# Patient Record
Sex: FEMALE | Race: WHITE | NOT HISPANIC OR LATINO | Employment: OTHER | URBAN - METROPOLITAN AREA
[De-identification: names, ages, dates, MRNs, and addresses within clinical notes are randomized per-mention and may not be internally consistent; named-entity substitution may affect disease eponyms.]

---

## 2017-01-11 ENCOUNTER — LAB (OUTPATIENT)
Dept: LAB | Facility: HOSPITAL | Age: 62
End: 2017-01-11

## 2017-01-11 DIAGNOSIS — N18.4 CHRONIC KIDNEY DISEASE, STAGE IV (SEVERE) (HCC): Primary | ICD-10-CM

## 2017-01-11 LAB
ALBUMIN SERPL-MCNC: 3.4 G/DL (ref 3.2–4.8)
ANION GAP SERPL CALCULATED.3IONS-SCNC: 13 MMOL/L (ref 3–11)
BACTERIA UR QL AUTO: ABNORMAL /HPF
BILIRUB UR QL STRIP: NEGATIVE
BUN BLD-MCNC: 53 MG/DL (ref 9–23)
BUN/CREAT SERPL: 21.2 (ref 7–25)
CALCIUM SPEC-SCNC: 9.7 MG/DL (ref 8.7–10.4)
CHLORIDE SERPL-SCNC: 104 MMOL/L (ref 99–109)
CLARITY UR: CLEAR
CO2 SERPL-SCNC: 25 MMOL/L (ref 20–31)
COLOR UR: YELLOW
CREAT BLD-MCNC: 2.5 MG/DL (ref 0.6–1.3)
CREAT UR-MCNC: 38.7 MG/DL
GFR SERPL CREATININE-BSD FRML MDRD: 20 ML/MIN/1.73
GLUCOSE BLD-MCNC: 350 MG/DL (ref 70–100)
GLUCOSE UR STRIP-MCNC: ABNORMAL MG/DL
HGB UR QL STRIP.AUTO: ABNORMAL
HYALINE CASTS UR QL AUTO: ABNORMAL /LPF
KETONES UR QL STRIP: NEGATIVE
LEUKOCYTE ESTERASE UR QL STRIP.AUTO: NEGATIVE
NITRITE UR QL STRIP: NEGATIVE
PH UR STRIP.AUTO: 6.5 [PH] (ref 5–8)
PHOSPHATE SERPL-MCNC: 4.3 MG/DL (ref 2.4–5.1)
POTASSIUM BLD-SCNC: 5 MMOL/L (ref 3.5–5.5)
PROT UR QL STRIP: ABNORMAL
PROT UR-MCNC: 649 MG/DL (ref 1–14)
RBC # UR: ABNORMAL /HPF
REF LAB TEST METHOD: ABNORMAL
SODIUM BLD-SCNC: 142 MMOL/L (ref 132–146)
SP GR UR STRIP: 1.02 (ref 1–1.03)
SQUAMOUS #/AREA URNS HPF: ABNORMAL /HPF
UROBILINOGEN UR QL STRIP: ABNORMAL
WBC UR QL AUTO: ABNORMAL /HPF

## 2017-01-11 PROCEDURE — 80069 RENAL FUNCTION PANEL: CPT | Performed by: INTERNAL MEDICINE

## 2017-01-11 PROCEDURE — 81001 URINALYSIS AUTO W/SCOPE: CPT | Performed by: INTERNAL MEDICINE

## 2017-01-11 PROCEDURE — 84156 ASSAY OF PROTEIN URINE: CPT | Performed by: INTERNAL MEDICINE

## 2017-01-11 PROCEDURE — 82570 ASSAY OF URINE CREATININE: CPT | Performed by: INTERNAL MEDICINE

## 2017-01-11 PROCEDURE — 36415 COLL VENOUS BLD VENIPUNCTURE: CPT

## 2017-01-16 RX ORDER — ERGOCALCIFEROL 1.25 MG/1
CAPSULE ORAL
Qty: 5 CAPSULE | Refills: 0 | Status: SHIPPED | OUTPATIENT
Start: 2017-01-16 | End: 2017-02-16 | Stop reason: SDUPTHER

## 2017-01-24 DIAGNOSIS — I10 ESSENTIAL HYPERTENSION: ICD-10-CM

## 2017-01-24 RX ORDER — DILTIAZEM HYDROCHLORIDE 240 MG/1
CAPSULE, EXTENDED RELEASE ORAL
Qty: 90 CAPSULE | Refills: 0 | Status: SHIPPED | OUTPATIENT
Start: 2017-01-24 | End: 2017-06-12 | Stop reason: SDUPTHER

## 2017-01-30 RX ORDER — INSULIN LISPRO 200 [IU]/ML
INJECTION, SOLUTION SUBCUTANEOUS
Qty: 60 ML | Refills: 0 | Status: SHIPPED | OUTPATIENT
Start: 2017-01-30 | End: 2017-03-11 | Stop reason: SDUPTHER

## 2017-02-16 ENCOUNTER — OFFICE VISIT (OUTPATIENT)
Dept: INTERNAL MEDICINE | Facility: CLINIC | Age: 62
End: 2017-02-16

## 2017-02-16 ENCOUNTER — OFFICE VISIT (OUTPATIENT)
Dept: ENDOCRINOLOGY | Facility: CLINIC | Age: 62
End: 2017-02-16

## 2017-02-16 VITALS
OXYGEN SATURATION: 99 % | SYSTOLIC BLOOD PRESSURE: 142 MMHG | DIASTOLIC BLOOD PRESSURE: 74 MMHG | HEIGHT: 67 IN | HEART RATE: 87 BPM | BODY MASS INDEX: 45.99 KG/M2 | WEIGHT: 293 LBS

## 2017-02-16 VITALS
WEIGHT: 293 LBS | HEIGHT: 67 IN | OXYGEN SATURATION: 99 % | HEART RATE: 86 BPM | BODY MASS INDEX: 45.99 KG/M2 | DIASTOLIC BLOOD PRESSURE: 74 MMHG | SYSTOLIC BLOOD PRESSURE: 142 MMHG

## 2017-02-16 DIAGNOSIS — I25.10 CORONARY ARTERY DISEASE INVOLVING NATIVE CORONARY ARTERY OF NATIVE HEART WITHOUT ANGINA PECTORIS: ICD-10-CM

## 2017-02-16 DIAGNOSIS — N25.81 SECONDARY HYPERPARATHYROIDISM (HCC): ICD-10-CM

## 2017-02-16 DIAGNOSIS — R60.0 BILATERAL EDEMA OF LOWER EXTREMITY: ICD-10-CM

## 2017-02-16 DIAGNOSIS — IMO0002 DIABETES MELLITUS WITH NEUROLOGICAL MANIFESTATIONS, UNCONTROLLED: ICD-10-CM

## 2017-02-16 DIAGNOSIS — IMO0002 UNCONTROLLED TYPE 2 DIABETES MELLITUS WITH COMPLICATION, WITH LONG-TERM CURRENT USE OF INSULIN: Primary | ICD-10-CM

## 2017-02-16 DIAGNOSIS — E08.319: ICD-10-CM

## 2017-02-16 DIAGNOSIS — E66.9 NON MORBID OBESITY, UNSPECIFIED OBESITY TYPE: ICD-10-CM

## 2017-02-16 DIAGNOSIS — E11.42 DIABETIC PERIPHERAL NEUROPATHY (HCC): ICD-10-CM

## 2017-02-16 DIAGNOSIS — E55.9 VITAMIN D DEFICIENCY: ICD-10-CM

## 2017-02-16 DIAGNOSIS — I10 ESSENTIAL HYPERTENSION: ICD-10-CM

## 2017-02-16 LAB
GLUCOSE BLDC GLUCOMTR-MCNC: 159 MG/DL (ref 70–130)
HBA1C MFR BLD: 7 %

## 2017-02-16 PROCEDURE — 82962 GLUCOSE BLOOD TEST: CPT | Performed by: INTERNAL MEDICINE

## 2017-02-16 PROCEDURE — 83036 HEMOGLOBIN GLYCOSYLATED A1C: CPT | Performed by: INTERNAL MEDICINE

## 2017-02-16 PROCEDURE — 99214 OFFICE O/P EST MOD 30 MIN: CPT | Performed by: INTERNAL MEDICINE

## 2017-02-16 PROCEDURE — 99213 OFFICE O/P EST LOW 20 MIN: CPT | Performed by: HOSPITALIST

## 2017-02-16 RX ORDER — INSULIN GLARGINE 300 U/ML
145 INJECTION, SOLUTION SUBCUTANEOUS DAILY
Qty: 15 PEN | Refills: 3 | Status: SHIPPED | OUTPATIENT
Start: 2017-02-16 | End: 2017-06-12 | Stop reason: SDUPTHER

## 2017-02-16 RX ORDER — ERGOCALCIFEROL 1.25 MG/1
CAPSULE ORAL
Qty: 5 CAPSULE | Refills: 0 | Status: SHIPPED | OUTPATIENT
Start: 2017-02-16 | End: 2017-03-23 | Stop reason: SDUPTHER

## 2017-02-16 NOTE — PROGRESS NOTES
Subjective   Tashia Leon is a 61 y.o. female. Essential hypertension; Gastroesophageal reflux disease without esophagitis; Non morbid obesity, unspecified obesity type; and Lumbar disc disease         HPI Comments: She is doing well. She has been swelling off and on. She has been trying to eliminate sugar and has been off for 1 month. She takes the diuretic daily. Her BP is up slight from her last visit. Her weight is stable. She is trying to lose weight. She has no new complaints. She sees Dr. Cowan today for follow up of her diabetes.      The following portions of the patient's history were reviewed and updated as appropriate: allergies, current medications, past family history, past medical history, past social history, past surgical history and problem list.    Review of Systems   Constitutional: Positive for fatigue. Negative for activity change, appetite change and unexpected weight change.   Respiratory: Negative for chest tightness, shortness of breath and wheezing.    Cardiovascular: Positive for leg swelling. Negative for chest pain and palpitations.   Gastrointestinal: Negative for abdominal distention and abdominal pain.   Endocrine: Negative for cold intolerance and heat intolerance.   Genitourinary: Negative for difficulty urinating and dysuria.   Neurological: Negative for light-headedness and headaches.       Objective   Vitals:    02/16/17 0833   BP: 142/74   Pulse: 87   SpO2: 99%       Physical Exam   Constitutional: She is oriented to person, place, and time. She appears well-developed and well-nourished.   HENT:   Head: Normocephalic and atraumatic.   Eyes: Conjunctivae and EOM are normal. Pupils are equal, round, and reactive to light.   Neck: Normal range of motion. Neck supple.   Cardiovascular: Normal rate, regular rhythm and normal heart sounds.    Pulmonary/Chest: Effort normal and breath sounds normal.   Abdominal: Soft. Bowel sounds are normal.   Musculoskeletal: Normal range of  motion.   Neurological: She is alert and oriented to person, place, and time. She has normal reflexes.       Assessment/Plan   Tashia was seen today for essential hypertension, gastroesophageal reflux disease without esophagitis, non morbid obesity, unspecified obesity type and lumbar disc disease.    Diagnoses and all orders for this visit:    Uncontrolled type 2 diabetes mellitus with complication, with long-term current use of insulin  -     Cancel: POC Glycosylated Hemoglobin (Hb A1C)  -     Cancel: POCT Glucose    Essential hypertension    Coronary artery disease involving native coronary artery of native heart without angina pectoris    Non morbid obesity, unspecified obesity type    Bilateral edema of lower extremity      She will montior her BP and let me know if it continue to run high.

## 2017-02-16 NOTE — PROGRESS NOTES
Chief complaint  Diabetes (F/u for type 2 diabetes, testing 5x qd ~ pt stated blood sugars were in the 500's in January (due to being on steroids) but now her readings have been lower. )    Subjective   Tashia Leon is a 61 y.o. female is here today for follow-up.  Diabetes mellitus type 2 diagnosed in mid 90s.   Medications: Humalog and Toujeo  Diabetic complications: Diabetic foot ulcer. Diab retinopathy. CKD   Eye care: diabetic retinopathy by last exam, seeing eye dr regularly. Podiatry visit - every 2 weeks currently  Hypoglycemia:none  Nutrition: Patient eats 3 meals a day with carbohydrates consistent amount per meal. She follows high protein diet for improved healing. Her mother is dietitian and helps her with diet plan.   Monitoring: she has      Hyerlipidemia with high LDL. Side effects on statins . She developed SOB on crestor and lipitor, simvastatin, Livalo.Doing well on Praluent, which was started Nov 2015, doing well since then.     Vit D deficiency 50 000 IU weekly Vit D started in Dec 2014      Patient was on steroids in January and the glucose was high in 400. She is following the strict diet since New Year.      Diabetes   Pertinent negatives for hypoglycemia include no dizziness, headaches or tremors. Associated symptoms include fatigue and weakness. Pertinent negatives for diabetes include no chest pain.       Medications    Current Outpatient Prescriptions:   •  Aflibercept (EYLEA IO), Inject  as directed every 30 (thirty) days., Disp: , Rfl:   •  Alirocumab (PRALUENT) 75 MG/ML solution pen-injector, Inject 75 mg under the skin Every 14 (Fourteen) Days., Disp: 6 pen, Rfl: 1  •  aspirin  MG tablet, Take 1 tablet by mouth Daily., Disp: 90 tablet, Rfl: 1  •  B-D UF III MINI PEN NEEDLES 31G X 5 MM misc, FOR USE WITH INSULIN PEN 4 TO 6 TIMES A DAY, Disp: 200 each, Rfl: 5  •  CARTIA  MG 24 hr capsule, TAKE 1 CAPSULE BY MOUTH ONCE DAILY, Disp: 90 capsule, Rfl: 0  •  furosemide  (LASIX) 40 MG tablet, TK 1 T PO BID, Disp: , Rfl: 3  •  HUMALOG KWIKPEN 200 UNIT/ML solution pen-injector, INJECT 60 UNITS WITH MEALS UP TO THREE TIMES DAILY ADD CORRECTION INSULIN AS DIRECTED. MAXIMUM DAILY DOSE 180 UNITS, Disp: 60 mL, Rfl: 0  •  lansoprazole (PREVACID) 30 MG capsule, Take 1 capsule by mouth Daily., Disp: 30 capsule, Rfl: 2  •  LORTAB 7.5-325 MG per tablet, TK 1 T PO  QID, Disp: , Rfl: 0  •  Multiple Vitamin (DAILY VITAMIN PO), Take  by mouth daily., Disp: , Rfl:   •  ONE TOUCH ULTRA TEST test strip, USE TO TEST BLOOD GLUCOSE THREE TIMES DAILY, Disp: 100 each, Rfl: 1  •  TOUJEO SOLOSTAR 300 UNIT/ML solution pen-injector, Inject 145 Units under the skin Daily. Inject 75 units in the morning and 70 units at bedtime., Disp: 15 pen, Rfl: 3  •  vitamin D (ERGOCALCIFEROL) 80626 UNITS capsule capsule, TAKE 1 CAPSULE BY MOUTH ONCE WEEKLY, Disp: 5 capsule, Rfl: 0    PMH  The following portions of the patient's history were reviewed and updated as appropriate: allergies, current medications, past family history, past medical history, past social history, past surgical history and problem list.    Review of systems  Review of Systems   Constitutional: Positive for fatigue and unexpected weight change (weight gain). Negative for activity change, appetite change, chills and diaphoresis.   HENT: Negative for congestion, ear pain, facial swelling, hearing loss, postnasal drip, sore throat, trouble swallowing and voice change.    Eyes: Negative.  Negative for redness, itching and visual disturbance.   Respiratory: Negative for cough and shortness of breath.    Cardiovascular: Positive for leg swelling. Negative for chest pain and palpitations.   Gastrointestinal: Negative for abdominal distention, abdominal pain, constipation, diarrhea, nausea and vomiting.   Endocrine:        As listed in HPI   Genitourinary: Negative.    Musculoskeletal: Negative for arthralgias, back pain, joint swelling, myalgias, neck pain  "and neck stiffness.   Skin: Negative.    Allergic/Immunologic: Negative.    Neurological: Positive for weakness and light-headedness. Negative for dizziness, tremors, syncope and headaches.   Hematological: Negative.    Psychiatric/Behavioral: Negative.    All other systems reviewed and are negative.      Physical exam  Objective   Blood pressure 142/74, pulse 86, height 67\" (170.2 cm), weight 298 lb (135 kg), SpO2 99 %.   Physical Exam   Constitutional: She is oriented to person, place, and time. She appears well-developed and well-nourished.   HENT:   Head: Normocephalic and atraumatic.   Eyes: Conjunctivae are normal.   Neck: No thyromegaly present.   The neck is supple and symmetric   Cardiovascular: Normal rate, regular rhythm and normal heart sounds.    Pulmonary/Chest: Effort normal and breath sounds normal.   Musculoskeletal:   Lower extremities - compression device applied bilaterally. Skin is intact   Lymphadenopathy:     She has no cervical adenopathy.   Neurological: She is alert and oriented to person, place, and time.   Skin: Skin is warm and dry. No rash noted.   Psychiatric: She has a normal mood and affect. Thought content normal.   Vitals reviewed.        LABS AND IMAGING  Office Visit on 02/16/2017   Component Date Value Ref Range Status   • Glucose 02/16/2017 159* 70 - 130 mg/dL Final   • Hemoglobin A1C 02/16/2017 7.0  % Final           Assessment  Assessment/Plan   Problem List Items Addressed This Visit        Digestive    Vitamin D deficiency       Endocrine    Diabetes mellitus with neurological manifestations, uncontrolled    Relevant Medications    TOUJEO SOLOSTAR 300 UNIT/ML solution pen-injector    Diabetic peripheral neuropathy    Relevant Medications    TOUJEO SOLOSTAR 300 UNIT/ML solution pen-injector    Diabetes mellitus type 2, uncontrolled, with complications - Primary    Relevant Medications    TOUJEO SOLOSTAR 300 UNIT/ML solution pen-injector    Other Relevant Orders    POC " Glucose Fingerstick (Completed)    POC Glycosylated Hemoglobin (Hb A1C) (Completed)    Secondary hyperparathyroidism       Other    Diabetic retinopathy          Plan  1. Diabetes mellitus type 2 with hx diabetic foot ulcer, retinopathy and CKD.   -New insulin instructions with concentrated insulins given: Toujeo 75 units in am and 70 units at bedtime. Humalog U-200 60 units with meals with correction 3 for 50 over 150.   patient was out of the insulin for a week and didn't noticed significant change. Her renal function declined  -restart Toujeo at 75 units daily and monitor glucose, may add 40-45 units second dose 3 days     2. Hyperlipidemia  Restart Praluent every 2 weeks. Samples provided. She had reaction to crestor, livalo and lipitor. No alternatives exist.   -repeat labs next visit     3. CKD / edema - stable, labs from nephrololgist reviewed, Cr is 3. She had discussion on dialysis.     4. Vitamin D deficiency - 50 000 IU weekly currently.       Lab results from nephrologist reviewed with the patient.     Follow-up in 3 months. Lab before the visit.

## 2017-03-13 RX ORDER — INSULIN LISPRO 200 [IU]/ML
INJECTION, SOLUTION SUBCUTANEOUS
Qty: 60 PEN | Refills: 0 | Status: SHIPPED | OUTPATIENT
Start: 2017-03-13 | End: 2017-05-16 | Stop reason: SDUPTHER

## 2017-03-20 ENCOUNTER — TRANSCRIBE ORDERS (OUTPATIENT)
Dept: LAB | Facility: HOSPITAL | Age: 62
End: 2017-03-20

## 2017-03-20 ENCOUNTER — LAB (OUTPATIENT)
Dept: LAB | Facility: HOSPITAL | Age: 62
End: 2017-03-20

## 2017-03-20 DIAGNOSIS — N18.4 CHRONIC KIDNEY DISEASE, STAGE IV (SEVERE) (HCC): ICD-10-CM

## 2017-03-20 DIAGNOSIS — E21.1 HYPERPARATHYROIDISM DUE TO 1,25(0H)2D3 (HCC): ICD-10-CM

## 2017-03-20 DIAGNOSIS — R80.9 PROTEINURIA: ICD-10-CM

## 2017-03-20 DIAGNOSIS — N18.4 CHRONIC KIDNEY DISEASE, STAGE IV (SEVERE) (HCC): Primary | ICD-10-CM

## 2017-03-20 LAB
25(OH)D3 SERPL-MCNC: 17.6 NG/ML
ALBUMIN SERPL-MCNC: 3.5 G/DL (ref 3.2–4.8)
ANION GAP SERPL CALCULATED.3IONS-SCNC: 10 MMOL/L (ref 3–11)
BACTERIA UR QL AUTO: ABNORMAL /HPF
BILIRUB UR QL STRIP: NEGATIVE
BUN BLD-MCNC: 47 MG/DL (ref 9–23)
BUN/CREAT SERPL: 16.8 (ref 7–25)
CALCIUM SPEC-SCNC: 9.6 MG/DL (ref 8.7–10.4)
CHLORIDE SERPL-SCNC: 108 MMOL/L (ref 99–109)
CLARITY UR: ABNORMAL
CO2 SERPL-SCNC: 25 MMOL/L (ref 20–31)
COLOR UR: YELLOW
CREAT BLD-MCNC: 2.8 MG/DL (ref 0.6–1.3)
CREAT UR-MCNC: 22.9 MG/DL
GFR SERPL CREATININE-BSD FRML MDRD: 17 ML/MIN/1.73
GLUCOSE BLD-MCNC: 95 MG/DL (ref 70–100)
GLUCOSE UR STRIP-MCNC: ABNORMAL MG/DL
HGB UR QL STRIP.AUTO: ABNORMAL
HYALINE CASTS UR QL AUTO: ABNORMAL /LPF
KETONES UR QL STRIP: NEGATIVE
LEUKOCYTE ESTERASE UR QL STRIP.AUTO: NEGATIVE
NITRITE UR QL STRIP: NEGATIVE
PH UR STRIP.AUTO: 6 [PH] (ref 5–8)
PHOSPHATE SERPL-MCNC: 3.4 MG/DL (ref 2.4–5.1)
POTASSIUM BLD-SCNC: 4.3 MMOL/L (ref 3.5–5.5)
PROT UR QL STRIP: ABNORMAL
PROT UR-MCNC: 220 MG/DL (ref 1–14)
RBC # UR: ABNORMAL /HPF
REF LAB TEST METHOD: ABNORMAL
SODIUM BLD-SCNC: 143 MMOL/L (ref 132–146)
SP GR UR STRIP: 1.01 (ref 1–1.03)
SQUAMOUS #/AREA URNS HPF: ABNORMAL /HPF
UROBILINOGEN UR QL STRIP: ABNORMAL
WBC UR QL AUTO: ABNORMAL /HPF

## 2017-03-20 PROCEDURE — 36415 COLL VENOUS BLD VENIPUNCTURE: CPT

## 2017-03-20 PROCEDURE — 83970 ASSAY OF PARATHORMONE: CPT | Performed by: INTERNAL MEDICINE

## 2017-03-20 PROCEDURE — 82306 VITAMIN D 25 HYDROXY: CPT | Performed by: INTERNAL MEDICINE

## 2017-03-20 PROCEDURE — 84156 ASSAY OF PROTEIN URINE: CPT | Performed by: INTERNAL MEDICINE

## 2017-03-20 PROCEDURE — 82570 ASSAY OF URINE CREATININE: CPT | Performed by: INTERNAL MEDICINE

## 2017-03-20 PROCEDURE — 81001 URINALYSIS AUTO W/SCOPE: CPT | Performed by: INTERNAL MEDICINE

## 2017-03-20 PROCEDURE — 80069 RENAL FUNCTION PANEL: CPT | Performed by: INTERNAL MEDICINE

## 2017-03-21 LAB — PTH-INTACT SERPL-MCNC: 175 PG/ML (ref 15–65)

## 2017-03-22 ENCOUNTER — TRANSCRIBE ORDERS (OUTPATIENT)
Dept: LAB | Facility: HOSPITAL | Age: 62
End: 2017-03-22

## 2017-03-22 ENCOUNTER — LAB (OUTPATIENT)
Dept: LAB | Facility: HOSPITAL | Age: 62
End: 2017-03-22

## 2017-03-22 DIAGNOSIS — E21.1 HYPERPARATHYROIDISM DUE TO 1,25(0H)2D3 (HCC): Primary | ICD-10-CM

## 2017-03-22 DIAGNOSIS — E21.1 HYPERPARATHYROIDISM DUE TO 1,25(0H)2D3 (HCC): ICD-10-CM

## 2017-03-22 PROCEDURE — 36415 COLL VENOUS BLD VENIPUNCTURE: CPT

## 2017-03-22 PROCEDURE — 83970 ASSAY OF PARATHORMONE: CPT | Performed by: INTERNAL MEDICINE

## 2017-03-23 LAB — PTH-INTACT SERPL-MCNC: 156 PG/ML (ref 15–65)

## 2017-03-23 RX ORDER — LANSOPRAZOLE 30 MG/1
CAPSULE, DELAYED RELEASE ORAL
Qty: 30 CAPSULE | Refills: 5 | Status: SHIPPED | OUTPATIENT
Start: 2017-03-23 | End: 2018-01-16

## 2017-03-23 RX ORDER — ERGOCALCIFEROL 1.25 MG/1
50000 CAPSULE ORAL
Qty: 4 CAPSULE | Refills: 0 | Status: SHIPPED | OUTPATIENT
Start: 2017-03-23 | End: 2017-04-18 | Stop reason: SDUPTHER

## 2017-03-30 ENCOUNTER — OFFICE VISIT (OUTPATIENT)
Dept: CARDIOLOGY | Facility: CLINIC | Age: 62
End: 2017-03-30

## 2017-03-30 VITALS
WEIGHT: 293 LBS | BODY MASS INDEX: 45.99 KG/M2 | DIASTOLIC BLOOD PRESSURE: 64 MMHG | HEIGHT: 67 IN | HEART RATE: 88 BPM | SYSTOLIC BLOOD PRESSURE: 104 MMHG

## 2017-03-30 DIAGNOSIS — E78.00 HYPERCHOLESTEROLEMIA: ICD-10-CM

## 2017-03-30 DIAGNOSIS — I10 ESSENTIAL HYPERTENSION: ICD-10-CM

## 2017-03-30 DIAGNOSIS — I25.10 CORONARY ARTERY DISEASE INVOLVING NATIVE CORONARY ARTERY OF NATIVE HEART WITHOUT ANGINA PECTORIS: Primary | ICD-10-CM

## 2017-03-30 PROCEDURE — 99213 OFFICE O/P EST LOW 20 MIN: CPT | Performed by: INTERNAL MEDICINE

## 2017-03-30 RX ORDER — HYDROCODONE BITARTRATE AND ACETAMINOPHEN 7.5; 325 MG/1; MG/1
1 TABLET ORAL EVERY 8 HOURS PRN
Status: ON HOLD | COMMUNITY
End: 2021-01-01 | Stop reason: SDUPTHER

## 2017-03-30 NOTE — PROGRESS NOTES
Tashia Leon  1955  [phone]  [phone]    2017    Jackie Davis MD    Chief Complaint   Patient presents with   • Coronary Artery Disease     IDENTIFICATION:  A 61-year-old white female,  and resident of [place], [place].    PROBLEM LIST:  1. Coronary artery disease:  a. Abnormal cardiac PET, 10/02/2013, Dr. Becker, revealing a posterolateral defect with a mild wall motion abnormality and a normal LVEF.  b. Cardiac catheterization,  10/21/2013, with placement of a 2.5 x 15 mm Xience Expedition drug-eluting stent to the proximal circumflex.  LVEF of 55% to 60%.  Noncritical disease in the LAD and right coronary.  c. Echocardiogram, 2013, shows an LVEF 50% to 55% with trace MR  and mild TR.   d. Echocardiogram, 7/15/2016: EF= 55%. Trace MR. Trace TR.   2. Hypertension.  3. Hyperlipidemia.  4. Type  2 diabetes mellitus:  a. Proteinuria noted, 2014.     5. Renal insufficiency   a. Renal duplex, 2014: No renal artery stenosis. Mild resistive parenchymal blood flow pattern.   6. Obesity.  7. Previous tobacco use with cessation in .  8.  Osteomyelitis/methicillin-resistant Staphylococcus aureus of the left foot, 2013.  9. Sepsis, 2013, hospitalized at [facility].  10. Gastroesophageal reflux disease/hiatal hernia.  11. History of peptic ulcer disease,  diagnosed 2013 by EGD.  Repeat EGD in 2013 was negative.  12. Nonobstructive carotid disease by duplex, 2013.  13. Lumbar disc disease.  14. Surgical history:  a.  section x2.  b. Tonsillectomy.  c. Incision and debridement of the left foot x2.      Allergies   Allergen Reactions   • Statins Myalgia   • Ancef [Cefazolin]    • Cephalosporins    • Cleocin [Clindamycin Hcl]    • Clindamycin/Lincomycin    • Keflex [Cephalexin]    • Levaquin [Levofloxacin]    • Lipitor [Atorvastatin]    • Lisinopril    • Losartan Potassium Other  (See Comments)     Unknown reaction   • Oxycodone    • Quinolones        Current Medications:      Current Outpatient Prescriptions:   •  Aflibercept (EYLEA IO), Inject  as directed. Every 6 weeks, Disp: , Rfl:   •  Alirocumab (PRALUENT) 75 MG/ML solution pen-injector, Inject 75 mg under the skin Every 14 (Fourteen) Days., Disp: 6 pen, Rfl: 1  •  aspirin  MG tablet, Take 1 tablet by mouth Daily., Disp: 90 tablet, Rfl: 1  •  B-D UF III MINI PEN NEEDLES 31G X 5 MM misc, FOR USE WITH INSULIN PEN 4 TO 6 TIMES A DAY, Disp: 200 each, Rfl: 5  •  CARTIA  MG 24 hr capsule, TAKE 1 CAPSULE BY MOUTH ONCE DAILY, Disp: 90 capsule, Rfl: 0  •  Cholecalciferol (VITAMIN D PO), Take 3,000 Units by mouth Daily., Disp: , Rfl:   •  furosemide (LASIX) 40 MG tablet, TK 1 T PO BID, Disp: , Rfl: 3  •  HUMALOG KWIKPEN 200 UNIT/ML solution pen-injector, INJECT 60 UNITS WITH MEALS UP TO THREE TIMES DAILY. ADD CORRECTION INSULIN AS DIRECTED. MAX DAILY DOSE  UNITS, Disp: 60 pen, Rfl: 0  •  HYDROcodone-acetaminophen (NORCO) 7.5-325 MG per tablet, Take 1 tablet by mouth 3 (Three) Times a Day., Disp: , Rfl:   •  lansoprazole (PREVACID) 30 MG capsule, TAKE 1 CAPSULE BY MOUTH DAILY, Disp: 30 capsule, Rfl: 5  •  Multiple Vitamin (DAILY VITAMIN PO), Take  by mouth daily., Disp: , Rfl:   •  ONE TOUCH ULTRA TEST test strip, USE TO TEST BLOOD GLUCOSE THREE TIMES DAILY, Disp: 100 each, Rfl: 1  •  TOUJEO SOLOSTAR 300 UNIT/ML solution pen-injector, Inject 145 Units under the skin Daily. Inject 75 units in the morning and 70 units at bedtime., Disp: 15 pen, Rfl: 3  •  vitamin D (ERGOCALCIFEROL) 69922 UNITS capsule capsule, Take 1 capsule by mouth Every 7 (Seven) Days., Disp: 4 capsule, Rfl: 0    HPI    Ms. Leon presents today for follow up of CAD, hypertension, and hyperlipidemia. Since last visit, she has had both legs casted for lower extremity edema, which has improved her symptoms and her mobility. She notes that her symptoms were  "found to be due to chronic renal insufficiency, for which she is seeing nephrology associates. She is scheduled to discuss a kidney transplant with them in the near future. She notes that she is chronically anemic secondary to proteinuria. Patient denies chest pain, palpitations, shortness of breath, PND, orthopnea, dizziness, and syncope. She keeps active doing housework and exercising with a stepper, and has lost some weight dieting with weight watchers.     The following portions of the patient's history were reviewed and updated as appropriate: allergies, current medications and problem list.    Pertinent positives as listed in the HPI.  All other systems reviewed are negative.    Vitals:    03/30/17 0909   BP: 104/64   BP Location: Left arm   Patient Position: Sitting   Pulse: 88   Weight: 295 lb 12.8 oz (134 kg)   Height: 67\" (170.2 cm)       General: Alert and oriented  Neck: Jugular venous pressure is within normal limits. Carotids have normal upstrokes without bruits.   Cardiovascular: Heart has a nondisplaced focal PMI. Regular rate and rhythm with 1/6 SE murmur at the RUSB with radiation to both carotids (right greater than left), gallop or rub.  Lungs: Clear without rales or wheezes. Equal expansion is noted.   Extremities: Show trace edema.  Skin: warm and dry.  Neurologic: nonfocal      Diagnostic Data:    Lab Results   Component Value Date    GLUCOSE 95 03/20/2017    CALCIUM 9.6 03/20/2017     03/20/2017    K 4.3 03/20/2017    CO2 25.0 03/20/2017     03/20/2017    BUN 47 (H) 03/20/2017    CREATININE 2.80 (H) 03/20/2017    EGFRIFAFRI 38 (L) 03/26/2015    EGFRIFNONA 17 (L) 03/20/2017    BCR 16.8 03/20/2017    ANIONGAP 10.0 03/20/2017     Lab Results   Component Value Date    CHOL 371 (H) 10/13/2016    TRIG 649 (H) 10/13/2016    HDL 45 10/13/2016    LDLDIRECT 142 (H) 10/13/2016    AST 74 (H) 10/13/2016    ALT 50 (H) 10/13/2016     Lab Results   Component Value Date    HGBA1C 7.0 02/16/2017 "     Lab Results   Component Value Date    TSH 2.493 10/13/2016     Procedures    Assessment:      ICD-10-CM ICD-9-CM   1. Coronary artery disease involving native coronary artery of native heart without angina pectoris I25.10 414.01   2. Essential hypertension I10 401.9   3. Hypercholesterolemia E78.00 272.0       Plan:    1. Encouraged diet and exercise efforts.   2. Continue current medications.  3. F/up in 12 months or sooner if needed.    Scribed for Gillian Becker MD by Petty Sutherland. 3/30/2017  9:16 AM    I Gillian Becker MD personally performed the services described in this documentation as scribed by the above individual in my presence, and it is both accurate and complete.    Gillian Becker MD, FACC

## 2017-04-18 RX ORDER — ERGOCALCIFEROL 1.25 MG/1
CAPSULE ORAL
Qty: 4 CAPSULE | Refills: 0 | Status: SHIPPED | OUTPATIENT
Start: 2017-04-18 | End: 2017-06-12 | Stop reason: SDUPTHER

## 2017-04-20 DIAGNOSIS — I10 ESSENTIAL HYPERTENSION: ICD-10-CM

## 2017-04-21 RX ORDER — DILTIAZEM HYDROCHLORIDE 240 MG/1
CAPSULE, EXTENDED RELEASE ORAL
Qty: 90 CAPSULE | Refills: 0 | Status: SHIPPED | OUTPATIENT
Start: 2017-04-21 | End: 2017-07-18 | Stop reason: SDUPTHER

## 2017-05-12 RX ORDER — ERGOCALCIFEROL 1.25 MG/1
CAPSULE ORAL
Qty: 4 CAPSULE | Refills: 0 | Status: SHIPPED | OUTPATIENT
Start: 2017-05-12 | End: 2017-06-12 | Stop reason: SDUPTHER

## 2017-05-16 RX ORDER — INSULIN LISPRO 200 [IU]/ML
INJECTION, SOLUTION SUBCUTANEOUS
Qty: 60 PEN | Refills: 0 | Status: SHIPPED | OUTPATIENT
Start: 2017-05-16 | End: 2017-06-12 | Stop reason: SDUPTHER

## 2017-05-17 ENCOUNTER — TELEPHONE (OUTPATIENT)
Dept: ENDOCRINOLOGY | Facility: CLINIC | Age: 62
End: 2017-05-17

## 2017-05-17 RX ORDER — FLURBIPROFEN SODIUM 0.3 MG/ML
SOLUTION/ DROPS OPHTHALMIC
Qty: 200 EACH | Refills: 3 | Status: SHIPPED | OUTPATIENT
Start: 2017-05-17 | End: 2018-01-16 | Stop reason: SDUPTHER

## 2017-06-08 RX ORDER — ERGOCALCIFEROL 1.25 MG/1
CAPSULE ORAL
Qty: 4 CAPSULE | Refills: 0 | Status: SHIPPED | OUTPATIENT
Start: 2017-06-08 | End: 2017-09-27 | Stop reason: SDUPTHER

## 2017-06-12 ENCOUNTER — OFFICE VISIT (OUTPATIENT)
Dept: ENDOCRINOLOGY | Facility: CLINIC | Age: 62
End: 2017-06-12

## 2017-06-12 ENCOUNTER — LAB (OUTPATIENT)
Dept: INTERNAL MEDICINE | Facility: CLINIC | Age: 62
End: 2017-06-12

## 2017-06-12 VITALS
BODY MASS INDEX: 45.28 KG/M2 | HEART RATE: 80 BPM | HEIGHT: 67 IN | OXYGEN SATURATION: 98 % | SYSTOLIC BLOOD PRESSURE: 138 MMHG | WEIGHT: 288.5 LBS | DIASTOLIC BLOOD PRESSURE: 74 MMHG

## 2017-06-12 DIAGNOSIS — I25.10 CORONARY ARTERY DISEASE INVOLVING NATIVE CORONARY ARTERY OF NATIVE HEART WITHOUT ANGINA PECTORIS: ICD-10-CM

## 2017-06-12 DIAGNOSIS — E55.9 VITAMIN D DEFICIENCY: ICD-10-CM

## 2017-06-12 DIAGNOSIS — IMO0002 DIABETES MELLITUS WITH NEUROLOGICAL MANIFESTATIONS, UNCONTROLLED: ICD-10-CM

## 2017-06-12 DIAGNOSIS — IMO0002 UNCONTROLLED TYPE 2 DIABETES MELLITUS WITH COMPLICATION, WITH LONG-TERM CURRENT USE OF INSULIN: ICD-10-CM

## 2017-06-12 DIAGNOSIS — E78.5 HYPERLIPIDEMIA, UNSPECIFIED HYPERLIPIDEMIA TYPE: Primary | ICD-10-CM

## 2017-06-12 DIAGNOSIS — E11.42 DIABETIC PERIPHERAL NEUROPATHY (HCC): ICD-10-CM

## 2017-06-12 LAB
25(OH)D3 SERPL-MCNC: 28.3 NG/ML
ALBUMIN SERPL-MCNC: 3.7 G/DL (ref 3.2–4.8)
ALBUMIN/GLOB SERPL: 1.3 G/DL (ref 1.5–2.5)
ALP SERPL-CCNC: 81 U/L (ref 25–100)
ALT SERPL W P-5'-P-CCNC: 28 U/L (ref 7–40)
ANION GAP SERPL CALCULATED.3IONS-SCNC: 8 MMOL/L (ref 3–11)
ARTICHOKE IGE QN: 73 MG/DL (ref 0–130)
AST SERPL-CCNC: 37 U/L (ref 0–33)
BILIRUB SERPL-MCNC: 0.1 MG/DL (ref 0.3–1.2)
BUN BLD-MCNC: 42 MG/DL (ref 9–23)
BUN/CREAT SERPL: 15.6 (ref 7–25)
CALCIUM SPEC-SCNC: 9.7 MG/DL (ref 8.7–10.4)
CHLORIDE SERPL-SCNC: 107 MMOL/L (ref 99–109)
CHOLEST SERPL-MCNC: 183 MG/DL (ref 0–200)
CO2 SERPL-SCNC: 24 MMOL/L (ref 20–31)
CREAT BLD-MCNC: 2.7 MG/DL (ref 0.6–1.3)
GFR SERPL CREATININE-BSD FRML MDRD: 18 ML/MIN/1.73
GLOBULIN UR ELPH-MCNC: 2.8 GM/DL
GLUCOSE BLD-MCNC: 181 MG/DL (ref 70–100)
HBA1C MFR BLD: 6.9 % (ref 4.8–5.6)
HDLC SERPL-MCNC: 35 MG/DL (ref 40–60)
POTASSIUM BLD-SCNC: 4.2 MMOL/L (ref 3.5–5.5)
PROT SERPL-MCNC: 6.5 G/DL (ref 5.7–8.2)
SODIUM BLD-SCNC: 139 MMOL/L (ref 132–146)
TRIGL SERPL-MCNC: 385 MG/DL (ref 0–150)
TSH SERPL DL<=0.05 MIU/L-ACNC: 2.48 MIU/ML (ref 0.35–5.35)

## 2017-06-12 PROCEDURE — 99214 OFFICE O/P EST MOD 30 MIN: CPT | Performed by: INTERNAL MEDICINE

## 2017-06-12 PROCEDURE — 84443 ASSAY THYROID STIM HORMONE: CPT | Performed by: INTERNAL MEDICINE

## 2017-06-12 PROCEDURE — 82306 VITAMIN D 25 HYDROXY: CPT | Performed by: INTERNAL MEDICINE

## 2017-06-12 PROCEDURE — 80061 LIPID PANEL: CPT | Performed by: INTERNAL MEDICINE

## 2017-06-12 PROCEDURE — 83036 HEMOGLOBIN GLYCOSYLATED A1C: CPT | Performed by: INTERNAL MEDICINE

## 2017-06-12 PROCEDURE — 80053 COMPREHEN METABOLIC PANEL: CPT | Performed by: INTERNAL MEDICINE

## 2017-06-12 RX ORDER — ASPIRIN 325 MG
TABLET, DELAYED RELEASE (ENTERIC COATED) ORAL
Qty: 90 TABLET | Refills: 0 | Status: SHIPPED | OUTPATIENT
Start: 2017-06-12 | End: 2017-09-14 | Stop reason: SDUPTHER

## 2017-06-12 RX ORDER — INSULIN GLARGINE 300 U/ML
120 INJECTION, SOLUTION SUBCUTANEOUS DAILY
Qty: 15 PEN | Refills: 6 | Status: SHIPPED | OUTPATIENT
Start: 2017-06-12 | End: 2017-08-18 | Stop reason: SDUPTHER

## 2017-06-12 NOTE — PATIENT INSTRUCTIONS
Toujeo 90 units at night, increase to 100 units in 4-5 days if morning glucose is > 180.   Continue to increase Toujeo by 10 units every week until morning glucose goal achieved.     Continue Humalog 60 units three times a day  3 units for every 50 over 150.

## 2017-06-12 NOTE — PROGRESS NOTES
Chief complaint  Diabetes (F/u for type 2 diabetes, pt stated that she has started weight watchers and is keeping a daily food log. Blood sugar numbers have been more elevated since starting on diet. )    Subjective   Tashia Leon is a 61 y.o. female is here today for follow-up.  Diabetes mellitus type 2 diagnosed in mid 90s.   Medications: Humalog and Toujeo  Diabetic complications: Diabetic foot ulcer. Diab retinopathy. CKD   Eye care: diabetic retinopathy by last exam, seeing eye dr regularly, had visist this morning and received injection. Podiatry visit - every 2 weeks currently  Patient was evaluated for the kidney transplant and went through testing.   Hypoglycemia:none  Nutrition: Patient eats 3 meals a day with carbohydrates consistent amount per meal. Follows weight watchers diet and lost weight, glucose is still high.       Hyerlipidemia with high LDL. Side effects on statins . She developed SOB on crestor and lipitor, simvastatin, Livalo.Doing well on Praluent, which was started Nov 2015, doing well since then.     Vit D deficiency 50 000 IU weekly Vit D started in Dec 2014    Started Weight Watchers  In March 2017, lost 10 lbs of weight but doesn't noticed glucose improvement. Meals are under 400 gui,      Diabetes   Pertinent negatives for hypoglycemia include no dizziness, headaches or tremors. Associated symptoms include fatigue and weakness. Pertinent negatives for diabetes include no chest pain.       Medications    Current Outpatient Prescriptions:   •  Aflibercept (EYLEA IO), Inject  as directed. Every 6 weeks, Disp: , Rfl:   •  Alirocumab (PRALUENT) 75 MG/ML solution pen-injector, Inject 75 mg under the skin Every 14 (Fourteen) Days., Disp: 6 pen, Rfl: 1  •  aspirin  MG tablet, TAKE 1 TABLET BY MOUTH EVERY DAY, Disp: 90 tablet, Rfl: 0  •  B-D UF III MINI PEN NEEDLES 31G X 5 MM misc, USE WITH INSULIN PEN 4-6 TIMES DAILY, Disp: 200 each, Rfl: 3  •  CARTIA  MG 24 hr capsule, TAKE  1 CAPSULE BY MOUTH ONCE DAILY, Disp: 90 capsule, Rfl: 0  •  Cholecalciferol (VITAMIN D PO), Take 3,000 Units by mouth Daily., Disp: , Rfl:   •  furosemide (LASIX) 40 MG tablet, TK 1 T PO BID, Disp: , Rfl: 3  •  HUMALOG KWIKPEN 200 UNIT/ML solution pen-injector, INJECT 60 UNITS UNDER THE SKIN THREE TIMES DAILY WITH MEALS AND CORRECTION INSULIN- MAX  UNITS/DAY, Disp: 60 pen, Rfl: 0  •  HYDROcodone-acetaminophen (NORCO) 7.5-325 MG per tablet, Take 1 tablet by mouth 3 (Three) Times a Day., Disp: , Rfl:   •  lansoprazole (PREVACID) 30 MG capsule, TAKE 1 CAPSULE BY MOUTH DAILY, Disp: 30 capsule, Rfl: 5  •  Multiple Vitamin (DAILY VITAMIN PO), Take  by mouth daily., Disp: , Rfl:   •  ONE TOUCH ULTRA TEST test strip, USE TO TEST BLOOD GLUCOSE THREE TIMES DAILY, Disp: 100 each, Rfl: 1  •  TOUJEO SOLOSTAR 300 UNIT/ML solution pen-injector, Inject 145 Units under the skin Daily. Inject 75 units in the morning and 70 units at bedtime., Disp: 15 pen, Rfl: 3  •  vitamin D (ERGOCALCIFEROL) 23321 UNITS capsule capsule, TAKE 1 CAPSULE BY MOUTH EVERY 7 DAYS, Disp: 4 capsule, Rfl: 0    PMH  The following portions of the patient's history were reviewed and updated as appropriate: allergies, current medications, past family history, past medical history, past social history, past surgical history and problem list.    Review of systems  Review of Systems   Constitutional: Positive for fatigue and unexpected weight change (weight gain). Negative for activity change, appetite change, chills and diaphoresis.   HENT: Negative for congestion, ear pain, facial swelling, hearing loss, postnasal drip, sore throat, trouble swallowing and voice change.    Eyes: Negative.  Negative for redness, itching and visual disturbance.   Respiratory: Negative for cough and shortness of breath.    Cardiovascular: Positive for leg swelling. Negative for chest pain and palpitations.   Gastrointestinal: Negative for abdominal distention, abdominal pain,  "constipation, diarrhea, nausea and vomiting.   Endocrine:        As listed in HPI   Genitourinary: Negative.    Musculoskeletal: Negative for arthralgias, back pain, joint swelling, myalgias, neck pain and neck stiffness.   Skin: Negative.    Allergic/Immunologic: Negative.    Neurological: Positive for weakness and light-headedness. Negative for dizziness, tremors, syncope and headaches.   Hematological: Negative.    Psychiatric/Behavioral: Negative.    All other systems reviewed and are negative.      Physical exam  Objective   Blood pressure 138/74, pulse 80, height 67\" (170.2 cm), weight 288 lb 8 oz (131 kg), SpO2 98 %.   Physical Exam   Constitutional: She is oriented to person, place, and time. She appears well-developed and well-nourished.   HENT:   Head: Normocephalic and atraumatic.   Eyes: Conjunctivae are normal.   Neck: No thyromegaly present.   The neck is supple and symmetric   Cardiovascular: Normal rate, regular rhythm and normal heart sounds.    Pulmonary/Chest: Effort normal and breath sounds normal.   Musculoskeletal:   Lower extremities - compression device applied bilaterally. Skin is intact   Lymphadenopathy:     She has no cervical adenopathy.   Neurological: She is alert and oriented to person, place, and time.   Skin: Skin is warm and dry. No rash noted.   Psychiatric: She has a normal mood and affect. Thought content normal.   Vitals reviewed.        LABS AND IMAGING  No visits with results within 1 Month(s) from this visit.  Latest known visit with results is:    Lab on 03/22/2017   Component Date Value Ref Range Status   • PTH, Intact 03/22/2017 156* 15 - 65 pg/mL Final           Assessment  Assessment/Plan   Problem List Items Addressed This Visit        Cardiovascular and Mediastinum    Hyperlipidemia - Primary       Digestive    Vitamin D deficiency       Endocrine    Diabetes mellitus with neurological manifestations, uncontrolled    Diabetic peripheral neuropathy    Diabetes mellitus " type 2, uncontrolled, with complications          Plan  1. Diabetes mellitus type 2 with hx diabetic foot ulcer, retinopathy and CKD.   -New insulin instructions with concentrated insulins given: Toujeo 90 units at bedtime.   Humalog U-200 60 units with meals 3 times  with correction 3 for 50 over 150.     Patient Instructions   Toujeo 90 units at night, increase to 100 units in 4-5 days if morning glucose is > 180.   Continue to increase Toujeo by 10 units every week until morning glucose goal achieved.     Continue Humalog 60 units three times a day  3 units for every 50 over 150.            2. Hyperlipidemia  Continue Praluent every 2 weeks. Samples provided. She had reaction to crestor, livalo and lipitor. No alternatives exist.      3. CKD / edema - stable, planned for kidney transplant.    4. Vitamin D deficiency - 50 000 IU weekly currently.       Lab results from nephrologist reviewed with the patient.     Follow-up in 3 months.

## 2017-07-10 RX ORDER — ERGOCALCIFEROL 1.25 MG/1
CAPSULE ORAL
Qty: 4 CAPSULE | Refills: 0 | Status: SHIPPED | OUTPATIENT
Start: 2017-07-10 | End: 2017-09-27 | Stop reason: SDUPTHER

## 2017-07-16 DIAGNOSIS — I10 ESSENTIAL HYPERTENSION: ICD-10-CM

## 2017-07-18 DIAGNOSIS — I10 ESSENTIAL HYPERTENSION: ICD-10-CM

## 2017-07-18 RX ORDER — DILTIAZEM HYDROCHLORIDE 240 MG/1
240 CAPSULE, COATED, EXTENDED RELEASE ORAL DAILY
Qty: 90 CAPSULE | Refills: 0 | Status: SHIPPED | OUTPATIENT
Start: 2017-07-18 | End: 2017-10-19 | Stop reason: SDUPTHER

## 2017-07-18 RX ORDER — DILTIAZEM HYDROCHLORIDE 240 MG/1
CAPSULE, EXTENDED RELEASE ORAL
Qty: 90 CAPSULE | Refills: 0 | OUTPATIENT
Start: 2017-07-18

## 2017-07-24 ENCOUNTER — OFFICE VISIT (OUTPATIENT)
Dept: INTERNAL MEDICINE | Facility: CLINIC | Age: 62
End: 2017-07-24

## 2017-07-24 ENCOUNTER — LAB (OUTPATIENT)
Dept: LAB | Facility: HOSPITAL | Age: 62
End: 2017-07-24

## 2017-07-24 ENCOUNTER — TRANSCRIBE ORDERS (OUTPATIENT)
Dept: LAB | Facility: HOSPITAL | Age: 62
End: 2017-07-24

## 2017-07-24 VITALS
WEIGHT: 279 LBS | DIASTOLIC BLOOD PRESSURE: 60 MMHG | HEART RATE: 94 BPM | SYSTOLIC BLOOD PRESSURE: 164 MMHG | OXYGEN SATURATION: 98 % | BODY MASS INDEX: 43.7 KG/M2

## 2017-07-24 DIAGNOSIS — N18.4 CHRONIC KIDNEY DISEASE, STAGE IV (SEVERE) (HCC): Primary | ICD-10-CM

## 2017-07-24 DIAGNOSIS — K21.9 GASTROESOPHAGEAL REFLUX DISEASE WITHOUT ESOPHAGITIS: Primary | ICD-10-CM

## 2017-07-24 DIAGNOSIS — N18.4 CHRONIC KIDNEY DISEASE, STAGE IV (SEVERE) (HCC): ICD-10-CM

## 2017-07-24 LAB
ALBUMIN SERPL-MCNC: 3.5 G/DL (ref 3.2–4.8)
ANION GAP SERPL CALCULATED.3IONS-SCNC: 11 MMOL/L (ref 3–11)
BACTERIA UR QL AUTO: ABNORMAL /HPF
BASOPHILS # BLD AUTO: 0.05 10*3/MM3 (ref 0–0.2)
BASOPHILS NFR BLD AUTO: 0.5 % (ref 0–1)
BILIRUB UR QL STRIP: NEGATIVE
BUN BLD-MCNC: 44 MG/DL (ref 9–23)
BUN/CREAT SERPL: 13.8 (ref 7–25)
CALCIUM SPEC-SCNC: 9.1 MG/DL (ref 8.7–10.4)
CHLORIDE SERPL-SCNC: 106 MMOL/L (ref 99–109)
CLARITY UR: ABNORMAL
CO2 SERPL-SCNC: 22 MMOL/L (ref 20–31)
COLOR UR: YELLOW
CREAT BLD-MCNC: 3.2 MG/DL (ref 0.6–1.3)
CREAT UR-MCNC: 79.9 MG/DL
DEPRECATED RDW RBC AUTO: 46.8 FL (ref 37–54)
EOSINOPHIL # BLD AUTO: 0.16 10*3/MM3 (ref 0–0.3)
EOSINOPHIL NFR BLD AUTO: 1.7 % (ref 0–3)
ERYTHROCYTE [DISTWIDTH] IN BLOOD BY AUTOMATED COUNT: 13.5 % (ref 11.3–14.5)
GFR SERPL CREATININE-BSD FRML MDRD: 15 ML/MIN/1.73
GLUCOSE BLD-MCNC: 265 MG/DL (ref 70–100)
GLUCOSE UR STRIP-MCNC: ABNORMAL MG/DL
HCT VFR BLD AUTO: 34.4 % (ref 34.5–44)
HGB BLD-MCNC: 10.7 G/DL (ref 11.5–15.5)
HGB UR QL STRIP.AUTO: ABNORMAL
HYALINE CASTS UR QL AUTO: ABNORMAL /LPF
IMM GRANULOCYTES # BLD: 0.05 10*3/MM3 (ref 0–0.03)
IMM GRANULOCYTES NFR BLD: 0.5 % (ref 0–0.6)
KETONES UR QL STRIP: NEGATIVE
LEUKOCYTE ESTERASE UR QL STRIP.AUTO: NEGATIVE
LYMPHOCYTES # BLD AUTO: 3.24 10*3/MM3 (ref 0.6–4.8)
LYMPHOCYTES NFR BLD AUTO: 34.4 % (ref 24–44)
MCH RBC QN AUTO: 29.3 PG (ref 27–31)
MCHC RBC AUTO-ENTMCNC: 31.1 G/DL (ref 32–36)
MCV RBC AUTO: 94.2 FL (ref 80–99)
MONOCYTES # BLD AUTO: 0.38 10*3/MM3 (ref 0–1)
MONOCYTES NFR BLD AUTO: 4 % (ref 0–12)
NEUTROPHILS # BLD AUTO: 5.54 10*3/MM3 (ref 1.5–8.3)
NEUTROPHILS NFR BLD AUTO: 58.9 % (ref 41–71)
NITRITE UR QL STRIP: NEGATIVE
PH UR STRIP.AUTO: 6 [PH] (ref 5–8)
PHOSPHATE SERPL-MCNC: 4.1 MG/DL (ref 2.4–5.1)
PLATELET # BLD AUTO: 344 10*3/MM3 (ref 150–450)
PMV BLD AUTO: 10.2 FL (ref 6–12)
POTASSIUM BLD-SCNC: 4.8 MMOL/L (ref 3.5–5.5)
PROT UR QL STRIP: ABNORMAL
PROT UR-MCNC: 679 MG/DL (ref 1–14)
RBC # BLD AUTO: 3.65 10*6/MM3 (ref 3.89–5.14)
RBC # UR: ABNORMAL /HPF
REF LAB TEST METHOD: ABNORMAL
SODIUM BLD-SCNC: 139 MMOL/L (ref 132–146)
SP GR UR STRIP: 1.02 (ref 1–1.03)
SQUAMOUS #/AREA URNS HPF: ABNORMAL /HPF
UROBILINOGEN UR QL STRIP: ABNORMAL
WBC NRBC COR # BLD: 9.42 10*3/MM3 (ref 3.5–10.8)
WBC UR QL AUTO: ABNORMAL /HPF

## 2017-07-24 PROCEDURE — 80069 RENAL FUNCTION PANEL: CPT | Performed by: INTERNAL MEDICINE

## 2017-07-24 PROCEDURE — 36415 COLL VENOUS BLD VENIPUNCTURE: CPT

## 2017-07-24 PROCEDURE — 81001 URINALYSIS AUTO W/SCOPE: CPT | Performed by: INTERNAL MEDICINE

## 2017-07-24 PROCEDURE — 85025 COMPLETE CBC W/AUTO DIFF WBC: CPT | Performed by: INTERNAL MEDICINE

## 2017-07-24 PROCEDURE — 99213 OFFICE O/P EST LOW 20 MIN: CPT | Performed by: PHYSICIAN ASSISTANT

## 2017-07-24 PROCEDURE — 82570 ASSAY OF URINE CREATININE: CPT | Performed by: INTERNAL MEDICINE

## 2017-07-24 PROCEDURE — 84156 ASSAY OF PROTEIN URINE: CPT | Performed by: INTERNAL MEDICINE

## 2017-07-24 PROCEDURE — 83970 ASSAY OF PARATHORMONE: CPT | Performed by: INTERNAL MEDICINE

## 2017-07-24 RX ORDER — COLLAGENASE SANTYL 250 [ARB'U]/G
1 OINTMENT TOPICAL AS NEEDED
COMMUNITY
Start: 2017-07-18 | End: 2017-09-27

## 2017-07-24 NOTE — PROGRESS NOTES
Chief Complaint   Patient presents with   • Follow-up     hypertension, hyperlipidemia, GERD       Subjective   Tashia Leon is a 61 y.o. female.       History of Present Illness     Pt is previous pt of Dr lAston. Has several specialist who manage her care so the only med prescribed by our office is reflux meds.    Pt is 5 lbs away from moving to step 2 of the kidney transplant process. She has some donors that are going to be tested. Followed by transplant team at . Kidneys are stable currently. Follow up with nephrology today.    Cardiologist is Dr Becker and Dr Cowan manages her diabetes.      Current Outpatient Prescriptions:   •  Aflibercept (EYLEA IO), Inject  as directed. Every 6 weeks, Disp: , Rfl:   •  Alirocumab (PRALUENT) 75 MG/ML solution pen-injector, Inject 75 mg under the skin Every 14 (Fourteen) Days., Disp: 7 pen, Rfl: 3  •  aspirin  MG tablet, TAKE 1 TABLET BY MOUTH EVERY DAY, Disp: 90 tablet, Rfl: 0  •  B-D UF III MINI PEN NEEDLES 31G X 5 MM misc, USE WITH INSULIN PEN 4-6 TIMES DAILY, Disp: 200 each, Rfl: 3  •  Cholecalciferol (VITAMIN D PO), Take 3,000 Units by mouth Daily., Disp: , Rfl:   •  diclofenac (VOLTAREN) 1 % gel gel, Apply 1 g topically As Needed., Disp: , Rfl: 3  •  diltiaZEM CD (CARTIA XT) 240 MG 24 hr capsule, Take 1 capsule by mouth Daily., Disp: 90 capsule, Rfl: 0  •  furosemide (LASIX) 40 MG tablet, TK 1 T PO BID, Disp: , Rfl: 3  •  HYDROcodone-acetaminophen (NORCO) 7.5-325 MG per tablet, Take 1 tablet by mouth 3 (Three) Times a Day., Disp: , Rfl:   •  Insulin Lispro (HUMALOG KWIKPEN) 200 UNIT/ML solution pen-injector, Inject 60 Units under the skin 3 (Three) Times a Day With Meals., Disp: 15 pen, Rfl: 6  •  Insulin Pen Needle (B-D ULTRAFINE III SHORT PEN) 31G X 8 MM misc, 1 each 4 (Four) Times a Day., Disp: 150 each, Rfl: 11  •  lansoprazole (PREVACID) 30 MG capsule, TAKE 1 CAPSULE BY MOUTH DAILY, Disp: 30 capsule, Rfl: 5  •  Multiple Vitamin (DAILY  VITAMIN PO), Take  by mouth daily., Disp: , Rfl:   •  ONE TOUCH ULTRA TEST test strip, TEST THREE TIMES DAILY, Disp: 100 each, Rfl: 11  •  SANTYL 250 UNIT/GM ointment, Apply 1 application topically As Needed., Disp: , Rfl:   •  TOUJEO SOLOSTAR 300 UNIT/ML solution pen-injector, Inject 120 Units under the skin Daily. Inject 75 units in the morning and 70 units at bedtime., Disp: 15 pen, Rfl: 6  •  vitamin D (ERGOCALCIFEROL) 99499 UNITS capsule capsule, TAKE 1 CAPSULE BY MOUTH EVERY 7 DAYS, Disp: 4 capsule, Rfl: 0  •  vitamin D (ERGOCALCIFEROL) 82164 UNITS capsule capsule, TAKE 1 CAPSULE BY MOUTH EVERY 7 DAYS, Disp: 4 capsule, Rfl: 0     PMFSH  The following portions of the patient's history were reviewed and updated as appropriate: allergies, current medications, past family history, past medical history, past social history, past surgical history and problem list.    Review of Systems   Constitutional: Positive for fatigue. Negative for activity change, appetite change and fever.   HENT: Negative.    Respiratory: Negative for chest tightness, shortness of breath and wheezing.    Cardiovascular: Negative for chest pain.   Gastrointestinal: Negative.    Genitourinary: Negative.    Musculoskeletal: Negative.    Neurological: Negative for dizziness, syncope, weakness and light-headedness.   Psychiatric/Behavioral: Negative for dysphoric mood and sleep disturbance. The patient is not nervous/anxious.        Objective   /60  Pulse 94  Wt 279 lb (127 kg)  SpO2 98%  BMI 43.7 kg/m2    Physical Exam   Constitutional: She appears well-developed and well-nourished.   HENT:   Head: Normocephalic.   Right Ear: Hearing, tympanic membrane, external ear and ear canal normal.   Left Ear: Hearing, tympanic membrane, external ear and ear canal normal.   Nose: Nose normal.   Mouth/Throat: Oropharynx is clear and moist.   Eyes: Conjunctivae are normal. Pupils are equal, round, and reactive to light.   Neck: Normal range of  motion.   Cardiovascular: Normal rate, regular rhythm and normal heart sounds.    Pulmonary/Chest: Effort normal and breath sounds normal. She has no decreased breath sounds. She has no wheezes. She has no rhonchi. She has no rales.   Musculoskeletal: Normal range of motion.   Neurological: She is alert.   Skin: Skin is warm and dry.   Psychiatric: She has a normal mood and affect. Her behavior is normal.   Nursing note and vitals reviewed.      Results for orders placed or performed in visit on 07/24/17   Renal Function Panel   Result Value Ref Range    Glucose 265 (H) 70 - 100 mg/dL    BUN 44 (H) 9 - 23 mg/dL    Creatinine 3.20 (H) 0.60 - 1.30 mg/dL    Sodium 139 132 - 146 mmol/L    Potassium 4.8 3.5 - 5.5 mmol/L    Chloride 106 99 - 109 mmol/L    CO2 22.0 20.0 - 31.0 mmol/L    Calcium 9.1 8.7 - 10.4 mg/dL    Albumin 3.50 3.20 - 4.80 g/dL    Phosphorus 4.1 2.4 - 5.1 mg/dL    Anion Gap 11.0 3.0 - 11.0 mmol/L    BUN/Creatinine Ratio 13.8 7.0 - 25.0    eGFR Non African Amer 15 (L) >60 mL/min/1.73   CBC Auto Differential   Result Value Ref Range    WBC 9.42 3.50 - 10.80 10*3/mm3    RBC 3.65 (L) 3.89 - 5.14 10*6/mm3    Hemoglobin 10.7 (L) 11.5 - 15.5 g/dL    Hematocrit 34.4 (L) 34.5 - 44.0 %    MCV 94.2 80.0 - 99.0 fL    MCH 29.3 27.0 - 31.0 pg    MCHC 31.1 (L) 32.0 - 36.0 g/dL    RDW 13.5 11.3 - 14.5 %    RDW-SD 46.8 37.0 - 54.0 fl    MPV 10.2 6.0 - 12.0 fL    Platelets 344 150 - 450 10*3/mm3    Neutrophil % 58.9 41.0 - 71.0 %    Lymphocyte % 34.4 24.0 - 44.0 %    Monocyte % 4.0 0.0 - 12.0 %    Eosinophil % 1.7 0.0 - 3.0 %    Basophil % 0.5 0.0 - 1.0 %    Immature Grans % 0.5 0.0 - 0.6 %    Neutrophils, Absolute 5.54 1.50 - 8.30 10*3/mm3    Lymphocytes, Absolute 3.24 0.60 - 4.80 10*3/mm3    Monocytes, Absolute 0.38 0.00 - 1.00 10*3/mm3    Eosinophils, Absolute 0.16 0.00 - 0.30 10*3/mm3    Basophils, Absolute 0.05 0.00 - 0.20 10*3/mm3    Immature Grans, Absolute 0.05 (H) 0.00 - 0.03 10*3/mm3         ASSESSMENT/PLAN    Problem List Items Addressed This Visit        Digestive    GERD (gastroesophageal reflux disease) - Primary     Stable with prevacid. Continue current meds.                    Return in about 5 months (around 12/24/2017).

## 2017-07-25 LAB — PTH-INTACT SERPL-MCNC: 172 PG/ML (ref 15–65)

## 2017-07-30 RX ORDER — ERGOCALCIFEROL 1.25 MG/1
CAPSULE ORAL
Qty: 4 CAPSULE | Refills: 0 | OUTPATIENT
Start: 2017-07-30

## 2017-08-07 RX ORDER — ERGOCALCIFEROL 1.25 MG/1
CAPSULE ORAL
Qty: 4 CAPSULE | Refills: 0 | Status: SHIPPED | OUTPATIENT
Start: 2017-08-07 | End: 2017-09-27 | Stop reason: SDUPTHER

## 2017-08-18 RX ORDER — INSULIN GLARGINE 300 U/ML
INJECTION, SOLUTION SUBCUTANEOUS
Qty: 1 PEN | Refills: 2 | Status: SHIPPED | OUTPATIENT
Start: 2017-08-18 | End: 2017-10-04 | Stop reason: SDUPTHER

## 2017-08-31 RX ORDER — ERGOCALCIFEROL 1.25 MG/1
CAPSULE ORAL
Qty: 12 CAPSULE | Refills: 0 | Status: SHIPPED | OUTPATIENT
Start: 2017-08-31 | End: 2017-09-27 | Stop reason: SDUPTHER

## 2017-09-05 ENCOUNTER — LAB (OUTPATIENT)
Dept: LAB | Facility: HOSPITAL | Age: 62
End: 2017-09-05

## 2017-09-05 ENCOUNTER — TRANSCRIBE ORDERS (OUTPATIENT)
Dept: LAB | Facility: HOSPITAL | Age: 62
End: 2017-09-05

## 2017-09-05 DIAGNOSIS — N18.4 CHRONIC KIDNEY DISEASE, STAGE IV (SEVERE) (HCC): ICD-10-CM

## 2017-09-05 DIAGNOSIS — N18.4 CHRONIC KIDNEY DISEASE, STAGE IV (SEVERE) (HCC): Primary | ICD-10-CM

## 2017-09-05 LAB
25(OH)D3 SERPL-MCNC: 33.7 NG/ML
ALBUMIN SERPL-MCNC: 4 G/DL (ref 3.2–4.8)
ANION GAP SERPL CALCULATED.3IONS-SCNC: 14 MMOL/L (ref 3–11)
BACTERIA UR QL AUTO: ABNORMAL /HPF
BASOPHILS # BLD AUTO: 0.07 10*3/MM3 (ref 0–0.2)
BASOPHILS NFR BLD AUTO: 0.6 % (ref 0–1)
BILIRUB UR QL STRIP: NEGATIVE
BUN BLD-MCNC: 65 MG/DL (ref 9–23)
BUN/CREAT SERPL: 18.6 (ref 7–25)
CALCIUM SPEC-SCNC: 9.4 MG/DL (ref 8.7–10.4)
CHLORIDE SERPL-SCNC: 98 MMOL/L (ref 99–109)
CLARITY UR: ABNORMAL
CO2 SERPL-SCNC: 28 MMOL/L (ref 20–31)
COLOR UR: YELLOW
CREAT BLD-MCNC: 3.5 MG/DL (ref 0.6–1.3)
CREAT UR-MCNC: 52.9 MG/DL
DEPRECATED RDW RBC AUTO: 46.5 FL (ref 37–54)
EOSINOPHIL # BLD AUTO: 0.14 10*3/MM3 (ref 0–0.3)
EOSINOPHIL NFR BLD AUTO: 1.3 % (ref 0–3)
ERYTHROCYTE [DISTWIDTH] IN BLOOD BY AUTOMATED COUNT: 13.6 % (ref 11.3–14.5)
GFR SERPL CREATININE-BSD FRML MDRD: 13 ML/MIN/1.73
GLUCOSE BLD-MCNC: 208 MG/DL (ref 70–100)
GLUCOSE UR STRIP-MCNC: ABNORMAL MG/DL
HCT VFR BLD AUTO: 34.7 % (ref 34.5–44)
HGB BLD-MCNC: 11.2 G/DL (ref 11.5–15.5)
HGB UR QL STRIP.AUTO: ABNORMAL
HYALINE CASTS UR QL AUTO: ABNORMAL /LPF
IMM GRANULOCYTES # BLD: 0.07 10*3/MM3 (ref 0–0.03)
IMM GRANULOCYTES NFR BLD: 0.6 % (ref 0–0.6)
KETONES UR QL STRIP: NEGATIVE
LEUKOCYTE ESTERASE UR QL STRIP.AUTO: ABNORMAL
LYMPHOCYTES # BLD AUTO: 3.9 10*3/MM3 (ref 0.6–4.8)
LYMPHOCYTES NFR BLD AUTO: 35.4 % (ref 24–44)
MCH RBC QN AUTO: 30 PG (ref 27–31)
MCHC RBC AUTO-ENTMCNC: 32.3 G/DL (ref 32–36)
MCV RBC AUTO: 93 FL (ref 80–99)
MONOCYTES # BLD AUTO: 0.72 10*3/MM3 (ref 0–1)
MONOCYTES NFR BLD AUTO: 6.5 % (ref 0–12)
NEUTROPHILS # BLD AUTO: 6.12 10*3/MM3 (ref 1.5–8.3)
NEUTROPHILS NFR BLD AUTO: 55.6 % (ref 41–71)
NITRITE UR QL STRIP: NEGATIVE
PH UR STRIP.AUTO: 5.5 [PH] (ref 5–8)
PHOSPHATE SERPL-MCNC: 4.8 MG/DL (ref 2.4–5.1)
PLATELET # BLD AUTO: 349 10*3/MM3 (ref 150–450)
PMV BLD AUTO: 10.4 FL (ref 6–12)
POTASSIUM BLD-SCNC: 3.2 MMOL/L (ref 3.5–5.5)
PROT UR QL STRIP: ABNORMAL
PROT UR-MCNC: 300 MG/DL (ref 1–14)
RBC # BLD AUTO: 3.73 10*6/MM3 (ref 3.89–5.14)
RBC # UR: ABNORMAL /HPF
REF LAB TEST METHOD: ABNORMAL
SODIUM BLD-SCNC: 140 MMOL/L (ref 132–146)
SP GR UR STRIP: 1.02 (ref 1–1.03)
SQUAMOUS #/AREA URNS HPF: ABNORMAL /HPF
UROBILINOGEN UR QL STRIP: ABNORMAL
WBC NRBC COR # BLD: 11.02 10*3/MM3 (ref 3.5–10.8)
WBC UR QL AUTO: ABNORMAL /HPF

## 2017-09-05 PROCEDURE — 82570 ASSAY OF URINE CREATININE: CPT | Performed by: INTERNAL MEDICINE

## 2017-09-05 PROCEDURE — 36415 COLL VENOUS BLD VENIPUNCTURE: CPT

## 2017-09-05 PROCEDURE — 83970 ASSAY OF PARATHORMONE: CPT | Performed by: INTERNAL MEDICINE

## 2017-09-05 PROCEDURE — 82306 VITAMIN D 25 HYDROXY: CPT | Performed by: INTERNAL MEDICINE

## 2017-09-05 PROCEDURE — 84156 ASSAY OF PROTEIN URINE: CPT | Performed by: INTERNAL MEDICINE

## 2017-09-05 PROCEDURE — 85025 COMPLETE CBC W/AUTO DIFF WBC: CPT | Performed by: INTERNAL MEDICINE

## 2017-09-05 PROCEDURE — 81001 URINALYSIS AUTO W/SCOPE: CPT | Performed by: INTERNAL MEDICINE

## 2017-09-05 PROCEDURE — 80069 RENAL FUNCTION PANEL: CPT | Performed by: INTERNAL MEDICINE

## 2017-09-06 LAB — PTH-INTACT SERPL-MCNC: 241 PG/ML (ref 15–65)

## 2017-09-14 RX ORDER — ASPIRIN 325 MG
TABLET, DELAYED RELEASE (ENTERIC COATED) ORAL
Qty: 90 TABLET | Refills: 0 | Status: SHIPPED | OUTPATIENT
Start: 2017-09-14 | End: 2017-12-13 | Stop reason: SDUPTHER

## 2017-09-27 ENCOUNTER — OFFICE VISIT (OUTPATIENT)
Dept: ENDOCRINOLOGY | Facility: CLINIC | Age: 62
End: 2017-09-27

## 2017-09-27 ENCOUNTER — TELEPHONE (OUTPATIENT)
Dept: INTERNAL MEDICINE | Facility: CLINIC | Age: 62
End: 2017-09-27

## 2017-09-27 VITALS
OXYGEN SATURATION: 98 % | DIASTOLIC BLOOD PRESSURE: 78 MMHG | SYSTOLIC BLOOD PRESSURE: 132 MMHG | HEART RATE: 82 BPM | BODY MASS INDEX: 42.72 KG/M2 | HEIGHT: 67 IN | WEIGHT: 272.2 LBS

## 2017-09-27 DIAGNOSIS — E11.3399 MODERATE NONPROLIFERATIVE DIABETIC RETINOPATHY WITHOUT MACULAR EDEMA ASSOCIATED WITH TYPE 2 DIABETES MELLITUS (HCC): ICD-10-CM

## 2017-09-27 DIAGNOSIS — N18.4 CKD (CHRONIC KIDNEY DISEASE), STAGE 4 (SEVERE): ICD-10-CM

## 2017-09-27 DIAGNOSIS — N25.81 SECONDARY HYPERPARATHYROIDISM (HCC): ICD-10-CM

## 2017-09-27 DIAGNOSIS — E78.2 MIXED HYPERLIPIDEMIA: ICD-10-CM

## 2017-09-27 DIAGNOSIS — IMO0002 DIABETES MELLITUS WITH NEUROLOGICAL MANIFESTATIONS, UNCONTROLLED: ICD-10-CM

## 2017-09-27 DIAGNOSIS — E11.42 DIABETIC PERIPHERAL NEUROPATHY (HCC): ICD-10-CM

## 2017-09-27 DIAGNOSIS — IMO0002 UNCONTROLLED TYPE 2 DIABETES MELLITUS WITH COMPLICATION, WITH LONG-TERM CURRENT USE OF INSULIN: Primary | ICD-10-CM

## 2017-09-27 LAB
GLUCOSE BLDC GLUCOMTR-MCNC: 173 MG/DL (ref 70–130)
HBA1C MFR BLD: 8 %

## 2017-09-27 PROCEDURE — 90471 IMMUNIZATION ADMIN: CPT | Performed by: INTERNAL MEDICINE

## 2017-09-27 PROCEDURE — 82947 ASSAY GLUCOSE BLOOD QUANT: CPT | Performed by: INTERNAL MEDICINE

## 2017-09-27 PROCEDURE — 90686 IIV4 VACC NO PRSV 0.5 ML IM: CPT | Performed by: INTERNAL MEDICINE

## 2017-09-27 PROCEDURE — 83036 HEMOGLOBIN GLYCOSYLATED A1C: CPT | Performed by: INTERNAL MEDICINE

## 2017-09-27 PROCEDURE — 99214 OFFICE O/P EST MOD 30 MIN: CPT | Performed by: INTERNAL MEDICINE

## 2017-09-27 RX ORDER — ERGOCALCIFEROL 1.25 MG/1
50000 CAPSULE ORAL WEEKLY
COMMUNITY
End: 2018-01-12 | Stop reason: SDUPTHER

## 2017-09-27 NOTE — PATIENT INSTRUCTIONS
Toujeo 60 units twice a day (or Tresiba 120 units nightly)  May increase by 5 units every week for morning glucose < 180 goal    Continue Humalog 60 units three times a day  Add 3 units for every 50 over 200

## 2017-09-27 NOTE — PROGRESS NOTES
Chief complaint  Diabetes (F/u for type 2 diabetes, testing 4-5x qd. C/o elevations in blood sugar readings this week. )    Subjective   Tashia Leon is a 61 y.o. female is here today for follow-up.  Diabetes mellitus type 2 diagnosed in mid 90s.   Medications: Humalog and Toujeo  Diabetic complications: Diabetic foot ulcer. Diab retinopathy. CKD   Eye care: diabetic retinopathy by last exam, seeing eye dr regularly.   Podiatry visit - every 2 weeks currently  Patient was evaluated for the kidney transplant and went through testing.   Hypoglycemia:none  Nutrition: Patient eats 3 meals a day with carbohydrates consistent amount per meal. Follows weight watchers diet and lost weight, glucose is still high.  She is taking insulin consistently.      Hyerlipidemia with high LDL. Side effects on statins . She developed SOB on crestor and lipitor, simvastatin, Livalo.Doing well on Praluent, which was started Nov 2015, doing well since then, LDL 73 in 6/2017.     Vit D deficiency 50 000 IU weekly Vit D started in Dec 2014    Started Weight Watchers  In March 2017, lost 20 lbs of weight but doesn't noticed glucose improvement. Meals are under 400 gui,      Diabetes   Pertinent negatives for hypoglycemia include no dizziness, headaches or tremors. Associated symptoms include fatigue and weakness. Pertinent negatives for diabetes include no chest pain.       Medications    Current Outpatient Prescriptions:   •  vitamin D (ERGOCALCIFEROL) 01013 units capsule capsule, Take 50,000 Units by mouth 1 (One) Time Per Week., Disp: , Rfl:   •  Aflibercept (EYLEA IO), Inject  as directed. Every 6 weeks, Disp: , Rfl:   •  Alirocumab (PRALUENT) 75 MG/ML solution pen-injector, Inject 75 mg under the skin Every 14 (Fourteen) Days., Disp: 7 pen, Rfl: 3  •  aspirin  MG tablet, TAKE 1 TABLET BY MOUTH EVERY DAY, Disp: 90 tablet, Rfl: 0  •  B-D UF III MINI PEN NEEDLES 31G X 5 MM misc, USE WITH INSULIN PEN 4-6 TIMES DAILY, Disp: 200  each, Rfl: 3  •  Cholecalciferol (VITAMIN D PO), Take 3,000 Units by mouth Daily., Disp: , Rfl:   •  diclofenac (VOLTAREN) 1 % gel gel, Apply 1 g topically As Needed., Disp: , Rfl: 3  •  diltiaZEM CD (CARTIA XT) 240 MG 24 hr capsule, Take 1 capsule by mouth Daily., Disp: 90 capsule, Rfl: 0  •  furosemide (LASIX) 40 MG tablet, TK 1 T PO BID, Disp: , Rfl: 3  •  HYDROcodone-acetaminophen (NORCO) 7.5-325 MG per tablet, Take 1 tablet by mouth 3 (Three) Times a Day., Disp: , Rfl:   •  Insulin Lispro (HUMALOG KWIKPEN) 200 UNIT/ML solution pen-injector, Inject 60 Units under the skin 3 (Three) Times a Day With Meals., Disp: 15 pen, Rfl: 6  •  Insulin Pen Needle (B-D ULTRAFINE III SHORT PEN) 31G X 8 MM misc, 1 each 4 (Four) Times a Day., Disp: 150 each, Rfl: 11  •  lansoprazole (PREVACID) 30 MG capsule, TAKE 1 CAPSULE BY MOUTH DAILY, Disp: 30 capsule, Rfl: 5  •  Multiple Vitamin (DAILY VITAMIN PO), Take  by mouth daily., Disp: , Rfl:   •  ONE TOUCH ULTRA TEST test strip, TEST THREE TIMES DAILY, Disp: 100 each, Rfl: 11  •  TOUJEO SOLOSTAR 300 UNIT/ML solution pen-injector, INJECT 145 UNITS UNDER THE SKIN DAILY. INJECT 75 UNITS IN THE MORNING AND 70 UNITS AT BEDTIME, Disp: 1 pen, Rfl: 2    PMH  The following portions of the patient's history were reviewed and updated as appropriate: allergies, current medications, past family history, past medical history, past social history, past surgical history and problem list.    Review of systems  Review of Systems   Constitutional: Positive for fatigue and unexpected weight change (weight gain). Negative for activity change, appetite change, chills and diaphoresis.   HENT: Negative for congestion, ear pain, facial swelling, hearing loss, postnasal drip, sore throat, trouble swallowing and voice change.    Eyes: Negative.  Negative for redness, itching and visual disturbance.   Respiratory: Negative for cough and shortness of breath.    Cardiovascular: Positive for leg swelling.  "Negative for chest pain and palpitations.   Gastrointestinal: Negative for abdominal distention, abdominal pain, constipation, diarrhea, nausea and vomiting.   Endocrine:        As listed in HPI   Genitourinary: Negative.    Musculoskeletal: Negative for arthralgias, back pain, joint swelling, myalgias, neck pain and neck stiffness.   Skin: Negative.    Allergic/Immunologic: Negative.    Neurological: Positive for weakness and light-headedness. Negative for dizziness, tremors, syncope and headaches.   Hematological: Negative.    Psychiatric/Behavioral: Negative.    All other systems reviewed and are negative.      Physical exam  Objective   Blood pressure 132/78, pulse 82, height 67\" (170.2 cm), weight 272 lb 3.2 oz (123 kg), SpO2 98 %.   Physical Exam   Constitutional: She is oriented to person, place, and time. She appears well-developed and well-nourished.   HENT:   Head: Normocephalic and atraumatic.   Eyes: Conjunctivae are normal.   Neck: No thyromegaly present.   The neck is supple and symmetric   Cardiovascular: Normal rate, regular rhythm and normal heart sounds.    Pulmonary/Chest: Effort normal and breath sounds normal.   Musculoskeletal: She exhibits edema (trace).   Lower extremities - compression device applied bilaterally. Skin is intact   Lymphadenopathy:     She has no cervical adenopathy.   Neurological: She is alert and oriented to person, place, and time.   Skin: Skin is warm and dry. No rash noted.   Psychiatric: She has a normal mood and affect. Thought content normal.   Vitals reviewed.        LABS AND IMAGING  Office Visit on 09/27/2017   Component Date Value Ref Range Status   • Glucose 09/27/2017 173* 70 - 130 mg/dL Final   • Hemoglobin A1C 09/27/2017 8.0  % Final   Lab on 09/05/2017   Component Date Value Ref Range Status   • Creatinine, Urine 09/05/2017 52.9  mg/dL Final   • PTH, Intact 09/05/2017 241* 15 - 65 pg/mL Final   • Total Protein, Urine 09/05/2017 300.0* 1.0 - 14.0 mg/dL Final "   • Glucose 09/05/2017 208* 70 - 100 mg/dL Final   • BUN 09/05/2017 65* 9 - 23 mg/dL Final   • Creatinine 09/05/2017 3.50* 0.60 - 1.30 mg/dL Final   • Sodium 09/05/2017 140  132 - 146 mmol/L Final   • Potassium 09/05/2017 3.2* 3.5 - 5.5 mmol/L Final   • Chloride 09/05/2017 98* 99 - 109 mmol/L Final   • CO2 09/05/2017 28.0  20.0 - 31.0 mmol/L Final   • Calcium 09/05/2017 9.4  8.7 - 10.4 mg/dL Final   • Albumin 09/05/2017 4.00  3.20 - 4.80 g/dL Final   • Phosphorus 09/05/2017 4.8  2.4 - 5.1 mg/dL Final   • Anion Gap 09/05/2017 14.0* 3.0 - 11.0 mmol/L Final   • BUN/Creatinine Ratio 09/05/2017 18.6  7.0 - 25.0 Final   • eGFR Non African Amer 09/05/2017 13* >60 mL/min/1.73 Final   • 25 Hydroxy, Vitamin D 09/05/2017 33.7  ng/ml Final   • Color, UA 09/05/2017 Yellow  Yellow, Straw Final   • Appearance, UA 09/05/2017 Cloudy* Clear Final   • pH, UA 09/05/2017 5.5  5.0 - 8.0 Final   • Specific Gravity, UA 09/05/2017 1.016  1.001 - 1.030 Final   • Glucose, UA 09/05/2017 250 mg/dL (1+)* Negative Final   • Ketones, UA 09/05/2017 Negative  Negative Final   • Bilirubin, UA 09/05/2017 Negative  Negative Final   • Blood, UA 09/05/2017 Small (1+)* Negative Final   • Protein, UA 09/05/2017 >=300 mg/dL (3+)* Negative Final   • Leuk Esterase, UA 09/05/2017 Small (1+)* Negative Final   • Nitrite, UA 09/05/2017 Negative  Negative Final   • Urobilinogen, UA 09/05/2017 0.2 E.U./dL  0.2 - 1.0 E.U./dL Final   • RBC, UA 09/05/2017 None Seen  None Seen, 0-2 /HPF Final   • WBC, UA 09/05/2017 Too Numerous to Count* None Seen /HPF Final   • Bacteria, UA 09/05/2017 4+* None Seen, Trace /HPF Final   • Squamous Epithelial Cells, UA 09/05/2017 None Seen  None Seen, 0-2 /HPF Final   • Hyaline Casts, UA 09/05/2017 0-6  0 - 6 /LPF Final   • Methodology 09/05/2017 Automated Microscopy   Final   • WBC 09/05/2017 11.02* 3.50 - 10.80 10*3/mm3 Final   • RBC 09/05/2017 3.73* 3.89 - 5.14 10*6/mm3 Final   • Hemoglobin 09/05/2017 11.2* 11.5 - 15.5 g/dL Final   •  Hematocrit 09/05/2017 34.7  34.5 - 44.0 % Final   • MCV 09/05/2017 93.0  80.0 - 99.0 fL Final   • MCH 09/05/2017 30.0  27.0 - 31.0 pg Final   • MCHC 09/05/2017 32.3  32.0 - 36.0 g/dL Final   • RDW 09/05/2017 13.6  11.3 - 14.5 % Final   • RDW-SD 09/05/2017 46.5  37.0 - 54.0 fl Final   • MPV 09/05/2017 10.4  6.0 - 12.0 fL Final   • Platelets 09/05/2017 349  150 - 450 10*3/mm3 Final   • Neutrophil % 09/05/2017 55.6  41.0 - 71.0 % Final   • Lymphocyte % 09/05/2017 35.4  24.0 - 44.0 % Final   • Monocyte % 09/05/2017 6.5  0.0 - 12.0 % Final   • Eosinophil % 09/05/2017 1.3  0.0 - 3.0 % Final   • Basophil % 09/05/2017 0.6  0.0 - 1.0 % Final   • Immature Grans % 09/05/2017 0.6  0.0 - 0.6 % Final   • Neutrophils, Absolute 09/05/2017 6.12  1.50 - 8.30 10*3/mm3 Final   • Lymphocytes, Absolute 09/05/2017 3.90  0.60 - 4.80 10*3/mm3 Final   • Monocytes, Absolute 09/05/2017 0.72  0.00 - 1.00 10*3/mm3 Final   • Eosinophils, Absolute 09/05/2017 0.14  0.00 - 0.30 10*3/mm3 Final   • Basophils, Absolute 09/05/2017 0.07  0.00 - 0.20 10*3/mm3 Final   • Immature Grans, Absolute 09/05/2017 0.07* 0.00 - 0.03 10*3/mm3 Final           Assessment  Assessment/Plan   Problem List Items Addressed This Visit        Cardiovascular and Mediastinum    Hyperlipemia       Endocrine    Diabetes mellitus with neurological manifestations, uncontrolled    Diabetic peripheral neuropathy    Moderate nonproliferative diabetic retinopathy without macular edema associated with type 2 diabetes mellitus    Diabetes mellitus type 2, uncontrolled, with complications - Primary    Relevant Orders    POC Glucose Fingerstick (Completed)    POC Glycosylated Hemoglobin (Hb A1C) (Completed)    Secondary hyperparathyroidism       Genitourinary    CKD (chronic kidney disease)          Plan  1. Diabetes mellitus type 2 with hx diabetic foot ulcer, retinopathy and CKD.   -New insulin instructions with concentrated insulins given: Toujeo 120 units at bedtime, she may have to  split the dose into 60 units BID.    Humalog U-200 60 units with meals 3 times  with correction 3 for 50 over 150.     Patient Instructions   Toujeo 60 units twice a day (or Tresiba 120 units nightly)  May increase by 5 units every week for morning glucose < 180 goal    Continue Humalog 60 units three times a day  Add 3 units for every 50 over 200         2. Hyperlipidemia  Continue Praluent every 2 weeks. Samples provided. She had reaction to crestor, livalo and lipitor. No alternatives exist.      3. CKD / edema - stable, planned for kidney transplant.       Lab results from nephrologist reviewed with the patient. GFR is 13.   Medications refilled. Hypoglycemia precautions and insulin instructions reviewed.     Follow-up in 3 months.

## 2017-10-04 RX ORDER — INSULIN GLARGINE 300 U/ML
INJECTION, SOLUTION SUBCUTANEOUS
Qty: 3 PEN | Refills: 3 | Status: SHIPPED | OUTPATIENT
Start: 2017-10-04 | End: 2017-10-23 | Stop reason: SDUPTHER

## 2017-10-19 DIAGNOSIS — I10 ESSENTIAL HYPERTENSION: ICD-10-CM

## 2017-10-19 RX ORDER — DILTIAZEM HYDROCHLORIDE 240 MG/1
CAPSULE, EXTENDED RELEASE ORAL
Qty: 90 CAPSULE | Refills: 0 | Status: SHIPPED | OUTPATIENT
Start: 2017-10-19 | End: 2018-01-17 | Stop reason: SDUPTHER

## 2017-10-23 ENCOUNTER — TELEPHONE (OUTPATIENT)
Dept: INTERNAL MEDICINE | Facility: CLINIC | Age: 62
End: 2017-10-23

## 2017-10-23 NOTE — TELEPHONE ENCOUNTER
INSULIN GIVEN TO USE IN PLACE OF TOUJEO IS NOT HELPFUL, STATES IT MADE IT WORSE. COULD YOU PLEASE CALL IN TOUJEO 80 UNITS MORNING AND 80 UNITS AT NIGHT FOR HER?

## 2017-10-25 RX ORDER — INSULIN GLARGINE 300 U/ML
INJECTION, SOLUTION SUBCUTANEOUS
Refills: 0 | OUTPATIENT
Start: 2017-10-25

## 2017-11-14 ENCOUNTER — TRANSCRIBE ORDERS (OUTPATIENT)
Dept: LAB | Facility: HOSPITAL | Age: 62
End: 2017-11-14

## 2017-11-14 ENCOUNTER — LAB (OUTPATIENT)
Dept: LAB | Facility: HOSPITAL | Age: 62
End: 2017-11-14
Attending: INTERNAL MEDICINE

## 2017-11-14 DIAGNOSIS — N18.5 CHRONIC KIDNEY DISEASE, STAGE V (HCC): Primary | ICD-10-CM

## 2017-11-14 DIAGNOSIS — N18.5 CHRONIC KIDNEY DISEASE, STAGE V (HCC): ICD-10-CM

## 2017-11-14 LAB
25(OH)D3 SERPL-MCNC: 44.5 NG/ML
ALBUMIN SERPL-MCNC: 3.8 G/DL (ref 3.2–4.8)
ANION GAP SERPL CALCULATED.3IONS-SCNC: 14 MMOL/L (ref 3–11)
BASOPHILS # BLD AUTO: 0.06 10*3/MM3 (ref 0–0.2)
BASOPHILS NFR BLD AUTO: 0.6 % (ref 0–1)
BUN BLD-MCNC: 78 MG/DL (ref 9–23)
BUN/CREAT SERPL: 20.5 (ref 7–25)
CALCIUM SPEC-SCNC: 9 MG/DL (ref 8.7–10.4)
CHLORIDE SERPL-SCNC: 97 MMOL/L (ref 99–109)
CO2 SERPL-SCNC: 27 MMOL/L (ref 20–31)
CREAT BLD-MCNC: 3.8 MG/DL (ref 0.6–1.3)
DEPRECATED RDW RBC AUTO: 47.9 FL (ref 37–54)
EOSINOPHIL # BLD AUTO: 0.12 10*3/MM3 (ref 0–0.3)
EOSINOPHIL NFR BLD AUTO: 1.2 % (ref 0–3)
ERYTHROCYTE [DISTWIDTH] IN BLOOD BY AUTOMATED COUNT: 13.6 % (ref 11.3–14.5)
GFR SERPL CREATININE-BSD FRML MDRD: 12 ML/MIN/1.73
GLUCOSE BLD-MCNC: 394 MG/DL (ref 70–100)
HCT VFR BLD AUTO: 31.9 % (ref 34.5–44)
HGB BLD-MCNC: 10.3 G/DL (ref 11.5–15.5)
IMM GRANULOCYTES # BLD: 0.03 10*3/MM3 (ref 0–0.03)
IMM GRANULOCYTES NFR BLD: 0.3 % (ref 0–0.6)
LYMPHOCYTES # BLD AUTO: 3.25 10*3/MM3 (ref 0.6–4.8)
LYMPHOCYTES NFR BLD AUTO: 33.3 % (ref 24–44)
MCH RBC QN AUTO: 31.1 PG (ref 27–31)
MCHC RBC AUTO-ENTMCNC: 32.3 G/DL (ref 32–36)
MCV RBC AUTO: 96.4 FL (ref 80–99)
MONOCYTES # BLD AUTO: 0.66 10*3/MM3 (ref 0–1)
MONOCYTES NFR BLD AUTO: 6.8 % (ref 0–12)
NEUTROPHILS # BLD AUTO: 5.63 10*3/MM3 (ref 1.5–8.3)
NEUTROPHILS NFR BLD AUTO: 57.8 % (ref 41–71)
PHOSPHATE SERPL-MCNC: 4.7 MG/DL (ref 2.4–5.1)
PLATELET # BLD AUTO: 308 10*3/MM3 (ref 150–450)
PMV BLD AUTO: 10.7 FL (ref 6–12)
POTASSIUM BLD-SCNC: 3.5 MMOL/L (ref 3.5–5.5)
RBC # BLD AUTO: 3.31 10*6/MM3 (ref 3.89–5.14)
SODIUM BLD-SCNC: 138 MMOL/L (ref 132–146)
WBC NRBC COR # BLD: 9.75 10*3/MM3 (ref 3.5–10.8)

## 2017-11-14 PROCEDURE — 36415 COLL VENOUS BLD VENIPUNCTURE: CPT

## 2017-11-14 PROCEDURE — 82306 VITAMIN D 25 HYDROXY: CPT | Performed by: INTERNAL MEDICINE

## 2017-11-14 PROCEDURE — 80069 RENAL FUNCTION PANEL: CPT | Performed by: INTERNAL MEDICINE

## 2017-11-14 PROCEDURE — 85025 COMPLETE CBC W/AUTO DIFF WBC: CPT | Performed by: INTERNAL MEDICINE

## 2017-11-14 PROCEDURE — 83970 ASSAY OF PARATHORMONE: CPT | Performed by: INTERNAL MEDICINE

## 2017-11-16 LAB — PTH-INTACT SERPL-MCNC: 181 PG/ML (ref 15–65)

## 2017-11-19 RX ORDER — ERGOCALCIFEROL 1.25 MG/1
CAPSULE ORAL
Qty: 12 CAPSULE | Refills: 0 | Status: SHIPPED | OUTPATIENT
Start: 2017-11-19 | End: 2018-01-12 | Stop reason: SDUPTHER

## 2017-12-13 RX ORDER — INSULIN GLARGINE 300 U/ML
INJECTION, SOLUTION SUBCUTANEOUS
Qty: 15 PEN | Refills: 0 | Status: SHIPPED | OUTPATIENT
Start: 2017-12-13 | End: 2018-01-12 | Stop reason: SDUPTHER

## 2017-12-13 RX ORDER — ASPIRIN 325 MG
TABLET, DELAYED RELEASE (ENTERIC COATED) ORAL
Qty: 90 TABLET | Refills: 0 | Status: SHIPPED | OUTPATIENT
Start: 2017-12-13 | End: 2018-03-16 | Stop reason: SDUPTHER

## 2018-01-12 RX ORDER — ERGOCALCIFEROL 1.25 MG/1
CAPSULE ORAL
Qty: 12 CAPSULE | Refills: 0 | Status: SHIPPED | OUTPATIENT
Start: 2018-01-12 | End: 2018-01-16 | Stop reason: SDUPTHER

## 2018-01-12 NOTE — TELEPHONE ENCOUNTER
SUZANNEI: PT CHANGED PHARM. PLEASE SEND TO [facility] IN [place]

## 2018-01-15 RX ORDER — ERGOCALCIFEROL 1.25 MG/1
50000 CAPSULE ORAL WEEKLY
Qty: 4 CAPSULE | Refills: 3 | Status: SHIPPED | OUTPATIENT
Start: 2018-01-15 | End: 2018-04-25

## 2018-01-16 ENCOUNTER — LAB (OUTPATIENT)
Dept: LAB | Facility: HOSPITAL | Age: 63
End: 2018-01-16

## 2018-01-16 ENCOUNTER — OFFICE VISIT (OUTPATIENT)
Dept: INTERNAL MEDICINE | Facility: CLINIC | Age: 63
End: 2018-01-16

## 2018-01-16 ENCOUNTER — OFFICE VISIT (OUTPATIENT)
Dept: ENDOCRINOLOGY | Facility: CLINIC | Age: 63
End: 2018-01-16

## 2018-01-16 ENCOUNTER — TRANSCRIBE ORDERS (OUTPATIENT)
Dept: LAB | Facility: HOSPITAL | Age: 63
End: 2018-01-16

## 2018-01-16 VITALS
HEART RATE: 78 BPM | DIASTOLIC BLOOD PRESSURE: 74 MMHG | HEIGHT: 67 IN | BODY MASS INDEX: 42.06 KG/M2 | OXYGEN SATURATION: 97 % | WEIGHT: 268 LBS | SYSTOLIC BLOOD PRESSURE: 130 MMHG

## 2018-01-16 VITALS
BODY MASS INDEX: 42.06 KG/M2 | SYSTOLIC BLOOD PRESSURE: 130 MMHG | OXYGEN SATURATION: 97 % | HEIGHT: 67 IN | DIASTOLIC BLOOD PRESSURE: 74 MMHG | HEART RATE: 78 BPM | WEIGHT: 268 LBS

## 2018-01-16 DIAGNOSIS — E21.1 HYPERPARATHYROIDISM DUE TO 1,25(0H)2D3 (HCC): ICD-10-CM

## 2018-01-16 DIAGNOSIS — E21.1 HYPERPARATHYROIDISM DUE TO 1,25(0H)2D3 (HCC): Primary | ICD-10-CM

## 2018-01-16 DIAGNOSIS — N18.5 CHRONIC KIDNEY DISEASE, STAGE V (HCC): ICD-10-CM

## 2018-01-16 DIAGNOSIS — J02.9 SORE THROAT: Primary | ICD-10-CM

## 2018-01-16 DIAGNOSIS — IMO0002 UNCONTROLLED TYPE 2 DIABETES MELLITUS WITH COMPLICATION, WITH LONG-TERM CURRENT USE OF INSULIN: Primary | ICD-10-CM

## 2018-01-16 LAB
25(OH)D3 SERPL-MCNC: 63.5 NG/ML
ALBUMIN SERPL-MCNC: 4 G/DL (ref 3.2–4.8)
ALBUMIN/GLOB SERPL: 1.3 G/DL (ref 1.5–2.5)
ALP SERPL-CCNC: 50 U/L (ref 25–100)
ALT SERPL W P-5'-P-CCNC: 34 U/L (ref 7–40)
ANION GAP SERPL CALCULATED.3IONS-SCNC: 13 MMOL/L (ref 3–11)
ARTICHOKE IGE QN: 93 MG/DL (ref 0–130)
AST SERPL-CCNC: 43 U/L (ref 0–33)
BACTERIA UR QL AUTO: ABNORMAL /HPF
BASOPHILS # BLD AUTO: 0.06 10*3/MM3 (ref 0–0.2)
BASOPHILS NFR BLD AUTO: 0.5 % (ref 0–1)
BILIRUB SERPL-MCNC: 0.2 MG/DL (ref 0.3–1.2)
BILIRUB UR QL STRIP: NEGATIVE
BUN BLD-MCNC: 86 MG/DL (ref 9–23)
BUN/CREAT SERPL: 21.5 (ref 7–25)
CALCIUM SPEC-SCNC: 11.1 MG/DL (ref 8.7–10.4)
CHLORIDE SERPL-SCNC: 97 MMOL/L (ref 99–109)
CHOLEST SERPL-MCNC: 177 MG/DL (ref 0–200)
CLARITY UR: CLEAR
CO2 SERPL-SCNC: 29 MMOL/L (ref 20–31)
COLOR UR: YELLOW
CREAT BLD-MCNC: 4 MG/DL (ref 0.6–1.3)
CREAT UR-MCNC: 39.7 MG/DL
DEPRECATED RDW RBC AUTO: 47.5 FL (ref 37–54)
EOSINOPHIL # BLD AUTO: 0.21 10*3/MM3 (ref 0–0.3)
EOSINOPHIL NFR BLD AUTO: 1.6 % (ref 0–3)
ERYTHROCYTE [DISTWIDTH] IN BLOOD BY AUTOMATED COUNT: 13.4 % (ref 11.3–14.5)
EXPIRATION DATE: NORMAL
FERRITIN SERPL-MCNC: 146 NG/ML (ref 10–291)
GFR SERPL CREATININE-BSD FRML MDRD: 11 ML/MIN/1.73
GLOBULIN UR ELPH-MCNC: 3 GM/DL
GLUCOSE BLD-MCNC: 83 MG/DL (ref 70–100)
GLUCOSE BLDC GLUCOMTR-MCNC: 118 MG/DL (ref 70–130)
GLUCOSE UR STRIP-MCNC: NEGATIVE MG/DL
HBA1C MFR BLD: 7.1 %
HCT VFR BLD AUTO: 31.2 % (ref 34.5–44)
HDLC SERPL-MCNC: 33 MG/DL (ref 40–60)
HGB BLD-MCNC: 9.8 G/DL (ref 11.5–15.5)
HGB UR QL STRIP.AUTO: ABNORMAL
HYALINE CASTS UR QL AUTO: ABNORMAL /LPF
IMM GRANULOCYTES # BLD: 0.06 10*3/MM3 (ref 0–0.03)
IMM GRANULOCYTES NFR BLD: 0.5 % (ref 0–0.6)
INTERNAL CONTROL: NORMAL
IRON 24H UR-MRATE: 51 MCG/DL (ref 50–175)
IRON SATN MFR SERPL: 15 % (ref 15–50)
KETONES UR QL STRIP: NEGATIVE
LEUKOCYTE ESTERASE UR QL STRIP.AUTO: NEGATIVE
LYMPHOCYTES # BLD AUTO: 4.56 10*3/MM3 (ref 0.6–4.8)
LYMPHOCYTES NFR BLD AUTO: 35.5 % (ref 24–44)
Lab: NORMAL
MCH RBC QN AUTO: 30.5 PG (ref 27–31)
MCHC RBC AUTO-ENTMCNC: 31.4 G/DL (ref 32–36)
MCV RBC AUTO: 97.2 FL (ref 80–99)
MONOCYTES # BLD AUTO: 0.75 10*3/MM3 (ref 0–1)
MONOCYTES NFR BLD AUTO: 5.8 % (ref 0–12)
NEUTROPHILS # BLD AUTO: 7.22 10*3/MM3 (ref 1.5–8.3)
NEUTROPHILS NFR BLD AUTO: 56.1 % (ref 41–71)
NITRITE UR QL STRIP: NEGATIVE
PH UR STRIP.AUTO: <=5 [PH] (ref 5–8)
PHOSPHATE SERPL-MCNC: 4.3 MG/DL (ref 2.4–5.1)
PLATELET # BLD AUTO: 357 10*3/MM3 (ref 150–450)
PMV BLD AUTO: 10.8 FL (ref 6–12)
POTASSIUM BLD-SCNC: 3.5 MMOL/L (ref 3.5–5.5)
PROT SERPL-MCNC: 7 G/DL (ref 5.7–8.2)
PROT UR QL STRIP: ABNORMAL
PROT UR-MCNC: 124 MG/DL (ref 1–14)
RBC # BLD AUTO: 3.21 10*6/MM3 (ref 3.89–5.14)
RBC # UR: ABNORMAL /HPF
REF LAB TEST METHOD: ABNORMAL
S PYO AG THROAT QL: NEGATIVE
SODIUM BLD-SCNC: 139 MMOL/L (ref 132–146)
SP GR UR STRIP: 1.01 (ref 1–1.03)
SQUAMOUS #/AREA URNS HPF: ABNORMAL /HPF
TIBC SERPL-MCNC: 351 MCG/DL (ref 250–450)
TRIGL SERPL-MCNC: 304 MG/DL (ref 0–150)
UROBILINOGEN UR QL STRIP: ABNORMAL
WBC NRBC COR # BLD: 12.86 10*3/MM3 (ref 3.5–10.8)
WBC UR QL AUTO: ABNORMAL /HPF

## 2018-01-16 PROCEDURE — 81001 URINALYSIS AUTO W/SCOPE: CPT

## 2018-01-16 PROCEDURE — 84100 ASSAY OF PHOSPHORUS: CPT

## 2018-01-16 PROCEDURE — 83970 ASSAY OF PARATHORMONE: CPT

## 2018-01-16 PROCEDURE — 36415 COLL VENOUS BLD VENIPUNCTURE: CPT | Performed by: INTERNAL MEDICINE

## 2018-01-16 PROCEDURE — 99214 OFFICE O/P EST MOD 30 MIN: CPT | Performed by: INTERNAL MEDICINE

## 2018-01-16 PROCEDURE — 80053 COMPREHEN METABOLIC PANEL: CPT | Performed by: INTERNAL MEDICINE

## 2018-01-16 PROCEDURE — 83540 ASSAY OF IRON: CPT

## 2018-01-16 PROCEDURE — 83036 HEMOGLOBIN GLYCOSYLATED A1C: CPT | Performed by: INTERNAL MEDICINE

## 2018-01-16 PROCEDURE — 82043 UR ALBUMIN QUANTITATIVE: CPT | Performed by: INTERNAL MEDICINE

## 2018-01-16 PROCEDURE — 82728 ASSAY OF FERRITIN: CPT

## 2018-01-16 PROCEDURE — 99213 OFFICE O/P EST LOW 20 MIN: CPT | Performed by: PHYSICIAN ASSISTANT

## 2018-01-16 PROCEDURE — 87880 STREP A ASSAY W/OPTIC: CPT | Performed by: PHYSICIAN ASSISTANT

## 2018-01-16 PROCEDURE — 83550 IRON BINDING TEST: CPT

## 2018-01-16 PROCEDURE — 82570 ASSAY OF URINE CREATININE: CPT

## 2018-01-16 PROCEDURE — 80061 LIPID PANEL: CPT | Performed by: INTERNAL MEDICINE

## 2018-01-16 PROCEDURE — 82306 VITAMIN D 25 HYDROXY: CPT | Performed by: INTERNAL MEDICINE

## 2018-01-16 PROCEDURE — 84156 ASSAY OF PROTEIN URINE: CPT

## 2018-01-16 PROCEDURE — 82947 ASSAY GLUCOSE BLOOD QUANT: CPT | Performed by: INTERNAL MEDICINE

## 2018-01-16 PROCEDURE — 82570 ASSAY OF URINE CREATININE: CPT | Performed by: INTERNAL MEDICINE

## 2018-01-16 PROCEDURE — 85025 COMPLETE CBC W/AUTO DIFF WBC: CPT

## 2018-01-16 RX ORDER — AMMONIUM LACTATE 12 G/100G
1 CREAM TOPICAL AS NEEDED
COMMUNITY
Start: 2018-01-10 | End: 2018-11-26

## 2018-01-16 RX ORDER — FLUTICASONE PROPIONATE 50 MCG
2 SPRAY, SUSPENSION (ML) NASAL DAILY
Qty: 1 BOTTLE | Refills: 2 | Status: SHIPPED | OUTPATIENT
Start: 2018-01-16 | End: 2018-03-16 | Stop reason: SDUPTHER

## 2018-01-16 RX ORDER — BACLOFEN 10 MG/1
5-10 TABLET ORAL AS NEEDED
COMMUNITY
Start: 2018-01-10 | End: 2019-09-03

## 2018-01-16 RX ORDER — FLUTICASONE PROPIONATE 50 MCG
2 SPRAY, SUSPENSION (ML) NASAL DAILY
Qty: 1 BOTTLE | Refills: 2 | Status: SHIPPED | OUTPATIENT
Start: 2018-01-16 | End: 2018-01-16 | Stop reason: SDUPTHER

## 2018-01-16 RX ORDER — RANITIDINE 150 MG/1
150 TABLET ORAL 2 TIMES DAILY
COMMUNITY
Start: 2018-01-10 | End: 2018-07-26

## 2018-01-16 RX ORDER — BUMETANIDE 1 MG/1
TABLET ORAL
COMMUNITY
Start: 2018-01-10 | End: 2018-04-25

## 2018-01-16 RX ORDER — METOLAZONE 2.5 MG/1
2.5 TABLET ORAL EVERY OTHER DAY
COMMUNITY
Start: 2018-01-15 | End: 2018-12-13 | Stop reason: HOSPADM

## 2018-01-16 NOTE — PROGRESS NOTES
Chief complaint  Diabetes (F/u for type 2 diabetes, testing 3-4x qd. Pt stated that bs numbers have improved since last ov)    Subjective   Tashia Leon is a 62 y.o. female is here today for follow-up.  Diabetes mellitus type 2 diagnosed in mid 90s.   Medications: Humalog and Toujeo  Diabetic complications: Diabetic foot ulcer. Diab retinopathy. CKD   Eye care: diabetic retinopathy by last exam, seeing eye dr regularly.   Podiatry visit - every 2 weeks currently  Patient was evaluated for the kidney transplant and went through testing.   Hypoglycemia:none  Nutrition: Patient eats 3 meals a day with carbohydrates consistent amount per meal. Follows weight watchers diet and lost weight, glucose is still high.  She is taking insulin consistently.      Hyerlipidemia with high LDL. Side effects on statins . She developed SOB on crestor and lipitor, simvastatin, Livalo.Doing well on Praluent, which was started Nov 2015, doing well since then, LDL 73 in 6/2017.     Vit D deficiency 50 000 IU weekly Vit D started in Dec 2014    Started Weight Watchers  In March 2017, lost 20 lbs of weight but doesn't noticed glucose improvement. Meals are under 400 gui, she lost 5 more lbs from last visit. Glucometer reviewed.      Diabetes   Pertinent negatives for hypoglycemia include no dizziness, headaches or tremors. Associated symptoms include fatigue and weakness. Pertinent negatives for diabetes include no chest pain.       Medications    Current Outpatient Prescriptions:   •  Aflibercept (EYLEA IO), Inject  as directed. Every 6 weeks, Disp: , Rfl:   •  Alirocumab (PRALUENT) 75 MG/ML solution pen-injector, Inject 75 mg under the skin Every 14 (Fourteen) Days., Disp: 7 pen, Rfl: 3  •  aspirin  MG tablet, TAKE 1 TABLET BY MOUTH EVERY DAY, Disp: 90 tablet, Rfl: 0  •  B-D UF III MINI PEN NEEDLES 31G X 5 MM misc, USE WITH INSULIN PEN 4-6 TIMES DAILY, Disp: 200 each, Rfl: 3  •  CARTIA  MG 24 hr capsule, TAKE ONE CAPSULE  BY MOUTH ONCE DAILY, Disp: 90 capsule, Rfl: 0  •  Cholecalciferol (VITAMIN D PO), Take 3,000 Units by mouth Daily., Disp: , Rfl:   •  diclofenac (VOLTAREN) 1 % gel gel, Apply 1 g topically As Needed., Disp: , Rfl: 3  •  furosemide (LASIX) 40 MG tablet, TK 1 T PO BID, Disp: , Rfl: 3  •  HYDROcodone-acetaminophen (NORCO) 7.5-325 MG per tablet, Take 1 tablet by mouth 3 (Three) Times a Day., Disp: , Rfl:   •  Insulin Glargine (TOUJEO SOLOSTAR) 300 UNIT/ML solution pen-injector, Inject 80 Units as directed Daily., Disp: 15 pen, Rfl: 0  •  Insulin Lispro (HUMALOG KWIKPEN) 200 UNIT/ML solution pen-injector, Inject 60 Units under the skin 3 (Three) Times a Day With Meals., Disp: 15 pen, Rfl: 6  •  Insulin Pen Needle (B-D ULTRAFINE III SHORT PEN) 31G X 8 MM misc, 1 each 4 (Four) Times a Day., Disp: 150 each, Rfl: 11  •  lansoprazole (PREVACID) 30 MG capsule, TAKE 1 CAPSULE BY MOUTH DAILY, Disp: 30 capsule, Rfl: 5  •  Multiple Vitamin (DAILY VITAMIN PO), Take  by mouth daily., Disp: , Rfl:   •  ONE TOUCH ULTRA TEST test strip, TEST THREE TIMES DAILY, Disp: 100 each, Rfl: 11  •  vitamin D (ERGOCALCIFEROL) 47695 units capsule capsule, Take 1 capsule by mouth 1 (One) Time Per Week., Disp: 4 capsule, Rfl: 3    PMH  The following portions of the patient's history were reviewed and updated as appropriate: allergies, current medications, past family history, past medical history, past social history, past surgical history and problem list.    Review of systems  Review of Systems   Constitutional: Positive for fatigue and unexpected weight change (weight gain). Negative for activity change, appetite change, chills and diaphoresis.   HENT: Negative for congestion, ear pain, facial swelling, hearing loss, postnasal drip, sore throat, trouble swallowing and voice change.    Eyes: Negative.  Negative for redness, itching and visual disturbance.   Respiratory: Negative for cough and shortness of breath.    Cardiovascular: Positive for leg  "swelling. Negative for chest pain and palpitations.   Gastrointestinal: Negative for abdominal distention, abdominal pain, constipation, diarrhea, nausea and vomiting.   Endocrine:        As listed in HPI   Genitourinary: Negative.    Musculoskeletal: Negative for arthralgias, back pain, joint swelling, myalgias, neck pain and neck stiffness.   Skin: Negative.    Allergic/Immunologic: Negative.    Neurological: Positive for weakness and light-headedness. Negative for dizziness, tremors, syncope and headaches.   Hematological: Negative.    Psychiatric/Behavioral: Negative.    All other systems reviewed and are negative.      Physical exam  Objective   Blood pressure 130/74, pulse 78, height 170.2 cm (67\"), weight 122 kg (268 lb), SpO2 97 %.   Physical Exam   Constitutional: She is oriented to person, place, and time. She appears well-developed and well-nourished.   HENT:   Head: Normocephalic and atraumatic.   Eyes: Conjunctivae are normal.   Neck: No thyromegaly present.   The neck is supple and symmetric   Cardiovascular: Normal rate, regular rhythm and normal heart sounds.    Pulmonary/Chest: Effort normal and breath sounds normal.   Musculoskeletal: She exhibits edema (trace).   Lower extremities - compression device applied bilaterally. Skin is intact   Lymphadenopathy:     She has no cervical adenopathy.   Neurological: She is alert and oriented to person, place, and time.   Skin: Skin is warm and dry. No rash noted.   Psychiatric: She has a normal mood and affect. Thought content normal.   Vitals reviewed.        LABS AND IMAGING  Office Visit on 01/16/2018   Component Date Value Ref Range Status   • Glucose 01/16/2018 118  70 - 130 mg/dL Final   • Hemoglobin A1C 01/16/2018 7.1  % Final           Assessment  Assessment/Plan   Problem List Items Addressed This Visit        Endocrine    Diabetes mellitus type 2, uncontrolled, with complications - Primary    Relevant Orders    POC Glucose Fingerstick (Completed) "    POC Glycosylated Hemoglobin (Hb A1C) (Completed)          Plan  1. Diabetes mellitus type 2 with hx diabetic foot ulcer, retinopathy and CKD.   -New insulin instructions with concentrated insulins given: Toujeo 160 units at bedtime, she may have to split the dose into 80 units BID.    Humalog U-200 80 units with meals 3 times  with correction 3 for 50 over 150.     Add Trulicity, it should be beneficial for weight loss.     There are no Patient Instructions on file for this visit.    2. Hyperlipidemia  Continue Praluent every 2 weeks. Samples provided. She had reaction to crestor, livalo and lipitor. No alternatives exist.      3. CKD / edema - stable, planned for kidney transplant.       Medications refilled. Hypoglycemia precautions and insulin instructions reviewed.   Fasting labs today.   Follow-up in 3 months.

## 2018-01-16 NOTE — PROGRESS NOTES
Chief Complaint   Patient presents with   • Follow-up     FOllow UP        Subjective   Tashia Leon is a 62 y.o. female.       History of Present Illness     Pt is still being followed by kidney transplant team- levels are stable. She has been told that she needs to lose weight down to 225 lbs before she will be eligible for transplant, she continues to work on weight loss.     She saw endocrinologist today and had good report, a1c has improved.    Pt started feeling bad with ST in the morning and and evenings about 2 weeks ago, hurts when she swallows. No congestion or nasal drainage. Has gas heat with humidifier. No history of allergic rhinitis. No heartburn or reflux. Was switched from prevacid to zantac about a year ago due to worsening kidney function. Since then levels have stabilized and she was instructed to remain off of it.        Current Outpatient Prescriptions:   •  Aflibercept (EYLEA IO), Inject  as directed. Every 6 weeks, Disp: , Rfl:   •  Alirocumab (PRALUENT) 75 MG/ML solution pen-injector, Inject 75 mg under the skin Every 14 (Fourteen) Days., Disp: 7 pen, Rfl: 3  •  ammonium lactate (AMLACTIN) 12 % cream, , Disp: , Rfl:   •  aspirin  MG tablet, TAKE 1 TABLET BY MOUTH EVERY DAY, Disp: 90 tablet, Rfl: 0  •  baclofen (LIORESAL) 10 MG tablet, , Disp: , Rfl:   •  bumetanide (BUMEX) 1 MG tablet, , Disp: , Rfl:   •  CARTIA  MG 24 hr capsule, TAKE ONE CAPSULE BY MOUTH ONCE DAILY, Disp: 90 capsule, Rfl: 0  •  Cholecalciferol (VITAMIN D PO), Take 3,000 Units by mouth Daily., Disp: , Rfl:   •  diclofenac (VOLTAREN) 1 % gel gel, Apply 1 g topically As Needed., Disp: , Rfl: 3  •  Dulaglutide (TRULICITY) 0.75 MG/0.5ML solution pen-injector, Inject 1 dose under the skin 1 (One) Time Per Week., Disp: 4 pen, Rfl: 6  •  furosemide (LASIX) 40 MG tablet, TK 1 T PO BID, Disp: , Rfl: 3  •  HYDROcodone-acetaminophen (NORCO) 7.5-325 MG per tablet, Take 1 tablet by mouth 3 (Three) Times a Day., Disp: ,  Rfl:   •  Insulin Glargine (TOUJEO SOLOSTAR) 300 UNIT/ML solution pen-injector, Inject 80 Units as directed 2 (Two) Times a Day., Disp: 20 pen, Rfl: 6  •  Insulin Lispro (HUMALOG KWIKPEN) 200 UNIT/ML solution pen-injector, Inject 80 Units under the skin 3 (Three) Times a Day With Meals., Disp: 25 pen, Rfl: 6  •  Insulin Pen Needle (B-D UF III MINI PEN NEEDLES) 31G X 5 MM misc, Inject 1 each under the skin 6 (Six) Times a Day., Disp: 200 each, Rfl: 6  •  metOLazone (ZAROXOLYN) 2.5 MG tablet, , Disp: , Rfl:   •  Multiple Vitamin (DAILY VITAMIN PO), Take  by mouth daily., Disp: , Rfl:   •  ONE TOUCH ULTRA TEST test strip, TEST THREE TIMES DAILY, Disp: 100 each, Rfl: 11  •  raNITIdine (ZANTAC) 150 MG tablet, , Disp: , Rfl:   •  vitamin D (ERGOCALCIFEROL) 92040 units capsule capsule, Take 1 capsule by mouth 1 (One) Time Per Week., Disp: 4 capsule, Rfl: 3  •  fluticasone (FLONASE) 50 MCG/ACT nasal spray, 2 sprays into each nostril Daily., Disp: 1 bottle, Rfl: 2     PMFSH  The following portions of the patient's history were reviewed and updated as appropriate: allergies, current medications, past family history, past medical history, past social history, past surgical history and problem list.    Review of Systems   Constitutional: Positive for fatigue. Negative for activity change, appetite change, diaphoresis and fever.   HENT: Positive for sore throat. Negative for congestion, ear pain, postnasal drip, rhinorrhea and sinus pain.    Respiratory: Negative for chest tightness, shortness of breath and wheezing.    Cardiovascular: Positive for leg swelling. Negative for chest pain and palpitations.   Gastrointestinal: Positive for vomiting. Negative for abdominal pain and diarrhea.   Genitourinary: Negative for dysuria.   Musculoskeletal: Positive for back pain.   Neurological: Negative for dizziness, light-headedness and headaches.   Psychiatric/Behavioral: Negative for dysphoric mood.       Objective   /74  Pulse  "78  Ht 170.2 cm (67\")  Wt 122 kg (268 lb)  SpO2 97%  BMI 41.97 kg/m2    Physical Exam   Constitutional: She appears well-developed and well-nourished.   HENT:   Head: Normocephalic.   Right Ear: Hearing, tympanic membrane, external ear and ear canal normal.   Left Ear: Hearing, tympanic membrane, external ear and ear canal normal.   Nose: Nose normal.   Mouth/Throat: Oropharynx is clear and moist and mucous membranes are normal. Tonsils are 0 on the right. Tonsils are 0 on the left.   Eyes: Conjunctivae are normal. Pupils are equal, round, and reactive to light.   Neck: Normal range of motion.   Cardiovascular: Normal rate, regular rhythm and normal heart sounds.    Pulmonary/Chest: Effort normal and breath sounds normal. She has no decreased breath sounds. She has no wheezes. She has no rhonchi. She has no rales.   Musculoskeletal: Normal range of motion.   Neurological: She is alert.   Skin: Skin is warm and dry.   Psychiatric: She has a normal mood and affect. Her behavior is normal.   Nursing note and vitals reviewed.      Results for orders placed or performed in visit on 01/16/18   POCT rapid strep A   Result Value Ref Range    Rapid Strep A Screen Negative Negative, VALID, INVALID, Not Performed    Internal Control Passed Passed    Lot Number [id number]     Expiration Date 2016-02-28         ASSESSMENT/PLAN    Problem List Items Addressed This Visit     None      Visit Diagnoses     Sore throat    -  Primary    Trial of flonase to help with post nasal drainage, also recommended using humidifier in room. Pt will run it by transplant MD before using.    Relevant Medications    fluticasone (FLONASE) 50 MCG/ACT nasal spray    Other Relevant Orders    POCT rapid strep A (Completed)               Return in about 6 months (around 7/16/2018) for Annual physical.  "

## 2018-01-17 DIAGNOSIS — I10 ESSENTIAL HYPERTENSION: ICD-10-CM

## 2018-01-17 LAB
CREAT 24H UR-MCNC: 36.1 MG/DL
MICROALBUMIN UR-MCNC: 813.1 UG/ML
MICROALBUMIN/CREAT UR: 2252.4 MG/G CREAT (ref 0–30)
PTH-INTACT SERPL-MCNC: 18 PG/ML (ref 15–65)

## 2018-01-17 RX ORDER — DILTIAZEM HYDROCHLORIDE 240 MG/1
CAPSULE, EXTENDED RELEASE ORAL
Qty: 90 CAPSULE | Refills: 0 | Status: SHIPPED | OUTPATIENT
Start: 2018-01-17 | End: 2018-04-17 | Stop reason: SDUPTHER

## 2018-01-19 ENCOUNTER — TELEPHONE (OUTPATIENT)
Dept: ENDOCRINOLOGY | Facility: CLINIC | Age: 63
End: 2018-01-19

## 2018-01-19 NOTE — TELEPHONE ENCOUNTER
Pt informed of results and verbalized understanding. Pt stated she was currently taking 6000 mg of vitamin d so Per , I advised her to cut back to 3000 daily

## 2018-01-19 NOTE — TELEPHONE ENCOUNTER
----- Message from Franchesca VALE MD sent at 1/17/2018  3:13 PM EST -----  Please call the patient regarding her lab results. Renal function is abnormal and slightly declined from previous labs. Calcium level is high and Vit D is on the high side. I would like you to reduce Vit D supplements. THe chart states that pt is taking 3000 units daily - reduce to 1000 UNits per day. If she is taking different dose, please let me know. Lipid panel is normal with the exception of the high triglycerides.

## 2018-02-05 DIAGNOSIS — I25.10 CORONARY ARTERY DISEASE INVOLVING NATIVE CORONARY ARTERY OF NATIVE HEART WITHOUT ANGINA PECTORIS: ICD-10-CM

## 2018-02-13 ENCOUNTER — TELEPHONE (OUTPATIENT)
Dept: INTERNAL MEDICINE | Facility: CLINIC | Age: 63
End: 2018-02-13

## 2018-02-13 NOTE — TELEPHONE ENCOUNTER
Per Ella pt called and requested samples of Praluent 75 mg/ML, prepared one sample pen for pt to .

## 2018-02-14 ENCOUNTER — TELEPHONE (OUTPATIENT)
Dept: INTERNAL MEDICINE | Facility: CLINIC | Age: 63
End: 2018-02-14

## 2018-02-14 DIAGNOSIS — I25.10 CORONARY ARTERY DISEASE INVOLVING NATIVE CORONARY ARTERY OF NATIVE HEART WITHOUT ANGINA PECTORIS: ICD-10-CM

## 2018-02-14 NOTE — TELEPHONE ENCOUNTER
PER [facility] THEY SENT OVER A RELEASE TO GET MEDICAL RECORDS BECAUSE THEY ARE DOING THE P.A. THEMSELVES

## 2018-02-19 ENCOUNTER — TELEPHONE (OUTPATIENT)
Dept: INTERNAL MEDICINE | Facility: CLINIC | Age: 63
End: 2018-02-19

## 2018-02-19 NOTE — TELEPHONE ENCOUNTER
Returned pt's call and informed her that PA for Toujeo as well as Praluent have been completed and faxed to [facility] for review.

## 2018-02-19 NOTE — TELEPHONE ENCOUNTER
FYI:  PT BELIEVES THAT HER INSURANCE WASN'T BILLED FOR HER LAST OV.   SHE HAS CALLED BILLING DEPARTMENT SINCE 01/24/2018 WITH OUT A RESPONSE.CAN YOU CHECK   THE ONLY THING SHE WAS BILLED FOR WAS A STREP TEST. SHE WAS GIVEN THE  MAIN OFFICE NUMBER. I GAVE HER THE DIRECT BILLING NUMBER AND INFORMED HER THAT WE DO NOT HANDLE BILLING.

## 2018-02-20 PROBLEM — N18.5 CHRONIC KIDNEY DISEASE, STAGE V (HCC): Status: ACTIVE | Noted: 2018-02-20

## 2018-02-20 PROBLEM — D50.9: Status: ACTIVE | Noted: 2018-02-20

## 2018-02-22 ENCOUNTER — TELEPHONE (OUTPATIENT)
Dept: INTERNAL MEDICINE | Facility: CLINIC | Age: 63
End: 2018-02-22

## 2018-02-22 NOTE — TELEPHONE ENCOUNTER
PATIENT STATES THAT THE INSURANCE COMPANY STILL HASN'T RECEIVED THE PER APPROVAL FOR HER TOUJEO MEDICATION. PATIENT WOULD LIKE TO GET A CALL BACK [phone]

## 2018-02-22 NOTE — TELEPHONE ENCOUNTER
Returned pt's call and informed her that her Toujeo was denied by her insurance and that the next step will be the appeal process which will take 3-5 days. Pt verbalized understanding

## 2018-02-27 ENCOUNTER — INFUSION (OUTPATIENT)
Dept: ONCOLOGY | Facility: HOSPITAL | Age: 63
End: 2018-02-27

## 2018-02-27 VITALS
HEART RATE: 75 BPM | RESPIRATION RATE: 16 BRPM | SYSTOLIC BLOOD PRESSURE: 144 MMHG | DIASTOLIC BLOOD PRESSURE: 64 MMHG | TEMPERATURE: 97.9 F

## 2018-02-27 DIAGNOSIS — D50.9 IDIOPATHIC HYPOCHROMIC ANEMIA: Primary | ICD-10-CM

## 2018-02-27 DIAGNOSIS — N18.5 CHRONIC KIDNEY DISEASE, STAGE V (HCC): ICD-10-CM

## 2018-02-27 PROCEDURE — 25010000002 FERUMOXYTOL 510 MG/17ML SOLUTION 510 MG VIAL: Performed by: INTERNAL MEDICINE

## 2018-02-27 PROCEDURE — 96374 THER/PROPH/DIAG INJ IV PUSH: CPT

## 2018-02-27 RX ADMIN — FERUMOXYTOL 510 MG: 510 INJECTION INTRAVENOUS at 13:06

## 2018-03-02 ENCOUNTER — INFUSION (OUTPATIENT)
Dept: ONCOLOGY | Facility: HOSPITAL | Age: 63
End: 2018-03-02

## 2018-03-02 VITALS
TEMPERATURE: 97.7 F | HEART RATE: 84 BPM | SYSTOLIC BLOOD PRESSURE: 122 MMHG | DIASTOLIC BLOOD PRESSURE: 46 MMHG | RESPIRATION RATE: 16 BRPM

## 2018-03-02 DIAGNOSIS — N18.5 CHRONIC KIDNEY DISEASE, STAGE V (HCC): ICD-10-CM

## 2018-03-02 DIAGNOSIS — D50.9 IDIOPATHIC HYPOCHROMIC ANEMIA: Primary | ICD-10-CM

## 2018-03-02 PROCEDURE — 96374 THER/PROPH/DIAG INJ IV PUSH: CPT

## 2018-03-02 PROCEDURE — 25010000002 FERUMOXYTOL 510 MG/17ML SOLUTION 510 MG VIAL: Performed by: INTERNAL MEDICINE

## 2018-03-02 RX ADMIN — FERUMOXYTOL 510 MG: 510 INJECTION INTRAVENOUS at 13:11

## 2018-03-05 ENCOUNTER — TELEPHONE (OUTPATIENT)
Dept: INTERNAL MEDICINE | Facility: CLINIC | Age: 63
End: 2018-03-05

## 2018-03-07 DIAGNOSIS — IMO0002 UNCONTROLLED TYPE 2 DIABETES MELLITUS WITH COMPLICATION, WITH LONG-TERM CURRENT USE OF INSULIN: ICD-10-CM

## 2018-03-16 DIAGNOSIS — J02.9 SORE THROAT: ICD-10-CM

## 2018-03-16 RX ORDER — FUROSEMIDE 40 MG/1
TABLET ORAL
Qty: 120 TABLET | Refills: 11 | Status: SHIPPED | OUTPATIENT
Start: 2018-03-16 | End: 2018-11-26

## 2018-03-16 RX ORDER — FLUTICASONE PROPIONATE 50 MCG
SPRAY, SUSPENSION (ML) NASAL
Qty: 16 G | Refills: 2 | Status: SHIPPED | OUTPATIENT
Start: 2018-03-16 | End: 2018-11-26

## 2018-03-16 RX ORDER — ASPIRIN 325 MG
TABLET, DELAYED RELEASE (ENTERIC COATED) ORAL
Qty: 60 TABLET | Refills: 11 | Status: SHIPPED | OUTPATIENT
Start: 2018-03-16 | End: 2018-11-26

## 2018-03-30 ENCOUNTER — TELEPHONE (OUTPATIENT)
Dept: INTERNAL MEDICINE | Facility: CLINIC | Age: 63
End: 2018-03-30

## 2018-04-02 NOTE — TELEPHONE ENCOUNTER
Documents were signed and faxed to Dr. Janet Jama on 03/22/18 but I went ahead and refaxed it again this morning to [phone]

## 2018-04-17 DIAGNOSIS — I10 ESSENTIAL HYPERTENSION: ICD-10-CM

## 2018-04-17 DIAGNOSIS — J02.9 SORE THROAT: ICD-10-CM

## 2018-04-17 RX ORDER — FLUTICASONE PROPIONATE 50 MCG
SPRAY, SUSPENSION (ML) NASAL
Qty: 16 G | Refills: 2 | OUTPATIENT
Start: 2018-04-17

## 2018-04-17 RX ORDER — DILTIAZEM HYDROCHLORIDE 240 MG/1
CAPSULE, COATED, EXTENDED RELEASE ORAL
Qty: 90 CAPSULE | Refills: 0 | Status: SHIPPED | OUTPATIENT
Start: 2018-04-17 | End: 2018-07-26

## 2018-04-19 ENCOUNTER — TELEPHONE (OUTPATIENT)
Dept: INTERNAL MEDICINE | Facility: CLINIC | Age: 63
End: 2018-04-19

## 2018-04-19 NOTE — TELEPHONE ENCOUNTER
PATIENT HAS BEEN TAKING TRULICITY 0.75 AND SHE WOULD LIKE FOR DR BURTON TO INCREASE HER DOSE. SHE STATES THAT THE DOSE SHE IS ON NOW ISN'T WORKING FOR HER. IF THERE IS ANY QUESTIONS OR CONCERNS THE PATIENT CAN BE REACHED [phone]

## 2018-04-25 ENCOUNTER — OFFICE VISIT (OUTPATIENT)
Dept: CARDIOLOGY | Facility: CLINIC | Age: 63
End: 2018-04-25

## 2018-04-25 VITALS
BODY MASS INDEX: 39.02 KG/M2 | SYSTOLIC BLOOD PRESSURE: 116 MMHG | WEIGHT: 248.6 LBS | HEIGHT: 67 IN | HEART RATE: 87 BPM | DIASTOLIC BLOOD PRESSURE: 58 MMHG

## 2018-04-25 DIAGNOSIS — I25.10 CORONARY ARTERY DISEASE INVOLVING NATIVE CORONARY ARTERY OF NATIVE HEART WITHOUT ANGINA PECTORIS: Primary | ICD-10-CM

## 2018-04-25 DIAGNOSIS — E78.2 MIXED HYPERLIPIDEMIA: ICD-10-CM

## 2018-04-25 DIAGNOSIS — I10 ESSENTIAL HYPERTENSION: ICD-10-CM

## 2018-04-25 DIAGNOSIS — I65.23 BILATERAL CAROTID ARTERY STENOSIS: Primary | ICD-10-CM

## 2018-04-25 DIAGNOSIS — I65.23 BILATERAL CAROTID ARTERY STENOSIS: ICD-10-CM

## 2018-04-25 PROCEDURE — 99213 OFFICE O/P EST LOW 20 MIN: CPT | Performed by: INTERNAL MEDICINE

## 2018-04-25 NOTE — PROGRESS NOTES
Tashiagolden Leon  1955  [phone]  [phone]    2018    ALYSSIA De Los Santos    Chief Complaint   Patient presents with   • Coronary Artery Disease   • Carotid artery stenosis   • Hypertension       Problem List:  1. Coronary artery disease:  a. Abnormal cardiac PET, 10/02/2013, Dr. Becker, revealing a posterolateral defect with a mild wall motion abnormality and a normal LVEF.  b. Cardiac catheterization,  10/21/2013, with placement of a 2.5 x 15 mm Xience Expedition drug-eluting stent to the proximal circumflex.  LVEF of 55% to 60%.  Noncritical disease in the LAD and right coronary.  c. Echocardiogram, 2013, shows an LVEF 50% to 55% with trace MR  and mild TR.   d. Echocardiogram, 7/15/2016: EF= 55%. Trace MR. Trace TR.   2. Hypertension.  3. Hyperlipidemia.  4. Type  2 diabetes mellitus:  a. Proteinuria noted, 2014.     5. Renal insufficiency   a. Renal duplex, 2014: No renal artery stenosis. Mild resistive parenchymal blood flow pattern.   6. Obesity.  7. Previous tobacco use with cessation in .  8.  Osteomyelitis/methicillin-resistant Staphylococcus aureus of the left foot, 2013.  9. Sepsis, 2013, hospitalized at [facility].  10. Gastroesophageal reflux disease/hiatal hernia.  11. History of peptic ulcer disease,  diagnosed 2013 by EGD.  Repeat EGD in 2013 was negative.  12. Carotid disease, nonobstructive by duplex, 2013.  13. Lumbar disc disease.  14. Surgical history:  a.  section x2.  b. Tonsillectomy.  c. Incision and debridement of the left foot x2.    Allergies   Allergen Reactions   • Statins Myalgia   • Ancef [Cefazolin]    • Cephalosporins    • Cleocin [Clindamycin Hcl]    • Clindamycin/Lincomycin    • Keflex [Cephalexin]    • Levaquin [Levofloxacin]    • Lipitor [Atorvastatin]    • Lisinopril    • Losartan Potassium Other (See Comments)     Unknown reaction   • Oxycodone    •  Quinolones        Current Medications:      Current Outpatient Prescriptions:   •  Aflibercept (EYLEA IO), Inject  as directed As Needed. Every 6 weeks, Disp: , Rfl:   •  Alirocumab (PRALUENT) 75 MG/ML solution pen-injector, Inject 75 mg under the skin Every 14 (Fourteen) Days., Disp: 7 pen, Rfl: 3  •  ammonium lactate (AMLACTIN) 12 % cream, Apply 1 application topically As Needed., Disp: , Rfl:   •  aspirin  MG tablet, TAKE 1 TABLET DAILY, Disp: 60 tablet, Rfl: 11  •  baclofen (LIORESAL) 10 MG tablet, Take 10 mg by mouth As Needed., Disp: , Rfl:   •  diclofenac (VOLTAREN) 1 % gel gel, Apply 1 g topically As Needed., Disp: , Rfl: 3  •  diltiaZEM CD (CARDIZEM CD) 240 MG 24 hr capsule, TAKE 1 CAPSULE DAILY, Disp: 90 capsule, Rfl: 0  •  Dulaglutide 1.5 MG/0.5ML solution pen-injector, Inject 1.5 mg under the skin 1 (One) Time Per Week., Disp: 4 pen, Rfl: 5  •  fluticasone (FLONASE) 50 MCG/ACT nasal spray, USE 2 SPRAYS INTO EACH NOSTRIL ONCE DAILY., Disp: 16 g, Rfl: 2  •  furosemide (LASIX) 40 MG tablet, TAKE 1 TABLET TWICE DAILY, Disp: 120 tablet, Rfl: 11  •  HYDROcodone-acetaminophen (NORCO) 7.5-325 MG per tablet, Take 1 tablet by mouth 3 (Three) Times a Day., Disp: , Rfl:   •  Insulin Glargine (TOUJEO SOLOSTAR) 300 UNIT/ML solution pen-injector, Inject 80 Units as directed 2 (Two) Times a Day., Disp: 20 pen, Rfl: 6  •  Insulin Lispro (HUMALOG KWIKPEN) 200 UNIT/ML solution pen-injector, Inject 80 Units under the skin 3 (Three) Times a Day With Meals. (Patient taking differently: Inject 80 Units under the skin As Needed.), Disp: 25 pen, Rfl: 6  •  Insulin Pen Needle (B-D UF III MINI PEN NEEDLES) 31G X 5 MM misc, Inject 1 each under the skin 6 (Six) Times a Day., Disp: 200 each, Rfl: 6  •  metOLazone (ZAROXOLYN) 2.5 MG tablet, Take 2.5 mg by mouth As Needed., Disp: , Rfl:   •  ONE TOUCH ULTRA TEST test strip, TEST THREE TIMES DAILY, Disp: 100 each, Rfl: 11  •  raNITIdine (ZANTAC) 150 MG tablet, Take 150 mg by mouth  "2 (Two) Times a Day., Disp: , Rfl:     HPI    Tashia Leon presents today for 12 month follow up of CAD, hypertension, and hyperlipidemia. Since last visit, patient has been has been doing well from a cardiac standpoint. According to most recent lipid panel, her HDL continues to be low. Since last visit, she has lost almost 100 pounds due to an improved diet. Patient denies chest pain, palpitations, shortness of breath, edema, PND, orthopnea, dizziness, and syncope.     The following portions of the patient's history were reviewed and updated as appropriate: allergies, current medications and problem list.    Pertinent positives as listed in the HPI.  All other systems reviewed are negative.    Vitals:    04/25/18 1018   BP: 116/58   BP Location: Right arm   Patient Position: Sitting   Pulse: 87   Weight: 113 kg (248 lb 9.6 oz)   Height: 170.2 cm (67\")       Physical Exam:  General: Alert and oriented to person, place, and time.  Neck: Jugular venous pressure is within normal limits. Carotids have normal upstrokes with bilateral bruits.   Cardiovascular: Regular rate and rhythm with 1/6 SE murmur gallop or rub.  Lungs: Clear without rales or wheezes. Equal expansion is noted.   Extremities: Show trace lower extremity edema.  Skin: warm and dry.  Neurologic: nonfocal    Diagnostic Data:    Lab Results   Component Value Date    CHOL 177 01/16/2018    CHLPL 198 03/18/2016    TRIG 304 (H) 01/16/2018    HDL 33 (L) 01/16/2018    LDL 93 01/16/2018     Lab Results   Component Value Date    GLUCOSE 83 01/16/2018    BUN 86 (H) 01/16/2018    CREATININE 4.00 (H) 01/16/2018    EGFRIFNONA 11 (L) 01/16/2018    EGFRIFAFRI 38 (L) 03/26/2015    BCR 21.5 01/16/2018    K 3.5 01/16/2018    CO2 29.0 01/16/2018    CALCIUM 11.1 (H) 01/16/2018    PROTENTOTREF 6.2 05/26/2015    ALBUMIN 4.00 01/16/2018    LABIL2 1.3 (L) 01/16/2018    AST 43 (H) 01/16/2018    ALT 34 01/16/2018     Lab Results   Component Value Date    WBC 12.86 (H) " 01/16/2018    HGB 9.8 (L) 01/16/2018    HCT 31.2 (L) 01/16/2018    MCV 97.2 01/16/2018     01/16/2018     Lab Results   Component Value Date    TSH 2.479 06/12/2017         Procedures    Assessment:      ICD-10-CM ICD-9-CM   1. Coronary artery disease involving native coronary artery of native heart without angina pectoris I25.10 414.01   2. Essential hypertension I10 401.9   3. Mixed hyperlipidemia E78.2 272.2       Plan:    1. Schedule a carotid ultrasound to assess for her carotid artery disease.  2. Continue to monitor diet.  3. Continue current medications.  4. F/up in 12 months or sooner if needed.    Scribed for Gillian Becker MD by Blake Trevizo. 4/25/2018  10:39 AM     I Gillian Becker MD personally performed the services described in this documentation as scribed by the above individual in my presence, and it is both accurate and complete.    Gillian Becker MD, FACC

## 2018-05-15 ENCOUNTER — HOSPITAL ENCOUNTER (OUTPATIENT)
Dept: CARDIOLOGY | Facility: HOSPITAL | Age: 63
Discharge: HOME | End: 2018-05-15
Attending: INTERNAL MEDICINE | Admitting: INTERNAL MEDICINE

## 2018-05-15 DIAGNOSIS — I65.23 BILATERAL CAROTID ARTERY STENOSIS: ICD-10-CM

## 2018-05-15 PROCEDURE — 93880 EXTRACRANIAL BILAT STUDY: CPT

## 2018-05-15 PROCEDURE — 93880 EXTRACRANIAL BILAT STUDY: CPT | Performed by: INTERNAL MEDICINE

## 2018-05-16 LAB
BH CV XLRA MEAS LEFT DIST CCA EDV: 55 CM/SEC
BH CV XLRA MEAS LEFT DIST CCA PSV: 281 CM/SEC
BH CV XLRA MEAS LEFT DIST ICA EDV: 33 CM/SEC
BH CV XLRA MEAS LEFT DIST ICA PSV: 120 CM/SEC
BH CV XLRA MEAS LEFT ICA/CCA RATIO: 1
BH CV XLRA MEAS LEFT MID CCA EDV: 37.3 CM/SEC
BH CV XLRA MEAS LEFT MID CCA PSV: 136 CM/SEC
BH CV XLRA MEAS LEFT MID ICA EDV: 39 CM/SEC
BH CV XLRA MEAS LEFT MID ICA PSV: 183 CM/SEC
BH CV XLRA MEAS LEFT PROX CCA EDV: 27 CM/SEC
BH CV XLRA MEAS LEFT PROX CCA PSV: 116 CM/SEC
BH CV XLRA MEAS LEFT PROX ECA EDV: 12 CM/SEC
BH CV XLRA MEAS LEFT PROX ECA PSV: 175 CM/SEC
BH CV XLRA MEAS LEFT PROX ICA EDV: 53 CM/SEC
BH CV XLRA MEAS LEFT PROX ICA PSV: 275 CM/SEC
BH CV XLRA MEAS LEFT PROX SCLA EDV: 3 CM/SEC
BH CV XLRA MEAS LEFT PROX SCLA PSV: 414 CM/SEC
BH CV XLRA MEAS LEFT VERTEBRAL A EDV: 3 CM/SEC
BH CV XLRA MEAS LEFT VERTEBRAL A PSV: 30 CM/SEC
BH CV XLRA MEAS RIGHT DIST CCA EDV: 22 CM/SEC
BH CV XLRA MEAS RIGHT DIST CCA PSV: 108 CM/SEC
BH CV XLRA MEAS RIGHT DIST ICA EDV: 23 CM/SEC
BH CV XLRA MEAS RIGHT DIST ICA PSV: 132 CM/SEC
BH CV XLRA MEAS RIGHT ICA/CCA RATIO: 2
BH CV XLRA MEAS RIGHT MID CCA EDV: 17.6 CM/SEC
BH CV XLRA MEAS RIGHT MID CCA PSV: 81.7 CM/SEC
BH CV XLRA MEAS RIGHT MID ICA EDV: 61 CM/SEC
BH CV XLRA MEAS RIGHT MID ICA PSV: 220 CM/SEC
BH CV XLRA MEAS RIGHT PROX CCA EDV: 20 CM/SEC
BH CV XLRA MEAS RIGHT PROX CCA PSV: 145 CM/SEC
BH CV XLRA MEAS RIGHT PROX ECA EDV: 18 CM/SEC
BH CV XLRA MEAS RIGHT PROX ECA PSV: 162 CM/SEC
BH CV XLRA MEAS RIGHT PROX ICA EDV: 52 CM/SEC
BH CV XLRA MEAS RIGHT PROX ICA PSV: 219 CM/SEC
BH CV XLRA MEAS RIGHT PROX SCLA EDV: 21 CM/SEC
BH CV XLRA MEAS RIGHT PROX SCLA PSV: 434 CM/SEC
BH CV XLRA MEAS RIGHT VERTEBRAL A EDV: 21 CM/SEC
BH CV XLRA MEAS RIGHT VERTEBRAL A PSV: 55 CM/SEC
LEFT ARM BP: NORMAL MMHG
RIGHT ARM BP: NORMAL MMHG

## 2018-05-23 ENCOUNTER — TELEPHONE (OUTPATIENT)
Dept: CARDIOLOGY | Facility: CLINIC | Age: 63
End: 2018-05-23

## 2018-05-23 DIAGNOSIS — I65.23 BILATERAL CAROTID ARTERY STENOSIS: Primary | ICD-10-CM

## 2018-06-05 ENCOUNTER — OFFICE VISIT (OUTPATIENT)
Dept: ENDOCRINOLOGY | Facility: CLINIC | Age: 63
End: 2018-06-05

## 2018-06-05 VITALS
WEIGHT: 255 LBS | SYSTOLIC BLOOD PRESSURE: 124 MMHG | BODY MASS INDEX: 40.02 KG/M2 | HEART RATE: 72 BPM | DIASTOLIC BLOOD PRESSURE: 74 MMHG | HEIGHT: 67 IN

## 2018-06-05 DIAGNOSIS — IMO0002 UNCONTROLLED TYPE 2 DIABETES MELLITUS WITH COMPLICATION, WITH LONG-TERM CURRENT USE OF INSULIN: ICD-10-CM

## 2018-06-05 DIAGNOSIS — N25.81 SECONDARY HYPERPARATHYROIDISM (HCC): ICD-10-CM

## 2018-06-05 DIAGNOSIS — N18.4 STAGE 4 CHRONIC KIDNEY DISEASE (HCC): ICD-10-CM

## 2018-06-05 DIAGNOSIS — E11.3399 MODERATE NONPROLIFERATIVE DIABETIC RETINOPATHY WITHOUT MACULAR EDEMA ASSOCIATED WITH TYPE 2 DIABETES MELLITUS, UNSPECIFIED LATERALITY (HCC): ICD-10-CM

## 2018-06-05 DIAGNOSIS — E78.2 MIXED HYPERLIPIDEMIA: ICD-10-CM

## 2018-06-05 DIAGNOSIS — E11.42 DIABETIC PERIPHERAL NEUROPATHY (HCC): Primary | ICD-10-CM

## 2018-06-05 LAB
GLUCOSE BLDC GLUCOMTR-MCNC: 57 MG/DL (ref 70–130)
HBA1C MFR BLD: 7.5 %

## 2018-06-05 PROCEDURE — 83036 HEMOGLOBIN GLYCOSYLATED A1C: CPT | Performed by: INTERNAL MEDICINE

## 2018-06-05 PROCEDURE — 82947 ASSAY GLUCOSE BLOOD QUANT: CPT | Performed by: INTERNAL MEDICINE

## 2018-06-05 PROCEDURE — 99214 OFFICE O/P EST MOD 30 MIN: CPT | Performed by: INTERNAL MEDICINE

## 2018-06-05 NOTE — PATIENT INSTRUCTIONS
-Toujeo 80 Units BID.   -Continue Humalog 30-40  units three times a day  -Trulicity 1.5 mg     Results for orders placed or performed in visit on 06/05/18   POC Glucose Fingerstick   Result Value Ref Range    Glucose 57 (A) 70 - 130 mg/dL   POC Glycosylated Hemoglobin (Hb A1C)   Result Value Ref Range    Hemoglobin A1C 7.5 %

## 2018-06-05 NOTE — PROGRESS NOTES
Chief complaint  Hyperlipidemia (F/u for type 2 diabetes and hyperlipidemia. Fasting for labs today) and Diabetes    Subjective   Tashia Leon is a 62 y.o. female is here today for follow-up.  Diabetes mellitus type 2 diagnosed in mid 90s.   Medications: Humalog and Toujeo  Diabetic complications: Diabetic foot ulcer. Diab retinopathy. CKD - stable function.   Eye care: diabetic retinopathy by last exam, seeing eye dr regularly.   Podiatry visit - every 2 weeks currently, diabetic shoes rx given.   Patient was evaluated for the kidney transplant and went through testing.   Hypoglycemia:none  Nutrition: Patient eats 3 meals a day with carbohydrates consistent amount per meal. Follows weight [employer] diet and lost weight, glucose is still high.  She is taking insulin consistently.      Hyerlipidemia with high LDL. Side effects on statins . She developed SOB on crestor and lipitor, simvastatin, Livalo.Doing well on Praluent, which was started Nov 2015, doing well since then, LDL 73 in 6/2017.   Patient has vascular surgery consult pending, 70%carotud stenosis,            Diabetes   Pertinent negatives for hypoglycemia include no dizziness, headaches or tremors. Associated symptoms include fatigue and weakness. Pertinent negatives for diabetes include no chest pain.   Hyperlipidemia   Pertinent negatives include no chest pain, myalgias or shortness of breath.       Medications    Current Outpatient Prescriptions:   •  Aflibercept (EYLEA IO), Inject  as directed As Needed. Every 6 weeks, Disp: , Rfl:   •  Alirocumab (PRALUENT) 75 MG/ML solution pen-injector, Inject 75 mg under the skin Every 14 (Fourteen) Days., Disp: 7 pen, Rfl: 3  •  ammonium lactate (AMLACTIN) 12 % cream, Apply 1 application topically As Needed., Disp: , Rfl:   •  aspirin  MG tablet, TAKE 1 TABLET DAILY, Disp: 60 tablet, Rfl: 11  •  baclofen (LIORESAL) 10 MG tablet, Take 10 mg by mouth As Needed., Disp: , Rfl:   •  diclofenac (VOLTAREN) 1  % gel gel, Apply 1 g topically As Needed., Disp: , Rfl: 3  •  diltiaZEM CD (CARDIZEM CD) 240 MG 24 hr capsule, TAKE 1 CAPSULE DAILY, Disp: 90 capsule, Rfl: 0  •  Dulaglutide 1.5 MG/0.5ML solution pen-injector, Inject 1.5 mg under the skin 1 (One) Time Per Week., Disp: 4 pen, Rfl: 5  •  fluticasone (FLONASE) 50 MCG/ACT nasal spray, USE 2 SPRAYS INTO EACH NOSTRIL ONCE DAILY., Disp: 16 g, Rfl: 2  •  furosemide (LASIX) 40 MG tablet, TAKE 1 TABLET TWICE DAILY, Disp: 120 tablet, Rfl: 11  •  HYDROcodone-acetaminophen (NORCO) 7.5-325 MG per tablet, Take 1 tablet by mouth 3 (Three) Times a Day., Disp: , Rfl:   •  Insulin Glargine (TOUJEO SOLOSTAR) 300 UNIT/ML solution pen-injector, Inject 80 Units as directed 2 (Two) Times a Day., Disp: 20 pen, Rfl: 6  •  Insulin Lispro (HUMALOG KWIKPEN) 200 UNIT/ML solution pen-injector, Inject 80 Units under the skin 3 (Three) Times a Day With Meals. (Patient taking differently: Inject 80 Units under the skin As Needed.), Disp: 25 pen, Rfl: 6  •  Insulin Pen Needle (B-D UF III MINI PEN NEEDLES) 31G X 5 MM misc, Inject 1 each under the skin 6 (Six) Times a Day., Disp: 200 each, Rfl: 6  •  metOLazone (ZAROXOLYN) 2.5 MG tablet, Take 2.5 mg by mouth As Needed., Disp: , Rfl:   •  ONE TOUCH ULTRA TEST test strip, TEST THREE TIMES DAILY, Disp: 100 each, Rfl: 11  •  raNITIdine (ZANTAC) 150 MG tablet, Take 150 mg by mouth 2 (Two) Times a Day., Disp: , Rfl:     PMH  The following portions of the patient's history were reviewed and updated as appropriate: allergies, current medications, past family history, past medical history, past social history, past surgical history and problem list.    Review of systems  Review of Systems   Constitutional: Positive for fatigue and unexpected weight change (weight gain). Negative for activity change, appetite change, chills and diaphoresis.   HENT: Negative for congestion, ear pain, facial swelling, hearing loss, postnasal drip, sore throat, trouble swallowing  "and voice change.    Eyes: Negative.  Negative for redness, itching and visual disturbance.   Respiratory: Negative for cough and shortness of breath.    Cardiovascular: Positive for leg swelling. Negative for chest pain and palpitations.   Gastrointestinal: Negative for abdominal distention, abdominal pain, constipation, diarrhea, nausea and vomiting.   Endocrine:        As listed in HPI   Genitourinary: Negative.    Musculoskeletal: Negative for arthralgias, back pain, joint swelling, myalgias, neck pain and neck stiffness.   Skin: Negative.    Allergic/Immunologic: Negative.    Neurological: Positive for weakness and light-headedness. Negative for dizziness, tremors, syncope and headaches.   Hematological: Negative.    Psychiatric/Behavioral: Negative.    All other systems reviewed and are negative.      Physical exam  Objective   Blood pressure 124/74, pulse 72, height 170.2 cm (67\"), weight 116 kg (255 lb).   Physical Exam   Constitutional: She is oriented to person, place, and time. She appears well-developed and well-nourished.   HENT:   Head: Normocephalic and atraumatic.   Eyes: Conjunctivae are normal.   Neck: No thyromegaly present.   The neck is supple and symmetric   Cardiovascular: Normal rate, regular rhythm and normal heart sounds.    Pulmonary/Chest: Effort normal and breath sounds normal.   Musculoskeletal: She exhibits edema (trace).   Lower extremities - compression device applied bilaterally. Skin is intact   Lymphadenopathy:     She has no cervical adenopathy.   Neurological: She is alert and oriented to person, place, and time.   Skin: Skin is warm and dry. No rash noted.   Psychiatric: She has a normal mood and affect. Thought content normal.   Vitals reviewed.        LABS AND IMAGING  Results for orders placed or performed in visit on 06/05/18   POC Glucose Fingerstick   Result Value Ref Range    Glucose 57 (A) 70 - 130 mg/dL   POC Glycosylated Hemoglobin (Hb A1C)   Result Value Ref Range    " Hemoglobin A1C 7.5 %           Assessment  Assessment/Plan   Problem List Items Addressed This Visit        Cardiovascular and Mediastinum    Hyperlipemia       Endocrine    Diabetic peripheral neuropathy - Primary    Relevant Orders    POC Glucose Fingerstick (Completed)    POC Glycosylated Hemoglobin (Hb A1C) (Completed)    Moderate nonproliferative diabetic retinopathy without macular edema associated with type 2 diabetes mellitus    Diabetes mellitus type 2, uncontrolled, with complications    Secondary hyperparathyroidism       Other    CKD (chronic kidney disease)          Plan  1. Diabetes mellitus type 2 with hx diabetic foot ulcer, retinopathy and CKD.     Patient Instructions   -Toujeo 80 Units BID.   -Continue Humalog 30-40  units three times a day  -Trulicity 1.5 mg     I have advised to reduce nighttimne dose of Toujeo to 75 UNits if morning glucose is low often     2. Hyperlipidemia  Continue Praluent every 2 weeks.  She had reaction to crestor, livalo and lipitor and severe peripheral vascular disease. No alternatives exist.      3. CKD / edema - stable, planned for kidney transplant.     4. Hypercalcemia/secondary hyperparathyroidism  -resolved after stopping Vit D.      Medications refilled. Hypoglycemia precautions and insulin instructions reviewed.   Fasting labs with Angelica Tran next month.     Follow-up in 3-4 months.

## 2018-06-22 ENCOUNTER — OFFICE VISIT (OUTPATIENT)
Dept: INTERNAL MEDICINE | Facility: CLINIC | Age: 63
End: 2018-06-22

## 2018-06-22 VITALS
SYSTOLIC BLOOD PRESSURE: 120 MMHG | BODY MASS INDEX: 39.08 KG/M2 | HEART RATE: 92 BPM | WEIGHT: 249 LBS | OXYGEN SATURATION: 98 % | HEIGHT: 67 IN | DIASTOLIC BLOOD PRESSURE: 74 MMHG

## 2018-06-22 DIAGNOSIS — IMO0002 ABDOMINAL ABSCESS: Primary | ICD-10-CM

## 2018-06-22 PROCEDURE — 87070 CULTURE OTHR SPECIMN AEROBIC: CPT | Performed by: PHYSICIAN ASSISTANT

## 2018-06-22 PROCEDURE — 99213 OFFICE O/P EST LOW 20 MIN: CPT | Performed by: PHYSICIAN ASSISTANT

## 2018-06-22 PROCEDURE — 87205 SMEAR GRAM STAIN: CPT | Performed by: PHYSICIAN ASSISTANT

## 2018-06-22 RX ORDER — DOXYCYCLINE 100 MG/1
100 CAPSULE ORAL 2 TIMES DAILY
Qty: 20 CAPSULE | Refills: 0 | Status: SHIPPED | OUTPATIENT
Start: 2018-06-22 | End: 2018-07-26

## 2018-06-22 NOTE — PROGRESS NOTES
Chief Complaint   Patient presents with   • Abscess     Stomach, leaking, started 11 days ago. Has been to [facility], given Doxycycline went away after few days then has returned       Subjective   Tashia Leon is a 62 y.o. female.       History of Present Illness     Pt developed an abdominal abscess over 10 days ago. Went to urgent care and started on doxycycline. The abscess did open up and drain. Thought it has resolved and dried up but opened up yesterday and is draining purulent fluid.      Current Outpatient Prescriptions:   •  Aflibercept (EYLEA IO), Inject  as directed As Needed. Every 6 weeks, Disp: , Rfl:   •  Alirocumab (PRALUENT) 75 MG/ML solution pen-injector, Inject 75 mg under the skin Every 14 (Fourteen) Days., Disp: 7 pen, Rfl: 3  •  ammonium lactate (AMLACTIN) 12 % cream, Apply 1 application topically As Needed., Disp: , Rfl:   •  aspirin  MG tablet, TAKE 1 TABLET DAILY, Disp: 60 tablet, Rfl: 11  •  baclofen (LIORESAL) 10 MG tablet, Take 10 mg by mouth As Needed., Disp: , Rfl:   •  diclofenac (VOLTAREN) 1 % gel gel, Apply 1 g topically As Needed., Disp: , Rfl: 3  •  diltiaZEM CD (CARDIZEM CD) 240 MG 24 hr capsule, TAKE 1 CAPSULE DAILY, Disp: 90 capsule, Rfl: 0  •  Dulaglutide 1.5 MG/0.5ML solution pen-injector, Inject 1.5 mg under the skin 1 (One) Time Per Week., Disp: 4 pen, Rfl: 5  •  fluticasone (FLONASE) 50 MCG/ACT nasal spray, USE 2 SPRAYS INTO EACH NOSTRIL ONCE DAILY., Disp: 16 g, Rfl: 2  •  furosemide (LASIX) 40 MG tablet, TAKE 1 TABLET TWICE DAILY, Disp: 120 tablet, Rfl: 11  •  HYDROcodone-acetaminophen (NORCO) 7.5-325 MG per tablet, Take 1 tablet by mouth 3 (Three) Times a Day., Disp: , Rfl:   •  Insulin Glargine (TOUJEO SOLOSTAR) 300 UNIT/ML solution pen-injector, Inject 80 Units as directed 2 (Two) Times a Day., Disp: 20 pen, Rfl: 6  •  Insulin Lispro (HUMALOG KWIKPEN) 200 UNIT/ML solution pen-injector, Inject 80 Units under the skin 3 (Three) Times a Day With Meals. (Patient  "taking differently: Inject 80 Units under the skin As Needed.), Disp: 25 pen, Rfl: 6  •  Insulin Pen Needle (B-D UF III MINI PEN NEEDLES) 31G X 5 MM misc, Inject 1 each under the skin 6 (Six) Times a Day., Disp: 200 each, Rfl: 6  •  metOLazone (ZAROXOLYN) 2.5 MG tablet, Take 2.5 mg by mouth As Needed., Disp: , Rfl:   •  ONE TOUCH ULTRA TEST test strip, TEST BLOOD SUGAR 3 TIMES DAILY, Disp: 100 each, Rfl: 4  •  raNITIdine (ZANTAC) 150 MG tablet, Take 150 mg by mouth 2 (Two) Times a Day., Disp: , Rfl:   •  doxycycline (MONODOX) 100 MG capsule, Take 1 capsule by mouth 2 (Two) Times a Day., Disp: 20 capsule, Rfl: 0  •  mupirocin (BACTROBAN) 2 % ointment, Apply  topically 3 (Three) Times a Day., Disp: 30 g, Rfl: 0     PMFSH  The following portions of the patient's history were reviewed and updated as appropriate: allergies, current medications, past family history, past medical history, past social history, past surgical history and problem list.    Review of Systems   Constitutional: Negative for activity change, appetite change, fatigue, fever and unexpected weight change.   HENT: Negative for facial swelling, mouth sores and trouble swallowing.    Eyes: Negative for pain, discharge, itching and visual disturbance.   Respiratory: Negative for chest tightness, shortness of breath and wheezing.    Cardiovascular: Negative for chest pain and palpitations.   Gastrointestinal: Negative for abdominal pain, diarrhea, nausea and vomiting.   Endocrine: Negative.    Genitourinary: Negative.    Musculoskeletal: Negative for joint swelling.   Skin: Positive for wound.   Allergic/Immunologic: Negative.    Neurological: Negative for seizures and syncope.   Hematological: Does not bruise/bleed easily.   Psychiatric/Behavioral: Negative.        Objective   /74   Pulse 92   Ht 170.2 cm (67\")   Wt 113 kg (249 lb)   SpO2 98%   Breastfeeding? No   BMI 39.00 kg/m²     Physical Exam   Constitutional: She appears well-developed " and well-nourished.   HENT:   Head: Normocephalic.   Right Ear: Hearing, tympanic membrane, external ear and ear canal normal.   Left Ear: Hearing, tympanic membrane, external ear and ear canal normal.   Nose: Nose normal.   Mouth/Throat: Oropharynx is clear and moist.   Eyes: Conjunctivae are normal. Pupils are equal, round, and reactive to light.   Neck: Normal range of motion.   Cardiovascular: Normal rate, regular rhythm and normal heart sounds.    Pulmonary/Chest: Effort normal and breath sounds normal. She has no decreased breath sounds. She has no wheezes. She has no rhonchi. She has no rales.   Musculoskeletal: Normal range of motion.   Neurological: She is alert.   Skin: Skin is warm and dry.   Mid abdomen abscess with three 2-3 mm openings with purulent drainage; slight surrounding erythema and some firm tissue   Psychiatric: She has a normal mood and affect. Her behavior is normal.   Nursing note and vitals reviewed.      Results for orders placed or performed in visit on 06/22/18   Wound Culture - Wound, Abdominal Wall   Result Value Ref Range    Wound Culture No growth at 2 days     Gram Stain Result No WBCs or organisms seen         ASSESSMENT/PLAN    Problem List Items Addressed This Visit     None      Visit Diagnoses     Abdominal abscess    -  Primary    Culture of drainage sent. Refilled doxycycline and use bactroban. Keep clean, dry and covered.    Relevant Medications    doxycycline (MONODOX) 100 MG capsule    mupirocin (BACTROBAN) 2 % ointment    Other Relevant Orders    Wound Culture - Wound, Abdominal Wall (Completed)               Return in about 2 weeks (around 7/6/2018).

## 2018-06-24 LAB
BACTERIA SPEC AEROBE CULT: NORMAL
GRAM STN SPEC: NORMAL

## 2018-06-24 NOTE — PROGRESS NOTES
Please let her know that the wound culture did not grow any bacteria- probably because she was already on antibiotics. She should complete the doxycycline as directe.

## 2018-06-25 ENCOUNTER — OFFICE VISIT (OUTPATIENT)
Dept: NEUROSURGERY | Facility: CLINIC | Age: 63
End: 2018-06-25

## 2018-06-25 VITALS
SYSTOLIC BLOOD PRESSURE: 124 MMHG | WEIGHT: 250.2 LBS | TEMPERATURE: 97.9 F | HEIGHT: 67 IN | BODY MASS INDEX: 39.27 KG/M2 | DIASTOLIC BLOOD PRESSURE: 60 MMHG

## 2018-06-25 DIAGNOSIS — I65.23 CAROTID STENOSIS, ASYMPTOMATIC, BILATERAL: Primary | ICD-10-CM

## 2018-06-25 PROCEDURE — 99244 OFF/OP CNSLTJ NEW/EST MOD 40: CPT | Performed by: RADIOLOGY

## 2018-06-25 NOTE — PROGRESS NOTES
NAME: KADEN CERVANTES   DOS: 2018  : 1955  PCP: ALYSSIA De Los Santos    Chief Complaint:    Chief Complaint   Patient presents with   • Carotid Artery Disease       History of Present Illness:  62 y.o. female being followed by Dr. Becker for asymptomatic carotid disease.  She denies any symptoms of unilateral weakness/numbness, no speech or new vision changes.  Her most recent carotid duplex suggests a high-grade, left carotid stenosis.  She presents for consultation regarding her carotid occlusive disease.    Past Medical History:  Past Medical History:   Diagnosis Date   • Acute bronchitis    • Acute pharyngitis    • Acute sinusitis    • Benign colonic polyp    • Edema    • GERD (gastroesophageal reflux disease)    • Hiatal hernia    • Hyperkalemia    • Hyperlipidemia    • Hypertension    • Low back pain    • Lumbar disc disease    • MRSA (methicillin resistant Staphylococcus aureus)     Osteomyelitis/methicillin-resistant Staphylococcus aureus of the left foot, 2013.   • Obesity    • Osteomyelitis     Osteomyelitis/methicillin-resistant Staphylococcus aureus of the left foot, 2013.   • Peptic ulceration 2013    History of peptic ulcer disease, diagnosed 2013 by EGD.  Repeat EGD in 2013 was negative.   • Sepsis     Sepsis, 2013, hospitalized at [facility].   • Tobacco use     Previous tobacco use with cessation in .   • Type 2 diabetes mellitus     Proteinuria noted, 2014.     • Vitamin D deficiency        Past Surgical History:  Past Surgical History:   Procedure Laterality Date   •  SECTION      x2   • FOOT SURGERY Left     Incision and debridement of the left foot x2.   • LASIK     • TONSILLECTOMY         Review of Systems:        Review of Systems   Constitutional: Negative for activity change, appetite change, chills, diaphoresis, fatigue, fever and unexpected weight change.   HENT: Negative for  congestion, dental problem, drooling, ear discharge, ear pain, facial swelling, hearing loss, mouth sores, nosebleeds, postnasal drip, rhinorrhea, sinus pressure, sneezing, sore throat, tinnitus, trouble swallowing and voice change.    Eyes: Negative for photophobia, pain, discharge, redness, itching and visual disturbance.   Respiratory: Negative for apnea, cough, choking, chest tightness, shortness of breath, wheezing and stridor.    Cardiovascular: Negative for chest pain, palpitations and leg swelling.   Gastrointestinal: Negative for abdominal distention, abdominal pain, anal bleeding, blood in stool, constipation, diarrhea, nausea, rectal pain and vomiting.   Endocrine: Negative for cold intolerance, heat intolerance, polydipsia, polyphagia and polyuria.   Genitourinary: Negative for decreased urine volume, difficulty urinating, dysuria, enuresis, flank pain, frequency, genital sores, hematuria and urgency.   Musculoskeletal: Negative for arthralgias, back pain, gait problem, joint swelling, myalgias, neck pain and neck stiffness.   Skin: Negative for color change, pallor, rash and wound.   Allergic/Immunologic: Negative for environmental allergies, food allergies and immunocompromised state.   Neurological: Negative for dizziness, tremors, seizures, syncope, facial asymmetry, speech difficulty, weakness, light-headedness, numbness and headaches.   Hematological: Negative for adenopathy. Does not bruise/bleed easily.   Psychiatric/Behavioral: Negative for agitation, behavioral problems, confusion, decreased concentration, dysphoric mood, hallucinations, self-injury, sleep disturbance and suicidal ideas. The patient is not nervous/anxious and is not hyperactive.         Medications    Current Outpatient Prescriptions:   •  Aflibercept (EYLEA IO), Inject  as directed As Needed. Every 6 weeks, Disp: , Rfl:   •  Alirocumab (PRALUENT) 75 MG/ML solution pen-injector, Inject 75 mg under the skin Every 14 (Fourteen)  Days., Disp: 7 pen, Rfl: 3  •  ammonium lactate (AMLACTIN) 12 % cream, Apply 1 application topically As Needed., Disp: , Rfl:   •  aspirin  MG tablet, TAKE 1 TABLET DAILY, Disp: 60 tablet, Rfl: 11  •  baclofen (LIORESAL) 10 MG tablet, Take 10 mg by mouth As Needed., Disp: , Rfl:   •  diclofenac (VOLTAREN) 1 % gel gel, Apply 1 g topically As Needed., Disp: , Rfl: 3  •  diltiaZEM CD (CARDIZEM CD) 240 MG 24 hr capsule, TAKE 1 CAPSULE DAILY, Disp: 90 capsule, Rfl: 0  •  doxycycline (MONODOX) 100 MG capsule, Take 1 capsule by mouth 2 (Two) Times a Day., Disp: 20 capsule, Rfl: 0  •  Dulaglutide 1.5 MG/0.5ML solution pen-injector, Inject 1.5 mg under the skin 1 (One) Time Per Week., Disp: 4 pen, Rfl: 5  •  fluticasone (FLONASE) 50 MCG/ACT nasal spray, USE 2 SPRAYS INTO EACH NOSTRIL ONCE DAILY., Disp: 16 g, Rfl: 2  •  furosemide (LASIX) 40 MG tablet, TAKE 1 TABLET TWICE DAILY, Disp: 120 tablet, Rfl: 11  •  HYDROcodone-acetaminophen (NORCO) 7.5-325 MG per tablet, Take 1 tablet by mouth 3 (Three) Times a Day., Disp: , Rfl:   •  Insulin Glargine (TOUJEO SOLOSTAR) 300 UNIT/ML solution pen-injector, Inject 80 Units as directed 2 (Two) Times a Day., Disp: 20 pen, Rfl: 6  •  Insulin Lispro (HUMALOG KWIKPEN) 200 UNIT/ML solution pen-injector, Inject 80 Units under the skin 3 (Three) Times a Day With Meals. (Patient taking differently: Inject 80 Units under the skin As Needed.), Disp: 25 pen, Rfl: 6  •  Insulin Pen Needle (B-D UF III MINI PEN NEEDLES) 31G X 5 MM misc, Inject 1 each under the skin 6 (Six) Times a Day., Disp: 200 each, Rfl: 6  •  metOLazone (ZAROXOLYN) 2.5 MG tablet, Take 2.5 mg by mouth As Needed., Disp: , Rfl:   •  mupirocin (BACTROBAN) 2 % ointment, Apply  topically 3 (Three) Times a Day., Disp: 30 g, Rfl: 0  •  ONE TOUCH ULTRA TEST test strip, TEST BLOOD SUGAR 3 TIMES DAILY, Disp: 100 each, Rfl: 4  •  raNITIdine (ZANTAC) 150 MG tablet, Take 150 mg by mouth 2 (Two) Times a Day., Disp: , Rfl:      Allergies:  Allergies   Allergen Reactions   • Statins Myalgia   • Ancef [Cefazolin]    • Cephalosporins    • Cleocin [Clindamycin Hcl]    • Clindamycin/Lincomycin    • Keflex [Cephalexin]    • Levaquin [Levofloxacin]    • Lipitor [Atorvastatin]    • Lisinopril    • Losartan Potassium Other (See Comments)     Unknown reaction   • Oxycodone    • Quinolones        Social Hx:  Social History   Substance Use Topics   • Smoking status: Former Smoker     Packs/day: 1.00     Years: 30.00     Types: Cigarettes   • Smokeless tobacco: Never Used      Comment: quit 7 years ago   • Alcohol use 0.6 oz/week     1 Glasses of wine per week      Comment: holidays       Family Hx:  Family History   Problem Relation Age of Onset   • No Known Problems Mother    • No Known Problems Father        Review of Imaging:  Carotid duplex dated 5/18/2018 from [facility] was reviewed along with its corresponding radiologic report.  Comparison is made to prior carotid duplex dated 10/21/2014.  Given differences in technique, there has been improvement severity of stenosis involving the bilateral carotid bifurcations.  Peak velocities within the right carotid are 220/62 cm/s, with a ratio of 2.0 (was 304/80 cm/s).  Peak velocities within the left carotid vasculature are 275/53 cm/s, ratio 1.0 (was 432/85 cm/s).  Again, given differences in technique this represents an improvement, and I would estimate the severity of blockage within her left carotid to be on the order of 50% or so, with even less severe stenosis of the right carotid.    Physical Examination:  Vitals:    06/25/18 1413   BP: 124/60   Temp: 97.9 °F (36.6 °C)        General Appearance:   Well developed, well nourished, well groomed, alert, and cooperative.  Cardiovascular: Regular rate and rhythm. Bilateral carotid bruits      Neurological examination:  Neurologic Exam     Mental Status   Oriented to person, place, and time.   Speech: speech is normal   Level of  "consciousness: alert    Cranial Nerves     CN II   Right visual field deficit: upper temporal (Peripheral field deficit involving her right eye, which she attributes to \"retinopathy\".) quadrant(s)    CN III, IV, VI   Pupils are equal, round, and reactive to light.  Extraocular motions are normal.     CN V   Facial sensation intact.     CN VII   Facial expression full, symmetric.     CN VIII   CN VIII normal.     CN IX, X   CN IX normal.   CN X normal.     CN XI   CN XI normal.     Motor Exam     Strength   Strength 5/5 throughout.     Sensory Exam   Light touch normal.     Gait, Coordination, and Reflexes     Gait  Gait: normal      Diagnoses/Plan:    Ms. Leon is a 62 y.o. female asymptomatic, bilateral carotid stenosis (left greater than right).  I have reviewed her most recent carotid duplex, and in comparison to prior study from 2014, this represents an interval improvement.  She has started on Praluent, and lost nearly 70 pounds, and this certainly could account for an apparent regression and carotid plaque burden/stenosis.  I would estimate the severity of her stenosis within her left carotid vasculature to be approximate 50% or so, and as such we will continue aggressive medical therapy of her cardiovascular risk factors.  She does have chronic renal insufficiency (last creatinine 4.0), and again given the apparent improvement in her carotid occlusive disease, we will defer catheter angiography.  I did review his stroke symptoms with her, she will contact our office if she has any stroke symptoms during the interval, but I plan on following up with her in 12 months time with carotid duplex to ensure stability and exclude any progression of disease that might necessitate further treatment/intervention.                "

## 2018-06-29 ENCOUNTER — OFFICE VISIT (OUTPATIENT)
Dept: INTERNAL MEDICINE | Facility: CLINIC | Age: 63
End: 2018-06-29

## 2018-06-29 VITALS
SYSTOLIC BLOOD PRESSURE: 104 MMHG | HEART RATE: 90 BPM | WEIGHT: 245 LBS | BODY MASS INDEX: 38.36 KG/M2 | DIASTOLIC BLOOD PRESSURE: 58 MMHG | OXYGEN SATURATION: 99 %

## 2018-06-29 DIAGNOSIS — IMO0002 ABDOMINAL ABSCESS: Primary | ICD-10-CM

## 2018-06-29 PROCEDURE — 99213 OFFICE O/P EST LOW 20 MIN: CPT | Performed by: PHYSICIAN ASSISTANT

## 2018-07-26 ENCOUNTER — OFFICE VISIT (OUTPATIENT)
Dept: INTERNAL MEDICINE | Facility: CLINIC | Age: 63
End: 2018-07-26

## 2018-07-26 VITALS
BODY MASS INDEX: 39.87 KG/M2 | HEIGHT: 67 IN | WEIGHT: 254 LBS | SYSTOLIC BLOOD PRESSURE: 136 MMHG | DIASTOLIC BLOOD PRESSURE: 60 MMHG | OXYGEN SATURATION: 99 % | HEART RATE: 82 BPM

## 2018-07-26 DIAGNOSIS — Z12.39 BREAST CANCER SCREENING: Primary | ICD-10-CM

## 2018-07-26 DIAGNOSIS — I10 ESSENTIAL HYPERTENSION: ICD-10-CM

## 2018-07-26 DIAGNOSIS — N18.4 STAGE 4 CHRONIC KIDNEY DISEASE (HCC): ICD-10-CM

## 2018-07-26 DIAGNOSIS — Z11.59 NEED FOR HEPATITIS C SCREENING TEST: ICD-10-CM

## 2018-07-26 DIAGNOSIS — Z78.0 POST-MENOPAUSAL: ICD-10-CM

## 2018-07-26 LAB — HCV AB SER DONR QL: NORMAL

## 2018-07-26 PROCEDURE — 99214 OFFICE O/P EST MOD 30 MIN: CPT | Performed by: PHYSICIAN ASSISTANT

## 2018-07-26 PROCEDURE — 86803 HEPATITIS C AB TEST: CPT | Performed by: PHYSICIAN ASSISTANT

## 2018-07-26 RX ORDER — DILTIAZEM HYDROCHLORIDE 120 MG/1
120 CAPSULE, EXTENDED RELEASE ORAL DAILY
Refills: 11 | COMMUNITY
Start: 2018-07-06 | End: 2019-06-10

## 2018-07-26 RX ORDER — LANSOPRAZOLE 30 MG/1
30 CAPSULE, DELAYED RELEASE ORAL EVERY MORNING
Refills: 11 | COMMUNITY
Start: 2018-07-06

## 2018-07-26 RX ORDER — NALOXEGOL OXALATE 12.5 MG/1
1 TABLET, FILM COATED ORAL 2 TIMES WEEKLY
Refills: 1 | COMMUNITY
Start: 2018-07-07 | End: 2019-06-10

## 2018-07-26 NOTE — PROGRESS NOTES
Chief Complaint   Patient presents with   • Abdominal abscess     follow up       Subjective   Tashia Leon is a 62 y.o. female.       History of Present Illness     Pt's mid abdominal abscess has completely healed. Has some remaining scarring but firmness, redness, tenderness have resolved.    She saw her nephrologist 6 weeks ago, levels are stable. She is currently retaining about 14 lbs of fluid, she will take her metalozone today and tomorrow to get the fluid off. She still needs to lose 15 more lbs to be eligible for kidney transplant.     Saw Dr Becker in June for check up. Saw Dr Walsh, neurosurgery, but was told she does not need any carotid intervention because disease has regressed since last check.    Saw her opthalmologist and had bilateral injections for her retinopathy.    Taking meds as directed.Overdue for mammogram, bone density and physical.    Notes that she is retiring in the next couple months. Hopefully can take better care of herself.      Current Outpatient Prescriptions:   •  Aflibercept (EYLEA IO), Inject  as directed As Needed. Every 6 weeks, Disp: , Rfl:   •  Alirocumab (PRALUENT) 75 MG/ML solution pen-injector, Inject 75 mg under the skin Every 14 (Fourteen) Days., Disp: 7 pen, Rfl: 3  •  ammonium lactate (AMLACTIN) 12 % cream, Apply 1 application topically As Needed., Disp: , Rfl:   •  aspirin  MG tablet, TAKE 1 TABLET DAILY, Disp: 60 tablet, Rfl: 11  •  baclofen (LIORESAL) 10 MG tablet, Take 10 mg by mouth As Needed., Disp: , Rfl:   •  diclofenac (VOLTAREN) 1 % gel gel, Apply 1 g topically As Needed., Disp: , Rfl: 3  •  Dulaglutide 1.5 MG/0.5ML solution pen-injector, Inject 1.5 mg under the skin 1 (One) Time Per Week., Disp: 4 pen, Rfl: 5  •  fluticasone (FLONASE) 50 MCG/ACT nasal spray, USE 2 SPRAYS INTO EACH NOSTRIL ONCE DAILY., Disp: 16 g, Rfl: 2  •  furosemide (LASIX) 40 MG tablet, TAKE 1 TABLET TWICE DAILY, Disp: 120 tablet, Rfl: 11  •   HYDROcodone-acetaminophen (NORCO) 7.5-325 MG per tablet, Take 1 tablet by mouth 3 (Three) Times a Day., Disp: , Rfl:   •  Insulin Glargine (TOUJEO SOLOSTAR) 300 UNIT/ML solution pen-injector, Inject 80 Units as directed 2 (Two) Times a Day., Disp: 20 pen, Rfl: 6  •  Insulin Lispro (HUMALOG KWIKPEN) 200 UNIT/ML solution pen-injector, Inject 80 Units under the skin 3 (Three) Times a Day With Meals. (Patient taking differently: Inject 80 Units under the skin As Needed.), Disp: 25 pen, Rfl: 6  •  Insulin Pen Needle (B-D UF III MINI PEN NEEDLES) 31G X 5 MM misc, Inject 1 each under the skin 6 (Six) Times a Day., Disp: 200 each, Rfl: 6  •  lansoprazole (PREVACID) 30 MG capsule, Take 30 mg by mouth Daily., Disp: , Rfl: 11  •  metOLazone (ZAROXOLYN) 2.5 MG tablet, Take 2.5 mg by mouth As Needed., Disp: , Rfl:   •  MOVANTIK 12.5 MG tablet, Take 1 tablet by mouth Daily., Disp: , Rfl: 1  •  mupirocin (BACTROBAN) 2 % ointment, Apply  topically 3 (Three) Times a Day., Disp: 30 g, Rfl: 0  •  ONE TOUCH ULTRA TEST test strip, TEST BLOOD SUGAR 3 TIMES DAILY, Disp: 100 each, Rfl: 4  •  DILT- MG 24 hr capsule, Take 120 mg by mouth Daily., Disp: , Rfl: 11     PMF  The following portions of the patient's history were reviewed and updated as appropriate: allergies, current medications, past family history, past medical history, past social history, past surgical history and problem list.    Review of Systems   Constitutional: Positive for fatigue. Negative for activity change and appetite change.   HENT: Negative for congestion and rhinorrhea.    Respiratory: Negative for chest tightness and shortness of breath.    Cardiovascular: Negative for chest pain and palpitations.   Gastrointestinal: Negative for abdominal pain.   Genitourinary: Negative for dysuria.   Musculoskeletal: Negative for arthralgias and myalgias.   Neurological: Negative for dizziness, weakness, light-headedness and headaches.   Psychiatric/Behavioral: Negative  "for dysphoric mood. The patient is not nervous/anxious.        Objective   /60   Pulse 82   Ht 170.2 cm (67.01\")   Wt 115 kg (254 lb)   SpO2 99%   BMI 39.77 kg/m²     Physical Exam   Constitutional: She appears well-developed and well-nourished.   HENT:   Head: Normocephalic.   Right Ear: Hearing, tympanic membrane, external ear and ear canal normal.   Left Ear: Hearing, tympanic membrane, external ear and ear canal normal.   Nose: Nose normal.   Mouth/Throat: Oropharynx is clear and moist.   Eyes: Pupils are equal, round, and reactive to light. Conjunctivae are normal.   Neck: Normal range of motion.   Cardiovascular: Normal rate, regular rhythm and normal heart sounds.    Pulmonary/Chest: Effort normal and breath sounds normal. She has no decreased breath sounds. She has no wheezes. She has no rhonchi. She has no rales.   Musculoskeletal: Normal range of motion.   Neurological: She is alert.   Skin: Skin is warm and dry.   Psychiatric: She has a normal mood and affect. Her behavior is normal.   Nursing note and vitals reviewed.      Results for orders placed or performed in visit on 07/26/18   Hepatitis C Antibody   Result Value Ref Range    Hepatitis C Ab Non-Reactive Non-Reactive        ASSESSMENT/PLAN    Problem List Items Addressed This Visit        Cardiovascular and Mediastinum    Essential hypertension     Hypertension is unchanged.  Continue current treatment regimen.  Blood pressure will be reassessed at the next regular appointment.         Relevant Medications    DILT- MG 24 hr capsule       Other    CKD (chronic kidney disease)     Followed by  nephrology- currently stable.           Other Visit Diagnoses     Breast cancer screening    -  Primary    Relevant Orders    Mammo Screening Digital Tomosynthesis Bilateral With CAD    Post-menopausal        Relevant Orders    DEXA Bone Density Axial    Need for hepatitis C screening test        Relevant Orders    Hepatitis C Antibody " (Completed)               Return in about 4 months (around 11/26/2018) for Annual.

## 2018-08-06 DIAGNOSIS — IMO0002 UNCONTROLLED TYPE 2 DIABETES MELLITUS WITH COMPLICATION, WITH LONG-TERM CURRENT USE OF INSULIN: ICD-10-CM

## 2018-08-07 DIAGNOSIS — IMO0002 UNCONTROLLED TYPE 2 DIABETES MELLITUS WITH COMPLICATION, WITH LONG-TERM CURRENT USE OF INSULIN: ICD-10-CM

## 2018-08-07 RX ORDER — INSULIN LISPRO 200 [IU]/ML
80 INJECTION, SOLUTION SUBCUTANEOUS
Qty: 36 ML | Refills: 6 | Status: SHIPPED | OUTPATIENT
Start: 2018-08-07 | End: 2018-08-07 | Stop reason: SDUPTHER

## 2018-08-07 RX ORDER — PEN NEEDLE, DIABETIC 31 GX3/16"
1 NEEDLE, DISPOSABLE MISCELLANEOUS
Qty: 200 EACH | Refills: 6 | Status: SHIPPED | OUTPATIENT
Start: 2018-08-07 | End: 2018-11-26

## 2018-08-29 ENCOUNTER — HOSPITAL ENCOUNTER (OUTPATIENT)
Dept: BONE DENSITY | Facility: HOSPITAL | Age: 63
Discharge: HOME | End: 2018-08-29

## 2018-08-29 ENCOUNTER — HOSPITAL ENCOUNTER (OUTPATIENT)
Dept: MAMMOGRAPHY | Facility: HOSPITAL | Age: 63
Discharge: HOME | End: 2018-08-29
Admitting: PHYSICIAN ASSISTANT

## 2018-08-29 DIAGNOSIS — Z78.0 POST-MENOPAUSAL: ICD-10-CM

## 2018-08-29 DIAGNOSIS — Z12.39 BREAST CANCER SCREENING: ICD-10-CM

## 2018-08-29 PROCEDURE — 77063 BREAST TOMOSYNTHESIS BI: CPT

## 2018-08-29 PROCEDURE — 77067 SCR MAMMO BI INCL CAD: CPT | Performed by: RADIOLOGY

## 2018-08-29 PROCEDURE — 77067 SCR MAMMO BI INCL CAD: CPT

## 2018-08-29 PROCEDURE — 77080 DXA BONE DENSITY AXIAL: CPT

## 2018-08-29 PROCEDURE — 77063 BREAST TOMOSYNTHESIS BI: CPT | Performed by: RADIOLOGY

## 2018-10-09 RX ORDER — DULAGLUTIDE 1.5 MG/.5ML
INJECTION, SOLUTION SUBCUTANEOUS
Qty: 2 ML | Refills: 5 | Status: SHIPPED | OUTPATIENT
Start: 2018-10-09 | End: 2018-11-26

## 2018-10-19 ENCOUNTER — OFFICE VISIT (OUTPATIENT)
Dept: ENDOCRINOLOGY | Facility: CLINIC | Age: 63
End: 2018-10-19

## 2018-10-19 VITALS
DIASTOLIC BLOOD PRESSURE: 68 MMHG | HEIGHT: 67 IN | WEIGHT: 246.2 LBS | BODY MASS INDEX: 38.64 KG/M2 | HEART RATE: 84 BPM | SYSTOLIC BLOOD PRESSURE: 130 MMHG | OXYGEN SATURATION: 99 %

## 2018-10-19 DIAGNOSIS — I25.10 CORONARY ARTERY DISEASE INVOLVING NATIVE CORONARY ARTERY OF NATIVE HEART WITHOUT ANGINA PECTORIS: ICD-10-CM

## 2018-10-19 DIAGNOSIS — IMO0002 UNCONTROLLED TYPE 2 DIABETES MELLITUS WITH COMPLICATION, WITH LONG-TERM CURRENT USE OF INSULIN: ICD-10-CM

## 2018-10-19 DIAGNOSIS — E78.2 MIXED HYPERLIPIDEMIA: ICD-10-CM

## 2018-10-19 DIAGNOSIS — IMO0002 DIABETES MELLITUS TYPE 2, UNCONTROLLED, WITH COMPLICATIONS: Primary | ICD-10-CM

## 2018-10-19 LAB
GLUCOSE BLDC GLUCOMTR-MCNC: 115 MG/DL (ref 70–130)
HBA1C MFR BLD: 7.6 %

## 2018-10-19 PROCEDURE — 99214 OFFICE O/P EST MOD 30 MIN: CPT | Performed by: INTERNAL MEDICINE

## 2018-10-19 PROCEDURE — 83036 HEMOGLOBIN GLYCOSYLATED A1C: CPT | Performed by: INTERNAL MEDICINE

## 2018-10-19 PROCEDURE — 90674 CCIIV4 VAC NO PRSV 0.5 ML IM: CPT | Performed by: INTERNAL MEDICINE

## 2018-10-19 PROCEDURE — 82947 ASSAY GLUCOSE BLOOD QUANT: CPT | Performed by: INTERNAL MEDICINE

## 2018-10-19 PROCEDURE — 90471 IMMUNIZATION ADMIN: CPT | Performed by: INTERNAL MEDICINE

## 2018-10-19 NOTE — PROGRESS NOTES
Chief complaint  Diabetes (Follow UP Feels like the Trulicity is not working)    Subjective   Tashia Leon is a 63 y.o. female is here today for follow-up.  Diabetes mellitus type 2 diagnosed in mid 90s.   Medications: Humalog and Toujeo  Diabetic complications: Diabetic foot ulcer. Diab retinopathy. CKD - stable function.   Eye care: diabetic retinopathy by last exam, seeing eye dr regularly.   Podiatry visit - every 2 weeks currently, diabetic shoes rx given.   Patient was evaluated for the kidney transplant and went through testing.   Hypoglycemia:none  Nutrition: Patient eats 3 meals a day with carbohydrates consistent amount per meal. Follows weight watchers diet and lost weight, glucose is still high.  She is taking insulin consistently.      Hyerlipidemia with high LDL. Side effects on statins . She developed SOB on crestor and lipitor, simvastatin, Livalo.Doing well on Praluent, which was started Nov 2015, doing well since then, LDL 92 1/2018         Hyperlipidemia   Pertinent negatives include no chest pain, myalgias or shortness of breath.   Diabetes   Pertinent negatives for hypoglycemia include no dizziness, headaches or tremors. Associated symptoms include fatigue and weakness. Pertinent negatives for diabetes include no chest pain.       Medications    Current Outpatient Prescriptions:   •  Aflibercept (EYLEA IO), Inject  as directed As Needed. Every 6 weeks, Disp: , Rfl:   •  Alirocumab (PRALUENT) 75 MG/ML solution pen-injector, Inject 75 mg under the skin into the appropriate area as directed Every 14 (Fourteen) Days., Disp: 7 pen, Rfl: 3  •  ammonium lactate (AMLACTIN) 12 % cream, Apply 1 application topically As Needed., Disp: , Rfl:   •  aspirin  MG tablet, TAKE 1 TABLET DAILY, Disp: 60 tablet, Rfl: 11  •  baclofen (LIORESAL) 10 MG tablet, Take 10 mg by mouth As Needed., Disp: , Rfl:   •  diclofenac (VOLTAREN) 1 % gel gel, Apply 1 g topically As Needed., Disp: , Rfl: 3  •  DILT-  MG 24 hr capsule, Take 120 mg by mouth Daily., Disp: , Rfl: 11  •  fluticasone (FLONASE) 50 MCG/ACT nasal spray, USE 2 SPRAYS INTO EACH NOSTRIL ONCE DAILY., Disp: 16 g, Rfl: 2  •  furosemide (LASIX) 40 MG tablet, TAKE 1 TABLET TWICE DAILY, Disp: 120 tablet, Rfl: 11  •  GLOBAL EASE INJECT PEN NEEDLES 31G X 5 MM misc, INJECT 1 EACH UNDER THE SKIN 6 (SIX) TIMES A DAY., Disp: 200 each, Rfl: 6  •  HYDROcodone-acetaminophen (NORCO) 7.5-325 MG per tablet, Take 1 tablet by mouth 3 (Three) Times a Day., Disp: , Rfl:   •  Insulin Glargine (TOUJEO SOLOSTAR) 300 UNIT/ML solution pen-injector, Inject 80 Units as directed 2 (Two) Times a Day., Disp: 20 pen, Rfl: 11  •  Insulin Lispro (HUMALOG KWIKPEN) 200 UNIT/ML solution pen-injector, Inject 50 Units under the skin into the appropriate area as directed 3 (Three) Times a Day With Meals., Disp: 12 pen, Rfl: 11  •  lansoprazole (PREVACID) 30 MG capsule, Take 30 mg by mouth Daily., Disp: , Rfl: 11  •  metOLazone (ZAROXOLYN) 2.5 MG tablet, Take 2.5 mg by mouth As Needed., Disp: , Rfl:   •  MOVANTIK 12.5 MG tablet, Take 1 tablet by mouth Daily., Disp: , Rfl: 1  •  mupirocin (BACTROBAN) 2 % ointment, Apply  topically 3 (Three) Times a Day., Disp: 30 g, Rfl: 0  •  ONE TOUCH ULTRA TEST test strip, TEST BLOOD SUGAR 3 TIMES DAILY, Disp: 100 each, Rfl: 4  •  TRULICITY 1.5 MG/0.5ML solution pen-injector, INJECT 1.5 MG UNDER THE SKIN 1 ONE TIME PER WEEK., Disp: 2 mL, Rfl: 5    PMH  The following portions of the patient's history were reviewed and updated as appropriate: allergies, current medications, past family history, past medical history, past social history, past surgical history and problem list.    Review of systems  Review of Systems   Constitutional: Positive for fatigue and unexpected weight change (weight gain). Negative for activity change, appetite change, chills and diaphoresis.   HENT: Negative for congestion, ear pain, facial swelling, hearing loss, postnasal drip, sore  "throat, trouble swallowing and voice change.    Eyes: Negative.  Negative for redness, itching and visual disturbance.   Respiratory: Negative for cough and shortness of breath.    Cardiovascular: Positive for leg swelling. Negative for chest pain and palpitations.   Gastrointestinal: Negative for abdominal distention, abdominal pain, constipation, diarrhea, nausea and vomiting.   Endocrine:        As listed in HPI   Genitourinary: Negative.    Musculoskeletal: Negative for arthralgias, back pain, joint swelling, myalgias, neck pain and neck stiffness.   Skin: Negative.    Allergic/Immunologic: Negative.    Neurological: Positive for weakness and light-headedness. Negative for dizziness, tremors, syncope and headaches.   Hematological: Negative.    Psychiatric/Behavioral: Negative.    All other systems reviewed and are negative.      Physical exam  Objective   Blood pressure 130/68, pulse 84, height 170.2 cm (67.01\"), weight 112 kg (246 lb 3.2 oz), SpO2 99 %, not currently breastfeeding.   Physical Exam   Constitutional: She is oriented to person, place, and time. She appears well-developed and well-nourished.   HENT:   Head: Normocephalic and atraumatic.   Eyes: Conjunctivae are normal.   Neck: No thyromegaly present.   The neck is supple and symmetric   Cardiovascular: Normal rate, regular rhythm and normal heart sounds.    Pulmonary/Chest: Effort normal and breath sounds normal.   Musculoskeletal: She exhibits edema (trace).   Lower extremities - compression device applied bilaterally. Skin is intact   Lymphadenopathy:     She has no cervical adenopathy.   Neurological: She is alert and oriented to person, place, and time.   Skin: Skin is warm and dry. No rash noted.   Psychiatric: She has a normal mood and affect. Thought content normal.   Vitals reviewed.        LABS AND IMAGING  Results for orders placed or performed in visit on 10/19/18   POC Glucose Fingerstick   Result Value Ref Range    Glucose 115 70 - " 130 mg/dL   POC Glycosylated Hemoglobin (Hb A1C)   Result Value Ref Range    Hemoglobin A1C 7.6 %           Assessment  Assessment/Plan   Problem List Items Addressed This Visit        Cardiovascular and Mediastinum    Coronary artery disease involving native coronary artery of native heart without angina pectoris    Relevant Medications    Alirocumab (PRALUENT) 75 MG/ML solution pen-injector    Hyperlipemia     Praluent every 2 weeks continued.   LDL was 92 in 1/2018             Relevant Medications    Alirocumab (PRALUENT) 75 MG/ML solution pen-injector    Other Relevant Orders    Lipid Panel       Endocrine    Diabetes mellitus type 2, uncontrolled, with complications (CMS/[facility]) - Primary    Relevant Medications    Insulin Lispro (HUMALOG KWIKPEN) 200 UNIT/ML solution pen-injector    Insulin Glargine (TOUJEO SOLOSTAR) 300 UNIT/ML solution pen-injector    Other Relevant Orders    POC Glucose Fingerstick (Completed)    POC Glycosylated Hemoglobin (Hb A1C) (Completed)      Other Visit Diagnoses     Uncontrolled type 2 diabetes mellitus with complication, with long-term current use of insulin (CMS/[facility])        Relevant Medications    Insulin Lispro (HUMALOG KWIKPEN) 200 UNIT/ML solution pen-injector    Insulin Glargine (TOUJEO SOLOSTAR) 300 UNIT/ML solution pen-injector          Plan  Diabetes mellitus type 2 with hx diabetic foot ulcer, retinopathy and CKD.     Patient Instructions     -Toujeo 80 Units BID.   -Continue Humalog 30-50  units three times a day  -Trulicity 1.5 mg     Results for orders placed or performed in visit on 10/19/18   POC Glucose Fingerstick   Result Value Ref Range    Glucose 115 70 - 130 mg/dL   POC Glycosylated Hemoglobin (Hb A1C)   Result Value Ref Range    Hemoglobin A1C 7.6 %     2. Hyperlipidemia  Continue Praluent every 2 weeks.  She had reaction to crestor, livalo and lipitor and severe peripheral vascular disease. No alternatives exist.      3. CKD / edema - stable, planned for kidney  transplant. Last CR was 4.1 in 10/17/2018    Recent labs reviewed.    Medications refilled. Hypoglycemia precautions and insulin instructions reviewed.   Flu vaccine administered today.     Follow-up in 3-4 months.

## 2018-10-19 NOTE — PATIENT INSTRUCTIONS
-Toujeo 80 Units BID.   -Continue Humalog 30-50  units three times a day  -Trulicity 1.5 mg     Results for orders placed or performed in visit on 10/19/18   POC Glucose Fingerstick   Result Value Ref Range    Glucose 115 70 - 130 mg/dL   POC Glycosylated Hemoglobin (Hb A1C)   Result Value Ref Range    Hemoglobin A1C 7.6 %

## 2018-10-23 DIAGNOSIS — IMO0002 UNCONTROLLED TYPE 2 DIABETES MELLITUS WITH COMPLICATION, WITH LONG-TERM CURRENT USE OF INSULIN: ICD-10-CM

## 2018-11-26 ENCOUNTER — APPOINTMENT (OUTPATIENT)
Dept: MRI IMAGING | Facility: HOSPITAL | Age: 63
End: 2018-11-26

## 2018-11-26 ENCOUNTER — HOSPITAL ENCOUNTER (INPATIENT)
Facility: HOSPITAL | Age: 63
LOS: 17 days | Discharge: HOME | End: 2018-12-13
Attending: EMERGENCY MEDICINE | Admitting: INTERNAL MEDICINE

## 2018-11-26 DIAGNOSIS — E66.9 OBESITY (BMI 30-39.9): ICD-10-CM

## 2018-11-26 DIAGNOSIS — L97.414 DIABETIC ULCER OF RIGHT MIDFOOT ASSOCIATED WITH TYPE 2 DIABETES MELLITUS, WITH NECROSIS OF BONE (HCC): ICD-10-CM

## 2018-11-26 DIAGNOSIS — D72.829 LEUKOCYTOSIS, UNSPECIFIED TYPE: ICD-10-CM

## 2018-11-26 DIAGNOSIS — L03.031 CELLULITIS OF TOE OF RIGHT FOOT: ICD-10-CM

## 2018-11-26 DIAGNOSIS — N18.9 CHRONIC KIDNEY DISEASE, UNSPECIFIED CKD STAGE: ICD-10-CM

## 2018-11-26 DIAGNOSIS — E11.621 DIABETIC ULCER OF RIGHT MIDFOOT ASSOCIATED WITH TYPE 2 DIABETES MELLITUS, WITH NECROSIS OF BONE (HCC): ICD-10-CM

## 2018-11-26 DIAGNOSIS — Z74.09 IMPAIRED FUNCTIONAL MOBILITY, BALANCE, GAIT, AND ENDURANCE: ICD-10-CM

## 2018-11-26 DIAGNOSIS — N18.5 CHRONIC KIDNEY DISEASE, STAGE V (HCC): ICD-10-CM

## 2018-11-26 DIAGNOSIS — IMO0002 DIABETES MELLITUS TYPE 2, UNCONTROLLED, WITH COMPLICATIONS: ICD-10-CM

## 2018-11-26 DIAGNOSIS — L97.519 DIABETIC ULCER OF OTHER PART OF RIGHT FOOT ASSOCIATED WITH TYPE 2 DIABETES MELLITUS, UNSPECIFIED ULCER STAGE (HCC): Primary | ICD-10-CM

## 2018-11-26 DIAGNOSIS — L03.115 CELLULITIS OF RIGHT FOOT: ICD-10-CM

## 2018-11-26 DIAGNOSIS — E11.621 DIABETIC ULCER OF OTHER PART OF RIGHT FOOT ASSOCIATED WITH TYPE 2 DIABETES MELLITUS, UNSPECIFIED ULCER STAGE (HCC): Primary | ICD-10-CM

## 2018-11-26 DIAGNOSIS — M86.471 CHRONIC OSTEOMYELITIS OF RIGHT FOOT WITH DRAINING SINUS (HCC): ICD-10-CM

## 2018-11-26 DIAGNOSIS — E11.42 DIABETIC PERIPHERAL NEUROPATHY (HCC): ICD-10-CM

## 2018-11-26 DIAGNOSIS — Z74.09 IMPAIRED MOBILITY AND ADLS: ICD-10-CM

## 2018-11-26 DIAGNOSIS — Z78.9 IMPAIRED MOBILITY AND ADLS: ICD-10-CM

## 2018-11-26 PROBLEM — L03.90 CELLULITIS: Status: ACTIVE | Noted: 2018-11-26

## 2018-11-26 PROBLEM — A41.9 SEPSIS (HCC): Status: ACTIVE | Noted: 2018-11-26

## 2018-11-26 PROBLEM — E87.6 HYPOKALEMIA: Status: ACTIVE | Noted: 2018-11-26

## 2018-11-26 PROBLEM — D63.8 ANEMIA, CHRONIC DISEASE: Status: ACTIVE | Noted: 2018-11-26

## 2018-11-26 LAB
ALBUMIN SERPL-MCNC: 4.41 G/DL (ref 3.2–4.8)
ALBUMIN/GLOB SERPL: 1.3 G/DL (ref 1.5–2.5)
ALP SERPL-CCNC: 92 U/L (ref 25–100)
ALT SERPL W P-5'-P-CCNC: 19 U/L (ref 7–40)
ANION GAP SERPL CALCULATED.3IONS-SCNC: 12 MMOL/L (ref 3–11)
AST SERPL-CCNC: 24 U/L (ref 0–33)
BASOPHILS # BLD AUTO: 0.05 10*3/MM3 (ref 0–0.2)
BASOPHILS NFR BLD AUTO: 0.3 % (ref 0–1)
BILIRUB SERPL-MCNC: 0.2 MG/DL (ref 0.3–1.2)
BUN BLD-MCNC: 74 MG/DL (ref 9–23)
BUN/CREAT SERPL: 19.5 (ref 7–25)
CALCIUM SPEC-SCNC: 9.8 MG/DL (ref 8.7–10.4)
CHLORIDE SERPL-SCNC: 99 MMOL/L (ref 99–109)
CO2 SERPL-SCNC: 26 MMOL/L (ref 20–31)
CREAT BLD-MCNC: 3.79 MG/DL (ref 0.6–1.3)
CRP SERPL-MCNC: 17.25 MG/DL (ref 0–1)
D-LACTATE SERPL-SCNC: 2 MMOL/L (ref 0.5–2)
DEPRECATED RDW RBC AUTO: 44.6 FL (ref 37–54)
EOSINOPHIL # BLD AUTO: 0.07 10*3/MM3 (ref 0–0.3)
EOSINOPHIL NFR BLD AUTO: 0.4 % (ref 0–3)
ERYTHROCYTE [DISTWIDTH] IN BLOOD BY AUTOMATED COUNT: 12.7 % (ref 11.3–14.5)
ERYTHROCYTE [SEDIMENTATION RATE] IN BLOOD: 103 MM/HR (ref 0–30)
GFR SERPL CREATININE-BSD FRML MDRD: 12 ML/MIN/1.73
GLOBULIN UR ELPH-MCNC: 3.4 GM/DL
GLUCOSE BLD-MCNC: 137 MG/DL (ref 70–100)
GLUCOSE BLDC GLUCOMTR-MCNC: 208 MG/DL (ref 70–130)
HCT VFR BLD AUTO: 34.3 % (ref 34.5–44)
HGB BLD-MCNC: 11 G/DL (ref 11.5–15.5)
IMM GRANULOCYTES # BLD: 0.1 10*3/MM3 (ref 0–0.03)
IMM GRANULOCYTES NFR BLD: 0.5 % (ref 0–0.6)
INR PPP: 0.98 (ref 0.91–1.09)
LYMPHOCYTES # BLD AUTO: 4.13 10*3/MM3 (ref 0.6–4.8)
LYMPHOCYTES NFR BLD AUTO: 21.2 % (ref 24–44)
MCH RBC QN AUTO: 30.6 PG (ref 27–31)
MCHC RBC AUTO-ENTMCNC: 32.1 G/DL (ref 32–36)
MCV RBC AUTO: 95.3 FL (ref 80–99)
MONOCYTES # BLD AUTO: 1.3 10*3/MM3 (ref 0–1)
MONOCYTES NFR BLD AUTO: 6.7 % (ref 0–12)
NEUTROPHILS # BLD AUTO: 13.82 10*3/MM3 (ref 1.5–8.3)
NEUTROPHILS NFR BLD AUTO: 70.9 % (ref 41–71)
PLAT MORPH BLD: NORMAL
PLATELET # BLD AUTO: 417 10*3/MM3 (ref 150–450)
PMV BLD AUTO: 10.5 FL (ref 6–12)
POTASSIUM BLD-SCNC: 3.2 MMOL/L (ref 3.5–5.5)
PROCALCITONIN SERPL-MCNC: 0.1 NG/ML
PROT SERPL-MCNC: 7.8 G/DL (ref 5.7–8.2)
PROTHROMBIN TIME: 10.3 SECONDS (ref 9.6–11.5)
RBC # BLD AUTO: 3.6 10*6/MM3 (ref 3.89–5.14)
RBC MORPH BLD: NORMAL
SODIUM BLD-SCNC: 137 MMOL/L (ref 132–146)
WBC MORPH BLD: NORMAL
WBC NRBC COR # BLD: 19.47 10*3/MM3 (ref 3.5–10.8)

## 2018-11-26 PROCEDURE — 99284 EMERGENCY DEPT VISIT MOD MDM: CPT

## 2018-11-26 PROCEDURE — 85610 PROTHROMBIN TIME: CPT | Performed by: EMERGENCY MEDICINE

## 2018-11-26 PROCEDURE — 25010000002 HEPARIN (PORCINE) PER 1000 UNITS: Performed by: NURSE PRACTITIONER

## 2018-11-26 PROCEDURE — 86140 C-REACTIVE PROTEIN: CPT | Performed by: EMERGENCY MEDICINE

## 2018-11-26 PROCEDURE — 63710000001 INSULIN LISPRO (HUMAN) PER 5 UNITS: Performed by: NURSE PRACTITIONER

## 2018-11-26 PROCEDURE — 63710000001 INSULIN DETEMIR PER 5 UNITS: Performed by: NURSE PRACTITIONER

## 2018-11-26 PROCEDURE — 80053 COMPREHEN METABOLIC PANEL: CPT | Performed by: EMERGENCY MEDICINE

## 2018-11-26 PROCEDURE — 25010000002 DAPTOMYCIN PER 1 MG: Performed by: EMERGENCY MEDICINE

## 2018-11-26 PROCEDURE — 84145 PROCALCITONIN (PCT): CPT | Performed by: EMERGENCY MEDICINE

## 2018-11-26 PROCEDURE — 25010000002 HYDROMORPHONE 1 MG/ML SOLUTION: Performed by: EMERGENCY MEDICINE

## 2018-11-26 PROCEDURE — 25010000002 HYDROMORPHONE PER 4 MG: Performed by: NURSE PRACTITIONER

## 2018-11-26 PROCEDURE — 73718 MRI LOWER EXTREMITY W/O DYE: CPT

## 2018-11-26 PROCEDURE — 83605 ASSAY OF LACTIC ACID: CPT | Performed by: EMERGENCY MEDICINE

## 2018-11-26 PROCEDURE — 99223 1ST HOSP IP/OBS HIGH 75: CPT | Performed by: FAMILY MEDICINE

## 2018-11-26 PROCEDURE — 82962 GLUCOSE BLOOD TEST: CPT

## 2018-11-26 PROCEDURE — 85652 RBC SED RATE AUTOMATED: CPT | Performed by: EMERGENCY MEDICINE

## 2018-11-26 PROCEDURE — 85007 BL SMEAR W/DIFF WBC COUNT: CPT | Performed by: EMERGENCY MEDICINE

## 2018-11-26 PROCEDURE — 85025 COMPLETE CBC W/AUTO DIFF WBC: CPT | Performed by: EMERGENCY MEDICINE

## 2018-11-26 PROCEDURE — 87040 BLOOD CULTURE FOR BACTERIA: CPT | Performed by: EMERGENCY MEDICINE

## 2018-11-26 PROCEDURE — 25010000002 ERTAPENEM PER 500 MG: Performed by: EMERGENCY MEDICINE

## 2018-11-26 RX ORDER — DEXTROSE MONOHYDRATE 25 G/50ML
25 INJECTION, SOLUTION INTRAVENOUS
Status: DISCONTINUED | OUTPATIENT
Start: 2018-11-26 | End: 2018-12-13 | Stop reason: HOSPADM

## 2018-11-26 RX ORDER — HYDROMORPHONE HYDROCHLORIDE 1 MG/ML
0.5 INJECTION, SOLUTION INTRAMUSCULAR; INTRAVENOUS; SUBCUTANEOUS
Status: DISCONTINUED | OUTPATIENT
Start: 2018-11-26 | End: 2018-12-05

## 2018-11-26 RX ORDER — ONDANSETRON 2 MG/ML
4 INJECTION INTRAMUSCULAR; INTRAVENOUS EVERY 6 HOURS PRN
Status: DISCONTINUED | OUTPATIENT
Start: 2018-11-26 | End: 2018-12-13 | Stop reason: HOSPADM

## 2018-11-26 RX ORDER — DILTIAZEM HYDROCHLORIDE 120 MG/1
120 CAPSULE, COATED, EXTENDED RELEASE ORAL NIGHTLY
Status: DISCONTINUED | OUTPATIENT
Start: 2018-11-27 | End: 2018-12-13 | Stop reason: HOSPADM

## 2018-11-26 RX ORDER — HEPARIN SODIUM 5000 [USP'U]/ML
5000 INJECTION, SOLUTION INTRAVENOUS; SUBCUTANEOUS EVERY 12 HOURS SCHEDULED
Status: DISCONTINUED | OUTPATIENT
Start: 2018-11-26 | End: 2018-12-10

## 2018-11-26 RX ORDER — DULAGLUTIDE 1.5 MG/.5ML
1.5 INJECTION, SOLUTION SUBCUTANEOUS WEEKLY
COMMUNITY
End: 2019-02-13 | Stop reason: SDUPTHER

## 2018-11-26 RX ORDER — PANTOPRAZOLE SODIUM 40 MG/1
40 TABLET, DELAYED RELEASE ORAL EVERY MORNING
Status: DISCONTINUED | OUTPATIENT
Start: 2018-11-27 | End: 2018-12-13 | Stop reason: HOSPADM

## 2018-11-26 RX ORDER — HYDROCODONE BITARTRATE AND ACETAMINOPHEN 7.5; 325 MG/1; MG/1
1 TABLET ORAL EVERY 8 HOURS PRN
Status: DISCONTINUED | OUTPATIENT
Start: 2018-11-26 | End: 2018-11-30

## 2018-11-26 RX ORDER — SODIUM CHLORIDE 0.9 % (FLUSH) 0.9 %
3 SYRINGE (ML) INJECTION EVERY 12 HOURS SCHEDULED
Status: DISCONTINUED | OUTPATIENT
Start: 2018-11-26 | End: 2018-12-13 | Stop reason: HOSPADM

## 2018-11-26 RX ORDER — METOLAZONE 2.5 MG/1
2.5 TABLET ORAL EVERY OTHER DAY
Status: DISCONTINUED | OUTPATIENT
Start: 2018-11-26 | End: 2018-11-26

## 2018-11-26 RX ORDER — METOLAZONE 2.5 MG/1
2.5 TABLET ORAL EVERY OTHER DAY
Status: DISCONTINUED | OUTPATIENT
Start: 2018-11-28 | End: 2018-11-28

## 2018-11-26 RX ORDER — ASPIRIN 325 MG
325 TABLET, DELAYED RELEASE (ENTERIC COATED) ORAL DAILY
COMMUNITY
End: 2019-04-12 | Stop reason: SDUPTHER

## 2018-11-26 RX ORDER — FUROSEMIDE 40 MG/1
40 TABLET ORAL 2 TIMES DAILY
COMMUNITY
End: 2018-12-13 | Stop reason: HOSPADM

## 2018-11-26 RX ORDER — DILTIAZEM HYDROCHLORIDE 120 MG/1
120 CAPSULE, COATED, EXTENDED RELEASE ORAL
Status: DISCONTINUED | OUTPATIENT
Start: 2018-11-27 | End: 2018-11-26

## 2018-11-26 RX ORDER — SODIUM CHLORIDE 0.9 % (FLUSH) 0.9 %
3-10 SYRINGE (ML) INJECTION AS NEEDED
Status: DISCONTINUED | OUTPATIENT
Start: 2018-11-26 | End: 2018-12-13 | Stop reason: HOSPADM

## 2018-11-26 RX ORDER — FUROSEMIDE 40 MG/1
40 TABLET ORAL 2 TIMES DAILY
Status: DISCONTINUED | OUTPATIENT
Start: 2018-11-26 | End: 2018-11-28

## 2018-11-26 RX ORDER — ONDANSETRON 4 MG/1
4 TABLET, FILM COATED ORAL EVERY 6 HOURS PRN
Status: DISCONTINUED | OUTPATIENT
Start: 2018-11-26 | End: 2018-12-13 | Stop reason: HOSPADM

## 2018-11-26 RX ORDER — ASPIRIN 325 MG
325 TABLET, DELAYED RELEASE (ENTERIC COATED) ORAL DAILY
Status: DISCONTINUED | OUTPATIENT
Start: 2018-11-27 | End: 2018-12-13 | Stop reason: HOSPADM

## 2018-11-26 RX ORDER — SODIUM CHLORIDE 0.9 % (FLUSH) 0.9 %
10 SYRINGE (ML) INJECTION AS NEEDED
Status: DISCONTINUED | OUTPATIENT
Start: 2018-11-26 | End: 2018-12-13 | Stop reason: HOSPADM

## 2018-11-26 RX ORDER — NALOXONE HCL 0.4 MG/ML
0.4 VIAL (ML) INJECTION
Status: DISCONTINUED | OUTPATIENT
Start: 2018-11-26 | End: 2018-12-05

## 2018-11-26 RX ORDER — NICOTINE POLACRILEX 4 MG
15 LOZENGE BUCCAL
Status: DISCONTINUED | OUTPATIENT
Start: 2018-11-26 | End: 2018-12-13 | Stop reason: HOSPADM

## 2018-11-26 RX ADMIN — INSULIN LISPRO 5 UNITS: 100 INJECTION, SOLUTION INTRAVENOUS; SUBCUTANEOUS at 23:47

## 2018-11-26 RX ADMIN — INSULIN DETEMIR 40 UNITS: 100 INJECTION, SOLUTION SUBCUTANEOUS at 23:47

## 2018-11-26 RX ADMIN — ERTAPENEM SODIUM 500 MG: 1 INJECTION, POWDER, LYOPHILIZED, FOR SOLUTION INTRAMUSCULAR; INTRAVENOUS at 23:45

## 2018-11-26 RX ADMIN — DILTIAZEM HYDROCHLORIDE 120 MG: 120 CAPSULE, EXTENDED RELEASE ORAL at 23:44

## 2018-11-26 RX ADMIN — SODIUM CHLORIDE 500 ML: 9 INJECTION, SOLUTION INTRAVENOUS at 18:11

## 2018-11-26 RX ADMIN — HYDROMORPHONE HYDROCHLORIDE 1 MG: 1 INJECTION, SOLUTION INTRAMUSCULAR; INTRAVENOUS; SUBCUTANEOUS at 18:11

## 2018-11-26 RX ADMIN — HYDROMORPHONE HYDROCHLORIDE 0.5 MG: 1 INJECTION, SOLUTION INTRAMUSCULAR; INTRAVENOUS; SUBCUTANEOUS at 22:35

## 2018-11-26 RX ADMIN — FUROSEMIDE 40 MG: 40 TABLET ORAL at 23:45

## 2018-11-26 RX ADMIN — DAPTOMYCIN 500 MG: 500 INJECTION, POWDER, LYOPHILIZED, FOR SOLUTION INTRAVENOUS at 18:55

## 2018-11-26 RX ADMIN — HEPARIN SODIUM 5000 UNITS: 5000 INJECTION, SOLUTION INTRAVENOUS; SUBCUTANEOUS at 23:46

## 2018-11-26 RX ADMIN — SODIUM CHLORIDE, PRESERVATIVE FREE 3 ML: 5 INJECTION INTRAVENOUS at 22:36

## 2018-11-27 LAB
ANION GAP SERPL CALCULATED.3IONS-SCNC: 9 MMOL/L (ref 3–11)
BASOPHILS # BLD AUTO: 0.04 10*3/MM3 (ref 0–0.2)
BASOPHILS NFR BLD AUTO: 0.2 % (ref 0–1)
BUN BLD-MCNC: 73 MG/DL (ref 9–23)
BUN/CREAT SERPL: 19.4 (ref 7–25)
CALCIUM SPEC-SCNC: 8.4 MG/DL (ref 8.7–10.4)
CHLORIDE SERPL-SCNC: 101 MMOL/L (ref 99–109)
CK SERPL-CCNC: 146 U/L (ref 26–174)
CO2 SERPL-SCNC: 25 MMOL/L (ref 20–31)
CREAT BLD-MCNC: 3.76 MG/DL (ref 0.6–1.3)
DEPRECATED RDW RBC AUTO: 45.9 FL (ref 37–54)
EOSINOPHIL # BLD AUTO: 0.05 10*3/MM3 (ref 0–0.3)
EOSINOPHIL NFR BLD AUTO: 0.3 % (ref 0–3)
ERYTHROCYTE [DISTWIDTH] IN BLOOD BY AUTOMATED COUNT: 13.1 % (ref 11.3–14.5)
GFR SERPL CREATININE-BSD FRML MDRD: 12 ML/MIN/1.73
GLUCOSE BLD-MCNC: 106 MG/DL (ref 70–100)
GLUCOSE BLDC GLUCOMTR-MCNC: 120 MG/DL (ref 70–130)
GLUCOSE BLDC GLUCOMTR-MCNC: 133 MG/DL (ref 70–130)
GLUCOSE BLDC GLUCOMTR-MCNC: 137 MG/DL (ref 70–130)
GLUCOSE BLDC GLUCOMTR-MCNC: 140 MG/DL (ref 70–130)
HBA1C MFR BLD: 7.6 % (ref 4.8–5.6)
HCT VFR BLD AUTO: 28.9 % (ref 34.5–44)
HGB BLD-MCNC: 9.2 G/DL (ref 11.5–15.5)
IMM GRANULOCYTES # BLD: 0.07 10*3/MM3 (ref 0–0.03)
IMM GRANULOCYTES NFR BLD: 0.4 % (ref 0–0.6)
LYMPHOCYTES # BLD AUTO: 2.96 10*3/MM3 (ref 0.6–4.8)
LYMPHOCYTES NFR BLD AUTO: 17.6 % (ref 24–44)
MCH RBC QN AUTO: 30.9 PG (ref 27–31)
MCHC RBC AUTO-ENTMCNC: 31.8 G/DL (ref 32–36)
MCV RBC AUTO: 97 FL (ref 80–99)
MONOCYTES # BLD AUTO: 1.4 10*3/MM3 (ref 0–1)
MONOCYTES NFR BLD AUTO: 8.3 % (ref 0–12)
NEUTROPHILS # BLD AUTO: 12.35 10*3/MM3 (ref 1.5–8.3)
NEUTROPHILS NFR BLD AUTO: 73.6 % (ref 41–71)
PLATELET # BLD AUTO: 352 10*3/MM3 (ref 150–450)
PMV BLD AUTO: 10.8 FL (ref 6–12)
POTASSIUM BLD-SCNC: 3 MMOL/L (ref 3.5–5.5)
RBC # BLD AUTO: 2.98 10*6/MM3 (ref 3.89–5.14)
SODIUM BLD-SCNC: 135 MMOL/L (ref 132–146)
URATE SERPL-MCNC: 9.9 MG/DL (ref 3.1–7.8)
WBC NRBC COR # BLD: 16.8 10*3/MM3 (ref 3.5–10.8)

## 2018-11-27 PROCEDURE — 82962 GLUCOSE BLOOD TEST: CPT

## 2018-11-27 PROCEDURE — 85025 COMPLETE CBC W/AUTO DIFF WBC: CPT | Performed by: NURSE PRACTITIONER

## 2018-11-27 PROCEDURE — 25010000002 ONDANSETRON PER 1 MG: Performed by: NURSE PRACTITIONER

## 2018-11-27 PROCEDURE — 25010000002 ERTAPENEM PER 500 MG: Performed by: NURSE PRACTITIONER

## 2018-11-27 PROCEDURE — 63710000001 INSULIN DETEMIR PER 5 UNITS: Performed by: NURSE PRACTITIONER

## 2018-11-27 PROCEDURE — 25010000002 HEPARIN (PORCINE) PER 1000 UNITS: Performed by: NURSE PRACTITIONER

## 2018-11-27 PROCEDURE — 99233 SBSQ HOSP IP/OBS HIGH 50: CPT | Performed by: INTERNAL MEDICINE

## 2018-11-27 PROCEDURE — 83036 HEMOGLOBIN GLYCOSYLATED A1C: CPT | Performed by: NURSE PRACTITIONER

## 2018-11-27 PROCEDURE — 84550 ASSAY OF BLOOD/URIC ACID: CPT | Performed by: INTERNAL MEDICINE

## 2018-11-27 PROCEDURE — 80048 BASIC METABOLIC PNL TOTAL CA: CPT | Performed by: NURSE PRACTITIONER

## 2018-11-27 PROCEDURE — 82550 ASSAY OF CK (CPK): CPT | Performed by: INTERNAL MEDICINE

## 2018-11-27 PROCEDURE — 25010000002 HYDROMORPHONE PER 4 MG: Performed by: NURSE PRACTITIONER

## 2018-11-27 PROCEDURE — 63710000001 INSULIN DETEMIR PER 5 UNITS: Performed by: INTERNAL MEDICINE

## 2018-11-27 RX ORDER — POTASSIUM CHLORIDE 750 MG/1
20 CAPSULE, EXTENDED RELEASE ORAL ONCE
Status: COMPLETED | OUTPATIENT
Start: 2018-11-27 | End: 2018-11-27

## 2018-11-27 RX ADMIN — HYDROMORPHONE HYDROCHLORIDE 0.5 MG: 1 INJECTION, SOLUTION INTRAMUSCULAR; INTRAVENOUS; SUBCUTANEOUS at 15:13

## 2018-11-27 RX ADMIN — ERTAPENEM SODIUM 500 MG: 1 INJECTION, POWDER, LYOPHILIZED, FOR SOLUTION INTRAMUSCULAR; INTRAVENOUS at 17:41

## 2018-11-27 RX ADMIN — HYDROMORPHONE HYDROCHLORIDE 0.5 MG: 1 INJECTION, SOLUTION INTRAMUSCULAR; INTRAVENOUS; SUBCUTANEOUS at 18:29

## 2018-11-27 RX ADMIN — ASPIRIN 325 MG: 325 TABLET, DELAYED RELEASE ORAL at 09:00

## 2018-11-27 RX ADMIN — HYDROMORPHONE HYDROCHLORIDE 0.5 MG: 1 INJECTION, SOLUTION INTRAMUSCULAR; INTRAVENOUS; SUBCUTANEOUS at 07:44

## 2018-11-27 RX ADMIN — INSULIN DETEMIR 40 UNITS: 100 INJECTION, SOLUTION SUBCUTANEOUS at 21:55

## 2018-11-27 RX ADMIN — DILTIAZEM HYDROCHLORIDE 120 MG: 120 CAPSULE, EXTENDED RELEASE ORAL at 21:46

## 2018-11-27 RX ADMIN — HYDROMORPHONE HYDROCHLORIDE 0.5 MG: 1 INJECTION, SOLUTION INTRAMUSCULAR; INTRAVENOUS; SUBCUTANEOUS at 03:03

## 2018-11-27 RX ADMIN — HYDROMORPHONE HYDROCHLORIDE 0.5 MG: 1 INJECTION, SOLUTION INTRAMUSCULAR; INTRAVENOUS; SUBCUTANEOUS at 21:46

## 2018-11-27 RX ADMIN — HYDROMORPHONE HYDROCHLORIDE 0.5 MG: 1 INJECTION, SOLUTION INTRAMUSCULAR; INTRAVENOUS; SUBCUTANEOUS at 10:27

## 2018-11-27 RX ADMIN — HEPARIN SODIUM 5000 UNITS: 5000 INJECTION, SOLUTION INTRAVENOUS; SUBCUTANEOUS at 21:52

## 2018-11-27 RX ADMIN — ONDANSETRON HYDROCHLORIDE 4 MG: 2 INJECTION, SOLUTION INTRAMUSCULAR; INTRAVENOUS at 15:13

## 2018-11-27 RX ADMIN — FUROSEMIDE 40 MG: 40 TABLET ORAL at 21:46

## 2018-11-27 RX ADMIN — PANTOPRAZOLE SODIUM 40 MG: 40 TABLET, DELAYED RELEASE ORAL at 07:44

## 2018-11-27 RX ADMIN — POTASSIUM CHLORIDE 20 MEQ: 750 CAPSULE, EXTENDED RELEASE ORAL at 17:40

## 2018-11-27 RX ADMIN — FUROSEMIDE 40 MG: 40 TABLET ORAL at 09:17

## 2018-11-27 RX ADMIN — INSULIN DETEMIR 40 UNITS: 100 INJECTION, SOLUTION SUBCUTANEOUS at 10:20

## 2018-11-27 RX ADMIN — ONDANSETRON HYDROCHLORIDE 4 MG: 2 INJECTION, SOLUTION INTRAMUSCULAR; INTRAVENOUS at 03:03

## 2018-11-27 RX ADMIN — POTASSIUM CHLORIDE 20 MEQ: 750 CAPSULE, EXTENDED RELEASE ORAL at 12:10

## 2018-11-27 RX ADMIN — SODIUM CHLORIDE, PRESERVATIVE FREE 3 ML: 5 INJECTION INTRAVENOUS at 09:17

## 2018-11-27 RX ADMIN — HEPARIN SODIUM 5000 UNITS: 5000 INJECTION, SOLUTION INTRAVENOUS; SUBCUTANEOUS at 09:16

## 2018-11-28 ENCOUNTER — APPOINTMENT (OUTPATIENT)
Dept: CARDIOLOGY | Facility: HOSPITAL | Age: 63
End: 2018-11-28
Attending: INTERNAL MEDICINE

## 2018-11-28 ENCOUNTER — APPOINTMENT (OUTPATIENT)
Dept: GENERAL RADIOLOGY | Facility: HOSPITAL | Age: 63
End: 2018-11-28

## 2018-11-28 LAB
ANION GAP SERPL CALCULATED.3IONS-SCNC: 8 MMOL/L (ref 3–11)
BUN BLD-MCNC: 80 MG/DL (ref 9–23)
BUN/CREAT SERPL: 19.3 (ref 7–25)
CALCIUM SPEC-SCNC: 8.2 MG/DL (ref 8.7–10.4)
CHLORIDE SERPL-SCNC: 100 MMOL/L (ref 99–109)
CO2 SERPL-SCNC: 28 MMOL/L (ref 20–31)
CREAT BLD-MCNC: 4.14 MG/DL (ref 0.6–1.3)
DEPRECATED RDW RBC AUTO: 45.2 FL (ref 37–54)
ERYTHROCYTE [DISTWIDTH] IN BLOOD BY AUTOMATED COUNT: 12.8 % (ref 11.3–14.5)
GFR SERPL CREATININE-BSD FRML MDRD: 11 ML/MIN/1.73
GLUCOSE BLD-MCNC: 74 MG/DL (ref 70–100)
GLUCOSE BLDC GLUCOMTR-MCNC: 134 MG/DL (ref 70–130)
GLUCOSE BLDC GLUCOMTR-MCNC: 162 MG/DL (ref 70–130)
GLUCOSE BLDC GLUCOMTR-MCNC: 212 MG/DL (ref 70–130)
GLUCOSE BLDC GLUCOMTR-MCNC: 86 MG/DL (ref 70–130)
HCT VFR BLD AUTO: 29.8 % (ref 34.5–44)
HGB BLD-MCNC: 9.4 G/DL (ref 11.5–15.5)
IRON 24H UR-MRATE: 25 MCG/DL (ref 50–175)
IRON SATN MFR SERPL: 11 % (ref 15–50)
MCH RBC QN AUTO: 30.6 PG (ref 27–31)
MCHC RBC AUTO-ENTMCNC: 31.5 G/DL (ref 32–36)
MCV RBC AUTO: 97.1 FL (ref 80–99)
PLATELET # BLD AUTO: 380 10*3/MM3 (ref 150–450)
PMV BLD AUTO: 10.6 FL (ref 6–12)
POTASSIUM BLD-SCNC: 3.5 MMOL/L (ref 3.5–5.5)
RBC # BLD AUTO: 3.07 10*6/MM3 (ref 3.89–5.14)
SODIUM BLD-SCNC: 136 MMOL/L (ref 132–146)
TIBC SERPL-MCNC: 218 MCG/DL (ref 250–450)
WBC NRBC COR # BLD: 18.5 10*3/MM3 (ref 3.5–10.8)

## 2018-11-28 PROCEDURE — 25010000002 DAPTOMYCIN PER 1 MG: Performed by: FAMILY MEDICINE

## 2018-11-28 PROCEDURE — 83550 IRON BINDING TEST: CPT | Performed by: INTERNAL MEDICINE

## 2018-11-28 PROCEDURE — 85027 COMPLETE CBC AUTOMATED: CPT | Performed by: INTERNAL MEDICINE

## 2018-11-28 PROCEDURE — 25010000002 HYDROMORPHONE PER 4 MG: Performed by: NURSE PRACTITIONER

## 2018-11-28 PROCEDURE — 82962 GLUCOSE BLOOD TEST: CPT

## 2018-11-28 PROCEDURE — 25010000002 ERTAPENEM PER 500 MG: Performed by: NURSE PRACTITIONER

## 2018-11-28 PROCEDURE — 99233 SBSQ HOSP IP/OBS HIGH 50: CPT | Performed by: INTERNAL MEDICINE

## 2018-11-28 PROCEDURE — 63710000001 INSULIN DETEMIR PER 5 UNITS: Performed by: INTERNAL MEDICINE

## 2018-11-28 PROCEDURE — 93922 UPR/L XTREMITY ART 2 LEVELS: CPT

## 2018-11-28 PROCEDURE — 25010000002 HEPARIN (PORCINE) PER 1000 UNITS: Performed by: NURSE PRACTITIONER

## 2018-11-28 PROCEDURE — 63710000001 INSULIN LISPRO (HUMAN) PER 5 UNITS: Performed by: NURSE PRACTITIONER

## 2018-11-28 PROCEDURE — 80048 BASIC METABOLIC PNL TOTAL CA: CPT | Performed by: INTERNAL MEDICINE

## 2018-11-28 PROCEDURE — 93922 UPR/L XTREMITY ART 2 LEVELS: CPT | Performed by: INTERNAL MEDICINE

## 2018-11-28 PROCEDURE — 83540 ASSAY OF IRON: CPT | Performed by: INTERNAL MEDICINE

## 2018-11-28 PROCEDURE — 25010000002 ONDANSETRON PER 1 MG: Performed by: NURSE PRACTITIONER

## 2018-11-28 PROCEDURE — 73630 X-RAY EXAM OF FOOT: CPT

## 2018-11-28 RX ORDER — FERROUS SULFATE 325(65) MG
325 TABLET ORAL 2 TIMES DAILY WITH MEALS
Status: DISCONTINUED | OUTPATIENT
Start: 2018-11-28 | End: 2018-12-03

## 2018-11-28 RX ADMIN — HYDROMORPHONE HYDROCHLORIDE 0.5 MG: 1 INJECTION, SOLUTION INTRAMUSCULAR; INTRAVENOUS; SUBCUTANEOUS at 00:33

## 2018-11-28 RX ADMIN — INSULIN LISPRO 5 UNITS: 100 INJECTION, SOLUTION INTRAVENOUS; SUBCUTANEOUS at 12:34

## 2018-11-28 RX ADMIN — DAPTOMYCIN 500 MG: 500 INJECTION, POWDER, LYOPHILIZED, FOR SOLUTION INTRAVENOUS at 18:50

## 2018-11-28 RX ADMIN — SODIUM CHLORIDE, PRESERVATIVE FREE 3 ML: 5 INJECTION INTRAVENOUS at 21:42

## 2018-11-28 RX ADMIN — METOLAZONE 2.5 MG: 2.5 TABLET ORAL at 08:27

## 2018-11-28 RX ADMIN — INSULIN LISPRO 3 UNITS: 100 INJECTION, SOLUTION INTRAVENOUS; SUBCUTANEOUS at 21:14

## 2018-11-28 RX ADMIN — HYDROMORPHONE HYDROCHLORIDE 0.5 MG: 1 INJECTION, SOLUTION INTRAMUSCULAR; INTRAVENOUS; SUBCUTANEOUS at 19:57

## 2018-11-28 RX ADMIN — HEPARIN SODIUM 5000 UNITS: 5000 INJECTION, SOLUTION INTRAVENOUS; SUBCUTANEOUS at 08:27

## 2018-11-28 RX ADMIN — HYDROMORPHONE HYDROCHLORIDE 0.5 MG: 1 INJECTION, SOLUTION INTRAMUSCULAR; INTRAVENOUS; SUBCUTANEOUS at 13:09

## 2018-11-28 RX ADMIN — HYDROMORPHONE HYDROCHLORIDE 0.5 MG: 1 INJECTION, SOLUTION INTRAMUSCULAR; INTRAVENOUS; SUBCUTANEOUS at 06:50

## 2018-11-28 RX ADMIN — PANTOPRAZOLE SODIUM 40 MG: 40 TABLET, DELAYED RELEASE ORAL at 06:50

## 2018-11-28 RX ADMIN — HYDROMORPHONE HYDROCHLORIDE 0.5 MG: 1 INJECTION, SOLUTION INTRAMUSCULAR; INTRAVENOUS; SUBCUTANEOUS at 16:17

## 2018-11-28 RX ADMIN — HYDROMORPHONE HYDROCHLORIDE 0.5 MG: 1 INJECTION, SOLUTION INTRAMUSCULAR; INTRAVENOUS; SUBCUTANEOUS at 10:09

## 2018-11-28 RX ADMIN — HYDROMORPHONE HYDROCHLORIDE 0.5 MG: 1 INJECTION, SOLUTION INTRAMUSCULAR; INTRAVENOUS; SUBCUTANEOUS at 03:54

## 2018-11-28 RX ADMIN — ERTAPENEM SODIUM 500 MG: 1 INJECTION, POWDER, LYOPHILIZED, FOR SOLUTION INTRAMUSCULAR; INTRAVENOUS at 18:50

## 2018-11-28 RX ADMIN — ASPIRIN 325 MG: 325 TABLET, DELAYED RELEASE ORAL at 08:27

## 2018-11-28 RX ADMIN — HEPARIN SODIUM 5000 UNITS: 5000 INJECTION, SOLUTION INTRAVENOUS; SUBCUTANEOUS at 21:14

## 2018-11-28 RX ADMIN — SODIUM CHLORIDE, PRESERVATIVE FREE 3 ML: 5 INJECTION INTRAVENOUS at 08:29

## 2018-11-28 RX ADMIN — DILTIAZEM HYDROCHLORIDE 120 MG: 120 CAPSULE, EXTENDED RELEASE ORAL at 21:14

## 2018-11-28 RX ADMIN — INSULIN DETEMIR 40 UNITS: 100 INJECTION, SOLUTION SUBCUTANEOUS at 08:29

## 2018-11-28 RX ADMIN — Medication 325 MG: at 19:57

## 2018-11-28 RX ADMIN — HYDROMORPHONE HYDROCHLORIDE 0.5 MG: 1 INJECTION, SOLUTION INTRAMUSCULAR; INTRAVENOUS; SUBCUTANEOUS at 23:00

## 2018-11-28 RX ADMIN — ONDANSETRON HYDROCHLORIDE 4 MG: 2 INJECTION, SOLUTION INTRAMUSCULAR; INTRAVENOUS at 04:38

## 2018-11-28 RX ADMIN — INSULIN DETEMIR 40 UNITS: 100 INJECTION, SOLUTION SUBCUTANEOUS at 21:14

## 2018-11-29 LAB
ANION GAP SERPL CALCULATED.3IONS-SCNC: 14 MMOL/L (ref 3–11)
BH CV LOWER ARTERIAL RIGHT ABI RATIO: 0.77
BH CV LOWER ARTERIAL RIGHT CALF RATIO: 0.73
BH CV LOWER ARTERIAL RIGHT DORSALIS PEDIS SYS MAX: 104 MMHG
BH CV LOWER ARTERIAL RIGHT POPLITEAL SYS MAX: 108 MMHG
BH CV LOWER ARTERIAL RIGHT POST TIBIAL SYS MAX: 113 MMHG
BUN BLD-MCNC: 82 MG/DL (ref 9–23)
BUN/CREAT SERPL: 19.1 (ref 7–25)
CALCIUM SPEC-SCNC: 8.7 MG/DL (ref 8.7–10.4)
CHLORIDE SERPL-SCNC: 96 MMOL/L (ref 99–109)
CO2 SERPL-SCNC: 24 MMOL/L (ref 20–31)
CREAT BLD-MCNC: 4.29 MG/DL (ref 0.6–1.3)
DEPRECATED RDW RBC AUTO: 43.4 FL (ref 37–54)
ERYTHROCYTE [DISTWIDTH] IN BLOOD BY AUTOMATED COUNT: 12.5 % (ref 11.3–14.5)
GFR SERPL CREATININE-BSD FRML MDRD: 10 ML/MIN/1.73
GLUCOSE BLD-MCNC: 120 MG/DL (ref 70–100)
GLUCOSE BLDC GLUCOMTR-MCNC: 115 MG/DL (ref 70–130)
GLUCOSE BLDC GLUCOMTR-MCNC: 162 MG/DL (ref 70–130)
GLUCOSE BLDC GLUCOMTR-MCNC: 184 MG/DL (ref 70–130)
GLUCOSE BLDC GLUCOMTR-MCNC: 191 MG/DL (ref 70–130)
HCT VFR BLD AUTO: 31.2 % (ref 34.5–44)
HGB BLD-MCNC: 10.3 G/DL (ref 11.5–15.5)
MCH RBC QN AUTO: 30.9 PG (ref 27–31)
MCHC RBC AUTO-ENTMCNC: 33 G/DL (ref 32–36)
MCV RBC AUTO: 93.7 FL (ref 80–99)
PLATELET # BLD AUTO: 415 10*3/MM3 (ref 150–450)
PMV BLD AUTO: 9.6 FL (ref 6–12)
POTASSIUM BLD-SCNC: 3.3 MMOL/L (ref 3.5–5.5)
RBC # BLD AUTO: 3.33 10*6/MM3 (ref 3.89–5.14)
SODIUM BLD-SCNC: 134 MMOL/L (ref 132–146)
UPPER ARTERIAL LEFT ARM BRACHIAL SYS MAX: 147 MMHG
WBC NRBC COR # BLD: 18.34 10*3/MM3 (ref 3.5–10.8)

## 2018-11-29 PROCEDURE — 80048 BASIC METABOLIC PNL TOTAL CA: CPT | Performed by: INTERNAL MEDICINE

## 2018-11-29 PROCEDURE — 25010000002 HEPARIN (PORCINE) PER 1000 UNITS: Performed by: NURSE PRACTITIONER

## 2018-11-29 PROCEDURE — 25010000002 ONDANSETRON PER 1 MG: Performed by: NURSE PRACTITIONER

## 2018-11-29 PROCEDURE — 99254 IP/OBS CNSLTJ NEW/EST MOD 60: CPT | Performed by: ORTHOPAEDIC SURGERY

## 2018-11-29 PROCEDURE — 97163 PT EVAL HIGH COMPLEX 45 MIN: CPT

## 2018-11-29 PROCEDURE — 25010000002 HYDROMORPHONE PER 4 MG: Performed by: NURSE PRACTITIONER

## 2018-11-29 PROCEDURE — 85027 COMPLETE CBC AUTOMATED: CPT | Performed by: INTERNAL MEDICINE

## 2018-11-29 PROCEDURE — 82962 GLUCOSE BLOOD TEST: CPT

## 2018-11-29 PROCEDURE — 99233 SBSQ HOSP IP/OBS HIGH 50: CPT | Performed by: INTERNAL MEDICINE

## 2018-11-29 PROCEDURE — 25010000002 ERTAPENEM PER 500 MG: Performed by: NURSE PRACTITIONER

## 2018-11-29 PROCEDURE — 63710000001 INSULIN DETEMIR PER 5 UNITS: Performed by: INTERNAL MEDICINE

## 2018-11-29 RX ORDER — HYDROMORPHONE HCL 110MG/55ML
2 PATIENT CONTROLLED ANALGESIA SYRINGE INTRAVENOUS
Status: DISCONTINUED | OUTPATIENT
Start: 2018-11-30 | End: 2018-12-05

## 2018-11-29 RX ORDER — MUPIROCIN CALCIUM 20 MG/G
CREAM TOPICAL
Status: DISCONTINUED | OUTPATIENT
Start: 2018-11-29 | End: 2018-12-13 | Stop reason: HOSPADM

## 2018-11-29 RX ORDER — POTASSIUM CHLORIDE 750 MG/1
40 CAPSULE, EXTENDED RELEASE ORAL ONCE
Status: COMPLETED | OUTPATIENT
Start: 2018-11-29 | End: 2018-11-29

## 2018-11-29 RX ORDER — LORAZEPAM 2 MG/ML
1 INJECTION INTRAMUSCULAR ONCE
Status: COMPLETED | OUTPATIENT
Start: 2018-11-30 | End: 2018-11-30

## 2018-11-29 RX ADMIN — ONDANSETRON HYDROCHLORIDE 4 MG: 2 INJECTION, SOLUTION INTRAMUSCULAR; INTRAVENOUS at 20:42

## 2018-11-29 RX ADMIN — SODIUM CHLORIDE, PRESERVATIVE FREE 3 ML: 5 INJECTION INTRAVENOUS at 10:39

## 2018-11-29 RX ADMIN — INSULIN DETEMIR 40 UNITS: 100 INJECTION, SOLUTION SUBCUTANEOUS at 08:22

## 2018-11-29 RX ADMIN — HYDROMORPHONE HYDROCHLORIDE 0.5 MG: 1 INJECTION, SOLUTION INTRAMUSCULAR; INTRAVENOUS; SUBCUTANEOUS at 10:37

## 2018-11-29 RX ADMIN — ASPIRIN 325 MG: 325 TABLET, DELAYED RELEASE ORAL at 08:20

## 2018-11-29 RX ADMIN — HYDROMORPHONE HYDROCHLORIDE 0.5 MG: 1 INJECTION, SOLUTION INTRAMUSCULAR; INTRAVENOUS; SUBCUTANEOUS at 03:58

## 2018-11-29 RX ADMIN — SODIUM CHLORIDE, PRESERVATIVE FREE 3 ML: 5 INJECTION INTRAVENOUS at 20:24

## 2018-11-29 RX ADMIN — HEPARIN SODIUM 5000 UNITS: 5000 INJECTION, SOLUTION INTRAVENOUS; SUBCUTANEOUS at 08:20

## 2018-11-29 RX ADMIN — PANTOPRAZOLE SODIUM 40 MG: 40 TABLET, DELAYED RELEASE ORAL at 06:28

## 2018-11-29 RX ADMIN — HYDROMORPHONE HYDROCHLORIDE 0.5 MG: 1 INJECTION, SOLUTION INTRAMUSCULAR; INTRAVENOUS; SUBCUTANEOUS at 07:09

## 2018-11-29 RX ADMIN — POTASSIUM CHLORIDE 40 MEQ: 750 CAPSULE, EXTENDED RELEASE ORAL at 13:29

## 2018-11-29 RX ADMIN — HYDROMORPHONE HYDROCHLORIDE 0.5 MG: 1 INJECTION, SOLUTION INTRAMUSCULAR; INTRAVENOUS; SUBCUTANEOUS at 20:42

## 2018-11-29 RX ADMIN — INSULIN DETEMIR 40 UNITS: 100 INJECTION, SOLUTION SUBCUTANEOUS at 21:00

## 2018-11-29 RX ADMIN — INSULIN LISPRO 3 UNITS: 100 INJECTION, SOLUTION INTRAVENOUS; SUBCUTANEOUS at 20:42

## 2018-11-29 RX ADMIN — ERTAPENEM SODIUM 500 MG: 1 INJECTION, POWDER, LYOPHILIZED, FOR SOLUTION INTRAMUSCULAR; INTRAVENOUS at 20:18

## 2018-11-29 RX ADMIN — HEPARIN SODIUM 5000 UNITS: 5000 INJECTION, SOLUTION INTRAVENOUS; SUBCUTANEOUS at 20:19

## 2018-11-29 RX ADMIN — INSULIN LISPRO 2 UNITS: 100 INJECTION, SOLUTION INTRAVENOUS; SUBCUTANEOUS at 13:30

## 2018-11-29 RX ADMIN — HYDROMORPHONE HYDROCHLORIDE 0.5 MG: 1 INJECTION, SOLUTION INTRAMUSCULAR; INTRAVENOUS; SUBCUTANEOUS at 23:44

## 2018-11-29 RX ADMIN — HYDROMORPHONE HYDROCHLORIDE 0.5 MG: 1 INJECTION, SOLUTION INTRAMUSCULAR; INTRAVENOUS; SUBCUTANEOUS at 17:23

## 2018-11-29 RX ADMIN — DILTIAZEM HYDROCHLORIDE 120 MG: 120 CAPSULE, EXTENDED RELEASE ORAL at 20:19

## 2018-11-29 RX ADMIN — INSULIN LISPRO 3 UNITS: 100 INJECTION, SOLUTION INTRAVENOUS; SUBCUTANEOUS at 18:56

## 2018-11-30 ENCOUNTER — APPOINTMENT (OUTPATIENT)
Dept: CARDIOLOGY | Facility: HOSPITAL | Age: 63
End: 2018-11-30
Attending: ORTHOPAEDIC SURGERY

## 2018-11-30 LAB
ANION GAP SERPL CALCULATED.3IONS-SCNC: 14 MMOL/L (ref 3–11)
BH CV LOWER ARTERIAL LEFT ABI RATIO: 1.32
BH CV LOWER ARTERIAL LEFT DORSALIS PEDIS SYS MAX: 137 MMHG
BH CV LOWER ARTERIAL LEFT HIGH THIGH SYS MAX: 254 MMHG
BH CV LOWER ARTERIAL LEFT LOW THIGH SYS MAX: 236 MMHG
BH CV LOWER ARTERIAL LEFT POPLITEAL SYS MAX: 192 MMHG
BH CV LOWER ARTERIAL LEFT POST TIBIAL SYS MAX: 140 MMHG
BH CV LOWER ARTERIAL RIGHT ABI RATIO: 0.95
BH CV LOWER ARTERIAL RIGHT DORSALIS PEDIS SYS MAX: 83 MMHG
BH CV LOWER ARTERIAL RIGHT HIGH THIGH SYS MAX: 151 MMHG
BH CV LOWER ARTERIAL RIGHT LOW THIGH SYS MAX: 124 MMHG
BH CV LOWER ARTERIAL RIGHT POPLITEAL SYS MAX: 98 MMHG
BH CV LOWER ARTERIAL RIGHT POST TIBIAL SYS MAX: 101 MMHG
BUN BLD-MCNC: 79 MG/DL (ref 9–23)
BUN/CREAT SERPL: 18.2 (ref 7–25)
CALCIUM SPEC-SCNC: 8.2 MG/DL (ref 8.7–10.4)
CHLORIDE SERPL-SCNC: 94 MMOL/L (ref 99–109)
CO2 SERPL-SCNC: 24 MMOL/L (ref 20–31)
CREAT BLD-MCNC: 4.33 MG/DL (ref 0.6–1.3)
DEPRECATED RDW RBC AUTO: 44.1 FL (ref 37–54)
ERYTHROCYTE [DISTWIDTH] IN BLOOD BY AUTOMATED COUNT: 12.8 % (ref 11.3–14.5)
GFR SERPL CREATININE-BSD FRML MDRD: 10 ML/MIN/1.73
GLUCOSE BLD-MCNC: 185 MG/DL (ref 70–100)
GLUCOSE BLDC GLUCOMTR-MCNC: 142 MG/DL (ref 70–130)
GLUCOSE BLDC GLUCOMTR-MCNC: 156 MG/DL (ref 70–130)
GLUCOSE BLDC GLUCOMTR-MCNC: 226 MG/DL (ref 70–130)
GLUCOSE BLDC GLUCOMTR-MCNC: 244 MG/DL (ref 70–130)
HCT VFR BLD AUTO: 29.4 % (ref 34.5–44)
HGB BLD-MCNC: 9.3 G/DL (ref 11.5–15.5)
MCH RBC QN AUTO: 30.3 PG (ref 27–31)
MCHC RBC AUTO-ENTMCNC: 31.6 G/DL (ref 32–36)
MCV RBC AUTO: 95.8 FL (ref 80–99)
PLATELET # BLD AUTO: 406 10*3/MM3 (ref 150–450)
PMV BLD AUTO: 10.4 FL (ref 6–12)
POTASSIUM BLD-SCNC: 3.4 MMOL/L (ref 3.5–5.5)
RBC # BLD AUTO: 3.07 10*6/MM3 (ref 3.89–5.14)
SODIUM BLD-SCNC: 132 MMOL/L (ref 132–146)
WBC NRBC COR # BLD: 15.18 10*3/MM3 (ref 3.5–10.8)

## 2018-11-30 PROCEDURE — 99232 SBSQ HOSP IP/OBS MODERATE 35: CPT | Performed by: ORTHOPAEDIC SURGERY

## 2018-11-30 PROCEDURE — 25010000002 HEPARIN (PORCINE) PER 1000 UNITS: Performed by: NURSE PRACTITIONER

## 2018-11-30 PROCEDURE — 99253 IP/OBS CNSLTJ NEW/EST LOW 45: CPT | Performed by: THORACIC SURGERY (CARDIOTHORACIC VASCULAR SURGERY)

## 2018-11-30 PROCEDURE — 25010000002 DAPTOMYCIN PER 1 MG: Performed by: FAMILY MEDICINE

## 2018-11-30 PROCEDURE — 25010000002 ERTAPENEM PER 500 MG: Performed by: NURSE PRACTITIONER

## 2018-11-30 PROCEDURE — 25010000002 HYDROMORPHONE PER 4 MG: Performed by: ORTHOPAEDIC SURGERY

## 2018-11-30 PROCEDURE — 93923 UPR/LXTR ART STDY 3+ LVLS: CPT

## 2018-11-30 PROCEDURE — 93923 UPR/LXTR ART STDY 3+ LVLS: CPT | Performed by: INTERNAL MEDICINE

## 2018-11-30 PROCEDURE — 82962 GLUCOSE BLOOD TEST: CPT

## 2018-11-30 PROCEDURE — 80048 BASIC METABOLIC PNL TOTAL CA: CPT | Performed by: INTERNAL MEDICINE

## 2018-11-30 PROCEDURE — 25010000002 LORAZEPAM PER 2 MG: Performed by: ORTHOPAEDIC SURGERY

## 2018-11-30 PROCEDURE — 85027 COMPLETE CBC AUTOMATED: CPT | Performed by: INTERNAL MEDICINE

## 2018-11-30 PROCEDURE — 25010000002 HYDROMORPHONE PER 4 MG: Performed by: NURSE PRACTITIONER

## 2018-11-30 PROCEDURE — 99233 SBSQ HOSP IP/OBS HIGH 50: CPT | Performed by: INTERNAL MEDICINE

## 2018-11-30 PROCEDURE — 63710000001 INSULIN DETEMIR PER 5 UNITS: Performed by: INTERNAL MEDICINE

## 2018-11-30 RX ORDER — HYDROCODONE BITARTRATE AND ACETAMINOPHEN 7.5; 325 MG/1; MG/1
1 TABLET ORAL 3 TIMES DAILY
Status: DISCONTINUED | OUTPATIENT
Start: 2018-11-30 | End: 2018-11-30

## 2018-11-30 RX ORDER — HYDROCODONE BITARTRATE AND ACETAMINOPHEN 7.5; 325 MG/1; MG/1
1 TABLET ORAL 3 TIMES DAILY
Status: DISCONTINUED | OUTPATIENT
Start: 2018-11-30 | End: 2018-12-06

## 2018-11-30 RX ORDER — SODIUM CHLORIDE 9 MG/ML
75 INJECTION, SOLUTION INTRAVENOUS CONTINUOUS
Status: ACTIVE | OUTPATIENT
Start: 2018-11-30 | End: 2018-12-01

## 2018-11-30 RX ADMIN — DILTIAZEM HYDROCHLORIDE 120 MG: 120 CAPSULE, EXTENDED RELEASE ORAL at 20:39

## 2018-11-30 RX ADMIN — HYDROMORPHONE HYDROCHLORIDE 0.5 MG: 1 INJECTION, SOLUTION INTRAMUSCULAR; INTRAVENOUS; SUBCUTANEOUS at 05:57

## 2018-11-30 RX ADMIN — HYDROMORPHONE HYDROCHLORIDE 0.5 MG: 1 INJECTION, SOLUTION INTRAMUSCULAR; INTRAVENOUS; SUBCUTANEOUS at 11:39

## 2018-11-30 RX ADMIN — PANTOPRAZOLE SODIUM 40 MG: 40 TABLET, DELAYED RELEASE ORAL at 06:03

## 2018-11-30 RX ADMIN — HYDROMORPHONE HYDROCHLORIDE 0.5 MG: 1 INJECTION, SOLUTION INTRAMUSCULAR; INTRAVENOUS; SUBCUTANEOUS at 21:30

## 2018-11-30 RX ADMIN — HEPARIN SODIUM 5000 UNITS: 5000 INJECTION, SOLUTION INTRAVENOUS; SUBCUTANEOUS at 08:39

## 2018-11-30 RX ADMIN — HYDROMORPHONE HYDROCHLORIDE 0.5 MG: 1 INJECTION, SOLUTION INTRAMUSCULAR; INTRAVENOUS; SUBCUTANEOUS at 14:47

## 2018-11-30 RX ADMIN — INSULIN DETEMIR 40 UNITS: 100 INJECTION, SOLUTION SUBCUTANEOUS at 20:59

## 2018-11-30 RX ADMIN — HYDROMORPHONE HYDROCHLORIDE 0.5 MG: 1 INJECTION, SOLUTION INTRAMUSCULAR; INTRAVENOUS; SUBCUTANEOUS at 08:38

## 2018-11-30 RX ADMIN — HEPARIN SODIUM 5000 UNITS: 5000 INJECTION, SOLUTION INTRAVENOUS; SUBCUTANEOUS at 20:39

## 2018-11-30 RX ADMIN — HYDROCODONE BITARTRATE AND ACETAMINOPHEN 1 TABLET: 7.5; 325 TABLET ORAL at 20:40

## 2018-11-30 RX ADMIN — HYDROMORPHONE HYDROCHLORIDE 0.5 MG: 1 INJECTION, SOLUTION INTRAMUSCULAR; INTRAVENOUS; SUBCUTANEOUS at 02:51

## 2018-11-30 RX ADMIN — INSULIN LISPRO 5 UNITS: 100 INJECTION, SOLUTION INTRAVENOUS; SUBCUTANEOUS at 12:58

## 2018-11-30 RX ADMIN — INSULIN LISPRO 3 UNITS: 100 INJECTION, SOLUTION INTRAVENOUS; SUBCUTANEOUS at 08:38

## 2018-11-30 RX ADMIN — ONDANSETRON 4 MG: 4 TABLET, FILM COATED ORAL at 20:46

## 2018-11-30 RX ADMIN — DAPTOMYCIN 500 MG: 500 INJECTION, POWDER, LYOPHILIZED, FOR SOLUTION INTRAVENOUS at 17:31

## 2018-11-30 RX ADMIN — HYDROCODONE BITARTRATE AND ACETAMINOPHEN 1 TABLET: 7.5; 325 TABLET ORAL at 13:00

## 2018-11-30 RX ADMIN — ERTAPENEM SODIUM 500 MG: 1 INJECTION, POWDER, LYOPHILIZED, FOR SOLUTION INTRAMUSCULAR; INTRAVENOUS at 17:31

## 2018-11-30 RX ADMIN — HYDROMORPHONE HYDROCHLORIDE 2 MG: 2 INJECTION INTRAMUSCULAR; INTRAVENOUS; SUBCUTANEOUS at 11:05

## 2018-11-30 RX ADMIN — MUPIROCIN: 2 CREAM TOPICAL at 08:38

## 2018-11-30 RX ADMIN — SODIUM CHLORIDE, PRESERVATIVE FREE 3 ML: 5 INJECTION INTRAVENOUS at 08:42

## 2018-11-30 RX ADMIN — ASPIRIN 325 MG: 325 TABLET, DELAYED RELEASE ORAL at 08:39

## 2018-11-30 RX ADMIN — HYDROCODONE BITARTRATE AND ACETAMINOPHEN 1 TABLET: 7.5; 325 TABLET ORAL at 17:30

## 2018-11-30 RX ADMIN — LORAZEPAM 1 MG: 2 INJECTION, SOLUTION INTRAMUSCULAR; INTRAVENOUS at 11:04

## 2018-11-30 RX ADMIN — INSULIN DETEMIR 40 UNITS: 100 INJECTION, SOLUTION SUBCUTANEOUS at 08:47

## 2018-11-30 RX ADMIN — SODIUM CHLORIDE 75 ML/HR: 9 INJECTION, SOLUTION INTRAVENOUS at 17:32

## 2018-11-30 RX ADMIN — INSULIN LISPRO 5 UNITS: 100 INJECTION, SOLUTION INTRAVENOUS; SUBCUTANEOUS at 20:58

## 2018-12-01 LAB
ANION GAP SERPL CALCULATED.3IONS-SCNC: 13 MMOL/L (ref 3–11)
BACTERIA SPEC AEROBE CULT: NORMAL
BACTERIA SPEC AEROBE CULT: NORMAL
BACTERIA UR QL AUTO: ABNORMAL /HPF
BILIRUB UR QL STRIP: NEGATIVE
BUN BLD-MCNC: 87 MG/DL (ref 9–23)
BUN/CREAT SERPL: 19.9 (ref 7–25)
CALCIUM SPEC-SCNC: 8.2 MG/DL (ref 8.7–10.4)
CHLORIDE SERPL-SCNC: 95 MMOL/L (ref 99–109)
CK SERPL-CCNC: 121 U/L (ref 26–174)
CLARITY UR: ABNORMAL
CO2 SERPL-SCNC: 25 MMOL/L (ref 20–31)
COLOR UR: YELLOW
CREAT BLD-MCNC: 4.38 MG/DL (ref 0.6–1.3)
CREAT UR-MCNC: 61.1 MG/DL
EOSINOPHIL SPEC QL MICRO: 0 % EOS/100 CELLS (ref 0–0)
GFR SERPL CREATININE-BSD FRML MDRD: 10 ML/MIN/1.73
GLUCOSE BLD-MCNC: 150 MG/DL (ref 70–100)
GLUCOSE BLDC GLUCOMTR-MCNC: 147 MG/DL (ref 70–130)
GLUCOSE BLDC GLUCOMTR-MCNC: 153 MG/DL (ref 70–130)
GLUCOSE BLDC GLUCOMTR-MCNC: 197 MG/DL (ref 70–130)
GLUCOSE BLDC GLUCOMTR-MCNC: 203 MG/DL (ref 70–130)
GLUCOSE UR STRIP-MCNC: ABNORMAL MG/DL
HGB UR QL STRIP.AUTO: ABNORMAL
HYALINE CASTS UR QL AUTO: ABNORMAL /LPF
KETONES UR QL STRIP: NEGATIVE
LEUKOCYTE ESTERASE UR QL STRIP.AUTO: NEGATIVE
NITRITE UR QL STRIP: NEGATIVE
PH UR STRIP.AUTO: <=5 [PH] (ref 5–8)
POTASSIUM BLD-SCNC: 3.8 MMOL/L (ref 3.5–5.5)
PROT UR QL STRIP: ABNORMAL
PROT UR-MCNC: 203 MG/DL (ref 1–14)
RBC # UR: ABNORMAL /HPF
REF LAB TEST METHOD: ABNORMAL
SODIUM BLD-SCNC: 133 MMOL/L (ref 132–146)
SODIUM UR-SCNC: 43 MMOL/L (ref 30–90)
SP GR UR STRIP: 1.02 (ref 1–1.03)
SQUAMOUS #/AREA URNS HPF: ABNORMAL /HPF
URATE SERPL-MCNC: 9.9 MG/DL (ref 3.1–7.8)
UROBILINOGEN UR QL STRIP: ABNORMAL
UUN 24H UR-MCNC: 612 MG/DL
WBC UR QL AUTO: ABNORMAL /HPF

## 2018-12-01 PROCEDURE — 82550 ASSAY OF CK (CPK): CPT | Performed by: INTERNAL MEDICINE

## 2018-12-01 PROCEDURE — 87205 SMEAR GRAM STAIN: CPT | Performed by: INTERNAL MEDICINE

## 2018-12-01 PROCEDURE — 25010000002 ONDANSETRON PER 1 MG: Performed by: NURSE PRACTITIONER

## 2018-12-01 PROCEDURE — 81001 URINALYSIS AUTO W/SCOPE: CPT | Performed by: INTERNAL MEDICINE

## 2018-12-01 PROCEDURE — 25010000002 ERTAPENEM PER 500 MG: Performed by: NURSE PRACTITIONER

## 2018-12-01 PROCEDURE — 82570 ASSAY OF URINE CREATININE: CPT | Performed by: INTERNAL MEDICINE

## 2018-12-01 PROCEDURE — 25010000002 HYDROMORPHONE PER 4 MG: Performed by: NURSE PRACTITIONER

## 2018-12-01 PROCEDURE — 80048 BASIC METABOLIC PNL TOTAL CA: CPT | Performed by: INTERNAL MEDICINE

## 2018-12-01 PROCEDURE — 25010000002 HYDROMORPHONE PER 4 MG: Performed by: ORTHOPAEDIC SURGERY

## 2018-12-01 PROCEDURE — 97116 GAIT TRAINING THERAPY: CPT

## 2018-12-01 PROCEDURE — 84540 ASSAY OF URINE/UREA-N: CPT | Performed by: INTERNAL MEDICINE

## 2018-12-01 PROCEDURE — 84550 ASSAY OF BLOOD/URIC ACID: CPT | Performed by: INTERNAL MEDICINE

## 2018-12-01 PROCEDURE — 99232 SBSQ HOSP IP/OBS MODERATE 35: CPT | Performed by: INTERNAL MEDICINE

## 2018-12-01 PROCEDURE — 25010000002 HEPARIN (PORCINE) PER 1000 UNITS: Performed by: NURSE PRACTITIONER

## 2018-12-01 PROCEDURE — 84300 ASSAY OF URINE SODIUM: CPT | Performed by: INTERNAL MEDICINE

## 2018-12-01 PROCEDURE — 84156 ASSAY OF PROTEIN URINE: CPT | Performed by: INTERNAL MEDICINE

## 2018-12-01 PROCEDURE — 63710000001 INSULIN DETEMIR PER 5 UNITS: Performed by: INTERNAL MEDICINE

## 2018-12-01 PROCEDURE — 82962 GLUCOSE BLOOD TEST: CPT

## 2018-12-01 PROCEDURE — 99231 SBSQ HOSP IP/OBS SF/LOW 25: CPT | Performed by: ORTHOPAEDIC SURGERY

## 2018-12-01 RX ORDER — LACTULOSE 10 G/15ML
20 SOLUTION ORAL ONCE
Status: COMPLETED | OUTPATIENT
Start: 2018-12-01 | End: 2018-12-01

## 2018-12-01 RX ORDER — DOCUSATE SODIUM 100 MG/1
100 CAPSULE, LIQUID FILLED ORAL DAILY
Status: DISCONTINUED | OUTPATIENT
Start: 2018-12-01 | End: 2018-12-13 | Stop reason: HOSPADM

## 2018-12-01 RX ORDER — SODIUM CHLORIDE 9 MG/ML
75 INJECTION, SOLUTION INTRAVENOUS CONTINUOUS
Status: ACTIVE | OUTPATIENT
Start: 2018-12-01 | End: 2018-12-01

## 2018-12-01 RX ORDER — CLONAZEPAM 0.5 MG/1
0.5 TABLET ORAL 2 TIMES DAILY PRN
Status: DISCONTINUED | OUTPATIENT
Start: 2018-12-01 | End: 2018-12-07

## 2018-12-01 RX ORDER — BISACODYL 5 MG/1
10 TABLET, DELAYED RELEASE ORAL DAILY PRN
Status: DISCONTINUED | OUTPATIENT
Start: 2018-12-01 | End: 2018-12-13 | Stop reason: HOSPADM

## 2018-12-01 RX ADMIN — HYDROCODONE BITARTRATE AND ACETAMINOPHEN 1 TABLET: 7.5; 325 TABLET ORAL at 09:50

## 2018-12-01 RX ADMIN — HYDROMORPHONE HYDROCHLORIDE 2 MG: 2 INJECTION INTRAMUSCULAR; INTRAVENOUS; SUBCUTANEOUS at 23:30

## 2018-12-01 RX ADMIN — PANTOPRAZOLE SODIUM 40 MG: 40 TABLET, DELAYED RELEASE ORAL at 05:07

## 2018-12-01 RX ADMIN — HYDROMORPHONE HYDROCHLORIDE 2 MG: 2 INJECTION INTRAMUSCULAR; INTRAVENOUS; SUBCUTANEOUS at 07:43

## 2018-12-01 RX ADMIN — MUPIROCIN: 2 CREAM TOPICAL at 09:46

## 2018-12-01 RX ADMIN — HYDROCODONE BITARTRATE AND ACETAMINOPHEN 1 TABLET: 7.5; 325 TABLET ORAL at 16:19

## 2018-12-01 RX ADMIN — SODIUM CHLORIDE 75 ML/HR: 9 INJECTION, SOLUTION INTRAVENOUS at 14:56

## 2018-12-01 RX ADMIN — HYDROCODONE BITARTRATE AND ACETAMINOPHEN 1 TABLET: 7.5; 325 TABLET ORAL at 20:02

## 2018-12-01 RX ADMIN — INSULIN DETEMIR 40 UNITS: 100 INJECTION, SOLUTION SUBCUTANEOUS at 21:17

## 2018-12-01 RX ADMIN — CLONAZEPAM 0.5 MG: 0.5 TABLET ORAL at 16:19

## 2018-12-01 RX ADMIN — LACTULOSE 20 G: 20 SOLUTION ORAL at 05:06

## 2018-12-01 RX ADMIN — HYDROMORPHONE HYDROCHLORIDE 2 MG: 2 INJECTION INTRAMUSCULAR; INTRAVENOUS; SUBCUTANEOUS at 18:33

## 2018-12-01 RX ADMIN — DILTIAZEM HYDROCHLORIDE 120 MG: 120 CAPSULE, EXTENDED RELEASE ORAL at 20:01

## 2018-12-01 RX ADMIN — HYDROMORPHONE HYDROCHLORIDE 0.5 MG: 1 INJECTION, SOLUTION INTRAMUSCULAR; INTRAVENOUS; SUBCUTANEOUS at 05:07

## 2018-12-01 RX ADMIN — INSULIN LISPRO 5 UNITS: 100 INJECTION, SOLUTION INTRAVENOUS; SUBCUTANEOUS at 18:33

## 2018-12-01 RX ADMIN — INSULIN LISPRO 3 UNITS: 100 INJECTION, SOLUTION INTRAVENOUS; SUBCUTANEOUS at 21:17

## 2018-12-01 RX ADMIN — DOCUSATE SODIUM 100 MG: 100 CAPSULE, LIQUID FILLED ORAL at 09:46

## 2018-12-01 RX ADMIN — SODIUM CHLORIDE, PRESERVATIVE FREE 3 ML: 5 INJECTION INTRAVENOUS at 20:02

## 2018-12-01 RX ADMIN — HYDROMORPHONE HYDROCHLORIDE 2 MG: 2 INJECTION INTRAMUSCULAR; INTRAVENOUS; SUBCUTANEOUS at 21:24

## 2018-12-01 RX ADMIN — BISACODYL 10 MG: 5 TABLET, COATED ORAL at 09:49

## 2018-12-01 RX ADMIN — HEPARIN SODIUM 5000 UNITS: 5000 INJECTION, SOLUTION INTRAVENOUS; SUBCUTANEOUS at 20:02

## 2018-12-01 RX ADMIN — ERTAPENEM SODIUM 500 MG: 1 INJECTION, POWDER, LYOPHILIZED, FOR SOLUTION INTRAMUSCULAR; INTRAVENOUS at 18:32

## 2018-12-01 RX ADMIN — INSULIN DETEMIR 40 UNITS: 100 INJECTION, SOLUTION SUBCUTANEOUS at 09:47

## 2018-12-01 RX ADMIN — HYDROMORPHONE HYDROCHLORIDE 2 MG: 2 INJECTION INTRAMUSCULAR; INTRAVENOUS; SUBCUTANEOUS at 14:56

## 2018-12-01 RX ADMIN — INSULIN LISPRO 3 UNITS: 100 INJECTION, SOLUTION INTRAVENOUS; SUBCUTANEOUS at 07:42

## 2018-12-01 RX ADMIN — ONDANSETRON HYDROCHLORIDE 4 MG: 2 INJECTION, SOLUTION INTRAMUSCULAR; INTRAVENOUS at 21:25

## 2018-12-01 RX ADMIN — HYDROMORPHONE HYDROCHLORIDE 0.5 MG: 1 INJECTION, SOLUTION INTRAMUSCULAR; INTRAVENOUS; SUBCUTANEOUS at 01:34

## 2018-12-01 RX ADMIN — ASPIRIN 325 MG: 325 TABLET, DELAYED RELEASE ORAL at 09:47

## 2018-12-01 RX ADMIN — HEPARIN SODIUM 5000 UNITS: 5000 INJECTION, SOLUTION INTRAVENOUS; SUBCUTANEOUS at 09:46

## 2018-12-01 RX ADMIN — SODIUM CHLORIDE, PRESERVATIVE FREE 8 ML: 5 INJECTION INTRAVENOUS at 07:44

## 2018-12-01 RX ADMIN — SODIUM CHLORIDE 75 ML/HR: 9 INJECTION, SOLUTION INTRAVENOUS at 14:20

## 2018-12-01 NOTE — PROGRESS NOTES
Orthopedic Foot/Ankle Progress Note    Subjective   CC- infected right foot    Systemic or Specific Complaints: less pain  Objective     Vital signs in last 24 hours:  Temp:  [97.9 °F (36.6 °C)-98.2 °F (36.8 °C)] 98.2 °F (36.8 °C)  Heart Rate:  [81-90] 90  Resp:  [16-18] 16  BP: ()/(42-75) 137/64  GEN- trunkal obesity  PPULM- breathing unlabored  Neurovascular: No change in dense neuropathy    Wound:  right foot dressing intact         Data Review  Lab Results (last 24 hours)     Procedure Component Value Units Date/Time    Basic Metabolic Panel [[id number]] Collected:  12/01/18 0949    Specimen:  Blood Updated:  12/01/18 1023    POC Glucose Once [[id number]]  (Abnormal) Collected:  12/01/18 0722    Specimen:  Blood Updated:  12/01/18 0724     Glucose 197 mg/dL     POC Glucose Once [[id number]]  (Abnormal) Collected:  11/30/18 2047    Specimen:  Blood Updated:  11/30/18 2048     Glucose 244 mg/dL     Blood Culture - Blood, Arm, Left [[id number]] Collected:  11/26/18 1625    Specimen:  Blood from Arm, Left Updated:  11/30/18 1645     Blood Culture No growth at 4 days    Blood Culture - Blood, Arm, Right [[id number]] Collected:  11/26/18 1620    Specimen:  Blood from Arm, Right Updated:  11/30/18 1645     Blood Culture No growth at 4 days    POC Glucose Once [[id number]]  (Abnormal) Collected:  11/30/18 1629    Specimen:  Blood Updated:  11/30/18 1632     Glucose 142 mg/dL     POC Glucose Once [[id number]]  (Abnormal) Collected:  11/30/18 1157    Specimen:  Blood Updated:  11/30/18 1200     Glucose 226 mg/dL             Assessment/Plan      I will assess foot in am, and make a plan . As discussed previously, I would hope to remove toe only, for better foot function.  High risk for failure given all medical problems and difficulty with compliance.      Dense neuropathy- high risk for ulcers and amputation both feet    Hyperglycemia- high risk for poor outcome    Renal failure- stage IV< increased risk for  healing      Chio Sterling MD    Date: 12/1/2018  Time: 10:49 AM

## 2018-12-01 NOTE — PLAN OF CARE
Problem: Patient Care Overview  Goal: Plan of Care Review  Outcome: Ongoing (interventions implemented as appropriate)   12/01/18 7581   Coping/Psychosocial   Plan of Care Reviewed With patient;family   OTHER   Outcome Summary Pt used knee scooter to ambulate 40' with min A. Pt continues to have difficulty with turns and going backwards. Difficulty transferring on/off knee scooter while maintaining NWB precautions. Pt requires constant cues to maintain NWB status.

## 2018-12-01 NOTE — THERAPY TREATMENT NOTE
Acute Care - Physical Therapy Treatment Note  [facility]     Patient Name: Tashia Leon  : 1955  MRN: [id number]  Today's Date: 2018  Onset of Illness/Injury or Date of Surgery: 18  Date of Referral to PT: 18  Referring Physician: MD Schmidt    Admit Date: 2018    Visit Dx:    ICD-10-CM ICD-9-CM   1. Diabetic ulcer of other part of right foot associated with type 2 diabetes mellitus, unspecified ulcer stage (CMS/HCC) E11.621 250.80    L97.519 707.15   2. Leukocytosis, unspecified type D72.829 288.60   3. Cellulitis of right foot L03.115 682.7   4. Chronic kidney disease, unspecified CKD stage N18.9 585.9   5. Impaired functional mobility, balance, gait, and endurance Z74.09 V49.89     Patient Active Problem List   Diagnosis   • Coronary artery disease involving native coronary artery of native heart without angina pectoris   • Dyspnea   • Essential hypertension   • Hyperlipemia   • Lower extremity edema   • Carotid artery stenosis   • Carotid bruit   • Diabetes mellitus with neurological manifestations, uncontrolled (CMS/HCC)   • Diabetic peripheral neuropathy (CMS/HCC)   • Moderate nonproliferative diabetic retinopathy without macular edema associated with type 2 diabetes mellitus (CMS/HCC)   • Functional murmur   • GERD (gastroesophageal reflux disease)   • Peptic ulcer   • Vitamin D deficiency   • Health care maintenance   • Diabetes mellitus type 2, uncontrolled, with complications (CMS/HCC)   • Hyperlipidemia   • Obesity   • Osteomyelitis (CMS/HCC)   • MRSA (methicillin resistant Staphylococcus aureus)   • Lumbar disc disease   • Hearing loss   • Hyperkalemia   • Secondary hyperparathyroidism (CMS/HCC)   • Idiopathic hypochromic anemia   • Chronic kidney disease, stage V (CMS/HCC)   • Diabetic ulcer of right foot associated with type 2 diabetes mellitus (CMS/HCC)   • Sepsis (CMS/HCC)   • Cellulitis   • Hypokalemia   • Anemia, chronic disease   • Obesity (BMI 30-39.9)        Therapy Treatment    Rehabilitation Treatment Summary     Row Name 12/01/18 1217             Treatment Time/Intention    Discipline  physical therapist  -ES      Document Type  therapy note (daily note)  -ES      Subjective Information  complains of;weakness  -ES      Mode of Treatment  physical therapy  -ES      Patient/Family Observations  family present  -ES      Therapy Frequency (PT Clinical Impression)  daily  -ES      Patient Effort  fair  -ES      Existing Precautions/Restrictions  other (see comments) off loading shoe for RLE  -ES      Recorded by [ES] Galina Wei, PT 12/01/18 1515      Row Name 12/01/18 1217             Vital Signs    Pre Systolic BP Rehab  -- VSS, RN consent to tx  -ES      Recorded by [ES] Galina Wei, PT 12/01/18 1515      Row Name 12/01/18 1217             Cognitive Assessment/Intervention- PT/OT    Orientation Status (Cognition)  oriented x 4  -ES      Follows Commands (Cognition)  WFL  -ES      Safety Deficit (Cognitive)  safety precautions awareness;safety precautions follow-through/compliance  -ES      Recorded by [ES] Galina Wei, PT 12/01/18 1515      Row Name 12/01/18 1217             Safety Issues, Functional Mobility    Safety Issues Affecting Function (Mobility)  awareness of need for assistance;insight into deficits/self awareness;safety precaution awareness;safety precautions follow-through/compliance  -ES      Recorded by [ES] Galina Wei, PT 12/01/18 1515      Row Name 12/01/18 1217             Bed Mobility Assessment/Treatment    Supine-Sit [place] (Bed Mobility)  supervision  -ES      Sit-Supine [place] (Bed Mobility)  supervision  -ES      Recorded by [ES] Galina Wei, PT 12/01/18 1515      Row Name 12/01/18 1217             Transfer Assessment/Treatment    Transfer Assessment/Treatment  sit-stand transfer;stand-sit transfer  -ES      Comment (Transfers)  Constant cues to maintain NWB status on RLE, difficulty maintaining during STS  transfer  -ES      Recorded by [ES] Galina Wei, PT 12/01/18 1515      Row Name 12/01/18 1217             Sit-Stand Transfer    Sit-Stand [place] (Transfers)  contact guard  -ES      Assistive Device (Sit-Stand Transfers)  walker, knee scooter  -ES      Recorded by [ES] Galina Wei, PT 12/01/18 1515      Row Name 12/01/18 1217             Stand-Sit Transfer    Stand-Sit [place] (Transfers)  contact guard  -ES      Assistive Device (Stand-Sit Transfers)  walker, knee scooter  -ES      Recorded by [ES] Galina Wei, PT 12/01/18 1515      Row Name 12/01/18 1217             Gait/Stairs Assessment/Training    37514 - Gait Training Minutes   10  -ES      Gait/Stairs Assessment/Training  gait/ambulation assistive device  -ES      [place] Level (Gait)  minimum assist (75% patient effort)  -ES      Assistive Device (Gait)  walker, knee scooter  -ES      Distance in Feet (Gait)  40  -ES      Pattern (Gait)  step-to  -ES      Comment (Gait/Stairs)  Difficulty with turning and going backwards.  Decreased balance and difficulty transferring on/off knee scooter while maintaining NWB status.  Constant cues during transfers to maintain NWB status.  -ES      Recorded by [ES] Galina Wei, PT 12/01/18 1515      Row Name 12/01/18 1217             Motor Skills Assessment/Interventions    Additional Documentation  Therapeutic Exercise (Group)  -ES      Recorded by [ES] Galina Wei, PT 12/01/18 1515      Row Name 12/01/18 1217             Therapeutic Exercise    Therapeutic Exercise  seated, lower extremities  -ES      Additional Documentation  Therapeutic Exercise (Row)  -ES      85723 - PT Therapeutic Exercise Minutes  3  -ES      09905 - PT Therapeutic Activity Minutes  5  -ES      Recorded by [ES] Galina Wei, PT 12/01/18 1515      Row Name 12/01/18 1217             Lower Extremity Seated Therapeutic Exercise    Performed, Seated Lower Extremity (Therapeutic Exercise)  hip flexion/extension;knee  flexion/extension;ankle dorsiflexion/plantarflexion  -ES      Exercise Type, Seated Lower Extremity (Therapeutic Exercise)  AROM (active range of motion)  -ES      Sets/Reps Detail, Seated Lower Extremity (Therapeutic Exercise)  10  -ES      Recorded by [ES] Galina Wei, PT 12/01/18 1515      Row Name 12/01/18 1217             Static Sitting Balance    Level of [place] (Unsupported Sitting, Static Balance)  independent  -ES      Sitting Position (Unsupported Sitting, Static Balance)  sitting on edge of bed  -ES      Recorded by [ES] Galina Wei, PT 12/01/18 1515      Row Name 12/01/18 1217             Static Standing Balance    Level of [place] (Supported Standing, Static Balance)  minimal assist, 75% patient effort  -ES      Time Able to Maintain Position (Supported Standing, Static Balance)  1 to 2 minutes  -ES      Recorded by [ES] Galina Wei, PT 12/01/18 1515      Row Name 12/01/18 1217             Positioning and Restraints    Pre-Treatment Position  in bed  -ES      Post Treatment Position  bed  -ES      In Bed  supine;call light within reach;encouraged to call for assist;with family/caregiver  -ES      Recorded by [ES] Galina Wei, PT 12/01/18 1515      Row Name 12/01/18 1217             Pain Scale: Numbers Pre/Post-Treatment    Pain Scale: Numbers, Pretreatment  0/10 - no pain  -ES      Pain Scale: Numbers, Post-Treatment  0/10 - no pain  -ES      Recorded by [ES] Galina Wei, PT 12/01/18 1515      Row Name                Wound 11/26/18 2130 Right lateral plantar diabetic/neuropathic ulceration    Wound - Properties Group Date first assessed: 11/26/18 [KF] Time first assessed: 2130 [KF] Side: Right [KF] Orientation: lateral [KF2] Location: plantar [KF] Type: diabetic/neuropathic ulceration [KF] Recorded by:  [KF] Courtney Buenrostro RN 11/27/18 0323 [KF2] Courtney Buenrostro RN 11/27/18 0325    Row Name                Wound 11/26/18 2130 Right medial plantar diabetic/neuropathic  ulceration    Wound - Properties Group Date first assessed: 11/26/18 [KF] Time first assessed: 2130 [KF] Side: Right [KF] Orientation: medial [KF] Location: plantar [KF] Type: diabetic/neuropathic ulceration [KF] Recorded by:  [] Courtney Buenrostro RN 11/27/18 0325      User Key  (r) = Recorded By, (t) = Taken By, (c) = Cosigned By    Initials Name Effective Dates Discipline     Courtney Buenrostro RN 06/30/16 -  Nurse    Galina Pro, PT 11/13/17 -  PT          Wound 11/26/18 2130 Right lateral plantar diabetic/neuropathic ulceration (Active)   Dressing Appearance dry;intact 12/1/2018 10:00 AM   Closure VIOLA 12/1/2018 10:00 AM   Base dressing in place, unable to visualize 12/1/2018 10:00 AM   Dressing Care, Wound gauze 12/1/2018  7:43 AM       Wound 11/26/18 2130 Right medial plantar diabetic/neuropathic ulceration (Active)   Dressing Appearance dry;intact 12/1/2018 10:00 AM   Closure VIOLA 12/1/2018 10:00 AM   Base dressing in place, unable to visualize 12/1/2018 10:00 AM   Drainage Amount none 12/1/2018 10:00 AM   Dressing Care, Wound gauze, dry 12/1/2018  7:43 AM           Physical Therapy Education     Title: PT OT SLP Therapies (In Progress)     Topic: Physical Therapy (In Progress)     Point: Mobility training (In Progress)     Learning Progress Summary           Patient Acceptance, E,D, NR by ES at 12/1/2018  3:15 PM    Comment:  Educated pt and family on NWB status and importance of maintaining NWB status.  Reviewed sequencing with knee scooter and correct sequencing for transfers    Acceptance, E, NR by JACYE at 11/29/2018 10:15 AM   Family Acceptance, E,D, NR by ES at 12/1/2018  3:15 PM    Comment:  Educated pt and family on NWB status and importance of maintaining NWB status.  Reviewed sequencing with knee scooter and correct sequencing for transfers                   Point: Home exercise program (In Progress)     Learning Progress Summary           Patient Acceptance, E,D, NR by ES at 12/1/2018  3:15 PM     Comment:  Educated pt and family on NWB status and importance of maintaining NWB status.  Reviewed sequencing with knee scooter and correct sequencing for transfers    Acceptance, E, NR by JACEY at 11/29/2018 10:15 AM   Family Acceptance, E,D, NR by ES at 12/1/2018  3:15 PM    Comment:  Educated pt and family on NWB status and importance of maintaining NWB status.  Reviewed sequencing with knee scooter and correct sequencing for transfers                   Point: Body mechanics (In Progress)     Learning Progress Summary           Patient Acceptance, E,D, NR by ES at 12/1/2018  3:15 PM    Comment:  Educated pt and family on NWB status and importance of maintaining NWB status.  Reviewed sequencing with knee scooter and correct sequencing for transfers    Acceptance, E, VU by KG at 11/30/2018 11:46 PM    Acceptance, E, NR by JACEY at 11/29/2018 10:15 AM   Family Acceptance, E,D, NR by ES at 12/1/2018  3:15 PM    Comment:  Educated pt and family on NWB status and importance of maintaining NWB status.  Reviewed sequencing with knee scooter and correct sequencing for transfers                   Point: Precautions (In Progress)     Learning Progress Summary           Patient Acceptance, E,D, NR by ES at 12/1/2018  3:15 PM    Comment:  Educated pt and family on NWB status and importance of maintaining NWB status.  Reviewed sequencing with knee scooter and correct sequencing for transfers    Acceptance, E, NR by JACEY at 11/29/2018 10:15 AM   Family Acceptance, E,D, NR by ES at 12/1/2018  3:15 PM    Comment:  Educated pt and family on NWB status and importance of maintaining NWB status.  Reviewed sequencing with knee scooter and correct sequencing for transfers                               User Key     Initials Effective Dates Name Provider Type Discipline    JACEY 06/19/15 -  Yany Cheek, PT Physical Therapist PT    KG 06/16/16 -  Roseann Lazo, RN Registered Nurse Nurse    ES 11/13/17 -  Galina Wei, PT Physical  Therapist PT                PT Recommendation and Plan  Therapy Frequency (PT Clinical Impression): daily  Plan of Care Reviewed With: patient, family  Outcome Summary: Pt used knee scooter to ambulate 40' with min A.  Pt continues to have difficulty with turns and going backwards.  Difficulty transferring on/off knee scooter while maintaining NWB precautions.  Pt requires constant cues to maintain NWB status.  Outcome Measures     Row Name 12/01/18 1217 11/29/18 1015          How much help from another person do you currently need...    Turning from your back to your side while in flat bed without using bedrails?  4  -ES  4  -JACEY     Moving from lying on back to sitting on the side of a flat bed without bedrails?  4  -ES  4  -JACEY     Moving to and from a bed to a chair (including a wheelchair)?  3  -ES  3  -JACEY     Standing up from a chair using your arms (e.g., wheelchair, bedside chair)?  3  -ES  3  -JACEY     Climbing 3-5 steps with a railing?  2  -ES  2  -JACEY     To walk in hospital room?  3  -ES  3  -JACEY     AM-PAC 6 Clicks Score  19  -ES  19  -JACEY        Functional Assessment    Outcome Measure Options  AM-PAC 6 Clicks Basic Mobility (PT)  -ES  AM-PAC 6 Clicks Basic Mobility (PT)  -JACEY       User Key  (r) = Recorded By, (t) = Taken By, (c) = Cosigned By    Initials Name Provider Type    Yany Gonzalez, PT Physical Therapist    Galina Pro, PT Physical Therapist         Time Calculation:   PT Charges     Row Name 12/01/18 1217             Time Calculation    Start Time  1217  -ES      PT Received On  12/01/18  -ES      PT Goal Re-Cert Due Date  12/09/18  -ES         Timed Charges    80489 - PT Therapeutic Exercise Minutes  3  -ES      07913 - Gait Training Minutes   10  -ES      40461 - PT Therapeutic Activity Minutes  5  -ES        User Key  (r) = Recorded By, (t) = Taken By, (c) = Cosigned By    Initials Name Provider Type    Galina Pro, PT Physical Therapist        Therapy Suggested Charges      Code   Minutes Charges    75231 (CPT®) Hc Pt Neuromusc Re Education Ea 15 Min      94888 (CPT®) Hc Pt Ther Proc Ea 15 Min 3     69495 (CPT®) Hc Gait Training Ea 15 Min 10 1    19832 (CPT®) Hc Pt Therapeutic Act Ea 15 Min 5     51341 (CPT®) Hc Pt Manual Therapy Ea 15 Min      25120 (CPT®) Hc Pt Iontophoresis Ea 15 Min      87209 (CPT®) Hc Pt Elec Stim Ea-Per 15 Min      42316 (CPT®) Hc Pt Ultrasound Ea 15 Min      88803 (CPT®) Hc Pt Self Care/Mgmt/Train Ea 15 Min      76265 (CPT®) Hc Pt Prosthetic (S) Train Initial Encounter, Each 15 Min      83917 (CPT®) Hc Pt Orthotic(S)/Prosthetic(S) Encounter, Each 15 Min      36494 (CPT®) Hc Orthotic(S) Mgmt/Train Initial Encounter, Each 15min      Total  18 1        Therapy Charges for Today     Code Description Service Date Service Provider Modifiers Qty    [id number] HC GAIT TRAINING EA 15 MIN 12/1/2018 Galina Wei, PT GP 1          PT G-Codes  Outcome Measure Options: AM-PAC 6 Clicks Basic Mobility (PT)  AM-PAC 6 Clicks Score: 19    Galina Wei PT  12/1/2018

## 2018-12-01 NOTE — SIGNIFICANT NOTE
RN states pt has demonstrated numerous noncompliance since here. Up walking and wt bearing, getting up without help. Frequently to the bathroom trying to have bowel movement and constipated and only a small pebble out.    Added regiment.    Please f/u PRN on her compliance and constipation.  
<<-----Click on this checkbox to enter Pre-Op Dx

## 2018-12-01 NOTE — PROGRESS NOTES
[facility] Medicine Services  PROGRESS NOTE    Patient Name: Tashia Leon  : 1955  MRN: [id number]    Date of Admission: 2018  Length of Stay: 5  Primary Care Physician: Angelica Tran PA    Subjective   Subjective     CC:  Follow-up diabetic foot ulcer    HPI:  Ms. Leon was seen at 3PM.  She was very tearful and anxious as she was worried about surgery.  She said she was having severe issues with anxiety and was requesting a medication to help with this issue.  She noted that her home pain medication has helped her pain issues currently.  She denies any new fevers or chills or other new complaints.  She did have issues with constipation overnight.  She says she took Movantik at home and refuses Relastor which is our hospital formulary.  She also stated she did not want to bring in her home medication at this time.      Review of Systems  No current fevers or chills  No current shortness of breath or cough  No current nausea, vomiting, or diarrhea  No current chest pain or palpitations    Objective   Objective     Vital Signs:   Temp:  [97.1 °F (36.2 °C)-98.2 °F (36.8 °C)] 97.1 °F (36.2 °C)  Heart Rate:  [76-90] 76  Resp:  [16-18] 16  BP: (106-148)/(42-75) 110/55        Physical Exam:  Constitutional:Awake, alert  HENT: NCAT, mucous membranes moist, neck supple  Respiratory: Clear to auscultation bilaterally, respiratory effort normal, nonlabored breathing   Cardiovascular: RRR, no murmurs, rubs, or gallops,  trace bilateral lower extremity edema  Gastrointestinal: Positive bowel sounds, soft, nontender,  obese  Musculoskeletal: Normal musculature for age, BMI 39, near morbidly obese  Psychiatric: Appropriate affect, cooperative, conversational  Neurologic: No slurred speech or facial droop, follows commands, not confused   Skin: No rashes or jaundice, warm, right foot is currently covered with dressing CDI, wound pictures reviewed from wound care  nursing.        Results Reviewed:  I have personally reviewed current lab, radiology, and data and agree.    Results from last 7 days   Lab Units  11/30/18   0527  11/29/18   1503  11/28/18   0359   11/26/18   1628   WBC 10*3/mm3  15.18*  18.34*  18.50*   < >  19.47*   HEMOGLOBIN g/dL  9.3*  10.3*  9.4*   < >  11.0*   HEMATOCRIT %  29.4*  31.2*  29.8*   < >  34.3*   PLATELETS 10*3/mm3  406  415  380   < >  417   INR    --    --    --    --   0.98    < > = values in this interval not displayed.     Results from last 7 days   Lab Units  12/01/18   0949  11/30/18   0527  11/29/18   0635   11/26/18   1759   SODIUM mmol/L  133  132  134   < >  137   POTASSIUM mmol/L  3.8  3.4*  3.3*   < >  3.2*   CHLORIDE mmol/L  95*  94*  96*   < >  99   CO2 mmol/L  25.0  24.0  24.0   < >  26.0   BUN mg/dL  87*  79*  82*   < >  74*   CREATININE mg/dL  4.38*  4.33*  4.29*   < >  3.79*   GLUCOSE mg/dL  150*  185*  120*   < >  137*   CALCIUM mg/dL  8.2*  8.2*  8.7   < >  9.8   ALT (SGPT) U/L   --    --    --    --   19   AST (SGOT) U/L   --    --    --    --   24    < > = values in this interval not displayed.     Estimated Creatinine Clearance: 16.9 mL/min (A) (by C-G formula based on SCr of 4.38 mg/dL (H)).  No results found for: BNP    Microbiology Results Abnormal     Procedure Component Value - Date/Time    Blood Culture - Blood, Arm, Left [[id number]] Collected:  11/26/18 1625    Lab Status:  Preliminary result Specimen:  Blood from Arm, Left Updated:  11/30/18 1645     Blood Culture No growth at 4 days    Blood Culture - Blood, Arm, Right [[id number]] Collected:  11/26/18 1620    Lab Status:  Preliminary result Specimen:  Blood from Arm, Right Updated:  11/30/18 8383     Blood Culture No growth at 4 days          Imaging Results (last 24 hours)     ** No results found for the last 24 hours. **          I have reviewed the medications.      aspirin  mg Oral Daily   DAPTOmycin 6 mg/kg (Adjusted) Intravenous Q48H   diltiaZEM   mg Oral Nightly   docusate sodium 100 mg Oral Daily   ertapenem 500 mg Intravenous Q24H   ferrous sulfate 325 mg Oral BID With Meals   heparin (porcine) 5,000 Units Subcutaneous Q12H   HYDROcodone-acetaminophen 1 tablet Oral TID   insulin detemir 40 Units Subcutaneous Q12H   insulin lispro 0-14 Units Subcutaneous 4x Daily With Meals & Nightly   mupirocin  Topical Q24H   Naloxegol Oxalate 12.5 mg Oral Once per day on Sun Wed   pantoprazole 40 mg Oral QAM   sodium chloride 3 mL Intravenous Q12H         Assessment/Plan   Assessment / Plan     Active Hospital Problems    Diagnosis   • **Diabetic ulcer of right foot associated with type 2 diabetes mellitus (CMS/HCC)   • Sepsis (CMS/[facility])   • Cellulitis   • Hypokalemia   • Anemia, chronic disease   • Obesity (BMI 30-39.9)   • Chronic kidney disease, stage V (CMS/HCC)   • Hyperlipidemia   • Diabetes mellitus type 2, uncontrolled, with complications (CMS/HCC)     Proteinuria noted, January 2014.       • Diabetic peripheral neuropathy (CMS/[facility])   • Essential hypertension   • Coronary artery disease involving native coronary artery of native heart without angina pectoris     1. Coronary artery disease:  a. Abnormal cardiac PET, 10/02/2013, Dr. Becker, revealing a posterolateral defect with a mild wall motion abnormality and a normal LVEF.  b. Cardiac catheterization, 10/21/2013, with placement of a 2.5 x 15 mm Xience Expedition drug-eluting stent to the proximal circumflex.  LVEF of 55% to 60%.  Noncritical disease in the LAD and right coronary.  c. Echocardiogram, 12/18/2013, shows an LVEF 50% to 55% with trace MR and mild TR.               Brief Hospital Course to date:  Tashia Leon is a 63 y.o. female presents with infected diabetic foot ulceration and sepsis      Assessment & Plan:      1.  Sepsis, now resolved     2.  Diabetic foot ulcer with cellulitis right foot              -wound care consult currently with silver wound gel topical treatment               -Invanz and Dapto, ID recs              -BC x 2, currently negative              -pain control, monitor while on IV pain medicines              -cbc, bmp in the am            - Scooter to offload foot has been ordered and is at the bedside.  Patient has seen PT who taught her appropriate use but          are concerned about her weakness and stability.     3.   Type 2 diabetes mellitus with diabetic peripheral neuropathy              -a1c 7.6              -levemir 40 units bid, continue               -continue correction insulin, eating less while admitted     4.  CKDV              -consult nephrology,  renal function limits abx choices               -renal dysfunction is very advanced,               -May need nephrology clearance for PICC line if needed              - Monitor                 5.  Hypokalemia              -replaced by nephrology as needed                6. Obesity BMI 38: Weight loss recommended     7.  HTN: Currently normotensive     8.  CAD              -aspirin     9.  Diabetic peripheral neuropathy: Symptomatic treatment and supportive care     10.  Anemia of chronic disease              -cbc in the am and monitor    Patient receiving IV fluid from surgery.  Plan to recheck laboratory studies tomorrow.  In the setting of severe anxiety while awaiting surgery plan to start low-dose clonazepam and adjust as needed.  Preferably could use BuSpar but advanced kidney disease prevent the use of this medication.  Pain improved today with patient's home medicine she will try to back off on IV pain medicine as much as possible.    Also of note patient has not been completely compliant regarding nonweightbearing status of the foot.  I explained to her at great length that not adhering to recommended treatment plan can lead to worsening morbidity or mortality including leg amputation if her infection were to worsen.  She is now agreeable to stay nonweightbearing on her foot as recommended.    DVT Prophylaxis:   Heparin    CODE STATUS:   Code Status and Medical Interventions:   Ordered at: 11/26/18 2126     Level Of Support Discussed With:    Patient     Code Status:    CPR     Medical Interventions (Level of Support Prior to Arrest):    Full       Disposition: I expect the patient to be discharged possibly home as refusing skilled facility.      Electronically signed by Gonzalo Schmidt MD, 12/01/18, 3:11 PM.

## 2018-12-01 NOTE — PLAN OF CARE
"Problem: Patient Care Overview  Goal: Plan of Care Review  Transfer from , PT arrived at the floor at 1855. A&Ox4, very anxious. PT states\" I am extremely anxious because I don't want to be here.\" Resp even/unlabored. Skin w/d to touch. Dressings to the right planter CDI.   12/01/18 0139   Coping/Psychosocial   Plan of Care Reviewed With patient   OTHER   Outcome Summary new admit    PT is noncompliant with NWB status, states \"I can take care of myself\"  Proceeds to the bathroom with a walker by herself. Bed alarm/pad alarm in place. PT requires PRN narcotics every 3 to 4 hours for the RLE pain which she describes \"sharp\" and \"constant\". VS WNL. Will cont to mx. Call light in reach.      "

## 2018-12-01 NOTE — PROGRESS NOTES
INFECTIOUS DISEASE PROGRESS NOTE    Tashia Leon  1955  [id number]    Date: 12/1/2018    Admission Date: 11/26/2018      CC: diabetic foot ulcer    History of present illness:    Patient is a 63 y.o. female with chronic kidney disease stage IV, diabetes type 2 since 1990s has had history of left foot MRSA osteomyelitis treated at  several years ago.  Patient has developed calluses on her right foot and saw [facility] who did some bedside debridement in the clinic.  Patient said increasing pain and some weeping from the wounds because of low-grade temperatures patient's returns to see her podiatrist recommended admission to hospital.  Patient was seen in the emergency room started on IV antibiotics including daptomycin and ertapenem.  She had MRI of the foot which was stopped prematurely by the patient's and was not a complete study.    Asked to address long-term treatment and duration of antibiotics.    Patient lives in [place].    Patient denies C. difficile colitis history of nausea vomiting or diarrhea difficulty breathing patient denies rash.    Patient says she is a PICC line before    11/28/18; no events overnight; no fever, rash, sore throat    11/29/18; no events overnight feels well; no complaints; no diarrhea, rash, sore throat    11/30/18: No new events. Denies f/c, sob, n/v/d, rashes or sore throat.     12/1/18; no events overnight; no fever, rash, sore throat    Past Medical History:   Diagnosis Date   • Acute bronchitis    • Acute pharyngitis    • Acute sinusitis    • Benign colonic polyp    • Edema    • GERD (gastroesophageal reflux disease)    • Hiatal hernia    • Hyperkalemia    • Hyperlipidemia    • Hypertension    • Low back pain    • Lumbar disc disease    • MRSA (methicillin resistant Staphylococcus aureus)     Osteomyelitis/methicillin-resistant Staphylococcus aureus of the left foot, April 2013.   • Obesity    • Osteomyelitis (CMS/HCC)      Osteomyelitis/methicillin-resistant Staphylococcus aureus of the left foot, 2013.   • Peptic ulceration 2013    History of peptic ulcer disease, diagnosed 2013 by EGD.  Repeat EGD in 2013 was negative.   • Sepsis (CMS/formerly [facility])     Sepsis, 2013, hospitalized at [facility].   • Tobacco use     Previous tobacco use with cessation in .   • Type 2 diabetes mellitus (CMS/formerly [facility])     Proteinuria noted, 2014.     • Vitamin D deficiency        Past Surgical History:   Procedure Laterality Date   •  SECTION      x2   • FOOT SURGERY Left     Incision and debridement of the left foot x2.   • LASIK     • TONSILLECTOMY         Family History   Problem Relation Age of Onset   • No Known Problems Mother    • No Known Problems Father    • Breast cancer Neg Hx    • Ovarian cancer Neg Hx        Social History     Socioeconomic History   • Marital status:      Spouse name: Not on file   • Number of children: Not on file   • Years of education: Not on file   • Highest education level: Not on file   Social Needs   • Financial resource strain: Not on file   • Food insecurity - worry: Not on file   • Food insecurity - inability: Not on file   • Transportation needs - medical: Not on file   • Transportation needs - non-medical: Not on file   Occupational History   • Not on file   Tobacco Use   • Smoking status: Former Smoker     Packs/day: 1.00     Years: 30.00     Pack years: 30.00     Types: Cigarettes   • Smokeless tobacco: Never Used   • Tobacco comment: quit 7 years ago   Substance and Sexual Activity   • Alcohol use: Yes     Alcohol/week: 0.6 oz     Types: 1 Glasses of wine per week     Comment: holidays   • Drug use: No   • Sexual activity: Defer   Other Topics Concern   • Not on file   Social History Narrative   • Not on file       Allergies   Allergen Reactions   • Statins Myalgia   • Ancef [Cefazolin]    • Bactrim [Sulfamethoxazole-Trimethoprim] Nausea  And Vomiting   • Cephalosporins    • Cleocin [Clindamycin Hcl]    • Clindamycin/Lincomycin    • Keflex [Cephalexin]    • Levaquin [Levofloxacin]    • Lipitor [Atorvastatin]    • Lisinopril    • Losartan Potassium Other (See Comments)     Unknown reaction   • Oxycodone    • Quinolones          Medication:    Current Facility-Administered Medications:   •  aspirin EC tablet 325 mg, 325 mg, Oral, Daily, Rosario Walter APRN, 325 mg at 12/01/18 0947  •  bisacodyl (DULCOLAX) EC tablet 10 mg, 10 mg, Oral, Daily PRN, Heavenly Medina APRN, 10 mg at 12/01/18 0949  •  DAPTOmycin (CUBICIN) 500 mg in sodium chloride 0.9 % 50 mL IVPB, 6 mg/kg (Adjusted), Intravenous, Q48H, Breana Chance MD, Last Rate: 100 mL/hr at 11/30/18 1731, 500 mg at 11/30/18 1731  •  dextrose (D50W) 25 g/ 50mL Intravenous Solution 25 g, 25 g, Intravenous, Q15 Min PRN, Rosario Walter, APRN  •  dextrose (GLUTOSE) oral gel 15 g, 15 g, Oral, Q15 Min PRN, Rosario Walter APRN  •  diltiaZEM CD (CARDIZEM CD) 24 hr capsule 120 mg, 120 mg, Oral, Nightly, Rosario Walter APRN, 120 mg at 11/30/18 2039  •  docusate sodium (COLACE) capsule 100 mg, 100 mg, Oral, Daily, Heavenly Medina APRN, 100 mg at 12/01/18 0946  •  ertapenem (INVanz) 500 mg in sodium chloride 0.9 % 50 mL IVPB, 500 mg, Intravenous, Q24H, Rosario Walter APRN, Last Rate: 100 mL/hr at 11/30/18 1731, 500 mg at 11/30/18 1731  •  ferrous sulfate tablet 325 mg, 325 mg, Oral, BID With Meals, Jeremy, Dannie Armstrong MD, 325 mg at 11/1955  •  glucagon (human recombinant) (GLUCAGEN DIAGNOSTIC) injection 1 mg, 1 mg, Subcutaneous, PRN, Rosario Walter APRN  •  heparin (porcine) 5000 UNIT/ML injection 5,000 Units, 5,000 Units, Subcutaneous, Q12H, Rosario Walter APRN, 5,000 Units at 12/01/18 0946  •  HYDROcodone-acetaminophen (NORCO) 7.5-325 MG per tablet 1 tablet * Brand Name Norco--Patient Supplied Medication *, 1 tablet, Oral, TID, Gonzalo Schmidt  MD Yash, 1 tablet at 12/01/18 0950  •  HYDROmorphone (DILAUDID) injection 0.5 mg, 0.5 mg, Intravenous, Q3H PRN, 0.5 mg at 12/01/18 0507 **AND** naloxone (NARCAN) injection 0.4 mg, 0.4 mg, Intravenous, Q5 Min PRN, Rosario Walter APRN  •  HYDROmorphone (DILAUDID) injection 2 mg, 2 mg, Intravenous, Q2H PRN, Chio Sterling MD, 2 mg at 12/01/18 0743  •  insulin detemir (LEVEMIR) injection 40 Units, 40 Units, Subcutaneous, Q12H, Gonzalo Schmidt MD, 40 Units at 12/01/18 0947  •  insulin lispro (humaLOG) injection 0-14 Units, 0-14 Units, Subcutaneous, 4x Daily With Meals & Nightly, Rosario Walter APRN, 3 Units at 12/01/18 0742  •  mupirocin (BACTROBAN) 2 % cream, , Topical, Q24H, Chio Sterling MD  •  Naloxegol Oxalate (MOVANTIK) tablet 12.5 mg, 12.5 mg, Oral, Once per day on Sun Wed, Rosario Walter APRN  •  ondansetron (ZOFRAN) tablet 4 mg, 4 mg, Oral, Q6H PRN, 4 mg at 11/30/18 2046 **OR** ondansetron (ZOFRAN) injection 4 mg, 4 mg, Intravenous, Q6H PRN, Rosario Walter APRN, 4 mg at 11/29/18 2042  •  pantoprazole (PROTONIX) EC tablet 40 mg, 40 mg, Oral, QAM, Rosario Walter APRN, 40 mg at 12/01/18 0507  •  [COMPLETED] Insert peripheral IV, , , Once **AND** sodium chloride 0.9 % flush 10 mL, 10 mL, Intravenous, PRN, Hiram Osborn MD  •  sodium chloride 0.9 % flush 3 mL, 3 mL, Intravenous, Q12H, Rosario Walter APRN, 8 mL at 12/01/18 0744  •  sodium chloride 0.9 % flush 3-10 mL, 3-10 mL, Intravenous, PRN, Rosario Walter APRN  •  sodium chloride 0.9 % infusion, 75 mL/hr, Intravenous, Continuous, Dannie Luna MD    Antibiotics:  Anti-Infectives (From admission, onward)    Ordered     Dose/Rate Route Frequency Start Stop    11/26/18 2136  DAPTOmycin (CUBICIN) 500 mg in sodium chloride 0.9 % 50 mL IVPB     Ordering Provider:  Breana Chance MD    6 mg/kg × 79.2 kg (Adjusted)  100 mL/hr over 30 Minutes Intravenous Every 48 Hours 11/28/18 1800 12/04/18  1759    18  ertapenem (INVanz) 500 mg in sodium chloride 0.9 % 50 mL IVPB     Ordering Provider:  Rosario Walter APRN    500 mg  100 mL/hr over 30 Minutes Intravenous Every 24 Hours 18 1800 18 1759    18 1616  ertapenem (INVanz) 500 mg in sodium chloride 0.9 % 50 mL IVPB     Ordering Provider:  Hiram Osborn MD    500 mg  100 mL/hr over 30 Minutes Intravenous Every 24 Hours 18 1618 18 0015    18 1616  DAPTOmycin (CUBICIN) 500 mg in sodium chloride 0.9 % 50 mL IVPB     Ordering Provider:  Hiram Osborn MD    500 mg  100 mL/hr over 30 Minutes Intravenous Every 24 Hours 18 1617 18 1935            Review of Systems:       Review of systems were reviewed and were unremarkable.  See hpi      Physical Exam:   Vital Signs  Temp (24hrs), Av.9 °F (36.6 °C), Min:97.1 °F (36.2 °C), Max:98.2 °F (36.8 °C)    Temp  Min: 97.1 °F (36.2 °C)  Max: 98.2 °F (36.8 °C)  BP  Min: 106/42  Max: 148/61  Pulse  Min: 76  Max: 90  Resp  Min: 16  Max: 18  SpO2  Min: 95 %  Max: 98 %    GENERAL: Awake and alert, in no acute distress.   HEENT: Normocephalic, atraumatic.  PERRL. EOMI. No conjunctival injection. No icterus. Oropharynx clear without evidence of thrush or exudate.   HEART: RRR; No murmur, rubs, gallops.   LUNGS: Clear to auscultation bilaterally without wheezing, rales, rhonchi.   ABDOMEN: Soft, nontender, nondistended. Positive bowel sounds.    EXT:  No cyanosis, clubbing or edema. No cord.  MSK: FROM without joint effusions noted arms/legs.  Right foot wrapped  SKIN: Warm and dry without cutaneous eruptions on Inspection/palpation.    NEURO: Oriented to PPT. No focal deficits on motor/sensory exam at arms/legs.  PSYCHIATRIC: Normal insight and judgement. Cooperative with PE    Laboratory Data    Results from last 7 days   Lab Units  18   0527  18   1503  18   0359   WBC 10*3/mm3  15.18*  18.34*  18.50*   HEMOGLOBIN g/dL  9.3*  10.3*   9.4*   HEMATOCRIT %  29.4*  31.2*  29.8*   PLATELETS 10*3/mm3  406  415  380     Results from last 7 days   Lab Units  12/01/18   0949   SODIUM mmol/L  133   POTASSIUM mmol/L  3.8   CHLORIDE mmol/L  95*   CO2 mmol/L  25.0   BUN mg/dL  87*   CREATININE mg/dL  4.38*   GLUCOSE mg/dL  150*   CALCIUM mg/dL  8.2*     Results from last 7 days   Lab Units  11/26/18   1759   ALK PHOS U/L  92   BILIRUBIN mg/dL  0.2*   ALT (SGPT) U/L  19   AST (SGOT) U/L  24     Results from last 7 days   Lab Units  11/26/18   1628   SED RATE mm/hr  103*     Results from last 7 days   Lab Units  11/26/18   1759   CRP mg/dL  17.25*     Results from last 7 days   Lab Units  11/26/18   1628   LACTATE mmol/L  2.0     Results from last 7 days   Lab Units  12/01/18   0949  11/27/18   1515   CK TOTAL U/L  121  146         Estimated Creatinine Clearance: 16.9 mL/min (A) (by C-G formula based on SCr of 4.38 mg/dL (H)).      Microbiology:  Blood Culture   Date Value Ref Range Status   11/26/2018 No growth at 3 days  Preliminary   11/26/2018 No growth at 3 days  Preliminary                                Radiology:  Xr Foot 3+ View Right    Result Date: 11/28/2018  Possible plantar ulceration of the forefoot. No acute osseous abnormality is identified.  D:  11/28/2018 E:  11/28/2018  This report was finalized on 11/28/2018 12:28 PM by Aniceto Deleon.      Mri Foot Right Without Contrast    Result Date: 11/29/2018  Exam terminated early by the patient. Limited scan. No evidence of osteomyelitis, or discrete abscess.  D:  11/26/2018 E:  11/27/2018    This report was finalized on 11/29/2018 12:04 PM by DR. Schuyler Fernando MD.      Doppler arterial:   Addenda:       · Moderate reduction in arterial flow in the right lower extremity. · Monophasic waveforms are present in the right lower extremity with   biphasic waveforms in the left lower extremity · Due to the patients inability to tolerate the study only RLE pressures   were obtained    Signed: 11/29/18 2249 by  Gillian Becker MD   Narrative:     · Moderate reduction in arterial flow in the right lower extremity.   Normal-appearing waveforms.         Impression:   Diabetic foot ulcer on right foot  Leukocytosis with neutrophilia  chronic kidney disease stage IV  Antibiotic intolerances to cefazolin, clindamycin, Levaquin  DM II  Esr > 100  PVD    PLAN/RECOMMENDATIONS:   Thank you for asking us to see Tashia Leon, I recommend the following:    I find it very difficult to interpret an MRI was not completed an MRI that was not given with gadolinium because of her chronic kidney disease.    Physical exam of foot not remarkable for  Osteomyelitis.  I still have some concern with ESr > 100.      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   If patient continues the ulcerations can lead to secondary infection and contiguous spread of infection to the bone;  Unclear is patient already has osteo; bone biopsy may be gold standard to determine    contniue offloading     Offloading and using a knee scooter is probably the most important intervention currently     Cont dapto  Cont invanz  Avoid picc line for now  F/u NELLI's    D/w Dr. Dupont  Patient is at increased risk for limb loss  D/w family, Dr. Schmitt,   Jeremy  Nephrology is opposed to picc line  Hung Haynes MD  12/1/2018  12:58 PM

## 2018-12-02 LAB
DEPRECATED RDW RBC AUTO: 46 FL (ref 37–54)
ERYTHROCYTE [DISTWIDTH] IN BLOOD BY AUTOMATED COUNT: 13.1 % (ref 11.3–14.5)
GLUCOSE BLDC GLUCOMTR-MCNC: 126 MG/DL (ref 70–130)
GLUCOSE BLDC GLUCOMTR-MCNC: 165 MG/DL (ref 70–130)
GLUCOSE BLDC GLUCOMTR-MCNC: 191 MG/DL (ref 70–130)
GLUCOSE BLDC GLUCOMTR-MCNC: 97 MG/DL (ref 70–130)
HCT VFR BLD AUTO: 27.9 % (ref 34.5–44)
HGB BLD-MCNC: 8.7 G/DL (ref 11.5–15.5)
MCH RBC QN AUTO: 30.3 PG (ref 27–31)
MCHC RBC AUTO-ENTMCNC: 31.2 G/DL (ref 32–36)
MCV RBC AUTO: 97.2 FL (ref 80–99)
PLATELET # BLD AUTO: 438 10*3/MM3 (ref 150–450)
PMV BLD AUTO: 10 FL (ref 6–12)
RBC # BLD AUTO: 2.87 10*6/MM3 (ref 3.89–5.14)
WBC NRBC COR # BLD: 13.86 10*3/MM3 (ref 3.5–10.8)

## 2018-12-02 PROCEDURE — 25010000002 HEPARIN (PORCINE) PER 1000 UNITS: Performed by: NURSE PRACTITIONER

## 2018-12-02 PROCEDURE — 99233 SBSQ HOSP IP/OBS HIGH 50: CPT | Performed by: ORTHOPAEDIC SURGERY

## 2018-12-02 PROCEDURE — 82962 GLUCOSE BLOOD TEST: CPT

## 2018-12-02 PROCEDURE — 97116 GAIT TRAINING THERAPY: CPT

## 2018-12-02 PROCEDURE — 25010000002 ONDANSETRON PER 1 MG: Performed by: NURSE PRACTITIONER

## 2018-12-02 PROCEDURE — 63710000001 INSULIN DETEMIR PER 5 UNITS: Performed by: INTERNAL MEDICINE

## 2018-12-02 PROCEDURE — 85027 COMPLETE CBC AUTOMATED: CPT | Performed by: INTERNAL MEDICINE

## 2018-12-02 PROCEDURE — 99232 SBSQ HOSP IP/OBS MODERATE 35: CPT | Performed by: INTERNAL MEDICINE

## 2018-12-02 PROCEDURE — 25010000002 HYDROMORPHONE PER 4 MG: Performed by: ORTHOPAEDIC SURGERY

## 2018-12-02 PROCEDURE — 25010000002 ERTAPENEM PER 500 MG: Performed by: NURSE PRACTITIONER

## 2018-12-02 PROCEDURE — 25010000002 DAPTOMYCIN PER 1 MG: Performed by: FAMILY MEDICINE

## 2018-12-02 PROCEDURE — 25010000002 HYDROMORPHONE PER 4 MG: Performed by: NURSE PRACTITIONER

## 2018-12-02 RX ADMIN — SODIUM CHLORIDE, PRESERVATIVE FREE 3 ML: 5 INJECTION INTRAVENOUS at 19:28

## 2018-12-02 RX ADMIN — HYDROMORPHONE HYDROCHLORIDE 2 MG: 2 INJECTION INTRAMUSCULAR; INTRAVENOUS; SUBCUTANEOUS at 10:59

## 2018-12-02 RX ADMIN — ASPIRIN 325 MG: 325 TABLET, DELAYED RELEASE ORAL at 09:14

## 2018-12-02 RX ADMIN — HYDROCODONE BITARTRATE AND ACETAMINOPHEN 1 TABLET: 7.5; 325 TABLET ORAL at 16:00

## 2018-12-02 RX ADMIN — CLONAZEPAM 0.5 MG: 0.5 TABLET ORAL at 23:06

## 2018-12-02 RX ADMIN — INSULIN DETEMIR 40 UNITS: 100 INJECTION, SOLUTION SUBCUTANEOUS at 20:51

## 2018-12-02 RX ADMIN — HYDROMORPHONE HYDROCHLORIDE 0.5 MG: 1 INJECTION, SOLUTION INTRAMUSCULAR; INTRAVENOUS; SUBCUTANEOUS at 19:28

## 2018-12-02 RX ADMIN — MUPIROCIN 1 APPLICATION: 2 CREAM TOPICAL at 11:09

## 2018-12-02 RX ADMIN — HYDROMORPHONE HYDROCHLORIDE 0.5 MG: 1 INJECTION, SOLUTION INTRAMUSCULAR; INTRAVENOUS; SUBCUTANEOUS at 14:47

## 2018-12-02 RX ADMIN — HEPARIN SODIUM 5000 UNITS: 5000 INJECTION, SOLUTION INTRAVENOUS; SUBCUTANEOUS at 09:13

## 2018-12-02 RX ADMIN — HEPARIN SODIUM 5000 UNITS: 5000 INJECTION, SOLUTION INTRAVENOUS; SUBCUTANEOUS at 19:29

## 2018-12-02 RX ADMIN — HYDROCODONE BITARTRATE AND ACETAMINOPHEN 1 TABLET: 7.5; 325 TABLET ORAL at 09:15

## 2018-12-02 RX ADMIN — ONDANSETRON HYDROCHLORIDE 4 MG: 2 INJECTION, SOLUTION INTRAMUSCULAR; INTRAVENOUS at 04:38

## 2018-12-02 RX ADMIN — DAPTOMYCIN 500 MG: 500 INJECTION, POWDER, LYOPHILIZED, FOR SOLUTION INTRAVENOUS at 19:29

## 2018-12-02 RX ADMIN — INSULIN LISPRO 3 UNITS: 100 INJECTION, SOLUTION INTRAVENOUS; SUBCUTANEOUS at 17:46

## 2018-12-02 RX ADMIN — PANTOPRAZOLE SODIUM 40 MG: 40 TABLET, DELAYED RELEASE ORAL at 09:14

## 2018-12-02 RX ADMIN — INSULIN LISPRO 3 UNITS: 100 INJECTION, SOLUTION INTRAVENOUS; SUBCUTANEOUS at 20:52

## 2018-12-02 RX ADMIN — HYDROMORPHONE HYDROCHLORIDE 0.5 MG: 1 INJECTION, SOLUTION INTRAMUSCULAR; INTRAVENOUS; SUBCUTANEOUS at 04:33

## 2018-12-02 RX ADMIN — HYDROMORPHONE HYDROCHLORIDE 0.5 MG: 1 INJECTION, SOLUTION INTRAMUSCULAR; INTRAVENOUS; SUBCUTANEOUS at 23:06

## 2018-12-02 RX ADMIN — DILTIAZEM HYDROCHLORIDE 120 MG: 120 CAPSULE, EXTENDED RELEASE ORAL at 19:30

## 2018-12-02 RX ADMIN — INSULIN DETEMIR 40 UNITS: 100 INJECTION, SOLUTION SUBCUTANEOUS at 09:15

## 2018-12-02 RX ADMIN — ERTAPENEM SODIUM 500 MG: 1 INJECTION, POWDER, LYOPHILIZED, FOR SOLUTION INTRAMUSCULAR; INTRAVENOUS at 19:29

## 2018-12-02 RX ADMIN — DOCUSATE SODIUM 100 MG: 100 CAPSULE, LIQUID FILLED ORAL at 09:14

## 2018-12-02 RX ADMIN — ONDANSETRON HYDROCHLORIDE 4 MG: 2 INJECTION, SOLUTION INTRAMUSCULAR; INTRAVENOUS at 19:32

## 2018-12-02 NOTE — PROGRESS NOTES
INFECTIOUS DISEASE PROGRESS NOTE    Tashia Leon  1955  [id number]    Date: 12/2/2018    Admission Date: 11/26/2018      CC: diabetic foot ulcer    History of present illness:    Patient is a 63 y.o. female with chronic kidney disease stage IV, diabetes type 2 since 1990s has had history of left foot MRSA osteomyelitis treated at  several years ago.  Patient has developed calluses on her right foot and saw [facility] who did some bedside debridement in the clinic.  Patient said increasing pain and some weeping from the wounds because of low-grade temperatures patient's returns to see her podiatrist recommended admission to hospital.  Patient was seen in the emergency room started on IV antibiotics including daptomycin and ertapenem.  She had MRI of the foot which was stopped prematurely by the patient's and was not a complete study.    Asked to address long-term treatment and duration of antibiotics.    Patient lives in [place].    Patient denies C. difficile colitis history of nausea vomiting or diarrhea difficulty breathing patient denies rash.    Patient says she is a PICC line before    11/28/18; no events overnight; no fever, rash, sore throat    11/29/18; no events overnight feels well; no complaints; no diarrhea, rash, sore throat    11/30/18: No new events. Denies f/c, sob, n/v/d, rashes or sore throat.     12/1/18; no events overnight; no fever, rash, sore throat    12/2/18: no new events or complaints.  Denies f/c, sob, n/v/d, rashes.     Past Medical History:   Diagnosis Date   • Acute bronchitis    • Acute pharyngitis    • Acute sinusitis    • Benign colonic polyp    • Edema    • GERD (gastroesophageal reflux disease)    • Hiatal hernia    • Hyperkalemia    • Hyperlipidemia    • Hypertension    • Low back pain    • Lumbar disc disease    • MRSA (methicillin resistant Staphylococcus aureus)     Osteomyelitis/methicillin-resistant Staphylococcus aureus of the left foot, April 2013.    • Obesity    • Osteomyelitis (CMS/[facility])     Osteomyelitis/methicillin-resistant Staphylococcus aureus of the left foot, 2013.   • Peptic ulceration 2013    History of peptic ulcer disease, diagnosed 2013 by EGD.  Repeat EGD in 2013 was negative.   • Sepsis (CMS/HCC)     Sepsis, 2013, hospitalized at [facility].   • Tobacco use     Previous tobacco use with cessation in .   • Type 2 diabetes mellitus (CMS/[facility])     Proteinuria noted, 2014.     • Vitamin D deficiency        Past Surgical History:   Procedure Laterality Date   •  SECTION      x2   • FOOT SURGERY Left     Incision and debridement of the left foot x2.   • LASIK     • TONSILLECTOMY         Family History   Problem Relation Age of Onset   • No Known Problems Mother    • No Known Problems Father    • Breast cancer Neg Hx    • Ovarian cancer Neg Hx        Social History     Socioeconomic History   • Marital status:      Spouse name: Not on file   • Number of children: Not on file   • Years of education: Not on file   • Highest education level: Not on file   Social Needs   • Financial resource strain: Not on file   • Food insecurity - worry: Not on file   • Food insecurity - inability: Not on file   • Transportation needs - medical: Not on file   • Transportation needs - non-medical: Not on file   Occupational History   • Not on file   Tobacco Use   • Smoking status: Former Smoker     Packs/day: 1.00     Years: 30.00     Pack years: 30.00     Types: Cigarettes   • Smokeless tobacco: Never Used   • Tobacco comment: quit 7 years ago   Substance and Sexual Activity   • Alcohol use: Yes     Alcohol/week: 0.6 oz     Types: 1 Glasses of wine per week     Comment: holidays   • Drug use: No   • Sexual activity: Defer   Other Topics Concern   • Not on file   Social History Narrative   • Not on file       Allergies   Allergen Reactions   • Statins Myalgia   • Ancef [Cefazolin]    •  Bactrim [Sulfamethoxazole-Trimethoprim] Nausea And Vomiting   • Cephalosporins    • Cleocin [Clindamycin Hcl]    • Clindamycin/Lincomycin    • Keflex [Cephalexin]    • Levaquin [Levofloxacin]    • Lipitor [Atorvastatin]    • Lisinopril    • Losartan Potassium Other (See Comments)     Unknown reaction   • Oxycodone    • Quinolones          Medication:    Current Facility-Administered Medications:   •  aspirin EC tablet 325 mg, 325 mg, Oral, Daily, Rosario Walter APRN, 325 mg at 12/02/18 0914  •  bisacodyl (DULCOLAX) EC tablet 10 mg, 10 mg, Oral, Daily PRN, Heavenly Medina APRN, 10 mg at 12/01/18 0949  •  clonazePAM (KlonoPIN) tablet 0.5 mg, 0.5 mg, Oral, BID PRN, Gonzalo Schmidt MD, 0.5 mg at 12/01/18 1619  •  DAPTOmycin (CUBICIN) 500 mg in sodium chloride 0.9 % 50 mL IVPB, 6 mg/kg (Adjusted), Intravenous, Q48H, Breana Chance MD, Last Rate: 100 mL/hr at 11/30/18 1731, 500 mg at 11/30/18 1731  •  dextrose (D50W) 25 g/ 50mL Intravenous Solution 25 g, 25 g, Intravenous, Q15 Min PRN, Rosario Walter, APRN  •  dextrose (GLUTOSE) oral gel 15 g, 15 g, Oral, Q15 Min PRN, Rosario Walter, APRN  •  diltiaZEM CD (CARDIZEM CD) 24 hr capsule 120 mg, 120 mg, Oral, Nightly, Rosario Walter APRN, 120 mg at 12/01/18 2001  •  docusate sodium (COLACE) capsule 100 mg, 100 mg, Oral, Daily, Heavenly Medina APRN, 100 mg at 12/02/18 0914  •  ertapenem (INVanz) 500 mg in sodium chloride 0.9 % 50 mL IVPB, 500 mg, Intravenous, Q24H, Rosario Walter APRN, Last Rate: 100 mL/hr at 12/01/18 1832, 500 mg at 12/01/18 1832  •  ferrous sulfate tablet 325 mg, 325 mg, Oral, BID With Meals, JeremyDannie MD, 325 mg at 11/1955  •  glucagon (human recombinant) (GLUCAGEN DIAGNOSTIC) injection 1 mg, 1 mg, Subcutaneous, PRN, Rosario Walter, APRN  •  heparin (porcine) 5000 UNIT/ML injection 5,000 Units, 5,000 Units, Subcutaneous, Q12H, Rosario Walter, KAREEM, 5,000 Units at 12/02/18  0913  •  HYDROcodone-acetaminophen (NORCO) 7.5-325 MG per tablet 1 tablet * Brand Name Norco--Patient Supplied Medication *, 1 tablet, Oral, TID, Gonzalo Schmidt MD, 1 tablet at 12/02/18 0915  •  HYDROmorphone (DILAUDID) injection 0.5 mg, 0.5 mg, Intravenous, Q3H PRN, 0.5 mg at 12/02/18 1447 **AND** naloxone (NARCAN) injection 0.4 mg, 0.4 mg, Intravenous, Q5 Min PRN, Rosario Walter APRN  •  HYDROmorphone (DILAUDID) injection 2 mg, 2 mg, Intravenous, Q2H PRN, Chio Sterling MD, 2 mg at 12/02/18 1059  •  insulin detemir (LEVEMIR) injection 40 Units, 40 Units, Subcutaneous, Q12H, Gonzalo Schmidt MD, 40 Units at 12/02/18 0915  •  insulin lispro (humaLOG) injection 0-14 Units, 0-14 Units, Subcutaneous, 4x Daily With Meals & Nightly, Rosario Walter APRN, 3 Units at 12/01/18 2117  •  mupirocin (BACTROBAN) 2 % cream, , Topical, Q24H, Chio Sterling MD, 1 application at 12/02/18 1109  •  Naloxegol Oxalate (MOVANTIK) tablet 12.5 mg, 12.5 mg, Oral, Once per day on Sun Wed, Rosario Walter APRN  •  ondansetron (ZOFRAN) tablet 4 mg, 4 mg, Oral, Q6H PRN, 4 mg at 11/30/18 2046 **OR** ondansetron (ZOFRAN) injection 4 mg, 4 mg, Intravenous, Q6H PRN, Rosario Walter APRN, 4 mg at 12/02/18 0438  •  pantoprazole (PROTONIX) EC tablet 40 mg, 40 mg, Oral, QAM, Rosario Walter APRN, 40 mg at 12/02/18 0914  •  [COMPLETED] Insert peripheral IV, , , Once **AND** sodium chloride 0.9 % flush 10 mL, 10 mL, Intravenous, PRN, Hiram Osborn MD  •  sodium chloride 0.9 % flush 3 mL, 3 mL, Intravenous, Q12H, Rosario Walter APRN, 3 mL at 12/01/18 2002  •  sodium chloride 0.9 % flush 3-10 mL, 3-10 mL, Intravenous, PRN, Rosario Walter APRN    Antibiotics:  Anti-Infectives (From admission, onward)    Ordered     Dose/Rate Route Frequency Start Stop    11/26/18 2136  DAPTOmycin (CUBICIN) 500 mg in sodium chloride 0.9 % 50 mL IVPB     Ordering Provider:  Breana Chance MD    6 mg/kg ×  79.2 kg (Adjusted)  100 mL/hr over 30 Minutes Intravenous Every 48 Hours 18 1800 18 1759    18 2132  ertapenem (INVanz) 500 mg in sodium chloride 0.9 % 50 mL IVPB     Ordering Provider:  Rosario Walter APRN    500 mg  100 mL/hr over 30 Minutes Intravenous Every 24 Hours 18 1800 18 1759    18 1616  ertapenem (INVanz) 500 mg in sodium chloride 0.9 % 50 mL IVPB     Ordering Provider:  Hiram Osborn MD    500 mg  100 mL/hr over 30 Minutes Intravenous Every 24 Hours 18 1618 18 0015    18 1616  DAPTOmycin (CUBICIN) 500 mg in sodium chloride 0.9 % 50 mL IVPB     Ordering Provider:  Hiram Osborn MD    500 mg  100 mL/hr over 30 Minutes Intravenous Every 24 Hours 18 1617 18 1935            Review of Systems:       Review of systems were reviewed and were unremarkable.  See hpi      Physical Exam:   Vital Signs  Temp (24hrs), Av.2 °F (36.8 °C), Min:98 °F (36.7 °C), Max:98.6 °F (37 °C)    Temp  Min: 98 °F (36.7 °C)  Max: 98.6 °F (37 °C)  BP  Min: 112/54  Max: 122/60  Pulse  Min: 74  Max: 78  Resp  Min: 14  Max: 16  SpO2  Min: 92 %  Max: 94 %    GENERAL: Awake and alert, in no acute distress.   HEENT: Normocephalic, atraumatic.  PERRL. EOMI. No conjunctival injection. No icterus. Oropharynx clear without evidence of thrush or exudate.   HEART: RRR; No murmur, rubs, gallops.   LUNGS: Clear to auscultation bilaterally without wheezing, rales, rhonchi.   ABDOMEN: Soft, nontender, nondistended. Positive bowel sounds.    EXT:  No cyanosis, clubbing or edema. No cord.  MSK: FROM without joint effusions noted arms/legs.  Right foot wrapped  SKIN: Warm and dry without cutaneous eruptions on Inspection/palpation.    NEURO: Oriented to PPT. No focal deficits on motor/sensory exam at arms/legs.  PSYCHIATRIC: Normal insight and judgement. Cooperative with PE    Laboratory Data    Results from last 7 days   Lab Units  18   0818   0509   11/29/18   1503   WBC 10*3/mm3  13.86*  15.18*  18.34*   HEMOGLOBIN g/dL  8.7*  9.3*  10.3*   HEMATOCRIT %  27.9*  29.4*  31.2*   PLATELETS 10*3/mm3  438  406  415     Results from last 7 days   Lab Units  12/01/18   0949   SODIUM mmol/L  133   POTASSIUM mmol/L  3.8   CHLORIDE mmol/L  95*   CO2 mmol/L  25.0   BUN mg/dL  87*   CREATININE mg/dL  4.38*   GLUCOSE mg/dL  150*   CALCIUM mg/dL  8.2*     Results from last 7 days   Lab Units  11/26/18   1759   ALK PHOS U/L  92   BILIRUBIN mg/dL  0.2*   ALT (SGPT) U/L  19   AST (SGOT) U/L  24     Results from last 7 days   Lab Units  11/26/18   1628   SED RATE mm/hr  103*     Results from last 7 days   Lab Units  11/26/18   1759   CRP mg/dL  17.25*     Results from last 7 days   Lab Units  11/26/18   1628   LACTATE mmol/L  2.0     Results from last 7 days   Lab Units  12/01/18   0949  11/27/18   1515   CK TOTAL U/L  121  146         Estimated Creatinine Clearance: 16.9 mL/min (A) (by C-G formula based on SCr of 4.38 mg/dL (H)).      Microbiology:  Microbiology Results Abnormal     Procedure Component Value - Date/Time    Eosinophil Smear - Urine, Urine, Clean Catch [[id number]]  (Normal) Collected:  12/01/18 1629    Lab Status:  Final result Specimen:  Urine, Clean Catch Updated:  12/01/18 1710     Eosinophil Smear 0 % EOS/100 Cells     Narrative:       No eosinophil seen    Blood Culture - Blood, Arm, Left [[id number]] Collected:  11/26/18 1625    Lab Status:  Final result Specimen:  Blood from Arm, Left Updated:  12/01/18 1645     Blood Culture No growth at 5 days    Blood Culture - Blood, Arm, Right [[id number]] Collected:  11/26/18 1620    Lab Status:  Final result Specimen:  Blood from Arm, Right Updated:  12/01/18 1645     Blood Culture No growth at 5 days                       Radiology:  Xr Foot 3+ View Right    Result Date: 11/28/2018  Possible plantar ulceration of the forefoot. No acute osseous abnormality is identified.  D:  11/28/2018 E:  11/28/2018  This report was  finalized on 11/28/2018 12:28 PM by Aniceto Deleon.      Mri Foot Right Without Contrast    Result Date: 11/29/2018  Exam terminated early by the patient. Limited scan. No evidence of osteomyelitis, or discrete abscess.  D:  11/26/2018 E:  11/27/2018    This report was finalized on 11/29/2018 12:04 PM by DR. Schuyler Fernando MD.      Doppler arterial:   Addenda:       · Moderate reduction in arterial flow in the right lower extremity. · Monophasic waveforms are present in the right lower extremity with   biphasic waveforms in the left lower extremity · Due to the patients inability to tolerate the study only RLE pressures   were obtained    Signed: 11/29/18 2249 by Gillian Becker MD   Narrative:     · Moderate reduction in arterial flow in the right lower extremity.   Normal-appearing waveforms.     Narrative:     · Left Conclusion: The left NELLI is normal.  · Right Conclusion: The right NELLI is normal.           Impression:   Diabetic foot ulcer on right foot  Leukocytosis with neutrophilia, improving  chronic kidney disease stage IV  Antibiotic intolerances to cefazolin, clindamycin, Levaquin  DM II  Esr > 100    PLAN/RECOMMENDATIONS:     I find it very difficult to interpret an MRI was not completed an MRI that was not given with gadolinium because of her chronic kidney disease.    Physical exam of foot not remarkable for  Osteomyelitis.  I still have some concern with ESr > 100.      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   If patient continues the ulcerations can lead to secondary infection and contiguous spread of infection to the bone;  Unclear is patient already has osteo; bone biopsy may be gold standard to determine    continue offloading     Offloading and using a knee scooter is probably the most important intervention currently     Cont dapto  Cont invanz    Patient is at increased risk for limb loss  Nephrology is opposed to picc  line    Hung Haynes MD saw and examined patient, verified hx and PE, read all radiographic studies, reviewed labs and micro data, and formulated dx, plan for treatment and all medical decision making.      ALYSSIA Bahena-C for Hung Haynes MD    I have seen and examined patient sand agree with above      ALYSSIA Bahena  12/2/2018  3:31 PM

## 2018-12-02 NOTE — PLAN OF CARE
"Problem: Pain, Acute (Adult)  Goal: Acceptable Pain Control/Comfort Level  PT rests in bed without sx/si of pain/acute distress at this time. Resp even/unlabored. Skin w/d to touch. PT requires IV pain medications q 3 to 4 hours to control the pain which PT describes \"sharp\" and \"constant\". More relaxed / cooperative with nursing care tonight. Cont to be non compliant with  NWB status. PT uses \"scooter\" to get around in  the room. Bed/pad alarms in place. VS WNL. Will cont to mx Ca   12/02/18 0338   Pain, Acute (Adult)   Acceptable Pain Control/Comfort Level making progress toward outcome   ll light in reach.      "

## 2018-12-02 NOTE — PLAN OF CARE
Problem: Patient Care Overview  Goal: Plan of Care Review  Outcome: Ongoing (interventions implemented as appropriate)   12/02/18 1611   Coping/Psychosocial   Plan of Care Reviewed With patient   OTHER   Outcome Summary Pt ambulated 20' x 2 using knee scooter with min A. Continues to have difficulty with turns. Unable to transfer on/off while maintaining NWB on RLE. Educated on sequencing of transfer to be able to transfer on/off scooter while maintaining precautions.

## 2018-12-02 NOTE — PROGRESS NOTES
"Orthopedic Foot/Ankle Progress Note    Subjective       Systemic or Specific Complaints: notes significant pain in right foot and leg, despite dense neuropathy, much less tender in calf and thigh today.  Reports she is \"doing the best that she can\" with non-wt bearing but the bottom of the dressing is dirty indicating weight bearing, and nurses report she is putting weight on the right foot.  I very clearly explained that she is at high risk for BKA or even AKa if the foot does not heal.    Objective     Vital signs in last 24 hours:  Temp:  [97.9 °F (36.6 °C)-98.6 °F (37 °C)] 98 °F (36.7 °C)  Heart Rate:  [73-78] 74  Resp:  [14-16] 16  BP: (104-122)/(53-60) 122/60  GEN- tearful, no change in trunkal obesity  PULM- unlabored breathing  CV- pulses not palpable in either foot  EXT- much less tender in right thigh and calf, not tender on left.  No edema in the legs    Neurovascular: No change in very dense neuropathy bilaterally   Wound: Dressing changed on right foot, the great toe is less dusky, some of it is blanchable, the erythema has retreated to primarily the great toe,  Much less on metatarsal, less swelling.  The plantar ulcer under the 1st metatarsal has necrotic tissue in the center, the ulcer under the 4th metatarsal does not.           Data Review  Lab Results (last 24 hours)     Procedure Component Value Units Date/Time    CBC (No Diff) [[id number]]  (Abnormal) Collected:  12/02/18 0802    Specimen:  Blood Updated:  12/02/18 0852     WBC 13.86 10*3/mm3      RBC 2.87 10*6/mm3      Hemoglobin 8.7 g/dL      Hematocrit 27.9 %      MCV 97.2 fL      MCH 30.3 pg      MCHC 31.2 g/dL      RDW 13.1 %      RDW-SD 46.0 fl      MPV 10.0 fL      Platelets 438 10*3/mm3     POC Glucose Once [[id number]]  (Normal) Collected:  12/02/18 0733    Specimen:  Blood Updated:  12/02/18 0735     Glucose 97 mg/dL     POC Glucose Once [[id number]]  (Abnormal) Collected:  12/01/18 2101    Specimen:  Blood Updated:  12/01/18 2112     " Glucose 153 mg/dL     Protein, Urine, Random - Urine, Clean Catch [[id number]]  (Abnormal) Collected:  12/01/18 1629    Specimen:  Urine, Clean Catch Updated:  12/01/18 2101     Total Protein, Urine 203.0 mg/dL     Sodium, Urine, Random - Urine, Clean Catch [[id number]]  (Normal) Collected:  12/01/18 1629    Specimen:  Urine, Clean Catch Updated:  12/01/18 2050     Sodium, Urine 43 mmol/L     Creatinine, Urine, Random - Urine, Clean Catch [[id number]] Collected:  12/01/18 1629    Specimen:  Urine, Clean Catch Updated:  12/01/18 2050     Creatinine, Urine 61.1 mg/dL     Urea Nitrogen, Urine - Urine, Clean Catch [[id number]] Collected:  12/01/18 1629    Specimen:  Urine, Clean Catch Updated:  12/01/18 2050     Urea Nitrogen, Urine 612 mg/dL     POC Glucose Once [[id number]]  (Abnormal) Collected:  12/01/18 1753    Specimen:  Blood Updated:  12/01/18 1758     Glucose 203 mg/dL     Eosinophil Smear - Urine, Urine, Clean Catch [[id number]]  (Normal) Collected:  12/01/18 1629    Specimen:  Urine, Clean Catch Updated:  12/01/18 1710     Eosinophil Smear 0 % EOS/100 Cells     Narrative:       No eosinophil seen    Blood Culture - Blood, Arm, Left [[id number]] Collected:  11/26/18 1625    Specimen:  Blood from Arm, Left Updated:  12/01/18 1645     Blood Culture No growth at 5 days    Blood Culture - Blood, Arm, Right [[id number]] Collected:  11/26/18 1620    Specimen:  Blood from Arm, Right Updated:  12/01/18 1645     Blood Culture No growth at 5 days    Urinalysis With Microscopic If Indicated (No Culture) - Urine, Clean Catch [[id number]]  (Abnormal) Collected:  12/01/18 1629    Specimen:  Urine, Clean Catch Updated:  12/01/18 1636     Color, UA Yellow     Appearance, UA Cloudy     pH, UA <=5.0     Specific Gravity, UA 1.018     Glucose,  mg/dL (1+)     Ketones, UA Negative     Bilirubin, UA Negative     Blood, UA Moderate (2+)     Protein, UA >=300 mg/dL (3+)     Leuk Esterase, UA Negative     Nitrite, UA Negative      "Urobilinogen, UA 0.2 E.U./dL    Urinalysis, Microscopic Only - Urine, Clean Catch [[id number]]  (Abnormal) Collected:  12/01/18 1629    Specimen:  Urine, Clean Catch Updated:  12/01/18 1636     RBC, UA 3-6 /HPF      WBC, UA 3-5 /HPF      Bacteria, UA None Seen /HPF      Squamous Epithelial Cells, UA 0-2 /HPF      Hyaline Casts, UA 7-12 /LPF      Methodology Automated Microscopy    CK [[id number]]  (Normal) Collected:  12/01/18 0949    Specimen:  Blood Updated:  12/01/18 1234     Creatine Kinase 121 U/L     Uric Acid [[id number]]  (Abnormal) Collected:  12/01/18 0949    Specimen:  Blood Updated:  12/01/18 1234     Uric Acid 9.9 mg/dL      Comment: Falsely depressed results may occur on samples drawn from patients receiving N-Acetylcysteine (NAC) or Metamizole.       POC Glucose Once [[id number]]  (Abnormal) Collected:  12/01/18 1202    Specimen:  Blood Updated:  12/01/18 1224     Glucose 147 mg/dL             Assessment/Plan -diabetic neuropathy, peripheral vascular disease, renal failure, cellulitis, necrosis, ulcers right foot, possible osteomyelitis,     Very difficult problem,  I do not think the bone is involved in the more lateral ulcer- under the 4th metatarsal.  I think the sesamoids, the great toe and possibly the 1st MTPjoint are involved with the infection and necrosis.  I cannot be certain until I can look at the tissue, the MRI was not completed.  Now that the erythema has improved, and the area involved is more demarcated, we can plan for surgical debridement.  My goal will be to remove all necrotic and infected tissue, but try to preserve the metatarsal if possible.  If she has removal of the metatarsal it will be a more dysfunctional foot and at higher risk for further amputation.  I explained this to her clearly.  She was tearful, reports that \"I do not want to have something removed every month\" I explained that I will try to do the most definitive procedure that I can.  However, much of the risk for " further surgery and amputation rests with her.  She must be compliant with complete non-wt bearing on the foot- or she can definitely expect further amputation.  I also discussed this with her son. Plan for surgery Tuesday, my next operating time. We discussed the risks including but not limited to: death, infection, neurovascular damage, strokes, heart attacks, blood clots, chronic pain, deformity, stiffness, need for  further surgery,  amputation, etc.  Questions asked and answered in detail.  We also discussed what would happen if we do nothing- I think the foot is unlikely to heal with the deep necrotic tissue present, it needs debridement.                 Chio Sterling MD    Date: 12/2/2018  Time: 11:21 AM

## 2018-12-02 NOTE — THERAPY TREATMENT NOTE
Acute Care - Physical Therapy Treatment Note  [facility]     Patient Name: Tashia Leon  : 1955  MRN: [id number]  Today's Date: 2018  Onset of Illness/Injury or Date of Surgery: 18  Date of Referral to PT: 18  Referring Physician: MD Schmidt    Admit Date: 2018    Visit Dx:    ICD-10-CM ICD-9-CM   1. Diabetic ulcer of other part of right foot associated with type 2 diabetes mellitus, unspecified ulcer stage (CMS/[facility]) E11.621 250.80    L97.519 707.15   2. Leukocytosis, unspecified type D72.829 288.60   3. Cellulitis of right foot L03.115 682.7   4. Chronic kidney disease, unspecified CKD stage N18.9 585.9   5. Impaired functional mobility, balance, gait, and endurance Z74.09 V49.89   6. Diabetic peripheral neuropathy (CMS/[facility]) E11.42 250.60     357.2   7. Diabetes mellitus type 2, uncontrolled, with complications (CMS/[facility]) E11.8 250.92    E11.65    8. Chronic kidney disease, stage V (CMS/[facility]) N18.5 585.5   9. Diabetic ulcer of right midfoot associated with type 2 diabetes mellitus, with necrosis of bone (CMS/[facility]) E11.621 250.80    L97.414 707.14   10. Cellulitis of toe of right foot L03.031 681.10   11. Obesity (BMI 30-39.9) E66.9 278.00   12. Chronic osteomyelitis of right foot with draining sinus (CMS/[facility]) M86.471 730.17     Patient Active Problem List   Diagnosis   • Coronary artery disease involving native coronary artery of native heart without angina pectoris   • Dyspnea   • Essential hypertension   • Hyperlipemia   • Lower extremity edema   • Carotid artery stenosis   • Carotid bruit   • Diabetes mellitus with neurological manifestations, uncontrolled (CMS/HCC)   • Diabetic peripheral neuropathy (CMS/HCC)   • Moderate nonproliferative diabetic retinopathy without macular edema associated with type 2 diabetes mellitus (CMS/HCC)   • Functional murmur   • GERD (gastroesophageal reflux disease)   • Peptic ulcer   • Vitamin D deficiency   • Health care maintenance   • Diabetes  mellitus type 2, uncontrolled, with complications (CMS/HCC)   • Hyperlipidemia   • Obesity   • Osteomyelitis (CMS/HCC)   • MRSA (methicillin resistant Staphylococcus aureus)   • Lumbar disc disease   • Hearing loss   • Hyperkalemia   • Secondary hyperparathyroidism (CMS/HCC)   • Idiopathic hypochromic anemia   • Chronic kidney disease, stage V (CMS/HCC)   • Diabetic ulcer of right foot associated with type 2 diabetes mellitus (CMS/HCC)   • Sepsis (CMS/HCC)   • Cellulitis   • Hypokalemia   • Anemia, chronic disease   • Obesity (BMI 30-39.9)       Therapy Treatment    Rehabilitation Treatment Summary     Row Name 12/02/18 1530             Treatment Time/Intention    Discipline  physical therapist  -ES      Document Type  therapy note (daily note)  -ES      Subjective Information  complains of;pain  -ES      Mode of Treatment  physical therapy  -ES      Patient/Family Observations  no family present  -ES      Therapy Frequency (PT Clinical Impression)  daily  -ES      Patient Effort  fair  -ES      Existing Precautions/Restrictions  non-weight bearing RLE  -ES      Treatment Considerations/Comments  non-compliant with NWB status  -ES      Recorded by [ES] Galina Wei, PT 12/02/18 1610      Row Name 12/02/18 1530             Vital Signs    Pre Systolic BP Rehab  -- VSS, RN consent to tx  -ES      Recorded by [ES] Galina Wei, PT 12/02/18 1610      Row Name 12/02/18 1530             Safety Issues, Functional Mobility    Safety Issues Affecting Function (Mobility)  awareness of need for assistance;insight into deficits/self awareness;safety precaution awareness;safety precautions follow-through/compliance  -ES      Recorded by [ES] Galina Wei, PT 12/02/18 1610      Row Name 12/02/18 1530             Bed Mobility Assessment/Treatment    Supine-Sit [place] (Bed Mobility)  supervision  -ES      Sit-Supine [place] (Bed Mobility)  supervision  -ES      Recorded by [ES] Galina Wei, PT 12/02/18 1610       Row Name 12/02/18 1530             Transfer Assessment/Treatment    Transfer Assessment/Treatment  sit-stand transfer;stand-sit transfer  -ES      Maintains Weight-bearing Status (Transfers)  verbal cues to maintain  -ES      Comment (Transfers)  constant cues to maintain NWB on R LE  -ES      Recorded by [ES] Galina Wei, PT 12/02/18 1610      Row Name 12/02/18 1530             Sit-Stand Transfer    Sit-Stand [place] (Transfers)  contact guard;verbal cues  -ES      Assistive Device (Sit-Stand Transfers)  walker, knee scooter  -ES      Recorded by [ES] Galina Wei, PT 12/02/18 1610      Row Name 12/02/18 1530             Stand-Sit Transfer    Stand-Sit [place] (Transfers)  contact guard pt unable to maintain NWB on R LE with transfers  -ES      Assistive Device (Stand-Sit Transfers)  walker, knee scooter  -ES      Recorded by [ES] Galina Wei, PT 12/02/18 1610      Row Name 12/02/18 1530             Gait/Stairs Assessment/Training    27561 - Gait Training Minutes   8  -ES      [place] Level (Gait)  minimum assist (75% patient effort)  -ES      Assistive Device (Gait)  walker, knee scooter  -ES      Distance in Feet (Gait)  20+20  -ES      Pattern (Gait)  step-to  -ES      Comment (Gait/Stairs)  pt has difficulty turning, educated pt on approaching surface so LLE is near surface to be able to maintain NWB status on RLE  -ES      Recorded by [ES] Galina Wei, PT 12/02/18 1610      Row Name 12/02/18 1530             Pain Scale: Numbers Pre/Post-Treatment    Pain Scale: Numbers, Pretreatment  6/10  -ES      Pain Scale: Numbers, Post-Treatment  6/10  -ES      Pain Location - Side  Right  -ES      Pain Location - Orientation  lower  -ES      Pain Location  extremity  -ES      Pain Intervention(s)  Repositioned  -ES      Recorded by [ES] Galina Wei, PT 12/02/18 1610      Row Name                Wound 11/26/18 2130 Right lateral plantar diabetic/neuropathic ulceration    Wound -  Properties Group Date first assessed: 11/26/18 [KF] Time first assessed: 2130 [KF] Side: Right [KF] Orientation: lateral [KF2] Location: plantar [KF] Type: diabetic/neuropathic ulceration [KF] Recorded by:  [KF] Courtney Buenrostro RN 11/27/18 0323 [KF2] Courtney Buenrostro RN 11/27/18 0325    Row Name                Wound 11/26/18 2130 Right medial plantar diabetic/neuropathic ulceration    Wound - Properties Group Date first assessed: 11/26/18 [KF] Time first assessed: 2130 [KF] Side: Right [KF] Orientation: medial [KF] Location: plantar [KF] Type: diabetic/neuropathic ulceration [KF] Recorded by:  [KF] Courtney Buenrostro RN 11/27/18 0325      User Key  (r) = Recorded By, (t) = Taken By, (c) = Cosigned By    Initials Name Effective Dates Discipline     Courtney Buenrostro RN 06/30/16 -  Nurse    Galina Pro, PT 11/13/17 -  PT          Wound 11/26/18 2130 Right lateral plantar diabetic/neuropathic ulceration (Active)   Dressing Appearance dry;intact 12/2/2018  2:47 PM   Closure VIOLA 12/2/2018 10:00 AM   Base dressing in place, unable to visualize 12/2/2018 10:00 AM   Drainage Amount none 12/2/2018  9:14 AM   Care, Wound other (see comments) 12/2/2018 10:59 AM   Dressing Care, Wound dressing changed;gauze;elastic bandage;other (see comments) 12/2/2018 10:59 AM       Wound 11/26/18 2130 Right medial plantar diabetic/neuropathic ulceration (Active)   Dressing Appearance dry;intact 12/2/2018  2:47 PM   Closure VIOLA 12/2/2018  2:47 PM   Base dressing in place, unable to visualize 12/2/2018  2:47 PM   Drainage Amount none 12/2/2018  2:47 PM   Dressing Care, Wound gauze 12/2/2018  5:59 AM           Physical Therapy Education     Title: PT OT SLP Therapies (In Progress)     Topic: Physical Therapy (In Progress)     Point: Mobility training (In Progress)     Learning Progress Summary           Patient Acceptance, E, NR by DANIELLE at 12/2/2018  4:11 PM    Comment:  Reviewed NWB status with pt and educated on importance of following  NWB on R LE.  Educated pt on safe transfers on/off knee scooter to be able to maintain NWB.    Acceptance, E,D, NR by ES at 12/1/2018  3:15 PM    Comment:  Educated pt and family on NWB status and importance of maintaining NWB status.  Reviewed sequencing with knee scooter and correct sequencing for transfers    Acceptance, E, NR by JACEY at 11/29/2018 10:15 AM   Family Acceptance, E,D, NR by ES at 12/1/2018  3:15 PM    Comment:  Educated pt and family on NWB status and importance of maintaining NWB status.  Reviewed sequencing with knee scooter and correct sequencing for transfers                   Point: Home exercise program (In Progress)     Learning Progress Summary           Patient Acceptance, E,D, NR by ES at 12/1/2018  3:15 PM    Comment:  Educated pt and family on NWB status and importance of maintaining NWB status.  Reviewed sequencing with knee scooter and correct sequencing for transfers    Acceptance, E, NR by JACEY at 11/29/2018 10:15 AM   Family Acceptance, E,D, NR by ES at 12/1/2018  3:15 PM    Comment:  Educated pt and family on NWB status and importance of maintaining NWB status.  Reviewed sequencing with knee scooter and correct sequencing for transfers                   Point: Body mechanics (In Progress)     Learning Progress Summary           Patient Acceptance, E, NR by ES at 12/2/2018  4:11 PM    Comment:  Reviewed NWB status with pt and educated on importance of following NWB on R LE.  Educated pt on safe transfers on/off knee scooter to be able to maintain NWB.    Acceptance, E,D, NR by ES at 12/1/2018  3:15 PM    Comment:  Educated pt and family on NWB status and importance of maintaining NWB status.  Reviewed sequencing with knee scooter and correct sequencing for transfers    Acceptance, E, VU by KG at 11/30/2018 11:46 PM    Acceptance, E, NR by JACEY at 11/29/2018 10:15 AM   Family Acceptance, E,D, NR by ES at 12/1/2018  3:15 PM    Comment:  Educated pt and family on NWB status and importance  of maintaining NWB status.  Reviewed sequencing with knee scooter and correct sequencing for transfers                   Point: Precautions (In Progress)     Learning Progress Summary           Patient Acceptance, E, NR by ES at 12/2/2018  4:11 PM    Comment:  Reviewed NWB status with pt and educated on importance of following NWB on R LE.  Educated pt on safe transfers on/off knee scooter to be able to maintain NWB.    Acceptance, E, VU by KG at 12/1/2018 11:14 PM    Acceptance, E,D, NR by ES at 12/1/2018  3:15 PM    Comment:  Educated pt and family on NWB status and importance of maintaining NWB status.  Reviewed sequencing with knee scooter and correct sequencing for transfers    Acceptance, E, NR by JACEY at 11/29/2018 10:15 AM   Family Acceptance, E,D, NR by ES at 12/1/2018  3:15 PM    Comment:  Educated pt and family on NWB status and importance of maintaining NWB status.  Reviewed sequencing with knee scooter and correct sequencing for transfers                               User Key     Initials Effective Dates Name Provider Type Discipline     06/19/15 -  Yany Cheek, PT Physical Therapist PT     06/16/16 -  Roseann Lazo, RN Registered Nurse Nurse     11/13/17 -  Galina Wei, PT Physical Therapist PT                PT Recommendation and Plan  Therapy Frequency (PT Clinical Impression): daily  Plan of Care Reviewed With: patient  Outcome Summary: Pt ambulated 20' x 2 using knee scooter with min A.  Continues to have difficulty with turns.  Unable to transfer on/off while maintaining NWB on RLE.  Educated on sequencing of transfer to be able to transfer on/off scooter while maintaining precautions.    Outcome Measures     Row Name 12/02/18 1530 12/01/18 1217          How much help from another person do you currently need...    Turning from your back to your side while in flat bed without using bedrails?  4  -ES  4  -ES     Moving from lying on back to sitting on the side of a flat bed  without bedrails?  4  -ES  4  -ES     Moving to and from a bed to a chair (including a wheelchair)?  3  -ES  3  -ES     Standing up from a chair using your arms (e.g., wheelchair, bedside chair)?  3  -ES  3  -ES     Climbing 3-5 steps with a railing?  2  -ES  2  -ES     To walk in hospital room?  3  -ES  3  -ES     AM-PAC 6 Clicks Score  19  -ES  19  -ES        Functional Assessment    Outcome Measure Options  AM-PAC 6 Clicks Basic Mobility (PT)  -ES  AM-PAC 6 Clicks Basic Mobility (PT)  -ES       User Key  (r) = Recorded By, (t) = Taken By, (c) = Cosigned By    Initials Name Provider Type    Galina Pro, PT Physical Therapist         Time Calculation:   PT Charges     Row Name 12/02/18 1530             Time Calculation    Start Time  1530  -ES      PT Received On  12/02/18  -ES      PT Goal Re-Cert Due Date  12/09/18  -ES         Timed Charges    74098 - Gait Training Minutes   8  -ES        User Key  (r) = Recorded By, (t) = Taken By, (c) = Cosigned By    Initials Name Provider Type    Galina Pro, PT Physical Therapist        Therapy Suggested Charges     Code   Minutes Charges    23777 (CPT®) Hc Pt Neuromusc Re Education Ea 15 Min      79433 (CPT®) Hc Pt Ther Proc Ea 15 Min      81837 (CPT®) Hc Gait Training Ea 15 Min 8 1    81323 (CPT®) Hc Pt Therapeutic Act Ea 15 Min      22504 (CPT®) Hc Pt Manual Therapy Ea 15 Min      57561 (CPT®) Hc Pt Iontophoresis Ea 15 Min      73781 (CPT®) Hc Pt Elec Stim Ea-Per 15 Min      47691 (CPT®) Hc Pt Ultrasound Ea 15 Min      10040 (CPT®) Hc Pt Self Care/Mgmt/Train Ea 15 Min      06012 (CPT®) Hc Pt Prosthetic (S) Train Initial Encounter, Each 15 Min      46217 (CPT®) Hc Pt Orthotic(S)/Prosthetic(S) Encounter, Each 15 Min      26338 (CPT®) Hc Orthotic(S) Mgmt/Train Initial Encounter, Each 15min      Total  8 1        Therapy Charges for Today     Code Description Service Date Service Provider Modifiers Qty    01982603015 HC GAIT TRAINING EA 15 MIN 12/1/2018  Galina Wei, PT GP 1    66929998813 HC GAIT TRAINING EA 15 MIN 12/2/2018 Galina Wei, PT GP 1          PT G-Codes  Outcome Measure Options: AM-PAC 6 Clicks Basic Mobility (PT)  AM-PAC 6 Clicks Score: 19    Galina Wei, KYM  12/2/2018

## 2018-12-02 NOTE — PROGRESS NOTES
[facility] Medicine Services  PROGRESS NOTE    Patient Name: Tashia Leon  : 1955  MRN: [id number]    Date of Admission: 2018  Length of Stay: 6  Primary Care Physician: Angelica Tran PA    Subjective   Subjective     CC:  Follow-up diabetic foot ulcer    HPI:  Ms. Leon was seen at 1:30 PM.  She was much less anxious today.  She felt the clonazepam had greatly helped with her anxiety crisis yesterday.  She says her pain is reasonably controlled.  She denies any oversedation with pain medications.  She says the redness in her toe has decreased.  She is agreeable to surgery on Tuesday and hoping that this will help her heal quickly.  No other new complaints.  Her son was present at the bedside.       Review of Systems  No current fevers or chills  No current shortness of breath or cough  No current nausea, vomiting, or diarrhea  No current chest pain or palpitations    Objective   Objective     Vital Signs:   Temp:  [97.9 °F (36.6 °C)-98.6 °F (37 °C)] 98 °F (36.7 °C)  Heart Rate:  [73-78] 74  Resp:  [14-16] 16  BP: (104-122)/(53-60) 122/60        Physical Exam:  Constitutional:Awake, alert  HENT: NCAT, mucous membranes moist, neck supple  Respiratory: Clear to auscultation bilaterally, respiratory effort normal, nonlabored breathing   Cardiovascular: RRR, S1 S2,  No bilateral lower extremity edema  Gastrointestinal: Positive bowel sounds, soft, nontender,  obese  Musculoskeletal: Normal musculature for age, BMI 39, near morbidly obese  Psychiatric: Appropriate affect, cooperative, conversational  Neurologic: No slurred speech or facial droop, follows commands, not confused   Skin: No rashes or jaundice, warm, right foot is currently covered with dressing CDI, great toe exposed with decreasing erythema, wound pictures reviewed from wound care nursing in the chart.        Results Reviewed:  I have personally reviewed current lab, radiology, and data and  agree.    Results from last 7 days   Lab Units  12/02/18   0802  11/30/18   0527  11/29/18   1503   11/26/18   1628   WBC 10*3/mm3  13.86*  15.18*  18.34*   < >  19.47*   HEMOGLOBIN g/dL  8.7*  9.3*  10.3*   < >  11.0*   HEMATOCRIT %  27.9*  29.4*  31.2*   < >  34.3*   PLATELETS 10*3/mm3  438  406  415   < >  417   INR    --    --    --    --   0.98    < > = values in this interval not displayed.     Results from last 7 days   Lab Units  12/01/18   0949  11/30/18   0527  11/29/18   0635   11/26/18   1759   SODIUM mmol/L  133  132  134   < >  137   POTASSIUM mmol/L  3.8  3.4*  3.3*   < >  3.2*   CHLORIDE mmol/L  95*  94*  96*   < >  99   CO2 mmol/L  25.0  24.0  24.0   < >  26.0   BUN mg/dL  87*  79*  82*   < >  74*   CREATININE mg/dL  4.38*  4.33*  4.29*   < >  3.79*   GLUCOSE mg/dL  150*  185*  120*   < >  137*   CALCIUM mg/dL  8.2*  8.2*  8.7   < >  9.8   ALT (SGPT) U/L   --    --    --    --   19   AST (SGOT) U/L   --    --    --    --   24    < > = values in this interval not displayed.     Estimated Creatinine Clearance: 16.9 mL/min (A) (by C-G formula based on SCr of 4.38 mg/dL (H)).  No results found for: BNP    Microbiology Results Abnormal     Procedure Component Value - Date/Time    Eosinophil Smear - Urine, Urine, Clean Catch [[id number]]  (Normal) Collected:  12/01/18 1629    Lab Status:  Final result Specimen:  Urine, Clean Catch Updated:  12/01/18 1710     Eosinophil Smear 0 % EOS/100 Cells     Narrative:       No eosinophil seen    Blood Culture - Blood, Arm, Left [[id number]] Collected:  11/26/18 1625    Lab Status:  Final result Specimen:  Blood from Arm, Left Updated:  12/01/18 1645     Blood Culture No growth at 5 days    Blood Culture - Blood, Arm, Right [[id number]] Collected:  11/26/18 1620    Lab Status:  Final result Specimen:  Blood from Arm, Right Updated:  12/01/18 1645     Blood Culture No growth at 5 days          Imaging Results (last 24 hours)     ** No results found for the last 24 hours.  **          I have reviewed the medications.      aspirin  mg Oral Daily   DAPTOmycin 6 mg/kg (Adjusted) Intravenous Q48H   diltiaZEM  mg Oral Nightly   docusate sodium 100 mg Oral Daily   ertapenem 500 mg Intravenous Q24H   ferrous sulfate 325 mg Oral BID With Meals   heparin (porcine) 5,000 Units Subcutaneous Q12H   HYDROcodone-acetaminophen 1 tablet Oral TID   insulin detemir 40 Units Subcutaneous Q12H   insulin lispro 0-14 Units Subcutaneous 4x Daily With Meals & Nightly   mupirocin  Topical Q24H   Naloxegol Oxalate 12.5 mg Oral Once per day on Sun Wed   pantoprazole 40 mg Oral QAM   sodium chloride 3 mL Intravenous Q12H         Assessment/Plan   Assessment / Plan     Active Hospital Problems    Diagnosis   • **Diabetic ulcer of right foot associated with type 2 diabetes mellitus (CMS/HCC)   • Sepsis (CMS/HCC)   • Cellulitis   • Hypokalemia   • Anemia, chronic disease   • Obesity (BMI 30-39.9)   • Chronic kidney disease, stage V (CMS/HCC)   • Hyperlipidemia   • Diabetes mellitus type 2, uncontrolled, with complications (CMS/HCC)     Proteinuria noted, January 2014.       • Diabetic peripheral neuropathy (CMS/HCC)   • Essential hypertension   • Coronary artery disease involving native coronary artery of native heart without angina pectoris     1. Coronary artery disease:  a. Abnormal cardiac PET, 10/02/2013, Dr. Becker, revealing a posterolateral defect with a mild wall motion abnormality and a normal LVEF.  b. Cardiac catheterization, 10/21/2013, with placement of a 2.5 x 15 mm Xience Expedition drug-eluting stent to the proximal circumflex.  LVEF of 55% to 60%.  Noncritical disease in the LAD and right coronary.  c. Echocardiogram, 12/18/2013, shows an LVEF 50% to 55% with trace MR and mild TR.               Brief Hospital Course to date:  Tashia Leno is a 63 y.o. female presents with infected diabetic foot ulceration and sepsis    Diagnosis list:  Sepsis, now resolved  Diabetic foot  ulcer, necrotic infection, cellulitis right foot  Type 2 diabetes mellitus with diabetic peripheral polyneuropathy  Stage V chronic kidney disease  Hypokalemia   near morbid obesity BMI 38  Essential hypertension  Coronary artery disease      Assessment & Plan:        1.  Sepsis, now resolved     2.  Diabetic foot ulcer with cellulitis right foot              -wound care received silver wound gel topical treatment, dressings              -Invanz and Dapto, ID recs              -BC x 2, currently negative              -pain control, monitor while on IV pain medicines              -cbc, bmp in the am            - Scooter to offload foot has been ordered and is at the bedside.  Patient has seen PT who taught her appropriate use but are concerned about her weakness and stability.     3.   Type 2 diabetes mellitus with diabetic peripheral neuropathy              -a1c 7.6              -levemir 40 units bid, continue               -continue correction insulin, eating less while admitted     4.  CKDV              -nephrology following,  renal function limits abx choices               -renal dysfunction is very advanced              - Creatinine trending up, diuretics have been held and patient has received some IV fluids                 5.  Hypokalemia              -replaced by nephrology as needed                6. Obesity BMI 38: Weight loss recommended     7.  HTN: Currently normotensive     8.  CAD: Asymptomatic, aspirin     9.  Diabetic peripheral neuropathy: Symptomatic treatment and supportive care     10.  Anemia of chronic disease              -cbc in the am and monitor    11.  Severe anxiety:  In the setting of severe anxiety while awaiting surgery plan to start low-dose clonazepam and adjust as needed.  Preferably could use BuSpar but advanced kidney disease prevent the use of this medication.  improved now.      12.  Debility: Difficulty walking with needed skater.  Therapy recommended rehabilitation facility  for appropriate care and treatment.  Patient so far has declined.    Also of note patient has not been completely compliant regarding nonweightbearing status of the foot.  I explained to her at great length that not adhering to recommended treatment plan can lead to worsening morbidity or mortality including leg amputation if her infection were to worsen.  She is now agreeable to stay nonweightbearing on her foot as recommended.    DVT Prophylaxis:  Heparin    CODE STATUS:   Code Status and Medical Interventions:   Ordered at: 11/26/18 2126     Level Of Support Discussed With:    Patient     Code Status:    CPR     Medical Interventions (Level of Support Prior to Arrest):    Full       Disposition: I expect the patient to be discharged possibly home as refusing recommended skilled facility after stable from surgery.        Electronically signed by Gonzalo Schmidt MD, 12/02/18, 1:45 PM.

## 2018-12-03 PROBLEM — L97.414 DIABETIC ULCER OF RIGHT MIDFOOT ASSOCIATED WITH TYPE 2 DIABETES MELLITUS, WITH NECROSIS OF BONE (HCC): Status: ACTIVE | Noted: 2018-11-26

## 2018-12-03 PROBLEM — Z74.09 IMPAIRED FUNCTIONAL MOBILITY, BALANCE, GAIT, AND ENDURANCE: Status: ACTIVE | Noted: 2018-11-26

## 2018-12-03 PROBLEM — M86.471 CHRONIC OSTEOMYELITIS OF RIGHT FOOT WITH DRAINING SINUS (HCC): Status: ACTIVE | Noted: 2018-11-26

## 2018-12-03 PROBLEM — L03.031 CELLULITIS OF TOE OF RIGHT FOOT: Status: ACTIVE | Noted: 2018-11-26

## 2018-12-03 PROBLEM — E11.621 DIABETIC ULCER OF RIGHT MIDFOOT ASSOCIATED WITH TYPE 2 DIABETES MELLITUS, WITH NECROSIS OF BONE (HCC): Status: ACTIVE | Noted: 2018-11-26

## 2018-12-03 LAB
ANION GAP SERPL CALCULATED.3IONS-SCNC: 12 MMOL/L (ref 3–11)
BUN BLD-MCNC: 66 MG/DL (ref 9–23)
BUN/CREAT SERPL: 16.9 (ref 7–25)
CALCIUM SPEC-SCNC: 8 MG/DL (ref 8.7–10.4)
CHLORIDE SERPL-SCNC: 102 MMOL/L (ref 99–109)
CO2 SERPL-SCNC: 24 MMOL/L (ref 20–31)
CREAT BLD-MCNC: 3.91 MG/DL (ref 0.6–1.3)
DEPRECATED RDW RBC AUTO: 46.6 FL (ref 37–54)
ERYTHROCYTE [DISTWIDTH] IN BLOOD BY AUTOMATED COUNT: 13.1 % (ref 11.3–14.5)
GFR SERPL CREATININE-BSD FRML MDRD: 12 ML/MIN/1.73
GLUCOSE BLD-MCNC: 173 MG/DL (ref 70–100)
GLUCOSE BLDC GLUCOMTR-MCNC: 116 MG/DL (ref 70–130)
GLUCOSE BLDC GLUCOMTR-MCNC: 198 MG/DL (ref 70–130)
GLUCOSE BLDC GLUCOMTR-MCNC: 231 MG/DL (ref 70–130)
GLUCOSE BLDC GLUCOMTR-MCNC: 308 MG/DL (ref 70–130)
HCT VFR BLD AUTO: 26.9 % (ref 34.5–44)
HGB BLD-MCNC: 8.3 G/DL (ref 11.5–15.5)
MCH RBC QN AUTO: 30.2 PG (ref 27–31)
MCHC RBC AUTO-ENTMCNC: 30.9 G/DL (ref 32–36)
MCV RBC AUTO: 97.8 FL (ref 80–99)
PLATELET # BLD AUTO: 435 10*3/MM3 (ref 150–450)
PMV BLD AUTO: 10 FL (ref 6–12)
POTASSIUM BLD-SCNC: 3.6 MMOL/L (ref 3.5–5.5)
RBC # BLD AUTO: 2.75 10*6/MM3 (ref 3.89–5.14)
SODIUM BLD-SCNC: 138 MMOL/L (ref 132–146)
WBC NRBC COR # BLD: 12.24 10*3/MM3 (ref 3.5–10.8)

## 2018-12-03 PROCEDURE — 25010000002 ONDANSETRON PER 1 MG: Performed by: NURSE PRACTITIONER

## 2018-12-03 PROCEDURE — 99232 SBSQ HOSP IP/OBS MODERATE 35: CPT | Performed by: INTERNAL MEDICINE

## 2018-12-03 PROCEDURE — 25010000002 HYDROMORPHONE PER 4 MG: Performed by: NURSE PRACTITIONER

## 2018-12-03 PROCEDURE — 85027 COMPLETE CBC AUTOMATED: CPT | Performed by: INTERNAL MEDICINE

## 2018-12-03 PROCEDURE — 25010000002 ERTAPENEM PER 500 MG: Performed by: PHYSICIAN ASSISTANT

## 2018-12-03 PROCEDURE — 25010000002 HEPARIN (PORCINE) PER 1000 UNITS: Performed by: NURSE PRACTITIONER

## 2018-12-03 PROCEDURE — 25010000002 NA FERRIC GLUC CPLX PER 12.5 MG: Performed by: INTERNAL MEDICINE

## 2018-12-03 PROCEDURE — 63710000001 INSULIN DETEMIR PER 5 UNITS: Performed by: INTERNAL MEDICINE

## 2018-12-03 PROCEDURE — 82962 GLUCOSE BLOOD TEST: CPT

## 2018-12-03 PROCEDURE — 80048 BASIC METABOLIC PNL TOTAL CA: CPT | Performed by: INTERNAL MEDICINE

## 2018-12-03 RX ORDER — HYDROCODONE BITARTRATE AND ACETAMINOPHEN 7.5; 325 MG/1; MG/1
1-2 TABLET ORAL EVERY 6 HOURS PRN
Qty: 60 TABLET | Refills: 0 | OUTPATIENT
Start: 2018-12-03 | End: 2018-12-13 | Stop reason: HOSPADM

## 2018-12-03 RX ORDER — OXYCODONE HYDROCHLORIDE AND ACETAMINOPHEN 5; 325 MG/1; MG/1
1-2 TABLET ORAL EVERY 6 HOURS PRN
Qty: 60 TABLET | Refills: 0 | OUTPATIENT
Start: 2018-12-03

## 2018-12-03 RX ORDER — CASTOR OIL AND BALSAM, PERU 788; 87 MG/G; MG/G
OINTMENT TOPICAL EVERY 12 HOURS SCHEDULED
Status: DISCONTINUED | OUTPATIENT
Start: 2018-12-03 | End: 2018-12-13 | Stop reason: HOSPADM

## 2018-12-03 RX ORDER — ONDANSETRON 4 MG/1
4 TABLET, FILM COATED ORAL EVERY 6 HOURS PRN
Qty: 30 TABLET | Refills: 0 | OUTPATIENT
Start: 2018-12-03 | End: 2018-12-13 | Stop reason: HOSPADM

## 2018-12-03 RX ADMIN — ERTAPENEM SODIUM 500 MG: 1 INJECTION, POWDER, LYOPHILIZED, FOR SOLUTION INTRAMUSCULAR; INTRAVENOUS at 18:33

## 2018-12-03 RX ADMIN — INSULIN DETEMIR 40 UNITS: 100 INJECTION, SOLUTION SUBCUTANEOUS at 20:25

## 2018-12-03 RX ADMIN — HYDROMORPHONE HYDROCHLORIDE 0.5 MG: 1 INJECTION, SOLUTION INTRAMUSCULAR; INTRAVENOUS; SUBCUTANEOUS at 17:24

## 2018-12-03 RX ADMIN — INSULIN LISPRO 5 UNITS: 100 INJECTION, SOLUTION INTRAVENOUS; SUBCUTANEOUS at 20:26

## 2018-12-03 RX ADMIN — HYDROCODONE BITARTRATE AND ACETAMINOPHEN 1 TABLET: 7.5; 325 TABLET ORAL at 21:40

## 2018-12-03 RX ADMIN — INSULIN LISPRO 10 UNITS: 100 INJECTION, SOLUTION INTRAVENOUS; SUBCUTANEOUS at 13:09

## 2018-12-03 RX ADMIN — CASTOR OIL AND BALSAM, PERU: 788; 87 OINTMENT TOPICAL at 21:32

## 2018-12-03 RX ADMIN — HEPARIN SODIUM 5000 UNITS: 5000 INJECTION, SOLUTION INTRAVENOUS; SUBCUTANEOUS at 20:25

## 2018-12-03 RX ADMIN — CLONAZEPAM 0.5 MG: 0.5 TABLET ORAL at 17:25

## 2018-12-03 RX ADMIN — INSULIN LISPRO 3 UNITS: 100 INJECTION, SOLUTION INTRAVENOUS; SUBCUTANEOUS at 10:41

## 2018-12-03 RX ADMIN — PANTOPRAZOLE SODIUM 40 MG: 40 TABLET, DELAYED RELEASE ORAL at 10:40

## 2018-12-03 RX ADMIN — HYDROMORPHONE HYDROCHLORIDE 0.5 MG: 1 INJECTION, SOLUTION INTRAMUSCULAR; INTRAVENOUS; SUBCUTANEOUS at 20:27

## 2018-12-03 RX ADMIN — DOCUSATE SODIUM 100 MG: 100 CAPSULE, LIQUID FILLED ORAL at 10:40

## 2018-12-03 RX ADMIN — CASTOR OIL AND BALSAM, PERU: 788; 87 OINTMENT TOPICAL at 10:41

## 2018-12-03 RX ADMIN — HYDROMORPHONE HYDROCHLORIDE 0.5 MG: 1 INJECTION, SOLUTION INTRAMUSCULAR; INTRAVENOUS; SUBCUTANEOUS at 23:58

## 2018-12-03 RX ADMIN — INSULIN DETEMIR 40 UNITS: 100 INJECTION, SOLUTION SUBCUTANEOUS at 10:41

## 2018-12-03 RX ADMIN — HYDROMORPHONE HYDROCHLORIDE 0.5 MG: 1 INJECTION, SOLUTION INTRAMUSCULAR; INTRAVENOUS; SUBCUTANEOUS at 13:57

## 2018-12-03 RX ADMIN — DILTIAZEM HYDROCHLORIDE 120 MG: 120 CAPSULE, EXTENDED RELEASE ORAL at 20:25

## 2018-12-03 RX ADMIN — SODIUM CHLORIDE 125 MG: 9 INJECTION, SOLUTION INTRAVENOUS at 16:34

## 2018-12-03 RX ADMIN — MUPIROCIN: 2 CREAM TOPICAL at 16:33

## 2018-12-03 RX ADMIN — HEPARIN SODIUM 5000 UNITS: 5000 INJECTION, SOLUTION INTRAVENOUS; SUBCUTANEOUS at 10:40

## 2018-12-03 RX ADMIN — HYDROMORPHONE HYDROCHLORIDE 0.5 MG: 1 INJECTION, SOLUTION INTRAMUSCULAR; INTRAVENOUS; SUBCUTANEOUS at 06:56

## 2018-12-03 RX ADMIN — SODIUM CHLORIDE, PRESERVATIVE FREE 3 ML: 5 INJECTION INTRAVENOUS at 10:54

## 2018-12-03 RX ADMIN — ONDANSETRON HYDROCHLORIDE 4 MG: 2 INJECTION, SOLUTION INTRAMUSCULAR; INTRAVENOUS at 18:33

## 2018-12-03 RX ADMIN — HYDROMORPHONE HYDROCHLORIDE 0.5 MG: 1 INJECTION, SOLUTION INTRAMUSCULAR; INTRAVENOUS; SUBCUTANEOUS at 10:53

## 2018-12-03 NOTE — PROGRESS NOTES
"   LOS: 7 days    Patient Care Team:  Angelica Tran PA as PCP - General (Internal Medicine)  Denver Baldwin MD as Consulting Physician (Nephrology)    Chief Complaint:  Diabetic foot ulcer    Subjective     Interval History:     No acute events overnight. No new complaints.     Review of Systems:   NO CP Or SOA. NO Abdominal discomfort.     Objective     Vital Sign Min/Max for last 24 hours  Temp  Min: 98.3 °F (36.8 °C)  Max: 99.1 °F (37.3 °C)   BP  Min: 95/46  Max: 133/55   Pulse  Min: 73  Max: 81   Resp  Min: 16  Max: 18   SpO2  Min: 93 %  Max: 98 %   No Data Recorded   No Data Recorded     Flowsheet Rows      First Filed Value   Admission Height  167.6 cm (66\") Documented at 11/26/2018 1315   Admission Weight  109 kg (240 lb) Documented at 11/26/2018 1315          No intake/output data recorded.  I/O last 3 completed shifts:  In: -   Out: 50 [Urine:50]    Physical Exam:     General Appearance:    Alert, cooperative, in no acute distress   Head:    Normocephalic, without obvious abnormality, atraumatic               Neck:   No adenopathy, supple, trachea midline, no thyromegaly, no     carotid bruit, no JVD       Lungs:     Clear to auscultation,respirations regular, even and                   unlabored    Heart:    Regular rhythm and normal rate, normal S1 and S2, no            murmur, no gallop, no rub, no click       Abdomen:     Normal bowel sounds, no masses, no organomegaly, soft        non-tender, non-distended, no guarding, no rebound                 tenderness       Extremities:  Ulcer of the right foot with erythema noted. Trace edema.                Neurologic:   No focal deficit noted.        WBC WBC   Date Value Ref Range Status   12/03/2018 12.24 (H) 3.50 - 10.80 10*3/mm3 Final   12/02/2018 13.86 (H) 3.50 - 10.80 10*3/mm3 Final      HGB Hemoglobin   Date Value Ref Range Status   12/03/2018 8.3 (L) 11.5 - 15.5 g/dL Final   12/02/2018 8.7 (L) 11.5 - 15.5 g/dL Final      HCT Hematocrit "   Date Value Ref Range Status   12/03/2018 26.9 (L) 34.5 - 44.0 % Final   12/02/2018 27.9 (L) 34.5 - 44.0 % Final      Platlets No results found for: LABPLAT   MCV MCV   Date Value Ref Range Status   12/03/2018 97.8 80.0 - 99.0 fL Final   12/02/2018 97.2 80.0 - 99.0 fL Final          Sodium Sodium   Date Value Ref Range Status   12/03/2018 138 132 - 146 mmol/L Final   12/01/2018 133 132 - 146 mmol/L Final      Potassium Potassium   Date Value Ref Range Status   12/03/2018 3.6 3.5 - 5.5 mmol/L Final   12/01/2018 3.8 3.5 - 5.5 mmol/L Final      Chloride Chloride   Date Value Ref Range Status   12/03/2018 102 99 - 109 mmol/L Final   12/01/2018 95 (L) 99 - 109 mmol/L Final      CO2 CO2   Date Value Ref Range Status   12/03/2018 24.0 20.0 - 31.0 mmol/L Final   12/01/2018 25.0 20.0 - 31.0 mmol/L Final      BUN BUN   Date Value Ref Range Status   12/03/2018 66 (H) 9 - 23 mg/dL Final   12/01/2018 87 (H) 9 - 23 mg/dL Final      Creatinine Creatinine   Date Value Ref Range Status   12/03/2018 3.91 (H) 0.60 - 1.30 mg/dL Final   12/01/2018 4.38 (H) 0.60 - 1.30 mg/dL Final      Calcium Calcium   Date Value Ref Range Status   12/03/2018 8.0 (L) 8.7 - 10.4 mg/dL Final   12/01/2018 8.2 (L) 8.7 - 10.4 mg/dL Final      PO4 No results found for: CAPO4   Albumin No results found for: ALBUMIN   Magnesium No results found for: MG   Uric Acid Uric Acid   Date Value Ref Range Status   12/01/2018 9.9 (H) 3.1 - 7.8 mg/dL Final     Comment:     Falsely depressed results may occur on samples drawn from patients receiving N-Acetylcysteine (NAC) or Metamizole.           Results Review:     I reviewed the patient's new clinical results.      aspirin  mg Oral Daily   castor oil-balsam peru  Topical Q12H   [START ON 12/4/2018] DAPTOmycin 6 mg/kg (Adjusted) Intravenous Q48H   diltiaZEM  mg Oral Nightly   docusate sodium 100 mg Oral Daily   ertapenem 500 mg Intravenous Q24H   ferrous sulfate 325 mg Oral BID With Meals   heparin (porcine)  5,000 Units Subcutaneous Q12H   HYDROcodone-acetaminophen 1 tablet Oral TID   insulin detemir 40 Units Subcutaneous Q12H   insulin lispro 0-14 Units Subcutaneous 4x Daily With Meals & Nightly   mupirocin  Topical Q24H   Naloxegol Oxalate 12.5 mg Oral Once per day on Sun Wed   pantoprazole 40 mg Oral QAM   sodium chloride 3 mL Intravenous Q12H          Medication Review:     Assessment/Plan       Diabetic ulcer of right foot associated with type 2 diabetes mellitus (CMS/HCC)    Coronary artery disease involving native coronary artery of native heart without angina pectoris    Essential hypertension    Diabetic peripheral neuropathy (CMS/HCC)    Diabetes mellitus type 2, uncontrolled, with complications (CMS/HCC)    Hyperlipidemia    Chronic kidney disease, stage V (CMS/HCC)    Sepsis (CMS/HCC)    Cellulitis    Hypokalemia    Anemia, chronic disease    Obesity (BMI 30-39.9)    Impaired functional mobility, balance, gait, and endurance    Diabetic ulcer of right midfoot associated with type 2 diabetes mellitus, with necrosis of bone (CMS/HCC)    Cellulitis of toe of right foot    Chronic osteomyelitis of right foot with draining sinus (CMS/HCC)      1- CKD stage IV - DN likely - Scr 3.5 to 4.0 range at baseline.UOP is adequate. Scr 3.9 - improved.   2- HTN - stable.   3- Proteinuria -DN -UPCR 3.1  4- Anemia of chronic disease - stable.   5- Diabetic foot ulcer.   6- hypokalemia   7- Iron def.      Plan:  - Continue with current regimen.   - Monitor I/o   - Avoid nephrotoxic agents.       I discussed the patients findings and my recommendations with patient          Dannie Luan MD  12/03/18  12:36 PM

## 2018-12-03 NOTE — PLAN OF CARE
"Problem: Patient Care Overview  Goal: Plan of Care Review  PT agrees to stand pivot to the BSC. A&Ox4. Mood/affect appropriate. Voids spontaneously without difficulty. Cont to c/o \"sharp\" \"constant\" pain to the right ft. Dressings reinforced. VS WNL. Will cont to mx. Call light in reach.   12/03/18 0219   Coping/Psychosocial   Plan of Care Reviewed With patient   Plan of Care Review   Progress improving         "

## 2018-12-03 NOTE — PROGRESS NOTES
INFECTIOUS DISEASE PROGRESS NOTE    Tashia Leon  1955  [id number]    Date: 12/3/2018    Admission Date: 11/26/2018      CC: diabetic foot ulcer    History of present illness:    Patient is a 63 y.o. female with chronic kidney disease stage IV, diabetes type 2 since 1990s has had history of left foot MRSA osteomyelitis treated at  several years ago.  Patient has developed calluses on her right foot and saw [facility] who did some bedside debridement in the clinic.  Patient said increasing pain and some weeping from the wounds because of low-grade temperatures patient's returns to see her podiatrist recommended admission to hospital.  Patient was seen in the emergency room started on IV antibiotics including daptomycin and ertapenem.  She had MRI of the foot which was stopped prematurely by the patient's and was not a complete study.    Asked to address long-term treatment and duration of antibiotics.    Patient lives in [place].    Patient denies C. difficile colitis history of nausea vomiting or diarrhea difficulty breathing patient denies rash.    Patient says she is a PICC line before    11/28/18; no events overnight; no fever, rash, sore throat    11/29/18; no events overnight feels well; no complaints; no diarrhea, rash, sore throat    11/30/18: No new events. Denies f/c, sob, n/v/d, rashes or sore throat.     12/1/18; no events overnight; no fever, rash, sore throat    12/2/18: no new events or complaints.  Denies f/c, sob, n/v/d, rashes.   12/3/18: No new events or complaints.  Foot without pain.  No schedule for dressing changes.  Denies f/c, sob, n/v/d, rashes.     Past Medical History:   Diagnosis Date   • Acute bronchitis    • Acute pharyngitis    • Acute sinusitis    • Benign colonic polyp    • Edema    • GERD (gastroesophageal reflux disease)    • Hiatal hernia    • Hyperkalemia    • Hyperlipidemia    • Hypertension    • Low back pain    • Lumbar disc disease    • MRSA  (methicillin resistant Staphylococcus aureus)     Osteomyelitis/methicillin-resistant Staphylococcus aureus of the left foot, 2013.   • Obesity    • Osteomyelitis (CMS/HCC)     Osteomyelitis/methicillin-resistant Staphylococcus aureus of the left foot, 2013.   • Peptic ulceration 2013    History of peptic ulcer disease, diagnosed 2013 by EGD.  Repeat EGD in 2013 was negative.   • Sepsis (CMS/[facility])     Sepsis, 2013, hospitalized at [facility].   • Tobacco use     Previous tobacco use with cessation in .   • Type 2 diabetes mellitus (CMS/[facility])     Proteinuria noted, 2014.     • Vitamin D deficiency        Past Surgical History:   Procedure Laterality Date   •  SECTION      x2   • FOOT SURGERY Left     Incision and debridement of the left foot x2.   • LASIK     • TONSILLECTOMY         Family History   Problem Relation Age of Onset   • No Known Problems Mother    • No Known Problems Father    • Breast cancer Neg Hx    • Ovarian cancer Neg Hx        Social History     Socioeconomic History   • Marital status:      Spouse name: Not on file   • Number of children: Not on file   • Years of education: Not on file   • Highest education level: Not on file   Social Needs   • Financial resource strain: Not on file   • Food insecurity - worry: Not on file   • Food insecurity - inability: Not on file   • Transportation needs - medical: Not on file   • Transportation needs - non-medical: Not on file   Occupational History   • Not on file   Tobacco Use   • Smoking status: Former Smoker     Packs/day: 1.00     Years: 30.00     Pack years: 30.00     Types: Cigarettes   • Smokeless tobacco: Never Used   • Tobacco comment: quit 7 years ago   Substance and Sexual Activity   • Alcohol use: Yes     Alcohol/week: 0.6 oz     Types: 1 Glasses of wine per week     Comment: holidays   • Drug use: No   • Sexual activity: Defer   Other Topics Concern   • Not  on file   Social History Narrative   • Not on file       Allergies   Allergen Reactions   • Statins Myalgia   • Ancef [Cefazolin]    • Bactrim [Sulfamethoxazole-Trimethoprim] Nausea And Vomiting   • Cephalosporins    • Cleocin [Clindamycin Hcl]    • Clindamycin/Lincomycin    • Keflex [Cephalexin]    • Levaquin [Levofloxacin]    • Lipitor [Atorvastatin]    • Lisinopril    • Losartan Potassium Other (See Comments)     Unknown reaction   • Oxycodone    • Quinolones          Medication:    Current Facility-Administered Medications:   •  aspirin EC tablet 325 mg, 325 mg, Oral, Daily, Rosario Walter APRN, 325 mg at 12/02/18 0914  •  bisacodyl (DULCOLAX) EC tablet 10 mg, 10 mg, Oral, Daily PRN, Heavenly Medina APRN, 10 mg at 12/01/18 0949  •  clonazePAM (KlonoPIN) tablet 0.5 mg, 0.5 mg, Oral, BID PRN, Gonzalo Schmidt MD, 0.5 mg at 12/02/18 2306  •  dextrose (D50W) 25 g/ 50mL Intravenous Solution 25 g, 25 g, Intravenous, Q15 Min PRN, Rosario Walter APRN  •  dextrose (GLUTOSE) oral gel 15 g, 15 g, Oral, Q15 Min PRN, Rosario Walter APRN  •  diltiaZEM CD (CARDIZEM CD) 24 hr capsule 120 mg, 120 mg, Oral, Nightly, Rosario Walter APRN, 120 mg at 12/02/18 1930  •  docusate sodium (COLACE) capsule 100 mg, 100 mg, Oral, Daily, Heavenly Medina APRN, 100 mg at 12/02/18 0914  •  ertapenem (INVanz) 500 mg in sodium chloride 0.9 % 50 mL IVPB, 500 mg, Intravenous, Q24H, Rosario Walter APRN, Last Rate: 100 mL/hr at 12/02/18 1929, 500 mg at 12/02/18 1929  •  ferrous sulfate tablet 325 mg, 325 mg, Oral, BID With Meals, Jeremy, Dannie Armstrong MD, 325 mg at 11/1955  •  glucagon (human recombinant) (GLUCAGEN DIAGNOSTIC) injection 1 mg, 1 mg, Subcutaneous, PRN, Rosario Walter, APRN  •  heparin (porcine) 5000 UNIT/ML injection 5,000 Units, 5,000 Units, Subcutaneous, Q12H, Rosario Walter, KAREEM, 5,000 Units at 12/02/18 1929  •  HYDROcodone-acetaminophen (NORCO) 7.5-325 MG per  tablet 1 tablet * Brand Name Norco--Patient Supplied Medication *, 1 tablet, Oral, TID, Gonzalo Schmidt MD, 1 tablet at 12/02/18 1600  •  HYDROmorphone (DILAUDID) injection 0.5 mg, 0.5 mg, Intravenous, Q3H PRN, 0.5 mg at 12/03/18 0656 **AND** naloxone (NARCAN) injection 0.4 mg, 0.4 mg, Intravenous, Q5 Min PRN, Rosario Walter APRN  •  HYDROmorphone (DILAUDID) injection 2 mg, 2 mg, Intravenous, Q2H PRN, Chio Sterling MD, 2 mg at 12/02/18 1059  •  insulin detemir (LEVEMIR) injection 40 Units, 40 Units, Subcutaneous, Q12H, Gonzalo Schmidt MD, 40 Units at 12/02/18 2051  •  insulin lispro (humaLOG) injection 0-14 Units, 0-14 Units, Subcutaneous, 4x Daily With Meals & Nightly, Rosario Walter APRN, 3 Units at 12/02/18 2052  •  mupirocin (BACTROBAN) 2 % cream, , Topical, Q24H, Chio Sterling MD, 1 application at 12/02/18 1109  •  Naloxegol Oxalate (MOVANTIK) tablet 12.5 mg, 12.5 mg, Oral, Once per day on Sun Wed, Rosario Walter APRN  •  ondansetron (ZOFRAN) tablet 4 mg, 4 mg, Oral, Q6H PRN, 4 mg at 11/30/18 2046 **OR** ondansetron (ZOFRAN) injection 4 mg, 4 mg, Intravenous, Q6H PRN, Rosario Walter APRN, 4 mg at 12/02/18 1932  •  pantoprazole (PROTONIX) EC tablet 40 mg, 40 mg, Oral, QAM, Rosario Walter APRN, 40 mg at 12/02/18 0914  •  [COMPLETED] Insert peripheral IV, , , Once **AND** sodium chloride 0.9 % flush 10 mL, 10 mL, Intravenous, PRN, Hiram Osborn MD  •  sodium chloride 0.9 % flush 3 mL, 3 mL, Intravenous, Q12H, Rosario Walter, APRN, 3 mL at 12/02/18 1928  •  sodium chloride 0.9 % flush 3-10 mL, 3-10 mL, Intravenous, PRN, Rosario Walter, APRN    Antibiotics:  Anti-Infectives (From admission, onward)    Ordered     Dose/Rate Route Frequency Start Stop    11/26/18 2136  DAPTOmycin (CUBICIN) 500 mg in sodium chloride 0.9 % 50 mL IVPB     Ordering Provider:  Breana Chance MD    6 mg/kg × 79.2 kg (Adjusted)  100 mL/hr over 30 Minutes Intravenous  Every 48 Hours 18 1800 18 19518 2132  ertapenem (INVanz) 500 mg in sodium chloride 0.9 % 50 mL IVPB     Ordering Provider:  Rosario Walter APRN    500 mg  100 mL/hr over 30 Minutes Intravenous Every 24 Hours 18 1800 18 1759    18 1616  ertapenem (INVanz) 500 mg in sodium chloride 0.9 % 50 mL IVPB     Ordering Provider:  Hiram Osborn MD    500 mg  100 mL/hr over 30 Minutes Intravenous Every 24 Hours 18 1618 18 0015    18 1616  DAPTOmycin (CUBICIN) 500 mg in sodium chloride 0.9 % 50 mL IVPB     Ordering Provider:  Hiram Osborn MD    500 mg  100 mL/hr over 30 Minutes Intravenous Every 24 Hours 18 1617 18 1935            Review of Systems:       Review of systems were reviewed and were unremarkable.  See hpi      Physical Exam:   Vital Signs  Temp (24hrs), Av.5 °F (36.9 °C), Min:98.3 °F (36.8 °C), Max:98.8 °F (37.1 °C)    Temp  Min: 98.3 °F (36.8 °C)  Max: 98.8 °F (37.1 °C)  BP  Min: 95/46  Max: 133/55  Pulse  Min: 73  Max: 81  Resp  Min: 16  Max: 18  SpO2  Min: 93 %  Max: 98 %    GENERAL: Awake and alert, in no acute distress.   HEENT: Normocephalic, atraumatic.  PERRL. EOMI. No conjunctival injection. No icterus. Oropharynx clear without evidence of thrush or exudate.   HEART: RRR; No murmur, rubs, gallops.   LUNGS: Clear to auscultation bilaterally without wheezing, rales, rhonchi.   ABDOMEN: Soft, nontender, nondistended. Positive bowel sounds.    EXT:  No cyanosis, clubbing or edema. No cord.  MSK: FROM without joint effusions noted arms/legs.  Right foot wrapped  SKIN: Warm and dry without cutaneous eruptions on Inspection/palpation.    NEURO: Oriented to PPT. No focal deficits on motor/sensory exam at arms/legs.  PSYCHIATRIC: Normal insight and judgement. Cooperative with PE    Laboratory Data    Results from last 7 days   Lab Units  18   0557  18   0802  18   0527   WBC 10*3/mm3  12.24*  13.86*   15.18*   HEMOGLOBIN g/dL  8.3*  8.7*  9.3*   HEMATOCRIT %  26.9*  27.9*  29.4*   PLATELETS 10*3/mm3  435  438  406     Results from last 7 days   Lab Units  12/03/18   0557   SODIUM mmol/L  138   POTASSIUM mmol/L  3.6   CHLORIDE mmol/L  102   CO2 mmol/L  24.0   BUN mg/dL  66*   CREATININE mg/dL  3.91*   GLUCOSE mg/dL  173*   CALCIUM mg/dL  8.0*     Results from last 7 days   Lab Units  11/26/18   1759   ALK PHOS U/L  92   BILIRUBIN mg/dL  0.2*   ALT (SGPT) U/L  19   AST (SGOT) U/L  24     Results from last 7 days   Lab Units  11/26/18   1628   SED RATE mm/hr  103*     Results from last 7 days   Lab Units  11/26/18   1759   CRP mg/dL  17.25*     Results from last 7 days   Lab Units  11/26/18   1628   LACTATE mmol/L  2.0     Results from last 7 days   Lab Units  12/01/18   0949  11/27/18   1515   CK TOTAL U/L  121  146         Estimated Creatinine Clearance: 18.9 mL/min (A) (by C-G formula based on SCr of 3.91 mg/dL (H)).      Microbiology:  Microbiology Results Abnormal     Procedure Component Value - Date/Time    Eosinophil Smear - Urine, Urine, Clean Catch [[id number]]  (Normal) Collected:  12/01/18 1629    Lab Status:  Final result Specimen:  Urine, Clean Catch Updated:  12/01/18 1710     Eosinophil Smear 0 % EOS/100 Cells     Narrative:       No eosinophil seen    Blood Culture - Blood, Arm, Left [[id number]] Collected:  11/26/18 1625    Lab Status:  Final result Specimen:  Blood from Arm, Left Updated:  12/01/18 1645     Blood Culture No growth at 5 days    Blood Culture - Blood, Arm, Right [[id number]] Collected:  11/26/18 1620    Lab Status:  Final result Specimen:  Blood from Arm, Right Updated:  12/01/18 1645     Blood Culture No growth at 5 days                       Radiology:  Xr Foot 3+ View Right    Result Date: 11/28/2018  Possible plantar ulceration of the forefoot. No acute osseous abnormality is identified.  D:  11/28/2018 E:  11/28/2018  This report was finalized on 11/28/2018 12:28 PM by Aniceto Deleon.       Mri Foot Right Without Contrast    Result Date: 11/29/2018  Exam terminated early by the patient. Limited scan. No evidence of osteomyelitis, or discrete abscess.  D:  11/26/2018 E:  11/27/2018    This report was finalized on 11/29/2018 12:04 PM by DR. Schuyler Fernando MD.      Doppler arterial:   Addenda:       · Moderate reduction in arterial flow in the right lower extremity. · Monophasic waveforms are present in the right lower extremity with   biphasic waveforms in the left lower extremity · Due to the patients inability to tolerate the study only RLE pressures   were obtained    Signed: 11/29/18 2249 by Gillian Becker MD   Narrative:     · Moderate reduction in arterial flow in the right lower extremity.   Normal-appearing waveforms.     Narrative:     · Left Conclusion: The left NELLI is normal.  · Right Conclusion: The right NELLI is normal.           Impression:   Diabetic foot ulcer on right foot  Leukocytosis with neutrophilia, improving  chronic kidney disease stage IV  Antibiotic intolerances to cefazolin, clindamycin, Levaquin  DM II  Esr > 100    PLAN/RECOMMENDATIONS:     I find it very difficult to interpret an MRI was not completed an MRI that was not given with gadolinium because of her chronic kidney disease.    Physical exam of foot not remarkable for  Osteomyelitis.  I still have some concern with ESr > 100.      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   If patient continues the ulcerations can lead to secondary infection and contiguous spread of infection to the bone;  Unclear is patient already has osteo; bone biopsy may be gold standard to determine    continue offloading     Offloading and using a knee scooter is probably the most important intervention currently     Cont dapto 6 mg/kg IV Q48H  Cont invanz 500 mg IV Q24H    Patient is at increased risk for limb loss  Nephrology is opposed to picc line    Hung Haynes MD  saw and examined patient, verified hx and PE, read all radiographic studies, reviewed labs and micro data, and formulated dx, plan for treatment and all medical decision making.      Niko Deleon PA-C for Hung Haynes MD    I have seen and examined patient and agree with above      ALYSSIA Bahena  12/3/2018  8:37 AM

## 2018-12-03 NOTE — PROGRESS NOTES
Continued Stay Note  [facility]     Patient Name: Tashia Leon  MRN: [id number]  Today's Date: 12/3/2018    Admit Date: 11/26/2018    Discharge Plan     Row Name 12/03/18 1632       Plan    Plan  Home    Patient/Family in Agreement with Plan  yes    Plan Comments  Case mgt con't to follow. D/C plan is still to return home.She lives with daughter and mother ,who she states can assist with care. Knee scooter at bedside. Case mgt will con't to follow and assist with any final d/c arrrangements        Discharge Codes    No documentation.             Sonja C Kellerman, RN

## 2018-12-03 NOTE — PROGRESS NOTES
[facility] Medicine Services  PROGRESS NOTE    Patient Name: Tashia Leon  : 1955  MRN: [id number]    Date of Admission: 2018  Length of Stay: 7  Primary Care Physician: Angelica Tran PA    Subjective   Subjective     CC:  Diabetic foot ulcer     HPI:  States she is doing ok today. Agreeable with plan for the OR tomorrow.     Review of Systems  Gen- No fevers, chills  CV- No chest pain, palpitations  Resp- No cough, dyspnea  GI- No N/V/D, abd pain    Otherwise ROS is negative except as mentioned in the HPI.    Objective   Objective     Vital Signs:   Temp:  [97.9 °F (36.6 °C)-99.1 °F (37.3 °C)] 97.9 °F (36.6 °C)  Heart Rate:  [72-81] 72  Resp:  [16-18] 16  BP: ()/(44-59) 110/44        Physical Exam:  Constitutional: No acute distress, awake, alert  HENT: NCAT, mucous membranes moist  Respiratory: Clear to auscultation bilaterally, respiratory effort normal   Cardiovascular: RRR, no murmurs, rubs, or gallops, palpable pedal pulses bilaterally  Gastrointestinal: Positive bowel sounds, soft, nontender, nondistended  Musculoskeletal: No bilateral ankle edema  Psychiatric: Appropriate affect, cooperative  Neurologic: Oriented x 3, strength symmetric in all extremities, Cranial Nerves grossly intact to confrontation, speech clear  Skin: No rashes    Results Reviewed:  I have personally reviewed current lab, radiology, and data and agree.    Results from last 7 days   Lab Units  18   0557  18   0802  18   0518   1628   WBC 10*3/mm3  12.24*  13.86*  15.18*   < >  19.47*   HEMOGLOBIN g/dL  8.3*  8.7*  9.3*   < >  11.0*   HEMATOCRIT %  26.9*  27.9*  29.4*   < >  34.3*   PLATELETS 10*3/mm3  435  438  406   < >  417   INR    --    --    --    --   0.98    < > = values in this interval not displayed.     Results from last 7 days   Lab Units  18   0557  18   0949  18   0527   18   1759   SODIUM mmol/L  138  133  132   < >   137   POTASSIUM mmol/L  3.6  3.8  3.4*   < >  3.2*   CHLORIDE mmol/L  102  95*  94*   < >  99   CO2 mmol/L  24.0  25.0  24.0   < >  26.0   BUN mg/dL  66*  87*  79*   < >  74*   CREATININE mg/dL  3.91*  4.38*  4.33*   < >  3.79*   GLUCOSE mg/dL  173*  150*  185*   < >  137*   CALCIUM mg/dL  8.0*  8.2*  8.2*   < >  9.8   ALT (SGPT) U/L   --    --    --    --   19   AST (SGOT) U/L   --    --    --    --   24    < > = values in this interval not displayed.     Estimated Creatinine Clearance: 18.9 mL/min (A) (by C-G formula based on SCr of 3.91 mg/dL (H)).  No results found for: BNP    Microbiology Results Abnormal     Procedure Component Value - Date/Time    Eosinophil Smear - Urine, Urine, Clean Catch [[id number]]  (Normal) Collected:  12/01/18 1629    Lab Status:  Final result Specimen:  Urine, Clean Catch Updated:  12/01/18 1710     Eosinophil Smear 0 % EOS/100 Cells     Narrative:       No eosinophil seen    Blood Culture - Blood, Arm, Left [[id number]] Collected:  11/26/18 1625    Lab Status:  Final result Specimen:  Blood from Arm, Left Updated:  12/01/18 1645     Blood Culture No growth at 5 days    Blood Culture - Blood, Arm, Right [[id number]] Collected:  11/26/18 1620    Lab Status:  Final result Specimen:  Blood from Arm, Right Updated:  12/01/18 1645     Blood Culture No growth at 5 days          Imaging Results (last 24 hours)     ** No results found for the last 24 hours. **        Results for orders placed during the hospital encounter of 07/15/16   Adult transthoracic echo complete    Narrative · Left ventricular function is normal. Estimated EF = 55%.  · Trace mitral valve regurgitation is present.  · Trace tricuspid valve regurgitation is present          I have reviewed the medications.      aspirin  mg Oral Daily   castor oil-balsam peru  Topical Q12H   [START ON 12/4/2018] DAPTOmycin 6 mg/kg (Adjusted) Intravenous Q48H   diltiaZEM  mg Oral Nightly   docusate sodium 100 mg Oral Daily    ertapenem 500 mg Intravenous Q24H   ferric gluconate (FERRLECIT) IVPB 125 mg Intravenous Daily   heparin (porcine) 5,000 Units Subcutaneous Q12H   HYDROcodone-acetaminophen 1 tablet Oral TID   insulin detemir 40 Units Subcutaneous Q12H   insulin lispro 0-14 Units Subcutaneous 4x Daily With Meals & Nightly   mupirocin  Topical Q24H   Naloxegol Oxalate 12.5 mg Oral Once per day on Sun Wed   pantoprazole 40 mg Oral QAM   sodium chloride 3 mL Intravenous Q12H         Assessment/Plan   Assessment / Plan     Active Hospital Problems    Diagnosis   • **Diabetic ulcer of right foot associated with type 2 diabetes mellitus (CMS/HCC)   • Sepsis (CMS/HCC)   • Cellulitis   • Hypokalemia   • Anemia, chronic disease   • Obesity (BMI 30-39.9)   • Impaired functional mobility, balance, gait, and endurance     Added automatically from request for surgery [id number]     • Diabetic ulcer of right midfoot associated with type 2 diabetes mellitus, with necrosis of bone (CMS/HCC)     Added automatically from request for surgery [id number]     • Cellulitis of toe of right foot     Added automatically from request for surgery [id number]     • Chronic osteomyelitis of right foot with draining sinus (CMS/HCC)     Added automatically from request for surgery [id number]     • Chronic kidney disease, stage V (CMS/HCC)   • Hyperlipidemia   • Diabetes mellitus type 2, uncontrolled, with complications (CMS/HCC)     Proteinuria noted, January 2014.       • Diabetic peripheral neuropathy (CMS/HCC)   • Essential hypertension   • Coronary artery disease involving native coronary artery of native heart without angina pectoris     1. Coronary artery disease:  a. Abnormal cardiac PET, 10/02/2013, Dr. Becker, revealing a posterolateral defect with a mild wall motion abnormality and a normal LVEF.  b. Cardiac catheterization, 10/21/2013, with placement of a 2.5 x 15 mm Xience Expedition drug-eluting stent to the proximal circumflex.  LVEF of 55% to 60%.   Noncritical disease in the LAD and right coronary.  c. Echocardiogram, 12/18/2013, shows an LVEF 50% to 55% with trace MR and mild TR.               Brief Hospital Course to date:  1.  Sepsis, now resolved     2.  Diabetic foot ulcer with cellulitis right foot              -wound care received silver wound gel topical treatment, dressings              -Invanz and Dapto, ID recs              -BC x 2, currently negative              -pain control, monitor while on IV pain medicines              -cbc, bmp in the am            - Scooter to offload foot has been ordered and is at the bedside.  Patient has seen PT who taught her appropriate use but are concerned about her weakness and stability.             - OR tomorrow with Ortho      3.   Type 2 diabetes mellitus with diabetic peripheral neuropathy              -a1c 7.6              -levemir 40 units bid, continue               -continue correction insulin, eating less while admitted     4.  CKDV              -nephrology following,  renal function limits abx choices               -renal dysfunction is very advanced              - Creatinine trending up, diuretics have been held and patient has received some IV fluids                 5.  Hypokalemia              -replaced by nephrology as needed        6. Obesity BMI 38: Weight loss recommended     7.  HTN: Currently normotensive     8.  CAD: Asymptomatic, aspirin     9.  Diabetic peripheral neuropathy: Symptomatic treatment and supportive care     10.  Anemia of chronic disease                   11.  Severe anxiety:  In the setting of severe anxiety while awaiting surgery plan to start low-dose clonazepam and adjust as needed.  Preferably could use BuSpar but advanced kidney disease prevent the use of this medication.  improved now.       12.  Debility: Difficulty walking with needed skater.  Therapy recommended rehabilitation facility for appropriate care and treatment.  Patient so far has declined.     Also of note  patient has not been completely compliant regarding nonweightbearing status of the foot.      DVT Prophylaxis:  Heparin      CODE STATUS:   Code Status and Medical Interventions:   Ordered at: 11/26/18 2126     Level Of Support Discussed With:    Patient     Code Status:    CPR     Medical Interventions (Level of Support Prior to Arrest):    Full       Disposition: I expect the patient to be discharged tbd       Electronically signed by Halina Walker DO, 12/03/18, 4:13 PM.
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
## 2018-12-04 ENCOUNTER — ANESTHESIA EVENT (OUTPATIENT)
Dept: PERIOP | Facility: HOSPITAL | Age: 63
End: 2018-12-04

## 2018-12-04 ENCOUNTER — ANESTHESIA (OUTPATIENT)
Dept: PERIOP | Facility: HOSPITAL | Age: 63
End: 2018-12-04

## 2018-12-04 LAB
ANION GAP SERPL CALCULATED.3IONS-SCNC: 13 MMOL/L (ref 3–11)
BUN BLD-MCNC: 58 MG/DL (ref 9–23)
BUN/CREAT SERPL: 16.3 (ref 7–25)
CALCIUM SPEC-SCNC: 8.1 MG/DL (ref 8.7–10.4)
CHLORIDE SERPL-SCNC: 107 MMOL/L (ref 99–109)
CO2 SERPL-SCNC: 20 MMOL/L (ref 20–31)
CREAT BLD-MCNC: 3.56 MG/DL (ref 0.6–1.3)
DEPRECATED RDW RBC AUTO: 47 FL (ref 37–54)
ERYTHROCYTE [DISTWIDTH] IN BLOOD BY AUTOMATED COUNT: 13.1 % (ref 11.3–14.5)
GFR SERPL CREATININE-BSD FRML MDRD: 13 ML/MIN/1.73
GLUCOSE BLD-MCNC: 202 MG/DL (ref 70–100)
GLUCOSE BLDC GLUCOMTR-MCNC: 116 MG/DL (ref 70–130)
GLUCOSE BLDC GLUCOMTR-MCNC: 170 MG/DL (ref 70–130)
GLUCOSE BLDC GLUCOMTR-MCNC: 178 MG/DL (ref 70–130)
GLUCOSE BLDC GLUCOMTR-MCNC: 98 MG/DL (ref 70–130)
HCT VFR BLD AUTO: 27.3 % (ref 34.5–44)
HGB BLD-MCNC: 8.5 G/DL (ref 11.5–15.5)
MCH RBC QN AUTO: 30.6 PG (ref 27–31)
MCHC RBC AUTO-ENTMCNC: 31.1 G/DL (ref 32–36)
MCV RBC AUTO: 98.2 FL (ref 80–99)
PLATELET # BLD AUTO: 415 10*3/MM3 (ref 150–450)
PMV BLD AUTO: 9.4 FL (ref 6–12)
POTASSIUM BLD-SCNC: 3.9 MMOL/L (ref 3.5–5.5)
RBC # BLD AUTO: 2.78 10*6/MM3 (ref 3.89–5.14)
SODIUM BLD-SCNC: 140 MMOL/L (ref 132–146)
WBC NRBC COR # BLD: 12.87 10*3/MM3 (ref 3.5–10.8)

## 2018-12-04 PROCEDURE — 93005 ELECTROCARDIOGRAM TRACING: CPT | Performed by: ORTHOPAEDIC SURGERY

## 2018-12-04 PROCEDURE — 87102 FUNGUS ISOLATION CULTURE: CPT | Performed by: ORTHOPAEDIC SURGERY

## 2018-12-04 PROCEDURE — 25010000002 FENTANYL CITRATE (PF) 100 MCG/2ML SOLUTION: Performed by: NURSE ANESTHETIST, CERTIFIED REGISTERED

## 2018-12-04 PROCEDURE — 25010000002 HEPARIN (PORCINE) PER 1000 UNITS: Performed by: NURSE PRACTITIONER

## 2018-12-04 PROCEDURE — 87116 MYCOBACTERIA CULTURE: CPT | Performed by: ORTHOPAEDIC SURGERY

## 2018-12-04 PROCEDURE — 63710000001 INSULIN DETEMIR PER 5 UNITS: Performed by: INTERNAL MEDICINE

## 2018-12-04 PROCEDURE — 85027 COMPLETE CBC AUTOMATED: CPT | Performed by: INTERNAL MEDICINE

## 2018-12-04 PROCEDURE — 25010000002 NA FERRIC GLUC CPLX PER 12.5 MG: Performed by: INTERNAL MEDICINE

## 2018-12-04 PROCEDURE — 25010000002 SUCCINYLCHOLINE PER 20 MG: Performed by: NURSE ANESTHETIST, CERTIFIED REGISTERED

## 2018-12-04 PROCEDURE — 87206 SMEAR FLUORESCENT/ACID STAI: CPT | Performed by: ORTHOPAEDIC SURGERY

## 2018-12-04 PROCEDURE — 25010000002 PROPOFOL 10 MG/ML EMULSION: Performed by: NURSE ANESTHETIST, CERTIFIED REGISTERED

## 2018-12-04 PROCEDURE — 25010000002 ONDANSETRON PER 1 MG: Performed by: NURSE ANESTHETIST, CERTIFIED REGISTERED

## 2018-12-04 PROCEDURE — 25010000002 MIDAZOLAM PER 1 MG: Performed by: NURSE ANESTHETIST, CERTIFIED REGISTERED

## 2018-12-04 PROCEDURE — 87205 SMEAR GRAM STAIN: CPT | Performed by: ORTHOPAEDIC SURGERY

## 2018-12-04 PROCEDURE — 99232 SBSQ HOSP IP/OBS MODERATE 35: CPT | Performed by: NURSE PRACTITIONER

## 2018-12-04 PROCEDURE — 87070 CULTURE OTHR SPECIMN AEROBIC: CPT | Performed by: ORTHOPAEDIC SURGERY

## 2018-12-04 PROCEDURE — 87176 TISSUE HOMOGENIZATION CULTR: CPT | Performed by: ORTHOPAEDIC SURGERY

## 2018-12-04 PROCEDURE — 93010 ELECTROCARDIOGRAM REPORT: CPT | Performed by: INTERNAL MEDICINE

## 2018-12-04 PROCEDURE — 25010000002 ERTAPENEM PER 500 MG: Performed by: PHYSICIAN ASSISTANT

## 2018-12-04 PROCEDURE — 88311 DECALCIFY TISSUE: CPT | Performed by: ORTHOPAEDIC SURGERY

## 2018-12-04 PROCEDURE — 97164 PT RE-EVAL EST PLAN CARE: CPT

## 2018-12-04 PROCEDURE — 80048 BASIC METABOLIC PNL TOTAL CA: CPT | Performed by: INTERNAL MEDICINE

## 2018-12-04 PROCEDURE — 0Y6P0Z0 DETACHMENT AT RIGHT 1ST TOE, COMPLETE, OPEN APPROACH: ICD-10-PCS | Performed by: ORTHOPAEDIC SURGERY

## 2018-12-04 PROCEDURE — 97530 THERAPEUTIC ACTIVITIES: CPT

## 2018-12-04 PROCEDURE — 25010000002 DAPTOMYCIN PER 1 MG: Performed by: PHYSICIAN ASSISTANT

## 2018-12-04 PROCEDURE — 88305 TISSUE EXAM BY PATHOLOGIST: CPT | Performed by: ORTHOPAEDIC SURGERY

## 2018-12-04 PROCEDURE — 82962 GLUCOSE BLOOD TEST: CPT

## 2018-12-04 PROCEDURE — 28810 AMPUTATION TOE & METATARSAL: CPT | Performed by: ORTHOPAEDIC SURGERY

## 2018-12-04 PROCEDURE — 25010000002 HYDROMORPHONE PER 4 MG: Performed by: NURSE PRACTITIONER

## 2018-12-04 PROCEDURE — 25010000002 PHENYLEPHRINE PER 1 ML: Performed by: NURSE ANESTHETIST, CERTIFIED REGISTERED

## 2018-12-04 PROCEDURE — 87075 CULTR BACTERIA EXCEPT BLOOD: CPT | Performed by: ORTHOPAEDIC SURGERY

## 2018-12-04 RX ORDER — SODIUM CHLORIDE 9 MG/ML
9 INJECTION, SOLUTION INTRAVENOUS CONTINUOUS
Status: DISCONTINUED | OUTPATIENT
Start: 2018-12-04 | End: 2018-12-13 | Stop reason: HOSPADM

## 2018-12-04 RX ORDER — HYDROMORPHONE HYDROCHLORIDE 1 MG/ML
0.5 INJECTION, SOLUTION INTRAMUSCULAR; INTRAVENOUS; SUBCUTANEOUS
Status: DISCONTINUED | OUTPATIENT
Start: 2018-12-04 | End: 2018-12-04 | Stop reason: HOSPADM

## 2018-12-04 RX ORDER — SUCCINYLCHOLINE CHLORIDE 20 MG/ML
INJECTION INTRAMUSCULAR; INTRAVENOUS AS NEEDED
Status: DISCONTINUED | OUTPATIENT
Start: 2018-12-04 | End: 2018-12-04 | Stop reason: SURG

## 2018-12-04 RX ORDER — LIDOCAINE HYDROCHLORIDE 10 MG/ML
INJECTION, SOLUTION EPIDURAL; INFILTRATION; INTRACAUDAL; PERINEURAL AS NEEDED
Status: DISCONTINUED | OUTPATIENT
Start: 2018-12-04 | End: 2018-12-04 | Stop reason: SURG

## 2018-12-04 RX ORDER — FENTANYL CITRATE 50 UG/ML
INJECTION, SOLUTION INTRAMUSCULAR; INTRAVENOUS AS NEEDED
Status: DISCONTINUED | OUTPATIENT
Start: 2018-12-04 | End: 2018-12-04 | Stop reason: SURG

## 2018-12-04 RX ORDER — MIDAZOLAM HYDROCHLORIDE 1 MG/ML
INJECTION INTRAMUSCULAR; INTRAVENOUS AS NEEDED
Status: DISCONTINUED | OUTPATIENT
Start: 2018-12-04 | End: 2018-12-04 | Stop reason: SURG

## 2018-12-04 RX ORDER — MUPIROCIN CALCIUM 20 MG/G
CREAM TOPICAL AS NEEDED
Status: DISCONTINUED | OUTPATIENT
Start: 2018-12-04 | End: 2018-12-04 | Stop reason: HOSPADM

## 2018-12-04 RX ORDER — ONDANSETRON 2 MG/ML
INJECTION INTRAMUSCULAR; INTRAVENOUS AS NEEDED
Status: DISCONTINUED | OUTPATIENT
Start: 2018-12-04 | End: 2018-12-04 | Stop reason: SURG

## 2018-12-04 RX ORDER — FENTANYL CITRATE 50 UG/ML
50 INJECTION, SOLUTION INTRAMUSCULAR; INTRAVENOUS
Status: DISCONTINUED | OUTPATIENT
Start: 2018-12-04 | End: 2018-12-04 | Stop reason: HOSPADM

## 2018-12-04 RX ORDER — ONDANSETRON 2 MG/ML
4 INJECTION INTRAMUSCULAR; INTRAVENOUS ONCE AS NEEDED
Status: COMPLETED | OUTPATIENT
Start: 2018-12-04 | End: 2018-12-04

## 2018-12-04 RX ORDER — PROPOFOL 10 MG/ML
VIAL (ML) INTRAVENOUS AS NEEDED
Status: DISCONTINUED | OUTPATIENT
Start: 2018-12-04 | End: 2018-12-04 | Stop reason: SURG

## 2018-12-04 RX ADMIN — SODIUM CHLORIDE 125 MG: 9 INJECTION, SOLUTION INTRAVENOUS at 08:50

## 2018-12-04 RX ADMIN — FENTANYL CITRATE 50 MCG: 50 INJECTION, SOLUTION INTRAMUSCULAR; INTRAVENOUS at 12:50

## 2018-12-04 RX ADMIN — MIDAZOLAM HYDROCHLORIDE 2 MG: 1 INJECTION, SOLUTION INTRAMUSCULAR; INTRAVENOUS at 11:14

## 2018-12-04 RX ADMIN — MUPIROCIN: 2 CREAM TOPICAL at 08:51

## 2018-12-04 RX ADMIN — LIDOCAINE HYDROCHLORIDE 50 MG: 10 INJECTION, SOLUTION EPIDURAL; INFILTRATION; INTRACAUDAL; PERINEURAL at 11:28

## 2018-12-04 RX ADMIN — DAPTOMYCIN 500 MG: 500 INJECTION, POWDER, LYOPHILIZED, FOR SOLUTION INTRAVENOUS at 11:57

## 2018-12-04 RX ADMIN — CASTOR OIL AND BALSAM, PERU: 788; 87 OINTMENT TOPICAL at 22:00

## 2018-12-04 RX ADMIN — PHENYLEPHRINE HYDROCHLORIDE 80 MCG: 10 INJECTION INTRAVENOUS at 11:58

## 2018-12-04 RX ADMIN — SODIUM CHLORIDE 9 ML/HR: 9 INJECTION, SOLUTION INTRAVENOUS at 12:57

## 2018-12-04 RX ADMIN — HYDROMORPHONE HYDROCHLORIDE 0.5 MG: 1 INJECTION, SOLUTION INTRAMUSCULAR; INTRAVENOUS; SUBCUTANEOUS at 19:19

## 2018-12-04 RX ADMIN — SODIUM CHLORIDE 9 ML/HR: 9 INJECTION, SOLUTION INTRAVENOUS at 10:33

## 2018-12-04 RX ADMIN — HYDROMORPHONE HYDROCHLORIDE 0.5 MG: 1 INJECTION, SOLUTION INTRAMUSCULAR; INTRAVENOUS; SUBCUTANEOUS at 03:22

## 2018-12-04 RX ADMIN — DILTIAZEM HYDROCHLORIDE 120 MG: 120 CAPSULE, EXTENDED RELEASE ORAL at 23:03

## 2018-12-04 RX ADMIN — ERTAPENEM SODIUM 500 MG: 1 INJECTION, POWDER, LYOPHILIZED, FOR SOLUTION INTRAMUSCULAR; INTRAVENOUS at 18:13

## 2018-12-04 RX ADMIN — INSULIN DETEMIR 40 UNITS: 100 INJECTION, SOLUTION SUBCUTANEOUS at 08:55

## 2018-12-04 RX ADMIN — HEPARIN SODIUM 5000 UNITS: 5000 INJECTION, SOLUTION INTRAVENOUS; SUBCUTANEOUS at 23:12

## 2018-12-04 RX ADMIN — HYDROCODONE BITARTRATE AND ACETAMINOPHEN 1 TABLET: 7.5; 325 TABLET ORAL at 16:11

## 2018-12-04 RX ADMIN — PHENYLEPHRINE HYDROCHLORIDE 80 MCG: 10 INJECTION INTRAVENOUS at 11:53

## 2018-12-04 RX ADMIN — SODIUM CHLORIDE, PRESERVATIVE FREE 10 ML: 5 INJECTION INTRAVENOUS at 10:32

## 2018-12-04 RX ADMIN — HYDROCODONE BITARTRATE AND ACETAMINOPHEN 1 TABLET: 7.5; 325 TABLET ORAL at 08:55

## 2018-12-04 RX ADMIN — CASTOR OIL AND BALSAM, PERU: 788; 87 OINTMENT TOPICAL at 08:51

## 2018-12-04 RX ADMIN — ONDANSETRON 4 MG: 2 INJECTION INTRAMUSCULAR; INTRAVENOUS at 12:00

## 2018-12-04 RX ADMIN — SUCCINYLCHOLINE CHLORIDE 160 MG: 20 INJECTION, SOLUTION INTRAMUSCULAR; INTRAVENOUS at 11:29

## 2018-12-04 RX ADMIN — INSULIN DETEMIR 40 UNITS: 100 INJECTION, SOLUTION SUBCUTANEOUS at 22:57

## 2018-12-04 RX ADMIN — HYDROMORPHONE HYDROCHLORIDE 0.5 MG: 1 INJECTION, SOLUTION INTRAMUSCULAR; INTRAVENOUS; SUBCUTANEOUS at 16:09

## 2018-12-04 RX ADMIN — HYDROCODONE BITARTRATE AND ACETAMINOPHEN 1 TABLET: 7.5; 325 TABLET ORAL at 21:00

## 2018-12-04 RX ADMIN — HYDROMORPHONE HYDROCHLORIDE 0.5 MG: 1 INJECTION, SOLUTION INTRAMUSCULAR; INTRAVENOUS; SUBCUTANEOUS at 06:33

## 2018-12-04 RX ADMIN — HYDROMORPHONE HYDROCHLORIDE 0.5 MG: 1 INJECTION, SOLUTION INTRAMUSCULAR; INTRAVENOUS; SUBCUTANEOUS at 09:56

## 2018-12-04 RX ADMIN — INSULIN LISPRO 3 UNITS: 100 INJECTION, SOLUTION INTRAVENOUS; SUBCUTANEOUS at 08:56

## 2018-12-04 RX ADMIN — HYDROMORPHONE HYDROCHLORIDE 0.5 MG: 1 INJECTION, SOLUTION INTRAMUSCULAR; INTRAVENOUS; SUBCUTANEOUS at 22:59

## 2018-12-04 RX ADMIN — SODIUM CHLORIDE, PRESERVATIVE FREE 3 ML: 5 INJECTION INTRAVENOUS at 21:00

## 2018-12-04 RX ADMIN — ONDANSETRON 4 MG: 2 INJECTION, SOLUTION INTRAMUSCULAR; INTRAVENOUS at 12:50

## 2018-12-04 RX ADMIN — PANTOPRAZOLE SODIUM 40 MG: 40 TABLET, DELAYED RELEASE ORAL at 06:33

## 2018-12-04 RX ADMIN — PROPOFOL 200 MG: 10 INJECTION, EMULSION INTRAVENOUS at 11:28

## 2018-12-04 RX ADMIN — FENTANYL CITRATE 100 MCG: 50 INJECTION, SOLUTION INTRAMUSCULAR; INTRAVENOUS at 11:14

## 2018-12-04 NOTE — ANESTHESIA PROCEDURE NOTES
ANESTHESIA INTUBATION  Urgency: elective    Airway not difficult    General Information and Staff    Patient location during procedure: OR  CRNA: Darryl Bowden CRNA    Indications and Patient Condition  Indications for airway management: airway protection    Preoxygenated: yes  MILS not maintained throughout  Mask difficulty assessment: 1 - vent by mask    Final Airway Details  Final airway type: endotracheal airway      Successful airway: ETT  Cuffed: yes   Successful intubation technique: direct laryngoscopy  Endotracheal tube insertion site: oral  Blade: Preston  Blade size: 2  ETT size (mm): 7.0  Cormack-Lehane Classification: grade I - full view of glottis  Placement verified by: chest auscultation and capnometry   Cuff volume (mL): 5  Measured from: lips  ETT to lips (cm): 21  Number of attempts at approach: 1    Additional Comments  Negative epigastric sounds, Breath sound equal bilaterally with symmetric chest rise and fall

## 2018-12-04 NOTE — BRIEF OP NOTE
Orthopedics I&D right foot, great toe amputation, possible 1st Metatarsal amputation  Brief Op Note    Tashia Leon  11/26/2018 - 12/4/2018    Pre-op Diagnosis:   Impaired functional mobility, balance, gait, and endurance [Z74.09]  Diabetic peripheral neuropathy (CMS/HCC) [E11.42]  Diabetes mellitus type 2, uncontrolled, with complications (CMS/HCC) [E11.8, E11.65]  Chronic kidney disease, stage V (CMS/HCC) [N18.5]  Diabetic ulcer of right midfoot associated with type 2 diabetes mellitus, with necrosis of bone (CMS/HCC) [E11.621, L97.414]  Cellulitis of toe of right foot [L03.031]  Obesity (BMI 30-39.9) [E66.9]  Chronic osteomyelitis of right foot with draining sinus (CMS/HCC) [M86.471] gangrenous necrosis right great toe and distal metatarsal tissue    Post-op Diagnosis:  same       Post-Op Diagnosis Codes:     * Impaired functional mobility, balance, gait, and endurance [Z74.09]     * Diabetic peripheral neuropathy (CMS/HCC) [E11.42]     * Diabetes mellitus type 2, uncontrolled, with complications (CMS/HCC) [E11.8, E11.65]     * Chronic kidney disease, stage V (CMS/HCC) [N18.5]     * Diabetic ulcer of right midfoot associated with type 2 diabetes mellitus, with necrosis of bone (CMS/HCC) [E11.621, L97.414]     * Cellulitis of toe of right foot [L03.031]     * Obesity (BMI 30-39.9) [E66.9]     * Chronic osteomyelitis of right foot with draining sinus (CMS/HCC) [M86.471]    Procedure(s):  I&D right foot, great toe amputation, possible 1st Metatarsal amputation    Surgeon(s):  Chio Sterling MD    Anesthesia:  General with Block    Staff:   Circulator: Anshul Ferreira RN; Prema Mike RN  Scrub Person: Cam Dangelo; Gillian Stauffer  Assistant: Shayna Hooks PA-C    Estimated Blood Loss:10cc      Specimens: culture and permanant      Drains:  penrose    Complications:  None    Tourniquet:: 4min    Dressing:soft     Disposition:rr stable    Chio Sterling MD     Date: 12/4/2018  Time: 12:00  PM

## 2018-12-04 NOTE — PLAN OF CARE
"Problem: Patient Care Overview  Goal: Plan of Care Review  Outcome: Ongoing (interventions implemented as appropriate)   12/03/18 1950   Coping/Psychosocial   Plan of Care Reviewed With patient   OTHER   Outcome Summary Dressing changed on right foot. Pt remains noncompliant with non-weightbearing status and replies rudely to staff when she is reminded that she should not be bearing weight on the right foot. Pt uses her scooter, but gets out of it prematurely and ambulates to the bed/bathroom the rest of the way. Pt has refused her Norco all day, stating that it makes her \"sick\". Pt asks for Dilaudid approx every 3 hours. Wound nurse saw pt about an area of tenderness on her right buttock and Venelex ointment was ordered for it.       Problem: Fall Risk (Adult)  Goal: Absence of Fall  Outcome: Ongoing (interventions implemented as appropriate)  Alarms x2 even though patient still gets up without staff   12/03/18 1950   Fall Risk (Adult)   Absence of Fall making progress toward outcome         "

## 2018-12-04 NOTE — PROGRESS NOTES
"   LOS: 8 days    Patient Care Team:  Angelica Tran PA as PCP - General (Internal Medicine)  Denver Baldwin MD as Consulting Physician (Nephrology)    Chief Complaint:  Diabetic foot ulcer    Subjective     Interval History:     No acute events overnight. No new complaints.     Review of Systems:   NO CP Or SOA. NO Abdominal discomfort.     Objective     Vital Sign Min/Max for last 24 hours  Temp  Min: 97.9 °F (36.6 °C)  Max: 99.1 °F (37.3 °C)   BP  Min: 102/52  Max: 123/59   Pulse  Min: 72  Max: 76   Resp  Min: 16  Max: 17   SpO2  Min: 94 %  Max: 96 %   No Data Recorded   No Data Recorded     Flowsheet Rows      First Filed Value   Admission Height  167.6 cm (66\") Documented at 11/26/2018 1315   Admission Weight  109 kg (240 lb) Documented at 11/26/2018 1315          I/O this shift:  In: 110 [IV Piggyback:110]  Out: -   I/O last 3 completed shifts:  In: 1200 [P.O.:1200]  Out: -     Physical Exam:     General Appearance:    Alert, cooperative, in no acute distress   Head:    Normocephalic, without obvious abnormality, atraumatic               Neck:   No adenopathy, supple, trachea midline, no thyromegaly, no     carotid bruit, no JVD       Lungs:     Clear to auscultation,respirations regular, even and                   unlabored    Heart:    Regular rhythm and normal rate, normal S1 and S2, no            murmur, no gallop, no rub, no click       Abdomen:     Normal bowel sounds, no masses, no organomegaly, soft        non-tender, non-distended, no guarding, no rebound                 tenderness       Extremities:  Ulcer of the right foot with erythema noted. Trace edema.                Neurologic:   No focal deficit noted.        WBC WBC   Date Value Ref Range Status   12/04/2018 12.87 (H) 3.50 - 10.80 10*3/mm3 Final   12/03/2018 12.24 (H) 3.50 - 10.80 10*3/mm3 Final   12/02/2018 13.86 (H) 3.50 - 10.80 10*3/mm3 Final      HGB Hemoglobin   Date Value Ref Range Status   12/04/2018 8.5 (L) 11.5 - 15.5 g/dL " Final   12/03/2018 8.3 (L) 11.5 - 15.5 g/dL Final   12/02/2018 8.7 (L) 11.5 - 15.5 g/dL Final      HCT Hematocrit   Date Value Ref Range Status   12/04/2018 27.3 (L) 34.5 - 44.0 % Final   12/03/2018 26.9 (L) 34.5 - 44.0 % Final   12/02/2018 27.9 (L) 34.5 - 44.0 % Final      Platlets No results found for: LABPLAT   MCV MCV   Date Value Ref Range Status   12/04/2018 98.2 80.0 - 99.0 fL Final   12/03/2018 97.8 80.0 - 99.0 fL Final   12/02/2018 97.2 80.0 - 99.0 fL Final          Sodium Sodium   Date Value Ref Range Status   12/04/2018 140 132 - 146 mmol/L Final   12/03/2018 138 132 - 146 mmol/L Final   12/01/2018 133 132 - 146 mmol/L Final      Potassium Potassium   Date Value Ref Range Status   12/04/2018 3.9 3.5 - 5.5 mmol/L Final   12/03/2018 3.6 3.5 - 5.5 mmol/L Final   12/01/2018 3.8 3.5 - 5.5 mmol/L Final      Chloride Chloride   Date Value Ref Range Status   12/04/2018 107 99 - 109 mmol/L Final   12/03/2018 102 99 - 109 mmol/L Final   12/01/2018 95 (L) 99 - 109 mmol/L Final      CO2 CO2   Date Value Ref Range Status   12/04/2018 20.0 20.0 - 31.0 mmol/L Final   12/03/2018 24.0 20.0 - 31.0 mmol/L Final   12/01/2018 25.0 20.0 - 31.0 mmol/L Final      BUN BUN   Date Value Ref Range Status   12/04/2018 58 (H) 9 - 23 mg/dL Final   12/03/2018 66 (H) 9 - 23 mg/dL Final   12/01/2018 87 (H) 9 - 23 mg/dL Final      Creatinine Creatinine   Date Value Ref Range Status   12/04/2018 3.56 (H) 0.60 - 1.30 mg/dL Final   12/03/2018 3.91 (H) 0.60 - 1.30 mg/dL Final   12/01/2018 4.38 (H) 0.60 - 1.30 mg/dL Final      Calcium Calcium   Date Value Ref Range Status   12/04/2018 8.1 (L) 8.7 - 10.4 mg/dL Final   12/03/2018 8.0 (L) 8.7 - 10.4 mg/dL Final   12/01/2018 8.2 (L) 8.7 - 10.4 mg/dL Final      PO4 No results found for: CAPO4   Albumin No results found for: ALBUMIN   Magnesium No results found for: MG   Uric Acid Uric Acid   Date Value Ref Range Status   12/01/2018 9.9 (H) 3.1 - 7.8 mg/dL Final     Comment:     Falsely depressed  results may occur on samples drawn from patients receiving N-Acetylcysteine (NAC) or Metamizole.           Results Review:     I reviewed the patient's new clinical results.      aspirin  mg Oral Daily   castor oil-balsam peru  Topical Q12H   DAPTOmycin 6 mg/kg (Adjusted) Intravenous Q48H   diltiaZEM  mg Oral Nightly   docusate sodium 100 mg Oral Daily   ertapenem 500 mg Intravenous Q24H   ferric gluconate (FERRLECIT) IVPB 125 mg Intravenous Daily   heparin (porcine) 5,000 Units Subcutaneous Q12H   HYDROcodone-acetaminophen 1 tablet Oral TID   insulin detemir 40 Units Subcutaneous Q12H   insulin lispro 0-14 Units Subcutaneous 4x Daily With Meals & Nightly   mupirocin  Topical Q24H   Naloxegol Oxalate 12.5 mg Oral Once per day on Sun Wed   pantoprazole 40 mg Oral QAM   sodium chloride 3 mL Intravenous Q12H          Medication Review:     Assessment/Plan       Diabetic ulcer of right foot associated with type 2 diabetes mellitus (CMS/HCC)    Coronary artery disease involving native coronary artery of native heart without angina pectoris    Essential hypertension    Diabetic peripheral neuropathy (CMS/HCC)    Diabetes mellitus type 2, uncontrolled, with complications (CMS/HCC)    Hyperlipidemia    Chronic kidney disease, stage V (CMS/HCC)    Sepsis (CMS/HCC)    Cellulitis    Hypokalemia    Anemia, chronic disease    Obesity (BMI 30-39.9)    Impaired functional mobility, balance, gait, and endurance    Diabetic ulcer of right midfoot associated with type 2 diabetes mellitus, with necrosis of bone (CMS/HCC)    Cellulitis of toe of right foot    Chronic osteomyelitis of right foot with draining sinus (CMS/HCC)      1- CKD stage IV - DN likely - Scr 3.5 to 4.0 range at baseline.UOP is adequate. Scr 3.5 - improved.   2- HTN - stable.   3- Proteinuria -DN -UPCR 3.1  4- Anemia of chronic disease - stable.   5- Diabetic foot ulcer.   6- hypokalemia   7- Iron def.      Plan:  - Continue with current regimen.   -  Monitor I/o   - Avoid nephrotoxic agents.       I discussed the patients findings and my recommendations with patient          Dannie Luna MD  12/04/18  9:19 AM

## 2018-12-04 NOTE — THERAPY RE-EVALUATION
Acute Care - Physical Therapy Re-Evaluation  [facility]     Patient Name: Tashia Leon  : 1955  MRN: [id number]  Today's Date: 2018   Onset of Illness/Injury or Date of Surgery: 18  Date of Referral to PT: 18  Referring Physician: Dr Patiño      Admit Date: 2018    Visit Dx:     ICD-10-CM ICD-9-CM   1. Diabetic ulcer of other part of right foot associated with type 2 diabetes mellitus, unspecified ulcer stage (CMS/HCC) E11.621 250.80    L97.519 707.15   2. Leukocytosis, unspecified type D72.829 288.60   3. Cellulitis of right foot L03.115 682.7   4. Chronic kidney disease, unspecified CKD stage N18.9 585.9   5. Impaired functional mobility, balance, gait, and endurance Z74.09 V49.89   6. Diabetic peripheral neuropathy (CMS/[facility]) E11.42 250.60     357.2   7. Diabetes mellitus type 2, uncontrolled, with complications (CMS/HCC) E11.8 250.92    E11.65    8. Chronic kidney disease, stage V (CMS/HCC) N18.5 585.5   9. Diabetic ulcer of right midfoot associated with type 2 diabetes mellitus, with necrosis of bone (CMS/HCC) E11.621 250.80    L97.414 707.14   10. Cellulitis of toe of right foot L03.031 681.10   11. Obesity (BMI 30-39.9) E66.9 278.00   12. Chronic osteomyelitis of right foot with draining sinus (CMS/[facility]) M86.471 730.17     Patient Active Problem List   Diagnosis   • Coronary artery disease involving native coronary artery of native heart without angina pectoris   • Dyspnea   • Essential hypertension   • Hyperlipemia   • Lower extremity edema   • Carotid artery stenosis   • Carotid bruit   • Diabetes mellitus with neurological manifestations, uncontrolled (CMS/HCC)   • Diabetic peripheral neuropathy (CMS/HCC)   • Moderate nonproliferative diabetic retinopathy without macular edema associated with type 2 diabetes mellitus (CMS/HCC)   • Functional murmur   • GERD (gastroesophageal reflux disease)   • Peptic ulcer   • Vitamin D deficiency   • Health care maintenance   •  Diabetes mellitus type 2, uncontrolled, with complications (CMS/HCC)   • Hyperlipidemia   • Obesity   • Osteomyelitis (CMS/HCC)   • MRSA (methicillin resistant Staphylococcus aureus)   • Lumbar disc disease   • Hearing loss   • Hyperkalemia   • Secondary hyperparathyroidism (CMS/HCC)   • Idiopathic hypochromic anemia   • Chronic kidney disease, stage V (CMS/HCC)   • Diabetic ulcer of right foot associated with type 2 diabetes mellitus (CMS/HCC)   • Sepsis (CMS/HCC)   • Cellulitis   • Hypokalemia   • Anemia, chronic disease   • Obesity (BMI 30-39.9)   • Impaired functional mobility, balance, gait, and endurance   • Diabetic ulcer of right midfoot associated with type 2 diabetes mellitus, with necrosis of bone (CMS/HCC)   • Cellulitis of toe of right foot   • Chronic osteomyelitis of right foot with draining sinus (CMS/HCC)     Past Medical History:   Diagnosis Date   • Acute bronchitis    • Acute pharyngitis    • Acute sinusitis    • Benign colonic polyp    • Edema    • GERD (gastroesophageal reflux disease)    • Hiatal hernia    • Hyperkalemia    • Hyperlipidemia    • Hypertension    • Low back pain    • Lumbar disc disease    • MRSA (methicillin resistant Staphylococcus aureus)     Osteomyelitis/methicillin-resistant Staphylococcus aureus of the left foot, 2013.   • Obesity    • Osteomyelitis (CMS/HCC)     Osteomyelitis/methicillin-resistant Staphylococcus aureus of the left foot, 2013.   • Peptic ulceration 2013    History of peptic ulcer disease, diagnosed 2013 by EGD.  Repeat EGD in 2013 was negative.   • Sepsis (CMS/HCC)     Sepsis, 2013, hospitalized at [facility].   • Tobacco use     Previous tobacco use with cessation in .   • Type 2 diabetes mellitus (CMS/HCC)     Proteinuria noted, 2014.     • Vitamin D deficiency      Past Surgical History:   Procedure Laterality Date   •  SECTION      x2   • FOOT SURGERY Left     Incision  and debridement of the left foot x2.   • LASIK     • TONSILLECTOMY          PT ASSESSMENT (last 12 hours)      Physical Therapy Evaluation     Row Name 12/04/18 1530          General Information    Referring Physician  Dr Patiño  -SC     Patient Observations  alert  -SC     General Observations of Patient  in bed r leg elevated  -SC     Prior Level of Function  -- trained with PT on scooter this week  -SC     Equipment Currently Used at Home  -- has scooter rental  -SC     Pertinent History of Current Functional Problem  s/p GT toe amputation with MT today  -SC     Risks Reviewed  patient:;increased discomfort;change in vital signs  -SC     Benefits Reviewed  patient:;improve function;increase independence;increase strength  -SC     Barriers to Rehab  medically complex  -SC     Row Name 12/04/18 1530          Relationship/Environment    Lives With  parent(s)  -SC     Family Caregiver if Needed  child(jeannie), adult  -SC     Row Name 12/04/18 1530          Resource/Environmental Concerns    Current Living Arrangements  home/apartment/condo  -SC     Row Name 12/04/18 1530          Home Main Entrance    Number of Stairs, Main Entrance  one  -SC     Row Name 12/04/18 1530          Safety Issues, Functional Mobility    Safety Issues Affecting Function (Mobility)  judgment;impulsivity  -SC     Row Name 12/04/18 1530          Mobility Assessment/Treatment    Extremity Weight-bearing Status  right lower extremity  -SC     Right Lower Extremity (Weight-bearing Status)  non weight-bearing (NWB)  (Significant)   -SC     Row Name 12/04/18 1530          Bed Mobility Assessment/Treatment    Scooting/Bridging [place] (Bed Mobility)  independent  -SC     Supine-Sit [place] (Bed Mobility)  conditional independence  -SC     Comment (Bed Mobility)  up to edge of bed. Very impulsive and attempting to stand up  -SC     Row Name 12/04/18 1530          Transfer Assessment/Treatment    Transfer Assessment/Treatment  stand  pivot/stand step transfer  -SC     Maintains Weight-bearing Status (Transfers)  able to maintain  (Significant)   -SC     Comment (Transfers)  worked on transfers to [facility] then back to chair. Some direction needed for sequencing and for safety  -SC     Row Name 12/04/18 1530          Stand Pivot/Stand Step Transfer    Stand Pivot/Stand Step [facility]  minimum assist (75% patient effort);verbal cues;2 person assist  -SC     Assistive Device (Stand Pivot Stand Step Transfer)  walker, front-wheeled  -SC     Row Name 12/04/18 1530          Gait/Stairs Assessment/Training    Comment (Gait/Stairs)  see transfers--DId not use her scooter, there is a loose NUT and the stearing is unstable  -SC     Row Name 12/04/18 1530          General ROM    RT Lower Ext  -- DF limited 50%  -SC     GENERAL ROM COMMENTS  other jts wfl  -SC     Row Name 12/04/18 1530          MMT (Manual Muscle Testing)    Rt Lower Ext  -- DF 1/5, quads 3+/5  -SC     Lt Lower Ext  -- grossly $=/5  -SC     Row Name 12/04/18 1530          Motor Assessment/Intervention    Additional Documentation  Balance (Group);Balance Interventions (Group);Therapeutic Exercise (Group);Therapeutic Exercise Interventions (Group)  -SC     Row Name 12/04/18 1530          Therapeutic Exercise    27173 - PT Therapeutic Activity Minutes  30  -SC     Row Name 12/04/18 1530          Balance    Balance  static sitting balance;static standing balance;dynamic sitting balance;dynamic standing balance  -SC     Row Name 12/04/18 1530          Dynamic Standing Balance    Level of [facility], Reaches Outside Midline (Standing, Dynamic Balance)  minimal assist, 75% patient effort  -SC     Time Able to Maintain Position, Reaches Outside Midline (Standing, Dynamic Balance)  less than 15 seconds walker  -SC     Row Name 12/04/18 1530          Sensory Assessment/Intervention    Sensory General Assessment  light touch sensation deficits identified  -SC     Row Name 12/04/18 1530          Pain  Assessment    Additional Documentation  Pain Scale: FACES Pre/Post-Treatment (Group)  -SC     Row Name 12/04/18 1530          Pain Scale: Numbers Pre/Post-Treatment    Pain Location - Side  Right  -SC     Pain Location - Orientation  lower  -SC     Pain Location  extremity  -SC     Row Name 12/04/18 1530          Pain Scale: FACES Pre/Post-Treatment    Pain: FACES Scale, Pretreatment  2-->hurts little bit  -SC     Pain: FACES Scale, Post-Treatment  2-->hurts little bit  -SC     Row Name             Wound 11/26/18 2130 Right lateral plantar diabetic/neuropathic ulceration    Wound - Properties Group Date first assessed: 11/26/18  -KF Time first assessed: 2130  -KF Side: Right  -KF Orientation: lateral  -KF Location: plantar  -KF Type: diabetic/neuropathic ulceration  -KF    Row Name             Wound 11/26/18 2130 Right medial plantar diabetic/neuropathic ulceration    Wound - Properties Group Date first assessed: 11/26/18  -KF Time first assessed: 2130  -KF Side: Right  -KF Orientation: medial  -KF Location: plantar  -KF Type: diabetic/neuropathic ulceration  -KF    Row Name             Wound 12/03/18 0940 Right medial gluteal    Wound - Properties Group Date first assessed: 12/03/18  -MR Time first assessed: 0940  -MR Side: Right  -MR Orientation: medial  -MR Location: gluteal  -MR Stage, Pressure Injury: Stage 1  -MR, healing/chronic area     Row Name             Wound 12/04/18 1202 Right foot incision    Wound - Properties Group Date first assessed: 12/04/18  -LD Time first assessed: 1202  -LD Side: Right  -LD Location: foot  -LD Type: incision  -LD    Row Name 12/04/18 1530          Coping    Observed Emotional State  calm  -SC     Verbalized Emotional State  acceptance  -SC     Row Name 12/04/18 1530          Plan of Care Review    Plan of Care Reviewed With  patient  -SC     Row Name 12/04/18 1530          Physical Therapy Clinical Impression    Date of Referral to PT  12/04/18  -SC     PT Diagnosis (PT  Clinical Impression)  impaired mobility  -SC     Patient/Family Goals Statement (PT Clinical Impression)  go home  -SC     Criteria for Skilled Interventions Met (PT Clinical Impression)  yes;treatment indicated  -SC     Pathology/Pathophysiology Noted (Describe Specifically for Each System)  musculoskeletal  -SC     Impairments Found (describe specific impairments)  motor function;muscle performance  -SC     Rehab Potential (PT Clinical Summary)  good, to achieve stated therapy goals  -SC     Care Plan Review (PT)  risks/benefits reviewed;care plan/treatment goals reviewed;evaluation/treatment results reviewed  -SC     Patient/Family Concerns, Anticipated Discharge Disposition (PT)  wants to go home  -SC     Row Name 12/04/18 1530          Physical Therapy Goals    Transfer Goal Selection (PT)  transfer, PT goal 1  -SC     Gait Training Goal Selection (PT)  gait training, PT goal 1  -SC     Row Name 12/04/18 1530          Transfer Goal 1 (PT)    Activity/Assistive Device (Transfer Goal 1, PT)  sit-to-stand/stand-to-sit;bed-to-chair/chair-to-bed;walker, rolling  -SC     [place] Level/Cues Needed (Transfer Goal 1, PT)  contact guard assist  -SC     Time Frame (Transfer Goal 1, PT)  long term goal (LTG);10 days  -SC     Row Name 12/04/18 1530          Gait Training Goal 1 (PT)    Activity/Assistive Device (Gait Training Goal 1, PT)  gait (walking locomotion)  -SC     [place] Level (Gait Training Goal 1, PT)  conditional independence knee walker  -SC     Distance (Gait Goal 1, PT)  150  -SC     Time Frame (Gait Training Goal 1, PT)  long term goal (LTG);10 days  -SC     Row Name 12/04/18 4780          Patient Education Goal (PT)    Activity (Patient Education Goal, PT)  nwb r leg  -SC     [place]/Cues/Accuracy (Memory Goal 2, PT)  demonstrates adequately;verbalizes understanding  -SC     Time Frame (Patient Education Goal, PT)  long term goal (LTG);10 days  -SC     Row Name 12/04/18 7000           Positioning and Restraints    Pre-Treatment Position  in bed  -SC     Post Treatment Position  chair  -SC     In Bed  sitting;call light within reach;encouraged to call for assist;exit alarm on  -SC     Row Name 12/04/18 1530          Living Environment    Home Accessibility  stairs to enter home  -SC       User Key  (r) = Recorded By, (t) = Taken By, (c) = Cosigned By    Initials Name Provider Type    SC Srinivasan Mcmahon, PT Physical Therapist    Prema Aguirre RN Registered Nurse    Michell Soto RN Registered Nurse    Courtney Dale RN Registered Nurse        Physical Therapy Education     Title: PT OT SLP Therapies (In Progress)     Topic: Physical Therapy (In Progress)     Point: Mobility training (In Progress)     Learning Progress Summary           Patient MANJIT Sanford VU, DU,NR by SC at 12/4/2018  3:30 PM    Comment:  reviewed nwb r leg    Acceptance, E, NR by ES at 12/2/2018  4:11 PM    Comment:  Reviewed NWB status with pt and educated on importance of following NWB on R LE.  Educated pt on safe transfers on/off knee scooter to be able to maintain NWB.    Acceptance, E,D, NR by ES at 12/1/2018  3:15 PM    Comment:  Educated pt and family on NWB status and importance of maintaining NWB status.  Reviewed sequencing with knee scooter and correct sequencing for transfers    Acceptance, E, NR by JACEY at 11/29/2018 10:15 AM   Family Acceptance, E,D, NR by ES at 12/1/2018  3:15 PM    Comment:  Educated pt and family on NWB status and importance of maintaining NWB status.  Reviewed sequencing with knee scooter and correct sequencing for transfers                   Point: Home exercise program (In Progress)     Learning Progress Summary           Patient MANJIT Sanford VU, DU,NR by SC at 12/4/2018  3:30 PM    Comment:  reviewed nwb r leg    Acceptance, E,D, NR by ES at 12/1/2018  3:15 PM    Comment:  Educated pt and family on NWB status and importance of maintaining NWB status.  Reviewed sequencing with knee  scooter and correct sequencing for transfers    Acceptance, E, NR by JACEY at 11/29/2018 10:15 AM   Family Acceptance, E,D, NR by ES at 12/1/2018  3:15 PM    Comment:  Educated pt and family on NWB status and importance of maintaining NWB status.  Reviewed sequencing with knee scooter and correct sequencing for transfers                   Point: Body mechanics (In Progress)     Learning Progress Summary           Patient Eager, E, VU,DU,NR by SC at 12/4/2018  3:30 PM    Comment:  reviewed nwb r leg    Acceptance, E, NR by ES at 12/2/2018  4:11 PM    Comment:  Reviewed NWB status with pt and educated on importance of following NWB on R LE.  Educated pt on safe transfers on/off knee scooter to be able to maintain NWB.    Acceptance, E,D, NR by ES at 12/1/2018  3:15 PM    Comment:  Educated pt and family on NWB status and importance of maintaining NWB status.  Reviewed sequencing with knee scooter and correct sequencing for transfers    Acceptance, E, VU by KG at 11/30/2018 11:46 PM    Acceptance, E, NR by JACEY at 11/29/2018 10:15 AM   Family Acceptance, E,D, NR by ES at 12/1/2018  3:15 PM    Comment:  Educated pt and family on NWB status and importance of maintaining NWB status.  Reviewed sequencing with knee scooter and correct sequencing for transfers                   Point: Precautions (In Progress)     Learning Progress Summary           Patient Eager, E, VU,DU,NR by SC at 12/4/2018  3:30 PM    Comment:  reviewed nwb r leg    Acceptance, E, NR by ES at 12/2/2018  4:11 PM    Comment:  Reviewed NWB status with pt and educated on importance of following NWB on R LE.  Educated pt on safe transfers on/off knee scooter to be able to maintain NWB.    Acceptance, E, VU by KG at 12/1/2018 11:14 PM    Acceptance, E,D, NR by ES at 12/1/2018  3:15 PM    Comment:  Educated pt and family on NWB status and importance of maintaining NWB status.  Reviewed sequencing with knee scooter and correct sequencing for transfers    Acceptance,  E, NR by JACEY at 11/29/2018 10:15 AM   Family Acceptance, E,D, NR by DANIELLE at 12/1/2018  3:15 PM    Comment:  Educated pt and family on NWB status and importance of maintaining NWB status.  Reviewed sequencing with knee scooter and correct sequencing for transfers                               User Key     Initials Effective Dates Name Provider Type Discipline    SC 06/19/15 -  Srinivasan Mcmahon, PT Physical Therapist PT    JACEY 06/19/15 -  Yany Cheek, PT Physical Therapist PT    KG 06/16/16 -  Roseann Lazo, RN Registered Nurse Nurse     11/13/17 -  Galina Wei, PT Physical Therapist PT              PT Recommendation and Plan  Anticipated Discharge Disposition (PT): skilled nursing facility  Planned Therapy Interventions (PT Eval): gait training, patient/family education, strengthening, transfer training  Therapy Frequency (PT Clinical Impression): daily  Outcome Summary/Treatment Plan (PT)  Anticipated Equipment Needs at Discharge (PT): rolling knee walker  Anticipated Discharge Disposition (PT): skilled nursing facility  Patient/Family Concerns, Anticipated Discharge Disposition (PT): wants to go home  Plan of Care Reviewed With: patient  Progress: improving  Outcome Summary: Patient demonstrated ability to maintain nwb while using walker. She is impulsive and unsafe at times. Recommend SNF at discharge.  Outcome Measures     Row Name 12/04/18 1530 12/02/18 1530          How much help from another person do you currently need...    Turning from your back to your side while in flat bed without using bedrails?  4  -SC  4  -ES     Moving from lying on back to sitting on the side of a flat bed without bedrails?  4  -SC  4  -ES     Moving to and from a bed to a chair (including a wheelchair)?  3  -SC  3  -ES     Standing up from a chair using your arms (e.g., wheelchair, bedside chair)?  3  -SC  3  -ES     Climbing 3-5 steps with a railing?  1  -SC  2  -ES     To walk in hospital room?  2  -SC  3  -ES      AM-PAC 6 Clicks Score  17  -SC  19  -ES        Functional Assessment    Outcome Measure Options  AM-PAC 6 Clicks Basic Mobility (PT)  -SC  AM-PAC 6 Clicks Basic Mobility (PT)  -ES       User Key  (r) = Recorded By, (t) = Taken By, (c) = Cosigned By    Initials Name Provider Type    SC Srinivasan Mcmahon A, PT Physical Therapist    ES Galina Wei, PT Physical Therapist         Time Calculation:   PT Charges     Row Name 12/04/18 1530             Time Calculation    Start Time  1530  -SC      PT Received On  12/04/18  -SC      PT Goal Re-Cert Due Date  12/09/18  -SC         Time Calculation- PT    Total Timed Code Minutes- PT  30 minute(s)  -SC         Timed Charges    39497 - PT Therapeutic Activity Minutes  30  -SC        User Key  (r) = Recorded By, (t) = Taken By, (c) = Cosigned By    Initials Name Provider Type    SC Lisandro Srinivasan A, PT Physical Therapist        Therapy Suggested Charges     Code   Minutes Charges    04137 (CPT®) Hc Pt Neuromusc Re Education Ea 15 Min      47578 (CPT®) Hc Pt Ther Proc Ea 15 Min      71847 (CPT®) Hc Gait Training Ea 15 Min      53493 (CPT®) Hc Pt Therapeutic Act Ea 15 Min 30 2    97909 (CPT®) Hc Pt Manual Therapy Ea 15 Min      87439 (CPT®) Hc Pt Iontophoresis Ea 15 Min      97014 (CPT®) Hc Pt Elec Stim Ea-Per 15 Min      95122 (CPT®) Hc Pt Ultrasound Ea 15 Min      07184 (CPT®) Hc Pt Self Care/Mgmt/Train Ea 15 Min      70463 (CPT®) Hc Pt Prosthetic (S) Train Initial Encounter, Each 15 Min      73314 (CPT®) Hc Pt Orthotic(S)/Prosthetic(S) Encounter, Each 15 Min      58690 (CPT®) Hc Orthotic(S) Mgmt/Train Initial Encounter, Each 15min      Total  30 2        Therapy Charges for Today     Code Description Service Date Service Provider Modifiers Qty    19309777272 HC PT THERAPEUTIC ACT EA 15 MIN 12/4/2018 LisandroJosé Luis barkeron A, PT GP 2    25412909781 HC PT THER SUPP EA 15 MIN 12/4/2018 LisandroJosé Luis barkeron A, PT GP 2    96749668549 HC PT RE-EVAL ESTABLISHED PLAN 2 12/4/2018 Srinivasan Mcmahon, PT GP  1          PT G-Codes  Outcome Measure Options: AM-PAC 6 Clicks Basic Mobility (PT)  AM-PAC 6 Clicks Score: 17      Srinivasan Mcmahon, PT  12/4/2018

## 2018-12-04 NOTE — PLAN OF CARE
"Problem: Patient Care Overview  Goal: Plan of Care Review   12/04/18 0504   Coping/Psychosocial   Plan of Care Reviewed With patient   Plan of Care Review   Progress improving   OTHER   Outcome Summary Pt is alert and oriented x4, however, at the start of the shift the patient was experiencing possible hallucinations and forgetfulness, pt was talking to her sister (who was not present), frequent medication request despite giving medication. Pt was aware of behavior, had a recent administration of klonopin, states \"klonopin makes me feel drunk.\" Pt reports right foot pain 7/10, request dilaudid q3-4hrs, drowsiness noted, remains noncompliaint with NWB RLE status. Has been NPO since 0000, CHG done, pre-op EKG NSR. Consent signed for right foot debridement 12-4-18 at 1300 with Dr. Patiño. Hospitalist, ID, and nephrology also following pt.          "

## 2018-12-04 NOTE — OP NOTE
Operative Report    12/04/18  12:01 PM    Preoperative diagnosis: right foot diabetic ulcer, necrosis, probable  ostetomyelitis great toe    Postoperative diagnosis: Same with gangrenous necrosis of tissue circumferential to right great toe and distal 1st metatarsal    Anesthesia: Gen.     Surgeon: Chio Sterling M.D.    Assistant: pamela CADE, she was present for the entire procedure including prepping, draping, retraction, closure, dressing.    Operative procedure: amputate right great toe and metatarsal    Operative indications: This is a 63-year-old woman with long-standing diabetes with severe peripheral neuropathy, decreased blood flow, stage IV renal failure.  She has had osteomyelitis previously in the left foot.  She has had chronic calluses and recurrent ulcerations on the feet.  She was admitted to the hospital with cellulitis and draining ulcers on the right foot under the first and third metatarsal heads.  She was found to have significant necrosis in the ulcer under the first metatarsal head, she was started on antibiotics.  Noninvasive vascular studies showed monophasic flow.  MRI was attempted but she was unable to complete it due to pain.  With antibiotics the cellulitis decreased and the necrotic areas demarcated.  She was instructed in strict nonweightbearing, but had difficulty complying with this.  Dressing changes were done with Bactroban.  The ulcer under the third metatarsal head improved.  The one under the first metatarsal head remains necrotic, there is dusky tissue around the great toe, at the time of surgery I found deep necrosis circumferentially around the great toe and metatarsal head.  Once the toe demarcated I discussed surgery with her.  I recommended I&D with probable amputation of the toe and possible amputation of the metatarsal.  She agreed.  I explained I don't think this will heal without surgical debridement.  She has a very high risk for a below-knee or above-knee  amputation given all of her medical problems, the ignificant necrosis, and difficulty with compliance.    Operative procedure: The patient was taken to the operating room where general anesthesia was induced without difficulty.  SHe was already on antibiotics.  The right leg was prepped and draped in the usual sterile fashion with a well-padded tourniquet on the thigh.  The appropriate timeout was called.  The leg was elevated, tourniquet inflated to 350 mmHg.  Tourniquet time was 4 minutes.  I made an elliptical incision over the plantar ulcer under the first metatarsal head and carried this proximally and distally in a curvilinear fashion.  Beneath the skin I found the full-thickness necrosis circumferentially around the great toe and metatarsal head.  Once I remove the most significant necrosis, and once deep cultures were obtained the tourniquet was released.  I continued to excise necrotic tissue until the was at least some sluggish bleeding at the transected edges of the tissue.  This required removal of the distal quarter of the first metatarsal.  Once this was done the remaining tissue had at least some sluggish bleeding with no micah necrosis.  All of the purulent material was removed.  The wound was then irrigated copiously with antibiotic solution using pulsatile lavage.  The incision was closed loosely over a Penrose drain with nylon.  The foot was dressed sterilely.  She was awakened, extubated and transferred to recovery in stable condition.  We will continue IV antibiotics.  She must be absolutely nonweightbearing on the extremity to have any chance of healing.  She is at very high risk for below-knee amputation.  She is also at risk for above-knee amputation.    Estimated blood loss: 10 cc    Specimens: Cultures and permanent    Drains: Penrose    Complications: None    Chio Sterling MD  12/04/18  12:01 PM

## 2018-12-04 NOTE — ANESTHESIA POSTPROCEDURE EVALUATION
Patient: Tashia Leon    Procedure Summary     Date:  12/04/18 Room / Location:   [facility]    Anesthesia Start:  1125 Anesthesia Stop:  1236    Procedure:  I&D right foot, great toe amputation and 1st Metatarsal amputation (Right Fingers) Diagnosis:       Impaired functional mobility, balance, gait, and endurance      Diabetic peripheral neuropathy (CMS/HCC)      Diabetes mellitus type 2, uncontrolled, with complications (CMS/HCC)      Chronic kidney disease, stage V (CMS/HCC)      Diabetic ulcer of right midfoot associated with type 2 diabetes mellitus, with necrosis of bone (CMS/HCC)      Cellulitis of toe of right foot      Obesity (BMI 30-39.9)      Chronic osteomyelitis of right foot with draining sinus (CMS/HCC)      (Impaired functional mobility, balance, gait, and endurance [Z74.09])      (Diabetic peripheral neuropathy (CMS/HCC) [E11.42])      (Diabetes mellitus type 2, uncontrolled, with complications (CMS/HCC) [E11.8, E11.65])      (Chronic kidney disease, stage V (CMS/HCC) [N18.5])      (Diabetic ulcer of right midfoot associated with type 2 diabetes mellitus, with necrosis of bone (CMS/HCC) [E11.621, L97.414])      (Cellulitis of toe of right foot [L03.031])      (Obesity (BMI 30-39.9) [E66.9])      (Chronic osteomyelitis of right foot with draining sinus (CMS/HCC) [M86.471])    Surgeon:  Chio Sterling MD Provider:  Binta Patricia MD    Anesthesia Type:  general ASA Status:  3          Anesthesia Type: general  Last vitals  BP   130/51 (12/04/18 1029)   Temp   98.1 °F (36.7 °C) (12/04/18 1029)   Pulse   75 (12/04/18 1029)   Resp   18 (12/04/18 1029)     SpO2   96 % (12/04/18 1029)     Post Anesthesia Care and Evaluation    Patient location during evaluation: PACU  Patient participation: complete - patient participated  Level of consciousness: awake and alert  Pain score: 0  Pain management: adequate  Airway patency: patent  Anesthetic complications: No anesthetic  complications  PONV Status: none  Cardiovascular status: hemodynamically stable and acceptable  Respiratory status: nonlabored ventilation, acceptable and nasal cannula  Hydration status: acceptable

## 2018-12-04 NOTE — ANESTHESIA PREPROCEDURE EVALUATION
Anesthesia Evaluation     Patient summary reviewed and Nursing notes reviewed   no history of anesthetic complications:  NPO Solid Status: > 8 hours  NPO Liquid Status: > 8 hours           Airway   Mallampati: III  TM distance: >3 FB  Neck ROM: full  Possible difficult intubation  Dental - normal exam   (+) implants    Pulmonary - negative pulmonary ROS and normal exam   Cardiovascular - normal exam    ECG reviewed    (+) hypertension, CAD, cardiac stents more than 12 months ago PVD, hyperlipidemia,  carotid artery disease  (-) dysrhythmias, angina      Neuro/Psych  (+) numbness (NEUROPATHY),     (-) seizures, CVA  GI/Hepatic/Renal/Endo    (+) obesity,  GERD well controlled,  renal disease (ckd STAGE 4), diabetes mellitus,     Musculoskeletal     Abdominal   (+) obese,    Substance History      OB/GYN          Other          Other Comment: ANEMIA                  Anesthesia Plan    ASA 3     general   (PT REFUSED NERVE BLOCK)  intravenous induction   Anesthetic plan, all risks, benefits, and alternatives have been provided, discussed and informed consent has been obtained with: patient.    Plan discussed with CRNA.

## 2018-12-04 NOTE — PROGRESS NOTES
[facility] Medicine Services  PROGRESS NOTE    Patient Name: Tashia Leon  : 1955  MRN: [id number]    Date of Admission: 2018  Length of Stay: 8  Primary Care Physician: Angelica Tran PA    Subjective   Subjective     CC:  Diabetic foot ulcer     HPI:  Seen in PACU. Just came from OR. Is tired, but denies pain, nausea/ vomiting.  Reports no overnight issues    Review of Systems  Gen- No fevers, chills  CV- No chest pain, palpitations  Resp- No cough, dyspnea  GI- No N/V/D, abd pain    Otherwise ROS is negative except as mentioned in the HPI.    Objective   Objective     Vital Signs:   Temp:  [97.6 °F (36.4 °C)-98.5 °F (36.9 °C)] 97.6 °F (36.4 °C)  Heart Rate:  [70-80] 70  Resp:  [16-20] 18  BP: (102-152)/(39-63) 148/60        Physical Exam:  Constitutional: No acute distress, sleeping and arouses easily to voice  HENT: NCAT, mucous membranes moist  Respiratory: Clear to auscultation bilaterally, respiratory effort normal   Cardiovascular: RRR, no murmurs, rubs, or gallops, palpable pedal pulses bilaterally  Gastrointestinal: Positive bowel sounds, soft, nontender, nondistended  Musculoskeletal: No bilateral ankle edema  Psychiatric: Appropriate affect, cooperative  Neurologic: Oriented x 3, strength symmetric in all extremities, Cranial Nerves grossly intact to confrontation, speech clear  Skin: No rashes- right foot wrapped in ACE- CDI    Results Reviewed:  I have personally reviewed current lab, radiology, and data and agree.    Results from last 7 days   Lab Units  18   0639  18   0557  18   0802   WBC 10*3/mm3  12.87*  12.24*  13.86*   HEMOGLOBIN g/dL  8.5*  8.3*  8.7*   HEMATOCRIT %  27.3*  26.9*  27.9*   PLATELETS 10*3/mm3  415  435  438     Results from last 7 days   Lab Units  18   0639  18   0557  18   0949   SODIUM mmol/L  140  138  133   POTASSIUM mmol/L  3.9  3.6  3.8   CHLORIDE mmol/L  107  102  95*   CO2 mmol/L  20.0   24.0  25.0   BUN mg/dL  58*  66*  87*   CREATININE mg/dL  3.56*  3.91*  4.38*   GLUCOSE mg/dL  202*  173*  150*   CALCIUM mg/dL  8.1*  8.0*  8.2*     Estimated Creatinine Clearance: 20.7 mL/min (A) (by C-G formula based on SCr of 3.56 mg/dL (H)).  No results found for: BNP    Microbiology Results Abnormal     Procedure Component Value - Date/Time    Eosinophil Smear - Urine, Urine, Clean Catch [[id number]]  (Normal) Collected:  12/01/18 1629    Lab Status:  Final result Specimen:  Urine, Clean Catch Updated:  12/01/18 1710     Eosinophil Smear 0 % EOS/100 Cells     Narrative:       No eosinophil seen    Blood Culture - Blood, Arm, Left [[id number]] Collected:  11/26/18 1625    Lab Status:  Final result Specimen:  Blood from Arm, Left Updated:  12/01/18 1645     Blood Culture No growth at 5 days    Blood Culture - Blood, Arm, Right [[id number]] Collected:  11/26/18 1620    Lab Status:  Final result Specimen:  Blood from Arm, Right Updated:  12/01/18 1645     Blood Culture No growth at 5 days          Imaging Results (last 24 hours)     ** No results found for the last 24 hours. **        Results for orders placed during the hospital encounter of 07/15/16   Adult transthoracic echo complete    Narrative · Left ventricular function is normal. Estimated EF = 55%.  · Trace mitral valve regurgitation is present.  · Trace tricuspid valve regurgitation is present          I have reviewed the medications.      aspirin  mg Oral Daily   castor oil-balsam peru  Topical Q12H   DAPTOmycin 6 mg/kg (Adjusted) Intravenous Q48H   diltiaZEM  mg Oral Nightly   docusate sodium 100 mg Oral Daily   ertapenem 500 mg Intravenous Q24H   ferric gluconate (FERRLECIT) IVPB 125 mg Intravenous Daily   heparin (porcine) 5,000 Units Subcutaneous Q12H   HYDROcodone-acetaminophen 1 tablet Oral TID   insulin detemir 40 Units Subcutaneous Q12H   insulin lispro 0-14 Units Subcutaneous 4x Daily With Meals & Nightly   insulin lispro 0-7 Units  Subcutaneous 4x Daily With Meals & Nightly   mupirocin  Topical Q24H   Naloxegol Oxalate 12.5 mg Oral Once per day on Sun Wed   pantoprazole 40 mg Oral QAM   sodium chloride 3 mL Intravenous Q12H         Assessment/Plan   Assessment / Plan     Active Hospital Problems    Diagnosis   • **Diabetic ulcer of right foot associated with type 2 diabetes mellitus (CMS/HCC)   • Sepsis (CMS/HCC)   • Cellulitis   • Hypokalemia   • Anemia, chronic disease   • Obesity (BMI 30-39.9)   • Impaired functional mobility, balance, gait, and endurance     Added automatically from request for surgery [id number]     • Diabetic ulcer of right midfoot associated with type 2 diabetes mellitus, with necrosis of bone (CMS/HCC)     Added automatically from request for surgery [id number]     • Cellulitis of toe of right foot     Added automatically from request for surgery [id number]     • Chronic osteomyelitis of right foot with draining sinus (CMS/HCC)     Added automatically from request for surgery [id number]     • Chronic kidney disease, stage V (CMS/HCC)   • Hyperlipidemia   • Diabetes mellitus type 2, uncontrolled, with complications (CMS/HCC)     Proteinuria noted, January 2014.       • Diabetic peripheral neuropathy (CMS/HCC)   • Essential hypertension   • Coronary artery disease involving native coronary artery of native heart without angina pectoris     1. Coronary artery disease:  a. Abnormal cardiac PET, 10/02/2013, Dr. Becker, revealing a posterolateral defect with a mild wall motion abnormality and a normal LVEF.  b. Cardiac catheterization, 10/21/2013, with placement of a 2.5 x 15 mm Xience Expedition drug-eluting stent to the proximal circumflex.  LVEF of 55% to 60%.  Noncritical disease in the LAD and right coronary.  c. Echocardiogram, 12/18/2013, shows an LVEF 50% to 55% with trace MR and mild TR.               Brief Hospital Course to date:  1.  Sepsis, now resolved     2.  Diabetic foot ulcer with cellulitis right foot        -s/p debridement and partial right great toe amputation with Dr. Whalen today              -wound care received silver wound gel topical treatment, dressings              -Invanz and Dapto, ID recs              -BC x 2, currently negative              -pain control, monitor while on IV pain medicines              -cbc, bmp in the am            - Scooter to offload foot has been ordered and is at the bedside.  Patient has seen PT who taught her appropriate use but are concerned about her weakness and stability.               3.   Type 2 diabetes mellitus with diabetic peripheral neuropathy              -a1c 7.6              -levemir 40 units bid, continue               -continue correction insulin, eating less while admitted     4.  CKDV              -nephrology following,  renal function limits abx choices               -renal dysfunction is very advanced              - Creatinine trending up, diuretics have been held and patient has received some IV fluids                 5.  Hypokalemia              -replaced by nephrology as needed        6. Obesity BMI 38: Weight loss recommended     7.  HTN: Currently normotensive     8.  CAD: Asymptomatic, aspirin     9.  Diabetic peripheral neuropathy: Symptomatic treatment and supportive care     10.  Anemia of chronic disease                 11.  Severe anxiety:  In the setting of severe anxiety while awaiting surgery plan to start low-dose clonazepam and adjust as needed.  Preferably could use BuSpar but advanced kidney disease prevent the use of this medication.  improved now.       12.  Debility: Difficulty walking with needed scooter.  Therapy recommended rehabilitation facility for appropriate care and treatment.  Patient so far has declined.     Also of note patient has not been completely compliant regarding nonweightbearing status of the foot. Continue to stress importance     DVT Prophylaxis:  Heparin      CODE STATUS:   Code Status and Medical Interventions:    Ordered at: 11/26/18 2126     Level Of Support Discussed With:    Patient     Code Status:    CPR     Medical Interventions (Level of Support Prior to Arrest):    Full       Disposition: I expect the patient to be discharged tbd       Electronically signed by KAREEM Mena, 12/04/18, 1:08 PM.

## 2018-12-04 NOTE — PROGRESS NOTES
Orthopedic Foot/Ankle Progress Note      Subjective     Post-Op: s/p amputation right 1st toe and metatarsal   Systemic or Specific Complaints: sleepy  Objective     Vital signs in last 24 hours:  Temp:  [97.6 °F (36.4 °C)-98.5 °F (36.9 °C)] 97.6 °F (36.4 °C)  Heart Rate:  [70-80] 70  Resp:  [16-20] 18  BP: (102-152)/(39-63) 148/60    Neurovascular: No change in dense neuropathy, other toes right foot are pink and warm   Wound: Right foot dressing dry         Data Review  Lab Results (last 24 hours)     Procedure Component Value Units Date/Time    Anaerobic Culture - Tissue, Toe, Right [[id number]] Collected:  12/04/18 1207    Specimen:  Tissue from Toe, Right Updated:  12/04/18 1302    Fungus Culture - Tissue, Toe, Right [[id number]] Collected:  12/04/18 1207    Specimen:  Tissue from Toe, Right Updated:  12/04/18 1302    Tissue / Bone Culture - Tissue, Toe, Right [[id number]] Collected:  12/04/18 1207    Specimen:  Tissue from Toe, Right Updated:  12/04/18 1302    AFB Culture - Tissue, Toe, Right [[id number]] Collected:  12/04/18 1207    Specimen:  Tissue from Toe, Right Updated:  12/04/18 1302    Tissue Pathology Exam [[id number]] Collected:  12/04/18 1209    Specimen:  Tissue from Toe, Right Updated:  12/04/18 1257    POC Glucose Once [[id number]]  (Abnormal) Collected:  12/04/18 0753    Specimen:  Blood Updated:  12/04/18 0821     Glucose 178 mg/dL     Basic Metabolic Panel [[id number]]  (Abnormal) Collected:  12/04/18 0639    Specimen:  Blood Updated:  12/04/18 0740     Glucose 202 mg/dL      BUN 58 mg/dL      Creatinine 3.56 mg/dL      Sodium 140 mmol/L      Potassium 3.9 mmol/L      Chloride 107 mmol/L      CO2 20.0 mmol/L      Calcium 8.1 mg/dL      eGFR Non African Amer 13 mL/min/1.73      BUN/Creatinine Ratio 16.3     Anion Gap 13.0 mmol/L     Narrative:       [employer] Guidelines    Stage     Description        GFR  1         Normal or High     90+  2         Mild decrease      60-89  3          Moderate decrease  30-59  4         Severe decrease    15-29  5         Kidney failure     <15    The MDRD GFR formula is only valid for adults with stable renal function between ages 18 and 70.    CBC (No Diff) [[id number]]  (Abnormal) Collected:  12/04/18 0639    Specimen:  Blood Updated:  12/04/18 0704     WBC 12.87 10*3/mm3      RBC 2.78 10*6/mm3      Hemoglobin 8.5 g/dL      Hematocrit 27.3 %      MCV 98.2 fL      MCH 30.6 pg      MCHC 31.1 g/dL      RDW 13.1 %      RDW-SD 47.0 fl      MPV 9.4 fL      Platelets 415 10*3/mm3     POC Glucose Once [[id number]]  (Abnormal) Collected:  12/03/18 2020    Specimen:  Blood Updated:  12/03/18 2027     Glucose 231 mg/dL     POC Glucose Once [[id number]]  (Normal) Collected:  12/03/18 1613    Specimen:  Blood Updated:  12/03/18 1618     Glucose 116 mg/dL             Assessment/Plan     Status post- stable s/p right 1st toe and metatarsal amputation  Will need 6 weeks IV antibiotics.  Must be ABSOLUTELY NON-WT BEARING, or she will go on to BKA/AKA.  I discussed this in detail with son, and again with patient (multiple prior discussions).  Many risk factors for poor healing; unfortunately.           Chio Sterling MD    Date: 12/4/2018  Time: 1:31 PM

## 2018-12-05 LAB
ANION GAP SERPL CALCULATED.3IONS-SCNC: 7 MMOL/L (ref 3–11)
BASOPHILS # BLD AUTO: 0.06 10*3/MM3 (ref 0–0.2)
BASOPHILS NFR BLD AUTO: 0.4 % (ref 0–1)
BUN BLD-MCNC: 51 MG/DL (ref 9–23)
BUN/CREAT SERPL: 15.1 (ref 7–25)
CALCIUM SPEC-SCNC: 7.9 MG/DL (ref 8.7–10.4)
CHLORIDE SERPL-SCNC: 105 MMOL/L (ref 99–109)
CO2 SERPL-SCNC: 25 MMOL/L (ref 20–31)
CREAT BLD-MCNC: 3.38 MG/DL (ref 0.6–1.3)
CYTO UR: NORMAL
DEPRECATED RDW RBC AUTO: 47 FL (ref 37–54)
EOSINOPHIL # BLD AUTO: 0.44 10*3/MM3 (ref 0–0.3)
EOSINOPHIL NFR BLD AUTO: 2.9 % (ref 0–3)
ERYTHROCYTE [DISTWIDTH] IN BLOOD BY AUTOMATED COUNT: 13.2 % (ref 11.3–14.5)
GFR SERPL CREATININE-BSD FRML MDRD: 14 ML/MIN/1.73
GLUCOSE BLD-MCNC: 135 MG/DL (ref 70–100)
GLUCOSE BLDC GLUCOMTR-MCNC: 140 MG/DL (ref 70–130)
GLUCOSE BLDC GLUCOMTR-MCNC: 143 MG/DL (ref 70–130)
GLUCOSE BLDC GLUCOMTR-MCNC: 212 MG/DL (ref 70–130)
GLUCOSE BLDC GLUCOMTR-MCNC: 216 MG/DL (ref 70–130)
HCT VFR BLD AUTO: 27.1 % (ref 34.5–44)
HGB BLD-MCNC: 8.4 G/DL (ref 11.5–15.5)
IMM GRANULOCYTES # BLD: 0.19 10*3/MM3 (ref 0–0.03)
IMM GRANULOCYTES NFR BLD: 1.3 % (ref 0–0.6)
LAB AP CASE REPORT: NORMAL
LAB AP CLINICAL INFORMATION: NORMAL
LYMPHOCYTES # BLD AUTO: 3.88 10*3/MM3 (ref 0.6–4.8)
LYMPHOCYTES NFR BLD AUTO: 25.6 % (ref 24–44)
MCH RBC QN AUTO: 30.5 PG (ref 27–31)
MCHC RBC AUTO-ENTMCNC: 31 G/DL (ref 32–36)
MCV RBC AUTO: 98.5 FL (ref 80–99)
MONOCYTES # BLD AUTO: 0.87 10*3/MM3 (ref 0–1)
MONOCYTES NFR BLD AUTO: 5.7 % (ref 0–12)
NEUTROPHILS # BLD AUTO: 9.9 10*3/MM3 (ref 1.5–8.3)
NEUTROPHILS NFR BLD AUTO: 65.4 % (ref 41–71)
PATH REPORT.FINAL DX SPEC: NORMAL
PATH REPORT.GROSS SPEC: NORMAL
PLATELET # BLD AUTO: 444 10*3/MM3 (ref 150–450)
PMV BLD AUTO: 9.3 FL (ref 6–12)
POTASSIUM BLD-SCNC: 3.9 MMOL/L (ref 3.5–5.5)
RBC # BLD AUTO: 2.75 10*6/MM3 (ref 3.89–5.14)
SODIUM BLD-SCNC: 137 MMOL/L (ref 132–146)
WBC NRBC COR # BLD: 15.15 10*3/MM3 (ref 3.5–10.8)

## 2018-12-05 PROCEDURE — 99024 POSTOP FOLLOW-UP VISIT: CPT | Performed by: ORTHOPAEDIC SURGERY

## 2018-12-05 PROCEDURE — 97166 OT EVAL MOD COMPLEX 45 MIN: CPT

## 2018-12-05 PROCEDURE — 25010000002 HEPARIN (PORCINE) PER 1000 UNITS: Performed by: NURSE PRACTITIONER

## 2018-12-05 PROCEDURE — 80048 BASIC METABOLIC PNL TOTAL CA: CPT | Performed by: NURSE PRACTITIONER

## 2018-12-05 PROCEDURE — 25010000002 HYDROMORPHONE PER 4 MG: Performed by: NURSE PRACTITIONER

## 2018-12-05 PROCEDURE — 25010000002 NA FERRIC GLUC CPLX PER 12.5 MG: Performed by: INTERNAL MEDICINE

## 2018-12-05 PROCEDURE — 99232 SBSQ HOSP IP/OBS MODERATE 35: CPT | Performed by: NURSE PRACTITIONER

## 2018-12-05 PROCEDURE — 63710000001 INSULIN DETEMIR PER 5 UNITS: Performed by: INTERNAL MEDICINE

## 2018-12-05 PROCEDURE — 97530 THERAPEUTIC ACTIVITIES: CPT

## 2018-12-05 PROCEDURE — 82962 GLUCOSE BLOOD TEST: CPT

## 2018-12-05 PROCEDURE — 25010000002 ERTAPENEM PER 500 MG: Performed by: PHYSICIAN ASSISTANT

## 2018-12-05 PROCEDURE — 85025 COMPLETE CBC W/AUTO DIFF WBC: CPT | Performed by: NURSE PRACTITIONER

## 2018-12-05 RX ORDER — HYDROMORPHONE HYDROCHLORIDE 1 MG/ML
0.5 INJECTION, SOLUTION INTRAMUSCULAR; INTRAVENOUS; SUBCUTANEOUS EVERY 4 HOURS PRN
Status: DISCONTINUED | OUTPATIENT
Start: 2018-12-05 | End: 2018-12-06

## 2018-12-05 RX ORDER — MUPIROCIN CALCIUM 20 MG/G
CREAM TOPICAL
Status: DISCONTINUED | OUTPATIENT
Start: 2018-12-05 | End: 2018-12-13 | Stop reason: HOSPADM

## 2018-12-05 RX ORDER — NALOXONE HCL 0.4 MG/ML
0.4 VIAL (ML) INJECTION
Status: DISCONTINUED | OUTPATIENT
Start: 2018-12-05 | End: 2018-12-06

## 2018-12-05 RX ADMIN — PANTOPRAZOLE SODIUM 40 MG: 40 TABLET, DELAYED RELEASE ORAL at 09:36

## 2018-12-05 RX ADMIN — SODIUM CHLORIDE, PRESERVATIVE FREE 3 ML: 5 INJECTION INTRAVENOUS at 09:38

## 2018-12-05 RX ADMIN — HEPARIN SODIUM 5000 UNITS: 5000 INJECTION, SOLUTION INTRAVENOUS; SUBCUTANEOUS at 09:36

## 2018-12-05 RX ADMIN — INSULIN LISPRO 5 UNITS: 100 INJECTION, SOLUTION INTRAVENOUS; SUBCUTANEOUS at 17:53

## 2018-12-05 RX ADMIN — SODIUM CHLORIDE 125 MG: 9 INJECTION, SOLUTION INTRAVENOUS at 10:20

## 2018-12-05 RX ADMIN — CASTOR OIL AND BALSAM, PERU: 788; 87 OINTMENT TOPICAL at 09:37

## 2018-12-05 RX ADMIN — HYDROMORPHONE HYDROCHLORIDE 0.5 MG: 1 INJECTION, SOLUTION INTRAMUSCULAR; INTRAVENOUS; SUBCUTANEOUS at 22:51

## 2018-12-05 RX ADMIN — INSULIN DETEMIR 40 UNITS: 100 INJECTION, SOLUTION SUBCUTANEOUS at 09:43

## 2018-12-05 RX ADMIN — HYDROMORPHONE HYDROCHLORIDE 0.5 MG: 1 INJECTION, SOLUTION INTRAMUSCULAR; INTRAVENOUS; SUBCUTANEOUS at 08:12

## 2018-12-05 RX ADMIN — MUPIROCIN: 2 CREAM TOPICAL at 09:37

## 2018-12-05 RX ADMIN — SODIUM CHLORIDE, PRESERVATIVE FREE 3 ML: 5 INJECTION INTRAVENOUS at 21:44

## 2018-12-05 RX ADMIN — HEPARIN SODIUM 5000 UNITS: 5000 INJECTION, SOLUTION INTRAVENOUS; SUBCUTANEOUS at 21:43

## 2018-12-05 RX ADMIN — ASPIRIN 325 MG: 325 TABLET, DELAYED RELEASE ORAL at 09:36

## 2018-12-05 RX ADMIN — HYDROMORPHONE HYDROCHLORIDE 0.5 MG: 1 INJECTION, SOLUTION INTRAMUSCULAR; INTRAVENOUS; SUBCUTANEOUS at 12:36

## 2018-12-05 RX ADMIN — HYDROCODONE BITARTRATE AND ACETAMINOPHEN 1 TABLET: 7.5; 325 TABLET ORAL at 21:44

## 2018-12-05 RX ADMIN — DOCUSATE SODIUM 100 MG: 100 CAPSULE, LIQUID FILLED ORAL at 09:36

## 2018-12-05 RX ADMIN — ERTAPENEM SODIUM 500 MG: 1 INJECTION, POWDER, LYOPHILIZED, FOR SOLUTION INTRAMUSCULAR; INTRAVENOUS at 17:51

## 2018-12-05 RX ADMIN — INSULIN LISPRO 5 UNITS: 100 INJECTION, SOLUTION INTRAVENOUS; SUBCUTANEOUS at 12:11

## 2018-12-05 RX ADMIN — DILTIAZEM HYDROCHLORIDE 120 MG: 120 CAPSULE, EXTENDED RELEASE ORAL at 21:43

## 2018-12-05 RX ADMIN — HYDROCODONE BITARTRATE AND ACETAMINOPHEN 1 TABLET: 7.5; 325 TABLET ORAL at 17:51

## 2018-12-05 RX ADMIN — CASTOR OIL AND BALSAM, PERU: 788; 87 OINTMENT TOPICAL at 21:44

## 2018-12-05 RX ADMIN — HYDROMORPHONE HYDROCHLORIDE 0.5 MG: 1 INJECTION, SOLUTION INTRAMUSCULAR; INTRAVENOUS; SUBCUTANEOUS at 02:20

## 2018-12-05 RX ADMIN — MUPIROCIN: 2 CREAM TOPICAL at 09:42

## 2018-12-05 RX ADMIN — INSULIN DETEMIR 40 UNITS: 100 INJECTION, SOLUTION SUBCUTANEOUS at 21:43

## 2018-12-05 NOTE — PROGRESS NOTES
[facility] Medicine Services  PROGRESS NOTE    Patient Name: Tashia Leon  : 1955  MRN: [id number]    Date of Admission: 2018  Length of Stay: 9  Primary Care Physician: Angelica Tran PA    Subjective   Subjective     CC:  Diabetic foot ulcer     HPI:  Patient comfortable by report. States no overnight issues. Was able to get out of bed yesterday with assist, but has some trouble with balance per PT notes. Patient states she would be agreeable to some rehab at discharge.   Asking if she can switch to oral antibiotics    Review of Systems  Gen- No fevers, chills  CV- No chest pain, palpitations  Resp- No cough, dyspnea  GI- No N/V/D, abd pain    Otherwise ROS is negative except as mentioned in the HPI.    Objective   Objective     Vital Signs:   Temp:  [97.5 °F (36.4 °C)-99.6 °F (37.6 °C)] 99.6 °F (37.6 °C)  Heart Rate:  [65-85] 85  Resp:  [16-20] 16  BP: (116-155)/(39-94) 118/50        Physical Exam:  Constitutional: No acute distress, sitting up in bed with IPad  HENT: NCAT, mucous membranes moist  Respiratory: Clear to auscultation bilaterally, respiratory effort normal   Cardiovascular: RRR, no murmurs, rubs, or gallops, palpable pedal pulses bilaterally  Gastrointestinal: Positive bowel sounds, soft, nontender, nondistended  Musculoskeletal: No bilateral ankle edema  Psychiatric: Appropriate affect, cooperative  Neurologic: Oriented x 3, strength symmetric in all extremities, Cranial Nerves grossly intact to confrontation, speech clear  Skin: No rashes- right foot wrapped in ACE- CDI- elevated on pillow    Results Reviewed:  I have personally reviewed current lab, radiology, and data and agree.    Results from last 7 days   Lab Units  18   0637  18   0639  18   0557   WBC 10*3/mm3  15.15*  12.87*  12.24*   HEMOGLOBIN g/dL  8.4*  8.5*  8.3*   HEMATOCRIT %  27.1*  27.3*  26.9*   PLATELETS 10*3/mm3  444  415  435     Results from last 7 days   Lab  Units  12/05/18   0637  12/04/18   0639  12/03/18   0557   SODIUM mmol/L  137  140  138   POTASSIUM mmol/L  3.9  3.9  3.6   CHLORIDE mmol/L  105  107  102   CO2 mmol/L  25.0  20.0  24.0   BUN mg/dL  51*  58*  66*   CREATININE mg/dL  3.38*  3.56*  3.91*   GLUCOSE mg/dL  135*  202*  173*   CALCIUM mg/dL  7.9*  8.1*  8.0*     Estimated Creatinine Clearance: 21.9 mL/min (A) (by C-G formula based on SCr of 3.38 mg/dL (H)).  No results found for: BNP    Microbiology Results Abnormal     Procedure Component Value - Date/Time    Tissue / Bone Culture - Tissue, Toe, Right [[id number]] Collected:  12/04/18 1207    Lab Status:  Preliminary result Specimen:  Tissue from Toe, Right Updated:  12/05/18 0654     Tissue Culture No growth     Gram Stain No WBCs or organisms seen    Eosinophil Smear - Urine, Urine, Clean Catch [[id number]]  (Normal) Collected:  12/01/18 1629    Lab Status:  Final result Specimen:  Urine, Clean Catch Updated:  12/01/18 1710     Eosinophil Smear 0 % EOS/100 Cells     Narrative:       No eosinophil seen    Blood Culture - Blood, Arm, Left [[id number]] Collected:  11/26/18 1625    Lab Status:  Final result Specimen:  Blood from Arm, Left Updated:  12/01/18 1645     Blood Culture No growth at 5 days    Blood Culture - Blood, Arm, Right [[id number]] Collected:  11/26/18 1620    Lab Status:  Final result Specimen:  Blood from Arm, Right Updated:  12/01/18 1645     Blood Culture No growth at 5 days          Imaging Results (last 24 hours)     ** No results found for the last 24 hours. **        Results for orders placed during the hospital encounter of 07/15/16   Adult transthoracic echo complete    Narrative · Left ventricular function is normal. Estimated EF = 55%.  · Trace mitral valve regurgitation is present.  · Trace tricuspid valve regurgitation is present          I have reviewed the medications.      aspirin  mg Oral Daily   castor oil-balsam peru  Topical Q12H   DAPTOmycin 6 mg/kg (Adjusted)  Intravenous Q48H   diltiaZEM  mg Oral Nightly   docusate sodium 100 mg Oral Daily   ertapenem 500 mg Intravenous Q24H   ferric gluconate (FERRLECIT) IVPB 125 mg Intravenous Daily   heparin (porcine) 5,000 Units Subcutaneous Q12H   HYDROcodone-acetaminophen 1 tablet Oral TID   insulin detemir 40 Units Subcutaneous Q12H   insulin lispro 0-14 Units Subcutaneous 4x Daily With Meals & Nightly   mupirocin  Topical Q24H   mupirocin  Topical Q24H   Naloxegol Oxalate 12.5 mg Oral Once per day on Sun Wed   pantoprazole 40 mg Oral QAM   sodium chloride 3 mL Intravenous Q12H         Assessment/Plan   Assessment / Plan     Active Hospital Problems    Diagnosis   • **Diabetic ulcer of right foot associated with type 2 diabetes mellitus (CMS/HCC)   • Sepsis (CMS/HCC)   • Cellulitis   • Hypokalemia   • Anemia, chronic disease   • Obesity (BMI 30-39.9)   • Impaired functional mobility, balance, gait, and endurance     Added automatically from request for surgery [id number]     • Diabetic ulcer of right midfoot associated with type 2 diabetes mellitus, with necrosis of bone (CMS/HCC)     Added automatically from request for surgery [id number]     • Cellulitis of toe of right foot     Added automatically from request for surgery [id number]     • Chronic osteomyelitis of right foot with draining sinus (CMS/HCC)     Added automatically from request for surgery [id number]     • Chronic kidney disease, stage V (CMS/HCC)   • Hyperlipidemia   • Diabetes mellitus type 2, uncontrolled, with complications (CMS/HCC)     Proteinuria noted, January 2014.       • Diabetic peripheral neuropathy (CMS/HCC)   • Essential hypertension   • Coronary artery disease involving native coronary artery of native heart without angina pectoris     1. Coronary artery disease:  a. Abnormal cardiac PET, 10/02/2013, Dr. Becker, revealing a posterolateral defect with a mild wall motion abnormality and a normal LVEF.  b. Cardiac catheterization, 10/21/2013, with  placement of a 2.5 x 15 mm Xience Expedition drug-eluting stent to the proximal circumflex.  LVEF of 55% to 60%.  Noncritical disease in the LAD and right coronary.  c. Echocardiogram, 12/18/2013, shows an LVEF 50% to 55% with trace MR and mild TR.               Brief Hospital Course to date:  1.  Sepsis, now resolved     2.  Diabetic foot ulcer with cellulitis right foot       -s/p debridement and right 1st toe/ metarsal amputation with Dr. Whalen 12/4              -wound care received silver wound gel topical treatment, dressings              -Invanz and Dapto, ID recs              -BC x 2, currently negative              -pain control- scheduled norco; decrease frequency of dilaudid IV              -cbc, bmp in the am            - Scooter to offload foot has been ordered and is at the bedside.  Patient has seen PT who taught her appropriate use but are concerned about her weakness and stability.               3.   Type 2 diabetes mellitus with diabetic peripheral neuropathy              -a1c 7.6              -levemir 40 units bid, continue               -continue correction insulin, eating less while admitted     4.  CKDV              -nephrology following,  renal function limits abx choices               -renal dysfunction is very advanced              - Creatinine stable, continues off home rx lasix; euvolemic                 5.  Hypokalemia              -replaced by nephrology as needed        6. Obesity BMI 38: Weight loss recommended     7.  HTN: Currently normotensive     8.  CAD: Asymptomatic, aspirin     9.  Diabetic peripheral neuropathy: Symptomatic treatment and supportive care     10.  Anemia of chronic disease                 11.  Severe anxiety:improved with clonazepam. Not needed past couple of days      12.  Debility: Difficulty walking with needed skater.  Therapy recommended rehabilitation facility for appropriate care and treatment.  Patient agreeable today- will ask CM to make arrangements          DVT Prophylaxis:  Heparin      CODE STATUS:   Code Status and Medical Interventions:   Ordered at: 11/26/18 2126     Level Of Support Discussed With:    Patient     Code Status:    CPR     Medical Interventions (Level of Support Prior to Arrest):    Full       Disposition: I expect the patient to be discharged to rehab TBD      Electronically signed by KAREEM Mena, 12/05/18, 9:38 AM.

## 2018-12-05 NOTE — PROGRESS NOTES
Continued Stay Note  [facility]     Patient Name: Tashia Leon  MRN: [id number]  Today's Date: 12/5/2018    Admit Date: 11/26/2018    Discharge Plan     Row Name 12/05/18 1558       Plan    Plan  Home with home infusion    Patient/Family in Agreement with Plan  yes    Plan Comments  Case mgt con't to follow. Discussed d/c options with Ms Leon. Her plan remains to go home, she has a mother and daughter to assist with daily care. She states she has a RN friend who can administer IV antibiotics. She states she has done home IV's,wound vac , etc at home in 2013,she was also non weight bearing on left foot at that time. She is aware she is to be non weight bearing. She has a knee scooter,wheelchair, BSC, and electric scooter at home. She does not want to consider transfer to another facility(SNF or LTAC). She also does not want Home Health services. She is willing to make weekly trips to [facility] office.She states she would like MDs to  explain exact plan of treatment for home. We discussed IV access issues, since PICC is not an option. (She states her RN friend changed peripheral IV as needed when she did this in 2013)        Discharge Codes    No documentation.       Expected Discharge Date and Time     Expected Discharge Date Expected Discharge Time    Dec 6, 2018             Sonja C Kellerman, RN

## 2018-12-05 NOTE — PLAN OF CARE
"Problem: Patient Care Overview  Goal: Plan of Care Review   12/05/18 0125   Coping/Psychosocial   Plan of Care Reviewed With patient   Plan of Care Review   Progress improving   OTHER   Outcome Summary VSS, intermittent episodes of confusion and increased forgetfulness noted. Pain stays consistently at a 7/10, but pt reports being comfortable. RLE kept elevated post right 1st toe amputation, pt denies numbness/tingling, able to wiggle toes. Pt compliant with NWB status this evening, up with 2 to BSC due to her impulsiveness. Patient voiced interest in a SNF at discharge stating \"I'll consider it.\" Labs in AM. Hospitalist, ID, Nephrology, and Dr. Patiño following patient. Jayesh CADE contacted to clarify cardizem administration, pharmacy warning alerted staff to increased effectiveness of fentanyl when in combination of cardizem, Ambrose CADE instructed staff to give the Cardizem, this was at approx 2300 and patient had surgery at approx 1300 where fentanyl was used. PRN dilaudid 0.5mg IV used for pain management as well as scheduled norco.          "

## 2018-12-05 NOTE — PLAN OF CARE
Problem: Patient Care Overview  Goal: Plan of Care Review  Outcome: Ongoing (interventions implemented as appropriate)   12/04/18 2017   Coping/Psychosocial   Plan of Care Reviewed With patient   Plan of Care Review   Progress improving   OTHER   Outcome Summary patient taken down for scheduled surgery at aprox 1002 Great toe apmputation completed. Patient arrived back to the unit at aprox 1430. Continued to have complaint of right foot incisional pain Dilaudid and scheduled norco given throughout shift. Patient has been able to void after surgery. Up to bedside however pt was impulsive at times even after reeducation completed on nonweightbearing baring status. Spoke to pts about risk of putting weight on right foot. Dressing clean dry and intact. numbness has slowly resolved good cap refill on RLE Anticipates to be inpatient for next couple of days will continue to monitor         Problem: Infection, Risk/Actual (Adult)  Goal: Identify Related Risk Factors and Signs and Symptoms  Outcome: Ongoing (interventions implemented as appropriate)   12/04/18 2017   Infection, Risk/Actual (Adult)   Related Risk Factors (Infection, Risk/Actual) surgery/procedure   Signs and Symptoms (Infection, Risk/Actual) pain;weakness     Goal: Infection Prevention/Resolution  Outcome: Ongoing (interventions implemented as appropriate)   12/04/18 2017   Infection, Risk/Actual (Adult)   Infection Prevention/Resolution making progress toward outcome       Problem: Fall Risk (Adult)  Goal: Identify Related Risk Factors and Signs and Symptoms  Outcome: Ongoing (interventions implemented as appropriate)   12/04/18 2017   Fall Risk (Adult)   Related Risk Factors (Fall Risk) gait/mobility problems;fatigue/slow reaction;environment unfamiliar   Signs and Symptoms (Fall Risk) presence of risk factors     Goal: Absence of Fall  Outcome: Ongoing (interventions implemented as appropriate)   12/04/18 2017   Fall Risk (Adult)   Absence of Fall making  progress toward outcome       Problem: Pain, Acute (Adult)  Goal: Identify Related Risk Factors and Signs and Symptoms  Outcome: Ongoing (interventions implemented as appropriate)   12/04/18 2017   Pain, Acute (Adult)   Related Risk Factors (Acute Pain) surgery;persistent pain;infection;knowledge deficit;patient perception   Signs and Symptoms (Acute Pain) BADLs/IADLs reluctance/inability to perform;fatigue/weakness;facial mask of pain/grimace     Goal: Acceptable Pain Control/Comfort Level  Outcome: Ongoing (interventions implemented as appropriate)   12/04/18 2017   Pain, Acute (Adult)   Acceptable Pain Control/Comfort Level making progress toward outcome       Problem: Diabetes, Type 2 (Adult)  Goal: Signs and Symptoms of Listed Potential Problems Will be Absent, Minimized or Managed (Diabetes, Type 2)  Outcome: Ongoing (interventions implemented as appropriate)   12/04/18 2017   Goal/Outcome Evaluation   Problems Assessed (Type 2 Diabetes) all   Problems Present (Type 2 Diabetes) hyperglycemia       Problem: Skin and Soft Tissue Infection (Adult)  Goal: Signs and Symptoms of Listed Potential Problems Will be Absent, Minimized or Managed (Skin and Soft Tissue Infection)  Outcome: Ongoing (interventions implemented as appropriate)   12/04/18 2017   Goal/Outcome Evaluation   Problems Assessed (Skin and Soft Tissue Infection) all   Problems Present (Skin/Soft Tissue Inf) pain       Problem: Skin Injury Risk (Adult)  Goal: Identify Related Risk Factors and Signs and Symptoms  Outcome: Ongoing (interventions implemented as appropriate)   12/04/18 2017   Skin Injury Risk (Adult)   Related Risk Factors (Skin Injury Risk) body weight extremes;edema;mobility impaired     Goal: Skin Health and Integrity  Outcome: Ongoing (interventions implemented as appropriate)   12/04/18 2017   Skin Injury Risk (Adult)   Skin Health and Integrity making progress toward outcome

## 2018-12-05 NOTE — PROGRESS NOTES
Orthopedic  Progress Note    Subjective     Post-Operative Day: 1 Day Post-Op    Systemic or Specific Complaints: c/o pain, eating breakfast  Objective     Vital signs in last 24 hours:  Temp:  [97.5 °F (36.4 °C)-99.6 °F (37.6 °C)] 99.6 °F (37.6 °C)  Heart Rate:  [65-85] 85  Resp:  [16-20] 16  BP: (116-155)/(39-94) 118/50    Neurovascular: Remaining toes right foot pink, wiggle   Wound: dressingdry right foot         Data Review  Lab Results (last 24 hours)     Procedure Component Value Units Date/Time    Basic Metabolic Panel [[id number]]  (Abnormal) Collected:  12/05/18 0637    Specimen:  Blood Updated:  12/05/18 0709     Glucose 135 mg/dL      BUN 51 mg/dL      Creatinine 3.38 mg/dL      Sodium 137 mmol/L      Potassium 3.9 mmol/L      Chloride 105 mmol/L      CO2 25.0 mmol/L      Calcium 7.9 mg/dL      eGFR Non African Amer 14 mL/min/1.73      BUN/Creatinine Ratio 15.1     Anion Gap 7.0 mmol/L     Narrative:       [employer] Guidelines    Stage     Description        GFR  1         Normal or High     90+  2         Mild decrease      60-89  3         Moderate decrease  30-59  4         Severe decrease    15-29  5         Kidney failure     <15    The MDRD GFR formula is only valid for adults with stable renal function between ages 18 and 70.    Tissue / Bone Culture - Tissue, Toe, Right [[id number]] Collected:  12/04/18 1207    Specimen:  Tissue from Toe, Right Updated:  12/05/18 0654     Tissue Culture No growth     Gram Stain No WBCs or organisms seen    POC Glucose Once [[id number]]  (Abnormal) Collected:  12/05/18 0648    Specimen:  Blood Updated:  12/05/18 0649     Glucose 143 mg/dL     CBC & Differential [[id number]] Collected:  12/05/18 0637    Specimen:  Blood Updated:  12/05/18 0643    Narrative:       The following orders were created for panel order CBC & Differential.  Procedure                               Abnormality         Status                     ---------                                -----------         ------                     CBC Auto Differential[[id number]]        Abnormal            Final result                 Please view results for these tests on the individual orders.    CBC Auto Differential [[id number]]  (Abnormal) Collected:  12/05/18 0637    Specimen:  Blood Updated:  12/05/18 0643     WBC 15.15 10*3/mm3      RBC 2.75 10*6/mm3      Hemoglobin 8.4 g/dL      Hematocrit 27.1 %      MCV 98.5 fL      MCH 30.5 pg      MCHC 31.0 g/dL      RDW 13.2 %      RDW-SD 47.0 fl      MPV 9.3 fL      Platelets 444 10*3/mm3      Neutrophil % 65.4 %      Lymphocyte % 25.6 %      Monocyte % 5.7 %      Eosinophil % 2.9 %      Basophil % 0.4 %      Immature Grans % 1.3 %      Neutrophils, Absolute 9.90 10*3/mm3      Lymphocytes, Absolute 3.88 10*3/mm3      Monocytes, Absolute 0.87 10*3/mm3      Eosinophils, Absolute 0.44 10*3/mm3      Basophils, Absolute 0.06 10*3/mm3      Immature Grans, Absolute 0.19 10*3/mm3     POC Glucose Once [[id number]]  (Abnormal) Collected:  12/04/18 2006    Specimen:  Blood Updated:  12/04/18 2008     Glucose 170 mg/dL     POC Glucose Once [[id number]]  (Normal) Collected:  12/04/18 1637    Specimen:  Blood Updated:  12/04/18 1657     Glucose 98 mg/dL     POC Glucose Once [[id number]]  (Normal) Collected:  12/04/18 1336    Specimen:  Blood Updated:  12/04/18 1339     Glucose 116 mg/dL     Anaerobic Culture - Tissue, Toe, Right [[id number]] Collected:  12/04/18 1207    Specimen:  Tissue from Toe, Right Updated:  12/04/18 1302    Fungus Culture - Tissue, Toe, Right [[id number]] Collected:  12/04/18 1207    Specimen:  Tissue from Toe, Right Updated:  12/04/18 1302    AFB Culture - Tissue, Toe, Right [[id number]] Collected:  12/04/18 1207    Specimen:  Tissue from Toe, Right Updated:  12/04/18 1302    Tissue Pathology Exam [[id number]] Collected:  12/04/18 1209    Specimen:  Tissue from Toe, Right Updated:  12/04/18 1257            Assessment/Plan     Status post- stable, cultures  pending.  Must be absolutely non-wt bearing on the foot,  I will change the dressing tomorrow.             Chio Sterling MD    Date: 12/5/2018  Time: 8:31 AM

## 2018-12-05 NOTE — THERAPY EVALUATION
Acute Care - Occupational Therapy Initial Evaluation  [facility]     Patient Name: Tashia Leon  : 1955  MRN: [id number]  Today's Date: 2018  Onset of Illness/Injury or Date of Surgery: 18  Date of Referral to OT: 18  Referring Physician: Asher    Admit Date: 2018       ICD-10-CM ICD-9-CM   1. Diabetic ulcer of other part of right foot associated with type 2 diabetes mellitus, unspecified ulcer stage (CMS/[facility]) E11.621 250.80    L97.519 707.15   2. Leukocytosis, unspecified type D72.829 288.60   3. Cellulitis of right foot L03.115 682.7   4. Chronic kidney disease, unspecified CKD stage N18.9 585.9   5. Impaired functional mobility, balance, gait, and endurance Z74.09 V49.89   6. Diabetic peripheral neuropathy (CMS/[facility]) E11.42 250.60     357.2   7. Diabetes mellitus type 2, uncontrolled, with complications (CMS/[facility]) E11.8 250.92    E11.65    8. Chronic kidney disease, stage V (CMS/[facility]) N18.5 585.5   9. Diabetic ulcer of right midfoot associated with type 2 diabetes mellitus, with necrosis of bone (CMS/[facility]) E11.621 250.80    L97.414 707.14   10. Cellulitis of toe of right foot L03.031 681.10   11. Obesity (BMI 30-39.9) E66.9 278.00   12. Chronic osteomyelitis of right foot with draining sinus (CMS/[facility]) M86.471 730.17   13. Impaired mobility and ADLs Z74.09 799.89     Patient Active Problem List   Diagnosis   • Coronary artery disease involving native coronary artery of native heart without angina pectoris   • Dyspnea   • Essential hypertension   • Hyperlipemia   • Lower extremity edema   • Carotid artery stenosis   • Carotid bruit   • Diabetes mellitus with neurological manifestations, uncontrolled (CMS/[facility])   • Diabetic peripheral neuropathy (CMS/[facility])   • Moderate nonproliferative diabetic retinopathy without macular edema associated with type 2 diabetes mellitus (CMS/[facility])   • Functional murmur   • GERD (gastroesophageal reflux disease)   • Peptic ulcer   • Vitamin D  deficiency   • Health care maintenance   • Diabetes mellitus type 2, uncontrolled, with complications (CMS/HCC)   • Hyperlipidemia   • Obesity   • Osteomyelitis (CMS/HCC)   • MRSA (methicillin resistant Staphylococcus aureus)   • Lumbar disc disease   • Hearing loss   • Hyperkalemia   • Secondary hyperparathyroidism (CMS/HCC)   • Idiopathic hypochromic anemia   • Chronic kidney disease, stage V (CMS/HCC)   • Diabetic ulcer of right foot associated with type 2 diabetes mellitus (CMS/HCC)   • Sepsis (CMS/HCC)   • Cellulitis   • Hypokalemia   • Anemia, chronic disease   • Obesity (BMI 30-39.9)   • Impaired functional mobility, balance, gait, and endurance   • Diabetic ulcer of right midfoot associated with type 2 diabetes mellitus, with necrosis of bone (CMS/HCC)   • Cellulitis of toe of right foot   • Chronic osteomyelitis of right foot with draining sinus (CMS/HCC)     Past Medical History:   Diagnosis Date   • Acute bronchitis    • Acute pharyngitis    • Acute sinusitis    • Benign colonic polyp    • Edema    • GERD (gastroesophageal reflux disease)    • Hiatal hernia    • Hyperkalemia    • Hyperlipidemia    • Hypertension    • Low back pain    • Lumbar disc disease    • MRSA (methicillin resistant Staphylococcus aureus)     Osteomyelitis/methicillin-resistant Staphylococcus aureus of the left foot, 2013.   • Obesity    • Osteomyelitis (CMS/HCC)     Osteomyelitis/methicillin-resistant Staphylococcus aureus of the left foot, 2013.   • Peptic ulceration 2013    History of peptic ulcer disease, diagnosed 2013 by EGD.  Repeat EGD in 2013 was negative.   • Sepsis (CMS/HCC)     Sepsis, 2013, hospitalized at [facility].   • Tobacco use     Previous tobacco use with cessation in .   • Type 2 diabetes mellitus (CMS/HCC)     Proteinuria noted, 2014.     • Vitamin D deficiency      Past Surgical History:   Procedure Laterality Date   •  SECTION       x2   • FOOT SURGERY Left     Incision and debridement of the left foot x2.   • LASIK     • TONSILLECTOMY            OT ASSESSMENT FLOWSHEET (last 72 hours)      Occupational Therapy Evaluation     Row Name 12/05/18 0918                   OT Evaluation Time/Intention    Subjective Information  no complaints  -TB        Document Type  evaluation  -TB        Mode of Treatment  occupational therapy;individual therapy  -TB        Patient Effort  adequate  -TB        Symptoms Noted During/After Treatment  none  -TB        Comment  Reluctant to participate; irritable with staff - RN present  -TB           General Information    Patient Profile Reviewed?  yes  -TB        Onset of Illness/Injury or Date of Surgery  11/26/18  -TB        Referring Physician  DeGnore  -TB        Patient Observations  alert  -TB        Patient/Family Observations  No family present  -TB        General Observations of Patient  Pt in bed, room air, IV heplocked L forearm, compression ace wrap to R forefoot/elevated on pillows; exit alarm  -TB        Prior Level of Function  independent:;all household mobility;community mobility;ADL's;home management;driving;shopping;using stairs  -TB        Equipment Currently Used at Home  grab bar;bath bench  -TB        Pertinent History of Current Functional Problem  Pt to ED at urging of Nephrologist and admitted with R foot ulceration in arena of ESRD and uncontrolled DM. S/P R great toe and metatarsal amputation 11/4/2018  -TB        Existing Precautions/Restrictions  non-weight bearing;right;fall  (Significant)  Strict NWB R foot  -TB        Limitations/Impairments  safety/cognitive  -TB        Equipment Ordered for Patient  -- Pt rented knee scooter - delivered to room for training  -TB        Risks Reviewed  patient:;LOB;increased discomfort;increased drainage;lines disloged  -TB        Benefits Reviewed  patient:;improve function;decrease pain;decrease risk of DVT;increase knowledge  -TB         Barriers to Rehab  medically complex  -TB           Relationship/Environment    Lives With  parent(s);child(jeannie), adult  -TB        Family Caregiver if Needed  child(jeannie), adult  -TB           Resource/Environmental Concerns    Current Living Arrangements  home/apartment/condo  -TB           Cognitive Assessment/Intervention- PT/OT    Orientation Status (Cognition)  oriented x 4  -TB        Follows Commands (Cognition)  WFL;other (see comments) reluctant to take direction  -TB        Safety Deficit (Cognitive)  impulsivity;judgment;safety precautions awareness;safety precautions follow-through/compliance  -TB           Safety Issues, Functional Mobility    Safety Issues Affecting Function (Mobility)  impulsivity;judgment;safety precaution awareness;safety precautions follow-through/compliance  -TB        Impairments Affecting Function (Mobility)  balance;endurance/activity tolerance;pain;strength  -TB           Mobility Assessment/Treatment    Extremity Weight-bearing Status  right lower extremity  (Significant)   -TB        Right Lower Extremity (Weight-bearing Status)  non weight-bearing (NWB)  (Significant)   -TB           Bed Mobility Assessment/Treatment    Rolling Left [place] (Bed Mobility)  independent  -TB        Rolling Right [place] (Bed Mobility)  independent  -TB        Scooting/Bridging [place] (Bed Mobility)  independent  -TB        Supine-Sit [place] (Bed Mobility)  conditional independence  -TB        Assistive Device (Bed Mobility)  head of bed elevated;bed rails  -TB        Comment (Bed Mobility)  moves quickly; impulsive/irritable  -TB           Functional Mobility    Functional Mobility- Comment  Defer to PT for scooter training  -TB           Transfer Assessment/Treatment    Transfer Assessment/Treatment  sit-stand transfer;stand-sit transfer  -TB        Maintains Weight-bearing Status (Transfers)  able to maintain  (Significant)   -TB        Comment (Transfers)  Pt allowed  "OT to place gait belt, refused to allow OT to touch her during STS transfer - additional transfers deferred d/t safety concerns  -TB           Sit-Stand Transfer    Sit-Stand [place] (Transfers)  stand by assist  -TB        Assistive Device (Sit-Stand Transfers)  walker, front-wheeled  -TB           Stand-Sit Transfer    Stand-Sit [place] (Transfers)  stand by assist  -TB        Assistive Device (Stand-Sit Transfers)  walker, front-wheeled  -TB           ADL Assessment/Intervention    BADL Assessment/Intervention  bathing;lower body dressing  -TB           Bathing Assessment/Intervention    Comment (Bathing)  Education initiated for use of AE to maximize ADL independence - pt reports having all necessary AE at home & \"my daughter and mother are there\"  -TB           Lower Body Dressing Assessment/Training    Comment (Lower Body Dressing)  Education initiated for use of AE to maximize ADL independence - pt reports having all necessary AE at home & \"my daughter and mother are there\"  -TB           BADL Safety/Performance    Impairments, BADL Safety/Performance  balance;endurance/activity tolerance;pain;strength  -TB           General ROM    GENERAL ROM COMMENTS  AROM B UE WFL for age and ADL  -TB           MMT (Manual Muscle Testing)    General MMT Comments  B UE generalized weakness; functionally 4-/5  -TB           Motor Assessment/Interventions    Additional Documentation  Balance (Group);Therapeutic Exercise (Group)  -TB           Therapeutic Exercise    Therapeutic Exercise  seated, upper extremities  -TB        28853 - OT Therapeutic Activity Minutes  15  -TB           Upper Extremity Seated Therapeutic Exercise    Performed, Seated Upper Extremity (Therapeutic Exercise)  shoulder flexion/extension;shoulder abduction/adduction;shoulder external/internal rotation;shoulder horizontal abduction/adduction;elbow flexion/extension;forearm supination/pronation;wrist flexion/extension;digit flexion/extension  " -TB        Exercise Type, Seated Upper Extremity (Therapeutic Exercise)  AROM (active range of motion)  -TB        Restrictions, Seated Upper Extremity (Therapeutic Exercise)  generalized weakness  -TB        Comment, Seated Upper Extremity (Therapeutic Exercise)  Education initiated for benefits of activity/therapy and role of OT, transfer training and use of AE to maximize ADL independence  -TB           Balance    Balance  dynamic sitting balance;static standing balance  -TB           Static Sitting Balance    Level of [place] (Unsupported Sitting, Static Balance)  supervision  -TB        Sitting Position (Unsupported Sitting, Static Balance)  sitting on edge of bed  -TB        Time Able to Maintain Position (Unsupported Sitting, Static Balance)  more than 5 minutes  -TB           Dynamic Sitting Balance    Level of [place], Reaches Outside Midline (Sitting, Dynamic Balance)  standby assist  -TB           Static Standing Balance    Level of [place] (Supported Standing, Static Balance)  standby assist  -TB        Comment (Supported Standing, Static Balance)  Pt refused OT assist, completed STS with close SBA - EOB standing up within RW - OT holding wx steady  -TB           Sensory Assessment/Intervention    Sensory General Assessment  no sensation deficits identified B UE intact  -TB           Positioning and Restraints    Pre-Treatment Position  in bed  -TB        Post Treatment Position  bed  -TB        In Bed  sitting EOB;with nsg RN passing meds  -TB           Pain Assessment    Additional Documentation  Pain Scale: FACES Pre/Post-Treatment (Group)  -TB           Pain Scale: Numbers Pre/Post-Treatment    Pain Location - Side  Right  -TB        Pain Location - Orientation  lower  -TB        Pain Location  extremity  -TB        Pre/Post Treatment Pain Comment  tolerates activity  -TB        Pain Intervention(s)  Ambulation/increased activity;Repositioned  -TB           Pain Scale: FACES  Pre/Post-Treatment    Pain: FACES Scale, Pretreatment  2-->hurts little bit  -TB        Pain: FACES Scale, Post-Treatment  4-->hurts little more  -TB        Pre/Post Treatment Pain Comment  increased pain with R foot over EOB  -TB           Wound 11/26/18 2130 Right lateral plantar diabetic/neuropathic ulceration    Wound - Properties Group Date first assessed: 11/26/18  -KF Time first assessed: 2130  -KF Side: Right  -KF Orientation: lateral  -KF Location: plantar  -KF Type: diabetic/neuropathic ulceration  -KF       Wound 11/26/18 2130 Right medial plantar diabetic/neuropathic ulceration    Wound - Properties Group Date first assessed: 11/26/18  -KF Time first assessed: 2130  -KF Side: Right  -KF Orientation: medial  -KF Location: plantar  -KF Type: diabetic/neuropathic ulceration  -KF       Wound 12/03/18 0940 Right medial gluteal    Wound - Properties Group Date first assessed: 12/03/18  -MR Time first assessed: 0940  -MR Side: Right  -MR Orientation: medial  -MR Location: gluteal  -MR Stage, Pressure Injury: Stage 1  -MR, healing/chronic area        Wound 12/04/18 1202 Right foot incision    Wound - Properties Group Date first assessed: 12/04/18  -LD Time first assessed: 1202  -LD Side: Right  -LD Location: foot  -LD Type: incision  -LD       Coping    Observed Emotional State  irritable  -TB        Verbalized Emotional State  frustration  -TB           Plan of Care Review    Plan of Care Reviewed With  patient  -TB           Clinical Impression (OT)    Date of Referral to OT  12/04/18  -TB        OT Diagnosis  Impaired mobility and ADL  -TB        Patient/Family Goals Statement (OT Eval)  Pt asking to return home at d/c & wanting to make arrangements to that end  -TB        Criteria for Skilled Therapeutic Interventions Met (OT Eval)  yes;treatment indicated  -TB        Rehab Potential (OT Eval)  fair, will monitor progress closely  -TB        Therapy Frequency (OT Eval)  daily  -TB        Care Plan Review  (OT)  evaluation/treatment results reviewed;care plan/treatment goals reviewed;risks/benefits reviewed;patient/other agree to care plan  -TB        Anticipated Equipment Needs at Discharge (OT)  bedside commode;walker  -TB        Patient/Family Concerns, Equipment Needs at Discharge (OT)  Pt requesting wx for home use  -TB        Anticipated Discharge Disposition (OT)  skilled nursing facility  -TB        Patient/Family Concerns, Anticipated Discharge Disposition (OT)  Pt requesting home with family  -TB           Vital Signs    Pre Systolic BP Rehab  -- RN cleared OT  -TB        O2 Delivery Post Treatment  room air  -TB        Pre Patient Position  Supine  -TB        Intra Patient Position  Standing  -TB        Post Patient Position  Sitting  -TB           Planned OT Interventions    Planned Therapy Interventions (OT Eval)  transfer/mobility retraining;strengthening exercise  -TB           OT Goals    Transfer Goal Selection (OT)  transfer, OT goal 1  -TB        Strength Goal Selection (OT)  strength, OT goal 1  -TB           Transfer Goal 1 (OT)    Activity/Assistive Device (Transfer Goal 1, OT)  toilet;commode, bedside without drop arms;walker, standard  -TB        [place] Level/Cues Needed (Transfer Goal 1, OT)  minimum assist (75% or more patient effort);verbal cues required  -TB        Time Frame (Transfer Goal 1, OT)  by discharge  -TB        Barriers (Transfers Goal 1, OT)  safety, balance, strength, pain  -TB        Progress/Outcome (Transfer Goal 1, OT)  goal ongoing  -TB           Strength Goal 1 (OT)    Strength Goal 1 (OT)  Pt participates in STS/chair push-up HEP to support transfer training and LB/toileting ADLs - 2 sets, 3x3 reps  -TB        Time Frame (Strength Goal 1, OT)  by discharge  -TB        Barriers (Strength Goal 1, OT)  safety, balance, strength, pain  -TB        Progress/Outcome (Strength Goal 1, OT)  goal ongoing  -TB           Living Environment    Home Accessibility  stairs to  enter home walk-in shower with grab bar and bench  -          User Key  (r) = Recorded By, (t) = Taken By, (c) = Cosigned By    Initials Name Effective Dates     Leti Shepherd OT 06/08/18 -     Prema gAuirre RN 06/16/16 -     MR Marion, Michell SARAVIA RN 06/16/16 -     Courtney Dale RN 06/30/16 -          Occupational Therapy Education     Title: PT OT SLP Therapies (In Progress)     Topic: Occupational Therapy (Done)     Point: ADL training (Done)     Description: Instruct learner(s) on proper safety adaptation and remediation techniques during self care or transfers.   Instruct in proper use of assistive devices.    Learning Progress Summary           Patient Acceptance, E,D, VU,NR by  at 12/5/2018 11:06 AM    Comment:  Education initiated for benefits of activity/therapy, surgical precautions,  role of OT, transfer training and use of AE to maximize ADL independence                   Point: Precautions (Done)     Description: Instruct learner(s) on prescribed precautions during self-care and functional transfers.    Learning Progress Summary           Patient Acceptance, E,D, VU,NR by  at 12/5/2018 11:06 AM    Comment:  Education initiated for benefits of activity/therapy, surgical precautions,  role of OT, transfer training and use of AE to maximize ADL independence                               User Key     Initials Effective Dates Name Provider Type Discipline     06/08/18 -  Leti Shepherd OT Occupational Therapist OT                  OT Recommendation and Plan  Outcome Summary/Treatment Plan (OT)  Anticipated Equipment Needs at Discharge (OT): bedside commode, walker  Patient/Family Concerns, Equipment Needs at Discharge (OT): Pt requesting wx for home use  Anticipated Discharge Disposition (OT): skilled nursing facility  Patient/Family Concerns, Anticipated Discharge Disposition (OT): Pt requesting home with family  Planned Therapy Interventions (OT Eval):  transfer/mobility retraining, strengthening exercise  Therapy Frequency (OT Eval): daily  Plan of Care Review  Plan of Care Reviewed With: patient  Plan of Care Reviewed With: patient  Outcome Summary: OT IE completed. Pt participates with encouragement. Presents with impusivity and impaired judgement/safety. Education completed for surgical precautions with transfer training. Pt declines need for AE - family to assist with ADLs. OT to follow for BR transfer training. Recommend SNF at d/c for best outcome. Pt reports that she is going home at d/c.  Recommend BSC and SW for home.     Outcome Measures     Row Name 12/05/18 0918 12/04/18 1530 12/02/18 1530       How much help from another person do you currently need...    Turning from your back to your side while in flat bed without using bedrails?  --  4  -SC  4  -ES    Moving from lying on back to sitting on the side of a flat bed without bedrails?  --  4  -SC  4  -ES    Moving to and from a bed to a chair (including a wheelchair)?  --  3  -SC  3  -ES    Standing up from a chair using your arms (e.g., wheelchair, bedside chair)?  --  3  -SC  3  -ES    Climbing 3-5 steps with a railing?  --  1  -SC  2  -ES    To walk in hospital room?  --  2  -SC  3  -ES    AM-PAC 6 Clicks Score  --  17  -SC  19  -ES       How much help from another is currently needed...    Putting on and taking off regular lower body clothing?  2  -TB  --  --    Bathing (including washing, rinsing, and drying)  2  -TB  --  --    Toileting (which includes using toilet bed pan or urinal)  2  -TB  --  --    Putting on and taking off regular upper body clothing  3  -TB  --  --    Taking care of personal grooming (such as brushing teeth)  3  -TB  --  --    Eating meals  4  -TB  --  --    Score  16  -TB  --  --       Functional Assessment    Outcome Measure Options  AM-PAC 6 Clicks Daily Activity (OT)  -TB  AM-PAC 6 Clicks Basic Mobility (PT)  -SC  AM-PAC 6 Clicks Basic Mobility (PT)  -ES      User Key   (r) = Recorded By, (t) = Taken By, (c) = Cosigned By    Initials Name Provider Type    Srinivasan Scott, PT Physical Therapist    TB Leti Shepherd, OT Occupational Therapist    Galina Pro, PT Physical Therapist          Time Calculation:   Time Calculation- OT     Row Name 12/05/18 1110 12/05/18 0918          Time Calculation- OT    OT Start Time  0918  -TB  --     OT Received On  12/05/18  -TB  --     OT Goal Re-Cert Due Date  12/15/18  -TB  --        Timed Charges    12939 - OT Therapeutic Activity Minutes  --  15  -TB       User Key  (r) = Recorded By, (t) = Taken By, (c) = Cosigned By    Initials Name Provider Type    TB Leti Shepherd, OT Occupational Therapist        Therapy Suggested Charges     Code   Minutes Charges    40811 (CPT®) Hc Ot Neuromusc Re Education Ea 15 Min      71044 (CPT®) Hc Ot Ther Proc Ea 15 Min      61891 (CPT®) Hc Ot Therapeutic Act Ea 15 Min 15 1    77902 (CPT®) Hc Ot Manual Therapy Ea 15 Min      68905 (CPT®) Hc Ot Iontophoresis Ea 15 Min      57818 (CPT®) Hc Ot Elec Stim Ea-Per 15 Min      41096 (CPT®) Hc Ot Ultrasound Ea 15 Min      81677 (CPT®) Hc Ot Self Care/Mgmt/Train Ea 15 Min      Total  15 1        Therapy Charges for Today     Code Description Service Date Service Provider Modifiers Qty    20973727463 HC OT THERAPEUTIC ACT EA 15 MIN 12/5/2018 Leti Shepherd OT GO 1    64144600959 HC OT EVAL MOD COMPLEXITY 3 12/5/2018 Leti Shepherd OT GO 1               Leti Shepherd OT  12/5/2018

## 2018-12-05 NOTE — PROGRESS NOTES
"   LOS: 9 days    Patient Care Team:  Angelica Tran PA as PCP - General (Internal Medicine)  Denver Baldwin MD as Consulting Physician (Nephrology)    Chief Complaint:  Diabetic foot ulcer    Subjective     Interval History:     No acute events overnight. No new complaints.     Review of Systems:   NO CP Or SOA. NO Abdominal discomfort.     Objective     Vital Sign Min/Max for last 24 hours  Temp  Min: 97.5 °F (36.4 °C)  Max: 99.6 °F (37.6 °C)   BP  Min: 116/39  Max: 155/49   Pulse  Min: 65  Max: 85   Resp  Min: 16  Max: 20   SpO2  Min: 94 %  Max: 98 %   No Data Recorded   No Data Recorded     Flowsheet Rows      First Filed Value   Admission Height  167.6 cm (66\") Documented at 11/26/2018 1315   Admission Weight  109 kg (240 lb) Documented at 11/26/2018 1315          I/O this shift:  In: 120 [P.O.:120]  Out: -   I/O last 3 completed shifts:  In: 1610 [P.O.:600; I.V.:900; IV Piggyback:110]  Out: 1610 [Urine:1600; Blood:10]    Physical Exam:     General Appearance:    Alert, cooperative, in no acute distress   Head:    Normocephalic, without obvious abnormality, atraumatic               Neck:   No adenopathy, supple, trachea midline, no thyromegaly, no     carotid bruit, no JVD       Lungs:     Clear to auscultation,respirations regular, even and                   unlabored    Heart:    Regular rhythm and normal rate, normal S1 and S2, no            murmur, no gallop, no rub, no click       Abdomen:     Normal bowel sounds, no masses, no organomegaly, soft        non-tender, non-distended, no guarding, no rebound                 tenderness       Extremities:  Ulcer of the right foot with erythema noted. no edema.                Neurologic:   No focal deficit noted.        WBC WBC   Date Value Ref Range Status   12/05/2018 15.15 (H) 3.50 - 10.80 10*3/mm3 Final   12/04/2018 12.87 (H) 3.50 - 10.80 10*3/mm3 Final   12/03/2018 12.24 (H) 3.50 - 10.80 10*3/mm3 Final      HGB Hemoglobin   Date Value Ref Range " Status   12/05/2018 8.4 (L) 11.5 - 15.5 g/dL Final   12/04/2018 8.5 (L) 11.5 - 15.5 g/dL Final   12/03/2018 8.3 (L) 11.5 - 15.5 g/dL Final      HCT Hematocrit   Date Value Ref Range Status   12/05/2018 27.1 (L) 34.5 - 44.0 % Final   12/04/2018 27.3 (L) 34.5 - 44.0 % Final   12/03/2018 26.9 (L) 34.5 - 44.0 % Final      Platlets No results found for: LABPLAT   MCV MCV   Date Value Ref Range Status   12/05/2018 98.5 80.0 - 99.0 fL Final   12/04/2018 98.2 80.0 - 99.0 fL Final   12/03/2018 97.8 80.0 - 99.0 fL Final          Sodium Sodium   Date Value Ref Range Status   12/05/2018 137 132 - 146 mmol/L Final   12/04/2018 140 132 - 146 mmol/L Final   12/03/2018 138 132 - 146 mmol/L Final      Potassium Potassium   Date Value Ref Range Status   12/05/2018 3.9 3.5 - 5.5 mmol/L Final   12/04/2018 3.9 3.5 - 5.5 mmol/L Final   12/03/2018 3.6 3.5 - 5.5 mmol/L Final      Chloride Chloride   Date Value Ref Range Status   12/05/2018 105 99 - 109 mmol/L Final   12/04/2018 107 99 - 109 mmol/L Final   12/03/2018 102 99 - 109 mmol/L Final      CO2 CO2   Date Value Ref Range Status   12/05/2018 25.0 20.0 - 31.0 mmol/L Final   12/04/2018 20.0 20.0 - 31.0 mmol/L Final   12/03/2018 24.0 20.0 - 31.0 mmol/L Final      BUN BUN   Date Value Ref Range Status   12/05/2018 51 (H) 9 - 23 mg/dL Final   12/04/2018 58 (H) 9 - 23 mg/dL Final   12/03/2018 66 (H) 9 - 23 mg/dL Final      Creatinine Creatinine   Date Value Ref Range Status   12/05/2018 3.38 (H) 0.60 - 1.30 mg/dL Final   12/04/2018 3.56 (H) 0.60 - 1.30 mg/dL Final   12/03/2018 3.91 (H) 0.60 - 1.30 mg/dL Final      Calcium Calcium   Date Value Ref Range Status   12/05/2018 7.9 (L) 8.7 - 10.4 mg/dL Final   12/04/2018 8.1 (L) 8.7 - 10.4 mg/dL Final   12/03/2018 8.0 (L) 8.7 - 10.4 mg/dL Final      PO4 No results found for: CAPO4   Albumin No results found for: ALBUMIN   Magnesium No results found for: MG   Uric Acid No results found for: URICACID        Results Review:     I reviewed the  patient's new clinical results.      aspirin  mg Oral Daily   castor oil-balsam peru  Topical Q12H   DAPTOmycin 6 mg/kg (Adjusted) Intravenous Q48H   diltiaZEM  mg Oral Nightly   docusate sodium 100 mg Oral Daily   ertapenem 500 mg Intravenous Q24H   ferric gluconate (FERRLECIT) IVPB 125 mg Intravenous Daily   heparin (porcine) 5,000 Units Subcutaneous Q12H   HYDROcodone-acetaminophen 1 tablet Oral TID   insulin detemir 40 Units Subcutaneous Q12H   insulin lispro 0-14 Units Subcutaneous 4x Daily With Meals & Nightly   mupirocin  Topical Q24H   mupirocin  Topical Q24H   Naloxegol Oxalate 12.5 mg Oral Once per day on Sun Wed   pantoprazole 40 mg Oral QAM   sodium chloride 3 mL Intravenous Q12H       sodium chloride 9 mL/hr Last Rate: 9 mL/hr (12/04/18 1257)       Medication Review:     Assessment/Plan       Diabetic ulcer of right foot associated with type 2 diabetes mellitus (CMS/HCC)    Coronary artery disease involving native coronary artery of native heart without angina pectoris    Essential hypertension    Diabetic peripheral neuropathy (CMS/HCC)    Diabetes mellitus type 2, uncontrolled, with complications (CMS/HCC)    Hyperlipidemia    Chronic kidney disease, stage V (CMS/HCC)    Sepsis (CMS/HCC)    Cellulitis    Hypokalemia    Anemia, chronic disease    Obesity (BMI 30-39.9)    Impaired functional mobility, balance, gait, and endurance    Diabetic ulcer of right midfoot associated with type 2 diabetes mellitus, with necrosis of bone (CMS/HCC)    Cellulitis of toe of right foot    Chronic osteomyelitis of right foot with draining sinus (CMS/HCC)      1- CKD stage IV - DN likely - Scr 3.5 to 4.0 range at baseline.UOP is adequate. Scr 3.38 - improved. UOp is good without diuretics.   2- HTN - stable.   3- Proteinuria -DN -UPCR 3.1  4- Anemia of chronic disease - stable.   5- Diabetic foot ulcer.   6- hypokalemia   7- Iron def.      Plan:  - Continue with current regimen.   - Monitor I/o   - Avoid  nephrotoxic agents.       I discussed the patients findings and my recommendations with patient          Dannie Luna MD  12/05/18  11:07 AM

## 2018-12-05 NOTE — PROGRESS NOTES
INFECTIOUS DISEASE PROGRESS NOTE    Tashia Leon  1955  [id number]    Date: 12/5/2018    Admission Date: 11/26/2018      CC: diabetic foot ulcer    History of present illness:    Patient is a 63 y.o. female with chronic kidney disease stage IV, diabetes type 2 since 1990s has had history of left foot MRSA osteomyelitis treated at  several years ago.  Patient has developed calluses on her right foot and saw [place] pod[facility]ry who did some bedside debridement in the clinic.  Patient said increasing pain and some weeping from the wounds because of low-grade temperatures patient's returns to see her podiatrist recommended admission to hospital.  Patient was seen in the emergency room started on IV antibiotics including daptomycin and ertapenem.  She had MRI of the foot which was stopped prematurely by the patient's and was not a complete study.    Asked to address long-term treatment and duration of antibiotics.    Patient lives in [place].    Patient denies C. difficile colitis history of nausea vomiting or diarrhea difficulty breathing patient denies rash.    Patient says she is a PICC line before    11/28/18; no events overnight; no fever, rash, sore throat    11/29/18; no events overnight feels well; no complaints; no diarrhea, rash, sore throat    11/30/18: No new events. Denies f/c, sob, n/v/d, rashes or sore throat.     12/1/18; no events overnight; no fever, rash, sore throat    12/2/18: no new events or complaints.  Denies f/c, sob, n/v/d, rashes.   12/3/18: No new events or complaints.  Foot without pain.  No schedule for dressing changes.  Denies f/c, sob, n/v/d, rashes.     Past Medical History:   Diagnosis Date   • Acute bronchitis    • Acute pharyngitis    • Acute sinusitis    • Benign colonic polyp    • Edema    • GERD (gastroesophageal reflux disease)    • Hiatal hernia    • Hyperkalemia    • Hyperlipidemia    • Hypertension    • Low back pain    • Lumbar disc disease    • MRSA  (methicillin resistant Staphylococcus aureus)     Osteomyelitis/methicillin-resistant Staphylococcus aureus of the left foot, 2013.   • Obesity    • Osteomyelitis (CMS/HCC)     Osteomyelitis/methicillin-resistant Staphylococcus aureus of the left foot, 2013.   • Peptic ulceration 2013    History of peptic ulcer disease, diagnosed 2013 by EGD.  Repeat EGD in 2013 was negative.   • Sepsis (CMS/[facility])     Sepsis, 2013, hospitalized at [facility].   • Tobacco use     Previous tobacco use with cessation in .   • Type 2 diabetes mellitus (CMS/[facility])     Proteinuria noted, 2014.     • Vitamin D deficiency        Past Surgical History:   Procedure Laterality Date   •  SECTION      x2   • FOOT SURGERY Left     Incision and debridement of the left foot x2.   • LASIK     • TONSILLECTOMY         Family History   Problem Relation Age of Onset   • No Known Problems Mother    • No Known Problems Father    • Breast cancer Neg Hx    • Ovarian cancer Neg Hx        Social History     Socioeconomic History   • Marital status:      Spouse name: Not on file   • Number of children: Not on file   • Years of education: Not on file   • Highest education level: Not on file   Social Needs   • Financial resource strain: Not on file   • Food insecurity - worry: Not on file   • Food insecurity - inability: Not on file   • Transportation needs - medical: Not on file   • Transportation needs - non-medical: Not on file   Occupational History   • Not on file   Tobacco Use   • Smoking status: Former Smoker     Packs/day: 1.00     Years: 30.00     Pack years: 30.00     Types: Cigarettes   • Smokeless tobacco: Never Used   • Tobacco comment: quit 7 years ago   Substance and Sexual Activity   • Alcohol use: Yes     Alcohol/week: 0.6 oz     Types: 1 Glasses of wine per week     Comment: holidays   • Drug use: No   • Sexual activity: Defer   Other Topics Concern   • Not  on file   Social History Narrative   • Not on file       Allergies   Allergen Reactions   • Statins Myalgia   • Ancef [Cefazolin]    • Bactrim [Sulfamethoxazole-Trimethoprim] Nausea And Vomiting   • Cephalosporins    • Cleocin [Clindamycin Hcl]    • Clindamycin/Lincomycin    • Keflex [Cephalexin]    • Levaquin [Levofloxacin]    • Lipitor [Atorvastatin]    • Lisinopril    • Losartan Potassium Other (See Comments)     Unknown reaction   • Oxycodone    • Quinolones          Medication:    Current Facility-Administered Medications:   •  aspirin EC tablet 325 mg, 325 mg, Oral, Daily, Rosario Walter APRN, 325 mg at 12/05/18 0936  •  bisacodyl (DULCOLAX) EC tablet 10 mg, 10 mg, Oral, Daily PRN, Heavenly Medina APRN, 10 mg at 12/01/18 0949  •  castor oil-balsam peru (VENELEX) ointment, , Topical, Q12H, Halina Walker DO  •  clonazePAM (KlonoPIN) tablet 0.5 mg, 0.5 mg, Oral, BID PRN, Gonzalo Schmidt MD, 0.5 mg at 12/03/18 1725  •  DAPTOmycin (CUBICIN) 500 mg in sodium chloride 0.9 % 50 mL IVPB, 6 mg/kg (Adjusted), Intravenous, Q48H, Niko Deleon PA, 500 mg at 12/04/18 1157  •  dextrose (D50W) 25 g/ 50mL Intravenous Solution 25 g, 25 g, Intravenous, Q15 Min PRN, Rosario Walter APRN  •  dextrose (GLUTOSE) oral gel 15 g, 15 g, Oral, Q15 Min PRN, Rosario Walter APRN  •  diltiaZEM CD (CARDIZEM CD) 24 hr capsule 120 mg, 120 mg, Oral, Nightly, Rosario Walter APRN, 120 mg at 12/04/18 2303  •  docusate sodium (COLACE) capsule 100 mg, 100 mg, Oral, Daily, Heavenly Medina APRN, 100 mg at 12/05/18 0936  •  ertapenem (INVanz) 500 mg in sodium chloride 0.9 % 50 mL IVPB, 500 mg, Intravenous, Q24H, Niko Deleon PA, Last Rate: 100 mL/hr at 12/04/18 1813, 500 mg at 12/04/18 1813  •  glucagon (human recombinant) (GLUCAGEN DIAGNOSTIC) injection 1 mg, 1 mg, Subcutaneous, PRN, Rosario Walter, APRN  •  heparin (porcine) 5000 UNIT/ML injection 5,000 Units, 5,000 Units, Subcutaneous, Q12H,  Rosario Walter APRN, 5,000 Units at 12/05/18 0936  •  HYDROcodone-acetaminophen (NORCO) 7.5-325 MG per tablet 1 tablet * Brand Name Norco--Patient Supplied Medication *, 1 tablet, Oral, TID, Gonzalo Schmidt MD, 1 tablet at 12/04/18 2100  •  HYDROmorphone (DILAUDID) injection 0.5 mg, 0.5 mg, Intravenous, Q4H PRN, 0.5 mg at 12/05/18 1236 **AND** naloxone (NARCAN) injection 0.4 mg, 0.4 mg, Intravenous, Q5 Min PRN, Lilly Garay APRN  •  insulin detemir (LEVEMIR) injection 40 Units, 40 Units, Subcutaneous, Q12H, Gonzalo Schmidt MD, 40 Units at 12/05/18 0943  •  insulin lispro (humaLOG) injection 0-14 Units, 0-14 Units, Subcutaneous, 4x Daily With Meals & Nightly, Rosario Walter APRN, 5 Units at 12/05/18 1211  •  mupirocin (BACTROBAN) 2 % cream, , Topical, Q24H, Chio Sterling MD  •  mupirocin (BACTROBAN) 2 % cream, , Topical, Q24H, Chio Sterling MD  •  Naloxegol Oxalate (MOVANTIK) tablet 12.5 mg, 12.5 mg, Oral, Once per day on Sun Wed, Rosario Walter APRN, 12.5 mg at 12/05/18 0942  •  ondansetron (ZOFRAN) tablet 4 mg, 4 mg, Oral, Q6H PRN, 4 mg at 11/30/18 2046 **OR** ondansetron (ZOFRAN) injection 4 mg, 4 mg, Intravenous, Q6H PRN, Rosario Walter APRN, 4 mg at 12/03/18 1833  •  pantoprazole (PROTONIX) EC tablet 40 mg, 40 mg, Oral, QAM, Rosario Walter APRN, 40 mg at 12/05/18 0936  •  [COMPLETED] Insert peripheral IV, , , Once **AND** sodium chloride 0.9 % flush 10 mL, 10 mL, Intravenous, PRN, Hiram Osborn MD, 10 mL at 12/04/18 1032  •  sodium chloride 0.9 % flush 3 mL, 3 mL, Intravenous, Q12H, Rosario Walter APRN, 3 mL at 12/05/18 0938  •  sodium chloride 0.9 % flush 3-10 mL, 3-10 mL, Intravenous, PRN, Rosario Walter APRN  •  sodium chloride 0.9 % infusion, 9 mL/hr, Intravenous, Continuous, Binta Patricia MD, Last Rate: 9 mL/hr at 12/04/18 1257, 9 mL/hr at 12/04/18 1257    Antibiotics:  Anti-Infectives (From admission, onward)     Ordered     Dose/Rate Route Frequency Start Stop    18 0848  DAPTOmycin (CUBICIN) 500 mg in sodium chloride 0.9 % 50 mL IVPB     Ordering Provider:  Niko Deleon PA    6 mg/kg × 79.2 kg (Adjusted)  100 mL/hr over 30 Minutes Intravenous Every 48 Hours 18 1000 18 0959    18 2136  DAPTOmycin (CUBICIN) 500 mg in sodium chloride 0.9 % 50 mL IVPB     Ordering Provider:  Breana Chance MD    6 mg/kg × 79.2 kg (Adjusted)  100 mL/hr over 30 Minutes Intravenous Every 48 Hours 18 1800 18 1959    18 2132  ertapenem (INVanz) 500 mg in sodium chloride 0.9 % 50 mL IVPB     Niko Deleon PA reviewed the order on 18 0848.   Ordering Provider:  Niko Deleon PA    500 mg  100 mL/hr over 30 Minutes Intravenous Every 24 Hours 18 1800 12/10/18 0844    18 1616  ertapenem (INVanz) 500 mg in sodium chloride 0.9 % 50 mL IVPB     Ordering Provider:  Hiram Osborn MD    500 mg  100 mL/hr over 30 Minutes Intravenous Every 24 Hours 18 1618 18 0015    18 1616  DAPTOmycin (CUBICIN) 500 mg in sodium chloride 0.9 % 50 mL IVPB     Ordering Provider:  Hiram Osborn MD    500 mg  100 mL/hr over 30 Minutes Intravenous Every 24 Hours 18 1617 18 1935            Review of Systems:       Review of systems were reviewed and were unremarkable.  See hpi      Physical Exam:   Vital Signs  Temp (24hrs), Av °F (37.2 °C), Min:97.9 °F (36.6 °C), Max:99.6 °F (37.6 °C)    Temp  Min: 97.9 °F (36.6 °C)  Max: 99.6 °F (37.6 °C)  BP  Min: 118/50  Max: 155/49  Pulse  Min: 72  Max: 85  Resp  Min: 16  Max: 17  SpO2  Min: 94 %  Max: 95 %    GENERAL: Awake and alert, in no acute distress.   HEENT: Normocephalic, atraumatic.  PERRL. EOMI. No conjunctival injection. No icterus. Oropharynx clear without evidence of thrush or exudate.   HEART: RRR; No murmur, rubs, gallops.   LUNGS: Clear to auscultation bilaterally without wheezing, rales, rhonchi.   ABDOMEN: Soft,  nontender, nondistended. Positive bowel sounds.    EXT:  No cyanosis, clubbing or edema. No cord.  MSK: FROM without joint effusions noted arms/legs.  Right foot wrapped  SKIN: Warm and dry without cutaneous eruptions on Inspection/palpation.    NEURO: Oriented to PPT. No focal deficits on motor/sensory exam at arms/legs.  PSYCHIATRIC: Normal insight and judgement. Cooperative with PE    Laboratory Data    Results from last 7 days   Lab Units  12/05/18   0637  12/04/18   0639  12/03/18   0557   WBC 10*3/mm3  15.15*  12.87*  12.24*   HEMOGLOBIN g/dL  8.4*  8.5*  8.3*   HEMATOCRIT %  27.1*  27.3*  26.9*   PLATELETS 10*3/mm3  444  415  435     Results from last 7 days   Lab Units  12/05/18   0637   SODIUM mmol/L  137   POTASSIUM mmol/L  3.9   CHLORIDE mmol/L  105   CO2 mmol/L  25.0   BUN mg/dL  51*   CREATININE mg/dL  3.38*   GLUCOSE mg/dL  135*   CALCIUM mg/dL  7.9*                     Results from last 7 days   Lab Units  12/01/18   0949   CK TOTAL U/L  121         Estimated Creatinine Clearance: 21.9 mL/min (A) (by C-G formula based on SCr of 3.38 mg/dL (H)).      Microbiology:  Microbiology Results Abnormal     Procedure Component Value - Date/Time    Anaerobic Culture - Tissue, Toe, Right [[id number]] Collected:  12/04/18 1207    Lab Status:  Preliminary result Specimen:  Tissue from Toe, Right Updated:  12/05/18 1417     Culture No anaerobes isolated    AFB Culture - Tissue, Toe, Right [[id number]] Collected:  12/04/18 1207    Lab Status:  Preliminary result Specimen:  Tissue from Toe, Right Updated:  12/05/18 1320     AFB Stain No acid fast bacilli seen on concentrated smear    Tissue / Bone Culture - Tissue, Toe, Right [[id number]] Collected:  12/04/18 1207    Lab Status:  Preliminary result Specimen:  Tissue from Toe, Right Updated:  12/05/18 0654     Tissue Culture No growth     Gram Stain No WBCs or organisms seen    Eosinophil Smear - Urine, Urine, Clean Catch [[id number]]  (Normal) Collected:  12/01/18 7999     Lab Status:  Final result Specimen:  Urine, Clean Catch Updated:  12/01/18 1710     Eosinophil Smear 0 % EOS/100 Cells     Narrative:       No eosinophil seen    Blood Culture - Blood, Arm, Left [[id number]] Collected:  11/26/18 1625    Lab Status:  Final result Specimen:  Blood from Arm, Left Updated:  12/01/18 1645     Blood Culture No growth at 5 days    Blood Culture - Blood, Arm, Right [[id number]] Collected:  11/26/18 1620    Lab Status:  Final result Specimen:  Blood from Arm, Right Updated:  12/01/18 1645     Blood Culture No growth at 5 days                       Radiology:  Xr Foot 3+ View Right    Result Date: 11/28/2018  Possible plantar ulceration of the forefoot. No acute osseous abnormality is identified.  D:  11/28/2018 E:  11/28/2018  This report was finalized on 11/28/2018 12:28 PM by Aniceto Deleon.      Doppler arterial:   Addenda:       · Moderate reduction in arterial flow in the right lower extremity. · Monophasic waveforms are present in the right lower extremity with   biphasic waveforms in the left lower extremity · Due to the patients inability to tolerate the study only RLE pressures   were obtained    Signed: 11/29/18 2249 by Gillian Becker MD   Narrative:     · Moderate reduction in arterial flow in the right lower extremity.   Normal-appearing waveforms.     Narrative:     · Left Conclusion: The left NELLI is normal.  · Right Conclusion: The right NELLI is normal.           Impression:   Diabetic foot ulcer on right foot  Leukocytosis with neutrophilia, improving  chronic kidney disease stage IV  Antibiotic intolerances to cefazolin, clindamycin, Levaquin  DM II  Esr > 100    PLAN/RECOMMENDATIONS:     I find it very difficult to interpret an MRI was not completed an MRI that was not given with gadolinium because of her chronic kidney disease.    Physical exam of foot not remarkable for  Osteomyelitis.  I still have some concern with ESr > 100.      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   If patient continues the ulcerations can lead to secondary infection and contiguous spread of infection to the bone;  Unclear is patient already has osteo; bone biopsy may be gold standard to determine    continue offloading     Offloading and using a knee scooter is probably the most important intervention currently     Cont dapto 6 mg/kg IV Q48H  Cont invanz 500 mg IV Q24H    D/w Dr. Patiño  F/u op cultures  Patient needs scrict offloading  F/u of path    Needs LTAC referral      Hung Haynes MD  12/5/2018  2:34 PM

## 2018-12-05 NOTE — PROGRESS NOTES
"Adult Nutrition  Assessment/PES    Patient Name:  Tashia Leon  YOB: 1955  MRN: [id number]  Admit Date:  11/26/2018    Assessment Date:  12/5/2018    Comments:      Reason for Assessment     Row Name 12/05/18 0741          Reason for Assessment    Reason For Assessment  follow-up protocol 5\"                   Nutrition Prescription Ordered     Row Name 12/05/18 0741          Nutrition Prescription PO    Current PO Diet  Regular     Supplement  Milk SKIM     Supplement Frequency  3 times a day     Common Modifiers  Cardiac;Consistent Carbohydrate                 Problem/Interventions:  Problem 1     Row Name 12/05/18 0741          Nutrition Diagnoses Problem 1    Problem 1  Increased Nutrient Needs     Macronutrient  Protein     Etiology (related to)  -- SKIN INTEGRITY     Signs/Symptoms (evidenced by)  Other (comment) DIABETIC FOOD ULCER DX         Problem 2     Row Name 12/05/18 0742          Nutrition Diagnoses Problem 2    Problem 2  Needs Alternate Composition     Micronutrient  Potassium     Etiology (related to)  MNT for Treatment/Condition     Signs/Symptoms (evidenced by)  Biochemical     Specific Labs Noted  Other (comment) HYPOKALEMIA         Problem 3     Row Name 12/05/18 0742          Nutrition Diagnoses Problem 3    Problem 3  Nutrition Appropriate for Condition at this Time     Signs/Symptoms (evidenced by)  PO Intake     Percent (%) intake recorded  75 %     Over number of meals  3                   Education/Evaluation     Row Name 12/05/18 0743          Monitor/Evaluation    Monitor  Per protocol           Electronically signed by:  Kim Walter RD  12/05/18 7:43 AM  "

## 2018-12-05 NOTE — PLAN OF CARE
Problem: Patient Care Overview  Goal: Plan of Care Review  Outcome: Ongoing (interventions implemented as appropriate)   12/05/18 0928   Coping/Psychosocial   Plan of Care Reviewed With patient   OTHER   Outcome Summary OT IE completed. Pt participates with encouragement. Presents with impusivity and impaired judgement/safety. Education completed for surgical precautions with transfer training. Pt declines need for AE - family to assist with ADLs. OT to follow for BR transfer training. Recommend SNF at d/c for best outcome. Pt reports that she is going home at d/c. Recommend BSC and SW for home.

## 2018-12-06 LAB
GLUCOSE BLDC GLUCOMTR-MCNC: 133 MG/DL (ref 70–130)
GLUCOSE BLDC GLUCOMTR-MCNC: 212 MG/DL (ref 70–130)
GLUCOSE BLDC GLUCOMTR-MCNC: 260 MG/DL (ref 70–130)
GLUCOSE BLDC GLUCOMTR-MCNC: 79 MG/DL (ref 70–130)

## 2018-12-06 PROCEDURE — 25010000002 DAPTOMYCIN PER 1 MG: Performed by: PHYSICIAN ASSISTANT

## 2018-12-06 PROCEDURE — 99233 SBSQ HOSP IP/OBS HIGH 50: CPT | Performed by: NURSE PRACTITIONER

## 2018-12-06 PROCEDURE — 25010000002 HYDROMORPHONE PER 4 MG: Performed by: NURSE PRACTITIONER

## 2018-12-06 PROCEDURE — 99024 POSTOP FOLLOW-UP VISIT: CPT | Performed by: ORTHOPAEDIC SURGERY

## 2018-12-06 PROCEDURE — 82962 GLUCOSE BLOOD TEST: CPT

## 2018-12-06 PROCEDURE — 25010000002 ERTAPENEM PER 500 MG: Performed by: PHYSICIAN ASSISTANT

## 2018-12-06 PROCEDURE — 63710000001 INSULIN DETEMIR PER 5 UNITS: Performed by: INTERNAL MEDICINE

## 2018-12-06 PROCEDURE — 25010000002 HEPARIN (PORCINE) PER 1000 UNITS: Performed by: NURSE PRACTITIONER

## 2018-12-06 PROCEDURE — 25010000002 ONDANSETRON PER 1 MG: Performed by: NURSE PRACTITIONER

## 2018-12-06 RX ORDER — HYDROCODONE BITARTRATE AND ACETAMINOPHEN 7.5; 325 MG/1; MG/1
1 TABLET ORAL EVERY 6 HOURS PRN
Status: DISCONTINUED | OUTPATIENT
Start: 2018-12-06 | End: 2018-12-06

## 2018-12-06 RX ORDER — HYDROCODONE BITARTRATE AND ACETAMINOPHEN 7.5; 325 MG/1; MG/1
1 TABLET ORAL EVERY 6 HOURS PRN
Status: DISCONTINUED | OUTPATIENT
Start: 2018-12-06 | End: 2018-12-13 | Stop reason: HOSPADM

## 2018-12-06 RX ADMIN — MUPIROCIN: 2 CREAM TOPICAL at 12:51

## 2018-12-06 RX ADMIN — ERTAPENEM SODIUM 500 MG: 1 INJECTION, POWDER, LYOPHILIZED, FOR SOLUTION INTRAMUSCULAR; INTRAVENOUS at 17:58

## 2018-12-06 RX ADMIN — BISACODYL 10 MG: 5 TABLET, COATED ORAL at 09:12

## 2018-12-06 RX ADMIN — INSULIN DETEMIR 40 UNITS: 100 INJECTION, SOLUTION SUBCUTANEOUS at 22:10

## 2018-12-06 RX ADMIN — HYDROMORPHONE HYDROCHLORIDE 0.5 MG: 1 INJECTION, SOLUTION INTRAMUSCULAR; INTRAVENOUS; SUBCUTANEOUS at 02:46

## 2018-12-06 RX ADMIN — DILTIAZEM HYDROCHLORIDE 120 MG: 120 CAPSULE, EXTENDED RELEASE ORAL at 22:11

## 2018-12-06 RX ADMIN — HYDROMORPHONE HYDROCHLORIDE 0.5 MG: 1 INJECTION, SOLUTION INTRAMUSCULAR; INTRAVENOUS; SUBCUTANEOUS at 12:16

## 2018-12-06 RX ADMIN — SODIUM CHLORIDE, PRESERVATIVE FREE 3 ML: 5 INJECTION INTRAVENOUS at 22:10

## 2018-12-06 RX ADMIN — PANTOPRAZOLE SODIUM 40 MG: 40 TABLET, DELAYED RELEASE ORAL at 06:10

## 2018-12-06 RX ADMIN — CASTOR OIL AND BALSAM, PERU: 788; 87 OINTMENT TOPICAL at 22:09

## 2018-12-06 RX ADMIN — DOCUSATE SODIUM 100 MG: 100 CAPSULE, LIQUID FILLED ORAL at 09:12

## 2018-12-06 RX ADMIN — ONDANSETRON HYDROCHLORIDE 4 MG: 2 INJECTION, SOLUTION INTRAMUSCULAR; INTRAVENOUS at 12:23

## 2018-12-06 RX ADMIN — INSULIN LISPRO 8 UNITS: 100 INJECTION, SOLUTION INTRAVENOUS; SUBCUTANEOUS at 17:57

## 2018-12-06 RX ADMIN — HEPARIN SODIUM 5000 UNITS: 5000 INJECTION, SOLUTION INTRAVENOUS; SUBCUTANEOUS at 22:09

## 2018-12-06 RX ADMIN — CASTOR OIL AND BALSAM, PERU: 788; 87 OINTMENT TOPICAL at 09:15

## 2018-12-06 RX ADMIN — SODIUM CHLORIDE, PRESERVATIVE FREE 3 ML: 5 INJECTION INTRAVENOUS at 09:15

## 2018-12-06 RX ADMIN — DAPTOMYCIN 500 MG: 500 INJECTION, POWDER, LYOPHILIZED, FOR SOLUTION INTRAVENOUS at 12:16

## 2018-12-06 RX ADMIN — INSULIN LISPRO 5 UNITS: 100 INJECTION, SOLUTION INTRAVENOUS; SUBCUTANEOUS at 22:10

## 2018-12-06 RX ADMIN — HYDROCODONE BITARTRATE AND ACETAMINOPHEN 1 TABLET: 7.5; 325 TABLET ORAL at 09:16

## 2018-12-06 RX ADMIN — ASPIRIN 325 MG: 325 TABLET, DELAYED RELEASE ORAL at 09:12

## 2018-12-06 RX ADMIN — HEPARIN SODIUM 5000 UNITS: 5000 INJECTION, SOLUTION INTRAVENOUS; SUBCUTANEOUS at 09:12

## 2018-12-06 RX ADMIN — HYDROCODONE BITARTRATE AND ACETAMINOPHEN 1 TABLET: 7.5; 325 TABLET ORAL at 17:57

## 2018-12-06 NOTE — PLAN OF CARE
Problem: Patient Care Overview  Goal: Plan of Care Review  Outcome: Ongoing (interventions implemented as appropriate)   12/06/18 8341   Coping/Psychosocial   Plan of Care Reviewed With patient   Plan of Care Review   Progress no change   OTHER   Outcome Summary Dr. Patiño changed dressing on foot today (right foot); elevated on pillow; can wiggle toes; dressing cdi; IV kvo and abx given; dilaudid was d/c'd today for prep for discharge-pt very unhappy with this; explained the need to prepare her for discharge with meds she can take at home. vss. not needed insulin coverage today.     Goal: Individualization and Mutuality  Outcome: Ongoing (interventions implemented as appropriate)    Goal: Discharge Needs Assessment  Outcome: Ongoing (interventions implemented as appropriate)    Goal: Interprofessional Rounds/Family Conf  Outcome: Ongoing (interventions implemented as appropriate)      Problem: Infection, Risk/Actual (Adult)  Goal: Identify Related Risk Factors and Signs and Symptoms  Outcome: Ongoing (interventions implemented as appropriate)    Goal: Infection Prevention/Resolution  Outcome: Ongoing (interventions implemented as appropriate)      Problem: Fall Risk (Adult)  Goal: Identify Related Risk Factors and Signs and Symptoms  Outcome: Ongoing (interventions implemented as appropriate)    Goal: Absence of Fall  Outcome: Ongoing (interventions implemented as appropriate)      Problem: Pain, Acute (Adult)  Goal: Identify Related Risk Factors and Signs and Symptoms  Outcome: Ongoing (interventions implemented as appropriate)    Goal: Acceptable Pain Control/Comfort Level  Outcome: Ongoing (interventions implemented as appropriate)      Problem: Diabetes, Type 2 (Adult)  Goal: Signs and Symptoms of Listed Potential Problems Will be Absent, Minimized or Managed (Diabetes, Type 2)  Outcome: Ongoing (interventions implemented as appropriate)      Problem: Skin Injury Risk (Adult)  Goal: Identify Related Risk  Factors and Signs and Symptoms  Outcome: Ongoing (interventions implemented as appropriate)    Goal: Skin Health and Integrity  Outcome: Ongoing (interventions implemented as appropriate)

## 2018-12-06 NOTE — PROGRESS NOTES
[facility] Medicine Services  PROGRESS NOTE    Patient Name: Tashia Leon  : 1955  MRN: [id number]    Date of Admission: 2018  Length of Stay: 10  Primary Care Physician: Angelica Tran PA    Subjective   Subjective     CC:  Diabetic foot ulcer     HPI:  No issues overnight  Appears comfortable    Review of Systems  Gen- No fevers, chills  CV- No chest pain, palpitations  Resp- No cough, dyspnea  GI- No N/V/D, abd pain    Otherwise ROS is negative except as mentioned in the HPI.    Objective   Objective     Vital Signs:   Temp:  [98.2 °F (36.8 °C)-99.2 °F (37.3 °C)] 98.6 °F (37 °C)  Heart Rate:  [70-80] 71  Resp:  [16-18] 16  BP: (111-148)/(58-68) 121/58        Physical Exam:  Constitutional: No acute distress, resting with eyes closed  HENT: NCAT, mucous membranes moist  Respiratory: Clear to auscultation bilaterally, respiratory effort normal   Cardiovascular: RRR, no murmurs, rubs, or gallops, palpable pedal pulses bilaterally  Gastrointestinal: Positive bowel sounds, soft, nontender, nondistended  Musculoskeletal: No bilateral ankle edema  Psychiatric: Appropriate affect, cooperative  Neurologic: Oriented x 3, STOUT, speech clear  Skin: No rashes- right foot wrapped in ACE- CDI- elevated on pillow    Results Reviewed:  I have personally reviewed current lab, radiology, and data and agree.    Results from last 7 days   Lab Units  18   0618   0557   WBC 10*3/mm3  15.15*  12.87*  12.24*   HEMOGLOBIN g/dL  8.4*  8.5*  8.3*   HEMATOCRIT %  27.1*  27.3*  26.9*   PLATELETS 10*3/mm3  444  415  435     Results from last 7 days   Lab Units  18   0637  18   0639  18   0557   SODIUM mmol/L  137  140  138   POTASSIUM mmol/L  3.9  3.9  3.6   CHLORIDE mmol/L  105  107  102   CO2 mmol/L  25.0  20.0  24.0   BUN mg/dL  51*  58*  66*   CREATININE mg/dL  3.38*  3.56*  3.91*   GLUCOSE mg/dL  135*  202*  173*   CALCIUM mg/dL  7.9*  8.1*   8.0*     Estimated Creatinine Clearance: 21.9 mL/min (A) (by C-G formula based on SCr of 3.38 mg/dL (H)).  No results found for: BNP    Microbiology Results Abnormal     Procedure Component Value - Date/Time    Tissue / Bone Culture - Tissue, Toe, Right [[id number]] Collected:  12/04/18 1207    Lab Status:  Preliminary result Specimen:  Tissue from Toe, Right Updated:  12/06/18 0721     Tissue Culture No growth at 2 days     Gram Stain No WBCs or organisms seen    Anaerobic Culture - Tissue, Toe, Right [[id number]] Collected:  12/04/18 1207    Lab Status:  Preliminary result Specimen:  Tissue from Toe, Right Updated:  12/05/18 1417     Culture No anaerobes isolated    AFB Culture - Tissue, Toe, Right [[id number]] Collected:  12/04/18 1207    Lab Status:  Preliminary result Specimen:  Tissue from Toe, Right Updated:  12/05/18 1320     AFB Stain No acid fast bacilli seen on concentrated smear    Eosinophil Smear - Urine, Urine, Clean Catch [[id number]]  (Normal) Collected:  12/01/18 1629    Lab Status:  Final result Specimen:  Urine, Clean Catch Updated:  12/01/18 1710     Eosinophil Smear 0 % EOS/100 Cells     Narrative:       No eosinophil seen    Blood Culture - Blood, Arm, Left [[id number]] Collected:  11/26/18 1625    Lab Status:  Final result Specimen:  Blood from Arm, Left Updated:  12/01/18 1645     Blood Culture No growth at 5 days    Blood Culture - Blood, Arm, Right [[id number]] Collected:  11/26/18 1620    Lab Status:  Final result Specimen:  Blood from Arm, Right Updated:  12/01/18 1645     Blood Culture No growth at 5 days          Imaging Results (last 24 hours)     ** No results found for the last 24 hours. **        Results for orders placed during the hospital encounter of 07/15/16   Adult transthoracic echo complete    Narrative · Left ventricular function is normal. Estimated EF = 55%.  · Trace mitral valve regurgitation is present.  · Trace tricuspid valve regurgitation is present          I have  reviewed the medications.      aspirin  mg Oral Daily   castor oil-balsam peru  Topical Q12H   DAPTOmycin 6 mg/kg (Adjusted) Intravenous Q48H   diltiaZEM  mg Oral Nightly   docusate sodium 100 mg Oral Daily   ertapenem 500 mg Intravenous Q24H   heparin (porcine) 5,000 Units Subcutaneous Q12H   HYDROcodone-acetaminophen 1 tablet Oral TID   insulin detemir 40 Units Subcutaneous Q12H   insulin lispro 0-14 Units Subcutaneous 4x Daily With Meals & Nightly   mupirocin  Topical Q24H   mupirocin  Topical Q24H   Naloxegol Oxalate 12.5 mg Oral Once per day on Sun Wed   pantoprazole 40 mg Oral QAM   sodium chloride 3 mL Intravenous Q12H         Assessment/Plan   Assessment / Plan     Active Hospital Problems    Diagnosis   • **Diabetic ulcer of right foot associated with type 2 diabetes mellitus (CMS/HCC)   • Sepsis (CMS/HCC)   • Cellulitis   • Hypokalemia   • Anemia, chronic disease   • Obesity (BMI 30-39.9)   • Impaired functional mobility, balance, gait, and endurance     Added automatically from request for surgery [id number]     • Diabetic ulcer of right midfoot associated with type 2 diabetes mellitus, with necrosis of bone (CMS/HCC)     Added automatically from request for surgery [id number]     • Cellulitis of toe of right foot     Added automatically from request for surgery [id number]     • Chronic osteomyelitis of right foot with draining sinus (CMS/HCC)     Added automatically from request for surgery [id number]     • Chronic kidney disease, stage V (CMS/HCC)   • Hyperlipidemia   • Diabetes mellitus type 2, uncontrolled, with complications (CMS/HCC)     Proteinuria noted, January 2014.       • Diabetic peripheral neuropathy (CMS/HCC)   • Essential hypertension   • Coronary artery disease involving native coronary artery of native heart without angina pectoris     1. Coronary artery disease:  a. Abnormal cardiac PET, 10/02/2013, Dr. Becker, revealing a posterolateral defect with a mild wall motion abnormality  and a normal LVEF.  b. Cardiac catheterization, 10/21/2013, with placement of a 2.5 x 15 mm Xience Expedition drug-eluting stent to the proximal circumflex.  LVEF of 55% to 60%.  Noncritical disease in the LAD and right coronary.  c. Echocardiogram, 12/18/2013, shows an LVEF 50% to 55% with trace MR and mild TR.               Brief Hospital Course to date:  1.  Sepsis, now resolved     2.  Diabetic foot ulcer with cellulitis right foot       -s/p debridement and right 1st toe/ metarsal amputation with Dr. Patiño 12/4              -wound care received silver wound gel topical treatment, dressings              -Invanz and Dapto, ID recs              -BC x 2, currently negative; surgical path negative for OM              -pain control- norco changed to prn q6h; dc'd dilaudid              -Scooter to offload foot has been ordered and is at the bedside.  Patient has seen PT who taught her appropriate use but are concerned about her weakness and stability.               3.   Type 2 diabetes mellitus with diabetic peripheral neuropathy              -a1c 7.6              -levemir 40 units bid, continue               -continue correction insulin, eating less while admitted     4.  CKDV              -nephrology following,  renal function limits abx choices               -renal dysfunction is very advanced              -Creatinine stable, continues off home rx lasix; euvolemic                 5.  Hypokalemia              -replaced by nephrology as needed        6. Obesity BMI 38: Weight loss recommended     7.  HTN: Currently normotensive     8.  CAD: Asymptomatic, aspirin     9.  Diabetic peripheral neuropathy: Symptomatic treatment and supportive care     10.  Anemia of chronic disease                 11.  Severe anxiety:improved with clonazepam. Not needed past couple of days      12.  Debility: Difficulty walking with needed scooter.  Therapy recommended rehabilitation facility for appropriate care and treatment.        DVT Prophylaxis:  Heparin      CODE STATUS:   Code Status and Medical Interventions:   Ordered at: 11/26/18 2126     Level Of Support Discussed With:    Patient     Code Status:    CPR     Medical Interventions (Level of Support Prior to Arrest):    Full       Disposition: I expect the patient to be discharged home with HH, adamantly refuses HH      Electronically signed by KAREEM Zendejas, 12/06/18, 2:45 PM.

## 2018-12-06 NOTE — PLAN OF CARE
Problem: Patient Care Overview  Goal: Plan of Care Review  Outcome: Ongoing (interventions implemented as appropriate)   12/06/18 0356   Coping/Psychosocial   Plan of Care Reviewed With patient   Plan of Care Review   Progress no change   OTHER   Outcome Summary Pt VSS. Pt c/o right foot pain with a rating of 7 or greater consistently. Pt required both her scheduled pain medication and prn IV pain medication. Encouraged pt to try to use PO pain medication r/t will not DC with IV pain medication. Pt can be demanding at times and disregard the NWB status. Pts right leg is elevated at all times with pillows and bottom of bed elevated. Pt lost IV access, IV was fine and then during the night woukld not flush. House placed IV via U/S. Dressing supplies at bedside per physician orders. Pt slept most of the shift.        Problem: Fall Risk (Adult)  Goal: Identify Related Risk Factors and Signs and Symptoms  Outcome: Ongoing (interventions implemented as appropriate)    Goal: Absence of Fall  Outcome: Ongoing (interventions implemented as appropriate)      Problem: Pain, Acute (Adult)  Goal: Identify Related Risk Factors and Signs and Symptoms  Outcome: Ongoing (interventions implemented as appropriate)    Goal: Acceptable Pain Control/Comfort Level  Outcome: Ongoing (interventions implemented as appropriate)      Problem: Skin and Soft Tissue Infection (Adult)  Goal: Signs and Symptoms of Listed Potential Problems Will be Absent, Minimized or Managed (Skin and Soft Tissue Infection)  Outcome: Ongoing (interventions implemented as appropriate)      Problem: Skin Injury Risk (Adult)  Goal: Identify Related Risk Factors and Signs and Symptoms  Outcome: Ongoing (interventions implemented as appropriate)    Goal: Skin Health and Integrity  Outcome: Ongoing (interventions implemented as appropriate)

## 2018-12-06 NOTE — PROGRESS NOTES
INFECTIOUS DISEASE PROGRESS NOTE    Tashia Leon  1955  [id number]    Date: 12/6/2018    Admission Date: 11/26/2018      CC: diabetic foot ulcer    History of present illness:    Patient is a 63 y.o. female with chronic kidney disease stage IV, diabetes type 2 since 1990s has had history of left foot MRSA osteomyelitis treated at  several years ago.  Patient has developed calluses on her right foot and saw [facility] who did some bedside debridement in the clinic.  Patient said increasing pain and some weeping from the wounds because of low-grade temperatures patient's returns to see her podiatrist recommended admission to hospital.  Patient was seen in the emergency room started on IV antibiotics including daptomycin and ertapenem.  She had MRI of the foot which was stopped prematurely by the patient's and was not a complete study.    Asked to address long-term treatment and duration of antibiotics.    Patient lives in [place].    Patient denies C. difficile colitis history of nausea vomiting or diarrhea difficulty breathing patient denies rash.    Patient says she is a PICC line before    11/28/18; no events overnight; no fever, rash, sore throat    11/29/18; no events overnight feels well; no complaints; no diarrhea, rash, sore throat    11/30/18: No new events. Denies f/c, sob, n/v/d, rashes or sore throat.     12/1/18; no events overnight; no fever, rash, sore throat    12/2/18: no new events or complaints.  Denies f/c, sob, n/v/d, rashes.   12/3/18: No new events or complaints.  Foot without pain.  No schedule for dressing changes.  Denies f/c, sob, n/v/d, rashes.     12/6/18; doing well; no events overnight; no fever, rash, sore throat    Past Medical History:   Diagnosis Date   • Acute bronchitis    • Acute pharyngitis    • Acute sinusitis    • Benign colonic polyp    • Edema    • GERD (gastroesophageal reflux disease)    • Hiatal hernia    • Hyperkalemia    • Hyperlipidemia    •  Hypertension    • Low back pain    • Lumbar disc disease    • MRSA (methicillin resistant Staphylococcus aureus)     Osteomyelitis/methicillin-resistant Staphylococcus aureus of the left foot, 2013.   • Obesity    • Osteomyelitis (CMS/HCC)     Osteomyelitis/methicillin-resistant Staphylococcus aureus of the left foot, 2013.   • Peptic ulceration 2013    History of peptic ulcer disease, diagnosed 2013 by EGD.  Repeat EGD in 2013 was negative.   • Sepsis (CMS/[facility])     Sepsis, 2013, hospitalized at [facility].   • Tobacco use     Previous tobacco use with cessation in .   • Type 2 diabetes mellitus (CMS/[facility])     Proteinuria noted, 2014.     • Vitamin D deficiency        Past Surgical History:   Procedure Laterality Date   •  SECTION      x2   • FOOT SURGERY Left     Incision and debridement of the left foot x2.   • LASIK     • TONSILLECTOMY         Family History   Problem Relation Age of Onset   • No Known Problems Mother    • No Known Problems Father    • Breast cancer Neg Hx    • Ovarian cancer Neg Hx        Social History     Socioeconomic History   • Marital status:      Spouse name: Not on file   • Number of children: Not on file   • Years of education: Not on file   • Highest education level: Not on file   Social Needs   • Financial resource strain: Not on file   • Food insecurity - worry: Not on file   • Food insecurity - inability: Not on file   • Transportation needs - medical: Not on file   • Transportation needs - non-medical: Not on file   Occupational History   • Not on file   Tobacco Use   • Smoking status: Former Smoker     Packs/day: 1.00     Years: 30.00     Pack years: 30.00     Types: Cigarettes   • Smokeless tobacco: Never Used   • Tobacco comment: quit 7 years ago   Substance and Sexual Activity   • Alcohol use: Yes     Alcohol/week: 0.6 oz     Types: 1 Glasses of wine per week     Comment: holidays   • Drug  use: No   • Sexual activity: Defer   Other Topics Concern   • Not on file   Social History Narrative   • Not on file       Allergies   Allergen Reactions   • Statins Myalgia   • Ancef [Cefazolin]    • Bactrim [Sulfamethoxazole-Trimethoprim] Nausea And Vomiting   • Cephalosporins    • Cleocin [Clindamycin Hcl]    • Clindamycin/Lincomycin    • Keflex [Cephalexin]    • Levaquin [Levofloxacin]    • Lipitor [Atorvastatin]    • Lisinopril    • Losartan Potassium Other (See Comments)     Unknown reaction   • Oxycodone    • Quinolones          Medication:    Current Facility-Administered Medications:   •  aspirin EC tablet 325 mg, 325 mg, Oral, Daily, Rosario Walter APRN, 325 mg at 12/06/18 0912  •  bisacodyl (DULCOLAX) EC tablet 10 mg, 10 mg, Oral, Daily PRN, Heavenly Medina APRN, 10 mg at 12/06/18 0912  •  castor oil-balsam peru (VENELEX) ointment, , Topical, Q12H, Halina Walker,   •  clonazePAM (KlonoPIN) tablet 0.5 mg, 0.5 mg, Oral, BID PRN, Gonzalo Schmidt MD, 0.5 mg at 12/03/18 1725  •  DAPTOmycin (CUBICIN) 500 mg in sodium chloride 0.9 % 50 mL IVPB, 6 mg/kg (Adjusted), Intravenous, Q48H, Niko Deleon PA, Last Rate: 100 mL/hr at 12/06/18 1216, 500 mg at 12/06/18 1216  •  dextrose (D50W) 25 g/ 50mL Intravenous Solution 25 g, 25 g, Intravenous, Q15 Min PRN, Rosario Walter, APRN  •  dextrose (GLUTOSE) oral gel 15 g, 15 g, Oral, Q15 Min PRN, Rosario Walter, APRN  •  diltiaZEM CD (CARDIZEM CD) 24 hr capsule 120 mg, 120 mg, Oral, Nightly, Rosario Walter, APRN, 120 mg at 12/05/18 2143  •  docusate sodium (COLACE) capsule 100 mg, 100 mg, Oral, Daily, Heavenly Medina APRN, 100 mg at 12/06/18 0912  •  ertapenem (INVanz) 500 mg in sodium chloride 0.9 % 50 mL IVPB, 500 mg, Intravenous, Q24H, Niko Deleon PA, Last Rate: 100 mL/hr at 12/05/18 1751, 500 mg at 12/05/18 1751  •  glucagon (human recombinant) (GLUCAGEN DIAGNOSTIC) injection 1 mg, 1 mg, Subcutaneous, PRN, Saba,  KAREEM Hewitt  •  heparin (porcine) 5000 UNIT/ML injection 5,000 Units, 5,000 Units, Subcutaneous, Q12H, Rosario Walter APRN, 5,000 Units at 12/06/18 0912  •  HYDROcodone-acetaminophen (NORCO) 7.5-325 MG per tablet 1 tablet, 1 tablet, Oral, Q6H PRN, Franchesca Finney APRN  •  insulin detemir (LEVEMIR) injection 40 Units, 40 Units, Subcutaneous, Q12H, Gonzalo Schmidt MD, 40 Units at 12/05/18 2143  •  insulin lispro (humaLOG) injection 0-14 Units, 0-14 Units, Subcutaneous, 4x Daily With Meals & Nightly, Rosario Walter APRN, 5 Units at 12/05/18 1753  •  mupirocin (BACTROBAN) 2 % cream, , Topical, Q24H, Chio Sterling MD  •  mupirocin (BACTROBAN) 2 % cream, , Topical, Q24H, Chio Sterling MD  •  Naloxegol Oxalate (MOVANTIK) tablet 12.5 mg, 12.5 mg, Oral, Once per day on Sun Wed, Rosario Walter APRN, 12.5 mg at 12/05/18 0942  •  ondansetron (ZOFRAN) tablet 4 mg, 4 mg, Oral, Q6H PRN, 4 mg at 11/30/18 2046 **OR** ondansetron (ZOFRAN) injection 4 mg, 4 mg, Intravenous, Q6H PRN, Rosario Walter APRN, 4 mg at 12/06/18 1223  •  pantoprazole (PROTONIX) EC tablet 40 mg, 40 mg, Oral, QAM, Rosario Walter APRN, 40 mg at 12/06/18 0610  •  [COMPLETED] Insert peripheral IV, , , Once **AND** sodium chloride 0.9 % flush 10 mL, 10 mL, Intravenous, PRN, Hiram Osborn MD, 10 mL at 12/04/18 1032  •  sodium chloride 0.9 % flush 3 mL, 3 mL, Intravenous, Q12H, Rosario Walter APRN, 3 mL at 12/06/18 0915  •  sodium chloride 0.9 % flush 3-10 mL, 3-10 mL, Intravenous, PRN, Rosario Walter APRN  •  sodium chloride 0.9 % infusion, 9 mL/hr, Intravenous, Continuous, Binta Patricia MD, Last Rate: 9 mL/hr at 12/04/18 1257, 9 mL/hr at 12/04/18 1257    Antibiotics:  Anti-Infectives (From admission, onward)    Ordered     Dose/Rate Route Frequency Start Stop    12/03/18 0848  DAPTOmycin (CUBICIN) 500 mg in sodium chloride 0.9 % 50 mL IVPB     Ordering Provider:  Niko Deleon PA    6  mg/kg × 79.2 kg (Adjusted)  100 mL/hr over 30 Minutes Intravenous Every 48 Hours 18 1000 18 0959    18 2136  DAPTOmycin (CUBICIN) 500 mg in sodium chloride 0.9 % 50 mL IVPB     Ordering Provider:  Breana Chance MD    6 mg/kg × 79.2 kg (Adjusted)  100 mL/hr over 30 Minutes Intravenous Every 48 Hours 18 1800 18 1959    18 2132  ertapenem (INVanz) 500 mg in sodium chloride 0.9 % 50 mL IVPB     Niko Deleon PA reviewed the order on 18 0848.   Ordering Provider:  Niko Deleon PA    500 mg  100 mL/hr over 30 Minutes Intravenous Every 24 Hours 18 1800 12/10/18 0844    18 1616  ertapenem (INVanz) 500 mg in sodium chloride 0.9 % 50 mL IVPB     Ordering Provider:  Hiram Osborn MD    500 mg  100 mL/hr over 30 Minutes Intravenous Every 24 Hours 18 1618 18 0015    18 1616  DAPTOmycin (CUBICIN) 500 mg in sodium chloride 0.9 % 50 mL IVPB     Ordering Provider:  Hiram Osborn MD    500 mg  100 mL/hr over 30 Minutes Intravenous Every 24 Hours 18 1617 18 1935            Review of Systems:       Review of systems were reviewed and were unremarkable.  See hpi      Physical Exam:   Vital Signs  Temp (24hrs), Av.6 °F (37 °C), Min:98.2 °F (36.8 °C), Max:99.2 °F (37.3 °C)    Temp  Min: 98.2 °F (36.8 °C)  Max: 99.2 °F (37.3 °C)  BP  Min: 111/59  Max: 148/68  Pulse  Min: 70  Max: 80  Resp  Min: 16  Max: 18  SpO2  Min: 95 %  Max: 97 %    GENERAL: Awake and alert, in no acute distress.   HEENT: Normocephalic, atraumatic.  PERRL. EOMI. No conjunctival injection. No icterus. Oropharynx clear without evidence of thrush or exudate.   HEART: RRR; No murmur, rubs, gallops.   LUNGS: Clear to auscultation bilaterally without wheezing, rales, rhonchi.   ABDOMEN: Soft, nontender, nondistended. Positive bowel sounds.    EXT:  No cyanosis, clubbing or edema. No cord.  MSK: FROM without joint effusions noted arms/legs.  Right foot wrapped  SKIN: Warm  and dry without cutaneous eruptions on Inspection/palpation.    NEURO: Oriented to PPT. No focal deficits on motor/sensory exam at arms/legs.  PSYCHIATRIC: Normal insight and judgement. Cooperative with PE    Laboratory Data    Results from last 7 days   Lab Units  12/05/18   0637  12/04/18   0639  12/03/18   0557   WBC 10*3/mm3  15.15*  12.87*  12.24*   HEMOGLOBIN g/dL  8.4*  8.5*  8.3*   HEMATOCRIT %  27.1*  27.3*  26.9*   PLATELETS 10*3/mm3  444  415  435     Results from last 7 days   Lab Units  12/05/18   0637   SODIUM mmol/L  137   POTASSIUM mmol/L  3.9   CHLORIDE mmol/L  105   CO2 mmol/L  25.0   BUN mg/dL  51*   CREATININE mg/dL  3.38*   GLUCOSE mg/dL  135*   CALCIUM mg/dL  7.9*                     Results from last 7 days   Lab Units  12/01/18   0949   CK TOTAL U/L  121         Estimated Creatinine Clearance: 21.9 mL/min (A) (by C-G formula based on SCr of 3.38 mg/dL (H)).      Microbiology:  Microbiology Results Abnormal     Procedure Component Value - Date/Time    Tissue / Bone Culture - Tissue, Toe, Right [[id number]] Collected:  12/04/18 1207    Lab Status:  Preliminary result Specimen:  Tissue from Toe, Right Updated:  12/06/18 0721     Tissue Culture No growth at 2 days     Gram Stain No WBCs or organisms seen    Anaerobic Culture - Tissue, Toe, Right [[id number]] Collected:  12/04/18 1207    Lab Status:  Preliminary result Specimen:  Tissue from Toe, Right Updated:  12/05/18 1417     Culture No anaerobes isolated    AFB Culture - Tissue, Toe, Right [[id number]] Collected:  12/04/18 1207    Lab Status:  Preliminary result Specimen:  Tissue from Toe, Right Updated:  12/05/18 1320     AFB Stain No acid fast bacilli seen on concentrated smear    Eosinophil Smear - Urine, Urine, Clean Catch [[id number]]  (Normal) Collected:  12/01/18 1629    Lab Status:  Final result Specimen:  Urine, Clean Catch Updated:  12/01/18 1710     Eosinophil Smear 0 % EOS/100 Cells     Narrative:       No eosinophil seen    Blood  Culture - Blood, Arm, Left [[id number]] Collected:  11/26/18 1625    Lab Status:  Final result Specimen:  Blood from Arm, Left Updated:  12/01/18 1645     Blood Culture No growth at 5 days    Blood Culture - Blood, Arm, Right [[id number]] Collected:  11/26/18 1620    Lab Status:  Final result Specimen:  Blood from Arm, Right Updated:  12/01/18 1645     Blood Culture No growth at 5 days                       Radiology:  No radiology results for the last 7 days  Doppler arterial:   Addenda:       · Moderate reduction in arterial flow in the right lower extremity. · Monophasic waveforms are present in the right lower extremity with   biphasic waveforms in the left lower extremity · Due to the patients inability to tolerate the study only RLE pressures   were obtained    Signed: 11/29/18 6009 by Gillian Becker MD   Narrative:     · Moderate reduction in arterial flow in the right lower extremity.   Normal-appearing waveforms.     Narrative:     · Left Conclusion: The left NELLI is normal.  · Right Conclusion: The right NELLI is normal.     Path from 12/4/18  RIGHT FIRST TOE AND METATARSAL, AMPUTATION:  Skin with ulceration, necrosis and focal abscess formation compatible with gangrene.  No histologic evidence of acute osteomyelitis on representative sections of bone.  Proximal soft tissue margins appear viable.      Impression:   Diabetic foot ulcer on right foot  Leukocytosis with neutrophilia, improving  chronic kidney disease stage IV  Focal abscess of right 1st toe  Antibiotic intolerances to cefazolin, clindamycin, Levaquin  DM II  Esr > 100    PLAN/RECOMMENDATIONS:     I find it very difficult to interpret an MRI was not completed an MRI that was not given with gadolinium because of her chronic kidney disease.    Physical exam of foot not remarkable for  Osteomyelitis.  I still have some concern with ESr > 100.           Cont dapto 6 mg/kg IV Q48H  Cont invanz 500 mg IV Q24H    If cultures negative may  consolidate abx to invanz, po doxycycline    F/u op cultures  Patient needs scrict offloading  F/u of path    Needs LTAC referral  D/w ; dp/cm time 30 minutes  Hung Haynes MD  12/6/2018  3:05 PM

## 2018-12-06 NOTE — PROGRESS NOTES
"   LOS: 10 days    Patient Care Team:  Angelica Tran PA as PCP - General (Internal Medicine)  Denver Baldwin MD as Consulting Physician (Nephrology)    Chief Complaint:  Diabetic foot ulcer    Subjective     Interval History:     No acute events overnight. No new complaints.     Review of Systems:   NO CP Or SOA.     Objective     Vital Sign Min/Max for last 24 hours  Temp  Min: 98.2 °F (36.8 °C)  Max: 99.2 °F (37.3 °C)   BP  Min: 111/59  Max: 148/68   Pulse  Min: 70  Max: 80   Resp  Min: 16  Max: 18   SpO2  Min: 95 %  Max: 97 %   No Data Recorded   No Data Recorded     Flowsheet Rows      First Filed Value   Admission Height  167.6 cm (66\") Documented at 11/26/2018 1315   Admission Weight  109 kg (240 lb) Documented at 11/26/2018 1315          I/O this shift:  In: 600 [P.O.:600]  Out: 900 [Urine:900]  I/O last 3 completed shifts:  In: 360 [P.O.:360]  Out: 2250 [Urine:2250]    Physical Exam:     General Appearance:    Alert, cooperative, in no acute distress   Head:    Normocephalic, without obvious abnormality, atraumatic               Neck:   No adenopathy, supple, trachea midline, no thyromegaly, no     carotid bruit, no JVD       Lungs:     Clear to auscultation,respirations regular, even and                   unlabored    Heart:    Regular rhythm and normal rate, normal S1 and S2, no            murmur, no gallop, no rub, no click       Abdomen:     Normal bowel sounds, no masses, no organomegaly, soft        non-tender, non-distended, no guarding, no rebound                 tenderness       Extremities:  Ulcer of the right foot with erythema noted. no edema.                Neurologic:   No focal deficit noted.        WBC WBC   Date Value Ref Range Status   12/05/2018 15.15 (H) 3.50 - 10.80 10*3/mm3 Final   12/04/2018 12.87 (H) 3.50 - 10.80 10*3/mm3 Final      HGB Hemoglobin   Date Value Ref Range Status   12/05/2018 8.4 (L) 11.5 - 15.5 g/dL Final   12/04/2018 8.5 (L) 11.5 - 15.5 g/dL Final      HCT " Hematocrit   Date Value Ref Range Status   12/05/2018 27.1 (L) 34.5 - 44.0 % Final   12/04/2018 27.3 (L) 34.5 - 44.0 % Final      Platlets No results found for: LABPLAT   MCV MCV   Date Value Ref Range Status   12/05/2018 98.5 80.0 - 99.0 fL Final   12/04/2018 98.2 80.0 - 99.0 fL Final          Sodium Sodium   Date Value Ref Range Status   12/05/2018 137 132 - 146 mmol/L Final   12/04/2018 140 132 - 146 mmol/L Final      Potassium Potassium   Date Value Ref Range Status   12/05/2018 3.9 3.5 - 5.5 mmol/L Final   12/04/2018 3.9 3.5 - 5.5 mmol/L Final      Chloride Chloride   Date Value Ref Range Status   12/05/2018 105 99 - 109 mmol/L Final   12/04/2018 107 99 - 109 mmol/L Final      CO2 CO2   Date Value Ref Range Status   12/05/2018 25.0 20.0 - 31.0 mmol/L Final   12/04/2018 20.0 20.0 - 31.0 mmol/L Final      BUN BUN   Date Value Ref Range Status   12/05/2018 51 (H) 9 - 23 mg/dL Final   12/04/2018 58 (H) 9 - 23 mg/dL Final      Creatinine Creatinine   Date Value Ref Range Status   12/05/2018 3.38 (H) 0.60 - 1.30 mg/dL Final   12/04/2018 3.56 (H) 0.60 - 1.30 mg/dL Final      Calcium Calcium   Date Value Ref Range Status   12/05/2018 7.9 (L) 8.7 - 10.4 mg/dL Final   12/04/2018 8.1 (L) 8.7 - 10.4 mg/dL Final      PO4 No results found for: CAPO4   Albumin No results found for: ALBUMIN   Magnesium No results found for: MG   Uric Acid No results found for: URICACID        Results Review:     I reviewed the patient's new clinical results.      aspirin  mg Oral Daily   castor oil-balsam peru  Topical Q12H   DAPTOmycin 6 mg/kg (Adjusted) Intravenous Q48H   diltiaZEM  mg Oral Nightly   docusate sodium 100 mg Oral Daily   ertapenem 500 mg Intravenous Q24H   heparin (porcine) 5,000 Units Subcutaneous Q12H   HYDROcodone-acetaminophen 1 tablet Oral TID   insulin detemir 40 Units Subcutaneous Q12H   insulin lispro 0-14 Units Subcutaneous 4x Daily With Meals & Nightly   mupirocin  Topical Q24H   mupirocin  Topical Q24H    Naloxegol Oxalate 12.5 mg Oral Once per day on Sun Wed   pantoprazole 40 mg Oral QAM   sodium chloride 3 mL Intravenous Q12H       sodium chloride 9 mL/hr Last Rate: 9 mL/hr (12/04/18 1257)       Medication Review:     Assessment/Plan       Diabetic ulcer of right foot associated with type 2 diabetes mellitus (CMS/HCC)    Coronary artery disease involving native coronary artery of native heart without angina pectoris    Essential hypertension    Diabetic peripheral neuropathy (CMS/HCC)    Diabetes mellitus type 2, uncontrolled, with complications (CMS/HCC)    Hyperlipidemia    Chronic kidney disease, stage V (CMS/HCC)    Sepsis (CMS/HCC)    Cellulitis    Hypokalemia    Anemia, chronic disease    Obesity (BMI 30-39.9)    Impaired functional mobility, balance, gait, and endurance    Diabetic ulcer of right midfoot associated with type 2 diabetes mellitus, with necrosis of bone (CMS/HCC)    Cellulitis of toe of right foot    Chronic osteomyelitis of right foot with draining sinus (CMS/HCC)      1- CKD stage IV - DN likely - Scr 3.5 to 4.0 range at baseline.UOP is adequate. Scr 3.38 - improved. UOp is good without diuretics.   2- HTN - stable.   3- Proteinuria -DN -UPCR 3.1  4- Anemia of chronic disease - stable.   5- Diabetic foot ulcer.   6- hypokalemia   7- Iron def. S/p IV iron     Plan:  - Continue with current regimen.   - Labs @ AM       I discussed the  findings and my recommendations with patient          Dannie Luna MD  12/06/18  12:50 PM

## 2018-12-06 NOTE — PAYOR COMM NOTE
"Abril Cast RN Utilization Review [phone]  Fax # [phone]  Ref # [id number]          Tashia Leon (63 y.o. Female)     Date of Birth Social Security Number Address Home Phone MRN    1955  [id number] [facility] [id number] [phone] [id number]    [employer] Marital Status          None        Admission Date Admission Type Admitting Provider Attending Provider Department, Room/Bed    11/26/18 Emergency Halina Walker DO Roesch, Lyndsey, DO [facility] 3H, S386/1    Discharge Date Discharge Disposition Discharge Destination                       Attending Provider:  Halina Walker DO    Allergies:  Statins, Ancef [Cefazolin], Bactrim [Sulfamethoxazole-trimethoprim], Cephalosporins, Cleocin [Clindamycin Hcl], Clindamycin/lincomycin, Keflex [Cephalexin], Levaquin [Levofloxacin], Lipitor [Atorvastatin], Lisinopril, Losartan Potassium, Oxycodone, Quinolones    Isolation:  None   Infection:  None   Code Status:  CPR    Ht:  167.6 cm (66\")   Wt:  109 kg (240 lb)    Admission Cmt:  None   Principal Problem:  Diabetic ulcer of right foot associated with type 2 diabetes mellitus (CMS/HCC) [E11.621,L97.519]                 Active Insurance as of 11/26/2018     Primary Coverage     Payor Plan Insurance Group Employer/Plan Group    [employer] [facility] [id number]     Payor Plan Address Payor Plan Phone Number Payor Plan Fax Number Effective Dates    PO    1/16/2018 - None Entered    [facility] [id number]       Subscriber Name Subscriber Birth Date Member ID       TASHIA LEON 1955 [id number]                 Emergency Contacts      (Rel.) Home Phone Work Phone Mobile Phone    Bisi Lucia (Mother) -- -- [phone]            ICU Vital Signs     Row Name 12/06/18 1203 12/06/18 1032 12/06/18 0915 12/06/18 0842 12/06/18 0803       Vitals    Temp  98.6 °F (37 °C)  --  --  --  99.2 °F (37.3 °C)    Temp src  Oral  --  --  --  Oral    " Pulse  71  --  --  --  75    Heart Rate Source  Monitor  --  --  --  Monitor    Resp  16  --  --  --  16    Resp Rate Source  Visual  --  --  --  Visual    BP  121/58  --  --  --  129/63    BP Location  Right arm  --  --  --  Right arm    BP Method  Automatic  --  --  --  Automatic    Patient Position  Lying  --  --  --  Lying       Oxygen Therapy    SpO2  96 %  --  --  --  97 %    Device (Oxygen Therapy)  --  room air  room air  room air  --    Row Name 12/06/18 0620 12/06/18 0420 12/06/18 0349 12/06/18 0345 12/06/18 0246       Vitals    Temp  --  --  98.4 °F (36.9 °C)  --  --    Temp src  --  --  Oral  --  --    Pulse  --  --  73  --  --    Heart Rate Source  --  --  Monitor  --  --    Resp  --  --  16  --  --    Resp Rate Source  --  --  Visual  --  --    BP  --  --  148/68  --  --    BP Location  --  --  Right arm  --  --    BP Method  --  --  Automatic  --  --    Patient Position  --  --  Lying  --  --       Oxygen Therapy    SpO2  --  --  95 %  --  --    Pulse Oximetry Type  --  --  Intermittent  --  --    Device (Oxygen Therapy)  room air  room air  --  room air  room air    Row Name 12/06/18 0215 12/06/18 0103 12/06/18 0030 12/05/18 2350 12/05/18 2251       Vitals    Temp  --  98.6 °F (37 °C)  --  --  --    Temp src  --  Oral  --  --  --    Pulse  --  70  --  --  --    Heart Rate Source  --  Monitor  --  --  --    Resp  --  16  --  --  --    Resp Rate Source  --  Visual  --  --  --    BP  --  111/59  --  --  --    BP Location  --  Left arm  --  --  --    BP Method  --  Automatic  --  --  --    Patient Position  --  Lying  --  --  --       Oxygen Therapy    SpO2  --  96 %  --  --  --    Pulse Oximetry Type  --  Intermittent  --  --  --    Device (Oxygen Therapy)  room air  --  room air  room air  room air    Row Name 12/05/18 2144 12/05/18 2012 12/05/18 2005 12/05/18 1751 12/05/18 1640       Vitals    Temp  --  98.2 °F (36.8 °C)  --  --  98.6 °F (37 °C)    Temp src  --  Oral  --  --  Oral    Pulse  --  75  --   --  80    Heart Rate Source  --  Monitor  --  --  Monitor    Resp  --  16  --  --  18    Resp Rate Source  --  Visual  --  --  Visual    BP  --  132/62  --  --  131/60    BP Location  --  Left arm  --  --  --    BP Method  --  Automatic  --  --  --    Patient Position  --  Lying  --  --  --       Oxygen Therapy    SpO2  --  95 %  --  --  --    Pulse Oximetry Type  --  Intermittent  --  --  --    Device (Oxygen Therapy)  room air  --  room air  room air  --    Row Name 12/05/18 1600                   Oxygen Therapy    Device (Oxygen Therapy)  room air            [facility] Medications (active)       Dose Frequency Start End    aspirin EC tablet 325 mg 325 mg Daily 11/27/2018     Sig - Route: Take 1 tablet by mouth Daily. - Oral    bisacodyl (DULCOLAX) EC tablet 10 mg 10 mg Daily PRN 12/1/2018     Sig - Route: Take 2 tablets by mouth Daily As Needed for Constipation. - Oral    castor oil-balsam peru (VENELEX) ointment  Every 12 Hours Scheduled 12/3/2018     Sig - Route: Apply  topically to the appropriate area as directed Every 12 (Twelve) Hours. - Topical    Cosign for Ordering: Accepted by Halina Walker DO on 12/3/2018 11:38 AM    clonazePAM (KlonoPIN) tablet 0.5 mg 0.5 mg 2 Times Daily PRN 12/1/2018 12/11/2018    Sig - Route: Take 1 tablet by mouth 2 (Two) Times a Day As Needed for Anxiety. - Oral    DAPTOmycin (CUBICIN) 500 mg in sodium chloride 0.9 % 50 mL IVPB 6 mg/kg × 79.2 kg (Adjusted) Every 48 Hours 12/4/2018 12/12/2018    Sig - Route: Infuse 500 mg into a venous catheter Every Other Day. - Intravenous    dextrose (D50W) 25 g/ 50mL Intravenous Solution 25 g 25 g Every 15 Minutes PRN 11/26/2018     Sig - Route: Infuse 50 mL into a venous catheter Every 15 (Fifteen) Minutes As Needed for Low Blood Sugar (Blood Sugar Less Than 70). - Intravenous    dextrose (GLUTOSE) oral gel 15 g 15 g Every 15 Minutes PRN 11/26/2018     Sig - Route: Take 15 g by mouth Every 15 (Fifteen) Minutes As Needed for Low Blood Sugar  "(Blood sugar less than 70). - Oral    diltiaZEM CD (CARDIZEM CD) 24 hr capsule 120 mg 120 mg Nightly 11/27/2018     Sig - Route: Take 1 capsule by mouth Every Night. - Oral    docusate sodium (COLACE) capsule 100 mg 100 mg Daily 12/1/2018     Sig - Route: Take 1 capsule by mouth Daily. - Oral    ertapenem (INVanz) 500 mg in sodium chloride 0.9 % 50 mL IVPB 500 mg Every 24 Hours 11/27/2018 12/10/2018    Sig - Route: Infuse 500 mg into a venous catheter Daily. - Intravenous    glucagon (human recombinant) (GLUCAGEN DIAGNOSTIC) injection 1 mg 1 mg As Needed 11/26/2018     Sig - Route: Inject 1 mg under the skin into the appropriate area as directed As Needed (Blood Glucose Less Than 70). - Subcutaneous    heparin (porcine) 5000 UNIT/ML injection 5,000 Units 5,000 Units Every 12 Hours Scheduled 11/26/2018     Sig - Route: Inject 1 mL under the skin into the appropriate area as directed Every 12 (Twelve) Hours. - Subcutaneous    HYDROcodone-acetaminophen (NORCO) 7.5-325 MG per tablet 1 tablet * Brand Name Norco--Patient Supplied Medication * 1 tablet 3 Times Daily 11/30/2018 12/10/2018    Sig - Route: Take 1 tablet by mouth 3 (Three) Times a Day. - Oral    Non-formulary Exception Code: Patient supplied medication    HYDROmorphone (DILAUDID) injection 0.5 mg 0.5 mg Every 4 Hours PRN 12/5/2018     Sig - Route: Infuse 0.5 mL into a venous catheter Every 4 (Four) Hours As Needed for Severe Pain . - Intravenous    Linked Group 1:  \"And\" Linked Group Details        insulin detemir (LEVEMIR) injection 40 Units 40 Units Every 12 Hours Scheduled 11/27/2018     Sig - Route: Inject 40 Units under the skin into the appropriate area as directed Every 12 (Twelve) Hours. - Subcutaneous    insulin lispro (humaLOG) injection 0-14 Units 0-14 Units 4 Times Daily With Meals & Nightly 11/26/2018     Sig - Route: Inject 0-14 Units under the skin into the appropriate area as directed 4 (Four) Times a Day With Meals & at Bedtime. - Subcutaneous " "   mupirocin (BACTROBAN) 2 % cream  Every 24 Hours Scheduled 11/29/2018     Sig - Route: Apply  topically to the appropriate area as directed Daily. - Topical    mupirocin (BACTROBAN) 2 % cream  Every 24 Hours Scheduled 12/5/2018     Sig - Route: Apply  topically to the appropriate area as directed Daily. - Topical    Naloxegol Oxalate (MOVANTIK) tablet 12.5 mg 12.5 mg 2 Times Weekly 11/28/2018     Sig - Route: Take 1 tablet by mouth Once per day on Sun Wed. - Oral    Notes to Pharmacy: Wednesday and Sunday    naloxone (NARCAN) injection 0.4 mg 0.4 mg Every 5 Minutes PRN 12/5/2018     Sig - Route: Infuse 1 mL into a venous catheter Every 5 (Five) Minutes As Needed for Respiratory Depression. - Intravenous    Linked Group 1:  \"And\" Linked Group Details        ondansetron (ZOFRAN) injection 4 mg 4 mg Every 6 Hours PRN 11/26/2018     Sig - Route: Infuse 2 mL into a venous catheter Every 6 (Six) Hours As Needed for Nausea or Vomiting. - Intravenous    Linked Group 2:  \"Or\" Linked Group Details        ondansetron (ZOFRAN) tablet 4 mg 4 mg Every 6 Hours PRN 11/26/2018     Sig - Route: Take 1 tablet by mouth Every 6 (Six) Hours As Needed for Nausea or Vomiting. - Oral    Linked Group 2:  \"Or\" Linked Group Details        pantoprazole (PROTONIX) EC tablet 40 mg 40 mg Every Morning 11/27/2018     Sig - Route: Take 1 tablet by mouth Every Morning. - Oral    sodium chloride 0.9 % flush 10 mL 10 mL As Needed 11/26/2018     Sig - Route: Infuse 10 mL into a venous catheter As Needed for Line Care. - Intravenous    Linked Group 3:  \"And\" Linked Group Details        sodium chloride 0.9 % flush 3 mL 3 mL Every 12 Hours Scheduled 11/26/2018     Sig - Route: Infuse 3 mL into a venous catheter Every 12 (Twelve) Hours. - Intravenous    sodium chloride 0.9 % flush 3-10 mL 3-10 mL As Needed 11/26/2018     Sig - Route: Infuse 3-10 mL into a venous catheter As Needed for Line Care. - Intravenous    sodium chloride 0.9 % infusion 9 mL/hr " Continuous 12/4/2018     Sig - Route: Infuse 9 mL/hr into a venous catheter Continuous. - Intravenous             Operative/Procedure Notes (last 72 hours) (Notes from 12/3/2018  2:44 PM through 12/6/2018  2:44 PM)      Chio Sterling MD at 12/4/2018 12:00 PM        Orthopedics I&D right foot, great toe amputation, possible 1st Metatarsal amputation  Brief Op Note    Tashia Leon  11/26/2018 - 12/4/2018    Pre-op Diagnosis:   Impaired functional mobility, balance, gait, and endurance [Z74.09]  Diabetic peripheral neuropathy (CMS/HCC) [E11.42]  Diabetes mellitus type 2, uncontrolled, with complications (CMS/HCC) [E11.8, E11.65]  Chronic kidney disease, stage V (CMS/HCC) [N18.5]  Diabetic ulcer of right midfoot associated with type 2 diabetes mellitus, with necrosis of bone (CMS/HCC) [E11.621, L97.414]  Cellulitis of toe of right foot [L03.031]  Obesity (BMI 30-39.9) [E66.9]  Chronic osteomyelitis of right foot with draining sinus (CMS/HCC) [M86.471] gangrenous necrosis right great toe and distal metatarsal tissue    Post-op Diagnosis:  same       Post-Op Diagnosis Codes:     * Impaired functional mobility, balance, gait, and endurance [Z74.09]     * Diabetic peripheral neuropathy (CMS/HCC) [E11.42]     * Diabetes mellitus type 2, uncontrolled, with complications (CMS/HCC) [E11.8, E11.65]     * Chronic kidney disease, stage V (CMS/HCC) [N18.5]     * Diabetic ulcer of right midfoot associated with type 2 diabetes mellitus, with necrosis of bone (CMS/HCC) [E11.621, L97.414]     * Cellulitis of toe of right foot [L03.031]     * Obesity (BMI 30-39.9) [E66.9]     * Chronic osteomyelitis of right foot with draining sinus (CMS/HCC) [M86.471]    Procedure(s):  I&D right foot, great toe amputation, possible 1st Metatarsal amputation    Surgeon(s):  Chio Sterling MD    Anesthesia:  General with Block    Staff:   Circulator: Anshul Ferreira RN; Prema Mike RN  Scrub Person: Cam Dangelo; Gillian Stauffer  JI  Assistant: Shayna Hooks PA-C    Estimated Blood Loss:10cc      Specimens: culture and permanant      Drains:  penrose    Complications:  None    Tourniquet:: 4min    Dressing:soft     Disposition:rr stable    Chio Sterling MD     Date: 12/4/2018  Time: 12:00 PM    Electronically signed by Chio Sterling MD at 12/4/2018 12:01 PM     Chio Sterling MD at 12/4/2018 12:00 PM        Operative Report    12/04/18  12:01 PM    Preoperative diagnosis: right foot diabetic ulcer, necrosis, probable  ostetomyelitis great toe    Postoperative diagnosis: Same with gangrenous necrosis of tissue circumferential to right great toe and distal 1st metatarsal    Anesthesia: Gen.     Surgeon: Chio Sterling M.D.    Assistant: shayna CADE, she was present for the entire procedure including prepping, draping, retraction, closure, dressing.    Operative procedure: amputate right great toe and metatarsal    Operative indications: This is a 63-year-old woman with long-standing diabetes with severe peripheral neuropathy, decreased blood flow, stage IV renal failure.  She has had osteomyelitis previously in the left foot.  She has had chronic calluses and recurrent ulcerations on the feet.  She was admitted to the hospital with cellulitis and draining ulcers on the right foot under the first and third metatarsal heads.  She was found to have significant necrosis in the ulcer under the first metatarsal head, she was started on antibiotics.  Noninvasive vascular studies showed monophasic flow.  MRI was attempted but she was unable to complete it due to pain.  With antibiotics the cellulitis decreased and the necrotic areas demarcated.  She was instructed in strict nonweightbearing, but had difficulty complying with this.  Dressing changes were done with Bactroban.  The ulcer under the third metatarsal head improved.  The one under the first metatarsal head remains necrotic, there is dusky tissue around the great toe,  at the time of surgery I found deep necrosis circumferentially around the great toe and metatarsal head.  Once the toe demarcated I discussed surgery with her.  I recommended I&D with probable amputation of the toe and possible amputation of the metatarsal.  She agreed.  I explained I don't think this will heal without surgical debridement.  She has a very high risk for a below-knee or above-knee amputation given all of her medical problems, the ignificant necrosis, and difficulty with compliance.    Operative procedure: The patient was taken to the operating room where general anesthesia was induced without difficulty.  SHe was already on antibiotics.  The right leg was prepped and draped in the usual sterile fashion with a well-padded tourniquet on the thigh.  The appropriate timeout was called.  The leg was elevated, tourniquet inflated to 350 mmHg.  Tourniquet time was 4 minutes.  I made an elliptical incision over the plantar ulcer under the first metatarsal head and carried this proximally and distally in a curvilinear fashion.  Beneath the skin I found the full-thickness necrosis circumferentially around the great toe and metatarsal head.  Once I remove the most significant necrosis, and once deep cultures were obtained the tourniquet was released.  I continued to excise necrotic tissue until the was at least some sluggish bleeding at the transected edges of the tissue.  This required removal of the distal quarter of the first metatarsal.  Once this was done the remaining tissue had at least some sluggish bleeding with no micah necrosis.  All of the purulent material was removed.  The wound was then irrigated copiously with antibiotic solution using pulsatile lavage.  The incision was closed loosely over a Penrose drain with nylon.  The foot was dressed sterilely.  She was awakened, extubated and transferred to recovery in stable condition.  We will continue IV antibiotics.  She must be absolutely  nonweightbearing on the extremity to have any chance of healing.  She is at very high risk for below-knee amputation.  She is also at risk for above-knee amputation.    Estimated blood loss: 10 cc    Specimens: Cultures and permanent    Drains: Penrose    Complications: None    Chio Sterling MD  12/04/18  12:01 PM        Electronically signed by Chio Sterling MD at 12/4/2018 12:08 PM          Physician Progress Notes (last 72 hours) (Notes from 12/3/2018  2:44 PM through 12/6/2018  2:44 PM)      Chio Sterling MD at 12/6/2018 12:56 PM          Orthopedic  Progress Note    Subjective     Post-Operative Day: 2 Days Post-Op    Systemic or Specific Complaints: c/o pain and worry about what foot looks like.  However, when I changed the dressing and removed the penrose (she was not watching) she did not flinch at all, foot is densely neuropathic  Objective     Vital signs in last 24 hours:  Temp:  [98.2 °F (36.8 °C)-99.2 °F (37.3 °C)] 98.6 °F (37 °C)  Heart Rate:  [70-80] 71  Resp:  [16-18] 16  BP: (111-148)/(58-68) 121/58    Neurovascular: Dense neuropathy bilaterally   Wound: Right foot dressing changed, incision clean, closed, no flucutance no necrosis, no purulence, only faint erythema, the ulcer under the 4th met is healing         Data Review  Lab Results (last 24 hours)     Procedure Component Value Units Date/Time    POC Glucose Once [[id number]]  (Abnormal) Collected:  12/06/18 1206    Specimen:  Blood Updated:  12/06/18 1226     Glucose 133 mg/dL     POC Glucose Once [[id number]]  (Normal) Collected:  12/06/18 0805    Specimen:  Blood Updated:  12/06/18 0825     Glucose 79 mg/dL     Tissue / Bone Culture - Tissue, Toe, Right [[id number]] Collected:  12/04/18 1207    Specimen:  Tissue from Toe, Right Updated:  12/06/18 0721     Tissue Culture No growth at 2 days     Gram Stain No WBCs or organisms seen    POC Glucose Once [[id number]]  (Abnormal) Collected:  12/05/18 2010    Specimen:  Blood Updated:  12/05/18  2011     Glucose 140 mg/dL     POC Glucose Once [[id number]]  (Abnormal) Collected:  12/05/18 1606    Specimen:  Blood Updated:  12/05/18 1608     Glucose 212 mg/dL     Tissue Pathology Exam [[id number]] Collected:  12/04/18 1209    Specimen:  Tissue from Toe, Right Updated:  12/05/18 1608     Case Report --     Surgical Pathology Report                         Case: [id number]                                  Authorizing Provider:  Chio Sterling MD        Collected:           12/04/2018 12:09 PM          Ordering Location:     [facility]   Received:            12/04/2018 12:57 PM                                 OR                                                                           Pathologist:           Marito Dolan MD                                                        Specimen:    Toe, Right, right 1st toe and metatarsal - gangrene                                         Clinical Information --     The working history is right foot diabetic ulcer, necrosis, probable osteomyelitis great toe and gangrenous necrosis of tissue circumferential to right great toe and distal first metatarsal.           Final Diagnosis --      RIGHT FIRST TOE AND METATARSAL, AMPUTATION:  Skin with ulceration, necrosis and focal abscess formation compatible with gangrene.  No histologic evidence of acute osteomyelitis on representative sections of bone.  Proximal soft tissue margins appear viable.    PCC/dlb         Gross Description --     Received in formalin labeled as right first toe and metatarsal-gangrene is a 6.5 x 3.2 x 2.7 cm toe partially covered in tan skin.  The bone extends 4.5 cm beyond the proximal skin margin, which appears grossly viable.  Also received freely floating in the container is a 6.5 x 5.5 x 1 cm aggregate of tan skin, soft tissue and bone fragments.  A partial toenail is present on the toe.  One of the skin fragments displays a 1 x 0.4 cm ulcerated area.  Sectioning through the  bone reveals a firm, tan, finely granular cut surface.  No other gross lesions are identified.  Representative sections are submitted as follows:  Block 1A proximal skin margin, en face, 1B skin including ulcer and 1C cross section of metatarsal, submitted following decalcification.  LED/mm         Microscopic Description --     The slides are reviewed and demonstrate histopathologic features supporting the above rendered diagnosis.          Anaerobic Culture - Tissue, Toe, Right [[id number]] Collected:  12/04/18 1207    Specimen:  Tissue from Toe, Right Updated:  12/05/18 1417     Culture No anaerobes isolated    AFB Culture - Tissue, Toe, Right [[id number]] Collected:  12/04/18 1207    Specimen:  Tissue from Toe, Right Updated:  12/05/18 1320     AFB Stain No acid fast bacilli seen on concentrated smear            Assessment/Plan     Status post- right 1st toe and metatarsal amputation for gangrene- path shows the gangrenous necrosis.  No definite osteo on path, but the necrosis went to the periosteum on toe and metatarsal, and the bone had to be removed in order to close the wound.  Leaving the metatarsal and phalanges skeletonized would not heal.  Cultures pending (antibiotic modified)  She is very high risk for amputation, due to medical problems as well as difficulty with compliance.  I again emphasized that she must NOT PUT any weight on foot.  She was sleeping when I went in the room, and is taking a lot of narcotics (insensate foot) and I am not sure she is retaining the discussions that she has with nurses, doctors.   She would be safest at rehab, but is adamant that she does not want to go there.            Chio Sterling MD    Date: 12/6/2018  Time: 12:56 PM    Electronically signed by Chio Sterling MD at 12/6/2018  1:01 PM     JeremyDannie MD at 12/6/2018 12:50 PM             LOS: 10 days    Patient Care Team:  Angelica Tran PA as PCP - General (Internal Medicine)  Denver Baldwin,  "MD as Consulting Physician (Nephrology)    Chief Complaint:  Diabetic foot ulcer    Subjective     Interval History:     No acute events overnight. No new complaints.     Review of Systems:   NO CP Or SOA.     Objective     Vital Sign Min/Max for last 24 hours  Temp  Min: 98.2 °F (36.8 °C)  Max: 99.2 °F (37.3 °C)   BP  Min: 111/59  Max: 148/68   Pulse  Min: 70  Max: 80   Resp  Min: 16  Max: 18   SpO2  Min: 95 %  Max: 97 %   No Data Recorded   No Data Recorded     Flowsheet Rows      First Filed Value   Admission Height  167.6 cm (66\") Documented at 11/26/2018 1315   Admission Weight  109 kg (240 lb) Documented at 11/26/2018 1315          I/O this shift:  In: 600 [P.O.:600]  Out: 900 [Urine:900]  I/O last 3 completed shifts:  In: 360 [P.O.:360]  Out: 2250 [Urine:2250]    Physical Exam:     General Appearance:    Alert, cooperative, in no acute distress   Head:    Normocephalic, without obvious abnormality, atraumatic               Neck:   No adenopathy, supple, trachea midline, no thyromegaly, no     carotid bruit, no JVD       Lungs:     Clear to auscultation,respirations regular, even and                   unlabored    Heart:    Regular rhythm and normal rate, normal S1 and S2, no            murmur, no gallop, no rub, no click       Abdomen:     Normal bowel sounds, no masses, no organomegaly, soft        non-tender, non-distended, no guarding, no rebound                 tenderness       Extremities:  Ulcer of the right foot with erythema noted. no edema.                Neurologic:   No focal deficit noted.        WBC WBC   Date Value Ref Range Status   12/05/2018 15.15 (H) 3.50 - 10.80 10*3/mm3 Final   12/04/2018 12.87 (H) 3.50 - 10.80 10*3/mm3 Final      HGB Hemoglobin   Date Value Ref Range Status   12/05/2018 8.4 (L) 11.5 - 15.5 g/dL Final   12/04/2018 8.5 (L) 11.5 - 15.5 g/dL Final      HCT Hematocrit   Date Value Ref Range Status   12/05/2018 27.1 (L) 34.5 - 44.0 % Final   12/04/2018 27.3 (L) 34.5 - 44.0 % " Final      Platlets No results found for: LABPLAT   MCV MCV   Date Value Ref Range Status   12/05/2018 98.5 80.0 - 99.0 fL Final   12/04/2018 98.2 80.0 - 99.0 fL Final          Sodium Sodium   Date Value Ref Range Status   12/05/2018 137 132 - 146 mmol/L Final   12/04/2018 140 132 - 146 mmol/L Final      Potassium Potassium   Date Value Ref Range Status   12/05/2018 3.9 3.5 - 5.5 mmol/L Final   12/04/2018 3.9 3.5 - 5.5 mmol/L Final      Chloride Chloride   Date Value Ref Range Status   12/05/2018 105 99 - 109 mmol/L Final   12/04/2018 107 99 - 109 mmol/L Final      CO2 CO2   Date Value Ref Range Status   12/05/2018 25.0 20.0 - 31.0 mmol/L Final   12/04/2018 20.0 20.0 - 31.0 mmol/L Final      BUN BUN   Date Value Ref Range Status   12/05/2018 51 (H) 9 - 23 mg/dL Final   12/04/2018 58 (H) 9 - 23 mg/dL Final      Creatinine Creatinine   Date Value Ref Range Status   12/05/2018 3.38 (H) 0.60 - 1.30 mg/dL Final   12/04/2018 3.56 (H) 0.60 - 1.30 mg/dL Final      Calcium Calcium   Date Value Ref Range Status   12/05/2018 7.9 (L) 8.7 - 10.4 mg/dL Final   12/04/2018 8.1 (L) 8.7 - 10.4 mg/dL Final      PO4 No results found for: CAPO4   Albumin No results found for: ALBUMIN   Magnesium No results found for: MG   Uric Acid No results found for: URICACID        Results Review:     I reviewed the patient's new clinical results.      aspirin  mg Oral Daily   castor oil-balsam peru  Topical Q12H   DAPTOmycin 6 mg/kg (Adjusted) Intravenous Q48H   diltiaZEM  mg Oral Nightly   docusate sodium 100 mg Oral Daily   ertapenem 500 mg Intravenous Q24H   heparin (porcine) 5,000 Units Subcutaneous Q12H   HYDROcodone-acetaminophen 1 tablet Oral TID   insulin detemir 40 Units Subcutaneous Q12H   insulin lispro 0-14 Units Subcutaneous 4x Daily With Meals & Nightly   mupirocin  Topical Q24H   mupirocin  Topical Q24H   Naloxegol Oxalate 12.5 mg Oral Once per day on Sun Wed   pantoprazole 40 mg Oral QAM   sodium chloride 3 mL  Intravenous Q12H       sodium chloride 9 mL/hr Last Rate: 9 mL/hr (12/04/18 1257)       Medication Review:     Assessment/Plan       Diabetic ulcer of right foot associated with type 2 diabetes mellitus (CMS/HCC)    Coronary artery disease involving native coronary artery of native heart without angina pectoris    Essential hypertension    Diabetic peripheral neuropathy (CMS/HCC)    Diabetes mellitus type 2, uncontrolled, with complications (CMS/HCC)    Hyperlipidemia    Chronic kidney disease, stage V (CMS/HCC)    Sepsis (CMS/HCC)    Cellulitis    Hypokalemia    Anemia, chronic disease    Obesity (BMI 30-39.9)    Impaired functional mobility, balance, gait, and endurance    Diabetic ulcer of right midfoot associated with type 2 diabetes mellitus, with necrosis of bone (CMS/HCC)    Cellulitis of toe of right foot    Chronic osteomyelitis of right foot with draining sinus (CMS/HCC)      1- CKD stage IV - DN likely - Scr 3.5 to 4.0 range at baseline.UOP is adequate. Scr 3.38 - improved. UOp is good without diuretics.   2- HTN - stable.   3- Proteinuria -DN -UPCR 3.1  4- Anemia of chronic disease - stable.   5- Diabetic foot ulcer.   6- hypokalemia   7- Iron def. S/p IV iron     Plan:  - Continue with current regimen.   - Labs @ AM       I discussed the  findings and my recommendations with patient          Dannie Luna MD  12/06/18  12:50 PM        Electronically signed by Dannie Luna MD at 12/6/2018  2:07 PM     Hung Haynes MD at 12/5/2018  2:34 PM          INFECTIOUS DISEASE PROGRESS NOTE    Tashia Leon  1955  [id number]    Date: 12/5/2018    Admission Date: 11/26/2018      CC: diabetic foot ulcer    History of present illness:    Patient is a 63 y.o. female with chronic kidney disease stage IV, diabetes type 2 since 1990s has had history of left foot MRSA osteomyelitis treated at  several years ago.  Patient has developed calluses on her right foot and saw [facility] who did some bedside debridement in the clinic.  Patient said increasing pain and some weeping from the wounds because of low-grade temperatures patient's returns to see her podiatrist recommended admission to hospital.  Patient was seen in the emergency room started on IV antibiotics including daptomycin and ertapenem.  She had MRI of the foot which was stopped prematurely by the patient's and was not a complete study.    Asked to address long-term treatment and duration of antibiotics.    Patient lives in [place].    Patient denies C. difficile colitis history of nausea vomiting or diarrhea difficulty breathing patient denies rash.    Patient says she is a PICC line before    11/28/18; no events overnight; no fever, rash, sore throat    11/29/18; no events overnight feels well; no complaints; no diarrhea, rash, sore throat    11/30/18: No new events. Denies f/c, sob, n/v/d, rashes or sore throat.     12/1/18; no events overnight; no fever, rash, sore throat    12/2/18: no new events or complaints.  Denies f/c, sob, n/v/d, rashes.   12/3/18: No new events or complaints.  Foot without pain.  No schedule for dressing changes.  Denies f/c, sob, n/v/d, rashes.     Past Medical History:   Diagnosis Date   • Acute bronchitis    • Acute pharyngitis    • Acute sinusitis    • Benign colonic polyp    • Edema    • GERD (gastroesophageal reflux disease)    • Hiatal hernia    • Hyperkalemia    • Hyperlipidemia    • Hypertension    • Low back pain    • Lumbar disc disease    • MRSA (methicillin resistant Staphylococcus aureus)     Osteomyelitis/methicillin-resistant Staphylococcus aureus of the left foot, April 2013.   • Obesity    • Osteomyelitis (CMS/HCC)     Osteomyelitis/methicillin-resistant Staphylococcus aureus of the left foot, April 2013.   • Peptic ulceration 01/2013    History of peptic ulcer disease, diagnosed January 2013 by EGD.  Repeat EGD in August 2013 was negative.   • Sepsis (CMS/HCC)     Sepsis,  2013, hospitalized at [facility].   • Tobacco use     Previous tobacco use with cessation in .   • Type 2 diabetes mellitus (CMS/HCC)     Proteinuria noted, 2014.     • Vitamin D deficiency        Past Surgical History:   Procedure Laterality Date   •  SECTION      x2   • FOOT SURGERY Left     Incision and debridement of the left foot x2.   • LASIK     • TONSILLECTOMY         Family History   Problem Relation Age of Onset   • No Known Problems Mother    • No Known Problems Father    • Breast cancer Neg Hx    • Ovarian cancer Neg Hx        Social History     Socioeconomic History   • Marital status:      Spouse name: Not on file   • Number of children: Not on file   • Years of education: Not on file   • Highest education level: Not on file   Social Needs   • Financial resource strain: Not on file   • Food insecurity - worry: Not on file   • Food insecurity - inability: Not on file   • Transportation needs - medical: Not on file   • Transportation needs - non-medical: Not on file   Occupational History   • Not on file   Tobacco Use   • Smoking status: Former Smoker     Packs/day: 1.00     Years: 30.00     Pack years: 30.00     Types: Cigarettes   • Smokeless tobacco: Never Used   • Tobacco comment: quit 7 years ago   Substance and Sexual Activity   • Alcohol use: Yes     Alcohol/week: 0.6 oz     Types: 1 Glasses of wine per week     Comment: holidays   • Drug use: No   • Sexual activity: Defer   Other Topics Concern   • Not on file   Social History Narrative   • Not on file       Allergies   Allergen Reactions   • Statins Myalgia   • Ancef [Cefazolin]    • Bactrim [Sulfamethoxazole-Trimethoprim] Nausea And Vomiting   • Cephalosporins    • Cleocin [Clindamycin Hcl]    • Clindamycin/Lincomycin    • Keflex [Cephalexin]    • Levaquin [Levofloxacin]    • Lipitor [Atorvastatin]    • Lisinopril    • Losartan Potassium Other (See Comments)     Unknown reaction   •  Oxycodone    • Quinolones          Medication:    Current Facility-Administered Medications:   •  aspirin EC tablet 325 mg, 325 mg, Oral, Daily, Rosario Walter APRN, 325 mg at 12/05/18 0936  •  bisacodyl (DULCOLAX) EC tablet 10 mg, 10 mg, Oral, Daily PRN, Heavenly Medina APRN, 10 mg at 12/01/18 0949  •  castor oil-balsam peru (VENELEX) ointment, , Topical, Q12H, Halina Walker,   •  clonazePAM (KlonoPIN) tablet 0.5 mg, 0.5 mg, Oral, BID PRN, Gonzalo Schmidt MD, 0.5 mg at 12/03/18 1725  •  DAPTOmycin (CUBICIN) 500 mg in sodium chloride 0.9 % 50 mL IVPB, 6 mg/kg (Adjusted), Intravenous, Q48H, Niko Deleon PA, 500 mg at 12/04/18 1157  •  dextrose (D50W) 25 g/ 50mL Intravenous Solution 25 g, 25 g, Intravenous, Q15 Min PRN, Rosario Walter APRN  •  dextrose (GLUTOSE) oral gel 15 g, 15 g, Oral, Q15 Min PRN, Rosario Walter APRN  •  diltiaZEM CD (CARDIZEM CD) 24 hr capsule 120 mg, 120 mg, Oral, Nightly, Rosario Walter APRN, 120 mg at 12/04/18 2303  •  docusate sodium (COLACE) capsule 100 mg, 100 mg, Oral, Daily, Heavenly Medina APRN, 100 mg at 12/05/18 0936  •  ertapenem (INVanz) 500 mg in sodium chloride 0.9 % 50 mL IVPB, 500 mg, Intravenous, Q24H, Niko Deleon PA, Last Rate: 100 mL/hr at 12/04/18 1813, 500 mg at 12/04/18 1813  •  glucagon (human recombinant) (GLUCAGEN DIAGNOSTIC) injection 1 mg, 1 mg, Subcutaneous, PRN, Rosario Walter, APRN  •  heparin (porcine) 5000 UNIT/ML injection 5,000 Units, 5,000 Units, Subcutaneous, Q12H, Rosario Walter, APRN, 5,000 Units at 12/05/18 0936  •  HYDROcodone-acetaminophen (NORCO) 7.5-325 MG per tablet 1 tablet * Brand Name Norco--Patient Supplied Medication *, 1 tablet, Oral, TID, Gonzalo Schmidt MD, 1 tablet at 12/04/18 2100  •  HYDROmorphone (DILAUDID) injection 0.5 mg, 0.5 mg, Intravenous, Q4H PRN, 0.5 mg at 12/05/18 1236 **AND** naloxone (NARCAN) injection 0.4 mg, 0.4 mg, Intravenous, Q5 Min PRN, Jarrod  KAREEM Price  •  insulin detemir (LEVEMIR) injection 40 Units, 40 Units, Subcutaneous, Q12H, Gonzalo Schmidt MD, 40 Units at 12/05/18 0943  •  insulin lispro (humaLOG) injection 0-14 Units, 0-14 Units, Subcutaneous, 4x Daily With Meals & Nightly, Rosario Walter APRN, 5 Units at 12/05/18 1211  •  mupirocin (BACTROBAN) 2 % cream, , Topical, Q24H, Chio Sterling MD  •  mupirocin (BACTROBAN) 2 % cream, , Topical, Q24H, Chio Sterling MD  •  Naloxegol Oxalate (MOVANTIK) tablet 12.5 mg, 12.5 mg, Oral, Once per day on Sun Wed, Rosario Walter APRN, 12.5 mg at 12/05/18 0942  •  ondansetron (ZOFRAN) tablet 4 mg, 4 mg, Oral, Q6H PRN, 4 mg at 11/30/18 2046 **OR** ondansetron (ZOFRAN) injection 4 mg, 4 mg, Intravenous, Q6H PRN, Rosario Walter APRN, 4 mg at 12/03/18 1833  •  pantoprazole (PROTONIX) EC tablet 40 mg, 40 mg, Oral, QAM, Rosario Walter APRN, 40 mg at 12/05/18 0936  •  [COMPLETED] Insert peripheral IV, , , Once **AND** sodium chloride 0.9 % flush 10 mL, 10 mL, Intravenous, PRN, Hiram Osborn MD, 10 mL at 12/04/18 1032  •  sodium chloride 0.9 % flush 3 mL, 3 mL, Intravenous, Q12H, Rosario Walter APRN, 3 mL at 12/05/18 0938  •  sodium chloride 0.9 % flush 3-10 mL, 3-10 mL, Intravenous, PRN, Rosario Walter APRN  •  sodium chloride 0.9 % infusion, 9 mL/hr, Intravenous, Continuous, Binta Patricia MD, Last Rate: 9 mL/hr at 12/04/18 1257, 9 mL/hr at 12/04/18 1257    Antibiotics:  Anti-Infectives (From admission, onward)    Ordered     Dose/Rate Route Frequency Start Stop    12/03/18 0848  DAPTOmycin (CUBICIN) 500 mg in sodium chloride 0.9 % 50 mL IVPB     Ordering Provider:  Niko Deleon PA    6 mg/kg × 79.2 kg (Adjusted)  100 mL/hr over 30 Minutes Intravenous Every 48 Hours 12/04/18 1000 12/12/18 0959    11/26/18 2136  DAPTOmycin (CUBICIN) 500 mg in sodium chloride 0.9 % 50 mL IVPB     Ordering Provider:  Breana Chance MD    6 mg/kg × 79.2 kg  (Adjusted)  100 mL/hr over 30 Minutes Intravenous Every 48 Hours 18 1800 18 19518 2132  ertapenem (INVanz) 500 mg in sodium chloride 0.9 % 50 mL IVPB     Niko Deleon PA reviewed the order on 18 0848.   Ordering Provider:  Niko Deleon PA    500 mg  100 mL/hr over 30 Minutes Intravenous Every 24 Hours 18 1800 12/10/18 0844    18 1616  ertapenem (INVanz) 500 mg in sodium chloride 0.9 % 50 mL IVPB     Ordering Provider:  Hiram Osborn MD    500 mg  100 mL/hr over 30 Minutes Intravenous Every 24 Hours 18 1618 18 0015    18 1616  DAPTOmycin (CUBICIN) 500 mg in sodium chloride 0.9 % 50 mL IVPB     Ordering Provider:  Hiram Osborn MD    500 mg  100 mL/hr over 30 Minutes Intravenous Every 24 Hours 18 1617 18 1935            Review of Systems:       Review of systems were reviewed and were unremarkable.  See hpi      Physical Exam:   Vital Signs  Temp (24hrs), Av °F (37.2 °C), Min:97.9 °F (36.6 °C), Max:99.6 °F (37.6 °C)    Temp  Min: 97.9 °F (36.6 °C)  Max: 99.6 °F (37.6 °C)  BP  Min: 118/50  Max: 155/49  Pulse  Min: 72  Max: 85  Resp  Min: 16  Max: 17  SpO2  Min: 94 %  Max: 95 %    GENERAL: Awake and alert, in no acute distress.   HEENT: Normocephalic, atraumatic.  PERRL. EOMI. No conjunctival injection. No icterus. Oropharynx clear without evidence of thrush or exudate.   HEART: RRR; No murmur, rubs, gallops.   LUNGS: Clear to auscultation bilaterally without wheezing, rales, rhonchi.   ABDOMEN: Soft, nontender, nondistended. Positive bowel sounds.    EXT:  No cyanosis, clubbing or edema. No cord.  MSK: FROM without joint effusions noted arms/legs.  Right foot wrapped  SKIN: Warm and dry without cutaneous eruptions on Inspection/palpation.    NEURO: Oriented to PPT. No focal deficits on motor/sensory exam at arms/legs.  PSYCHIATRIC: Normal insight and judgement. Cooperative with PE    Laboratory Data    Results from last 7 days    Lab Units  12/05/18   0637  12/04/18   0639  12/03/18   0557   WBC 10*3/mm3  15.15*  12.87*  12.24*   HEMOGLOBIN g/dL  8.4*  8.5*  8.3*   HEMATOCRIT %  27.1*  27.3*  26.9*   PLATELETS 10*3/mm3  444  415  435     Results from last 7 days   Lab Units  12/05/18   0637   SODIUM mmol/L  137   POTASSIUM mmol/L  3.9   CHLORIDE mmol/L  105   CO2 mmol/L  25.0   BUN mg/dL  51*   CREATININE mg/dL  3.38*   GLUCOSE mg/dL  135*   CALCIUM mg/dL  7.9*                     Results from last 7 days   Lab Units  12/01/18   0949   CK TOTAL U/L  121         Estimated Creatinine Clearance: 21.9 mL/min (A) (by C-G formula based on SCr of 3.38 mg/dL (H)).      Microbiology:  Microbiology Results Abnormal     Procedure Component Value - Date/Time    Anaerobic Culture - Tissue, Toe, Right [[id number]] Collected:  12/04/18 1207    Lab Status:  Preliminary result Specimen:  Tissue from Toe, Right Updated:  12/05/18 1417     Culture No anaerobes isolated    AFB Culture - Tissue, Toe, Right [[id number]] Collected:  12/04/18 1207    Lab Status:  Preliminary result Specimen:  Tissue from Toe, Right Updated:  12/05/18 1320     AFB Stain No acid fast bacilli seen on concentrated smear    Tissue / Bone Culture - Tissue, Toe, Right [[id number]] Collected:  12/04/18 1207    Lab Status:  Preliminary result Specimen:  Tissue from Toe, Right Updated:  12/05/18 0654     Tissue Culture No growth     Gram Stain No WBCs or organisms seen    Eosinophil Smear - Urine, Urine, Clean Catch [[id number]]  (Normal) Collected:  12/01/18 1629    Lab Status:  Final result Specimen:  Urine, Clean Catch Updated:  12/01/18 1710     Eosinophil Smear 0 % EOS/100 Cells     Narrative:       No eosinophil seen    Blood Culture - Blood, Arm, Left [[id number]] Collected:  11/26/18 1625    Lab Status:  Final result Specimen:  Blood from Arm, Left Updated:  12/01/18 1645     Blood Culture No growth at 5 days    Blood Culture - Blood, Arm, Right [[id number]] Collected:  11/26/18 1620     Lab Status:  Final result Specimen:  Blood from Arm, Right Updated:  12/01/18 1645     Blood Culture No growth at 5 days                       Radiology:  Xr Foot 3+ View Right    Result Date: 11/28/2018  Possible plantar ulceration of the forefoot. No acute osseous abnormality is identified.  D:  11/28/2018 E:  11/28/2018  This report was finalized on 11/28/2018 12:28 PM by Aniceto Deleon.      Doppler arterial:   Addenda:       · Moderate reduction in arterial flow in the right lower extremity. · Monophasic waveforms are present in the right lower extremity with   biphasic waveforms in the left lower extremity · Due to the patients inability to tolerate the study only RLE pressures   were obtained    Signed: 11/29/18 6704 by Gillian Becker MD   Narrative:     · Moderate reduction in arterial flow in the right lower extremity.   Normal-appearing waveforms.     Narrative:     · Left Conclusion: The left NELLI is normal.  · Right Conclusion: The right NELLI is normal.           Impression:   Diabetic foot ulcer on right foot  Leukocytosis with neutrophilia, improving  chronic kidney disease stage IV  Antibiotic intolerances to cefazolin, clindamycin, Levaquin  DM II  Esr > 100    PLAN/RECOMMENDATIONS:     I find it very difficult to interpret an MRI was not completed an MRI that was not given with gadolinium because of her chronic kidney disease.    Physical exam of foot not remarkable for  Osteomyelitis.  I still have some concern with ESr > 100.      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   If patient continues the ulcerations can lead to secondary infection and contiguous spread of infection to the bone;  Unclear is patient already has osteo; bone biopsy may be gold standard to determine    continue offloading     Offloading and using a knee scooter is probably the most important intervention currently     Cont dapto 6 mg/kg IV Q48H  Cont  "invanz 500 mg IV Q24H    D/w Dr. Patiño  F/u op cultures  Patient needs scrict offloading  F/u of path    Needs LTAC referral      Hung Haynes MD  12/5/2018  2:34 PM                    Electronically signed by Hung Haynes MD at 12/5/2018  2:37 PM     Dannie Luna MD at 12/5/2018 11:07 AM             LOS: 9 days    Patient Care Team:  Angelica Tran PA as PCP - General (Internal Medicine)  Denver Baldwin MD as Consulting Physician (Nephrology)    Chief Complaint:  Diabetic foot ulcer    Subjective     Interval History:     No acute events overnight. No new complaints.     Review of Systems:   NO CP Or SOA. NO Abdominal discomfort.     Objective     Vital Sign Min/Max for last 24 hours  Temp  Min: 97.5 °F (36.4 °C)  Max: 99.6 °F (37.6 °C)   BP  Min: 116/39  Max: 155/49   Pulse  Min: 65  Max: 85   Resp  Min: 16  Max: 20   SpO2  Min: 94 %  Max: 98 %   No Data Recorded   No Data Recorded     Flowsheet Rows      First Filed Value   Admission Height  167.6 cm (66\") Documented at 11/26/2018 1315   Admission Weight  109 kg (240 lb) Documented at 11/26/2018 1315          I/O this shift:  In: 120 [P.O.:120]  Out: -   I/O last 3 completed shifts:  In: 1610 [P.O.:600; I.V.:900; IV Piggyback:110]  Out: 1610 [Urine:1600; Blood:10]    Physical Exam:     General Appearance:    Alert, cooperative, in no acute distress   Head:    Normocephalic, without obvious abnormality, atraumatic               Neck:   No adenopathy, supple, trachea midline, no thyromegaly, no     carotid bruit, no JVD       Lungs:     Clear to auscultation,respirations regular, even and                   unlabored    Heart:    Regular rhythm and normal rate, normal S1 and S2, no            murmur, no gallop, no rub, no click       Abdomen:     Normal bowel sounds, no masses, no organomegaly, soft        non-tender, non-distended, no guarding, no rebound                 tenderness       Extremities:  Ulcer of the right foot " with erythema noted. no edema.                Neurologic:   No focal deficit noted.        WBC WBC   Date Value Ref Range Status   12/05/2018 15.15 (H) 3.50 - 10.80 10*3/mm3 Final   12/04/2018 12.87 (H) 3.50 - 10.80 10*3/mm3 Final   12/03/2018 12.24 (H) 3.50 - 10.80 10*3/mm3 Final      HGB Hemoglobin   Date Value Ref Range Status   12/05/2018 8.4 (L) 11.5 - 15.5 g/dL Final   12/04/2018 8.5 (L) 11.5 - 15.5 g/dL Final   12/03/2018 8.3 (L) 11.5 - 15.5 g/dL Final      HCT Hematocrit   Date Value Ref Range Status   12/05/2018 27.1 (L) 34.5 - 44.0 % Final   12/04/2018 27.3 (L) 34.5 - 44.0 % Final   12/03/2018 26.9 (L) 34.5 - 44.0 % Final      Platlets No results found for: LABPLAT   MCV MCV   Date Value Ref Range Status   12/05/2018 98.5 80.0 - 99.0 fL Final   12/04/2018 98.2 80.0 - 99.0 fL Final   12/03/2018 97.8 80.0 - 99.0 fL Final          Sodium Sodium   Date Value Ref Range Status   12/05/2018 137 132 - 146 mmol/L Final   12/04/2018 140 132 - 146 mmol/L Final   12/03/2018 138 132 - 146 mmol/L Final      Potassium Potassium   Date Value Ref Range Status   12/05/2018 3.9 3.5 - 5.5 mmol/L Final   12/04/2018 3.9 3.5 - 5.5 mmol/L Final   12/03/2018 3.6 3.5 - 5.5 mmol/L Final      Chloride Chloride   Date Value Ref Range Status   12/05/2018 105 99 - 109 mmol/L Final   12/04/2018 107 99 - 109 mmol/L Final   12/03/2018 102 99 - 109 mmol/L Final      CO2 CO2   Date Value Ref Range Status   12/05/2018 25.0 20.0 - 31.0 mmol/L Final   12/04/2018 20.0 20.0 - 31.0 mmol/L Final   12/03/2018 24.0 20.0 - 31.0 mmol/L Final      BUN BUN   Date Value Ref Range Status   12/05/2018 51 (H) 9 - 23 mg/dL Final   12/04/2018 58 (H) 9 - 23 mg/dL Final   12/03/2018 66 (H) 9 - 23 mg/dL Final      Creatinine Creatinine   Date Value Ref Range Status   12/05/2018 3.38 (H) 0.60 - 1.30 mg/dL Final   12/04/2018 3.56 (H) 0.60 - 1.30 mg/dL Final   12/03/2018 3.91 (H) 0.60 - 1.30 mg/dL Final      Calcium Calcium   Date Value Ref Range Status   12/05/2018  7.9 (L) 8.7 - 10.4 mg/dL Final   12/04/2018 8.1 (L) 8.7 - 10.4 mg/dL Final   12/03/2018 8.0 (L) 8.7 - 10.4 mg/dL Final      PO4 No results found for: CAPO4   Albumin No results found for: ALBUMIN   Magnesium No results found for: MG   Uric Acid No results found for: URICACID        Results Review:     I reviewed the patient's new clinical results.      aspirin  mg Oral Daily   castor oil-balsam peru  Topical Q12H   DAPTOmycin 6 mg/kg (Adjusted) Intravenous Q48H   diltiaZEM  mg Oral Nightly   docusate sodium 100 mg Oral Daily   ertapenem 500 mg Intravenous Q24H   ferric gluconate (FERRLECIT) IVPB 125 mg Intravenous Daily   heparin (porcine) 5,000 Units Subcutaneous Q12H   HYDROcodone-acetaminophen 1 tablet Oral TID   insulin detemir 40 Units Subcutaneous Q12H   insulin lispro 0-14 Units Subcutaneous 4x Daily With Meals & Nightly   mupirocin  Topical Q24H   mupirocin  Topical Q24H   Naloxegol Oxalate 12.5 mg Oral Once per day on Sun Wed   pantoprazole 40 mg Oral QAM   sodium chloride 3 mL Intravenous Q12H       sodium chloride 9 mL/hr Last Rate: 9 mL/hr (12/04/18 1257)       Medication Review:     Assessment/Plan       Diabetic ulcer of right foot associated with type 2 diabetes mellitus (CMS/HCC)    Coronary artery disease involving native coronary artery of native heart without angina pectoris    Essential hypertension    Diabetic peripheral neuropathy (CMS/HCC)    Diabetes mellitus type 2, uncontrolled, with complications (CMS/HCC)    Hyperlipidemia    Chronic kidney disease, stage V (CMS/HCC)    Sepsis (CMS/HCC)    Cellulitis    Hypokalemia    Anemia, chronic disease    Obesity (BMI 30-39.9)    Impaired functional mobility, balance, gait, and endurance    Diabetic ulcer of right midfoot associated with type 2 diabetes mellitus, with necrosis of bone (CMS/HCC)    Cellulitis of toe of right foot    Chronic osteomyelitis of right foot with draining sinus (CMS/HCC)      1- CKD stage IV - DN likely - Scr  3.5 to 4.0 range at baseline.UOP is adequate. Scr 3.38 - improved. UOp is good without diuretics.   2- HTN - stable.   3- Proteinuria -DN -UPCR 3.1  4- Anemia of chronic disease - stable.   5- Diabetic foot ulcer.   6- hypokalemia   7- Iron def.      Plan:  - Continue with current regimen.   - Monitor I/o   - Avoid nephrotoxic agents.       I discussed the patients findings and my recommendations with patient          Dannie Luna MD  18  11:07 AM        Electronically signed by Dannie Luna MD at 2018 11:07 AM     Lilly Garay APRN at 2018  9:38 AM              [facility] Medicine Services  PROGRESS NOTE    Patient Name: Tashia Leon  : 1955  MRN: [id number]    Date of Admission: 2018  Length of Stay: 9  Primary Care Physician: Angelica Tran PA    Subjective   Subjective     CC:  Diabetic foot ulcer     HPI:  Patient comfortable by report. States no overnight issues. Was able to get out of bed yesterday with assist, but has some trouble with balance per PT notes. Patient states she would be agreeable to some rehab at discharge.   Asking if she can switch to oral antibiotics    Review of Systems  Gen- No fevers, chills  CV- No chest pain, palpitations  Resp- No cough, dyspnea  GI- No N/V/D, abd pain    Otherwise ROS is negative except as mentioned in the HPI.    Objective   Objective     Vital Signs:   Temp:  [97.5 °F (36.4 °C)-99.6 °F (37.6 °C)] 99.6 °F (37.6 °C)  Heart Rate:  [65-85] 85  Resp:  [16-20] 16  BP: (116-155)/(39-94) 118/50        Physical Exam:  Constitutional: No acute distress, sitting up in bed with IPad  HENT: NCAT, mucous membranes moist  Respiratory: Clear to auscultation bilaterally, respiratory effort normal   Cardiovascular: RRR, no murmurs, rubs, or gallops, palpable pedal pulses bilaterally  Gastrointestinal: Positive bowel sounds, soft, nontender, nondistended  Musculoskeletal: No bilateral ankle  edema  Psychiatric: Appropriate affect, cooperative  Neurologic: Oriented x 3, strength symmetric in all extremities, Cranial Nerves grossly intact to confrontation, speech clear  Skin: No rashes- right foot wrapped in ACE- CDI- elevated on pillow    Results Reviewed:  I have personally reviewed current lab, radiology, and data and agree.    Results from last 7 days   Lab Units  12/05/18   0637  12/04/18   0639  12/03/18   0557   WBC 10*3/mm3  15.15*  12.87*  12.24*   HEMOGLOBIN g/dL  8.4*  8.5*  8.3*   HEMATOCRIT %  27.1*  27.3*  26.9*   PLATELETS 10*3/mm3  444  415  435     Results from last 7 days   Lab Units  12/05/18   0637  12/04/18   0639  12/03/18   0557   SODIUM mmol/L  137  140  138   POTASSIUM mmol/L  3.9  3.9  3.6   CHLORIDE mmol/L  105  107  102   CO2 mmol/L  25.0  20.0  24.0   BUN mg/dL  51*  58*  66*   CREATININE mg/dL  3.38*  3.56*  3.91*   GLUCOSE mg/dL  135*  202*  173*   CALCIUM mg/dL  7.9*  8.1*  8.0*     Estimated Creatinine Clearance: 21.9 mL/min (A) (by C-G formula based on SCr of 3.38 mg/dL (H)).  No results found for: BNP    Microbiology Results Abnormal     Procedure Component Value - Date/Time    Tissue / Bone Culture - Tissue, Toe, Right [[id number]] Collected:  12/04/18 1207    Lab Status:  Preliminary result Specimen:  Tissue from Toe, Right Updated:  12/05/18 0654     Tissue Culture No growth     Gram Stain No WBCs or organisms seen    Eosinophil Smear - Urine, Urine, Clean Catch [[id number]]  (Normal) Collected:  12/01/18 1629    Lab Status:  Final result Specimen:  Urine, Clean Catch Updated:  12/01/18 1710     Eosinophil Smear 0 % EOS/100 Cells     Narrative:       No eosinophil seen    Blood Culture - Blood, Arm, Left [[id number]] Collected:  11/26/18 1625    Lab Status:  Final result Specimen:  Blood from Arm, Left Updated:  12/01/18 1645     Blood Culture No growth at 5 days    Blood Culture - Blood, Arm, Right [[id number]] Collected:  11/26/18 6267    Lab Status:  Final result  Specimen:  Blood from Arm, Right Updated:  12/01/18 1640     Blood Culture No growth at 5 days          Imaging Results (last 24 hours)     ** No results found for the last 24 hours. **        Results for orders placed during the hospital encounter of 07/15/16   Adult transthoracic echo complete    Narrative · Left ventricular function is normal. Estimated EF = 55%.  · Trace mitral valve regurgitation is present.  · Trace tricuspid valve regurgitation is present          I have reviewed the medications.      aspirin  mg Oral Daily   castor oil-balsam peru  Topical Q12H   DAPTOmycin 6 mg/kg (Adjusted) Intravenous Q48H   diltiaZEM  mg Oral Nightly   docusate sodium 100 mg Oral Daily   ertapenem 500 mg Intravenous Q24H   ferric gluconate (FERRLECIT) IVPB 125 mg Intravenous Daily   heparin (porcine) 5,000 Units Subcutaneous Q12H   HYDROcodone-acetaminophen 1 tablet Oral TID   insulin detemir 40 Units Subcutaneous Q12H   insulin lispro 0-14 Units Subcutaneous 4x Daily With Meals & Nightly   mupirocin  Topical Q24H   mupirocin  Topical Q24H   Naloxegol Oxalate 12.5 mg Oral Once per day on Sun Wed   pantoprazole 40 mg Oral QAM   sodium chloride 3 mL Intravenous Q12H         Assessment/Plan   Assessment / Plan     Active Hospital Problems    Diagnosis   • **Diabetic ulcer of right foot associated with type 2 diabetes mellitus (CMS/HCC)   • Sepsis (CMS/HCC)   • Cellulitis   • Hypokalemia   • Anemia, chronic disease   • Obesity (BMI 30-39.9)   • Impaired functional mobility, balance, gait, and endurance     Added automatically from request for surgery [id number]     • Diabetic ulcer of right midfoot associated with type 2 diabetes mellitus, with necrosis of bone (CMS/HCC)     Added automatically from request for surgery [id number]     • Cellulitis of toe of right foot     Added automatically from request for surgery [id number]     • Chronic osteomyelitis of right foot with draining sinus (CMS/HCC)     Added automatically  from request for surgery [id number]     • Chronic kidney disease, stage V (CMS/[facility])   • Hyperlipidemia   • Diabetes mellitus type 2, uncontrolled, with complications (CMS/HCC)     Proteinuria noted, January 2014.       • Diabetic peripheral neuropathy (CMS/[facility])   • Essential hypertension   • Coronary artery disease involving native coronary artery of native heart without angina pectoris     1. Coronary artery disease:  a. Abnormal cardiac PET, 10/02/2013, Dr. Becker, revealing a posterolateral defect with a mild wall motion abnormality and a normal LVEF.  b. Cardiac catheterization, 10/21/2013, with placement of a 2.5 x 15 mm Xience Expedition drug-eluting stent to the proximal circumflex.  LVEF of 55% to 60%.  Noncritical disease in the LAD and right coronary.  c. Echocardiogram, 12/18/2013, shows an LVEF 50% to 55% with trace MR and mild TR.               Brief Hospital Course to date:  1.  Sepsis, now resolved     2.  Diabetic foot ulcer with cellulitis right foot       -s/p debridement and right 1st toe/ metarsal amputation with Dr. Whalen 12/4              -wound care received silver wound gel topical treatment, dressings              -Invanz and Dapto, ID recs              -BC x 2, currently negative              -pain control- scheduled norco; decrease frequency of dilaudid IV              -cbc, bmp in the am            - Scooter to offload foot has been ordered and is at the bedside.  Patient has seen PT who taught her appropriate use but are concerned about her weakness and stability.               3.   Type 2 diabetes mellitus with diabetic peripheral neuropathy              -a1c 7.6              -levemir 40 units bid, continue               -continue correction insulin, eating less while admitted     4.  CKDV              -nephrology following,  renal function limits abx choices               -renal dysfunction is very advanced              - Creatinine stable, continues off home rx lasix; euvolemic                  5.  Hypokalemia              -replaced by nephrology as needed        6. Obesity BMI 38: Weight loss recommended     7.  HTN: Currently normotensive     8.  CAD: Asymptomatic, aspirin     9.  Diabetic peripheral neuropathy: Symptomatic treatment and supportive care     10.  Anemia of chronic disease                 11.  Severe anxiety:improved with clonazepam. Not needed past couple of days      12.  Debility: Difficulty walking with needed skater.  Therapy recommended rehabilitation facility for appropriate care and treatment.  Patient  agreeable today- will ask CM to make arrangements         DVT Prophylaxis:  Heparin      CODE STATUS:   Code Status and Medical Interventions:   Ordered at: 11/26/18 2126     Level Of Support Discussed With:    Patient     Code Status:    CPR     Medical Interventions (Level of Support Prior to Arrest):    Full       Disposition: I expect the patient to be discharged to rehab TBD      Electronically signed by KAREEM Mena, 12/05/18, 9:38 AM.        Electronically signed by Lilly Garay APRN at 12/5/2018  9:47 AM     Chio Sterling MD at 12/5/2018  8:31 AM          Orthopedic  Progress Note    Subjective     Post-Operative Day: 1 Day Post-Op    Systemic or Specific Complaints: c/o pain, eating breakfast  Objective     Vital signs in last 24 hours:  Temp:  [97.5 °F (36.4 °C)-99.6 °F (37.6 °C)] 99.6 °F (37.6 °C)  Heart Rate:  [65-85] 85  Resp:  [16-20] 16  BP: (116-155)/(39-94) 118/50    Neurovascular: Remaining toes right foot pink, wiggle   Wound: dressingdry right foot         Data Review  Lab Results (last 24 hours)     Procedure Component Value Units Date/Time    Basic Metabolic Panel [[id number]]  (Abnormal) Collected:  12/05/18 0637    Specimen:  Blood Updated:  12/05/18 0709     Glucose 135 mg/dL      BUN 51 mg/dL      Creatinine 3.38 mg/dL      Sodium 137 mmol/L      Potassium 3.9 mmol/L      Chloride 105 mmol/L      CO2 25.0 mmol/L      Calcium  7.9 mg/dL      eGFR Non African Amer 14 mL/min/1.73      BUN/Creatinine Ratio 15.1     Anion Gap 7.0 mmol/L     Narrative:       National [employer] Guidelines    Stage     Description        GFR  1         Normal or High     90+  2         Mild decrease      60-89  3         Moderate decrease  30-59  4         Severe decrease    15-29  5         Kidney failure     <15    The MDRD GFR formula is only valid for adults with stable renal function between ages 18 and 70.    Tissue / Bone Culture - Tissue, Toe, Right [[id number]] Collected:  12/04/18 1207    Specimen:  Tissue from Toe, Right Updated:  12/05/18 0654     Tissue Culture No growth     Gram Stain No WBCs or organisms seen    POC Glucose Once [[id number]]  (Abnormal) Collected:  12/05/18 0648    Specimen:  Blood Updated:  12/05/18 0649     Glucose 143 mg/dL     CBC & Differential [[id number]] Collected:  12/05/18 0637    Specimen:  Blood Updated:  12/05/18 0643    Narrative:       The following orders were created for panel order CBC & Differential.  Procedure                               Abnormality         Status                     ---------                               -----------         ------                     CBC Auto Differential[[id number]]        Abnormal            Final result                 Please view results for these tests on the individual orders.    CBC Auto Differential [[id number]]  (Abnormal) Collected:  12/05/18 0637    Specimen:  Blood Updated:  12/05/18 0643     WBC 15.15 10*3/mm3      RBC 2.75 10*6/mm3      Hemoglobin 8.4 g/dL      Hematocrit 27.1 %      MCV 98.5 fL      MCH 30.5 pg      MCHC 31.0 g/dL      RDW 13.2 %      RDW-SD 47.0 fl      MPV 9.3 fL      Platelets 444 10*3/mm3      Neutrophil % 65.4 %      Lymphocyte % 25.6 %      Monocyte % 5.7 %      Eosinophil % 2.9 %      Basophil % 0.4 %      Immature Grans % 1.3 %      Neutrophils, Absolute 9.90 10*3/mm3      Lymphocytes, Absolute 3.88 10*3/mm3      Monocytes,  Absolute 0.87 10*3/mm3      Eosinophils, Absolute 0.44 10*3/mm3      Basophils, Absolute 0.06 10*3/mm3      Immature Grans, Absolute 0.19 10*3/mm3     POC Glucose Once [[id number]]  (Abnormal) Collected:  12/04/18 2006    Specimen:  Blood Updated:  12/04/18 2008     Glucose 170 mg/dL     POC Glucose Once [[id number]]  (Normal) Collected:  12/04/18 1637    Specimen:  Blood Updated:  12/04/18 1657     Glucose 98 mg/dL     POC Glucose Once [[id number]]  (Normal) Collected:  12/04/18 1336    Specimen:  Blood Updated:  12/04/18 1339     Glucose 116 mg/dL     Anaerobic Culture - Tissue, Toe, Right [[id number]] Collected:  12/04/18 1207    Specimen:  Tissue from Toe, Right Updated:  12/04/18 1302    Fungus Culture - Tissue, Toe, Right [[id number]] Collected:  12/04/18 1207    Specimen:  Tissue from Toe, Right Updated:  12/04/18 1302    AFB Culture - Tissue, Toe, Right [[id number]] Collected:  12/04/18 1207    Specimen:  Tissue from Toe, Right Updated:  12/04/18 1302    Tissue Pathology Exam [[id number]] Collected:  12/04/18 1209    Specimen:  Tissue from Toe, Right Updated:  12/04/18 1257            Assessment/Plan     Status post- stable, cultures pending.  Must be absolutely non-wt bearing on the foot,  I will change the dressing tomorrow.             Chio Sterling MD    Date: 12/5/2018  Time: 8:31 AM    Electronically signed by Chio Sterling MD at 12/5/2018  8:34 AM     Chio Sterling MD at 12/4/2018  1:31 PM          Orthopedic Foot/Ankle Progress Note      Subjective     Post-Op: s/p amputation right 1st toe and metatarsal   Systemic or Specific Complaints: sleepy  Objective     Vital signs in last 24 hours:  Temp:  [97.6 °F (36.4 °C)-98.5 °F (36.9 °C)] 97.6 °F (36.4 °C)  Heart Rate:  [70-80] 70  Resp:  [16-20] 18  BP: (102-152)/(39-63) 148/60    Neurovascular: No change in dense neuropathy, other toes right foot are pink and warm   Wound: Right foot dressing dry         Data Review  Lab Results (last 24 hours)      Procedure Component Value Units Date/Time    Anaerobic Culture - Tissue, Toe, Right [[id number]] Collected:  12/04/18 1207    Specimen:  Tissue from Toe, Right Updated:  12/04/18 1302    Fungus Culture - Tissue, Toe, Right [[id number]] Collected:  12/04/18 1207    Specimen:  Tissue from Toe, Right Updated:  12/04/18 1302    Tissue / Bone Culture - Tissue, Toe, Right [[id number]] Collected:  12/04/18 1207    Specimen:  Tissue from Toe, Right Updated:  12/04/18 1302    AFB Culture - Tissue, Toe, Right [[id number]] Collected:  12/04/18 1207    Specimen:  Tissue from Toe, Right Updated:  12/04/18 1302    Tissue Pathology Exam [[id number]] Collected:  12/04/18 1209    Specimen:  Tissue from Toe, Right Updated:  12/04/18 1257    POC Glucose Once [[id number]]  (Abnormal) Collected:  12/04/18 0753    Specimen:  Blood Updated:  12/04/18 0821     Glucose 178 mg/dL     Basic Metabolic Panel [[id number]]  (Abnormal) Collected:  12/04/18 0639    Specimen:  Blood Updated:  12/04/18 0740     Glucose 202 mg/dL      BUN 58 mg/dL      Creatinine 3.56 mg/dL      Sodium 140 mmol/L      Potassium 3.9 mmol/L      Chloride 107 mmol/L      CO2 20.0 mmol/L      Calcium 8.1 mg/dL      eGFR Non African Amer 13 mL/min/1.73      BUN/Creatinine Ratio 16.3     Anion Gap 13.0 mmol/L     Narrative:       National Kidney Foundation Guidelines    Stage     Description        GFR  1         Normal or High     90+  2         Mild decrease      60-89  3         Moderate decrease  30-59  4         Severe decrease    15-29  5         Kidney failure     <15    The MDRD GFR formula is only valid for adults with stable renal function between ages 18 and 70.    CBC (No Diff) [[id number]]  (Abnormal) Collected:  12/04/18 0639    Specimen:  Blood Updated:  12/04/18 0704     WBC 12.87 10*3/mm3      RBC 2.78 10*6/mm3      Hemoglobin 8.5 g/dL      Hematocrit 27.3 %      MCV 98.2 fL      MCH 30.6 pg      MCHC 31.1 g/dL      RDW 13.1 %      RDW-SD 47.0 fl      MPV 9.4 fL       Platelets 415 10*3/mm3     POC Glucose Once [[id number]]  (Abnormal) Collected:  18    Specimen:  Blood Updated:  18     Glucose 231 mg/dL     POC Glucose Once [[id number]]  (Normal) Collected:  18 1613    Specimen:  Blood Updated:  18 1618     Glucose 116 mg/dL             Assessment/Plan     Status post- stable s/p right 1st toe and metatarsal amputation  Will need 6 weeks IV antibiotics.  Must be ABSOLUTELY NON-WT BEARING, or she will go on to BKA/AKA.  I discussed this in detail with son, and again with patient (multiple prior discussions).  Many risk factors for poor healing; unfortunately.           Chio Sterling MD    Date: 2018  Time: 1:31 PM    Electronically signed by Chio Sterling MD at 2018  1:37 PM     Lilly Garay APRN at 2018  1:08 PM              [facility]  PROGRESS NOTE    Patient Name: Tashia Leon  : 1955  MRN: [id number]    Date of Admission: 2018  Length of Stay: 8  Primary Care Physician: Angelica Tran PA    Subjective   Subjective     CC:  Diabetic foot ulcer     HPI:  Seen in PACU. Just came from OR. Is tired, but denies pain, nausea/ vomiting.  Reports no overnight issues    Review of Systems  Gen- No fevers, chills  CV- No chest pain, palpitations  Resp- No cough, dyspnea  GI- No N/V/D, abd pain    Otherwise ROS is negative except as mentioned in the HPI.    Objective   Objective     Vital Signs:   Temp:  [97.6 °F (36.4 °C)-98.5 °F (36.9 °C)] 97.6 °F (36.4 °C)  Heart Rate:  [70-80] 70  Resp:  [16-20] 18  BP: (102-152)/(39-63) 148/60        Physical Exam:  Constitutional: No acute distress, sleeping and arouses easily to voice  HENT: NCAT, mucous membranes moist  Respiratory: Clear to auscultation bilaterally, respiratory effort normal   Cardiovascular: RRR, no murmurs, rubs, or gallops, palpable pedal pulses bilaterally  Gastrointestinal: Positive bowel sounds,  soft, nontender, nondistended  Musculoskeletal: No bilateral ankle edema  Psychiatric: Appropriate affect, cooperative  Neurologic: Oriented x 3, strength symmetric in all extremities, Cranial Nerves grossly intact to confrontation, speech clear  Skin: No rashes- right foot wrapped in ACE- CDI    Results Reviewed:  I have personally reviewed current lab, radiology, and data and agree.    Results from last 7 days   Lab Units  12/04/18   0639  12/03/18   0557  12/02/18   0802   WBC 10*3/mm3  12.87*  12.24*  13.86*   HEMOGLOBIN g/dL  8.5*  8.3*  8.7*   HEMATOCRIT %  27.3*  26.9*  27.9*   PLATELETS 10*3/mm3  415  435  438     Results from last 7 days   Lab Units  12/04/18   0639  12/03/18   0557  12/01/18   0949   SODIUM mmol/L  140  138  133   POTASSIUM mmol/L  3.9  3.6  3.8   CHLORIDE mmol/L  107  102  95*   CO2 mmol/L  20.0  24.0  25.0   BUN mg/dL  58*  66*  87*   CREATININE mg/dL  3.56*  3.91*  4.38*   GLUCOSE mg/dL  202*  173*  150*   CALCIUM mg/dL  8.1*  8.0*  8.2*     Estimated Creatinine Clearance: 20.7 mL/min (A) (by C-G formula based on SCr of 3.56 mg/dL (H)).  No results found for: BNP    Microbiology Results Abnormal     Procedure Component Value - Date/Time    Eosinophil Smear - Urine, Urine, Clean Catch [[id number]]  (Normal) Collected:  12/01/18 1629    Lab Status:  Final result Specimen:  Urine, Clean Catch Updated:  12/01/18 1710     Eosinophil Smear 0 % EOS/100 Cells     Narrative:       No eosinophil seen    Blood Culture - Blood, Arm, Left [[id number]] Collected:  11/26/18 1625    Lab Status:  Final result Specimen:  Blood from Arm, Left Updated:  12/01/18 1645     Blood Culture No growth at 5 days    Blood Culture - Blood, Arm, Right [[id number]] Collected:  11/26/18 1620    Lab Status:  Final result Specimen:  Blood from Arm, Right Updated:  12/01/18 1645     Blood Culture No growth at 5 days          Imaging Results (last 24 hours)     ** No results found for the last 24 hours. **        Results for  orders placed during the hospital encounter of 07/15/16   Adult transthoracic echo complete    Narrative · Left ventricular function is normal. Estimated EF = 55%.  · Trace mitral valve regurgitation is present.  · Trace tricuspid valve regurgitation is present          I have reviewed the medications.      aspirin  mg Oral Daily   castor oil-balsam peru  Topical Q12H   DAPTOmycin 6 mg/kg (Adjusted) Intravenous Q48H   diltiaZEM  mg Oral Nightly   docusate sodium 100 mg Oral Daily   ertapenem 500 mg Intravenous Q24H   ferric gluconate (FERRLECIT) IVPB 125 mg Intravenous Daily   heparin (porcine) 5,000 Units Subcutaneous Q12H   HYDROcodone-acetaminophen 1 tablet Oral TID   insulin detemir 40 Units Subcutaneous Q12H   insulin lispro 0-14 Units Subcutaneous 4x Daily With Meals & Nightly   insulin lispro 0-7 Units Subcutaneous 4x Daily With Meals & Nightly   mupirocin  Topical Q24H   Naloxegol Oxalate 12.5 mg Oral Once per day on Sun Wed   pantoprazole 40 mg Oral QAM   sodium chloride 3 mL Intravenous Q12H         Assessment/Plan   Assessment / Plan     Active Hospital Problems    Diagnosis   • **Diabetic ulcer of right foot associated with type 2 diabetes mellitus (CMS/HCC)   • Sepsis (CMS/HCC)   • Cellulitis   • Hypokalemia   • Anemia, chronic disease   • Obesity (BMI 30-39.9)   • Impaired functional mobility, balance, gait, and endurance     Added automatically from request for surgery [id number]     • Diabetic ulcer of right midfoot associated with type 2 diabetes mellitus, with necrosis of bone (CMS/HCC)     Added automatically from request for surgery [id number]     • Cellulitis of toe of right foot     Added automatically from request for surgery [id number]     • Chronic osteomyelitis of right foot with draining sinus (CMS/HCC)     Added automatically from request for surgery [id number]     • Chronic kidney disease, stage V (CMS/HCC)   • Hyperlipidemia   • Diabetes mellitus type 2, uncontrolled, with complications  (CMS/HCC)     Proteinuria noted, January 2014.       • Diabetic peripheral neuropathy (CMS/HCC)   • Essential hypertension   • Coronary artery disease involving native coronary artery of native heart without angina pectoris     2. Coronary artery disease:  a. Abnormal cardiac PET, 10/02/2013, Dr. Becker, revealing a posterolateral defect with a mild wall motion abnormality and a normal LVEF.  b. Cardiac catheterization, 10/21/2013, with placement of a 2.5 x 15 mm Xience Expedition drug-eluting stent to the proximal circumflex.  LVEF of 55% to 60%.  Noncritical disease in the LAD and right coronary.  c. Echocardiogram, 12/18/2013, shows an LVEF 50% to 55% with trace MR and mild TR.               Brief Hospital Course to date:  1.  Sepsis, now resolved     2.  Diabetic foot ulcer with cellulitis right foot       -s/p debridement and partial right great toe amputation with Dr. Whalen today              -wound care received silver wound gel topical treatment, dressings              -Invanz and Dapto, ID recs              -BC x 2, currently negative              -pain control, monitor while on IV pain medicines              -cbc, bmp in the am            - Scooter to offload foot has been ordered and is at the bedside.  Patient has seen PT who taught her appropriate use but are concerned about her weakness and stability.               3.   Type 2 diabetes mellitus with diabetic peripheral neuropathy              -a1c 7.6              -levemir 40 units bid, continue               -continue correction insulin, eating less while admitted     4.  CKDV              -nephrology following,  renal function limits abx choices               -renal dysfunction is very advanced              - Creatinine trending up, diuretics have been held and patient has received some IV fluids                 5.  Hypokalemia              -replaced by nephrology as needed        6. Obesity BMI 38: Weight loss recommended     7.  HTN:  Currently normotensive     8.  CAD: Asymptomatic, aspirin     9.  Diabetic peripheral neuropathy: Symptomatic treatment and supportive care     10.  Anemia of chronic disease                 11.  Severe anxiety:  In the setting of severe anxiety while awaiting surgery plan to start low-dose clonazepam and adjust as needed.  Preferably could use BuSpar but advanced kidney disease prevent the use of this medication.  improved now.       12.  Debility: Difficulty walking with needed scooter.  Therapy recommended rehabilitation facility for appropriate care and treatment.  Patient so far has declined.     Also of note patient has not been completely compliant regarding nonweightbearing status of the foot. Continue to stress importance     DVT Prophylaxis:  Heparin      CODE STATUS:   Code Status and Medical Interventions:   Ordered at: 11/26/18 2126     Level Of Support Discussed With:    Patient     Code Status:    CPR     Medical Interventions (Level of Support Prior to Arrest):    Full       Disposition: I expect the patient to be discharged tbd       Electronically signed by KAREEM Mena, 12/04/18, 1:08 PM.        Electronically signed by Lilly Garay APRN at 12/4/2018  1:15 PM     Dannie Luna MD at 12/4/2018  9:19 AM             LOS: 8 days    Patient Care Team:  Angelica Tran PA as PCP - General (Internal Medicine)  Denver Baldwin MD as Consulting Physician (Nephrology)    Chief Complaint:  Diabetic foot ulcer    Subjective     Interval History:     No acute events overnight. No new complaints.     Review of Systems:   NO CP Or SOA. NO Abdominal discomfort.     Objective     Vital Sign Min/Max for last 24 hours  Temp  Min: 97.9 °F (36.6 °C)  Max: 99.1 °F (37.3 °C)   BP  Min: 102/52  Max: 123/59   Pulse  Min: 72  Max: 76   Resp  Min: 16  Max: 17   SpO2  Min: 94 %  Max: 96 %   No Data Recorded   No Data Recorded     Flowsheet Rows      First Filed Value   Admission Height   "167.6 cm (66\") Documented at 11/26/2018 1315   Admission Weight  109 kg (240 lb) Documented at 11/26/2018 1315          I/O this shift:  In: 110 [IV Piggyback:110]  Out: -   I/O last 3 completed shifts:  In: 1200 [P.O.:1200]  Out: -     Physical Exam:     General Appearance:    Alert, cooperative, in no acute distress   Head:    Normocephalic, without obvious abnormality, atraumatic               Neck:   No adenopathy, supple, trachea midline, no thyromegaly, no     carotid bruit, no JVD       Lungs:     Clear to auscultation,respirations regular, even and                   unlabored    Heart:    Regular rhythm and normal rate, normal S1 and S2, no            murmur, no gallop, no rub, no click       Abdomen:     Normal bowel sounds, no masses, no organomegaly, soft        non-tender, non-distended, no guarding, no rebound                 tenderness       Extremities:  Ulcer of the right foot with erythema noted. Trace edema.                Neurologic:   No focal deficit noted.        WBC WBC   Date Value Ref Range Status   12/04/2018 12.87 (H) 3.50 - 10.80 10*3/mm3 Final   12/03/2018 12.24 (H) 3.50 - 10.80 10*3/mm3 Final   12/02/2018 13.86 (H) 3.50 - 10.80 10*3/mm3 Final      HGB Hemoglobin   Date Value Ref Range Status   12/04/2018 8.5 (L) 11.5 - 15.5 g/dL Final   12/03/2018 8.3 (L) 11.5 - 15.5 g/dL Final   12/02/2018 8.7 (L) 11.5 - 15.5 g/dL Final      HCT Hematocrit   Date Value Ref Range Status   12/04/2018 27.3 (L) 34.5 - 44.0 % Final   12/03/2018 26.9 (L) 34.5 - 44.0 % Final   12/02/2018 27.9 (L) 34.5 - 44.0 % Final      Platlets No results found for: LABPLAT   MCV MCV   Date Value Ref Range Status   12/04/2018 98.2 80.0 - 99.0 fL Final   12/03/2018 97.8 80.0 - 99.0 fL Final   12/02/2018 97.2 80.0 - 99.0 fL Final          Sodium Sodium   Date Value Ref Range Status   12/04/2018 140 132 - 146 mmol/L Final   12/03/2018 138 132 - 146 mmol/L Final   12/01/2018 133 132 - 146 mmol/L Final      Potassium Potassium "   Date Value Ref Range Status   12/04/2018 3.9 3.5 - 5.5 mmol/L Final   12/03/2018 3.6 3.5 - 5.5 mmol/L Final   12/01/2018 3.8 3.5 - 5.5 mmol/L Final      Chloride Chloride   Date Value Ref Range Status   12/04/2018 107 99 - 109 mmol/L Final   12/03/2018 102 99 - 109 mmol/L Final   12/01/2018 95 (L) 99 - 109 mmol/L Final      CO2 CO2   Date Value Ref Range Status   12/04/2018 20.0 20.0 - 31.0 mmol/L Final   12/03/2018 24.0 20.0 - 31.0 mmol/L Final   12/01/2018 25.0 20.0 - 31.0 mmol/L Final      BUN BUN   Date Value Ref Range Status   12/04/2018 58 (H) 9 - 23 mg/dL Final   12/03/2018 66 (H) 9 - 23 mg/dL Final   12/01/2018 87 (H) 9 - 23 mg/dL Final      Creatinine Creatinine   Date Value Ref Range Status   12/04/2018 3.56 (H) 0.60 - 1.30 mg/dL Final   12/03/2018 3.91 (H) 0.60 - 1.30 mg/dL Final   12/01/2018 4.38 (H) 0.60 - 1.30 mg/dL Final      Calcium Calcium   Date Value Ref Range Status   12/04/2018 8.1 (L) 8.7 - 10.4 mg/dL Final   12/03/2018 8.0 (L) 8.7 - 10.4 mg/dL Final   12/01/2018 8.2 (L) 8.7 - 10.4 mg/dL Final      PO4 No results found for: CAPO4   Albumin No results found for: ALBUMIN   Magnesium No results found for: MG   Uric Acid Uric Acid   Date Value Ref Range Status   12/01/2018 9.9 (H) 3.1 - 7.8 mg/dL Final     Comment:     Falsely depressed results may occur on samples drawn from patients receiving N-Acetylcysteine (NAC) or Metamizole.           Results Review:     I reviewed the patient's new clinical results.      aspirin  mg Oral Daily   castor oil-balsam peru  Topical Q12H   DAPTOmycin 6 mg/kg (Adjusted) Intravenous Q48H   diltiaZEM  mg Oral Nightly   docusate sodium 100 mg Oral Daily   ertapenem 500 mg Intravenous Q24H   ferric gluconate (FERRLECIT) IVPB 125 mg Intravenous Daily   heparin (porcine) 5,000 Units Subcutaneous Q12H   HYDROcodone-acetaminophen 1 tablet Oral TID   insulin detemir 40 Units Subcutaneous Q12H   insulin lispro 0-14 Units Subcutaneous 4x Daily With Meals &  Nightly   mupirocin  Topical Q24H   Naloxegol Oxalate 12.5 mg Oral Once per day on Sun Wed   pantoprazole 40 mg Oral QAM   sodium chloride 3 mL Intravenous Q12H          Medication Review:     Assessment/Plan       Diabetic ulcer of right foot associated with type 2 diabetes mellitus (CMS/HCC)    Coronary artery disease involving native coronary artery of native heart without angina pectoris    Essential hypertension    Diabetic peripheral neuropathy (CMS/HCC)    Diabetes mellitus type 2, uncontrolled, with complications (CMS/HCC)    Hyperlipidemia    Chronic kidney disease, stage V (CMS/HCC)    Sepsis (CMS/HCC)    Cellulitis    Hypokalemia    Anemia, chronic disease    Obesity (BMI 30-39.9)    Impaired functional mobility, balance, gait, and endurance    Diabetic ulcer of right midfoot associated with type 2 diabetes mellitus, with necrosis of bone (CMS/HCC)    Cellulitis of toe of right foot    Chronic osteomyelitis of right foot with draining sinus (CMS/HCC)      1- CKD stage IV - DN likely - Scr 3.5 to 4.0 range at baseline.UOP is adequate. Scr 3.5 - improved.   2- HTN - stable.   3- Proteinuria -DN -UPCR 3.1  4- Anemia of chronic disease - stable.   5- Diabetic foot ulcer.   6- hypokalemia   7- Iron def.      Plan:  - Continue with current regimen.   - Monitor I/o   - Avoid nephrotoxic agents.       I discussed the patients findings and my recommendations with patient          Dannie Luna MD  18  9:19 AM        Electronically signed by Dannie Luna MD at 2018 10:34 AM     Halina Walker DO at 12/3/2018  4:13 PM              [facility] Medicine Services  PROGRESS NOTE    Patient Name: Tashia Leon  : 1955  MRN: [id number]    Date of Admission: 2018  Length of Stay: 7  Primary Care Physician: Angelica Tran PA    Subjective   Subjective     CC:  Diabetic foot ulcer     HPI:  States she is doing ok today. Agreeable with plan for the OR  tomorrow.     Review of Systems  Gen- No fevers, chills  CV- No chest pain, palpitations  Resp- No cough, dyspnea  GI- No N/V/D, abd pain    Otherwise ROS is negative except as mentioned in the HPI.    Objective   Objective     Vital Signs:   Temp:  [97.9 °F (36.6 °C)-99.1 °F (37.3 °C)] 97.9 °F (36.6 °C)  Heart Rate:  [72-81] 72  Resp:  [16-18] 16  BP: ()/(44-59) 110/44        Physical Exam:  Constitutional: No acute distress, awake, alert  HENT: NCAT, mucous membranes moist  Respiratory: Clear to auscultation bilaterally, respiratory effort normal   Cardiovascular: RRR, no murmurs, rubs, or gallops, palpable pedal pulses bilaterally  Gastrointestinal: Positive bowel sounds, soft, nontender, nondistended  Musculoskeletal: No bilateral ankle edema  Psychiatric: Appropriate affect, cooperative  Neurologic: Oriented x 3, strength symmetric in all extremities, Cranial Nerves grossly intact to confrontation, speech clear  Skin: No rashes    Results Reviewed:  I have personally reviewed current lab, radiology, and data and agree.    Results from last 7 days   Lab Units  12/03/18   0557  12/02/18   0802  11/30/18   0527 11/26/18   1628   WBC 10*3/mm3  12.24*  13.86*  15.18*   < >  19.47*   HEMOGLOBIN g/dL  8.3*  8.7*  9.3*   < >  11.0*   HEMATOCRIT %  26.9*  27.9*  29.4*   < >  34.3*   PLATELETS 10*3/mm3  435  438  406   < >  417   INR    --    --    --    --   0.98    < > = values in this interval not displayed.     Results from last 7 days   Lab Units  12/03/18   0557  12/01/18   0949  11/30/18   0527 11/26/18   1759   SODIUM mmol/L  138  133  132   < >  137   POTASSIUM mmol/L  3.6  3.8  3.4*   < >  3.2*   CHLORIDE mmol/L  102  95*  94*   < >  99   CO2 mmol/L  24.0  25.0  24.0   < >  26.0   BUN mg/dL  66*  87*  79*   < >  74*   CREATININE mg/dL  3.91*  4.38*  4.33*   < >  3.79*   GLUCOSE mg/dL  173*  150*  185*   < >  137*   CALCIUM mg/dL  8.0*  8.2*  8.2*   < >  9.8   ALT (SGPT) U/L   --    --    --    --   19    AST (SGOT) U/L   --    --    --    --   24    < > = values in this interval not displayed.     Estimated Creatinine Clearance: 18.9 mL/min (A) (by C-G formula based on SCr of 3.91 mg/dL (H)).  No results found for: BNP    Microbiology Results Abnormal     Procedure Component Value - Date/Time    Eosinophil Smear - Urine, Urine, Clean Catch [[id number]]  (Normal) Collected:  12/01/18 1629    Lab Status:  Final result Specimen:  Urine, Clean Catch Updated:  12/01/18 1710     Eosinophil Smear 0 % EOS/100 Cells     Narrative:       No eosinophil seen    Blood Culture - Blood, Arm, Left [[id number]] Collected:  11/26/18 1625    Lab Status:  Final result Specimen:  Blood from Arm, Left Updated:  12/01/18 1645     Blood Culture No growth at 5 days    Blood Culture - Blood, Arm, Right [[id number]] Collected:  11/26/18 1620    Lab Status:  Final result Specimen:  Blood from Arm, Right Updated:  12/01/18 1645     Blood Culture No growth at 5 days          Imaging Results (last 24 hours)     ** No results found for the last 24 hours. **        Results for orders placed during the hospital encounter of 07/15/16   Adult transthoracic echo complete    Narrative · Left ventricular function is normal. Estimated EF = 55%.  · Trace mitral valve regurgitation is present.  · Trace tricuspid valve regurgitation is present          I have reviewed the medications.      aspirin  mg Oral Daily   castor oil-balsam peru  Topical Q12H   [START ON 12/4/2018] DAPTOmycin 6 mg/kg (Adjusted) Intravenous Q48H   diltiaZEM  mg Oral Nightly   docusate sodium 100 mg Oral Daily   ertapenem 500 mg Intravenous Q24H   ferric gluconate (FERRLECIT) IVPB 125 mg Intravenous Daily   heparin (porcine) 5,000 Units Subcutaneous Q12H   HYDROcodone-acetaminophen 1 tablet Oral TID   insulin detemir 40 Units Subcutaneous Q12H   insulin lispro 0-14 Units Subcutaneous 4x Daily With Meals & Nightly   mupirocin  Topical Q24H   Naloxegol Oxalate 12.5 mg Oral  Once per day on Sun Wed   pantoprazole 40 mg Oral QAM   sodium chloride 3 mL Intravenous Q12H         Assessment/Plan   Assessment / Plan     Active Hospital Problems    Diagnosis   • **Diabetic ulcer of right foot associated with type 2 diabetes mellitus (CMS/HCC)   • Sepsis (CMS/HCC)   • Cellulitis   • Hypokalemia   • Anemia, chronic disease   • Obesity (BMI 30-39.9)   • Impaired functional mobility, balance, gait, and endurance     Added automatically from request for surgery [id number]     • Diabetic ulcer of right midfoot associated with type 2 diabetes mellitus, with necrosis of bone (CMS/HCC)     Added automatically from request for surgery [id number]     • Cellulitis of toe of right foot     Added automatically from request for surgery [id number]     • Chronic osteomyelitis of right foot with draining sinus (CMS/HCC)     Added automatically from request for surgery [id number]     • Chronic kidney disease, stage V (CMS/HCC)   • Hyperlipidemia   • Diabetes mellitus type 2, uncontrolled, with complications (CMS/HCC)     Proteinuria noted, January 2014.       • Diabetic peripheral neuropathy (CMS/HCC)   • Essential hypertension   • Coronary artery disease involving native coronary artery of native heart without angina pectoris     3. Coronary artery disease:  a. Abnormal cardiac PET, 10/02/2013, Dr. Becker, revealing a posterolateral defect with a mild wall motion abnormality and a normal LVEF.  b. Cardiac catheterization, 10/21/2013, with placement of a 2.5 x 15 mm Xience Expedition drug-eluting stent to the proximal circumflex.  LVEF of 55% to 60%.  Noncritical disease in the LAD and right coronary.  c. Echocardiogram, 12/18/2013, shows an LVEF 50% to 55% with trace MR and mild TR.               Brief Hospital Course to date:  1.  Sepsis, now resolved     2.  Diabetic foot ulcer with cellulitis right foot              -wound care received silver wound gel topical treatment, dressings              -Invanz and Dapto,  ID recs              -BC x 2, currently negative              -pain control, monitor while on IV pain medicines              -cbc, bmp in the am            - Scooter to offload foot has been ordered and is at the bedside.  Patient has seen PT who taught her appropriate use but are concerned about her weakness and stability.             - OR tomorrow with Ortho      3.   Type 2 diabetes mellitus with diabetic peripheral neuropathy              -a1c 7.6              -levemir 40 units bid, continue               -continue correction insulin, eating less while admitted     4.  CKDV              -nephrology following,  renal function limits abx choices               -renal dysfunction is very advanced              - Creatinine trending up, diuretics have been held and patient has received some IV fluids                 5.  Hypokalemia              -replaced by nephrology as needed        6. Obesity BMI 38: Weight loss recommended     7.  HTN: Currently normotensive     8.  CAD: Asymptomatic, aspirin     9.  Diabetic peripheral neuropathy: Symptomatic treatment and supportive care     10.  Anemia of chronic disease                   11.  Severe anxiety:  In the setting of severe anxiety while awaiting surgery plan to start low-dose clonazepam and adjust as needed.  Preferably could use BuSpar but advanced kidney disease prevent the use of this medication.  improved now.       12.  Debility: Difficulty walking with needed skater.  Therapy recommended rehabilitation facility for appropriate care and treatment.  Patient so far has declined.     Also of note patient has not been completely compliant regarding nonweightbearing status of the foot.      DVT Prophylaxis:  Heparin      CODE STATUS:   Code Status and Medical Interventions:   Ordered at: 11/26/18 2129     Level Of Support Discussed With:    Patient     Code Status:    CPR     Medical Interventions (Level of Support Prior to Arrest):    Full       Disposition: I  expect the patient to be discharged tbd       Electronically signed by Halina Walker DO, 12/03/18, 4:13 PM.        Electronically signed by Halina Walker DO at 12/3/2018  4:16 PM

## 2018-12-06 NOTE — PROGRESS NOTES
Orthopedic  Progress Note    Subjective     Post-Operative Day: 2 Days Post-Op    Systemic or Specific Complaints: c/o pain and worry about what foot looks like.  However, when I changed the dressing and removed the penrose (she was not watching) she did not flinch at all, foot is densely neuropathic  Objective     Vital signs in last 24 hours:  Temp:  [98.2 °F (36.8 °C)-99.2 °F (37.3 °C)] 98.6 °F (37 °C)  Heart Rate:  [70-80] 71  Resp:  [16-18] 16  BP: (111-148)/(58-68) 121/58    Neurovascular: Dense neuropathy bilaterally   Wound: Right foot dressing changed, incision clean, closed, no flucutance no necrosis, no purulence, only faint erythema, the ulcer under the 4th met is healing         Data Review  Lab Results (last 24 hours)     Procedure Component Value Units Date/Time    POC Glucose Once [[id number]]  (Abnormal) Collected:  12/06/18 1206    Specimen:  Blood Updated:  12/06/18 1226     Glucose 133 mg/dL     POC Glucose Once [[id number]]  (Normal) Collected:  12/06/18 0805    Specimen:  Blood Updated:  12/06/18 0825     Glucose 79 mg/dL     Tissue / Bone Culture - Tissue, Toe, Right [[id number]] Collected:  12/04/18 1207    Specimen:  Tissue from Toe, Right Updated:  12/06/18 0721     Tissue Culture No growth at 2 days     Gram Stain No WBCs or organisms seen    POC Glucose Once [[id number]]  (Abnormal) Collected:  12/05/18 2010    Specimen:  Blood Updated:  12/05/18 2011     Glucose 140 mg/dL     POC Glucose Once [[id number]]  (Abnormal) Collected:  12/05/18 1606    Specimen:  Blood Updated:  12/05/18 1608     Glucose 212 mg/dL     Tissue Pathology Exam [[id number]] Collected:  12/04/18 1209    Specimen:  Tissue from Toe, Right Updated:  12/05/18 1608     Case Report --     Surgical Pathology Report                         Case: [id number]                                  Authorizing Provider:  Chio Sterling MD        Collected:           12/04/2018 12:09 PM          Ordering Location:     [facility]  [facility]   Received:            12/04/2018 12:57 PM                                 OR                                                                           Pathologist:           Marito Dolan MD                                                        Specimen:    Toe, Right, right 1st toe and metatarsal - gangrene                                         Clinical Information --     The working history is right foot diabetic ulcer, necrosis, probable osteomyelitis great toe and gangrenous necrosis of tissue circumferential to right great toe and distal first metatarsal.           Final Diagnosis --      RIGHT FIRST TOE AND METATARSAL, AMPUTATION:  Skin with ulceration, necrosis and focal abscess formation compatible with gangrene.  No histologic evidence of acute osteomyelitis on representative sections of bone.  Proximal soft tissue margins appear viable.    PCC/dlb         Gross Description --     Received in formalin labeled as right first toe and metatarsal-gangrene is a 6.5 x 3.2 x 2.7 cm toe partially covered in tan skin.  The bone extends 4.5 cm beyond the proximal skin margin, which appears grossly viable.  Also received freely floating in the container is a 6.5 x 5.5 x 1 cm aggregate of tan skin, soft tissue and bone fragments.  A partial toenail is present on the toe.  One of the skin fragments displays a 1 x 0.4 cm ulcerated area.  Sectioning through the bone reveals a firm, tan, finely granular cut surface.  No other gross lesions are identified.  Representative sections are submitted as follows:  Block 1A proximal skin margin, en face, 1B skin including ulcer and 1C cross section of metatarsal, submitted following decalcification.  LED/mm         Microscopic Description --     The slides are reviewed and demonstrate histopathologic features supporting the above rendered diagnosis.          Anaerobic Culture - Tissue, Toe, Right [[id number]] Collected:  12/04/18 1208    Specimen:  Tissue from  Toe, Right Updated:  12/05/18 1417     Culture No anaerobes isolated    AFB Culture - Tissue, Toe, Right [[id number]] Collected:  12/04/18 1207    Specimen:  Tissue from Toe, Right Updated:  12/05/18 1320     AFB Stain No acid fast bacilli seen on concentrated smear            Assessment/Plan     Status post- right 1st toe and metatarsal amputation for gangrene- path shows the gangrenous necrosis.  No definite osteo on path, but the necrosis went to the periosteum on toe and metatarsal, and the bone had to be removed in order to close the wound.  Leaving the metatarsal and phalanges skeletonized would not heal.  Cultures pending (antibiotic modified)  She is very high risk for amputation, due to medical problems as well as difficulty with compliance.  I again emphasized that she must NOT PUT any weight on foot.  She was sleeping when I went in the room, and is taking a lot of narcotics (insensate foot) and I am not sure she is retaining the discussions that she has with nurses, doctors.   She would be safest at rehab, but is adamant that she does not want to go there.            Chio Sterling MD    Date: 12/6/2018  Time: 12:56 PM

## 2018-12-07 LAB
GLUCOSE BLDC GLUCOMTR-MCNC: 118 MG/DL (ref 70–130)
GLUCOSE BLDC GLUCOMTR-MCNC: 125 MG/DL (ref 70–130)
GLUCOSE BLDC GLUCOMTR-MCNC: 129 MG/DL (ref 70–130)
GLUCOSE BLDC GLUCOMTR-MCNC: 169 MG/DL (ref 70–130)
GLUCOSE BLDC GLUCOMTR-MCNC: 184 MG/DL (ref 70–130)

## 2018-12-07 PROCEDURE — 25010000002 HEPARIN (PORCINE) PER 1000 UNITS: Performed by: NURSE PRACTITIONER

## 2018-12-07 PROCEDURE — 82962 GLUCOSE BLOOD TEST: CPT

## 2018-12-07 PROCEDURE — 25010000002 LINEZOLID 600 MG/300ML SOLUTION: Performed by: INTERNAL MEDICINE

## 2018-12-07 PROCEDURE — 97116 GAIT TRAINING THERAPY: CPT

## 2018-12-07 PROCEDURE — 63710000001 INSULIN DETEMIR PER 5 UNITS: Performed by: INTERNAL MEDICINE

## 2018-12-07 PROCEDURE — 99232 SBSQ HOSP IP/OBS MODERATE 35: CPT | Performed by: INTERNAL MEDICINE

## 2018-12-07 PROCEDURE — 25010000002 ERTAPENEM PER 500 MG: Performed by: PHYSICIAN ASSISTANT

## 2018-12-07 PROCEDURE — 99024 POSTOP FOLLOW-UP VISIT: CPT | Performed by: ORTHOPAEDIC SURGERY

## 2018-12-07 RX ORDER — LINEZOLID 2 MG/ML
600 INJECTION, SOLUTION INTRAVENOUS EVERY 12 HOURS
Status: DISCONTINUED | OUTPATIENT
Start: 2018-12-07 | End: 2018-12-12

## 2018-12-07 RX ADMIN — HYDROCODONE BITARTRATE AND ACETAMINOPHEN 1 TABLET: 7.5; 325 TABLET ORAL at 19:47

## 2018-12-07 RX ADMIN — CASTOR OIL AND BALSAM, PERU: 788; 87 OINTMENT TOPICAL at 07:57

## 2018-12-07 RX ADMIN — INSULIN DETEMIR 40 UNITS: 100 INJECTION, SOLUTION SUBCUTANEOUS at 07:54

## 2018-12-07 RX ADMIN — DOCUSATE SODIUM 100 MG: 100 CAPSULE, LIQUID FILLED ORAL at 07:56

## 2018-12-07 RX ADMIN — INSULIN LISPRO 3 UNITS: 100 INJECTION, SOLUTION INTRAVENOUS; SUBCUTANEOUS at 17:48

## 2018-12-07 RX ADMIN — SODIUM CHLORIDE, PRESERVATIVE FREE 3 ML: 5 INJECTION INTRAVENOUS at 08:10

## 2018-12-07 RX ADMIN — ERTAPENEM SODIUM 500 MG: 1 INJECTION, POWDER, LYOPHILIZED, FOR SOLUTION INTRAMUSCULAR; INTRAVENOUS at 17:48

## 2018-12-07 RX ADMIN — PANTOPRAZOLE SODIUM 40 MG: 40 TABLET, DELAYED RELEASE ORAL at 07:56

## 2018-12-07 RX ADMIN — SODIUM CHLORIDE, PRESERVATIVE FREE 3 ML: 5 INJECTION INTRAVENOUS at 21:33

## 2018-12-07 RX ADMIN — INSULIN DETEMIR 40 UNITS: 100 INJECTION, SOLUTION SUBCUTANEOUS at 21:33

## 2018-12-07 RX ADMIN — MUPIROCIN: 2 CREAM TOPICAL at 07:57

## 2018-12-07 RX ADMIN — BISACODYL 10 MG: 5 TABLET, COATED ORAL at 07:55

## 2018-12-07 RX ADMIN — HYDROCODONE BITARTRATE AND ACETAMINOPHEN 1 TABLET: 7.5; 325 TABLET ORAL at 05:29

## 2018-12-07 RX ADMIN — HEPARIN SODIUM 5000 UNITS: 5000 INJECTION, SOLUTION INTRAVENOUS; SUBCUTANEOUS at 07:55

## 2018-12-07 RX ADMIN — PANTOPRAZOLE SODIUM 40 MG: 40 TABLET, DELAYED RELEASE ORAL at 05:28

## 2018-12-07 RX ADMIN — DILTIAZEM HYDROCHLORIDE 120 MG: 120 CAPSULE, EXTENDED RELEASE ORAL at 21:31

## 2018-12-07 RX ADMIN — CASTOR OIL AND BALSAM, PERU: 788; 87 OINTMENT TOPICAL at 21:31

## 2018-12-07 RX ADMIN — HEPARIN SODIUM 5000 UNITS: 5000 INJECTION, SOLUTION INTRAVENOUS; SUBCUTANEOUS at 21:32

## 2018-12-07 RX ADMIN — ASPIRIN 325 MG: 325 TABLET, DELAYED RELEASE ORAL at 07:55

## 2018-12-07 RX ADMIN — LINEZOLID 600 MG: 600 INJECTION, SOLUTION INTRAVENOUS at 15:33

## 2018-12-07 RX ADMIN — INSULIN LISPRO 3 UNITS: 100 INJECTION, SOLUTION INTRAVENOUS; SUBCUTANEOUS at 21:31

## 2018-12-07 NOTE — THERAPY TREATMENT NOTE
Acute Care - Physical Therapy Treatment Note  [facility]     Patient Name: Tashia Leon  : 1955  MRN: [id number]  Today's Date: 2018  Onset of Illness/Injury or Date of Surgery: 18  Date of Referral to PT: 18  Referring Physician: Asher    Admit Date: 2018    Visit Dx:    ICD-10-CM ICD-9-CM   1. Diabetic ulcer of other part of right foot associated with type 2 diabetes mellitus, unspecified ulcer stage (CMS/[facility]) E11.621 250.80    L97.519 707.15   2. Leukocytosis, unspecified type D72.829 288.60   3. Cellulitis of right foot L03.115 682.7   4. Chronic kidney disease, unspecified CKD stage N18.9 585.9   5. Impaired functional mobility, balance, gait, and endurance Z74.09 V49.89   6. Diabetic peripheral neuropathy (CMS/[facility]) E11.42 250.60     357.2   7. Diabetes mellitus type 2, uncontrolled, with complications (CMS/[facility]) E11.8 250.92    E11.65    8. Chronic kidney disease, stage V (CMS/[facility]) N18.5 585.5   9. Diabetic ulcer of right midfoot associated with type 2 diabetes mellitus, with necrosis of bone (CMS/[facility]) E11.621 250.80    L97.414 707.14   10. Cellulitis of toe of right foot L03.031 681.10   11. Obesity (BMI 30-39.9) E66.9 278.00   12. Chronic osteomyelitis of right foot with draining sinus (CMS/[facility]) M86.471 730.17   13. Impaired mobility and ADLs Z74.09 799.89     Patient Active Problem List   Diagnosis   • Coronary artery disease involving native coronary artery of native heart without angina pectoris   • Dyspnea   • Essential hypertension   • Hyperlipemia   • Lower extremity edema   • Carotid artery stenosis   • Carotid bruit   • Diabetes mellitus with neurological manifestations, uncontrolled (CMS/[facility])   • Diabetic peripheral neuropathy (CMS/[facility])   • Moderate nonproliferative diabetic retinopathy without macular edema associated with type 2 diabetes mellitus (CMS/[facility])   • Functional murmur   • GERD (gastroesophageal reflux disease)   • Peptic ulcer   • Vitamin D  deficiency   • Health care maintenance   • Diabetes mellitus type 2, uncontrolled, with complications (CMS/HCC)   • Hyperlipidemia   • Obesity   • Osteomyelitis (CMS/HCC)   • MRSA (methicillin resistant Staphylococcus aureus)   • Lumbar disc disease   • Hearing loss   • Hyperkalemia   • Secondary hyperparathyroidism (CMS/HCC)   • Idiopathic hypochromic anemia   • Chronic kidney disease, stage V (CMS/HCC)   • Diabetic ulcer of right foot associated with type 2 diabetes mellitus (CMS/HCC)   • Sepsis (CMS/HCC)   • Cellulitis   • Hypokalemia   • Anemia, chronic disease   • Obesity (BMI 30-39.9)   • Impaired functional mobility, balance, gait, and endurance   • Diabetic ulcer of right midfoot associated with type 2 diabetes mellitus, with necrosis of bone (CMS/HCC)   • Cellulitis of toe of right foot   • Chronic osteomyelitis of right foot with draining sinus (CMS/[facility])       Therapy Treatment    Rehabilitation Treatment Summary     Row Name 12/07/18 1379             Treatment Time/Intention    Discipline  physical therapist  -SC      Document Type  therapy note (daily note)  -SC      Subjective Information  complains of;pain  -SC      Mode of Treatment  physical therapy  -SC      Patient/Family Observations  in bed  -SC      Care Plan Review  risks/benefits reviewed  -SC      Therapy Frequency (PT Clinical Impression)  daily  -SC      Patient Effort  good  -SC      Existing Precautions/Restrictions  non-weight bearing;right;fall  -SC      Treatment Considerations/Comments  knee scooter slightly low and PT unable to readjust height 2 to failure of equiptment to adjust.  -SC      Patient Response to Treatment  good effort  -SC      Recorded by [SC] Srinivasan Mcmahon, PT 12/07/18 1411      Row Name 12/07/18 2148             Cognitive Assessment/Intervention- PT/OT    Orientation Status (Cognition)  oriented x 4  -SC      Follows Commands (Cognition)  follows one step commands;over 90% accuracy  -SC      Safety Deficit  (Cognitive)  impulsivity;judgment  -SC      Recorded by [SC] Srinivasan Mcmahon, PT 12/07/18 1411      Row Name 12/07/18 1333             Safety Issues, Functional Mobility    Safety Issues Affecting Function (Mobility)  impulsivity;judgment  -SC      Impairments Affecting Function (Mobility)  balance;strength  -SC      Recorded by [SC] Srinivasan Mcmahon, PT 12/07/18 1411      Row Name 12/07/18 1333             Mobility Assessment/Intervention    Extremity Weight-bearing Status  right lower extremity  -SC      Right Lower Extremity (Weight-bearing Status)  non weight-bearing (NWB)  -SC      Recorded by [SC] Srinivasan Mcmahon, PT 12/07/18 1411      Row Name 12/07/18 1333             Bed Mobility Assessment/Treatment    Scooting/Bridging [place] (Bed Mobility)  independent  -SC      Supine-Sit [place] (Bed Mobility)  conditional independence  -SC      Sit-Supine [place] (Bed Mobility)  conditional independence  -SC      Recorded by [SC] Srinivasan Mcmahon, PT 12/07/18 1411      Row Name 12/07/18 1333             Transfer Assessment/Treatment    Transfer Assessment/Treatment  sit-stand transfer;stand-sit transfer;stand pivot/stand step transfer;toilet transfer  -SC      Comment (Transfers)  demonstrated ability to get on/off scooter with cueing. Most difficult transfer was in the bathroom with limited space requiring holding railing  -SC      Recorded by [SC] Srinivasan Mcmahon, PT 12/07/18 1411      Row Name 12/07/18 1333             Sit-Stand Transfer    Sit-Stand [place] (Transfers)  contact guard;verbal cues  -SC      Assistive Device (Sit-Stand Transfers)  walker, knee scooter  -SC      Recorded by [SC] Srinivasan Mcmahon, PT 12/07/18 1411      Row Name 12/07/18 1333             Stand-Sit Transfer    Stand-Sit [place] (Transfers)  contact guard;verbal cues  -SC      Assistive Device (Stand-Sit Transfers)  walker, knee scooter  -SC      Recorded by [SC] Srinivasan Mcmahon, PT 12/07/18 1411      Row Name  12/07/18 1333             Stand Pivot/Stand Step Transfer    Stand Pivot/Stand Step [place]  verbal cues;contact guard  -SC      Assistive Device (Stand Pivot Stand Step Transfer)  walker, knee scooter  -SC      Recorded by [SC] Srinivasan Mcmahon, PT 12/07/18 1411      Row Name 12/07/18 1333             Toilet Transfer    Type (Toilet Transfer)  stand pivot/stand step  -SC      [place] Level (Toilet Transfer)  verbal cues  -SC      Assistive Device (Toilet Transfer)  walker, knee scooter  -SC      Recorded by [SC] Srinivasan Mcmahon, PT 12/07/18 1411      Row Name 12/07/18 1333             Gait/Stairs Assessment/Training    81815 - Gait Training Minutes   20  -SC      Gait/Stairs Assessment/Training  gait/ambulation independence  -SC      [place] Level (Gait)  verbal cues;contact guard  -SC      Assistive Device (Gait)  walker, knee scooter  -SC      Distance in Feet (Gait)  80  -SC      Pattern (Gait)  step-through  -SC      Deviations/Abnormal Patterns (Gait)  uriel decreased  -SC      Comment (Gait/Stairs)  demonstrated ability to mobilize with knee scooter with cueing on improving her turns. recommended smaller repeated turns.  -SC      Recorded by [SC] Srinivasan Mcmahon, PT 12/07/18 1411      Row Name 12/07/18 1333             Motor Skills Assessment/Interventions    Additional Documentation  Therapeutic Exercise (Group);Therapeutic Exercise Interventions (Group)  -SC      Recorded by [SC] Srinivasan Mcmahon, PT 12/07/18 1414      Row Name 12/07/18 1333             Therapeutic Exercise    70867 - PT Therapeutic Activity Minutes  3  -SC      Recorded by [SC] Srinivasan Mcmahon, PT 12/07/18 1414      Row Name 12/07/18 1333             Balance    Balance  dynamic standing balance  -SC      Recorded by [SC] Srinivasan Mcmahon, PT 12/07/18 1411      Row Name 12/07/18 1333             Dynamic Standing Balance    Level of [place], Reaches Outside Midline (Standing, Dynamic Balance)  contact guard assist knee  scooter  -SC      Recorded by [SC] Srinivasan Mcmahon, PT 12/07/18 1411      Row Name 12/07/18 1333             Positioning and Restraints    Pre-Treatment Position  in bed  -SC      Post Treatment Position  bed  -SC      In Bed  supine;call light within reach;encouraged to call for assist;exit alarm on  -SC      Recorded by [SC] Srinivasan Mcmahno, PT 12/07/18 1411      Row Name 12/07/18 1333             Pain Scale: Numbers Pre/Post-Treatment    Pain Location - Side  Right  -SC      Pain Location - Orientation  lower  -SC      Pain Location  extremity  -SC      Recorded by [SC] Srinivasan Mcmahon, PT 12/07/18 1411      Row Name 12/07/18 1333             Pain Scale: FACES Pre/Post-Treatment    Pain: FACES Scale, Pretreatment  2-->hurts little bit  -SC      Pain: FACES Scale, Post-Treatment  2-->hurts little bit  -SC      Recorded by [SC] Srinivasan Mcmahon, PT 12/07/18 1411      Row Name                Wound 11/26/18 2130 Right lateral plantar diabetic/neuropathic ulceration    Wound - Properties Group Date first assessed: 11/26/18 [KF] Time first assessed: 2130 [KF] Side: Right [KF] Orientation: lateral [KF2] Location: plantar [KF] Type: diabetic/neuropathic ulceration [KF] Recorded by:  [KF] Courtney Buenrostro RN 11/27/18 0323 [KF2] Courtney Buenrostro RN 11/27/18 0325    Row Name                Wound 12/03/18 0940 Right medial gluteal    Wound - Properties Group Date first assessed: 12/03/18 [MR] Time first assessed: 0940 [MR] Side: Right [MR] Orientation: medial [MR] Location: gluteal [MR] Stage, Pressure Injury: Stage 1 [MR], healing/chronic area  Recorded by:  [MR] Michell Marion RN 12/03/18 1016    Row Name                Wound 12/04/18 1202 Right foot incision    Wound - Properties Group Date first assessed: 12/04/18 [LD] Time first assessed: 1202 [LD] Side: Right [LD] Location: foot [LD] Type: incision [LD] Recorded by:  [LD] Prema Mike RN 12/04/18 1202    Row Name 12/07/18 1333             Outcome  Summary/Treatment Plan (PT)    Daily Summary of Progress (PT)  progress toward functional goals as expected  -SC      Recorded by [SC] Srinivasan Mcmahon, PT 12/07/18 1411        User Key  (r) = Recorded By, (t) = Taken By, (c) = Cosigned By    Initials Name Effective Dates Discipline    SC Srinivasan Mcmahon, PT 06/19/15 -  PT    Prema Aguirre RN 06/16/16 -  Nurse     Marion, Caroline, RN 06/16/16 -  Nurse    Courtney Dale RN 06/30/16 -  Nurse          Wound 11/26/18 2130 Right lateral plantar diabetic/neuropathic ulceration (Active)   Dressing Appearance dry;intact;no drainage 12/7/2018  8:00 AM   Base dressing in place, unable to visualize 12/7/2018  8:00 AM   Dressing Care, Wound gauze;elastic bandage 12/7/2018  8:00 AM       Wound 12/03/18 0940 Right medial gluteal (Active)   Dressing Appearance open to air 12/7/2018  8:00 AM   Periwound pink;blanchable 12/6/2018 10:05 PM   Drainage Amount none 12/6/2018 10:05 PM   Dressing Care, Wound open to air 12/7/2018  8:00 AM       Wound 12/04/18 1202 Right foot incision (Active)   Dressing Appearance no drainage 12/7/2018  8:00 AM   Dressing Care, Wound elastic bandage 12/7/2018  8:00 AM           Physical Therapy Education     Title: PT OT SLP Therapies (In Progress)     Topic: Physical Therapy (In Progress)     Point: Mobility training (In Progress)     Learning Progress Summary           Patient Claribel, LAURA SARAVIA, GIAL,RAMON,NR by SC at 12/7/2018  1:33 PM    Comment:  reviewed safety wth mobility    MANJIT Sanford, GAIL,DU,NR by SC at 12/4/2018  3:30 PM    Comment:  reviewed nwb r leg    Acceptance, E, NR by DANIELLE at 12/2/2018  4:11 PM    Comment:  Reviewed NWB status with pt and educated on importance of following NWB on R LE.  Educated pt on safe transfers on/off knee scooter to be able to maintain NWB.    Acceptance, E,D, NR by DANIELLE at 12/1/2018  3:15 PM    Comment:  Educated pt and family on NWB status and importance of maintaining NWB status.  Reviewed sequencing with knee  scooter and correct sequencing for transfers    Acceptance, E, NR by JACEY at 11/29/2018 10:15 AM   Family Acceptance, E,D, NR by ES at 12/1/2018  3:15 PM    Comment:  Educated pt and family on NWB status and importance of maintaining NWB status.  Reviewed sequencing with knee scooter and correct sequencing for transfers                   Point: Home exercise program (In Progress)     Learning Progress Summary           Patient Eager, E,D, VU,DU,NR by SC at 12/7/2018  1:33 PM    Comment:  reviewed safety wth mobility    Eager, E, VU,DU,NR by SC at 12/4/2018  3:30 PM    Comment:  reviewed nwb r leg    Acceptance, E,D, NR by ES at 12/1/2018  3:15 PM    Comment:  Educated pt and family on NWB status and importance of maintaining NWB status.  Reviewed sequencing with knee scooter and correct sequencing for transfers    Acceptance, E, NR by JACEY at 11/29/2018 10:15 AM   Family Acceptance, E,D, NR by ES at 12/1/2018  3:15 PM    Comment:  Educated pt and family on NWB status and importance of maintaining NWB status.  Reviewed sequencing with knee scooter and correct sequencing for transfers                   Point: Body mechanics (In Progress)     Learning Progress Summary           Patient Eager, E,D, VU,DU,NR by SC at 12/7/2018  1:33 PM    Comment:  reviewed safety wth mobility    Eager, E, VU,DU,NR by SC at 12/4/2018  3:30 PM    Comment:  reviewed nwb r leg    Acceptance, E, NR by ES at 12/2/2018  4:11 PM    Comment:  Reviewed NWB status with pt and educated on importance of following NWB on R LE.  Educated pt on safe transfers on/off knee scooter to be able to maintain NWB.    Acceptance, E,D, NR by ES at 12/1/2018  3:15 PM    Comment:  Educated pt and family on NWB status and importance of maintaining NWB status.  Reviewed sequencing with knee scooter and correct sequencing for transfers    Acceptance, E, VU by KG at 11/30/2018 11:46 PM    Acceptance, E, NR by JACEY at 11/29/2018 10:15 AM   Family Acceptance, E,D, NR by ES at  12/1/2018  3:15 PM    Comment:  Educated pt and family on NWB status and importance of maintaining NWB status.  Reviewed sequencing with knee scooter and correct sequencing for transfers                   Point: Precautions (In Progress)     Learning Progress Summary           Patient Eager, E,D, VU,DU,NR by SC at 12/7/2018  1:33 PM    Comment:  reviewed safety wth mobility    Eager, E, VU,DU,NR by SC at 12/4/2018  3:30 PM    Comment:  reviewed nwb r leg    Acceptance, E, NR by ES at 12/2/2018  4:11 PM    Comment:  Reviewed NWB status with pt and educated on importance of following NWB on R LE.  Educated pt on safe transfers on/off knee scooter to be able to maintain NWB.    Acceptance, E, VU by KG at 12/1/2018 11:14 PM    Acceptance, E,D, NR by ES at 12/1/2018  3:15 PM    Comment:  Educated pt and family on NWB status and importance of maintaining NWB status.  Reviewed sequencing with knee scooter and correct sequencing for transfers    Acceptance, E, NR by JACEY at 11/29/2018 10:15 AM   Family Acceptance, E,D, NR by ES at 12/1/2018  3:15 PM    Comment:  Educated pt and family on NWB status and importance of maintaining NWB status.  Reviewed sequencing with knee scooter and correct sequencing for transfers                               User Key     Initials Effective Dates Name Provider Type Discipline    SC 06/19/15 -  Srinivasan Mcmahon, PT Physical Therapist PT    JACEY 06/19/15 -  Yany Cheek, PT Physical Therapist PT    KG 06/16/16 -  Roseann Lazo, RN Registered Nurse Nurse     11/13/17 -  Galina Wei, PT Physical Therapist PT                PT Recommendation and Plan  Anticipated Discharge Disposition (PT): skilled nursing facility  Planned Therapy Interventions (PT Eval): gait training, patient/family education, strengthening, transfer training  Therapy Frequency (PT Clinical Impression): daily  Outcome Summary/Treatment Plan (PT)  Daily Summary of Progress (PT): progress toward functional goals as  expected  Anticipated Equipment Needs at Discharge (PT): rolling knee walker  Anticipated Discharge Disposition (PT): skilled nursing facility  Patient/Family Concerns, Anticipated Discharge Disposition (PT): wants to go home  Plan of Care Reviewed With: patient  Progress: improving  Outcome Summary: Patient able to ambulate 80 feet in hallway with scooter.  Her transfers  onto scooter requires more practise for safety.  Outcome Measures     Row Name 12/07/18 1333 12/05/18 0918 12/04/18 1530       How much help from another person do you currently need...    Turning from your back to your side while in flat bed without using bedrails?  4  -SC  --  4  -SC    Moving from lying on back to sitting on the side of a flat bed without bedrails?  4  -SC  --  4  -SC    Moving to and from a bed to a chair (including a wheelchair)?  3  -SC  --  3  -SC    Standing up from a chair using your arms (e.g., wheelchair, bedside chair)?  3  -SC  --  3  -SC    Climbing 3-5 steps with a railing?  1  -SC  --  1  -SC    To walk in hospital room?  3  -SC  --  2  -SC    AM-PAC 6 Clicks Score  18  -SC  --  17  -SC       How much help from another is currently needed...    Putting on and taking off regular lower body clothing?  --  2  -TB  --    Bathing (including washing, rinsing, and drying)  --  2  -TB  --    Toileting (which includes using toilet bed pan or urinal)  --  2  -TB  --    Putting on and taking off regular upper body clothing  --  3  -TB  --    Taking care of personal grooming (such as brushing teeth)  --  3  -TB  --    Eating meals  --  4  -TB  --    Score  --  16  -TB  --       Functional Assessment    Outcome Measure Options  AM-PAC 6 Clicks Basic Mobility (PT)  -SC  AM-PAC 6 Clicks Daily Activity (OT)  -TB  AM-PAC 6 Clicks Basic Mobility (PT)  -SC      User Key  (r) = Recorded By, (t) = Taken By, (c) = Cosigned By    Initials Name Provider Type    SC Srinivasan Mcmahon, PT Physical Therapist    Leti Andrade, OT  Occupational Therapist         Time Calculation:   PT Charges     Row Name 12/07/18 1333             Time Calculation    Start Time  1333  -SC      PT Received On  12/07/18  -SC      PT Goal Re-Cert Due Date  12/09/18  -SC         Time Calculation- PT    Total Timed Code Minutes- PT  23 minute(s)  -SC         Timed Charges    95216 - Gait Training Minutes   20  -SC      16083 - PT Therapeutic Activity Minutes  3  -SC        User Key  (r) = Recorded By, (t) = Taken By, (c) = Cosigned By    Initials Name Provider Type    SC Srinivasan Mcmahon, PT Physical Therapist        Therapy Suggested Charges     Code   Minutes Charges    99956 (CPT®) Hc Pt Neuromusc Re Education Ea 15 Min      82127 (CPT®) Hc Pt Ther Proc Ea 15 Min      71605 (CPT®) Hc Gait Training Ea 15 Min 20 2    83554 (CPT®) Hc Pt Therapeutic Act Ea 15 Min 3     07165 (CPT®) Hc Pt Manual Therapy Ea 15 Min      20104 (CPT®) Hc Pt Iontophoresis Ea 15 Min      83583 (CPT®) Hc Pt Elec Stim Ea-Per 15 Min      28131 (CPT®) Hc Pt Ultrasound Ea 15 Min      69740 (CPT®) Hc Pt Self Care/Mgmt/Train Ea 15 Min      80766 (CPT®) Hc Pt Prosthetic (S) Train Initial Encounter, Each 15 Min      57000 (CPT®) Hc Pt Orthotic(S)/Prosthetic(S) Encounter, Each 15 Min      59883 (CPT®) Hc Orthotic(S) Mgmt/Train Initial Encounter, Each 15min      Total  23 2        Therapy Charges for Today     Code Description Service Date Service Provider Modifiers Qty    06167041892 HC GAIT TRAINING EA 15 MIN 12/7/2018 Srinivasan Mcmahon PT GP 2          PT G-Codes  Outcome Measure Options: AM-PAC 6 Clicks Basic Mobility (PT)  AM-PAC 6 Clicks Score: 18  Score: 16    Srinivasan Mcmahon PT  12/7/2018

## 2018-12-07 NOTE — PROGRESS NOTES
"   LOS: 11 days    Patient Care Team:  Angelica Tran PA as PCP - General (Internal Medicine)  Denver Baldwin MD as Consulting Physician (Nephrology)    Subjective     No new events    Objective     Vital Signs:  Blood pressure 116/56, pulse 70, temperature 98.8 °F (37.1 °C), temperature source Oral, resp. rate 16, height 167.6 cm (66\"), weight 109 kg (240 lb), SpO2 95 %, not currently breastfeeding.    Flowsheet Rows      First Filed Value   Admission Height  167.6 cm (66\") Documented at 11/26/2018 1315   Admission Weight  109 kg (240 lb) Documented at 11/26/2018 1315          12/06 0701 - 12/07 0700  In: 1560 [P.O.:1560]  Out: 2700 [Urine:2700]    Physical Exam:    General Appearance: WD obese WF NAD  Neuro:   awake alert   Psych:  Normal mood and affect  CV:   No edema  Lungs: respirations regular and unlabored  Abdomen: not distended  :  No loya, no palp bladder  Skin: No rash, Warm and dry      Labs:  Results from last 7 days   Lab Units  12/05/18   0637  12/04/18   0639  12/03/18   0557   WBC 10*3/mm3  15.15*  12.87*  12.24*   HEMOGLOBIN g/dL  8.4*  8.5*  8.3*   PLATELETS 10*3/mm3  444  415  435     Results from last 7 days   Lab Units  12/05/18   0637  12/04/18   0639  12/03/18   0557  12/01/18   0949   SODIUM mmol/L  137  140  138  133   POTASSIUM mmol/L  3.9  3.9  3.6  3.8   CHLORIDE mmol/L  105  107  102  95*   CO2 mmol/L  25.0  20.0  24.0  25.0   BUN mg/dL  51*  58*  66*  87*   CREATININE mg/dL  3.38*  3.56*  3.91*  4.38*   CALCIUM mg/dL  7.9*  8.1*  8.0*  8.2*             Results from last 7 days   Lab Units  12/01/18   1629   COLOR UA   Yellow   CLARITY UA   Cloudy*   PH, URINE   <=5.0   SPECIFIC GRAVITY, URINE   1.018   GLUCOSE UA   250 mg/dL (1+)*   KETONES UA   Negative   BILIRUBIN UA   Negative   PROTEIN UA   >=300 mg/dL (3+)*   BLOOD UA   Moderate (2+)*   LEUKOCYTES UA   Negative   NITRITE UA   Negative       Estimated Creatinine Clearance: 21.9 mL/min (A) (by C-G formula based on SCr " of 3.38 mg/dL (H)).         A/P:    CKD4: stable in baseline 3.5-4.0.  Thought due to DM in the past.    Proteinuria:  Presumed DN.  Ratio 3.1    HTN: stable.     Anemia:  Hgb low.  Tsat 11%.  Recheck S/P IVFe. .  Transfuse PRN for Hgb < 7    Diabetic Foot Ulcer    Manny Guerrero MD  12/07/18  11:30 AM

## 2018-12-07 NOTE — PROGRESS NOTES
Continued Stay Note  [facility]     Patient Name: Tashia Leon  MRN: [id number]  Today's Date: 12/7/2018    Admit Date: 11/26/2018    Discharge Plan     Row Name 12/07/18 1714       Plan    Plan  LTAC    Patient/Family in Agreement with Plan  yes    Plan Comments  Lengthy discussion with Mrs Leon and son at bedside re: levels of care (LTAC vs SNF vs home) She does have insurance benefits for SNF and LTAC level but her insurance does require pre authorization.She is willing to consider LTAC level now, I spoke with Sakina at [facility],they  currently do not have an available beds. I spoke with Vivi at [facility], they do have available beds and have initiated pre cert with her insurance., hopefully we will have answer by  Mon 12/10. If insurance does not approve LTAC, she would prefer to go home with  central access if needed ( [place]) rather than SNF placement. She and son state they have  family members  and RN's that can administer IV antibiotics and she states she has enough family support and feels she can maintain NWB status. Case mgt will f/u Monday        Discharge Codes    No documentation.       Expected Discharge Date and Time     Expected Discharge Date Expected Discharge Time    Dec 7, 2018             Sonja C Kellerman, RN

## 2018-12-07 NOTE — DISCHARGE INSTRUCTIONS
"1. Absolutely no weight on right foot  2. Daily dressing change with thin layer Bactroban cream, gauze and ace bandage (not too tight)  3. Keep right foot clean and dry        Diabetic foot care instructions (these apply to the LEFT foot only for now, since the right foot has had recent surgery)        Diabetic Foot Care / Neuropathic Foot Care    SKIN  · Remove shoes and socks and look at the feet every morning and night.         · Blisters - clean it with peroxide or alcohol.  DO NOT put a shoe back on until it is healed.  DO NOT WALK ON THE FOOT.  If it is red or looks infected, call your doctor.    · Callus - sand calluses daily, it works better on dry skin.  A PED EGG is the best tool, or file or a pumice stone (available at a drugstore) using a back and forth motion over the callus.    · Ingrown nail - soak it in soapy water and call your doctor.    · Ulcers or sores on the foot - DO NOT PUT A SHOE ON, DO NOT WALK ON THE FOOT, go to the doctor who looks at your feet.    · Moisturize the feet every night.  An easy method: The foot with a thin layer of Vaseline or Bag [place] (you can find this at [employer], any feed store) and then a sock    NAILS  · Trim or file your nails straight across and leave them a little long.  If you have thick fungal nails, a nail  tool or dremel tool works well.    · If you have an ingrown nail with reddened skin, soak it (as mentioned above and call your doctor).    SHOES  · Keep your shoes in good repair and wear any special inserts or diabetic shoes as prescribed.    · If you have a problem with a special diabetic shoe and/or insert, such as rubbing, go back to where you got the diabetic shoe/insert as soon as possible.    · If you are in \"regular\" shoes, make sure they fit.  Shoes with soft, padded soles, rounded toes, and good support are best.    THINGS NOT TO DO  · DO NOT go barefoot    · DO NOT soak feet in very hot water.    · DO NOT soak feet in anything other " than water with soap or Epsom Salts (NO bleach, turpentine, gasoline, etc.).

## 2018-12-07 NOTE — PROGRESS NOTES
Orthopedic  Progress Note    Subjective     Post-Operative Day: 3 Days Post-Op    Systemic or Specific Complaints: less pain in foot, s/p amputate right 1st toe and metatarsal.  More compliant with non-wt bearing.  Now considering rehab/LTAC.  I think placement would be safest for her, to have best chance to avoid BKA.    Objective     Vital signs in last 24 hours:  Temp:  [98.1 °F (36.7 °C)-98.9 °F (37.2 °C)] 98.8 °F (37.1 °C)  Heart Rate:  [70-78] 70  Resp:  [16] 16  BP: (113-158)/(51-67) 116/56    Neurovascular: No change in dense neuropathy   Wound: Right foot dressing changed- no erythema, no necrosis, no purulence, small amount dry blood on dressing, no complaint of pain during dressing change         Data Review  Lab Results (last 24 hours)     Procedure Component Value Units Date/Time    POC Glucose Once [[id number]]  (Normal) Collected:  12/07/18 1049    Specimen:  Blood Updated:  12/07/18 1050     Glucose 129 mg/dL     Tissue / Bone Culture - Tissue, Toe, Right [[id number]] Collected:  12/04/18 1207    Specimen:  Tissue from Toe, Right Updated:  12/07/18 0819     Tissue Culture No growth at 3 days     Gram Stain No WBCs or organisms seen    POC Glucose Once [[id number]]  (Normal) Collected:  12/07/18 0653    Specimen:  Blood Updated:  12/07/18 0655     Glucose 118 mg/dL     POC Glucose Once [[id number]]  (Normal) Collected:  12/07/18 0305    Specimen:  Blood Updated:  12/07/18 0309     Glucose 125 mg/dL     POC Glucose Once [[id number]]  (Abnormal) Collected:  12/06/18 2003    Specimen:  Blood Updated:  12/06/18 2016     Glucose 212 mg/dL     POC Glucose Once [[id number]]  (Abnormal) Collected:  12/06/18 1706    Specimen:  Blood Updated:  12/06/18 1724     Glucose 260 mg/dL             Assessment/Plan     Status post- right 1st toe and metatarsal amputation- less pain.  As above I think rehab/LTAC is safest option.  From my standpoint she can go any time.      -Gangrene/necrosis- no new necrosis, cultures  pending (abx modified)     -must remain non-wt bearing to have a chance to save leg, high risk for higher amputation    -I will see her again Monday if she is still here, follow up with me in 7-10 days.  Daily dressing as ordered        Chio Sterling MD    Date: 12/7/2018  Time: 1:25 PM

## 2018-12-07 NOTE — PLAN OF CARE
Problem: Patient Care Overview  Goal: Plan of Care Review  Outcome: Ongoing (interventions implemented as appropriate)   12/07/18 7801   Coping/Psychosocial   Plan of Care Reviewed With patient   Plan of Care Review   Progress improving   OTHER   Outcome Summary Patient able to ambulate 80 feet in hallway with scooter. Her transfers onto scooter requires more practise for safety.

## 2018-12-07 NOTE — PLAN OF CARE
Problem: Patient Care Overview  Goal: Plan of Care Review  Outcome: Ongoing (interventions implemented as appropriate)   12/07/18 0457   Coping/Psychosocial   Plan of Care Reviewed With patient   Plan of Care Review   Progress improving   OTHER   Outcome Summary Pt VSS. Pt doing well with pain with minimal complaints. Pt compliant with NWB statues on RLE. Dressing intact and RLE elevated on pillows. Pt wanting to discharge.       Problem: Infection, Risk/Actual (Adult)  Goal: Identify Related Risk Factors and Signs and Symptoms  Outcome: Ongoing (interventions implemented as appropriate)    Goal: Infection Prevention/Resolution  Outcome: Ongoing (interventions implemented as appropriate)      Problem: Fall Risk (Adult)  Goal: Identify Related Risk Factors and Signs and Symptoms  Outcome: Ongoing (interventions implemented as appropriate)    Goal: Absence of Fall  Outcome: Ongoing (interventions implemented as appropriate)      Problem: Pain, Acute (Adult)  Goal: Identify Related Risk Factors and Signs and Symptoms  Outcome: Ongoing (interventions implemented as appropriate)    Goal: Acceptable Pain Control/Comfort Level  Outcome: Ongoing (interventions implemented as appropriate)      Problem: Diabetes, Type 2 (Adult)  Goal: Signs and Symptoms of Listed Potential Problems Will be Absent, Minimized or Managed (Diabetes, Type 2)  Outcome: Ongoing (interventions implemented as appropriate)      Problem: Skin Injury Risk (Adult)  Goal: Identify Related Risk Factors and Signs and Symptoms  Outcome: Ongoing (interventions implemented as appropriate)    Goal: Skin Health and Integrity  Outcome: Ongoing (interventions implemented as appropriate)

## 2018-12-07 NOTE — PROGRESS NOTES
INFECTIOUS DISEASE PROGRESS NOTE    Tashia Leon  1955  [id number]    Date: 12/7/2018    Admission Date: 11/26/2018      CC: diabetic foot ulcer    History of present illness:    Patient is a 63 y.o. female with chronic kidney disease stage IV, diabetes type 2 since 1990s has had history of left foot MRSA osteomyelitis treated at  several years ago.  Patient has developed calluses on her right foot and saw [facility] who did some bedside debridement in the clinic.  Patient said increasing pain and some weeping from the wounds because of low-grade temperatures patient's returns to see her podiatrist recommended admission to hospital.  Patient was seen in the emergency room started on IV antibiotics including daptomycin and ertapenem.  She had MRI of the foot which was stopped prematurely by the patient's and was not a complete study.    Asked to address long-term treatment and duration of antibiotics.    Patient lives in [place].    Patient denies C. difficile colitis history of nausea vomiting or diarrhea difficulty breathing patient denies rash.    Patient says she is a PICC line before    11/28/18; no events overnight; no fever, rash, sore throat    11/29/18; no events overnight feels well; no complaints; no diarrhea, rash, sore throat    11/30/18: No new events. Denies f/c, sob, n/v/d, rashes or sore throat.     12/1/18; no events overnight; no fever, rash, sore throat    12/2/18: no new events or complaints.  Denies f/c, sob, n/v/d, rashes.   12/3/18: No new events or complaints.  Foot without pain.  No schedule for dressing changes.  Denies f/c, sob, n/v/d, rashes.     12/6/18; doing well; no events overnight; no fever, rash, sore throat    12/7/18; no changes, wants to go home; no fever, rash, sore throat    Past Medical History:   Diagnosis Date   • Acute bronchitis    • Acute pharyngitis    • Acute sinusitis    • Benign colonic polyp    • Edema    • GERD (gastroesophageal reflux  disease)    • Hiatal hernia    • Hyperkalemia    • Hyperlipidemia    • Hypertension    • Low back pain    • Lumbar disc disease    • MRSA (methicillin resistant Staphylococcus aureus)     Osteomyelitis/methicillin-resistant Staphylococcus aureus of the left foot, 2013.   • Obesity    • Osteomyelitis (CMS/HCC)     Osteomyelitis/methicillin-resistant Staphylococcus aureus of the left foot, 2013.   • Peptic ulceration 2013    History of peptic ulcer disease, diagnosed 2013 by EGD.  Repeat EGD in 2013 was negative.   • Sepsis (CMS/HCC)     Sepsis, 2013, hospitalized at [facility].   • Tobacco use     Previous tobacco use with cessation in .   • Type 2 diabetes mellitus (CMS/[facility])     Proteinuria noted, 2014.     • Vitamin D deficiency        Past Surgical History:   Procedure Laterality Date   •  SECTION      x2   • FOOT SURGERY Left     Incision and debridement of the left foot x2.   • LASIK     • TONSILLECTOMY         Family History   Problem Relation Age of Onset   • No Known Problems Mother    • No Known Problems Father    • Breast cancer Neg Hx    • Ovarian cancer Neg Hx        Social History     Socioeconomic History   • Marital status:      Spouse name: Not on file   • Number of children: Not on file   • Years of education: Not on file   • Highest education level: Not on file   Social Needs   • Financial resource strain: Not on file   • Food insecurity - worry: Not on file   • Food insecurity - inability: Not on file   • Transportation needs - medical: Not on file   • Transportation needs - non-medical: Not on file   Occupational History   • Not on file   Tobacco Use   • Smoking status: Former Smoker     Packs/day: 1.00     Years: 30.00     Pack years: 30.00     Types: Cigarettes   • Smokeless tobacco: Never Used   • Tobacco comment: quit 7 years ago   Substance and Sexual Activity   • Alcohol use: Yes     Alcohol/week: 0.6  oz     Types: 1 Glasses of wine per week     Comment: holidays   • Drug use: No   • Sexual activity: Defer   Other Topics Concern   • Not on file   Social History Narrative   • Not on file       Allergies   Allergen Reactions   • Statins Myalgia   • Ancef [Cefazolin]    • Bactrim [Sulfamethoxazole-Trimethoprim] Nausea And Vomiting   • Cephalosporins    • Cleocin [Clindamycin Hcl]    • Clindamycin/Lincomycin    • Keflex [Cephalexin]    • Levaquin [Levofloxacin]    • Lipitor [Atorvastatin]    • Lisinopril    • Losartan Potassium Other (See Comments)     Unknown reaction   • Oxycodone    • Quinolones          Medication:    Current Facility-Administered Medications:   •  aspirin EC tablet 325 mg, 325 mg, Oral, Daily, Rosario Walter APRN, 325 mg at 12/07/18 0755  •  bisacodyl (DULCOLAX) EC tablet 10 mg, 10 mg, Oral, Daily PRN, Heavenly Medina APRN, 10 mg at 12/07/18 0755  •  castor oil-balsam peru (VENELEX) ointment, , Topical, Q12H, Halina Walker, DO  •  DAPTOmycin (CUBICIN) 500 mg in sodium chloride 0.9 % 50 mL IVPB, 6 mg/kg (Adjusted), Intravenous, Q48H, Niko Deleon PA, Last Rate: 100 mL/hr at 12/06/18 1216, 500 mg at 12/06/18 1216  •  dextrose (D50W) 25 g/ 50mL Intravenous Solution 25 g, 25 g, Intravenous, Q15 Min PRN, Rosario Walter APRN  •  dextrose (GLUTOSE) oral gel 15 g, 15 g, Oral, Q15 Min PRN, Rosario Walter, APRN  •  diltiaZEM CD (CARDIZEM CD) 24 hr capsule 120 mg, 120 mg, Oral, Nightly, Rosario Walter APRN, 120 mg at 12/06/18 2211  •  docusate sodium (COLACE) capsule 100 mg, 100 mg, Oral, Daily, Heavenly Medina APRN, 100 mg at 12/07/18 0756  •  ertapenem (INVanz) 500 mg in sodium chloride 0.9 % 50 mL IVPB, 500 mg, Intravenous, Q24H, Niko Deleon PA, Last Rate: 100 mL/hr at 12/06/18 1758, 500 mg at 12/06/18 1758  •  glucagon (human recombinant) (GLUCAGEN DIAGNOSTIC) injection 1 mg, 1 mg, Subcutaneous, PRN, Rosario Walter, APRN  •  heparin (porcine) 5000  UNIT/ML injection 5,000 Units, 5,000 Units, Subcutaneous, Q12H, Rosario Walter APRN, 5,000 Units at 12/07/18 0755  •  HYDROcodone-acetaminophen (NORCO) 7.5-325 MG per tablet 1 tablet, 1 tablet, Oral, Q6H PRN, Halina Walker DO, 1 tablet at 12/07/18 0529  •  insulin detemir (LEVEMIR) injection 40 Units, 40 Units, Subcutaneous, Q12H, Gonzalo Schmidt MD, 40 Units at 12/07/18 0754  •  insulin lispro (humaLOG) injection 0-14 Units, 0-14 Units, Subcutaneous, 4x Daily With Meals & Nightly, Rosario Walter APRN, 5 Units at 12/06/18 2210  •  mupirocin (BACTROBAN) 2 % cream, , Topical, Q24H, Chio Sterling MD  •  mupirocin (BACTROBAN) 2 % cream, , Topical, Q24H, Chio Streling MD  •  Naloxegol Oxalate (MOVANTIK) tablet 12.5 mg, 12.5 mg, Oral, Once per day on Sun Wed, Rosario Walter APRN, 12.5 mg at 12/05/18 0942  •  ondansetron (ZOFRAN) tablet 4 mg, 4 mg, Oral, Q6H PRN, 4 mg at 11/30/18 2046 **OR** ondansetron (ZOFRAN) injection 4 mg, 4 mg, Intravenous, Q6H PRN, Rosario Walter APRN, 4 mg at 12/06/18 1223  •  pantoprazole (PROTONIX) EC tablet 40 mg, 40 mg, Oral, QAM, Rosario Walter APRN, 40 mg at 12/07/18 0756  •  [COMPLETED] Insert peripheral IV, , , Once **AND** sodium chloride 0.9 % flush 10 mL, 10 mL, Intravenous, PRN, Hiram Osborn MD, 10 mL at 12/04/18 1032  •  sodium chloride 0.9 % flush 3 mL, 3 mL, Intravenous, Q12H, Rosario Walter APRN, 3 mL at 12/07/18 0810  •  sodium chloride 0.9 % flush 3-10 mL, 3-10 mL, Intravenous, PRN, oRsario Walter APRN  •  sodium chloride 0.9 % infusion, 9 mL/hr, Intravenous, Continuous, Binta Patricia MD, Last Rate: 9 mL/hr at 12/04/18 1257, 9 mL/hr at 12/04/18 1257    Antibiotics:  Anti-Infectives (From admission, onward)    Ordered     Dose/Rate Route Frequency Start Stop    12/03/18 0848  DAPTOmycin (CUBICIN) 500 mg in sodium chloride 0.9 % 50 mL IVPB     Ordering Provider:  Niko Deleon PA    6 mg/kg × 79.2 kg  (Adjusted)  100 mL/hr over 30 Minutes Intravenous Every 48 Hours 18 1000 18 0959    18 2136  DAPTOmycin (CUBICIN) 500 mg in sodium chloride 0.9 % 50 mL IVPB     Ordering Provider:  Breana Chance MD    6 mg/kg × 79.2 kg (Adjusted)  100 mL/hr over 30 Minutes Intravenous Every 48 Hours 18 1800 18 1959    18 2132  ertapenem (INVanz) 500 mg in sodium chloride 0.9 % 50 mL IVPB     Niko Deleon PA reviewed the order on 18 0848.   Ordering Provider:  Niko Deleon PA    500 mg  100 mL/hr over 30 Minutes Intravenous Every 24 Hours 18 1800 12/10/18 0844    18 1616  ertapenem (INVanz) 500 mg in sodium chloride 0.9 % 50 mL IVPB     Ordering Provider:  Hiram Osborn MD    500 mg  100 mL/hr over 30 Minutes Intravenous Every 24 Hours 18 1618 18 0015    18 1616  DAPTOmycin (CUBICIN) 500 mg in sodium chloride 0.9 % 50 mL IVPB     Ordering Provider:  Hiram Osborn MD    500 mg  100 mL/hr over 30 Minutes Intravenous Every 24 Hours 18 1617 18 1935            Review of Systems:       see hpi    Physical Exam:   Vital Signs  Temp (24hrs), Av.5 °F (36.9 °C), Min:98.1 °F (36.7 °C), Max:98.9 °F (37.2 °C)    Temp  Min: 98.1 °F (36.7 °C)  Max: 98.9 °F (37.2 °C)  BP  Min: 113/51  Max: 158/67  Pulse  Min: 70  Max: 78  Resp  Min: 16  Max: 16  SpO2  Min: 95 %  Max: 97 %    GENERAL: Awake and alert, in no acute distress.   HEENT: Normocephalic, atraumatic.  PERRL. EOMI. No conjunctival injection. No icterus. Oropharynx clear without evidence of thrush or exudate.   HEART: RRR; No murmur, rubs, gallops.   LUNGS: Clear to auscultation bilaterally without wheezing, rales, rhonchi.   ABDOMEN: Soft, nontender, nondistended. Positive bowel sounds.    EXT:  No cyanosis, clubbing or edema. No cord.  MSK: FROM without joint effusions noted arms/legs.  Right foot wrapped  SKIN: Warm and dry without cutaneous eruptions on Inspection/palpation.    NEURO:  Oriented to PPT. No focal deficits on motor/sensory exam at arms/legs.  PSYCHIATRIC: Normal insight and judgement. Cooperative with PE    Laboratory Data    Results from last 7 days   Lab Units  12/05/18   0637  12/04/18   0639  12/03/18   0557   WBC 10*3/mm3  15.15*  12.87*  12.24*   HEMOGLOBIN g/dL  8.4*  8.5*  8.3*   HEMATOCRIT %  27.1*  27.3*  26.9*   PLATELETS 10*3/mm3  444  415  435     Results from last 7 days   Lab Units  12/05/18   0637   SODIUM mmol/L  137   POTASSIUM mmol/L  3.9   CHLORIDE mmol/L  105   CO2 mmol/L  25.0   BUN mg/dL  51*   CREATININE mg/dL  3.38*   GLUCOSE mg/dL  135*   CALCIUM mg/dL  7.9*                     Results from last 7 days   Lab Units  12/01/18   0949   CK TOTAL U/L  121         Estimated Creatinine Clearance: 21.9 mL/min (A) (by C-G formula based on SCr of 3.38 mg/dL (H)).      Microbiology:  Microbiology Results Abnormal     Procedure Component Value - Date/Time    Tissue / Bone Culture - Tissue, Toe, Right [[id number]] Collected:  12/04/18 1207    Lab Status:  Preliminary result Specimen:  Tissue from Toe, Right Updated:  12/07/18 0819     Tissue Culture No growth at 3 days     Gram Stain No WBCs or organisms seen    Anaerobic Culture - Tissue, Toe, Right [[id number]] Collected:  12/04/18 1207    Lab Status:  Preliminary result Specimen:  Tissue from Toe, Right Updated:  12/05/18 1417     Culture No anaerobes isolated    AFB Culture - Tissue, Toe, Right [[id number]] Collected:  12/04/18 1207    Lab Status:  Preliminary result Specimen:  Tissue from Toe, Right Updated:  12/05/18 1320     AFB Stain No acid fast bacilli seen on concentrated smear    Eosinophil Smear - Urine, Urine, Clean Catch [[id number]]  (Normal) Collected:  12/01/18 1629    Lab Status:  Final result Specimen:  Urine, Clean Catch Updated:  12/01/18 1710     Eosinophil Smear 0 % EOS/100 Cells     Narrative:       No eosinophil seen    Blood Culture - Blood, Arm, Left [[id number]] Collected:  11/26/18 1628    Lab  Status:  Final result Specimen:  Blood from Arm, Left Updated:  12/01/18 1645     Blood Culture No growth at 5 days    Blood Culture - Blood, Arm, Right [[id number]] Collected:  11/26/18 1620    Lab Status:  Final result Specimen:  Blood from Arm, Right Updated:  12/01/18 1645     Blood Culture No growth at 5 days                       Radiology:  No radiology results for the last 7 days  Doppler arterial:   Addenda:       · Moderate reduction in arterial flow in the right lower extremity. · Monophasic waveforms are present in the right lower extremity with   biphasic waveforms in the left lower extremity · Due to the patients inability to tolerate the study only RLE pressures   were obtained    Signed: 11/29/18 3135 by Gillian Becker MD   Narrative:     · Moderate reduction in arterial flow in the right lower extremity.   Normal-appearing waveforms.     Narrative:     · Left Conclusion: The left NELLI is normal.  · Right Conclusion: The right NELLI is normal.     Path from 12/4/18  RIGHT FIRST TOE AND METATARSAL, AMPUTATION:  Skin with ulceration, necrosis and focal abscess formation compatible with gangrene.  No histologic evidence of acute osteomyelitis on representative sections of bone.  Proximal soft tissue margins appear viable.      Impression:   Diabetic foot ulcer on right foot  Leukocytosis with neutrophilia, improving  chronic kidney disease stage IV  Focal abscess of right 1st toe  Antibiotic intolerances to cefazolin, clindamycin, Levaquin  DM II  Esr > 100    PLAN/RECOMMENDATIONS:     I find it very difficult to interpret an MRI was not completed an MRI that was not given with gadolinium because of her chronic kidney disease.    Physical exam of foot not remarkable for  Osteomyelitis.  I still have some concern with ESr > 100.    Path suggestive of abscess without osteo       Cont dapto 6 mg/kg IV Q48H  Cont invanz 500 mg IV Q24H    Long discussion with patient, ;    Patient has  "neuropathic foot wounds and without offloading is likely to have worsening foot infection bone involvement and will require amputation.    Despite explaining this multiple times patient still apprehensive about going to LTAC.    The entire goal of LTAC of \"placement\" is for off loading of foot and at same time to continue abx which are really empiric because of negative operative cultures.    If patient refused placement and goes home I don't feel compelled to arrange outpatient iv abx therapy      Will change currently therapy to zyvox, continue invanz    Needs LTAC referral; even if this is for 1-2 weeks this is better than going directly home for sake of offloading  D/w ; dp/cm time 50 minutes    If iv access is required options incude groshong cath placement in OR (if here at [facility]) or through radiology as an outpatient procedure      I will reassess Monday; call partners if problems arise  Hung Haynes MD  12/7/2018  1:00 PM                  "

## 2018-12-07 NOTE — PROGRESS NOTES
[facility] Medicine Services  PROGRESS NOTE    Patient Name: Tashia Leon  : 1955  MRN: [id number]    Date of Admission: 2018  Length of Stay: 11  Primary Care Physician: Angelica Tran PA    Subjective   Subjective     CC:  Diabetic foot ulcer     HPI:  Has been trying not to put weight on foot. Agreeable to rehab/LTAC    Review of Systems  Gen- No fevers, chills  CV- No chest pain, palpitations  Resp- No cough, dyspnea  GI- No N/V/D, abd pain    Otherwise ROS is negative except as mentioned in the HPI.    Objective   Objective     Vital Signs:   Temp:  [98.1 °F (36.7 °C)-98.9 °F (37.2 °C)] 98.8 °F (37.1 °C)  Heart Rate:  [70-78] 70  Resp:  [16] 16  BP: (113-158)/(51-67) 116/56        Physical Exam:  Constitutional -no acute distress, non toxic, in bed  HEENT-NCAT, mucous membranes moist  CV-RRR, S1 S2 normal, no m/r/g  Resp-CTAB, no wheezes, rhonchi or rales  Abd-soft, non-tender, non-distended, normo active bowel sounds; overweight  Ext-right foot with clean wraps  Neuro-alert and oriented, speech clear, moves all extremities   Psych-normal affect   Skin- No rash on exposed UE or LE bilaterally      Results Reviewed:  I have personally reviewed current lab, radiology, and data and agree.    Results from last 7 days   Lab Units  1839  18   0557   WBC 10*3/mm3  15.15*  12.87*  12.24*   HEMOGLOBIN g/dL  8.4*  8.5*  8.3*   HEMATOCRIT %  27.1*  27.3*  26.9*   PLATELETS 10*3/mm3  444  415  435     Results from last 7 days   Lab Units  1837  18   0639  18   0557   SODIUM mmol/L  137  140  138   POTASSIUM mmol/L  3.9  3.9  3.6   CHLORIDE mmol/L  105  107  102   CO2 mmol/L  25.0  20.0  24.0   BUN mg/dL  51*  58*  66*   CREATININE mg/dL  3.38*  3.56*  3.91*   GLUCOSE mg/dL  135*  202*  173*   CALCIUM mg/dL  7.9*  8.1*  8.0*     Estimated Creatinine Clearance: 21.9 mL/min (A) (by C-G formula based on SCr of 3.38 mg/dL  (H)).  No results found for: BNP    Microbiology Results Abnormal     Procedure Component Value - Date/Time    Tissue / Bone Culture - Tissue, Toe, Right [[id number]] Collected:  12/04/18 1207    Lab Status:  Preliminary result Specimen:  Tissue from Toe, Right Updated:  12/07/18 0819     Tissue Culture No growth at 3 days     Gram Stain No WBCs or organisms seen    Anaerobic Culture - Tissue, Toe, Right [[id number]] Collected:  12/04/18 1207    Lab Status:  Preliminary result Specimen:  Tissue from Toe, Right Updated:  12/05/18 1417     Culture No anaerobes isolated    AFB Culture - Tissue, Toe, Right [[id number]] Collected:  12/04/18 1207    Lab Status:  Preliminary result Specimen:  Tissue from Toe, Right Updated:  12/05/18 1320     AFB Stain No acid fast bacilli seen on concentrated smear    Eosinophil Smear - Urine, Urine, Clean Catch [[id number]]  (Normal) Collected:  12/01/18 1629    Lab Status:  Final result Specimen:  Urine, Clean Catch Updated:  12/01/18 1710     Eosinophil Smear 0 % EOS/100 Cells     Narrative:       No eosinophil seen    Blood Culture - Blood, Arm, Left [[id number]] Collected:  11/26/18 1625    Lab Status:  Final result Specimen:  Blood from Arm, Left Updated:  12/01/18 1645     Blood Culture No growth at 5 days    Blood Culture - Blood, Arm, Right [[id number]] Collected:  11/26/18 1620    Lab Status:  Final result Specimen:  Blood from Arm, Right Updated:  12/01/18 1645     Blood Culture No growth at 5 days          Imaging Results (last 24 hours)     ** No results found for the last 24 hours. **        Results for orders placed during the hospital encounter of 07/15/16   Adult transthoracic echo complete    Narrative · Left ventricular function is normal. Estimated EF = 55%.  · Trace mitral valve regurgitation is present.  · Trace tricuspid valve regurgitation is present          I have reviewed the medications.      aspirin  mg Oral Daily   castor oil-balsam peru  Topical Q12H    diltiaZEM  mg Oral Nightly   docusate sodium 100 mg Oral Daily   ertapenem 500 mg Intravenous Q24H   heparin (porcine) 5,000 Units Subcutaneous Q12H   insulin detemir 40 Units Subcutaneous Q12H   insulin lispro 0-14 Units Subcutaneous 4x Daily With Meals & Nightly   Linezolid 600 mg Intravenous Q12H   mupirocin  Topical Q24H   mupirocin  Topical Q24H   [START ON 12/9/2018] Naloxegol Oxalate 12.5 mg Oral Once per day on Sun Wed   pantoprazole 40 mg Oral QAM   sodium chloride 3 mL Intravenous Q12H         Assessment/Plan   Assessment / Plan     Active Hospital Problems    Diagnosis   • **Diabetic ulcer of right foot associated with type 2 diabetes mellitus (CMS/HCC)   • Sepsis (CMS/HCC)   • Cellulitis   • Hypokalemia   • Anemia, chronic disease   • Obesity (BMI 30-39.9)   • Impaired functional mobility, balance, gait, and endurance     Added automatically from request for surgery [id number]     • Diabetic ulcer of right midfoot associated with type 2 diabetes mellitus, with necrosis of bone (CMS/HCC)     Added automatically from request for surgery [id number]     • Cellulitis of toe of right foot     Added automatically from request for surgery [id number]     • Chronic osteomyelitis of right foot with draining sinus (CMS/HCC)     Added automatically from request for surgery [id number]     • Chronic kidney disease, stage V (CMS/HCC)   • Hyperlipidemia   • Diabetes mellitus type 2, uncontrolled, with complications (CMS/HCC)     Proteinuria noted, January 2014.       • Diabetic peripheral neuropathy (CMS/HCC)   • Essential hypertension   • Coronary artery disease involving native coronary artery of native heart without angina pectoris     1. Coronary artery disease:  a. Abnormal cardiac PET, 10/02/2013, Dr. Becker, revealing a posterolateral defect with a mild wall motion abnormality and a normal LVEF.  b. Cardiac catheterization, 10/21/2013, with placement of a 2.5 x 15 mm Xience Expedition drug-eluting stent to the  proximal circumflex.  LVEF of 55% to 60%.  Noncritical disease in the LAD and right coronary.  c. Echocardiogram, 12/18/2013, shows an LVEF 50% to 55% with trace MR and mild TR.               Brief Hospital Course to date:  1.  Sepsis, now resolved     2.  Diabetic foot ulcer with cellulitis right foot       -s/p debridement and right 1st toe/ metarsal amputation with Dr. Patiño 12/4              -wound care received silver wound gel topical treatment, dressings              -continue invanz and zyvox              -pain control- norco changed to prn q6h; dc'd dilaudid              -Scooter to offload foot has been ordered and is at the bedside.  Patient has seen PT who taught her appropriate use but are concerned about her weakness and stability.               3.   Type 2 diabetes mellitus with diabetic peripheral neuropathy              -a1c 7.6              -levemir 40 units bid, continue               -continue correction insulin, eating less while admitted     4.  CKDV              -nephrology following,  renal function limits abx choices               -renal dysfunction is very advanced              -Creatinine stable, continues off home rx lasix; euvolemic                 5.  Hypokalemia              -replaced by nephrology as needed        6. Obesity BMI 38: Weight loss recommended     7.  HTN: Currently normotensive     8.  CAD: Asymptomatic, aspirin     9.  Diabetic peripheral neuropathy: Symptomatic treatment and supportive care     10.  Anemia of chronic disease                 11.  Severe anxiety:improved with clonazepam. Not needed past couple of days      12.  Debility: Difficulty walking with needed scooter.  Therapy recommended rehabilitation facility for appropriate care and treatment.       DVT Prophylaxis:  Heparin      CODE STATUS:   Code Status and Medical Interventions:   Ordered at: 11/26/18 9905     Level Of Support Discussed With:    Patient     Code Status:    CPR     Medical Interventions  (Level of Support Prior to Arrest):    Full       Disposition: I expect the patient to be discharged home with HH, adamantly refuses HH      Electronically signed by Geoffrey Stanley MD, 12/07/18, 2:58 PM.

## 2018-12-08 LAB
ALBUMIN SERPL-MCNC: 2.92 G/DL (ref 3.2–4.8)
ANION GAP SERPL CALCULATED.3IONS-SCNC: 7 MMOL/L (ref 3–11)
BACTERIA SPEC AEROBE CULT: NORMAL
BUN BLD-MCNC: 41 MG/DL (ref 9–23)
BUN/CREAT SERPL: 12.3 (ref 7–25)
CALCIUM SPEC-SCNC: 8.2 MG/DL (ref 8.7–10.4)
CHLORIDE SERPL-SCNC: 110 MMOL/L (ref 99–109)
CO2 SERPL-SCNC: 24 MMOL/L (ref 20–31)
CREAT BLD-MCNC: 3.34 MG/DL (ref 0.6–1.3)
GFR SERPL CREATININE-BSD FRML MDRD: 14 ML/MIN/1.73
GLUCOSE BLD-MCNC: 133 MG/DL (ref 70–100)
GLUCOSE BLDC GLUCOMTR-MCNC: 122 MG/DL (ref 70–130)
GLUCOSE BLDC GLUCOMTR-MCNC: 142 MG/DL (ref 70–130)
GLUCOSE BLDC GLUCOMTR-MCNC: 202 MG/DL (ref 70–130)
GRAM STN SPEC: NORMAL
IRON 24H UR-MRATE: 43 MCG/DL (ref 50–175)
IRON SATN MFR SERPL: 20 % (ref 15–50)
PHOSPHATE SERPL-MCNC: 3.5 MG/DL (ref 2.4–5.1)
POTASSIUM BLD-SCNC: 4.4 MMOL/L (ref 3.5–5.5)
SODIUM BLD-SCNC: 141 MMOL/L (ref 132–146)
TIBC SERPL-MCNC: 215 MCG/DL (ref 250–450)

## 2018-12-08 PROCEDURE — 82962 GLUCOSE BLOOD TEST: CPT

## 2018-12-08 PROCEDURE — 80069 RENAL FUNCTION PANEL: CPT | Performed by: INTERNAL MEDICINE

## 2018-12-08 PROCEDURE — 97116 GAIT TRAINING THERAPY: CPT

## 2018-12-08 PROCEDURE — 83540 ASSAY OF IRON: CPT | Performed by: INTERNAL MEDICINE

## 2018-12-08 PROCEDURE — 25010000002 ONDANSETRON PER 1 MG: Performed by: NURSE PRACTITIONER

## 2018-12-08 PROCEDURE — 25010000002 HEPARIN (PORCINE) PER 1000 UNITS: Performed by: NURSE PRACTITIONER

## 2018-12-08 PROCEDURE — 83550 IRON BINDING TEST: CPT | Performed by: INTERNAL MEDICINE

## 2018-12-08 PROCEDURE — 25010000002 LINEZOLID 600 MG/300ML SOLUTION: Performed by: INTERNAL MEDICINE

## 2018-12-08 PROCEDURE — 63710000001 INSULIN DETEMIR PER 5 UNITS: Performed by: INTERNAL MEDICINE

## 2018-12-08 PROCEDURE — 25010000002 ERTAPENEM PER 500 MG: Performed by: PHYSICIAN ASSISTANT

## 2018-12-08 PROCEDURE — 99232 SBSQ HOSP IP/OBS MODERATE 35: CPT | Performed by: INTERNAL MEDICINE

## 2018-12-08 PROCEDURE — 63510000001 EPOETIN ALFA PER 1000 UNITS: Performed by: INTERNAL MEDICINE

## 2018-12-08 RX ADMIN — MUPIROCIN: 2 CREAM TOPICAL at 11:24

## 2018-12-08 RX ADMIN — HEPARIN SODIUM 5000 UNITS: 5000 INJECTION, SOLUTION INTRAVENOUS; SUBCUTANEOUS at 20:52

## 2018-12-08 RX ADMIN — HEPARIN SODIUM 5000 UNITS: 5000 INJECTION, SOLUTION INTRAVENOUS; SUBCUTANEOUS at 08:40

## 2018-12-08 RX ADMIN — CASTOR OIL AND BALSAM, PERU: 788; 87 OINTMENT TOPICAL at 08:41

## 2018-12-08 RX ADMIN — LINEZOLID 600 MG: 600 INJECTION, SOLUTION INTRAVENOUS at 03:32

## 2018-12-08 RX ADMIN — INSULIN LISPRO 5 UNITS: 100 INJECTION, SOLUTION INTRAVENOUS; SUBCUTANEOUS at 16:48

## 2018-12-08 RX ADMIN — ERTAPENEM SODIUM 500 MG: 1 INJECTION, POWDER, LYOPHILIZED, FOR SOLUTION INTRAMUSCULAR; INTRAVENOUS at 16:48

## 2018-12-08 RX ADMIN — EPOETIN ALFA 20000 UNITS: 20000 SOLUTION INTRAVENOUS; SUBCUTANEOUS at 14:58

## 2018-12-08 RX ADMIN — ONDANSETRON HYDROCHLORIDE 4 MG: 2 INJECTION, SOLUTION INTRAMUSCULAR; INTRAVENOUS at 03:34

## 2018-12-08 RX ADMIN — PANTOPRAZOLE SODIUM 40 MG: 40 TABLET, DELAYED RELEASE ORAL at 05:20

## 2018-12-08 RX ADMIN — LINEZOLID 600 MG: 600 INJECTION, SOLUTION INTRAVENOUS at 14:58

## 2018-12-08 RX ADMIN — BISACODYL 10 MG: 5 TABLET, COATED ORAL at 08:41

## 2018-12-08 RX ADMIN — DILTIAZEM HYDROCHLORIDE 120 MG: 120 CAPSULE, EXTENDED RELEASE ORAL at 20:52

## 2018-12-08 RX ADMIN — INSULIN DETEMIR 40 UNITS: 100 INJECTION, SOLUTION SUBCUTANEOUS at 20:52

## 2018-12-08 RX ADMIN — INSULIN DETEMIR 40 UNITS: 100 INJECTION, SOLUTION SUBCUTANEOUS at 08:39

## 2018-12-08 RX ADMIN — HYDROCODONE BITARTRATE AND ACETAMINOPHEN 1 TABLET: 7.5; 325 TABLET ORAL at 08:40

## 2018-12-08 RX ADMIN — DOCUSATE SODIUM 100 MG: 100 CAPSULE, LIQUID FILLED ORAL at 08:41

## 2018-12-08 RX ADMIN — ASPIRIN 325 MG: 325 TABLET, DELAYED RELEASE ORAL at 08:41

## 2018-12-08 NOTE — PLAN OF CARE
Problem: Patient Care Overview  Goal: Plan of Care Review  Outcome: Ongoing (interventions implemented as appropriate)   12/08/18 1028   Coping/Psychosocial   Plan of Care Reviewed With patient   Plan of Care Review   Progress improving   OTHER   Outcome Summary Pt ambulated 150' with knee scooter with CGA, able to maintain NWB RLE.

## 2018-12-08 NOTE — PLAN OF CARE
Problem: Patient Care Overview  Goal: Plan of Care Review  Outcome: Ongoing (interventions implemented as appropriate)   12/07/18 6204   Coping/Psychosocial   Plan of Care Reviewed With patient   Plan of Care Review   Progress improving   OTHER   Outcome Summary vss; discussing placement options/treatment options; up with scooter, non weight bearing; alert and oriented. voiding well.     Goal: Individualization and Mutuality  Outcome: Ongoing (interventions implemented as appropriate)    Goal: Discharge Needs Assessment  Outcome: Ongoing (interventions implemented as appropriate)    Goal: Interprofessional Rounds/Family Conf  Outcome: Ongoing (interventions implemented as appropriate)      Problem: Infection, Risk/Actual (Adult)  Goal: Identify Related Risk Factors and Signs and Symptoms  Outcome: Ongoing (interventions implemented as appropriate)    Goal: Infection Prevention/Resolution  Outcome: Ongoing (interventions implemented as appropriate)      Problem: Fall Risk (Adult)  Goal: Identify Related Risk Factors and Signs and Symptoms  Outcome: Ongoing (interventions implemented as appropriate)    Goal: Absence of Fall  Outcome: Ongoing (interventions implemented as appropriate)      Problem: Pain, Acute (Adult)  Goal: Identify Related Risk Factors and Signs and Symptoms  Outcome: Ongoing (interventions implemented as appropriate)    Goal: Acceptable Pain Control/Comfort Level  Outcome: Ongoing (interventions implemented as appropriate)      Problem: Diabetes, Type 2 (Adult)  Goal: Signs and Symptoms of Listed Potential Problems Will be Absent, Minimized or Managed (Diabetes, Type 2)  Outcome: Ongoing (interventions implemented as appropriate)      Problem: Skin and Soft Tissue Infection (Adult)  Goal: Signs and Symptoms of Listed Potential Problems Will be Absent, Minimized or Managed (Skin and Soft Tissue Infection)  Outcome: Ongoing (interventions implemented as appropriate)      Problem: Skin Injury Risk  (Adult)  Goal: Identify Related Risk Factors and Signs and Symptoms  Outcome: Ongoing (interventions implemented as appropriate)    Goal: Skin Health and Integrity  Outcome: Ongoing (interventions implemented as appropriate)

## 2018-12-08 NOTE — THERAPY TREATMENT NOTE
Acute Care - Physical Therapy Treatment Note  [facility]     Patient Name: Tashia Leon  : 1955  MRN: [id number]  Today's Date: 2018  Onset of Illness/Injury or Date of Surgery: 18  Date of Referral to PT: 18  Referring Physician: Asher    Admit Date: 2018    Visit Dx:    ICD-10-CM ICD-9-CM   1. Diabetic ulcer of other part of right foot associated with type 2 diabetes mellitus, unspecified ulcer stage (CMS/[facility]) E11.621 250.80    L97.519 707.15   2. Leukocytosis, unspecified type D72.829 288.60   3. Cellulitis of right foot L03.115 682.7   4. Chronic kidney disease, unspecified CKD stage N18.9 585.9   5. Impaired functional mobility, balance, gait, and endurance Z74.09 V49.89   6. Diabetic peripheral neuropathy (CMS/[facility]) E11.42 250.60     357.2   7. Diabetes mellitus type 2, uncontrolled, with complications (CMS/[facility]) E11.8 250.92    E11.65    8. Chronic kidney disease, stage V (CMS/[facility]) N18.5 585.5   9. Diabetic ulcer of right midfoot associated with type 2 diabetes mellitus, with necrosis of bone (CMS/[facility]) E11.621 250.80    L97.414 707.14   10. Cellulitis of toe of right foot L03.031 681.10   11. Obesity (BMI 30-39.9) E66.9 278.00   12. Chronic osteomyelitis of right foot with draining sinus (CMS/[facility]) M86.471 730.17   13. Impaired mobility and ADLs Z74.09 799.89     Patient Active Problem List   Diagnosis   • Coronary artery disease involving native coronary artery of native heart without angina pectoris   • Dyspnea   • Essential hypertension   • Hyperlipemia   • Lower extremity edema   • Carotid artery stenosis   • Carotid bruit   • Diabetes mellitus with neurological manifestations, uncontrolled (CMS/[facility])   • Diabetic peripheral neuropathy (CMS/[facility])   • Moderate nonproliferative diabetic retinopathy without macular edema associated with type 2 diabetes mellitus (CMS/[facility])   • Functional murmur   • GERD (gastroesophageal reflux disease)   • Peptic ulcer   • Vitamin D  deficiency   • Health care maintenance   • Diabetes mellitus type 2, uncontrolled, with complications (CMS/[facility])   • Hyperlipidemia   • Obesity   • Osteomyelitis (CMS/HCC)   • MRSA (methicillin resistant Staphylococcus aureus)   • Lumbar disc disease   • Hearing loss   • Hyperkalemia   • Secondary hyperparathyroidism (CMS/HCC)   • Idiopathic hypochromic anemia   • Chronic kidney disease, stage V (CMS/HCC)   • Diabetic ulcer of right foot associated with type 2 diabetes mellitus (CMS/HCC)   • Sepsis (CMS/[facility])   • Cellulitis   • Hypokalemia   • Anemia, chronic disease   • Obesity (BMI 30-39.9)   • Impaired functional mobility, balance, gait, and endurance   • Diabetic ulcer of right midfoot associated with type 2 diabetes mellitus, with necrosis of bone (CMS/HCC)   • Cellulitis of toe of right foot   • Chronic osteomyelitis of right foot with draining sinus (CMS/[facility])       Therapy Treatment    Rehabilitation Treatment Summary     Row Name 12/08/18 0919             Treatment Time/Intention    Discipline  physical therapist  -      Document Type  therapy note (daily note)  -      Subjective Information  no complaints  -      Mode of Treatment  individual therapy;physical therapy  -      Patient/Family Observations  Pt supine in bed.  -      Care Plan Review  care plan/treatment goals reviewed;risks/benefits reviewed  -      Patient Effort  good  -      Comment  Pt reported new rental scooter is not going to work.  -      Existing Precautions/Restrictions  fall;non-weight bearing R LE  -SH      Recorded by [] Christina Simon, PT 12/08/18 1027      Row Name 12/08/18 0919             Cognitive Assessment/Intervention- PT/OT    Orientation Status (Cognition)  oriented x 4  -SH      Follows Commands (Cognition)  WNL  -      Safety Deficit (Cognitive)  impulsivity  -SH      Recorded by [] Christina Simon, PT 12/08/18 1027      Row Name 12/08/18 0919             Safety Issues, Functional Mobility     Safety Issues Affecting Function (Mobility)  impulsivity  -SH      Impairments Affecting Function (Mobility)  endurance/activity tolerance;strength  -SH      Recorded by [SH] Christina Simon, PT 12/08/18 1027      Row Name 12/08/18 0919             Mobility Assessment/Intervention    Extremity Weight-bearing Status  right lower extremity  -SH      Right Lower Extremity (Weight-bearing Status)  non weight-bearing (NWB)  -SH      Recorded by [SH] Christina Simon, PT 12/08/18 1027      Row Name 12/08/18 0919             Bed Mobility Assessment/Treatment    Bed Mobility Assessment/Treatment  supine-sit;sit-supine  -SH      Rolling Left [place] (Bed Mobility)  independent  -SH      Scooting/Bridging [place] (Bed Mobility)  independent  -SH      Supine-Sit [place] (Bed Mobility)  independent  -SH      Sit-Supine [place] (Bed Mobility)  independent  -SH      Bed Mobility, Safety Issues  decreased use of legs for bridging/pushing  -SH      Assistive Device (Bed Mobility)  head of bed elevated;bed rails  -SH      Recorded by [SH] Christina Simon, PT 12/08/18 1027      Row Name 12/08/18 0919             Transfer Assessment/Treatment    Transfer Assessment/Treatment  sit-stand transfer;stand-sit transfer  -SH      Maintains Weight-bearing Status (Transfers)  able to maintain  -SH      Comment (Transfers)  Pt able to verbalize appropriate sequencing.  -SH      Recorded by [SH] Christina Simon, PT 12/08/18 1027      Row Name 12/08/18 0919             Sit-Stand Transfer    Sit-Stand [place] (Transfers)  verbal cues;contact guard  -      Assistive Device (Sit-Stand Transfers)  walker, knee scooter  -SH      Recorded by [SH] Christina Simon, PT 12/08/18 1027      Row Name 12/08/18 0919             Stand-Sit Transfer    Stand-Sit [place] (Transfers)  contact guard;verbal cues  -      Assistive Device (Stand-Sit Transfers)  walker, knee scooter  -SH      Recorded by [SH] Christina Simon,  PT 12/08/18 1027      Row Name 12/08/18 0919             Stand Pivot/Stand Step Transfer    Stand Pivot/Stand Step [place]  verbal cues;contact guard  -SH      Assistive Device (Stand Pivot Stand Step Transfer)  walker, knee scooter  -SH      Recorded by [] Christina Simon, PT 12/08/18 1027      Row Name 12/08/18 0919             Gait/Stairs Assessment/Training    [place] Level (Gait)  verbal cues;contact guard  -SH      Assistive Device (Gait)  walker, knee scooter  -SH      Distance in Feet (Gait)  150  -SH      Pattern (Gait)  step-through  -SH      Deviations/Abnormal Patterns (Gait)  uriel decreased  -SH      Comment (Gait/Stairs)  Pt ambulated on knee scooter down hallway without difficulty. Pt reported fatigued.  -SH      Recorded by [] Christina Simon, PT 12/08/18 1027      Row Name 12/08/18 0919             Positioning and Restraints    Pre-Treatment Position  in bed  -SH      Post Treatment Position  bed  -SH      In Bed  supine;call light within reach;encouraged to call for assist;exit alarm on  -SH      Recorded by [] Christina Simon, PT 12/08/18 1027      Row Name 12/08/18 0919             Pain Scale: Numbers Pre/Post-Treatment    Pain Scale: Numbers, Pretreatment  0/10 - no pain  -      Pain Scale: Numbers, Post-Treatment  0/10 - no pain  -SH      Recorded by [] Christina Simon, PT 12/08/18 1027      Row Name                Wound 11/26/18 2130 Right lateral plantar diabetic/neuropathic ulceration    Wound - Properties Group Date first assessed: 11/26/18 [KF] Time first assessed: 2130 [KF] Side: Right [KF] Orientation: lateral [KF2] Location: plantar [KF] Type: diabetic/neuropathic ulceration [KF] Recorded by:  [KF] Courtney Buenrostro RN 11/27/18 0323 [KF2] Courtney Buenrostro RN 11/27/18 0325    Row Name                Wound 12/03/18 0940 Right medial gluteal    Wound - Properties Group Date first assessed: 12/03/18 [MR] Time first assessed: 0940 [MR] Side: Right [MR]  Orientation: medial [MR] Location: gluteal [MR] Stage, Pressure Injury: Stage 1 [MR], healing/chronic area  Recorded by:  [MR] Michell Marion RN 12/03/18 1016    Row Name                Wound 12/04/18 1202 Right foot incision    Wound - Properties Group Date first assessed: 12/04/18 [LD] Time first assessed: 1202 [LD] Side: Right [LD] Location: foot [LD] Type: incision [LD] Recorded by:  [LD] Prema Mike RN 12/04/18 1202    Row Name 12/08/18 0919             Coping    Observed Emotional State  accepting;calm;cooperative  -SH      Verbalized Emotional State  acceptance  -SH      Recorded by [SH] Christina Simon, PT 12/08/18 1027      Row Name 12/08/18 0919             Plan of Care Review    Plan of Care Reviewed With  patient  -SH      Recorded by [] Christina Simon, PT 12/08/18 1027      Row Name 12/08/18 0919             Outcome Summary/Treatment Plan (PT)    Daily Summary of Progress (PT)  progress toward functional goals is good  -SH      Anticipated Discharge Disposition (PT)  skilled nursing facility  -SH      Recorded by [] Christina Simon, PT 12/08/18 1027        User Key  (r) = Recorded By, (t) = Taken By, (c) = Cosigned By    Initials Name Effective Dates Discipline     Christina Simon, PT 06/19/15 -  PT    Prema Aguirre RN 06/16/16 -  Nurse    Michell Soto RN 06/16/16 -  Nurse    Courtney Dale RN 06/30/16 -  Nurse          Wound 11/26/18 2130 Right lateral plantar diabetic/neuropathic ulceration (Active)   Dressing Appearance dry;intact;no drainage 12/8/2018  8:41 AM   Base dressing in place, unable to visualize 12/8/2018  8:41 AM   Dressing Care, Wound gauze;elastic bandage 12/8/2018  8:41 AM       Wound 12/03/18 0940 Right medial gluteal (Active)   Dressing Appearance open to air 12/8/2018  8:41 AM   Base clean;dry;scab 12/7/2018  8:00 PM   Periwound pink;blanchable 12/7/2018  8:00 PM   Periwound Care, Wound barrier ointment applied 12/8/2018  8:41 AM        Wound 12/04/18 1202 Right foot incision (Active)   Dressing Appearance no drainage 12/8/2018  8:41 AM   Closure VIOLA 12/7/2018  8:00 PM   Base dressing in place, unable to visualize 12/7/2018  8:00 PM   Dressing Care, Wound gauze;elastic bandage 12/8/2018  8:41 AM           Physical Therapy Education     Title: PT OT SLP Therapies (In Progress)     Topic: Physical Therapy (In Progress)     Point: Mobility training (In Progress)     Learning Progress Summary           Patient Acceptance, E,D, VU,NR by  at 12/8/2018 10:28 AM    Eager, E,D, VU,DU,NR by SC at 12/7/2018  1:33 PM    Comment:  reviewed safety wth mobility    Eager, E, VU,DU,NR by SC at 12/4/2018  3:30 PM    Comment:  reviewed nwb r leg    Acceptance, E, NR by ES at 12/2/2018  4:11 PM    Comment:  Reviewed NWB status with pt and educated on importance of following NWB on R LE.  Educated pt on safe transfers on/off knee scooter to be able to maintain NWB.    Acceptance, E,D, NR by ES at 12/1/2018  3:15 PM    Comment:  Educated pt and family on NWB status and importance of maintaining NWB status.  Reviewed sequencing with knee scooter and correct sequencing for transfers    Acceptance, E, NR by JACEY at 11/29/2018 10:15 AM   Family Acceptance, E,D, NR by ES at 12/1/2018  3:15 PM    Comment:  Educated pt and family on NWB status and importance of maintaining NWB status.  Reviewed sequencing with knee scooter and correct sequencing for transfers                   Point: Home exercise program (In Progress)     Learning Progress Summary           Patient Acceptance, E,D, VU,NR by  at 12/8/2018 10:28 AM    Eager, E,D, VU,DU,NR by SC at 12/7/2018  1:33 PM    Comment:  reviewed safety wth mobility    Eager, E, VU,DU,NR by SC at 12/4/2018  3:30 PM    Comment:  reviewed nwb r leg    Acceptance, E,D, NR by ES at 12/1/2018  3:15 PM    Comment:  Educated pt and family on NWB status and importance of maintaining NWB status.  Reviewed sequencing with knee scooter and  correct sequencing for transfers    Acceptance, E, NR by JACEY at 11/29/2018 10:15 AM   Family Acceptance, E,D, NR by ES at 12/1/2018  3:15 PM    Comment:  Educated pt and family on NWB status and importance of maintaining NWB status.  Reviewed sequencing with knee scooter and correct sequencing for transfers                   Point: Body mechanics (In Progress)     Learning Progress Summary           Patient Acceptance, E,D, VU,NR by  at 12/8/2018 10:28 AM    Eager, E,D, VU,DU,NR by SC at 12/7/2018  1:33 PM    Comment:  reviewed safety wth mobility    Eager, E, VU,DU,NR by SC at 12/4/2018  3:30 PM    Comment:  reviewed nwb r leg    Acceptance, E, NR by ES at 12/2/2018  4:11 PM    Comment:  Reviewed NWB status with pt and educated on importance of following NWB on R LE.  Educated pt on safe transfers on/off knee scooter to be able to maintain NWB.    Acceptance, E,D, NR by ES at 12/1/2018  3:15 PM    Comment:  Educated pt and family on NWB status and importance of maintaining NWB status.  Reviewed sequencing with knee scooter and correct sequencing for transfers    Acceptance, E, VU by  at 11/30/2018 11:46 PM    Acceptance, E, NR by JACEY at 11/29/2018 10:15 AM   Family Acceptance, E,D, NR by ES at 12/1/2018  3:15 PM    Comment:  Educated pt and family on NWB status and importance of maintaining NWB status.  Reviewed sequencing with knee scooter and correct sequencing for transfers                   Point: Precautions (In Progress)     Learning Progress Summary           Patient Acceptance, E,D, VU,NR by  at 12/8/2018 10:28 AM    Eager, E,D, VU,DU,NR by SC at 12/7/2018  1:33 PM    Comment:  reviewed safety wth mobility    Eager, E, VU,DU,NR by SC at 12/4/2018  3:30 PM    Comment:  reviewed nwb r leg    Acceptance, E, NR by ES at 12/2/2018  4:11 PM    Comment:  Reviewed NWB status with pt and educated on importance of following NWB on R LE.  Educated pt on safe transfers on/off knee scooter to be able to maintain  NWB.    Acceptance, E, VU by KG at 12/1/2018 11:14 PM    Acceptance, E,D, NR by ES at 12/1/2018  3:15 PM    Comment:  Educated pt and family on NWB status and importance of maintaining NWB status.  Reviewed sequencing with knee scooter and correct sequencing for transfers    Acceptance, E, NR by JACEY at 11/29/2018 10:15 AM   Family Acceptance, E,D, NR by ES at 12/1/2018  3:15 PM    Comment:  Educated pt and family on NWB status and importance of maintaining NWB status.  Reviewed sequencing with knee scooter and correct sequencing for transfers                               User Key     Initials Effective Dates Name Provider Type Discipline    SC 06/19/15 -  Srinivasan Mcmahon, PT Physical Therapist PT    JACEY 06/19/15 -  Yany Cheek, PT Physical Therapist PT     06/19/15 -  Christina Simon, PT Physical Therapist PT    KG 06/16/16 -  Roseann aLzo, RN Registered Nurse Nurse     11/13/17 -  Galina Wei, PT Physical Therapist PT                PT Recommendation and Plan  Anticipated Discharge Disposition (PT): skilled nursing facility  Outcome Summary/Treatment Plan (PT)  Daily Summary of Progress (PT): progress toward functional goals is good  Anticipated Discharge Disposition (PT): skilled nursing facility  Plan of Care Reviewed With: patient  Progress: improving  Outcome Summary: Pt ambulated 150' with knee scooter with CGA, able to maintain NWB RLE.  Outcome Measures     Row Name 12/08/18 0919 12/07/18 1333          How much help from another person do you currently need...    Turning from your back to your side while in flat bed without using bedrails?  4  -SH  4  -SC     Moving from lying on back to sitting on the side of a flat bed without bedrails?  4  -SH  4  -SC     Moving to and from a bed to a chair (including a wheelchair)?  3  -SH  3  -SC     Standing up from a chair using your arms (e.g., wheelchair, bedside chair)?  3  -SH  3  -SC     Climbing 3-5 steps with a railing?  1  -SH  1  -SC      To walk in hospital room?  3  -  3  -SC     AM-PAC 6 Clicks Score  18  -SH  18  -SC        Functional Assessment    Outcome Measure Options  AM-PAC 6 Clicks Basic Mobility (PT)  -  AM-PAC 6 Clicks Basic Mobility (PT)  -SC       User Key  (r) = Recorded By, (t) = Taken By, (c) = Cosigned By    Initials Name Provider Type    SC Srinivasan Mcmahon, PT Physical Therapist     Christina iSmon, PT Physical Therapist         Time Calculation:   PT Charges     Row Name 12/08/18 0919             Time Calculation    Start Time  0919  -      PT Received On  12/08/18  -      PT Goal Re-Cert Due Date  12/09/18  -         Time Calculation- PT    Total Timed Code Minutes- PT  18 minute(s)  -         Timed Charges    11843 - Gait Training Minutes   18  -SH      99501 - PT Therapeutic Activity Minutes  --  -        User Key  (r) = Recorded By, (t) = Taken By, (c) = Cosigned By    Initials Name Provider Type     Christina Simon, PT Physical Therapist        Therapy Suggested Charges     Code   Minutes Charges    90269 (CPT®) Hc Pt Neuromusc Re Education Ea 15 Min      23689 (CPT®) Hc Pt Ther Proc Ea 15 Min      93203 (CPT®) Hc Gait Training Ea 15 Min 18 1    58183 (CPT®) Hc Pt Therapeutic Act Ea 15 Min      15369 (CPT®) Hc Pt Manual Therapy Ea 15 Min      58559 (CPT®) Hc Pt Iontophoresis Ea 15 Min      50394 (CPT®) Hc Pt Elec Stim Ea-Per 15 Min      35539 (CPT®) Hc Pt Ultrasound Ea 15 Min      12585 (CPT®) Hc Pt Self Care/Mgmt/Train Ea 15 Min      54523 (CPT®) Hc Pt Prosthetic (S) Train Initial Encounter, Each 15 Min      45431 (CPT®) Hc Pt Orthotic(S)/Prosthetic(S) Encounter, Each 15 Min      67818 (CPT®) Hc Orthotic(S) Mgmt/Train Initial Encounter, Each 15min      Total  18 1        Therapy Charges for Today     Code Description Service Date Service Provider Modifiers Qty    96363500620 HC GAIT TRAINING EA 15 MIN 12/8/2018 Christina Simon, PT GP 1          PT G-Codes  Outcome Measure Options: AM-PAC 6 Clicks  Basic Mobility (PT)  AM-PAC 6 Clicks Score: 18  Score: 16    Christina Simon, PT  12/8/2018

## 2018-12-08 NOTE — PROGRESS NOTES
"   LOS: 12 days    Patient Care Team:  Angelica Tran PA as PCP - General (Internal Medicine)  Denver Baldwin MD as Consulting Physician (Nephrology)    Subjective     No new events    Objective     Vital Signs:  Blood pressure 126/60, pulse 73, temperature 98.2 °F (36.8 °C), temperature source Oral, resp. rate 16, height 167.6 cm (66\"), weight 109 kg (240 lb), SpO2 96 %, not currently breastfeeding.    Flowsheet Rows      First Filed Value   Admission Height  167.6 cm (66\") Documented at 11/26/2018 1315   Admission Weight  109 kg (240 lb) Documented at 11/26/2018 1315          12/07 0701 - 12/08 0700  In: 960 [P.O.:660]  Out: 400 [Urine:400]    Physical Exam:    General Appearance: WD obese WF NAD  Neuro:   awake alert   Psych:  Normal mood and affect  CV:   No edema  Lungs: respirations regular and unlabored  Abdomen: not distended  :  No loya, no palp bladder  Skin: No rash, Warm and dry      Labs:  Results from last 7 days   Lab Units  12/05/18   0637  12/04/18   0639  12/03/18   0557   WBC 10*3/mm3  15.15*  12.87*  12.24*   HEMOGLOBIN g/dL  8.4*  8.5*  8.3*   PLATELETS 10*3/mm3  444  415  435     Results from last 7 days   Lab Units  12/08/18   0005  12/05/18   0637  12/04/18   0639  12/03/18   0557   SODIUM mmol/L  141  137  140  138   POTASSIUM mmol/L  4.4  3.9  3.9  3.6   CHLORIDE mmol/L  110*  105  107  102   CO2 mmol/L  24.0  25.0  20.0  24.0   BUN mg/dL  41*  51*  58*  66*   CREATININE mg/dL  3.34*  3.38*  3.56*  3.91*   CALCIUM mg/dL  8.2*  7.9*  8.1*  8.0*   PHOSPHORUS mg/dL  3.5   --    --    --    ALBUMIN g/dL  2.92*   --    --    --              Results from last 7 days   Lab Units  12/01/18   1629   COLOR UA   Yellow   CLARITY UA   Cloudy*   PH, URINE   <=5.0   SPECIFIC GRAVITY, URINE   1.018   GLUCOSE UA   250 mg/dL (1+)*   KETONES UA   Negative   BILIRUBIN UA   Negative   PROTEIN UA   >=300 mg/dL (3+)*   BLOOD UA   Moderate (2+)*   LEUKOCYTES UA   Negative   NITRITE UA   Negative "       Estimated Creatinine Clearance: 22.1 mL/min (A) (by C-G formula based on SCr of 3.34 mg/dL (H)).         A/P:    CKD4: stable in baseline 3.5-4.0.  Thought due to DM in the past.    Proteinuria:  Presumed DN.  Ratio 3.1    HTN: stable.     Anemia:  Hgb low.  Tsat 20% S/P IVFe.  will give epogen. .  Transfuse PRN for Hgb < 7    Diabetic Foot Ulcer    Manny Guerrero MD  12/08/18  10:44 AM

## 2018-12-08 NOTE — PLAN OF CARE
Problem: Patient Care Overview  Goal: Plan of Care Review  Outcome: Ongoing (interventions implemented as appropriate)   12/08/18 1603   Coping/Psychosocial   Plan of Care Reviewed With patient   Plan of Care Review   Progress improving   OTHER   Outcome Summary pt seems in better disposition today; dressing change done on right; 22 g. placed by house supervisor in right thumb today. iv abx given. vss     Goal: Individualization and Mutuality  Outcome: Ongoing (interventions implemented as appropriate)    Goal: Discharge Needs Assessment  Outcome: Ongoing (interventions implemented as appropriate)    Goal: Interprofessional Rounds/Family Conf  Outcome: Ongoing (interventions implemented as appropriate)      Problem: Infection, Risk/Actual (Adult)  Goal: Identify Related Risk Factors and Signs and Symptoms  Outcome: Ongoing (interventions implemented as appropriate)    Goal: Infection Prevention/Resolution  Outcome: Ongoing (interventions implemented as appropriate)      Problem: Fall Risk (Adult)  Goal: Identify Related Risk Factors and Signs and Symptoms  Outcome: Ongoing (interventions implemented as appropriate)    Goal: Absence of Fall  Outcome: Ongoing (interventions implemented as appropriate)      Problem: Pain, Acute (Adult)  Goal: Identify Related Risk Factors and Signs and Symptoms  Outcome: Ongoing (interventions implemented as appropriate)    Goal: Acceptable Pain Control/Comfort Level  Outcome: Ongoing (interventions implemented as appropriate)      Problem: Diabetes, Type 2 (Adult)  Goal: Signs and Symptoms of Listed Potential Problems Will be Absent, Minimized or Managed (Diabetes, Type 2)  Outcome: Ongoing (interventions implemented as appropriate)      Problem: Skin and Soft Tissue Infection (Adult)  Goal: Signs and Symptoms of Listed Potential Problems Will be Absent, Minimized or Managed (Skin and Soft Tissue Infection)  Outcome: Ongoing (interventions implemented as appropriate)      Problem:  Skin Injury Risk (Adult)  Goal: Identify Related Risk Factors and Signs and Symptoms  Outcome: Ongoing (interventions implemented as appropriate)    Goal: Skin Health and Integrity  Outcome: Ongoing (interventions implemented as appropriate)

## 2018-12-08 NOTE — PROGRESS NOTES
[facility] Medicine Services  PROGRESS NOTE    Patient Name: Tashia Leon  : 1955  MRN: [id number]    Date of Admission: 2018  Length of Stay: 12  Primary Care Physician: Angelica Tran PA    Subjective   Subjective     CC:  Diabetic foot ulcer     HPI:    Denies current pain. Eating well, no nausea    Review of Systems  Gen- No fevers, chills  CV- No chest pain, palpitations  Resp- No cough, dyspnea  GI- No N/V/D, abd pain    Otherwise ROS is negative except as mentioned in the HPI.    Objective   Objective     Vital Signs:   Temp:  [98.2 °F (36.8 °C)-98.7 °F (37.1 °C)] 98.7 °F (37.1 °C)  Heart Rate:  [73-85] 73  Resp:  [16-18] 16  BP: (122-151)/(57-69) 122/57        Physical Exam:  Constitutional -no acute distress, in bed  HEENT-NCAT, mucous membranes moist  CV-RRR, no MRG  Resp-CTAB  Abd-soft, non-tender, non-distended, normo active bowel sounds; overweight  Ext-right foot with clean wraps  Neuro-alert and oriented, speech clear, moves all extremities   Psych-normal affect   Skin- No rash on exposed UE or LE bilaterally      Results Reviewed:  I have personally reviewed current lab, radiology, and data and agree.    Results from last 7 days   Lab Units  18   0637  18   0639  18   0557   WBC 10*3/mm3  15.15*  12.87*  12.24*   HEMOGLOBIN g/dL  8.4*  8.5*  8.3*   HEMATOCRIT %  27.1*  27.3*  26.9*   PLATELETS 10*3/mm3  444  415  435     Results from last 7 days   Lab Units  18   0005  18   0637  18   0639   SODIUM mmol/L  141  137  140   POTASSIUM mmol/L  4.4  3.9  3.9   CHLORIDE mmol/L  110*  105  107   CO2 mmol/L  24.0  25.0  20.0   BUN mg/dL  41*  51*  58*   CREATININE mg/dL  3.34*  3.38*  3.56*   GLUCOSE mg/dL  133*  135*  202*   CALCIUM mg/dL  8.2*  7.9*  8.1*     Estimated Creatinine Clearance: 22.1 mL/min (A) (by C-G formula based on SCr of 3.34 mg/dL (H)).  No results found for: BNP    Microbiology Results Abnormal      Procedure Component Value - Date/Time    Tissue / Bone Culture - Tissue, Toe, Right [[id number]] Collected:  12/04/18 1207    Lab Status:  Final result Specimen:  Tissue from Toe, Right Updated:  12/08/18 0850     Tissue Culture No growth at 4 days     Gram Stain No WBCs or organisms seen    Anaerobic Culture - Tissue, Toe, Right [[id number]] Collected:  12/04/18 1207    Lab Status:  Preliminary result Specimen:  Tissue from Toe, Right Updated:  12/05/18 1417     Culture No anaerobes isolated    AFB Culture - Tissue, Toe, Right [[id number]] Collected:  12/04/18 1207    Lab Status:  Preliminary result Specimen:  Tissue from Toe, Right Updated:  12/05/18 1320     AFB Stain No acid fast bacilli seen on concentrated smear    Eosinophil Smear - Urine, Urine, Clean Catch [[id number]]  (Normal) Collected:  12/01/18 1629    Lab Status:  Final result Specimen:  Urine, Clean Catch Updated:  12/01/18 1710     Eosinophil Smear 0 % EOS/100 Cells     Narrative:       No eosinophil seen    Blood Culture - Blood, Arm, Left [[id number]] Collected:  11/26/18 1625    Lab Status:  Final result Specimen:  Blood from Arm, Left Updated:  12/01/18 1645     Blood Culture No growth at 5 days    Blood Culture - Blood, Arm, Right [[id number]] Collected:  11/26/18 1620    Lab Status:  Final result Specimen:  Blood from Arm, Right Updated:  12/01/18 1645     Blood Culture No growth at 5 days          Imaging Results (last 24 hours)     ** No results found for the last 24 hours. **        Results for orders placed during the hospital encounter of 07/15/16   Adult transthoracic echo complete    Narrative · Left ventricular function is normal. Estimated EF = 55%.  · Trace mitral valve regurgitation is present.  · Trace tricuspid valve regurgitation is present          I have reviewed the medications.      aspirin  mg Oral Daily   castor oil-balsam peru  Topical Q12H   diltiaZEM  mg Oral Nightly   docusate sodium 100 mg Oral Daily   epoetin  rebeca 20,000 Units Subcutaneous Weekly   ertapenem 500 mg Intravenous Q24H   heparin (porcine) 5,000 Units Subcutaneous Q12H   insulin detemir 40 Units Subcutaneous Q12H   insulin lispro 0-14 Units Subcutaneous 4x Daily With Meals & Nightly   Linezolid 600 mg Intravenous Q12H   mupirocin  Topical Q24H   mupirocin  Topical Q24H   [START ON 12/9/2018] Naloxegol Oxalate 12.5 mg Oral Once per day on Sun Wed   pantoprazole 40 mg Oral QAM   sodium chloride 3 mL Intravenous Q12H         Assessment/Plan   Assessment / Plan     Active Hospital Problems    Diagnosis   • **Diabetic ulcer of right foot associated with type 2 diabetes mellitus (CMS/HCC)   • Sepsis (CMS/HCC)   • Cellulitis   • Hypokalemia   • Anemia, chronic disease   • Obesity (BMI 30-39.9)   • Impaired functional mobility, balance, gait, and endurance     Added automatically from request for surgery [id number]     • Diabetic ulcer of right midfoot associated with type 2 diabetes mellitus, with necrosis of bone (CMS/HCC)     Added automatically from request for surgery [id number]     • Cellulitis of toe of right foot     Added automatically from request for surgery [id number]     • Chronic osteomyelitis of right foot with draining sinus (CMS/HCC)     Added automatically from request for surgery [id number]     • Chronic kidney disease, stage V (CMS/HCC)   • Hyperlipidemia   • Diabetes mellitus type 2, uncontrolled, with complications (CMS/HCC)     Proteinuria noted, January 2014.       • Diabetic peripheral neuropathy (CMS/HCC)   • Essential hypertension   • Coronary artery disease involving native coronary artery of native heart without angina pectoris     1. Coronary artery disease:  a. Abnormal cardiac PET, 10/02/2013, Dr. Becker, revealing a posterolateral defect with a mild wall motion abnormality and a normal LVEF.  b. Cardiac catheterization, 10/21/2013, with placement of a 2.5 x 15 mm Xience Expedition drug-eluting stent to the proximal circumflex.  LVEF of 55%  to 60%.  Noncritical disease in the LAD and right coronary.  c. Echocardiogram, 12/18/2013, shows an LVEF 50% to 55% with trace MR and mild TR.               Brief Hospital Course to date:  1.  Sepsis, now resolved     2.  Diabetic foot ulcer with cellulitis right foot       -s/p debridement and right 1st toe/ metarsal amputation with Dr. Patiño 12/4              -wound care received silver wound gel topical treatment, dressings              -continue invanz and zyvox              -pain control- norco changed to prn q6h; dc'd dilaudid              -Scooter to offload foot has been ordered and is at the bedside.  Patient has seen PT who taught her appropriate use but are concerned about her weakness and stability.               3.   Type 2 diabetes mellitus with diabetic peripheral neuropathy              -a1c 7.6              -levemir 40 units bid, continue               -continue correction insulin, eating less while admitted     4.  CKDV              -nephrology following,  renal function limits abx choices               -Creatinine stable, continues off home rx lasix; euvolemic                 5.  Hypokalemia              -replaced by nephrology as needed        6. Obesity BMI 38: Weight loss recommended     7.  HTN: Currently normotensive     8.  CAD: Asymptomatic, aspirin     9.  Diabetic peripheral neuropathy: Symptomatic treatment and supportive care     10.  Anemia of chronic disease                 11.  Severe anxiety:improved with clonazepam. Not needed past couple of days      12.  Debility: Difficulty walking with needed scooter.  Therapy recommended rehabilitation facility for appropriate care and treatment.       DVT Prophylaxis:  Heparin      CODE STATUS:   Code Status and Medical Interventions:   Ordered at: 11/26/18 4926     Level Of Support Discussed With:    Patient     Code Status:    CPR     Medical Interventions (Level of Support Prior to Arrest):    Full       Disposition: I expect the  patient to be discharged to LTAC/Rehab, possibly Monday.      Electronically signed by Geoffrey Stanley MD, 12/08/18, 3:26 PM.

## 2018-12-09 LAB
GLUCOSE BLDC GLUCOMTR-MCNC: 139 MG/DL (ref 70–130)
GLUCOSE BLDC GLUCOMTR-MCNC: 143 MG/DL (ref 70–130)
GLUCOSE BLDC GLUCOMTR-MCNC: 240 MG/DL (ref 70–130)
GLUCOSE BLDC GLUCOMTR-MCNC: 80 MG/DL (ref 70–130)
GLUCOSE BLDC GLUCOMTR-MCNC: 96 MG/DL (ref 70–130)

## 2018-12-09 PROCEDURE — 99232 SBSQ HOSP IP/OBS MODERATE 35: CPT | Performed by: INTERNAL MEDICINE

## 2018-12-09 PROCEDURE — 25010000002 LINEZOLID 600 MG/300ML SOLUTION: Performed by: INTERNAL MEDICINE

## 2018-12-09 PROCEDURE — 63710000001 INSULIN DETEMIR PER 5 UNITS: Performed by: INTERNAL MEDICINE

## 2018-12-09 PROCEDURE — 25010000002 HEPARIN (PORCINE) PER 1000 UNITS: Performed by: NURSE PRACTITIONER

## 2018-12-09 PROCEDURE — 82962 GLUCOSE BLOOD TEST: CPT

## 2018-12-09 PROCEDURE — 25010000002 ERTAPENEM PER 500 MG: Performed by: PHYSICIAN ASSISTANT

## 2018-12-09 RX ADMIN — HYDROCODONE BITARTRATE AND ACETAMINOPHEN 1 TABLET: 7.5; 325 TABLET ORAL at 18:04

## 2018-12-09 RX ADMIN — MUPIROCIN: 2 CREAM TOPICAL at 09:07

## 2018-12-09 RX ADMIN — HEPARIN SODIUM 5000 UNITS: 5000 INJECTION, SOLUTION INTRAVENOUS; SUBCUTANEOUS at 09:06

## 2018-12-09 RX ADMIN — ERTAPENEM SODIUM 500 MG: 1 INJECTION, POWDER, LYOPHILIZED, FOR SOLUTION INTRAMUSCULAR; INTRAVENOUS at 18:04

## 2018-12-09 RX ADMIN — DILTIAZEM HYDROCHLORIDE 120 MG: 120 CAPSULE, EXTENDED RELEASE ORAL at 21:18

## 2018-12-09 RX ADMIN — DOCUSATE SODIUM 100 MG: 100 CAPSULE, LIQUID FILLED ORAL at 09:05

## 2018-12-09 RX ADMIN — INSULIN LISPRO 5 UNITS: 100 INJECTION, SOLUTION INTRAVENOUS; SUBCUTANEOUS at 18:04

## 2018-12-09 RX ADMIN — PANTOPRAZOLE SODIUM 40 MG: 40 TABLET, DELAYED RELEASE ORAL at 05:29

## 2018-12-09 RX ADMIN — INSULIN DETEMIR 40 UNITS: 100 INJECTION, SOLUTION SUBCUTANEOUS at 21:19

## 2018-12-09 RX ADMIN — CASTOR OIL AND BALSAM, PERU: 788; 87 OINTMENT TOPICAL at 09:07

## 2018-12-09 RX ADMIN — HYDROCODONE BITARTRATE AND ACETAMINOPHEN 1 TABLET: 7.5; 325 TABLET ORAL at 23:49

## 2018-12-09 RX ADMIN — LINEZOLID 600 MG: 600 INJECTION, SOLUTION INTRAVENOUS at 15:13

## 2018-12-09 RX ADMIN — SODIUM CHLORIDE, PRESERVATIVE FREE 3 ML: 5 INJECTION INTRAVENOUS at 21:18

## 2018-12-09 RX ADMIN — SODIUM CHLORIDE, PRESERVATIVE FREE 3 ML: 5 INJECTION INTRAVENOUS at 09:08

## 2018-12-09 RX ADMIN — ASPIRIN 325 MG: 325 TABLET, DELAYED RELEASE ORAL at 09:06

## 2018-12-09 RX ADMIN — HEPARIN SODIUM 5000 UNITS: 5000 INJECTION, SOLUTION INTRAVENOUS; SUBCUTANEOUS at 21:18

## 2018-12-09 RX ADMIN — LINEZOLID 600 MG: 600 INJECTION, SOLUTION INTRAVENOUS at 05:29

## 2018-12-09 RX ADMIN — INSULIN DETEMIR 40 UNITS: 100 INJECTION, SOLUTION SUBCUTANEOUS at 09:09

## 2018-12-09 RX ADMIN — HYDROCODONE BITARTRATE AND ACETAMINOPHEN 1 TABLET: 7.5; 325 TABLET ORAL at 09:26

## 2018-12-09 RX ADMIN — PANTOPRAZOLE SODIUM 40 MG: 40 TABLET, DELAYED RELEASE ORAL at 09:06

## 2018-12-09 NOTE — PLAN OF CARE
Problem: Patient Care Overview  Goal: Plan of Care Review  Outcome: Ongoing (interventions implemented as appropriate)   12/09/18 1740   Coping/Psychosocial   Plan of Care Reviewed With patient   Plan of Care Review   Progress no change   OTHER   Outcome Summary patient remains confused at times disoriented to situation states she had been down to surgery today reoriented pt towards end of shift she stated there were children in her room. Dressing change complete site bleeding after pt ambulated and noncompliant with non weight baring status. Pt reeducated on importance of not applying weight to RLE. Cap refill good on RLE no numbness or tingling reported. Has been able to void spontaneously.  Vitals stable additional insulin coverage needed with dinner. PRN norco given for pain. Will continue to monitor        Problem: Infection, Risk/Actual (Adult)  Goal: Identify Related Risk Factors and Signs and Symptoms  Outcome: Ongoing (interventions implemented as appropriate)   12/09/18 1740   Infection, Risk/Actual (Adult)   Related Risk Factors (Infection, Risk/Actual) surgery/procedure;skin integrity impairment;prolonged hospitalization   Signs and Symptoms (Infection, Risk/Actual) pain;weakness     Goal: Infection Prevention/Resolution  Outcome: Ongoing (interventions implemented as appropriate)   12/09/18 1740   Infection, Risk/Actual (Adult)   Infection Prevention/Resolution making progress toward outcome       Problem: Fall Risk (Adult)  Goal: Identify Related Risk Factors and Signs and Symptoms  Outcome: Ongoing (interventions implemented as appropriate)   12/09/18 1740   Fall Risk (Adult)   Related Risk Factors (Fall Risk) gait/mobility problems;fatigue/slow reaction;confusion/agitation;environment unfamiliar   Signs and Symptoms (Fall Risk) presence of risk factors     Goal: Absence of Fall  Outcome: Ongoing (interventions implemented as appropriate)   12/09/18 1740   Fall Risk (Adult)   Absence of Fall achieves  outcome       Problem: Pain, Acute (Adult)  Goal: Identify Related Risk Factors and Signs and Symptoms  Outcome: Ongoing (interventions implemented as appropriate)   12/09/18 1740   Pain, Acute (Adult)   Related Risk Factors (Acute Pain) surgery;persistent pain;procedure/treatment   Signs and Symptoms (Acute Pain) BADLs/IADLs reluctance/inability to perform;fatigue/weakness;verbalization of pain descriptors     Goal: Acceptable Pain Control/Comfort Level  Outcome: Ongoing (interventions implemented as appropriate)   12/09/18 1740   Pain, Acute (Adult)   Acceptable Pain Control/Comfort Level making progress toward outcome       Problem: Diabetes, Type 2 (Adult)  Goal: Signs and Symptoms of Listed Potential Problems Will be Absent, Minimized or Managed (Diabetes, Type 2)  Outcome: Ongoing (interventions implemented as appropriate)   12/09/18 1740   Goal/Outcome Evaluation   Problems Assessed (Type 2 Diabetes) all   Problems Present (Type 2 Diabetes) hyperglycemia       Problem: Skin and Soft Tissue Infection (Adult)  Goal: Signs and Symptoms of Listed Potential Problems Will be Absent, Minimized or Managed (Skin and Soft Tissue Infection)  Outcome: Ongoing (interventions implemented as appropriate)   12/09/18 1740   Goal/Outcome Evaluation   Problems Assessed (Skin and Soft Tissue Infection) all   Problems Present (Skin/Soft Tissue Inf) pain       Problem: Skin Injury Risk (Adult)  Goal: Identify Related Risk Factors and Signs and Symptoms  Outcome: Ongoing (interventions implemented as appropriate)   12/09/18 1740   Skin Injury Risk (Adult)   Related Risk Factors (Skin Injury Risk) advanced age;body weight extremes;cognitive impairment;hospitalization prolonged;infection;mobility impaired     Goal: Skin Health and Integrity  Outcome: Ongoing (interventions implemented as appropriate)   12/09/18 1740   Skin Injury Risk (Adult)   Skin Health and Integrity making progress toward outcome

## 2018-12-09 NOTE — PROGRESS NOTES
[facility] Medicine Services  PROGRESS NOTE    Patient Name: Tashia Leon  : 1955  MRN: [id number]    Date of Admission: 2018  Length of Stay: 13  Primary Care Physician: Angelica Tran PA    Subjective   Subjective     CC:  Diabetic foot ulcer     HPI:    Denies pain. No fever or chills. OK with tunneled catheter if needed.    Review of Systems  Gen- No fevers, chills  CV- No chest pain, palpitations  Resp- No cough, dyspnea  GI- No N/V/D, abd pain    Otherwise ROS is negative except as mentioned in the HPI.    Objective   Objective     Vital Signs:   Temp:  [98 °F (36.7 °C)-98.8 °F (37.1 °C)] 98.5 °F (36.9 °C)  Heart Rate:  [67-75] 75  Resp:  [16] 16  BP: (122-148)/(58-86) 122/58        Physical Exam:  Constitutional -no acute distress, in bed  HEENT-NCAT, mucous membranes moist  CV-RRR, no MRG  Resp-CTAB  Abd-soft, non-tender, non-distended, normo active bowel sounds; overweight  Ext-right foot re-wrapped since yesterday, toes warm  Neuro-alert and oriented, speech clear, moves all extremities   Psych-normal affect  Skin- No rash on exposed UE or LE bilaterally      Results Reviewed:  I have personally reviewed current lab, radiology, and data and agree.    Results from last 7 days   Lab Units  18   0637  18   0639  18   0557   WBC 10*3/mm3  15.15*  12.87*  12.24*   HEMOGLOBIN g/dL  8.4*  8.5*  8.3*   HEMATOCRIT %  27.1*  27.3*  26.9*   PLATELETS 10*3/mm3  444  415  435     Results from last 7 days   Lab Units  18   0005  18   0637  18   0639   SODIUM mmol/L  141  137  140   POTASSIUM mmol/L  4.4  3.9  3.9   CHLORIDE mmol/L  110*  105  107   CO2 mmol/L  24.0  25.0  20.0   BUN mg/dL  41*  51*  58*   CREATININE mg/dL  3.34*  3.38*  3.56*   GLUCOSE mg/dL  133*  135*  202*   CALCIUM mg/dL  8.2*  7.9*  8.1*     Estimated Creatinine Clearance: 22.1 mL/min (A) (by C-G formula based on SCr of 3.34 mg/dL (H)).  No results found for:  BNP    Microbiology Results Abnormal     Procedure Component Value - Date/Time    Fungus Culture - Tissue, Toe, Right [[id number]] Collected:  12/04/18 1207    Lab Status:  Preliminary result Specimen:  Tissue from Toe, Right Updated:  12/09/18 1315     Fungus Culture No fungus isolated at less than 1 week    AFB Culture - Tissue, Toe, Right [[id number]] Collected:  12/04/18 1207    Lab Status:  Preliminary result Specimen:  Tissue from Toe, Right Updated:  12/09/18 1315     AFB Culture No AFB isolated at less than 1 week     AFB Stain No acid fast bacilli seen on concentrated smear    Tissue / Bone Culture - Tissue, Toe, Right [[id number]] Collected:  12/04/18 1207    Lab Status:  Final result Specimen:  Tissue from Toe, Right Updated:  12/08/18 0850     Tissue Culture No growth at 4 days     Gram Stain No WBCs or organisms seen    Anaerobic Culture - Tissue, Toe, Right [[id number]] Collected:  12/04/18 1207    Lab Status:  Preliminary result Specimen:  Tissue from Toe, Right Updated:  12/05/18 1417     Culture No anaerobes isolated    Eosinophil Smear - Urine, Urine, Clean Catch [[id number]]  (Normal) Collected:  12/01/18 1629    Lab Status:  Final result Specimen:  Urine, Clean Catch Updated:  12/01/18 1710     Eosinophil Smear 0 % EOS/100 Cells     Narrative:       No eosinophil seen    Blood Culture - Blood, Arm, Left [[id number]] Collected:  11/26/18 1625    Lab Status:  Final result Specimen:  Blood from Arm, Left Updated:  12/01/18 1645     Blood Culture No growth at 5 days    Blood Culture - Blood, Arm, Right [[id number]] Collected:  11/26/18 1620    Lab Status:  Final result Specimen:  Blood from Arm, Right Updated:  12/01/18 1645     Blood Culture No growth at 5 days          Imaging Results (last 24 hours)     ** No results found for the last 24 hours. **        Results for orders placed during the hospital encounter of 07/15/16   Adult transthoracic echo complete    Narrative · Left ventricular function is  normal. Estimated EF = 55%.  · Trace mitral valve regurgitation is present.  · Trace tricuspid valve regurgitation is present          I have reviewed the medications.      aspirin  mg Oral Daily   castor oil-balsam peru  Topical Q12H   diltiaZEM  mg Oral Nightly   docusate sodium 100 mg Oral Daily   epoetin rebeca 20,000 Units Subcutaneous Weekly   ertapenem 500 mg Intravenous Q24H   heparin (porcine) 5,000 Units Subcutaneous Q12H   insulin detemir 40 Units Subcutaneous Q12H   insulin lispro 0-14 Units Subcutaneous 4x Daily With Meals & Nightly   Linezolid 600 mg Intravenous Q12H   mupirocin  Topical Q24H   mupirocin  Topical Q24H   Naloxegol Oxalate 12.5 mg Oral Once per day on Sun Wed   pantoprazole 40 mg Oral QAM   sodium chloride 3 mL Intravenous Q12H         Assessment/Plan   Assessment / Plan     Active Hospital Problems    Diagnosis   • **Diabetic ulcer of right foot associated with type 2 diabetes mellitus (CMS/HCC)   • Sepsis (CMS/HCC)   • Cellulitis   • Hypokalemia   • Anemia, chronic disease   • Obesity (BMI 30-39.9)   • Impaired functional mobility, balance, gait, and endurance     Added automatically from request for surgery [id number]     • Diabetic ulcer of right midfoot associated with type 2 diabetes mellitus, with necrosis of bone (CMS/HCC)     Added automatically from request for surgery [id number]     • Cellulitis of toe of right foot     Added automatically from request for surgery [id number]     • Chronic osteomyelitis of right foot with draining sinus (CMS/HCC)     Added automatically from request for surgery [id number]     • Chronic kidney disease, stage V (CMS/HCC)   • Hyperlipidemia   • Diabetes mellitus type 2, uncontrolled, with complications (CMS/HCC)     Proteinuria noted, January 2014.       • Diabetic peripheral neuropathy (CMS/HCC)   • Essential hypertension   • Coronary artery disease involving native coronary artery of native heart without angina pectoris     1. Coronary artery  disease:  a. Abnormal cardiac PET, 10/02/2013, Dr. Becker, revealing a posterolateral defect with a mild wall motion abnormality and a normal LVEF.  b. Cardiac catheterization, 10/21/2013, with placement of a 2.5 x 15 mm Xience Expedition drug-eluting stent to the proximal circumflex.  LVEF of 55% to 60%.  Noncritical disease in the LAD and right coronary.  c. Echocardiogram, 12/18/2013, shows an LVEF 50% to 55% with trace MR and mild TR.               Brief Hospital Course to date:  1.  Sepsis, now resolved     2.  Diabetic foot ulcer with cellulitis right foot       -s/p debridement and right 1st toe/ metarsal amputation with Dr. Patiño 12/4              -continue invanz and zyvox              -pain control- norco changed to prn q6h; dc'd dilaudid               - consider tunneled catheter              -Scooter to offload foot has been ordered and is at the bedside.  Patient has seen PT who taught her appropriate use but are concerned about her weakness and stability.        3.   Type 2 diabetes mellitus with diabetic peripheral neuropathy              -a1c 7.6              -levemir 40 units bid, continue               -continue correction insulin, eating less while admitted     4.  CKDV              -nephrology following,  renal function limits abx choices               -Creatinine stable, continues off home rx lasix; euvolemic                 5.  Hypokalemia              -replaced by nephrology as needed     6. Obesity BMI 38: Weight loss recommended     7.  HTN: Currently normotensive     8.  CAD: Asymptomatic, aspirin     9.  Diabetic peripheral neuropathy: Symptomatic treatment and supportive care     10.  Anemia of chronic disease                 11.  Severe anxiety:improved with clonazepam. Not needed past couple of days      12.  Debility: Difficulty walking with needed scooter.  Therapy recommended rehabilitation facility for appropriate care and treatment.       DVT Prophylaxis:   Heparin      CODE STATUS:   Code Status and Medical Interventions:   Ordered at: 11/26/18 2126     Level Of Support Discussed With:    Patient     Code Status:    CPR     Medical Interventions (Level of Support Prior to Arrest):    Full       Disposition: I expect the patient to be discharged to LTAC/Rehab, possibly Monday.      Electronically signed by Geoffrey Stanley MD, 12/09/18, 1:48 PM.

## 2018-12-09 NOTE — PLAN OF CARE
Problem: Patient Care Overview  Goal: Plan of Care Review  Outcome: Ongoing (interventions implemented as appropriate)   12/09/18 0500   Coping/Psychosocial   Plan of Care Reviewed With patient   Plan of Care Review   Progress no change   OTHER   Outcome Summary pt rested well throughout the night, VSS, voids well, will continue to monitor.        Problem: Infection, Risk/Actual (Adult)  Goal: Identify Related Risk Factors and Signs and Symptoms  Outcome: Ongoing (interventions implemented as appropriate)      Problem: Pain, Acute (Adult)  Goal: Identify Related Risk Factors and Signs and Symptoms  Outcome: Ongoing (interventions implemented as appropriate)      Problem: Diabetes, Type 2 (Adult)  Goal: Signs and Symptoms of Listed Potential Problems Will be Absent, Minimized or Managed (Diabetes, Type 2)  Outcome: Ongoing (interventions implemented as appropriate)      Problem: Skin and Soft Tissue Infection (Adult)  Goal: Signs and Symptoms of Listed Potential Problems Will be Absent, Minimized or Managed (Skin and Soft Tissue Infection)  Outcome: Ongoing (interventions implemented as appropriate)      Problem: Skin Injury Risk (Adult)  Goal: Identify Related Risk Factors and Signs and Symptoms  Outcome: Ongoing (interventions implemented as appropriate)

## 2018-12-09 NOTE — PROGRESS NOTES
"   LOS: 13 days    Patient Care Team:  Angelica Tran PA as PCP - General (Internal Medicine)  Denver Baldwin MD as Consulting Physician (Nephrology)    Subjective     No new events    Objective     Vital Signs:  Blood pressure 122/58, pulse 75, temperature 98.5 °F (36.9 °C), temperature source Oral, resp. rate 16, height 167.6 cm (66\"), weight 109 kg (240 lb), SpO2 97 %, not currently breastfeeding.    Flowsheet Rows      First Filed Value   Admission Height  167.6 cm (66\") Documented at 11/26/2018 1315   Admission Weight  109 kg (240 lb) Documented at 11/26/2018 1315          12/08 0701 - 12/09 0700  In: 480 [P.O.:480]  Out: 600 [Urine:600]    Physical Exam:    General Appearance: WD obese WF NAD  Neuro:   awake alert   Psych:  Normal mood and affect  CV:   No edema  Lungs: respirations regular and unlabored  Abdomen: not distended  :  No lyoa, no palp bladder  Skin: No rash, Warm and dry.  Left foot with dressing in place      Labs:  Results from last 7 days   Lab Units  12/05/18   0637  12/04/18   0639  12/03/18   0557   WBC 10*3/mm3  15.15*  12.87*  12.24*   HEMOGLOBIN g/dL  8.4*  8.5*  8.3*   PLATELETS 10*3/mm3  444  415  435     Results from last 7 days   Lab Units  12/08/18   0005  12/05/18   0637  12/04/18   0639  12/03/18   0557   SODIUM mmol/L  141  137  140  138   POTASSIUM mmol/L  4.4  3.9  3.9  3.6   CHLORIDE mmol/L  110*  105  107  102   CO2 mmol/L  24.0  25.0  20.0  24.0   BUN mg/dL  41*  51*  58*  66*   CREATININE mg/dL  3.34*  3.38*  3.56*  3.91*   CALCIUM mg/dL  8.2*  7.9*  8.1*  8.0*   PHOSPHORUS mg/dL  3.5   --    --    --    ALBUMIN g/dL  2.92*   --    --    --                    Estimated Creatinine Clearance: 22.1 mL/min (A) (by C-G formula based on SCr of 3.34 mg/dL (H)).         A/P:    CKD4: stable in baseline 3.5-4.0.  Thought due to DM in the past.    Proteinuria:  Presumed DN.  Ratio 3.1    HTN: stable.     Anemia:  Hgb low.  Tsat 20% S/P IVFe.  will give epogen. .  " Transfuse PRN for Hgb < 7    Volume:  Stable.     Diabetic Foot Ulcer    Manny Guerrero MD  12/09/18  11:47 AM

## 2018-12-10 LAB
ANION GAP SERPL CALCULATED.3IONS-SCNC: 8 MMOL/L (ref 3–11)
BUN BLD-MCNC: 28 MG/DL (ref 9–23)
BUN/CREAT SERPL: 9.2 (ref 7–25)
CALCIUM SPEC-SCNC: 8.8 MG/DL (ref 8.7–10.4)
CHLORIDE SERPL-SCNC: 106 MMOL/L (ref 99–109)
CO2 SERPL-SCNC: 24 MMOL/L (ref 20–31)
CREAT BLD-MCNC: 3.05 MG/DL (ref 0.6–1.3)
DEPRECATED RDW RBC AUTO: 46.8 FL (ref 37–54)
ERYTHROCYTE [DISTWIDTH] IN BLOOD BY AUTOMATED COUNT: 13.5 % (ref 11.3–14.5)
GFR SERPL CREATININE-BSD FRML MDRD: 16 ML/MIN/1.73
GLUCOSE BLD-MCNC: 91 MG/DL (ref 70–100)
GLUCOSE BLDC GLUCOMTR-MCNC: 164 MG/DL (ref 70–130)
GLUCOSE BLDC GLUCOMTR-MCNC: 177 MG/DL (ref 70–130)
GLUCOSE BLDC GLUCOMTR-MCNC: 81 MG/DL (ref 70–130)
GLUCOSE BLDC GLUCOMTR-MCNC: 86 MG/DL (ref 70–130)
HCT VFR BLD AUTO: 27.9 % (ref 34.5–44)
HGB BLD-MCNC: 8.7 G/DL (ref 11.5–15.5)
MCH RBC QN AUTO: 30.5 PG (ref 27–31)
MCHC RBC AUTO-ENTMCNC: 31.2 G/DL (ref 32–36)
MCV RBC AUTO: 97.9 FL (ref 80–99)
PLATELET # BLD AUTO: 497 10*3/MM3 (ref 150–450)
PMV BLD AUTO: 9.2 FL (ref 6–12)
POTASSIUM BLD-SCNC: 4.2 MMOL/L (ref 3.5–5.5)
RBC # BLD AUTO: 2.85 10*6/MM3 (ref 3.89–5.14)
SODIUM BLD-SCNC: 138 MMOL/L (ref 132–146)
WBC NRBC COR # BLD: 13.92 10*3/MM3 (ref 3.5–10.8)

## 2018-12-10 PROCEDURE — 97530 THERAPEUTIC ACTIVITIES: CPT

## 2018-12-10 PROCEDURE — 25010000002 LINEZOLID 600 MG/300ML SOLUTION: Performed by: INTERNAL MEDICINE

## 2018-12-10 PROCEDURE — 80048 BASIC METABOLIC PNL TOTAL CA: CPT | Performed by: INTERNAL MEDICINE

## 2018-12-10 PROCEDURE — 25010000002 HEPARIN (PORCINE) PER 1000 UNITS: Performed by: NURSE PRACTITIONER

## 2018-12-10 PROCEDURE — 85027 COMPLETE CBC AUTOMATED: CPT | Performed by: INTERNAL MEDICINE

## 2018-12-10 PROCEDURE — 82962 GLUCOSE BLOOD TEST: CPT

## 2018-12-10 PROCEDURE — 63710000001 INSULIN DETEMIR PER 5 UNITS: Performed by: INTERNAL MEDICINE

## 2018-12-10 PROCEDURE — 97116 GAIT TRAINING THERAPY: CPT

## 2018-12-10 PROCEDURE — 99233 SBSQ HOSP IP/OBS HIGH 50: CPT | Performed by: INTERNAL MEDICINE

## 2018-12-10 PROCEDURE — 97530 THERAPEUTIC ACTIVITIES: CPT | Performed by: OCCUPATIONAL THERAPIST

## 2018-12-10 PROCEDURE — 25010000002 ERTAPENEM PER 500 MG: Performed by: INTERNAL MEDICINE

## 2018-12-10 PROCEDURE — 97535 SELF CARE MNGMENT TRAINING: CPT | Performed by: OCCUPATIONAL THERAPIST

## 2018-12-10 RX ADMIN — INSULIN LISPRO 3 UNITS: 100 INJECTION, SOLUTION INTRAVENOUS; SUBCUTANEOUS at 12:58

## 2018-12-10 RX ADMIN — MUPIROCIN: 2 CREAM TOPICAL at 08:36

## 2018-12-10 RX ADMIN — INSULIN DETEMIR 40 UNITS: 100 INJECTION, SOLUTION SUBCUTANEOUS at 08:37

## 2018-12-10 RX ADMIN — DOCUSATE SODIUM 100 MG: 100 CAPSULE, LIQUID FILLED ORAL at 08:36

## 2018-12-10 RX ADMIN — HEPARIN SODIUM 5000 UNITS: 5000 INJECTION, SOLUTION INTRAVENOUS; SUBCUTANEOUS at 08:36

## 2018-12-10 RX ADMIN — HYDROCODONE BITARTRATE AND ACETAMINOPHEN 1 TABLET: 7.5; 325 TABLET ORAL at 05:09

## 2018-12-10 RX ADMIN — PANTOPRAZOLE SODIUM 40 MG: 40 TABLET, DELAYED RELEASE ORAL at 05:09

## 2018-12-10 RX ADMIN — SODIUM CHLORIDE, PRESERVATIVE FREE 3 ML: 5 INJECTION INTRAVENOUS at 20:57

## 2018-12-10 RX ADMIN — CASTOR OIL AND BALSAM, PERU: 788; 87 OINTMENT TOPICAL at 20:56

## 2018-12-10 RX ADMIN — HYDROCODONE BITARTRATE AND ACETAMINOPHEN 1 TABLET: 7.5; 325 TABLET ORAL at 21:08

## 2018-12-10 RX ADMIN — DILTIAZEM HYDROCHLORIDE 120 MG: 120 CAPSULE, EXTENDED RELEASE ORAL at 20:57

## 2018-12-10 RX ADMIN — LINEZOLID 600 MG: 600 INJECTION, SOLUTION INTRAVENOUS at 14:25

## 2018-12-10 RX ADMIN — ASPIRIN 325 MG: 325 TABLET, DELAYED RELEASE ORAL at 08:36

## 2018-12-10 RX ADMIN — LINEZOLID 600 MG: 600 INJECTION, SOLUTION INTRAVENOUS at 02:03

## 2018-12-10 RX ADMIN — ERTAPENEM SODIUM 500 MG: 1 INJECTION, POWDER, LYOPHILIZED, FOR SOLUTION INTRAMUSCULAR; INTRAVENOUS at 18:28

## 2018-12-10 RX ADMIN — SODIUM CHLORIDE, PRESERVATIVE FREE 3 ML: 5 INJECTION INTRAVENOUS at 08:44

## 2018-12-10 RX ADMIN — INSULIN LISPRO 3 UNITS: 100 INJECTION, SOLUTION INTRAVENOUS; SUBCUTANEOUS at 18:25

## 2018-12-10 RX ADMIN — CASTOR OIL AND BALSAM, PERU: 788; 87 OINTMENT TOPICAL at 08:36

## 2018-12-10 NOTE — PROGRESS NOTES
"                  Clinical Nutrition     Reason for Visit:   Follow-up protocol    Patient Name: Tashia Leon  YOB: 1955  MRN: [id number]  Date of Encounter: 12/10/18 2:47 PM  Admission date: 11/26/2018    Nutrition Assessment   Assessment     Admission Problem List:  Diabetic R foot ulcer  Sepsis      Applicable medical tests/procedures since admission:  S/p I&D, R great toe amputation, 1st metatarsal removal (12/4)    Applicable PMH/procedures:  CAD  HTN  HLP  Carotid stenosis  GERD  Hiatal hernia  Peptic ulcer  DM2 with associated neuropathy/retinopathy  Vitamin D deficiency  CKD4  Osteomyelitis  DDD    L foot surgery      Reported/Observed/Food/Nutrition Related History:     Patient hard of hearing at visit. Noted reported intermittent confusion. Reports appetite improving; better today. Does not want milk ordered anymore. No other preferences at this time.      Anthropometrics     Height: 167.6 cm (66\")  Last filed wt: Weight: 109 kg (240 lb) (12/04/18 1029)  Weight Method: Stated    BMI: BMI (Calculated): 38.8  Obese Class II: 35-39.9kg/m2    Ideal Body Weight (IBW) (kg): 59.58  Admission wt: 240 lb  Method obtained: stated weight per charting (12/4)      Labs reviewed     Results from last 7 days   Lab Units  12/10/18   0604  12/08/18   0005  12/05/18   0637   GLUCOSE mg/dL  91  133*  135*   BUN mg/dL  28*  41*  51*   CREATININE mg/dL  3.05*  3.34*  3.38*   SODIUM mmol/L  138  141  137   CHLORIDE mmol/L  106  110*  105   POTASSIUM mmol/L  4.2  4.4  3.9   PHOSPHORUS mg/dL   --   3.5   --      Results from last 7 days   Lab Units  12/10/18   1121  12/10/18   0818  12/09/18   2116  12/09/18   1546  12/09/18   1139  12/09/18   0745   GLUCOSE mg/dL  164*  86  143*  240*  139*  80       Medications reviewed   Pertinent: Yes    Intake/Ouptut 24 hrs (7:00AM - 6:59 AM)     Intake & Output (last day)       12/09 0701 - 12/10 0700 12/10 0701 - 12/11 0700    P.O. 60 960    IV Piggyback 300 300    " Total Intake(mL/kg) 360 (3.3) 1260 (11.6)    Urine (mL/kg/hr) 750 (0.3)     Stool      Total Output 750     Net -390 +1260          Urine Unmeasured Occurrence 3 x           Current Nutrition Prescription     PO: Diet Regular; Cardiac, Consistent Carbohydrate  Intake: 56% of 10 meals from (12/6 - 12/10)      Nutrition Diagnosis     (12/5)  Problem Increased nutrient needs (protein, kcal)   Etiology Skin integrity   Signs/Symptoms R Diabetic foot ulcer dx   Status: ongoing    Problem Inadequate oral intake   Etiology Clinical condition   Signs/Symptoms PO Intake (56% of 10 meals)       Nutrition Intervention   1.  Follow treatment progress, Care plan reviewed, Advise alternate selection, Interview for preferences, Encourage intake    Goal:   General: Nutrition support treatment  PO: Increase intake    Monitoring/Evaluation:   Per protocol, PO intake, Skin status, Symptoms, POC/GOC    Will Continue to follow per protocol    Joann Phan  Time Spent: 35 minutes

## 2018-12-10 NOTE — PROGRESS NOTES
[facility] Medicine Services  PROGRESS NOTE    Patient Name: Tashia Leon  : 1955  MRN: [id number]    Date of Admission: 2018  Length of Stay: 14  Primary Care Physician: Angelica Tran PA    Subjective   Subjective     CC:  Diabetic foot ulcer     HPI:    Denies current pain. Eager for discharge or transfer.    Review of Systems  Gen- No fevers, chills  CV- No chest pain, palpitations  Resp- No cough, dyspnea  GI- No N/V/D, abd pain    Otherwise ROS is negative except as mentioned in the HPI.    Objective   Objective     Vital Signs:   Temp:  [97.9 °F (36.6 °C)-98.8 °F (37.1 °C)] 98 °F (36.7 °C)  Heart Rate:  [73-82] 78  Resp:  [16] 16  BP: (121-147)/(57-69) 134/61        Physical Exam:  Constitutional - no acute distress, in bed  HEENT-NCAT, mucous membranes moist  CV-RRR no MRG  Resp-CTAB  Abd-soft, non-tender, non-distended, normo active bowel sounds; overweight  Ext-right foot in clean wraps, toes warm  Neuro-alert and oriented, speech clear, moves all extremities   Psych-normal affect  Skin- No rash on exposed UE or LE bilaterally      Results Reviewed:  I have personally reviewed current lab, radiology, and data and agree.    Results from last 7 days   Lab Units  12/10/18   0604  18   0637  18   0639   WBC 10*3/mm3  13.92*  15.15*  12.87*   HEMOGLOBIN g/dL  8.7*  8.4*  8.5*   HEMATOCRIT %  27.9*  27.1*  27.3*   PLATELETS 10*3/mm3  497*  444  415     Results from last 7 days   Lab Units  12/10/18   0604  18   0005  18   0637   SODIUM mmol/L  138  141  137   POTASSIUM mmol/L  4.2  4.4  3.9   CHLORIDE mmol/L  106  110*  105   CO2 mmol/L  24.0  24.0  25.0   BUN mg/dL  28*  41*  51*   CREATININE mg/dL  3.05*  3.34*  3.38*   GLUCOSE mg/dL  91  133*  135*   CALCIUM mg/dL  8.8  8.2*  7.9*     Estimated Creatinine Clearance: 24.2 mL/min (A) (by C-G formula based on SCr of 3.05 mg/dL (H)).  No results found for: BNP    Microbiology Results  Abnormal     Procedure Component Value - Date/Time    Anaerobic Culture - Tissue, Toe, Right [[id number]] Collected:  12/04/18 1207    Lab Status:  Preliminary result Specimen:  Tissue from Toe, Right Updated:  12/10/18 1321     Culture No anaerobes isolated    Fungus Culture - Tissue, Toe, Right [[id number]] Collected:  12/04/18 1207    Lab Status:  Preliminary result Specimen:  Tissue from Toe, Right Updated:  12/09/18 1315     Fungus Culture No fungus isolated at less than 1 week    AFB Culture - Tissue, Toe, Right [[id number]] Collected:  12/04/18 1207    Lab Status:  Preliminary result Specimen:  Tissue from Toe, Right Updated:  12/09/18 1315     AFB Culture No AFB isolated at less than 1 week     AFB Stain No acid fast bacilli seen on concentrated smear    Tissue / Bone Culture - Tissue, Toe, Right [[id number]] Collected:  12/04/18 1207    Lab Status:  Final result Specimen:  Tissue from Toe, Right Updated:  12/08/18 0850     Tissue Culture No growth at 4 days     Gram Stain No WBCs or organisms seen    Eosinophil Smear - Urine, Urine, Clean Catch [[id number]]  (Normal) Collected:  12/01/18 1629    Lab Status:  Final result Specimen:  Urine, Clean Catch Updated:  12/01/18 1710     Eosinophil Smear 0 % EOS/100 Cells     Narrative:       No eosinophil seen    Blood Culture - Blood, Arm, Left [[id number]] Collected:  11/26/18 1625    Lab Status:  Final result Specimen:  Blood from Arm, Left Updated:  12/01/18 1645     Blood Culture No growth at 5 days    Blood Culture - Blood, Arm, Right [[id number]] Collected:  11/26/18 1620    Lab Status:  Final result Specimen:  Blood from Arm, Right Updated:  12/01/18 1645     Blood Culture No growth at 5 days          Imaging Results (last 24 hours)     ** No results found for the last 24 hours. **        Results for orders placed during the hospital encounter of 07/15/16   Adult transthoracic echo complete    Narrative · Left ventricular function is normal. Estimated EF = 55%.  ·  Trace mitral valve regurgitation is present.  · Trace tricuspid valve regurgitation is present          I have reviewed the medications.      aspirin  mg Oral Daily   castor oil-balsam peru  Topical Q12H   diltiaZEM  mg Oral Nightly   docusate sodium 100 mg Oral Daily   epoetin rebeca 20,000 Units Subcutaneous Weekly   ertapenem 500 mg Intravenous Q24H   insulin detemir 40 Units Subcutaneous Q12H   insulin lispro 0-14 Units Subcutaneous 4x Daily With Meals & Nightly   Linezolid 600 mg Intravenous Q12H   mupirocin  Topical Q24H   mupirocin  Topical Q24H   Naloxegol Oxalate 12.5 mg Oral Once per day on Sun Wed   pantoprazole 40 mg Oral QAM   sodium chloride 3 mL Intravenous Q12H         Assessment/Plan   Assessment / Plan     Active Hospital Problems    Diagnosis   • **Diabetic ulcer of right foot associated with type 2 diabetes mellitus (CMS/HCC)   • Sepsis (CMS/HCC)   • Cellulitis   • Hypokalemia   • Anemia, chronic disease   • Obesity (BMI 30-39.9)   • Impaired functional mobility, balance, gait, and endurance     Added automatically from request for surgery [id number]     • Diabetic ulcer of right midfoot associated with type 2 diabetes mellitus, with necrosis of bone (CMS/HCC)     Added automatically from request for surgery [id number]     • Cellulitis of toe of right foot     Added automatically from request for surgery [id number]     • Chronic osteomyelitis of right foot with draining sinus (CMS/HCC)     Added automatically from request for surgery [id number]     • Chronic kidney disease, stage V (CMS/HCC)   • Hyperlipidemia   • Diabetes mellitus type 2, uncontrolled, with complications (CMS/HCC)     Proteinuria noted, January 2014.       • Diabetic peripheral neuropathy (CMS/HCC)   • Essential hypertension   • Coronary artery disease involving native coronary artery of native heart without angina pectoris     1. Coronary artery disease:  a. Abnormal cardiac PET, 10/02/2013, Dr. Becker, revealing a  posterolateral defect with a mild wall motion abnormality and a normal LVEF.  b. Cardiac catheterization, 10/21/2013, with placement of a 2.5 x 15 mm Xience Expedition drug-eluting stent to the proximal circumflex.  LVEF of 55% to 60%.  Noncritical disease in the LAD and right coronary.  c. Echocardiogram, 12/18/2013, shows an LVEF 50% to 55% with trace MR and mild TR.               Brief Hospital Course to date:  1.  Sepsis, now resolved     2.  Diabetic foot ulcer with cellulitis right foot       -s/p debridement and right 1st toe/ metarsal amputation with Dr. Patiño 12/4              -continue invanz and zyvox              -pain control- norco changed to prn q6h; dc'd dilaudid               -groshong catheter placement tomorrow.              -Scooter to offload foot has been ordered and is at the bedside.  Patient has seen PT who taught her appropriate use but are concerned about her weakness and stability.        3.   Type 2 diabetes mellitus with diabetic peripheral neuropathy              -a1c 7.6              -decreased long acting insulin as patient NPO midnight (was on levemir 40 units BID, will change to 25 units qhs alone for now)               - continue glucose checks ac and hs, added 2 am glucose check with bmp am as well     4.  CKDV              -nephrology following,  renal function limits abx choices               -Creatinine stable, continues off home rx lasix; euvolemic                 5.  Hypokalemia              -replaced by nephrology as needed     6. Obesity BMI 38: Weight loss recommended     7.  HTN: Currently normotensive     8.  CAD: Asymptomatic, aspirin     9.  Diabetic peripheral neuropathy: Symptomatic treatment and supportive care     10.  Anemia of chronic disease                 11.  Severe anxiety:improved with clonazepam. Not needed past couple of days      12.  Debility: Difficulty walking with needed scooter.  Therapy recommended rehabilitation facility for appropriate care  and treatment.      DVT Prophylaxis:  Heparin      CODE STATUS:   Code Status and Medical Interventions:   Ordered at: 11/26/18 2126     Level Of Support Discussed With:    Patient     Code Status:    CPR     Medical Interventions (Level of Support Prior to Arrest):    Full       Disposition: I expect the patient to be discharged to LTAC/Rehab    Discussed tunneled catheter placement with Infectious Disease Dr Haynes and General Surgery Dr May      Electronically signed by Geoffrey Stanley MD, 12/10/18, 6:41 PM.

## 2018-12-10 NOTE — PLAN OF CARE
Problem: Patient Care Overview  Goal: Plan of Care Review   12/09/18 2120 12/10/18 0459   Coping/Psychosocial   Plan of Care Reviewed With patient --    Plan of Care Review   Progress --  no change   OTHER   Outcome Summary --  Remains confused intermittently; but primarily alert and oriented. No significant changes or events so far- will continue to monitor.

## 2018-12-10 NOTE — PROGRESS NOTES
"Tashia Leon  1955  [id number]    Surgery Progress Note    Date of visit: 12/10/2018    Subjective: He has evaluated the patient for placement of Groshong catheter  Patient sitting up in bed    Objective:    /62 (BP Location: Left arm, Patient Position: Lying)   Pulse 73   Temp 98.1 °F (36.7 °C) (Oral)   Resp 16   Ht 167.6 cm (66\")   Wt 109 kg (240 lb)   SpO2 98%   BMI 38.74 kg/m²     Intake/Output Summary (Last 24 hours) at 12/10/2018 1544  Last data filed at 12/10/2018 1425  Gross per 24 hour   Intake 1260 ml   Output --   Net 1260 ml       CV: Regular rate and rhythm  L: normal air entry        LABS:    Results from last 7 days   Lab Units  12/10/18   0604   WBC 10*3/mm3  13.92*   HEMOGLOBIN g/dL  8.7*   HEMATOCRIT %  27.9*   PLATELETS 10*3/mm3  497*     Results from last 7 days   Lab Units  12/10/18   0604   SODIUM mmol/L  138   POTASSIUM mmol/L  4.2   CHLORIDE mmol/L  106   CO2 mmol/L  24.0   BUN mg/dL  28*   CREATININE mg/dL  3.05*   CALCIUM mg/dL  8.8   GLUCOSE mg/dL  91     Results from last 7 days   Lab Units  12/10/18   0604   SODIUM mmol/L  138   POTASSIUM mmol/L  4.2   CHLORIDE mmol/L  106   CO2 mmol/L  24.0   BUN mg/dL  28*   CREATININE mg/dL  3.05*   GLUCOSE mg/dL  91   CALCIUM mg/dL  8.8     No results found for: LIPASE      Assessment/ Plan: Status post I&D with right great toe amputation with history of diabetic foot ulcer  Patient is on IV antibiotics  She is not a candidate for PICC line placement secondary to underlying chronic kidney disease  Plan to place a double-lumen Groshong catheter tomorrow  I discussed the procedure at length with her.  The risks of anesthesia, infection, bleeding, possible pneumothorax were discussed.  She understands and is willing to proceed with the plan as outlined.    Problem List Items Addressed This Visit     Diabetic peripheral neuropathy (CMS/HCC)    Relevant Medications    Insulin Lispro (HUMALOG KWIKPEN) 200 UNIT/ML solution " pen-injector    Dulaglutide (TRULICITY) 1.5 MG/0.5ML solution pen-injector    Other Relevant Orders    Case Request (Completed)    Anaerobic Culture - Tissue, Toe, Right (Completed)    Fungus Culture - Tissue, Toe, Right (Completed)    Tissue / Bone Culture - Tissue, Toe, Right (Completed)    AFB Culture - Tissue, Toe, Right (Completed)    Tissue Pathology Exam (Completed)    Diabetes mellitus type 2, uncontrolled, with complications (CMS/[facility])    Relevant Medications    Insulin Lispro (HUMALOG KWIKPEN) 200 UNIT/ML solution pen-injector    Dulaglutide (TRULICITY) 1.5 MG/0.5ML solution pen-injector    Other Relevant Orders    Case Request (Completed)    Anaerobic Culture - Tissue, Toe, Right (Completed)    Fungus Culture - Tissue, Toe, Right (Completed)    Tissue / Bone Culture - Tissue, Toe, Right (Completed)    AFB Culture - Tissue, Toe, Right (Completed)    Tissue Pathology Exam (Completed)    Osteomyelitis (CMS/[facility])    Relevant Orders    Case Request (Completed)    Anaerobic Culture - Tissue, Toe, Right (Completed)    Fungus Culture - Tissue, Toe, Right (Completed)    Tissue / Bone Culture - Tissue, Toe, Right (Completed)    AFB Culture - Tissue, Toe, Right (Completed)    Tissue Pathology Exam (Completed)    Chronic kidney disease, stage V (CMS/[facility])    Relevant Medications    furosemide (LASIX) 40 MG tablet    Other Relevant Orders    Case Request (Completed)    Anaerobic Culture - Tissue, Toe, Right (Completed)    Fungus Culture - Tissue, Toe, Right (Completed)    Tissue / Bone Culture - Tissue, Toe, Right (Completed)    AFB Culture - Tissue, Toe, Right (Completed)    Tissue Pathology Exam (Completed)    * (Principal) Diabetic ulcer of right foot associated with type 2 diabetes mellitus (CMS/[facility]) - Primary    Relevant Medications    Insulin Lispro (HUMALOG KWIKPEN) 200 UNIT/ML solution pen-injector    Dulaglutide (TRULICITY) 1.5 MG/0.5ML solution pen-injector    Other Relevant Orders    Case Request (Completed)     Anaerobic Culture - Tissue, Toe, Right (Completed)    Fungus Culture - Tissue, Toe, Right (Completed)    Tissue / Bone Culture - Tissue, Toe, Right (Completed)    AFB Culture - Tissue, Toe, Right (Completed)    Tissue Pathology Exam (Completed)    Cellulitis    Relevant Orders    Case Request (Completed)    Anaerobic Culture - Tissue, Toe, Right (Completed)    Fungus Culture - Tissue, Toe, Right (Completed)    Tissue / Bone Culture - Tissue, Toe, Right (Completed)    AFB Culture - Tissue, Toe, Right (Completed)    Tissue Pathology Exam (Completed)    Obesity (BMI 30-39.9)    Relevant Orders    Case Request (Completed)    Anaerobic Culture - Tissue, Toe, Right (Completed)    Fungus Culture - Tissue, Toe, Right (Completed)    Tissue / Bone Culture - Tissue, Toe, Right (Completed)    AFB Culture - Tissue, Toe, Right (Completed)    Tissue Pathology Exam (Completed)    Impaired functional mobility, balance, gait, and endurance    Relevant Orders    Case Request (Completed)    Anaerobic Culture - Tissue, Toe, Right (Completed)    Fungus Culture - Tissue, Toe, Right (Completed)    Tissue / Bone Culture - Tissue, Toe, Right (Completed)    AFB Culture - Tissue, Toe, Right (Completed)    Tissue Pathology Exam (Completed)      Other Visit Diagnoses     Leukocytosis, unspecified type        Cellulitis of right foot        Chronic kidney disease, unspecified CKD stage        Relevant Medications    furosemide (LASIX) 40 MG tablet    Impaired mobility and ADLs                Maribel May MD  12/10/2018  3:44 PM

## 2018-12-10 NOTE — THERAPY PROGRESS REPORT/RE-CERT
Acute Care - Physical Therapy Progress Note  [facility]     Patient Name: Tashia Leon  : 1955  MRN: [id number]  Today's Date: 12/10/2018  Onset of Illness/Injury or Date of Surgery: 18  Date of Referral to PT: 18  Referring Physician: Asher    Admit Date: 2018    Visit Dx:    ICD-10-CM ICD-9-CM   1. Diabetic ulcer of other part of right foot associated with type 2 diabetes mellitus, unspecified ulcer stage (CMS/[facility]) E11.621 250.80    L97.519 707.15   2. Leukocytosis, unspecified type D72.829 288.60   3. Cellulitis of right foot L03.115 682.7   4. Chronic kidney disease, unspecified CKD stage N18.9 585.9   5. Impaired functional mobility, balance, gait, and endurance Z74.09 V49.89   6. Diabetic peripheral neuropathy (CMS/[facility]) E11.42 250.60     357.2   7. Diabetes mellitus type 2, uncontrolled, with complications (CMS/[facility]) E11.8 250.92    E11.65    8. Chronic kidney disease, stage V (CMS/[facility]) N18.5 585.5   9. Diabetic ulcer of right midfoot associated with type 2 diabetes mellitus, with necrosis of bone (CMS/[facility]) E11.621 250.80    L97.414 707.14   10. Cellulitis of toe of right foot L03.031 681.10   11. Obesity (BMI 30-39.9) E66.9 278.00   12. Chronic osteomyelitis of right foot with draining sinus (CMS/[facility]) M86.471 730.17   13. Impaired mobility and ADLs Z74.09 799.89     Patient Active Problem List   Diagnosis   • Coronary artery disease involving native coronary artery of native heart without angina pectoris   • Dyspnea   • Essential hypertension   • Hyperlipemia   • Lower extremity edema   • Carotid artery stenosis   • Carotid bruit   • Diabetes mellitus with neurological manifestations, uncontrolled (CMS/[facility])   • Diabetic peripheral neuropathy (CMS/[facility])   • Moderate nonproliferative diabetic retinopathy without macular edema associated with type 2 diabetes mellitus (CMS/[facility])   • Functional murmur   • GERD (gastroesophageal reflux disease)   • Peptic ulcer   • Vitamin D  "deficiency   • Health care maintenance   • Diabetes mellitus type 2, uncontrolled, with complications (CMS/HCC)   • Hyperlipidemia   • Obesity   • Osteomyelitis (CMS/HCC)   • MRSA (methicillin resistant Staphylococcus aureus)   • Lumbar disc disease   • Hearing loss   • Hyperkalemia   • Secondary hyperparathyroidism (CMS/HCC)   • Idiopathic hypochromic anemia   • Chronic kidney disease, stage V (CMS/HCC)   • Diabetic ulcer of right foot associated with type 2 diabetes mellitus (CMS/HCC)   • Sepsis (CMS/HCC)   • Cellulitis   • Hypokalemia   • Anemia, chronic disease   • Obesity (BMI 30-39.9)   • Impaired functional mobility, balance, gait, and endurance   • Diabetic ulcer of right midfoot associated with type 2 diabetes mellitus, with necrosis of bone (CMS/HCC)   • Cellulitis of toe of right foot   • Chronic osteomyelitis of right foot with draining sinus (CMS/[facility])       Therapy Treatment    Rehabilitation Treatment Summary     Row Name 12/10/18 1213 12/10/18 0915          Treatment Time/Intention    Discipline  physical therapist  -DM  occupational therapist  -AR     Document Type  --  therapy note (daily note);discharge treatment  -AR     Subjective Information  complains of;weakness;pain painMed at 7 am;states kneeWx\"Not working right\";req.replace  -DM  no complaints  -AR     Mode of Treatment  individual therapy;physical therapy  -DM  occupational therapy  -AR     Patient/Family Observations  hall's position in bed; IV, RA,dual exit alarms; RLE elev on 1 pillow; R foot/ankle ace wrapped;had dropped Respirex & mouthpiece missing in room; issued new one per pt req.  -DM  --     Care Plan Review  care plan/treatment goals reviewed;patient/other agree to care plan  -DM  --     Therapy Frequency (PT Clinical Impression)  daily  -DM  --     Patient Effort  good  -DM  good  -AR     Comment  very impulsive/tremulous/dropping items   -DM  --     Existing Precautions/Restrictions  fall;non-weight bearing R LE;NWB W/Knee " wx.  -DM  fall;non-weight bearing;other (see comments) strict NWB RLE  -AR     Treatment Considerations/Comments  --  pt declined use of knee scooter, states she is awaiting arrival of new one  -AR     Recorded by [DM] Kelly العراقي, PT 12/10/18 1352 [AR] Abril Suazo, OT 12/10/18 1324     Row Name 12/10/18 1213             Vital Signs    Pre Systolic BP Rehab  135  -DM      Pre Treatment Diastolic BP  63  -DM      Pretreatment Heart Rate (beats/min)  78  -DM      Pre SpO2 (%)  98  -DM      O2 Delivery Pre Treatment  room air  -DM      O2 Delivery Post Treatment  room air  -DM      Pre Patient Position  Supine  -DM      Intra Patient Position  Standing  -DM      Post Patient Position  Sitting  -DM      Recorded by [DM] Kelly العراقي, PT 12/10/18 1352      Row Name 12/10/18 1213 12/10/18 0915          Cognitive Assessment/Intervention    Additional Documentation  Cognitive Assessment/Intervention (Group)  -DM  Cognitive Assessment/Intervention (Group)  -AR     Recorded by [DM] Kelly العراقي, PT 12/10/18 1352 [AR] Abril Suazo, OT 12/10/18 1324     Row Name 12/10/18 1213 12/10/18 0915          Cognitive Assessment/Intervention- PT/OT    Affect/Mental Status (Cognitive)  anxious  -DM  WNL  -AR     Orientation Status (Cognition)  oriented x 4  -DM  oriented x 4  -AR     Follows Commands (Cognition)  WNL  -DM  WFL  -AR     Cognitive Function (Cognitive)  safety deficit  -DM  safety deficit  -AR     Safety Deficit (Cognitive)  moderate deficit;impulsivity;insight into deficits/self awareness;safety precautions follow-through/compliance  -DM  impulsivity  -AR     Personal Safety Interventions  fall prevention program maintained;gait belt;nonskid shoes/slippers when out of bed  -DM  fall prevention program maintained  -AR     Recorded by [DM] Kelly العراقي, PT 12/10/18 1352 [AR] Abril Suazo, OT 12/10/18 1324     Row Name 12/10/18 1213 12/10/18 0915          Safety Issues, Functional  Mobility    Safety Issues Affecting Function (Mobility)  --  impulsivity;unable to maintain weight-bearing restrictions  -AR     Impairments Affecting Function (Mobility)  balance;endurance/activity tolerance;pain;strength  -DM  endurance/activity tolerance  -AR     Recorded by [DM] Kelly العراقي, PT 12/10/18 1352 [AR] Abril Suazo, OT 12/10/18 1324     Row Name 12/10/18 1213 12/10/18 0915          Mobility Assessment/Intervention    Extremity Weight-bearing Status  right lower extremity  -DM  right lower extremity  -AR     Right Lower Extremity (Weight-bearing Status)  non weight-bearing (NWB)  -DM  non weight-bearing (NWB)  -AR     Recorded by [DM] Kelly العراقي, PT 12/10/18 1352 [AR] Abril Suazo, OT 12/10/18 1324     Row Name 12/10/18 1213 12/10/18 0915          Bed Mobility Assessment/Treatment    Bed Mobility Assessment/Treatment  supine-sit;sit-supine  -DM  --     Rolling Left [place] (Bed Mobility)  independent  -DM  --     Rolling Right [place] (Bed Mobility)  independent  -DM  --     Scooting/Bridging [place] (Bed Mobility)  independent half bridging w/ LLE  -DM  independent  -AR     Supine-Sit [place] (Bed Mobility)  independent  -DM  conditional independence  -AR     Sit-Supine [place] (Bed Mobility)  independent  -DM  conditional independence  -AR     Bed Mobility, Safety Issues  decreased use of legs for bridging/pushing  -DM  --     Assistive Device (Bed Mobility)  head of bed elevated;bed rails  -DM  bed rails;head of bed elevated  -AR     Comment (Bed Mobility)  --  Good ability to maintain NWB RLE  -AR     Recorded by [DM] Kelly العراقي, PT 12/10/18 1352 [AR] Abril Suazo, OT 12/10/18 1324     Row Name 12/10/18 0915             Functional Mobility    Functional Mobility- Ind. Level  verbal cues required;supervision required  -AR      Functional Mobility- Device  rolling walker  -AR      Functional Mobility-Distance (Feet)  20  -AR       Functional Mobility- Comment  One instance of pt needing cues to maintain NWB RLE as pt ambulated on slight incline into restroom  -AR      Recorded by [AR] Abril Suazo, OT 12/10/18 1324      Row Name 12/10/18 1213 12/10/18 0915          Transfer Assessment/Treatment    Transfer Assessment/Treatment  sit-stand transfer;stand-sit transfer  -DM  sit-stand transfer;stand-sit transfer;toilet transfer  -AR     Maintains Weight-bearing Status (Transfers)  --  able to maintain  -AR     Recorded by [DM] Kelly العراقي, PT 12/10/18 1352 [AR] Abril Suazo, OT 12/10/18 1324     Row Name 12/10/18 1213 12/10/18 0915          Sit-Stand Transfer    Sit-Stand [place] (Transfers)  verbal cues;minimum assist (75% patient effort) very tremulous/anxious; not locking brakes p/t init. STS  -DM  verbal cues;supervision  -AR     Assistive Device (Sit-Stand Transfers)  walker, knee scooter impulsive during STS; Not awaiting gt belt placement  -DM  walker, front-wheeled  -AR     Recorded by [DM] Kelly العراقي, PT 12/10/18 1352 [AR] Abril Suazo, OT 12/10/18 1324     Row Name 12/10/18 1213 12/10/18 0915          Stand-Sit Transfer    Stand-Sit [place] (Transfers)  contact guard;verbal cues  -DM  supervision;verbal cues  -AR     Assistive Device (Stand-Sit Transfers)  walker, knee scooter  -DM  walker, front-wheeled  -AR     Recorded by [DM] Kelly العراقي, PT 12/10/18 1352 [AR] Abril Suazo, OT 12/10/18 1324     Row Name 12/10/18 0915             Toilet Transfer    Type (Toilet Transfer)  sit-stand;stand-sit  -AR      [place] Level (Toilet Transfer)  supervision;verbal cues  -AR      Assistive Device (Toilet Transfer)  raised toilet seat;grab bars/safety frame;walker, front-wheeled  -AR      Recorded by [AR] Abril Suazo, OT 12/10/18 1324      Row Name 12/10/18 1213             Gait/Stairs Assessment/Training    25649 - Gait Training Minutes   18  -DM      Gait/Stairs  Assessment/Training  gait/ambulation independence;gait/ambulation assistive device;distance ambulated;gait pattern;maintains weight-bearing status  -DM      [place] Level (Gait)  verbal cues;contact guard Omar on turns init, then CGA on return trip  -DM      Assistive Device (Gait)  walker, knee scooter  -DM      Distance in Feet (Gait)  120  -DM      Pattern (Gait)  step-through  -DM      Deviations/Abnormal Patterns (Gait)  antalgic;uriel decreased;stride length decreased heavy heel strike LLE(NOshoe avail.for L foot);cadenceVaries  -DM      Bilateral Gait Deviations  forward flexed posture;weight shift ability decreased  -DM      Comment (Gait/Stairs)  cues for inc. step length LLE,Avoiding heavy heelstrike, & maintaining midline posture  -DM      Recorded by [DM] Kelly العراقي, PT 12/10/18 1352      Row Name 12/10/18 0915             ADL Assessment/Intervention    85615 - OT Self Care/Mgmt Minutes  18  -AR      BADL Assessment/Intervention  upper body dressing;toileting;lower body dressing  -AR      Recorded by [AR] Abril Suazo, OT 12/10/18 1324      Row Name 12/10/18 0915             Upper Body Dressing Assessment/Training    Comment (Upper Body Dressing)  Pt declined AE and reports family will be available to assist if needed   -AR      Recorded by [AR] Abril Suazo OT 12/10/18 1324      Row Name 12/10/18 0915             Toileting Assessment/Training    [place] Level (Toileting)  toileting skills;supervision;verbal cues  -AR      Assistive Devices (Toileting)  raised toilet seat;grab bar/safety frame  -AR      Recorded by [AR] Abril Suazo OT 12/10/18 1324      Row Name 12/10/18 1213 12/10/18 0915          Motor Skills Assessment/Interventions    Additional Documentation  Balance (Group);Balance Interventions (Group);Therapeutic Exercise (Group);Therapeutic Exercise Interventions (Group)  -DM  Balance (Group);Balance Interventions (Group)  -AR     Recorded by [DM]  Kelly العراقي, PT 12/10/18 1352 [AR] Abril Suazo, OT 12/10/18 1324     Row Name 12/10/18 1213 12/10/18 0915          Therapeutic Exercise    56535 - PT Therapeutic Activity Minutes  30  -DM  --     28515 - OT Therapeutic Activity Minutes  --  5  -AR     Recorded by [DM] Kelly العراقي, PT 12/10/18 1352 [AR] Abril Suazo, OT 12/10/18 1324     Row Name 12/10/18 1213 12/10/18 0915          Therapeutic Exercise    Lower Extremity (Therapeutic Exercise)  gluteal sets;hamstring sets, bilateral;heel slides, bilateral;LAQ (long arc quad), bilateral;marching while seated;quad sets, bilateral;SAQ (short arc quad), bilateral;SLR (straight leg raise), bilateral  -DM  --     Lower Extremity Range of Motion (Therapeutic Exercise)  hip abduction/adduction, bilateral;ankle dorsiflexion/plantar flexion, left  -DM  --     Exercise Type (Therapeutic Exercise)  AAROM (active assistive range of motion);AROM (active range of motion);isometric contraction, static  -DM  --     Position (Therapeutic Exercise)  seated;supine  -DM  --     Sets/Reps (Therapeutic Exercise)  1/10  -DM  --     Comment (Therapeutic Exercise)  rests btwn sets  -DM  Pt states she has theraband (yellow/red/green) at home and declined bands for use in hospital. She declined written HEP.   -AR     Recorded by [DM] Kelly العراقي, PT 12/10/18 1352 [AR] Abril Suazo, OT 12/10/18 1324     Row Name 12/10/18 1213 12/10/18 0915          Balance    Balance  static sitting balance;static standing balance;dynamic sitting balance;dynamic standing balance  -DM  static sitting balance;static standing balance  -AR     Recorded by [DM] Kelly العراقي, PT 12/10/18 1352 [AR] Abril Suazo, OT 12/10/18 1324     Row Name 12/10/18 1213 12/10/18 0915          Static Sitting Balance    Level of [place] (Unsupported Sitting, Static Balance)  independent  -DM  independent  -AR     Sitting Position (Unsupported Sitting, Static Balance)  sitting on  edge of bed  -DM  sitting on edge of bed  -AR     Time Able to Maintain Position (Unsupported Sitting, Static Balance)  more than 5 minutes  -DM  more than 5 minutes  -AR     Recorded by [DM] Kelly العراقي, PT 12/10/18 1352 [AR] Abril Suazo, OT 12/10/18 1324     Row Name 12/10/18 1213             Dynamic Sitting Balance    Level of [place], Reaches Outside Midline (Sitting, Dynamic Balance)  contact guard assist  -DM      Sitting Position, Reaches Outside Midline (Sitting, Dynamic Balance)  sitting on edge of bed  -DM      Comment, Reaches Outside Midline (Sitting, Dynamic Balance)  for scooting ,pt needing cues to maintain NWB RLE on EOB  -DM      Recorded by [DM] Kelly العراقي, PT 12/10/18 1352      Row Name 12/10/18 1213 12/10/18 0915          Static Standing Balance    Level of [place] (Supported Standing, Static Balance)  contact guard assist  -DM  supervision  -AR     Time Able to Maintain Position (Supported Standing, Static Balance)  1 to 2 minutes  -DM  2 to 3 minutes  -AR     Assistive Device Utilized (Supported Standing, Static Balance)  other (see comments) rolling knee wx.  -DM  rolling walker  -AR     Recorded by [DM] Kelly العراقي, PT 12/10/18 1352 [AR] Abril Suazo, OT 12/10/18 1324     Row Name 12/10/18 1213             Dynamic Standing Balance    Level of [place], Reaches Outside Midline (Standing, Dynamic Balance)  minimal assist, 75% patient effort  -DM      Time Able to Maintain Position, Reaches Outside Midline (Standing, Dynamic Balance)  15 to 30 seconds  -DM      Comment, Reaches Outside Midline (Standing, Dynamic Balance)  pt. worked on init. WS FOR turns & backing up w/ AD  -DM      Recorded by [DM] Kelly العراقي, PT 12/10/18 1352      Row Name 12/10/18 1213 12/10/18 0915          Positioning and Restraints    Pre-Treatment Position  in bed  -DM  in bed  -AR     Post Treatment Position  bed  -DM  bed  -AR     In Bed  notified nsg;fowlers;call light  within reach;encouraged to call for assist;exit alarm on;RLE elevated  -DM  supine;call light within reach;encouraged to call for assist;exit alarm on;RLE elevated  -AR     Recorded by [DM] Kelly العراقي, PT 12/10/18 1352 [AR] Abril Suazo, OT 12/10/18 1324     Row Name 12/10/18 1213 12/10/18 0915          Pain Scale: Numbers Pre/Post-Treatment    Pain Scale: Numbers, Pretreatment  7/10  -DM  5/10  -AR     Pain Scale: Numbers, Post-Treatment  9/10  -DM  4/10  -AR     Pain Location - Side  Right  -DM  Right  -AR     Pain Location  foot  -DM  foot  -AR     Pain Intervention(s)  Repositioned;Rest  -DM  Repositioned;Ambulation/increased activity  -AR     Recorded by [DM] Kelly العراقي, PT 12/10/18 1352 [AR] Abrli Suazo, OT 12/10/18 1324     Row Name                Wound 11/26/18 2130 Right lateral plantar diabetic/neuropathic ulceration    Wound - Properties Group Date first assessed: 11/26/18 [KF] Time first assessed: 2130 [KF] Side: Right [KF] Orientation: lateral [KF2] Location: plantar [KF] Type: diabetic/neuropathic ulceration [KF] Recorded by:  [KF] Courtney Buenrostro RN 11/27/18 0323 [KF2] Courtney Buenrostro RN 11/27/18 0325    Row Name                Wound 12/03/18 0940 Right medial gluteal    Wound - Properties Group Date first assessed: 12/03/18 [MR] Time first assessed: 0940 [MR] Side: Right [MR] Orientation: medial [MR] Location: gluteal [MR] Stage, Pressure Injury: Stage 1 [MR], healing/chronic area  Recorded by:  [MR] Michell Marion RN 12/03/18 1016    Row Name                Wound 12/04/18 1202 Right foot incision    Wound - Properties Group Date first assessed: 12/04/18 [LD] Time first assessed: 1202 [LD] Side: Right [LD] Location: foot [LD] Type: incision [LD] Recorded by:  [LD] Prema Mike RN 12/04/18 1202    Row Name 12/10/18 1213             Coping    Observed Emotional State  accepting;calm;cooperative  -DM      Verbalized Emotional State  acceptance  -DM      Recorded by  [DM] Kelly العراقي, PT 12/10/18 1352      Row Name 12/10/18 1213 12/10/18 0915          Plan of Care Review    Plan of Care Reviewed With  patient  -DM  patient  -AR     Recorded by [DM] Kelly العراقي, PT 12/10/18 1352 [AR] Abril Suazo, OT 12/10/18 1324     Row Name 12/10/18 0915             Outcome Summary/Treatment Plan (OT)    Daily Summary of Progress (OT)  progress toward functional goals as expected;prepare for discharge  -AR      Reason for Discharge (OT Discharge Summary)  patient met all goals and outcomes  -AR      Recorded by [AR] Abril Suazo, OT 12/10/18 1324      Row Name 12/10/18 1213             Outcome Summary/Treatment Plan (PT)    Daily Summary of Progress (PT)  progress towards functional goals is fair  -DM      Anticipated Equipment Needs at Discharge (PT)  rolling knee walker  -DM      Anticipated Discharge Disposition (PT)  skilled nursing facility Pt wanting to d/c home, but presents fall risk d/t impulsive  -DM      Recorded by [DM] Kelly العراقي, PT 12/10/18 1352        User Key  (r) = Recorded By, (t) = Taken By, (c) = Cosigned By    Initials Name Effective Dates Discipline    DM Kelly العراقي, PT 06/19/15 -  PT    AR Abril Suazo, OT 06/22/15 -  OT    Prema Aguirre RN 06/16/16 -  Nurse    Michell Soto RN 06/16/16 -  Nurse    Courtney Dale RN 06/30/16 -  Nurse          Wound 11/26/18 2130 Right lateral plantar diabetic/neuropathic ulceration (Active)   Dressing Appearance dry;intact 12/9/2018  9:20 PM   Dressing Care, Wound gauze;elastic bandage 12/9/2018  9:20 PM       Wound 12/03/18 0940 Right medial gluteal (Active)   Dressing Appearance open to air 12/9/2018  9:20 PM   Closure Open to air 12/9/2018  9:20 PM   Dressing Care, Wound open to air 12/9/2018  9:20 PM       Wound 12/04/18 1202 Right foot incision (Active)   Dressing Appearance dry;intact 12/9/2018  9:20 PM   Dressing Care, Wound gauze;elastic bandage 12/9/2018  9:20 PM        PT Rehab Goals     Row Name 12/10/18 1213             Bed Mobility Goal 1 (PT)    Activity/Assistive Device (Bed Mobility Goal 1, PT)  sit to supine/supine to sit  -DM      [place] Level/Cues Needed (Bed Mobility Goal 1, PT)  independent  -DM      Time Frame (Bed Mobility Goal 1, PT)  long term goal (LTG);10 days  -DM      Progress/Outcomes (Bed Mobility Goal 1, PT)  goal ongoing  -DM         Transfer Goal 1 (PT)    Activity/Assistive Device (Transfer Goal 1, PT)  sit-to-stand/stand-to-sit;other (see comments) rolling knee wx  -DM      [place] Level/Cues Needed (Transfer Goal 1, PT)  independent  -DM      Time Frame (Transfer Goal 1, PT)  long term goal (LTG);10 days  -DM      Progress/Outcome (Transfer Goal 1, PT)  goal ongoing  -DM         Gait Training Goal 1 (PT)    Activity/Assistive Device (Gait Training Goal 1, PT)  gait (walking locomotion);assistive device use knee walker  -DM      [place] Level (Gait Training Goal 1, PT)  independent  -DM      Distance (Gait Goal 1, PT)  200  -DM      Time Frame (Gait Training Goal 1, PT)  long term goal (LTG);10 days  -DM      Progress/Outcome (Gait Training Goal 1, PT)  goal revised this date  -DM         Patient Education Goal (PT)    Activity (Patient Education Goal, PT)  HEP  -DM      [place]/Cues/Accuracy (Memory Goal 2, PT)  verbalizes understanding  -DM      Time Frame (Patient Education Goal, PT)  long term goal (LTG);10 days  -DM      Progress/Outcome (Patient Education Goal, PT)  goal ongoing  -DM        User Key  (r) = Recorded By, (t) = Taken By, (c) = Cosigned By    Initials Name Provider Type Discipline    Kelly Tsia, PT Physical Therapist PT          Physical Therapy Education     Title: PT OT SLP Therapies (In Progress)     Topic: Physical Therapy (In Progress)     Point: Mobility training (In Progress)     Learning Progress Summary           Patient MANJIT Sanford D, NR by DARRON at 12/10/2018  1:56 PM    Acceptance, LAURA SARAVIA, VU,NR by   at 12/8/2018 10:28 AM    Eager, E,D, VU,DU,NR by SC at 12/7/2018  1:33 PM    Comment:  reviewed safety wth mobility    Eager, E, VU,DU,NR by SC at 12/4/2018  3:30 PM    Comment:  reviewed nwb r leg    Acceptance, E, NR by ES at 12/2/2018  4:11 PM    Comment:  Reviewed NWB status with pt and educated on importance of following NWB on R LE.  Educated pt on safe transfers on/off knee scooter to be able to maintain NWB.    Acceptance, E,D, NR by ES at 12/1/2018  3:15 PM    Comment:  Educated pt and family on NWB status and importance of maintaining NWB status.  Reviewed sequencing with knee scooter and correct sequencing for transfers    Acceptance, E, NR by JACEY at 11/29/2018 10:15 AM   Family Acceptance, E,D, NR by ES at 12/1/2018  3:15 PM    Comment:  Educated pt and family on NWB status and importance of maintaining NWB status.  Reviewed sequencing with knee scooter and correct sequencing for transfers                   Point: Home exercise program (In Progress)     Learning Progress Summary           Patient Eager, E,D, NR by DM at 12/10/2018  1:56 PM    Acceptance, E,D, VU,NR by  at 12/8/2018 10:28 AM    Eager, E,D, VU,DU,NR by SC at 12/7/2018  1:33 PM    Comment:  reviewed safety wth mobility    Eager, E, VU,DU,NR by SC at 12/4/2018  3:30 PM    Comment:  reviewed nwb r leg    Acceptance, E,D, NR by ES at 12/1/2018  3:15 PM    Comment:  Educated pt and family on NWB status and importance of maintaining NWB status.  Reviewed sequencing with knee scooter and correct sequencing for transfers    Acceptance, E, NR by JACEY at 11/29/2018 10:15 AM   Family Acceptance, E,D, NR by ES at 12/1/2018  3:15 PM    Comment:  Educated pt and family on NWB status and importance of maintaining NWB status.  Reviewed sequencing with knee scooter and correct sequencing for transfers                   Point: Body mechanics (In Progress)     Learning Progress Summary           Patient Eager, E,D, NR by DM at 12/10/2018  1:56 PM     Acceptance, E,D, VU,NR by  at 12/8/2018 10:28 AM    Eager, E,D, VU,DU,NR by SC at 12/7/2018  1:33 PM    Comment:  reviewed safety wth mobility    Eager, E, VU,DU,NR by SC at 12/4/2018  3:30 PM    Comment:  reviewed nwb r leg    Acceptance, E, NR by ES at 12/2/2018  4:11 PM    Comment:  Reviewed NWB status with pt and educated on importance of following NWB on R LE.  Educated pt on safe transfers on/off knee scooter to be able to maintain NWB.    Acceptance, E,D, NR by ES at 12/1/2018  3:15 PM    Comment:  Educated pt and family on NWB status and importance of maintaining NWB status.  Reviewed sequencing with knee scooter and correct sequencing for transfers    Acceptance, E, VU by KG at 11/30/2018 11:46 PM    Acceptance, E, NR by JACEY at 11/29/2018 10:15 AM   Family Acceptance, E,D, NR by ES at 12/1/2018  3:15 PM    Comment:  Educated pt and family on NWB status and importance of maintaining NWB status.  Reviewed sequencing with knee scooter and correct sequencing for transfers                   Point: Precautions (In Progress)     Learning Progress Summary           Patient Eager, E,D, NR by DM at 12/10/2018  1:56 PM    Acceptance, E,D, VU,NR by  at 12/8/2018 10:28 AM    Eager, E,D, VU,DU,NR by SC at 12/7/2018  1:33 PM    Comment:  reviewed safety wth mobility    Eager, E, VU,DU,NR by SC at 12/4/2018  3:30 PM    Comment:  reviewed nwb r leg    Acceptance, E, NR by ES at 12/2/2018  4:11 PM    Comment:  Reviewed NWB status with pt and educated on importance of following NWB on R LE.  Educated pt on safe transfers on/off knee scooter to be able to maintain NWB.    Acceptance, E, VU by KG at 12/1/2018 11:14 PM    Acceptance, E,D, NR by ES at 12/1/2018  3:15 PM    Comment:  Educated pt and family on NWB status and importance of maintaining NWB status.  Reviewed sequencing with knee scooter and correct sequencing for transfers    Acceptance, E, NR by JACEY at 11/29/2018 10:15 AM   Family Acceptance, LAURA SARAVIA, NR by DANIELLE at  12/1/2018  3:15 PM    Comment:  Educated pt and family on NWB status and importance of maintaining NWB status.  Reviewed sequencing with knee scooter and correct sequencing for transfers                               User Key     Initials Effective Dates Name Provider Type Discipline    SC 06/19/15 -  Srinivasan Mcmahon, PT Physical Therapist PT    JACEY 06/19/15 -  Yany Cheek, PT Physical Therapist PT    SH 06/19/15 -  Christina Simon, PT Physical Therapist PT    DM 06/19/15 -  Kelly العراقي, PT Physical Therapist PT    KG 06/16/16 -  Roseann Lazo RN Registered Nurse Nurse    ES 11/13/17 -  Galina Wei, PT Physical Therapist PT                PT Recommendation and Plan  Anticipated Discharge Disposition (PT): skilled nursing facility(Pt wanting to d/c home, but presents fall risk d/t impulsive)  Therapy Frequency (PT Clinical Impression): daily  Outcome Summary/Treatment Plan (PT)  Daily Summary of Progress (PT): progress towards functional goals is fair  Anticipated Equipment Needs at Discharge (PT): rolling knee walker  Anticipated Discharge Disposition (PT): skilled nursing facility(Pt wanting to d/c home, but presents fall risk d/t impulsive)  Plan of Care Reviewed With: patient  Progress: improving  Outcome Summary: Able to amb 120 ft. w/ rolling knee wx (NWB RLE), W/ min A init, then CGA of PT, as well as performing 10 reps ther ex w/ signif amt of cueing for technique & safety during mobil (extremely impulsive/tremulous); recommend S.T. rehab d/t fall risk, but pt wishes to return home; modified distance for gt. goal, otherwise remaining goals are ongoing; noted low HGB ( 8,7)   Outcome Measures     Row Name 12/10/18 1213 12/10/18 0915 12/08/18 0919       How much help from another person do you currently need...    Turning from your back to your side while in flat bed without using bedrails?  4  -DM  --  4  -SH    Moving from lying on back to sitting on the side of a flat bed without  bedrails?  3  -DM  --  4  -SH    Moving to and from a bed to a chair (including a wheelchair)?  3  -DM  --  3  -SH    Standing up from a chair using your arms (e.g., wheelchair, bedside chair)?  3  -DM  --  3  -SH    Climbing 3-5 steps with a railing?  1  -DM  --  1  -SH    To walk in hospital room?  3  -DM  --  3  -SH    AM-PAC 6 Clicks Score  17  -DM  --  18  -SH       How much help from another is currently needed...    Putting on and taking off regular lower body clothing?  --  3  -AR  --    Bathing (including washing, rinsing, and drying)  --  3  -AR  --    Toileting (which includes using toilet bed pan or urinal)  --  3  -AR  --    Putting on and taking off regular upper body clothing  --  3  -AR  --    Taking care of personal grooming (such as brushing teeth)  --  3  -AR  --    Eating meals  --  3  -AR  --    Score  --  18  -AR  --       Functional Assessment    Outcome Measure Options  AM-PAC 6 Clicks Basic Mobility (PT)  -DM  AM-PAC 6 Clicks Daily Activity (OT)  -AR  AM-PAC 6 Clicks Basic Mobility (PT)  -SH      User Key  (r) = Recorded By, (t) = Taken By, (c) = Cosigned By    Initials Name Provider Type     Christina Simon, PT Physical Therapist    Kelly Tsai, PT Physical Therapist    Abril Parker, OT Occupational Therapist         Time Calculation:   PT Charges     Row Name 12/10/18 1403 12/10/18 1213          Time Calculation    Start Time  1213  -DM  --     PT Received On  12/10/18  -DM  --     PT Goal Re-Cert Due Date  12/20/18  -DM  --        Time Calculation- PT    Total Timed Code Minutes- PT  48 minute(s)  -DM  --        Timed Charges    45509 - Gait Training Minutes   --  18  -DM     19221 - PT Therapeutic Activity Minutes  --  30  -DM       User Key  (r) = Recorded By, (t) = Taken By, (c) = Cosigned By    Initials Name Provider Type    Kelly Tsai, PT Physical Therapist        Therapy Suggested Charges     Code   Minutes Charges    96262 (CPT®) Hc Pt Neuromusc Re  Education Ea 15 Min      46679 (CPT®) Hc Pt Ther Proc Ea 15 Min      21319 (CPT®) Hc Gait Training Ea 15 Min 18 1    78551 (CPT®) Hc Pt Therapeutic Act Ea 15 Min 30 2    78900 (CPT®) Hc Pt Manual Therapy Ea 15 Min      13745 (CPT®) Hc Pt Iontophoresis Ea 15 Min      19447 (CPT®) Hc Pt Elec Stim Ea-Per 15 Min      31825 (CPT®) Hc Pt Ultrasound Ea 15 Min      52141 (CPT®) Hc Pt Self Care/Mgmt/Train Ea 15 Min      30642 (CPT®) Hc Pt Prosthetic (S) Train Initial Encounter, Each 15 Min      05502 (CPT®) Hc Pt Orthotic(S)/Prosthetic(S) Encounter, Each 15 Min      02388 (CPT®) Hc Orthotic(S) Mgmt/Train Initial Encounter, Each 15min      Total  48 3        Therapy Charges for Today     Code Description Service Date Service Provider Modifiers Qty    65866069698 HC GAIT TRAINING EA 15 MIN 12/10/2018 Kelly العراقي, PT GP 1    91566261327 HC PT THERAPEUTIC ACT EA 15 MIN 12/10/2018 Kelly العراقي, PT GP 2          PT G-Codes  Outcome Measure Options: AM-PAC 6 Clicks Basic Mobility (PT)  AM-PAC 6 Clicks Score: 17  Score: 18    Kelly العراقي, PT  12/10/2018

## 2018-12-10 NOTE — PAYOR COMM NOTE
"Abril Cast RN Utilization Review [phone]  Fax # [phone]  Ref # [id number]      Please review clinicals for continued inpt stay.     Tashia Leon (63 y.o. Female)     Date of Birth Social Security Number Address Home Phone MRN    1955  [id number] [facility] [address] [phone] [id number]    Jain Marital Status          None        Admission Date Admission Type Admitting Provider Attending Provider Department, Room/Bed    11/26/18 Emergency Geoffrey Stanley MD Sloan, Walker E, MD [address], S386/1    Discharge Date Discharge Disposition Discharge Destination                       Attending Provider:  Geoffrey Stanley MD    Allergies:  Statins, Ancef [Cefazolin], Bactrim [Sulfamethoxazole-trimethoprim], Cephalosporins, Cleocin [Clindamycin Hcl], Clindamycin/lincomycin, Keflex [Cephalexin], Levaquin [Levofloxacin], Lipitor [Atorvastatin], Lisinopril, Losartan Potassium, Oxycodone, Quinolones    Isolation:  None   Infection:  None   Code Status:  CPR    Ht:  167.6 cm (66\")   Wt:  109 kg (240 lb)    Admission Cmt:  None   Principal Problem:  Diabetic ulcer of right foot associated with type 2 diabetes mellitus (CMS/HCC) [E11.621,L97.519]                 Active Insurance as of 11/26/2018     Primary Coverage     Payor Plan Insurance Group Employer/Plan Group    [facility] [employer] [facility] [id number]     Payor Plan Address Payor Plan Phone Number Payor Plan Fax Number Effective Dates    PO    1/16/2018 - None Entered    [facility] [id number]       Subscriber Name Subscriber Birth Date Member ID       TASHIA LEON 1955 [id number]                 Emergency Contacts      (Rel.) Home Phone Work Phone Mobile Phone    Bisi Lucia (Mother) -- -- [phone]            ICU Vital Signs     Row Name 12/10/18 1123 12/10/18 0816 12/10/18 0800 12/10/18 0500 12/09/18 2351       Vitals    Temp  98.1 °F (36.7 °C)  97.9 °F (36.6 °C)  " --  98.3 °F (36.8 °C)  98.8 °F (37.1 °C)    Temp src  Oral  Oral  --  Oral  Oral    Pulse  73  78  --  82  77    Heart Rate Source  Monitor  Monitor  --  Monitor  Monitor    Resp  16  16  --  16  16    Resp Rate Source  Visual  Visual  --  Visual  Visual    BP  139/62  135/63  --  121/57  147/69    BP Location  Left arm  Left arm  --  Left arm  Left arm    BP Method  Automatic  Automatic  --  Automatic  Automatic    Patient Position  Lying  Lying  --  Lying  Lying       Oxygen Therapy    SpO2  98 %  98 %  --  97 %  98 %    Device (Oxygen Therapy)  --  --  room air  --  --    Row Name 12/09/18 2120 12/09/18 1931 12/09/18 1804 12/09/18 1600 12/09/18 1543       Vitals    Temp  --  98.1 °F (36.7 °C)  --  --  98.4 °F (36.9 °C)    Temp src  --  Oral  --  --  Oral    Pulse  --  77  --  --  76    Heart Rate Source  --  Monitor  --  --  Monitor    Resp  --  16  --  --  16    Resp Rate Source  --  Visual  --  --  Visual    BP  --  132/62  --  --  146/67    BP Location  --  Left arm  --  --  Left arm    BP Method  --  Automatic  --  --  Automatic    Patient Position  --  Lying  --  --  Lying       Oxygen Therapy    SpO2  --  95 %  --  --  97 %    Device (Oxygen Therapy)  room air  --  room air  room air  --        Hospital Medications (active)       Dose Frequency Start End    aspirin EC tablet 325 mg 325 mg Daily 11/27/2018     Sig - Route: Take 1 tablet by mouth Daily. - Oral    bisacodyl (DULCOLAX) EC tablet 10 mg 10 mg Daily PRN 12/1/2018     Sig - Route: Take 2 tablets by mouth Daily As Needed for Constipation. - Oral    castor oil-balsam peru (VENELEX) ointment  Every 12 Hours Scheduled 12/3/2018     Sig - Route: Apply  topically to the appropriate area as directed Every 12 (Twelve) Hours. - Topical    Cosign for Ordering: Accepted by Halina Walker DO on 12/3/2018 11:38 AM    dextrose (D50W) 25 g/ 50mL Intravenous Solution 25 g 25 g Every 15 Minutes PRN 11/26/2018     Sig - Route: Infuse 50 mL into a venous catheter  Every 15 (Fifteen) Minutes As Needed for Low Blood Sugar (Blood Sugar Less Than 70). - Intravenous    dextrose (GLUTOSE) oral gel 15 g 15 g Every 15 Minutes PRN 11/26/2018     Sig - Route: Take 15 g by mouth Every 15 (Fifteen) Minutes As Needed for Low Blood Sugar (Blood sugar less than 70). - Oral    diltiaZEM CD (CARDIZEM CD) 24 hr capsule 120 mg 120 mg Nightly 11/27/2018     Sig - Route: Take 1 capsule by mouth Every Night. - Oral    docusate sodium (COLACE) capsule 100 mg 100 mg Daily 12/1/2018     Sig - Route: Take 1 capsule by mouth Daily. - Oral    epoetin rebeca (EPOGEN,PROCRIT) injection 20,000 Units 20,000 Units Weekly 12/8/2018     Sig - Route: Inject 1 mL under the skin into the appropriate area as directed 1 (One) Time Per Week. - Subcutaneous    ertapenem (INVanz) 500 mg in sodium chloride 0.9 % 50 mL IVPB 500 mg Every 24 Hours 11/27/2018 12/9/2018    Sig - Route: Infuse 500 mg into a venous catheter Daily. - Intravenous    ertapenem (INVanz) 500 mg in sodium chloride 0.9 % 50 mL IVPB 500 mg Every 24 Hours 12/10/2018 12/17/2018    Sig - Route: Infuse 500 mg into a venous catheter Daily. - Intravenous    glucagon (human recombinant) (GLUCAGEN DIAGNOSTIC) injection 1 mg 1 mg As Needed 11/26/2018     Sig - Route: Inject 1 mg under the skin into the appropriate area as directed As Needed (Blood Glucose Less Than 70). - Subcutaneous    heparin (porcine) 5000 UNIT/ML injection 5,000 Units 5,000 Units Every 12 Hours Scheduled 11/26/2018     Sig - Route: Inject 1 mL under the skin into the appropriate area as directed Every 12 (Twelve) Hours. - Subcutaneous    HYDROcodone-acetaminophen (NORCO) 7.5-325 MG per tablet 1 tablet 1 tablet Every 6 Hours PRN 12/6/2018     Sig - Route: Take 1 tablet by mouth Every 6 (Six) Hours As Needed for Moderate Pain . - Oral    Non-formulary Exception Code: Patient supplied medication    insulin detemir (LEVEMIR) injection 40 Units 40 Units Every 12 Hours Scheduled 11/27/2018      "Sig - Route: Inject 40 Units under the skin into the appropriate area as directed Every 12 (Twelve) Hours. - Subcutaneous    insulin lispro (humaLOG) injection 0-14 Units 0-14 Units 4 Times Daily With Meals & Nightly 11/26/2018     Sig - Route: Inject 0-14 Units under the skin into the appropriate area as directed 4 (Four) Times a Day With Meals & at Bedtime. - Subcutaneous    Linezolid (ZYVOX) 600 mg 300 mL 600 mg Every 12 Hours 12/7/2018 12/17/2018    Sig - Route: Infuse 300 mL into a venous catheter Every 12 (Twelve) Hours. - Intravenous    mupirocin (BACTROBAN) 2 % cream  Every 24 Hours Scheduled 11/29/2018     Sig - Route: Apply  topically to the appropriate area as directed Daily. - Topical    mupirocin (BACTROBAN) 2 % cream  Every 24 Hours Scheduled 12/5/2018     Sig - Route: Apply  topically to the appropriate area as directed Daily. - Topical    Naloxegol Oxalate (MOVANTIK) tablet 12.5 mg *Patient Supplied Med* 12.5 mg 2 Times Weekly 12/9/2018     Sig - Route: Take 1 tablet by mouth Once per day on Sun Wed. - Oral    Notes to Pharmacy: Wednesday and Sunday    Non-formulary Exception Code: Patient supplied medication    ondansetron (ZOFRAN) injection 4 mg 4 mg Every 6 Hours PRN 11/26/2018     Sig - Route: Infuse 2 mL into a venous catheter Every 6 (Six) Hours As Needed for Nausea or Vomiting. - Intravenous    Linked Group 1:  \"Or\" Linked Group Details        ondansetron (ZOFRAN) tablet 4 mg 4 mg Every 6 Hours PRN 11/26/2018     Sig - Route: Take 1 tablet by mouth Every 6 (Six) Hours As Needed for Nausea or Vomiting. - Oral    Linked Group 1:  \"Or\" Linked Group Details        pantoprazole (PROTONIX) EC tablet 40 mg 40 mg Every Morning 11/27/2018     Sig - Route: Take 1 tablet by mouth Every Morning. - Oral    sodium chloride 0.9 % flush 10 mL 10 mL As Needed 11/26/2018     Sig - Route: Infuse 10 mL into a venous catheter As Needed for Line Care. - Intravenous    Linked Group 2:  \"And\" Linked Group Details     " "   sodium chloride 0.9 % flush 3 mL 3 mL Every 12 Hours Scheduled 11/26/2018     Sig - Route: Infuse 3 mL into a venous catheter Every 12 (Twelve) Hours. - Intravenous    sodium chloride 0.9 % flush 3-10 mL 3-10 mL As Needed 11/26/2018     Sig - Route: Infuse 3-10 mL into a venous catheter As Needed for Line Care. - Intravenous    sodium chloride 0.9 % infusion 9 mL/hr Continuous 12/4/2018     Sig - Route: Infuse 9 mL/hr into a venous catheter Continuous. - Intravenous          Operative/Procedure Notes (last 72 hours) (Notes from 12/7/2018  2:27 PM through 12/10/2018  2:27 PM)     No notes of this type exist for this encounter.           Physician Progress Notes (last 72 hours) (Notes from 12/7/2018  2:27 PM through 12/10/2018  2:27 PM)      Dannei Luna MD at 12/10/2018 11:22 AM             LOS: 14 days    Patient Care Team:  Angelica Tran PA as PCP - General (Internal Medicine)  Denver Baldwin MD as Consulting Physician (Nephrology)    Subjective     No new events    Objective     Vital Signs:  Blood pressure 135/63, pulse 78, temperature 97.9 °F (36.6 °C), temperature source Oral, resp. rate 16, height 167.6 cm (66\"), weight 109 kg (240 lb), SpO2 98 %, not currently breastfeeding.    Flowsheet Rows      First Filed Value   Admission Height  167.6 cm (66\") Documented at 11/26/2018 1315   Admission Weight  109 kg (240 lb) Documented at 11/26/2018 1315          12/09 0701 - 12/10 0700  In: 360 [P.O.:60]  Out: 750 [Urine:750]    Physical Exam:    General Appearance: WD obese WF NAD  Neuro:   awake alert   Psych:  Normal mood and affect  CV:   No edema  Lungs: respirations regular and unlabored  Abdomen: not distended  :  No loya, no palp bladder  Skin: No rash, Warm and dry.  Left foot with dressing in place      Labs:  Results from last 7 days   Lab Units  12/10/18   0604  12/05/18   0637  12/04/18   0639   WBC 10*3/mm3  13.92*  15.15*  12.87*   HEMOGLOBIN g/dL  8.7*  8.4*  8.5*   PLATELETS " 10*3/mm3  497*  444  415     Results from last 7 days   Lab Units  12/10/18   0604  18   0005  18   0637  18   0639   SODIUM mmol/L  138  141  137  140   POTASSIUM mmol/L  4.2  4.4  3.9  3.9   CHLORIDE mmol/L  106  110*  105  107   CO2 mmol/L  24.0  24.0  25.0  20.0   BUN mg/dL  28*  41*  51*  58*   CREATININE mg/dL  3.05*  3.34*  3.38*  3.56*   CALCIUM mg/dL  8.8  8.2*  7.9*  8.1*   PHOSPHORUS mg/dL   --   3.5   --    --    ALBUMIN g/dL   --   2.92*   --    --                    Estimated Creatinine Clearance: 24.2 mL/min (A) (by C-G formula based on SCr of 3.05 mg/dL (H)).        A/P:    CKD4: stable in baseline 3.5-4.0.  Likely secondary to DN. Scr 3.0 stable.     Proteinuria:  Presumed DN.  Ratio 3.1    HTN: stable.     Anemia:  On epogen. .  Transfuse PRN for Hgb < 7    Volume:  Stable.     Diabetic Foot Ulcer    Plan:  Continue with current regimen.     Dannie Luna MD  12/10/18  11:22 AM           Electronically signed by Dannie Luna MD at 12/10/2018 12:05 PM     Geoffrey Stanley MD at 2018  1:48 PM              [facility] Medicine Services  PROGRESS NOTE    Patient Name: Tashia Leon  : 1955  MRN: [id number]    Date of Admission: 2018  Length of Stay: 13  Primary Care Physician: Angelica Tran PA    Subjective   Subjective     CC:  Diabetic foot ulcer     HPI:    Denies pain. No fever or chills. OK with tunneled catheter if needed.    Review of Systems  Gen- No fevers, chills  CV- No chest pain, palpitations  Resp- No cough, dyspnea  GI- No N/V/D, abd pain    Otherwise ROS is negative except as mentioned in the HPI.    Objective   Objective     Vital Signs:   Temp:  [98 °F (36.7 °C)-98.8 °F (37.1 °C)] 98.5 °F (36.9 °C)  Heart Rate:  [67-75] 75  Resp:  [16] 16  BP: (122-148)/(58-86) 122/58        Physical Exam:  Constitutional -no acute distress, in bed  HEENT-NCAT, mucous membranes moist  CV-RRR, no  MRG  Resp-CTAB  Abd-soft, non-tender, non-distended, normo active bowel sounds; overweight  Ext-right foot re-wrapped since yesterday, toes warm  Neuro-alert and oriented, speech clear, moves all extremities   Psych-normal affect  Skin- No rash on exposed UE or LE bilaterally      Results Reviewed:  I have personally reviewed current lab, radiology, and data and agree.    Results from last 7 days   Lab Units  12/05/18   0637  12/04/18   0639  12/03/18   0557   WBC 10*3/mm3  15.15*  12.87*  12.24*   HEMOGLOBIN g/dL  8.4*  8.5*  8.3*   HEMATOCRIT %  27.1*  27.3*  26.9*   PLATELETS 10*3/mm3  444  415  435     Results from last 7 days   Lab Units  12/08/18   0005  12/05/18   0637  12/04/18   0639   SODIUM mmol/L  141  137  140   POTASSIUM mmol/L  4.4  3.9  3.9   CHLORIDE mmol/L  110*  105  107   CO2 mmol/L  24.0  25.0  20.0   BUN mg/dL  41*  51*  58*   CREATININE mg/dL  3.34*  3.38*  3.56*   GLUCOSE mg/dL  133*  135*  202*   CALCIUM mg/dL  8.2*  7.9*  8.1*     Estimated Creatinine Clearance: 22.1 mL/min (A) (by C-G formula based on SCr of 3.34 mg/dL (H)).  No results found for: BNP    Microbiology Results Abnormal     Procedure Component Value - Date/Time    Fungus Culture - Tissue, Toe, Right [[id number]] Collected:  12/04/18 1207    Lab Status:  Preliminary result Specimen:  Tissue from Toe, Right Updated:  12/09/18 1315     Fungus Culture No fungus isolated at less than 1 week    AFB Culture - Tissue, Toe, Right [[id number]] Collected:  12/04/18 1207    Lab Status:  Preliminary result Specimen:  Tissue from Toe, Right Updated:  12/09/18 1315     AFB Culture No AFB isolated at less than 1 week     AFB Stain No acid fast bacilli seen on concentrated smear    Tissue / Bone Culture - Tissue, Toe, Right [[id number]] Collected:  12/04/18 1207    Lab Status:  Final result Specimen:  Tissue from Toe, Right Updated:  12/08/18 0850     Tissue Culture No growth at 4 days     Gram Stain No WBCs or organisms seen    Anaerobic  Culture - Tissue, Toe, Right [[id number]] Collected:  12/04/18 1207    Lab Status:  Preliminary result Specimen:  Tissue from Toe, Right Updated:  12/05/18 1417     Culture No anaerobes isolated    Eosinophil Smear - Urine, Urine, Clean Catch [[id number]]  (Normal) Collected:  12/01/18 1629    Lab Status:  Final result Specimen:  Urine, Clean Catch Updated:  12/01/18 1710     Eosinophil Smear 0 % EOS/100 Cells     Narrative:       No eosinophil seen    Blood Culture - Blood, Arm, Left [[id number]] Collected:  11/26/18 1625    Lab Status:  Final result Specimen:  Blood from Arm, Left Updated:  12/01/18 1645     Blood Culture No growth at 5 days    Blood Culture - Blood, Arm, Right [[id number]] Collected:  11/26/18 1620    Lab Status:  Final result Specimen:  Blood from Arm, Right Updated:  12/01/18 1645     Blood Culture No growth at 5 days          Imaging Results (last 24 hours)     ** No results found for the last 24 hours. **        Results for orders placed during the hospital encounter of 07/15/16   Adult transthoracic echo complete    Narrative · Left ventricular function is normal. Estimated EF = 55%.  · Trace mitral valve regurgitation is present.  · Trace tricuspid valve regurgitation is present          I have reviewed the medications.      aspirin  mg Oral Daily   castor oil-balsam peru  Topical Q12H   diltiaZEM  mg Oral Nightly   docusate sodium 100 mg Oral Daily   epoetin rebeca 20,000 Units Subcutaneous Weekly   ertapenem 500 mg Intravenous Q24H   heparin (porcine) 5,000 Units Subcutaneous Q12H   insulin detemir 40 Units Subcutaneous Q12H   insulin lispro 0-14 Units Subcutaneous 4x Daily With Meals & Nightly   Linezolid 600 mg Intravenous Q12H   mupirocin  Topical Q24H   mupirocin  Topical Q24H   Naloxegol Oxalate 12.5 mg Oral Once per day on Sun Wed   pantoprazole 40 mg Oral QAM   sodium chloride 3 mL Intravenous Q12H         Assessment/Plan   Assessment / Plan     Active Hospital Problems     Diagnosis   • **Diabetic ulcer of right foot associated with type 2 diabetes mellitus (CMS/HCC)   • Sepsis (CMS/HCC)   • Cellulitis   • Hypokalemia   • Anemia, chronic disease   • Obesity (BMI 30-39.9)   • Impaired functional mobility, balance, gait, and endurance     Added automatically from request for surgery [id number]     • Diabetic ulcer of right midfoot associated with type 2 diabetes mellitus, with necrosis of bone (CMS/HCC)     Added automatically from request for surgery [id number]     • Cellulitis of toe of right foot     Added automatically from request for surgery [id number]     • Chronic osteomyelitis of right foot with draining sinus (CMS/HCC)     Added automatically from request for surgery [id number]     • Chronic kidney disease, stage V (CMS/HCC)   • Hyperlipidemia   • Diabetes mellitus type 2, uncontrolled, with complications (CMS/HCC)     Proteinuria noted, January 2014.       • Diabetic peripheral neuropathy (CMS/[facility])   • Essential hypertension   • Coronary artery disease involving native coronary artery of native heart without angina pectoris     1. Coronary artery disease:  a. Abnormal cardiac PET, 10/02/2013, Dr. Becker, revealing a posterolateral defect with a mild wall motion abnormality and a normal LVEF.  b. Cardiac catheterization, 10/21/2013, with placement of a 2.5 x 15 mm Xience Expedition drug-eluting stent to the proximal circumflex.  LVEF of 55% to 60%.  Noncritical disease in the LAD and right coronary.  c. Echocardiogram, 12/18/2013, shows an LVEF 50% to 55% with trace MR and mild TR.               Brief Hospital Course to date:  1.  Sepsis, now resolved     2.  Diabetic foot ulcer with cellulitis right foot       -s/p debridement and right 1st toe/ metarsal amputation with Dr. Patiño 12/4              -continue invanz and zyvox              -pain control- norco changed to prn q6h; dc'd dilaudid               - consider tunneled catheter              -Scooter to offload foot has been  ordered and is at the bedside.  Patient has seen PT who taught her appropriate use but are concerned about her weakness and stability.        3.   Type 2 diabetes mellitus with diabetic peripheral neuropathy              -a1c 7.6              -levemir 40 units bid, continue               -continue correction insulin, eating less while admitted     4.  CKDV              -nephrology following,  renal function limits abx choices               -Creatinine stable, continues off home rx lasix; euvolemic                 5.  Hypokalemia              -replaced by nephrology as needed     6. Obesity BMI 38: Weight loss recommended     7.  HTN: Currently normotensive     8.  CAD: Asymptomatic, aspirin     9.  Diabetic peripheral neuropathy: Symptomatic treatment and supportive care     10.  Anemia of chronic disease                 11.  Severe anxiety:improved with clonazepam. Not needed past couple of days      12.  Debility: Difficulty walking with needed scooter.  Therapy recommended rehabilitation facility for appropriate care and treatment.       DVT Prophylaxis:  Heparin      CODE STATUS:   Code Status and Medical Interventions:   Ordered at: 11/26/18 2126     Level Of Support Discussed With:    Patient     Code Status:    CPR     Medical Interventions (Level of Support Prior to Arrest):    Full       Disposition: I expect the patient to be discharged to LTAC/Rehab, possibly Monday.      Electronically signed by Geoffrey Stanley MD, 12/09/18, 1:48 PM.        Electronically signed by Geoffrey Stanley MD at 12/9/2018  1:53 PM     Manny Guerrero MD at 12/9/2018 11:47 AM             LOS: 13 days    Patient Care Team:  Angelica Tran PA as PCP - General (Internal Medicine)  Denver Baldwin MD as Consulting Physician (Nephrology)    Subjective     No new events    Objective     Vital Signs:  Blood pressure 122/58, pulse 75, temperature 98.5 °F (36.9 °C), temperature source Oral, resp. rate 16, height 167.6  "cm (66\"), weight 109 kg (240 lb), SpO2 97 %, not currently breastfeeding.    Flowsheet Rows      First Filed Value   Admission Height  167.6 cm (66\") Documented at 11/26/2018 1315   Admission Weight  109 kg (240 lb) Documented at 11/26/2018 1315          12/08 0701 - 12/09 0700  In: 480 [P.O.:480]  Out: 600 [Urine:600]    Physical Exam:    General Appearance: WD obese WF NAD  Neuro:   awake alert   Psych:  Normal mood and affect  CV:   No edema  Lungs: respirations regular and unlabored  Abdomen: not distended  :  No loya, no palp bladder  Skin: No rash, Warm and dry.  Left foot with dressing in place      Labs:  Results from last 7 days   Lab Units  12/05/18   0637  12/04/18   0639  12/03/18   0557   WBC 10*3/mm3  15.15*  12.87*  12.24*   HEMOGLOBIN g/dL  8.4*  8.5*  8.3*   PLATELETS 10*3/mm3  444  415  435     Results from last 7 days   Lab Units  12/08/18   0005  12/05/18   0637  12/04/18   0639  12/03/18   0557   SODIUM mmol/L  141  137  140  138   POTASSIUM mmol/L  4.4  3.9  3.9  3.6   CHLORIDE mmol/L  110*  105  107  102   CO2 mmol/L  24.0  25.0  20.0  24.0   BUN mg/dL  41*  51*  58*  66*   CREATININE mg/dL  3.34*  3.38*  3.56*  3.91*   CALCIUM mg/dL  8.2*  7.9*  8.1*  8.0*   PHOSPHORUS mg/dL  3.5   --    --    --    ALBUMIN g/dL  2.92*   --    --    --                    Estimated Creatinine Clearance: 22.1 mL/min (A) (by C-G formula based on SCr of 3.34 mg/dL (H)).        A/P:    CKD4: stable in baseline 3.5-4.0.  Thought due to DM in the past.    Proteinuria:  Presumed DN.  Ratio 3.1    HTN: stable.     Anemia:  Hgb low.  Tsat 20% S/P IVFe.  will give epogen. .  Transfuse PRN for Hgb < 7    Volume:  Stable.     Diabetic Foot Ulcer    Manny Guerrero MD  12/09/18  11:47 AM           Electronically signed by Manny Guerrero MD at 12/10/2018 12:13 AM     Geoffrey Stanley MD at 12/8/2018  3:26 PM              [facility] Medicine Services  PROGRESS NOTE    Patient Name: " Tashia Leon  : 1955  MRN: [id number]    Date of Admission: 2018  Length of Stay: 12  Primary Care Physician: Angelica Tran PA    Subjective   Subjective     CC:  Diabetic foot ulcer     HPI:    Denies current pain. Eating well, no nausea    Review of Systems  Gen- No fevers, chills  CV- No chest pain, palpitations  Resp- No cough, dyspnea  GI- No N/V/D, abd pain    Otherwise ROS is negative except as mentioned in the HPI.    Objective   Objective     Vital Signs:   Temp:  [98.2 °F (36.8 °C)-98.7 °F (37.1 °C)] 98.7 °F (37.1 °C)  Heart Rate:  [73-85] 73  Resp:  [16-18] 16  BP: (122-151)/(57-69) 122/57        Physical Exam:  Constitutional -no acute distress, in bed  HEENT-NCAT, mucous membranes moist  CV-RRR, no MRG  Resp-CTAB  Abd-soft, non-tender, non-distended, normo active bowel sounds; overweight  Ext-right foot with clean wraps  Neuro-alert and oriented, speech clear, moves all extremities   Psych-normal affect   Skin- No rash on exposed UE or LE bilaterally      Results Reviewed:  I have personally reviewed current lab, radiology, and data and agree.    Results from last 7 days   Lab Units  18   0637  18   0639  18   0557   WBC 10*3/mm3  15.15*  12.87*  12.24*   HEMOGLOBIN g/dL  8.4*  8.5*  8.3*   HEMATOCRIT %  27.1*  27.3*  26.9*   PLATELETS 10*3/mm3  444  415  435     Results from last 7 days   Lab Units  18   0005  18   0637  18   0639   SODIUM mmol/L  141  137  140   POTASSIUM mmol/L  4.4  3.9  3.9   CHLORIDE mmol/L  110*  105  107   CO2 mmol/L  24.0  25.0  20.0   BUN mg/dL  41*  51*  58*   CREATININE mg/dL  3.34*  3.38*  3.56*   GLUCOSE mg/dL  133*  135*  202*   CALCIUM mg/dL  8.2*  7.9*  8.1*     Estimated Creatinine Clearance: 22.1 mL/min (A) (by C-G formula based on SCr of 3.34 mg/dL (H)).  No results found for: BNP    Microbiology Results Abnormal     Procedure Component Value - Date/Time    Tissue / Bone Culture - Tissue, Toe, Right  [[id number]] Collected:  12/04/18 1207    Lab Status:  Final result Specimen:  Tissue from Toe, Right Updated:  12/08/18 0850     Tissue Culture No growth at 4 days     Gram Stain No WBCs or organisms seen    Anaerobic Culture - Tissue, Toe, Right [[id number]] Collected:  12/04/18 1207    Lab Status:  Preliminary result Specimen:  Tissue from Toe, Right Updated:  12/05/18 1417     Culture No anaerobes isolated    AFB Culture - Tissue, Toe, Right [[id number]] Collected:  12/04/18 1207    Lab Status:  Preliminary result Specimen:  Tissue from Toe, Right Updated:  12/05/18 1320     AFB Stain No acid fast bacilli seen on concentrated smear    Eosinophil Smear - Urine, Urine, Clean Catch [[id number]]  (Normal) Collected:  12/01/18 1629    Lab Status:  Final result Specimen:  Urine, Clean Catch Updated:  12/01/18 1710     Eosinophil Smear 0 % EOS/100 Cells     Narrative:       No eosinophil seen    Blood Culture - Blood, Arm, Left [[id number]] Collected:  11/26/18 1625    Lab Status:  Final result Specimen:  Blood from Arm, Left Updated:  12/01/18 1645     Blood Culture No growth at 5 days    Blood Culture - Blood, Arm, Right [[id number]] Collected:  11/26/18 1620    Lab Status:  Final result Specimen:  Blood from Arm, Right Updated:  12/01/18 1645     Blood Culture No growth at 5 days          Imaging Results (last 24 hours)     ** No results found for the last 24 hours. **        Results for orders placed during the hospital encounter of 07/15/16   Adult transthoracic echo complete    Narrative · Left ventricular function is normal. Estimated EF = 55%.  · Trace mitral valve regurgitation is present.  · Trace tricuspid valve regurgitation is present          I have reviewed the medications.      aspirin  mg Oral Daily   castor oil-balsam peru  Topical Q12H   diltiaZEM  mg Oral Nightly   docusate sodium 100 mg Oral Daily   epoetin rebeca 20,000 Units Subcutaneous Weekly   ertapenem 500 mg Intravenous Q24H   heparin  (porcine) 5,000 Units Subcutaneous Q12H   insulin detemir 40 Units Subcutaneous Q12H   insulin lispro 0-14 Units Subcutaneous 4x Daily With Meals & Nightly   Linezolid 600 mg Intravenous Q12H   mupirocin  Topical Q24H   mupirocin  Topical Q24H   [START ON 12/9/2018] Naloxegol Oxalate 12.5 mg Oral Once per day on Sun Wed   pantoprazole 40 mg Oral QAM   sodium chloride 3 mL Intravenous Q12H         Assessment/Plan   Assessment / Plan     Active Hospital Problems    Diagnosis   • **Diabetic ulcer of right foot associated with type 2 diabetes mellitus (CMS/HCC)   • Sepsis (CMS/HCC)   • Cellulitis   • Hypokalemia   • Anemia, chronic disease   • Obesity (BMI 30-39.9)   • Impaired functional mobility, balance, gait, and endurance     Added automatically from request for surgery [id number]     • Diabetic ulcer of right midfoot associated with type 2 diabetes mellitus, with necrosis of bone (CMS/HCC)     Added automatically from request for surgery [id number]     • Cellulitis of toe of right foot     Added automatically from request for surgery [id number]     • Chronic osteomyelitis of right foot with draining sinus (CMS/HCC)     Added automatically from request for surgery [id number]     • Chronic kidney disease, stage V (CMS/HCC)   • Hyperlipidemia   • Diabetes mellitus type 2, uncontrolled, with complications (CMS/HCC)     Proteinuria noted, January 2014.       • Diabetic peripheral neuropathy (CMS/HCC)   • Essential hypertension   • Coronary artery disease involving native coronary artery of native heart without angina pectoris     2. Coronary artery disease:  a. Abnormal cardiac PET, 10/02/2013, Dr. Becker, revealing a posterolateral defect with a mild wall motion abnormality and a normal LVEF.  b. Cardiac catheterization, 10/21/2013, with placement of a 2.5 x 15 mm Xience Expedition drug-eluting stent to the proximal circumflex.  LVEF of 55% to 60%.  Noncritical disease in the LAD and right coronary.  c. Echocardiogram,  12/18/2013, shows an LVEF 50% to 55% with trace MR and mild TR.               Brief Hospital Course to date:  1.  Sepsis, now resolved     2.  Diabetic foot ulcer with cellulitis right foot       -s/p debridement and right 1st toe/ metarsal amputation with Dr. Patiño 12/4              -wound care received silver wound gel topical treatment, dressings              -continue invanz and zyvox              -pain control- norco changed to prn q6h; dc'd dilaudid              -Scooter to offload foot has been ordered and is at the bedside.  Patient has seen PT who taught her appropriate use but are concerned about her weakness and stability.               3.   Type 2 diabetes mellitus with diabetic peripheral neuropathy              -a1c 7.6              -levemir 40 units bid, continue               -continue correction insulin, eating less while admitted     4.  CKDV              -nephrology following,  renal function limits abx choices               -Creatinine stable, continues off home rx lasix; euvolemic                 5.  Hypokalemia              -replaced by nephrology as needed        6. Obesity BMI 38: Weight loss recommended     7.  HTN: Currently normotensive     8.  CAD: Asymptomatic, aspirin     9.  Diabetic peripheral neuropathy: Symptomatic treatment and supportive care     10.  Anemia of chronic disease                 11.  Severe anxiety:improved with clonazepam. Not needed past couple of days      12.  Debility: Difficulty walking with needed scooter.  Therapy recommended rehabilitation facility for appropriate care and treatment.       DVT Prophylaxis:  Heparin      CODE STATUS:   Code Status and Medical Interventions:   Ordered at: 11/26/18 2126     Level Of Support Discussed With:    Patient     Code Status:    CPR     Medical Interventions (Level of Support Prior to Arrest):    Full       Disposition: I expect the patient to be discharged to LTAC/Rehab, possibly Monday.      Electronically signed by  "Geoffrey Stanley MD, 12/08/18, 3:26 PM.        Electronically signed by Geoffrey Stanley MD at 12/8/2018  3:28 PM     Manny Guerrero MD at 12/8/2018 10:44 AM             LOS: 12 days    Patient Care Team:  Angelica Tran PA as PCP - General (Internal Medicine)  Denver Baldwin MD as Consulting Physician (Nephrology)    Subjective     No new events    Objective     Vital Signs:  Blood pressure 126/60, pulse 73, temperature 98.2 °F (36.8 °C), temperature source Oral, resp. rate 16, height 167.6 cm (66\"), weight 109 kg (240 lb), SpO2 96 %, not currently breastfeeding.    Flowsheet Rows      First Filed Value   Admission Height  167.6 cm (66\") Documented at 11/26/2018 1315   Admission Weight  109 kg (240 lb) Documented at 11/26/2018 1315          12/07 0701 - 12/08 0700  In: 960 [P.O.:660]  Out: 400 [Urine:400]    Physical Exam:    General Appearance: WD obese WF NAD  Neuro:   awake alert   Psych:  Normal mood and affect  CV:   No edema  Lungs: respirations regular and unlabored  Abdomen: not distended  :  No loya, no palp bladder  Skin: No rash, Warm and dry      Labs:  Results from last 7 days   Lab Units  12/05/18   0637  12/04/18   0639  12/03/18   0557   WBC 10*3/mm3  15.15*  12.87*  12.24*   HEMOGLOBIN g/dL  8.4*  8.5*  8.3*   PLATELETS 10*3/mm3  444  415  435     Results from last 7 days   Lab Units  12/08/18   0005  12/05/18   0637  12/04/18   0639  12/03/18   0557   SODIUM mmol/L  141  137  140  138   POTASSIUM mmol/L  4.4  3.9  3.9  3.6   CHLORIDE mmol/L  110*  105  107  102   CO2 mmol/L  24.0  25.0  20.0  24.0   BUN mg/dL  41*  51*  58*  66*   CREATININE mg/dL  3.34*  3.38*  3.56*  3.91*   CALCIUM mg/dL  8.2*  7.9*  8.1*  8.0*   PHOSPHORUS mg/dL  3.5   --    --    --    ALBUMIN g/dL  2.92*   --    --    --              Results from last 7 days   Lab Units  12/01/18   1629   COLOR UA   Yellow   CLARITY UA   Cloudy*   PH, URINE   <=5.0   SPECIFIC GRAVITY, URINE   1.018   GLUCOSE UA   250 " mg/dL (1+)*   KETONES UA   Negative   BILIRUBIN UA   Negative   PROTEIN UA   >=300 mg/dL (3+)*   BLOOD UA   Moderate (2+)*   LEUKOCYTES UA   Negative   NITRITE UA   Negative       Estimated Creatinine Clearance: 22.1 mL/min (A) (by C-G formula based on SCr of 3.34 mg/dL (H)).        A/P:    CKD4: stable in baseline 3.5-4.0.  Thought due to DM in the past.    Proteinuria:  Presumed DN.  Ratio 3.1    HTN: stable.     Anemia:  Hgb low.  Tsat 20% S/P IVFe.  will give epogen. .  Transfuse PRN for Hgb < 7    Diabetic Foot Ulcer    Manny Guerrero MD  18  10:44 AM           Electronically signed by Manny Guerrero MD at 2018  2:05 PM     Geoffrey Stanley MD at 2018  2:58 PM              [facility] Medicine Services  PROGRESS NOTE    Patient Name: Tashia Leon  : 1955  MRN: [id number]    Date of Admission: 2018  Length of Stay: 11  Primary Care Physician: Angelica Tran PA    Subjective   Subjective     CC:  Diabetic foot ulcer     HPI:  Has been trying not to put weight on foot. Agreeable to rehab/LTAC    Review of Systems  Gen- No fevers, chills  CV- No chest pain, palpitations  Resp- No cough, dyspnea  GI- No N/V/D, abd pain    Otherwise ROS is negative except as mentioned in the HPI.    Objective   Objective     Vital Signs:   Temp:  [98.1 °F (36.7 °C)-98.9 °F (37.2 °C)] 98.8 °F (37.1 °C)  Heart Rate:  [70-78] 70  Resp:  [16] 16  BP: (113-158)/(51-67) 116/56        Physical Exam:  Constitutional -no acute distress, non toxic, in bed  HEENT-NCAT, mucous membranes moist  CV-RRR, S1 S2 normal, no m/r/g  Resp-CTAB, no wheezes, rhonchi or rales  Abd-soft, non-tender, non-distended, normo active bowel sounds; overweight  Ext-right foot with clean wraps  Neuro-alert and oriented, speech clear, moves all extremities   Psych-normal affect   Skin- No rash on exposed UE or LE bilaterally      Results Reviewed:  I have personally reviewed current lab,  radiology, and data and agree.    Results from last 7 days   Lab Units  12/05/18   0637  12/04/18   0639  12/03/18   0557   WBC 10*3/mm3  15.15*  12.87*  12.24*   HEMOGLOBIN g/dL  8.4*  8.5*  8.3*   HEMATOCRIT %  27.1*  27.3*  26.9*   PLATELETS 10*3/mm3  444  415  435     Results from last 7 days   Lab Units  12/05/18   0637  12/04/18   0639  12/03/18   0557   SODIUM mmol/L  137  140  138   POTASSIUM mmol/L  3.9  3.9  3.6   CHLORIDE mmol/L  105  107  102   CO2 mmol/L  25.0  20.0  24.0   BUN mg/dL  51*  58*  66*   CREATININE mg/dL  3.38*  3.56*  3.91*   GLUCOSE mg/dL  135*  202*  173*   CALCIUM mg/dL  7.9*  8.1*  8.0*     Estimated Creatinine Clearance: 21.9 mL/min (A) (by C-G formula based on SCr of 3.38 mg/dL (H)).  No results found for: BNP    Microbiology Results Abnormal     Procedure Component Value - Date/Time    Tissue / Bone Culture - Tissue, Toe, Right [[id number]] Collected:  12/04/18 1207    Lab Status:  Preliminary result Specimen:  Tissue from Toe, Right Updated:  12/07/18 0819     Tissue Culture No growth at 3 days     Gram Stain No WBCs or organisms seen    Anaerobic Culture - Tissue, Toe, Right [[id number]] Collected:  12/04/18 1207    Lab Status:  Preliminary result Specimen:  Tissue from Toe, Right Updated:  12/05/18 1417     Culture No anaerobes isolated    AFB Culture - Tissue, Toe, Right [[id number]] Collected:  12/04/18 1207    Lab Status:  Preliminary result Specimen:  Tissue from Toe, Right Updated:  12/05/18 1320     AFB Stain No acid fast bacilli seen on concentrated smear    Eosinophil Smear - Urine, Urine, Clean Catch [[id number]]  (Normal) Collected:  12/01/18 1629    Lab Status:  Final result Specimen:  Urine, Clean Catch Updated:  12/01/18 1710     Eosinophil Smear 0 % EOS/100 Cells     Narrative:       No eosinophil seen    Blood Culture - Blood, Arm, Left [[id number]] Collected:  11/26/18 1625    Lab Status:  Final result Specimen:  Blood from Arm, Left Updated:  12/01/18 1642     Blood  Culture No growth at 5 days    Blood Culture - Blood, Arm, Right [[id number]] Collected:  11/26/18 1620    Lab Status:  Final result Specimen:  Blood from Arm, Right Updated:  12/01/18 1645     Blood Culture No growth at 5 days          Imaging Results (last 24 hours)     ** No results found for the last 24 hours. **        Results for orders placed during the hospital encounter of 07/15/16   Adult transthoracic echo complete    Narrative · Left ventricular function is normal. Estimated EF = 55%.  · Trace mitral valve regurgitation is present.  · Trace tricuspid valve regurgitation is present          I have reviewed the medications.      aspirin  mg Oral Daily   castor oil-balsam peru  Topical Q12H   diltiaZEM  mg Oral Nightly   docusate sodium 100 mg Oral Daily   ertapenem 500 mg Intravenous Q24H   heparin (porcine) 5,000 Units Subcutaneous Q12H   insulin detemir 40 Units Subcutaneous Q12H   insulin lispro 0-14 Units Subcutaneous 4x Daily With Meals & Nightly   Linezolid 600 mg Intravenous Q12H   mupirocin  Topical Q24H   mupirocin  Topical Q24H   [START ON 12/9/2018] Naloxegol Oxalate 12.5 mg Oral Once per day on Sun Wed   pantoprazole 40 mg Oral QAM   sodium chloride 3 mL Intravenous Q12H         Assessment/Plan   Assessment / Plan     Active Hospital Problems    Diagnosis   • **Diabetic ulcer of right foot associated with type 2 diabetes mellitus (CMS/HCC)   • Sepsis (CMS/HCC)   • Cellulitis   • Hypokalemia   • Anemia, chronic disease   • Obesity (BMI 30-39.9)   • Impaired functional mobility, balance, gait, and endurance     Added automatically from request for surgery [id number]     • Diabetic ulcer of right midfoot associated with type 2 diabetes mellitus, with necrosis of bone (CMS/HCC)     Added automatically from request for surgery [id number]     • Cellulitis of toe of right foot     Added automatically from request for surgery [id number]     • Chronic osteomyelitis of right foot with draining sinus  (CMS/[facility])     Added automatically from request for surgery [id number]     • Chronic kidney disease, stage V (CMS/[facility])   • Hyperlipidemia   • Diabetes mellitus type 2, uncontrolled, with complications (CMS/[facility])     Proteinuria noted, January 2014.       • Diabetic peripheral neuropathy (CMS/[facility])   • Essential hypertension   • Coronary artery disease involving native coronary artery of native heart without angina pectoris     3. Coronary artery disease:  a. Abnormal cardiac PET, 10/02/2013, Dr. Becker, revealing a posterolateral defect with a mild wall motion abnormality and a normal LVEF.  b. Cardiac catheterization, 10/21/2013, with placement of a 2.5 x 15 mm Xience Expedition drug-eluting stent to the proximal circumflex.  LVEF of 55% to 60%.  Noncritical disease in the LAD and right coronary.  c. Echocardiogram, 12/18/2013, shows an LVEF 50% to 55% with trace MR and mild TR.               Brief Hospital Course to date:  1.  Sepsis, now resolved     2.  Diabetic foot ulcer with cellulitis right foot       -s/p debridement and right 1st toe/ metarsal amputation with Dr. Patiño 12/4              -wound care received silver wound gel topical treatment, dressings              -continue invanz and zyvox              -pain control- norco changed to prn q6h; dc'd dilaudid              -Scooter to offload foot has been ordered and is at the bedside.  Patient has seen PT who taught her appropriate use but are concerned about her weakness and stability.               3.   Type 2 diabetes mellitus with diabetic peripheral neuropathy              -a1c 7.6              -levemir 40 units bid, continue               -continue correction insulin, eating less while admitted     4.  CKDV              -nephrology following,  renal function limits abx choices               -renal dysfunction is very advanced              -Creatinine stable, continues off home rx lasix; euvolemic                 5.   Hypokalemia              -replaced by nephrology as needed        6. Obesity BMI 38: Weight loss recommended     7.  HTN: Currently normotensive     8.  CAD: Asymptomatic, aspirin     9.  Diabetic peripheral neuropathy: Symptomatic treatment and supportive care     10.  Anemia of chronic disease                 11.  Severe anxiety:improved with clonazepam. Not needed past couple of days      12.  Debility: Difficulty walking with needed scooter.  Therapy recommended rehabilitation facility for appropriate care and treatment.       DVT Prophylaxis:  Heparin      CODE STATUS:   Code Status and Medical Interventions:   Ordered at: 11/26/18 2126     Level Of Support Discussed With:    Patient     Code Status:    CPR     Medical Interventions (Level of Support Prior to Arrest):    Full       Disposition: I expect the patient to be discharged home with HH, adamantly refuses HH      Electronically signed by Geoffrey Stanley MD, 12/07/18, 2:58 PM.        Electronically signed by Geoffrey Stanley MD at 12/7/2018  3:02 PM       Consult Notes (last 72 hours) (Notes from 12/7/2018  2:27 PM through 12/10/2018  2:27 PM)     No notes of this type exist for this encounter.

## 2018-12-10 NOTE — PROGRESS NOTES
"   LOS: 14 days    Patient Care Team:  Angelica Tran PA as PCP - General (Internal Medicine)  Denver Baldwin MD as Consulting Physician (Nephrology)    Subjective     No new events    Objective     Vital Signs:  Blood pressure 135/63, pulse 78, temperature 97.9 °F (36.6 °C), temperature source Oral, resp. rate 16, height 167.6 cm (66\"), weight 109 kg (240 lb), SpO2 98 %, not currently breastfeeding.    Flowsheet Rows      First Filed Value   Admission Height  167.6 cm (66\") Documented at 11/26/2018 1315   Admission Weight  109 kg (240 lb) Documented at 11/26/2018 1315          12/09 0701 - 12/10 0700  In: 360 [P.O.:60]  Out: 750 [Urine:750]    Physical Exam:    General Appearance: WD obese WF NAD  Neuro:   awake alert   Psych:  Normal mood and affect  CV:   No edema  Lungs: respirations regular and unlabored  Abdomen: not distended  :  No loya, no palp bladder  Skin: No rash, Warm and dry.  Left foot with dressing in place      Labs:  Results from last 7 days   Lab Units  12/10/18   0604  12/05/18   0637  12/04/18   0639   WBC 10*3/mm3  13.92*  15.15*  12.87*   HEMOGLOBIN g/dL  8.7*  8.4*  8.5*   PLATELETS 10*3/mm3  497*  444  415     Results from last 7 days   Lab Units  12/10/18   0604  12/08/18   0005  12/05/18   0637  12/04/18   0639   SODIUM mmol/L  138  141  137  140   POTASSIUM mmol/L  4.2  4.4  3.9  3.9   CHLORIDE mmol/L  106  110*  105  107   CO2 mmol/L  24.0  24.0  25.0  20.0   BUN mg/dL  28*  41*  51*  58*   CREATININE mg/dL  3.05*  3.34*  3.38*  3.56*   CALCIUM mg/dL  8.8  8.2*  7.9*  8.1*   PHOSPHORUS mg/dL   --   3.5   --    --    ALBUMIN g/dL   --   2.92*   --    --                    Estimated Creatinine Clearance: 24.2 mL/min (A) (by C-G formula based on SCr of 3.05 mg/dL (H)).         A/P:    CKD4: stable in baseline 3.5-4.0.  Likely secondary to DN. Scr 3.0 stable.     Proteinuria:  Presumed DN.  Ratio 3.1    HTN: stable.     Anemia:  On epogen. .  Transfuse PRN for Hgb < " 7    Volume:  Stable.     Diabetic Foot Ulcer    Plan:  Continue with current regimen.     Dannie Luna MD  12/10/18  11:22 AM

## 2018-12-10 NOTE — PROGRESS NOTES
Continued Stay Note  [facility]     Patient Name: Tashia Leon  MRN: [id number]  Today's Date: 12/10/2018    Admit Date: 11/26/2018    Discharge Plan     Row Name 12/10/18 1120       Plan    Plan  LTACH    Patient/Family in Agreement with Plan  yes    Plan Comments  Per Vivi w/ [facility], insurance precert is still pending. D/w patient at bedside. She states her preference it to return home and that nothing can be provided at [facility] that she doesn't have access to at home, however when asked if she is refusing LTACH she defers the question. At this time plan remains to dc to [facility] when, and if, insurance approval is received. CM following.         Discharge Codes    No documentation.       Expected Discharge Date and Time     Expected Discharge Date Expected Discharge Time    Dec 7, 2018             Valarie Bal RN

## 2018-12-10 NOTE — THERAPY DISCHARGE NOTE
Acute Care - Occupational Therapy Treatment Note/Discharge  [facility]     Patient Name: Tashia Leon  : 1955  MRN: [id number]  Today's Date: 12/10/2018  Onset of Illness/Injury or Date of Surgery: 18  Date of Referral to OT: 18  Referring Physician: Asher      Admit Date: 2018    Visit Dx:     ICD-10-CM ICD-9-CM   1. Diabetic ulcer of other part of right foot associated with type 2 diabetes mellitus, unspecified ulcer stage (CMS/[facility]) E11.621 250.80    L97.519 707.15   2. Leukocytosis, unspecified type D72.829 288.60   3. Cellulitis of right foot L03.115 682.7   4. Chronic kidney disease, unspecified CKD stage N18.9 585.9   5. Impaired functional mobility, balance, gait, and endurance Z74.09 V49.89   6. Diabetic peripheral neuropathy (CMS/[facility]) E11.42 250.60     357.2   7. Diabetes mellitus type 2, uncontrolled, with complications (CMS/[facility]) E11.8 250.92    E11.65    8. Chronic kidney disease, stage V (CMS/[facility]) N18.5 585.5   9. Diabetic ulcer of right midfoot associated with type 2 diabetes mellitus, with necrosis of bone (CMS/[facility]) E11.621 250.80    L97.414 707.14   10. Cellulitis of toe of right foot L03.031 681.10   11. Obesity (BMI 30-39.9) E66.9 278.00   12. Chronic osteomyelitis of right foot with draining sinus (CMS/[facility]) M86.471 730.17   13. Impaired mobility and ADLs Z74.09 799.89     Patient Active Problem List   Diagnosis   • Coronary artery disease involving native coronary artery of native heart without angina pectoris   • Dyspnea   • Essential hypertension   • Hyperlipemia   • Lower extremity edema   • Carotid artery stenosis   • Carotid bruit   • Diabetes mellitus with neurological manifestations, uncontrolled (CMS/[facility])   • Diabetic peripheral neuropathy (CMS/[facility])   • Moderate nonproliferative diabetic retinopathy without macular edema associated with type 2 diabetes mellitus (CMS/HCC)   • Functional murmur   • GERD (gastroesophageal reflux disease)   • Peptic ulcer    • Vitamin D deficiency   • Health care maintenance   • Diabetes mellitus type 2, uncontrolled, with complications (CMS/HCC)   • Hyperlipidemia   • Obesity   • Osteomyelitis (CMS/HCC)   • MRSA (methicillin resistant Staphylococcus aureus)   • Lumbar disc disease   • Hearing loss   • Hyperkalemia   • Secondary hyperparathyroidism (CMS/HCC)   • Idiopathic hypochromic anemia   • Chronic kidney disease, stage V (CMS/HCC)   • Diabetic ulcer of right foot associated with type 2 diabetes mellitus (CMS/HCC)   • Sepsis (CMS/HCC)   • Cellulitis   • Hypokalemia   • Anemia, chronic disease   • Obesity (BMI 30-39.9)   • Impaired functional mobility, balance, gait, and endurance   • Diabetic ulcer of right midfoot associated with type 2 diabetes mellitus, with necrosis of bone (CMS/HCC)   • Cellulitis of toe of right foot   • Chronic osteomyelitis of right foot with draining sinus (CMS/HCC)       Therapy Treatment    Rehabilitation Treatment Summary     Row Name 12/10/18 0915             Treatment Time/Intention    Discipline  occupational therapist  -AR      Document Type  therapy note (daily note);discharge treatment  -AR      Subjective Information  no complaints  -AR      Mode of Treatment  occupational therapy  -AR      Patient Effort  good  -AR      Existing Precautions/Restrictions  fall;non-weight bearing;other (see comments) strict NWB RLE  -AR      Treatment Considerations/Comments  pt declined use of knee scooter, states she is awaiting arrival of new one  -AR      Recorded by [AR] Abril Suazo OT 12/10/18 1327      Row Name 12/10/18 0915             Cognitive Assessment/Intervention    Additional Documentation  Cognitive Assessment/Intervention (Group)  -AR      Recorded by [AR] Abril Suazo OT 12/10/18 1324      Row Name 12/10/18 0915             Cognitive Assessment/Intervention- PT/OT    Affect/Mental Status (Cognitive)  WNL  -AR      Orientation Status (Cognition)  oriented x 4  -AR      Follows  Commands (Cognition)  WFL  -AR      Cognitive Function (Cognitive)  safety deficit  -AR      Safety Deficit (Cognitive)  impulsivity  -AR      Personal Safety Interventions  fall prevention program maintained  -AR      Recorded by [AR] Abril Suazo OT 12/10/18 1324      Row Name 12/10/18 0915             Safety Issues, Functional Mobility    Safety Issues Affecting Function (Mobility)  impulsivity;unable to maintain weight-bearing restrictions  -AR      Impairments Affecting Function (Mobility)  endurance/activity tolerance  -AR      Recorded by [AR] Abril Suazo OT 12/10/18 1324      Row Name 12/10/18 0915             Mobility Assessment/Intervention    Extremity Weight-bearing Status  right lower extremity  -AR      Right Lower Extremity (Weight-bearing Status)  non weight-bearing (NWB)  -AR      Recorded by [AR] Abril Suazo OT 12/10/18 1324      Row Name 12/10/18 0915             Bed Mobility Assessment/Treatment    Scooting/Bridging [place] (Bed Mobility)  independent  -AR      Supine-Sit [place] (Bed Mobility)  conditional independence  -AR      Sit-Supine [place] (Bed Mobility)  conditional independence  -AR      Assistive Device (Bed Mobility)  bed rails;head of bed elevated  -AR      Comment (Bed Mobility)  Good ability to maintain NWB RLE  -AR      Recorded by [AR] Abril Suazo OT 12/10/18 1324      Row Name 12/10/18 0915             Functional Mobility    Functional Mobility- Ind. Level  verbal cues required;supervision required  -AR      Functional Mobility- Device  rolling walker  -AR      Functional Mobility-Distance (Feet)  20  -AR      Functional Mobility- Comment  One instance of pt needing cues to maintain NWB RLE as pt ambulated on slight incline into restroom  -AR      Recorded by [AR] Abril Suazo OT 12/10/18 1324      Row Name 12/10/18 0915             Transfer Assessment/Treatment    Transfer Assessment/Treatment  sit-stand  transfer;stand-sit transfer;toilet transfer  -AR      Maintains Weight-bearing Status (Transfers)  able to maintain  -AR      Recorded by [AR] Abril Suazo OT 12/10/18 1324      Row Name 12/10/18 0915             Sit-Stand Transfer    Sit-Stand [place] (Transfers)  verbal cues;supervision  -AR      Assistive Device (Sit-Stand Transfers)  walker, front-wheeled  -AR      Recorded by [AR] Abril Suazo OT 12/10/18 1324      Row Name 12/10/18 0915             Stand-Sit Transfer    Stand-Sit [place] (Transfers)  supervision;verbal cues  -AR      Assistive Device (Stand-Sit Transfers)  walker, front-wheeled  -AR      Recorded by [AR] Abril Suazo OT 12/10/18 1324      Row Name 12/10/18 0915             Toilet Transfer    Type (Toilet Transfer)  sit-stand;stand-sit  -AR      [place] Level (Toilet Transfer)  supervision;verbal cues  -AR      Assistive Device (Toilet Transfer)  raised toilet seat;grab bars/safety frame;walker, front-wheeled  -AR      Recorded by [AR] Abril Suazo OT 12/10/18 1324      Row Name 12/10/18 0915             ADL Assessment/Intervention    38726 - OT Self Care/Mgmt Minutes  18  -AR      BADL Assessment/Intervention  upper body dressing;toileting;lower body dressing  -AR      Recorded by [AR] Abril Suazo OT 12/10/18 1324      Row Name 12/10/18 0915             Upper Body Dressing Assessment/Training    Comment (Upper Body Dressing)  Pt declined AE and reports family will be available to assist if needed   -AR      Recorded by [AR] Abril Suazo OT 12/10/18 1324      Row Name 12/10/18 0915             Toileting Assessment/Training    [place] Level (Toileting)  toileting skills;supervision;verbal cues  -AR      Assistive Devices (Toileting)  raised toilet seat;grab bar/safety frame  -AR      Recorded by [AR] Abril Suazo OT 12/10/18 1324      Row Name 12/10/18 0915             Motor Skills Assessment/Interventions    Additional  Documentation  Balance (Group);Balance Interventions (Group)  -AR      Recorded by [AR] Abril Suazo, OT 12/10/18 1324      Row Name 12/10/18 0915             Therapeutic Exercise    33359 - OT Therapeutic Activity Minutes  5  -AR      Recorded by [AR] Abril Suazo, OT 12/10/18 1324      Row Name 12/10/18 0915             Therapeutic Exercise    Comment (Therapeutic Exercise)  Pt states she has theraband (yellow/red/green) at home and declined bands for use in hospital. She declined written HEP.   -AR      Recorded by [AR] Abril Suazo, OT 12/10/18 1324      Row Name 12/10/18 0915             Balance    Balance  static sitting balance;static standing balance  -AR      Recorded by [AR] Abril Suazo, OT 12/10/18 1324      Row Name 12/10/18 0915             Static Sitting Balance    Level of [place] (Unsupported Sitting, Static Balance)  independent  -AR      Sitting Position (Unsupported Sitting, Static Balance)  sitting on edge of bed  -AR      Time Able to Maintain Position (Unsupported Sitting, Static Balance)  more than 5 minutes  -AR      Recorded by [AR] Abril Suazo, OT 12/10/18 1324      Row Name 12/10/18 0915             Static Standing Balance    Level of [place] (Supported Standing, Static Balance)  supervision  -AR      Time Able to Maintain Position (Supported Standing, Static Balance)  2 to 3 minutes  -AR      Assistive Device Utilized (Supported Standing, Static Balance)  rolling walker  -AR      Recorded by [AR] Abril Suazo OT 12/10/18 1324      Row Name 12/10/18 0915             Positioning and Restraints    Pre-Treatment Position  in bed  -AR      Post Treatment Position  bed  -AR      In Bed  supine;call light within reach;encouraged to call for assist;exit alarm on;RLE elevated  -AR      Recorded by [AR] Abril Suazo OT 12/10/18 1324      Row Name 12/10/18 0915             Pain Scale: Numbers Pre/Post-Treatment    Pain Scale: Numbers,  Pretreatment  5/10  -AR      Pain Scale: Numbers, Post-Treatment  4/10  -AR      Pain Location - Side  Right  -AR      Pain Location  foot  -AR      Pain Intervention(s)  Repositioned;Ambulation/increased activity  -AR      Recorded by [AR] Abril Suazo, OT 12/10/18 1324      Row Name                Wound 11/26/18 2130 Right lateral plantar diabetic/neuropathic ulceration    Wound - Properties Group Date first assessed: 11/26/18 [KF] Time first assessed: 2130 [KF] Side: Right [KF] Orientation: lateral [KF2] Location: plantar [KF] Type: diabetic/neuropathic ulceration [KF] Recorded by:  [KF] Courtney Buenrostro RN 11/27/18 0323 [KF2] Courtney Buenrostro RN 11/27/18 0325    Row Name                Wound 12/03/18 0940 Right medial gluteal    Wound - Properties Group Date first assessed: 12/03/18 [MR] Time first assessed: 0940 [MR] Side: Right [MR] Orientation: medial [MR] Location: gluteal [MR] Stage, Pressure Injury: Stage 1 [MR], healing/chronic area  Recorded by:  [MR] Michell Marion RN 12/03/18 1016    Row Name                Wound 12/04/18 1202 Right foot incision    Wound - Properties Group Date first assessed: 12/04/18 [LD] Time first assessed: 1202 [LD] Side: Right [LD] Location: foot [LD] Type: incision [LD] Recorded by:  [LD] Prema Mike RN 12/04/18 1202    Row Name 12/10/18 0915             Plan of Care Review    Plan of Care Reviewed With  patient  -AR      Recorded by [AR] Abril Suazo, OT 12/10/18 1324      Row Name 12/10/18 0915             Outcome Summary/Treatment Plan (OT)    Daily Summary of Progress (OT)  progress toward functional goals as expected;prepare for discharge  -AR      Reason for Discharge (OT Discharge Summary)  patient met all goals and outcomes  -AR      Recorded by [AR] Abril Suazo, OT 12/10/18 1324        User Key  (r) = Recorded By, (t) = Taken By, (c) = Cosigned By    Initials Name Effective Dates Discipline    AR Abril Suazo, OT 06/22/15 -  OT     Prema Aguirre, RN 06/16/16 -  Nurse    Michell Soto, RN 06/16/16 -  Nurse    KF Porter Courtney GLADIS COULTER 06/30/16 -  Nurse        Wound 11/26/18 2130 Right lateral plantar diabetic/neuropathic ulceration (Active)   Dressing Appearance dry;intact 12/9/2018  9:20 PM   Dressing Care, Wound gauze;elastic bandage 12/9/2018  9:20 PM       Wound 12/03/18 0940 Right medial gluteal (Active)   Dressing Appearance open to air 12/9/2018  9:20 PM   Closure Open to air 12/9/2018  9:20 PM   Dressing Care, Wound open to air 12/9/2018  9:20 PM       Wound 12/04/18 1202 Right foot incision (Active)   Dressing Appearance dry;intact 12/9/2018  9:20 PM   Dressing Care, Wound gauze;elastic bandage 12/9/2018  9:20 PM       OT Rehab Goals     Row Name 12/10/18 0915             Transfer Goal 1 (OT)    Progress/Outcome (Transfer Goal 1, OT)  goal met  -AR         Strength Goal 1 (OT)    Progress/Outcome (Strength Goal 1, OT)  other (see comments) DC goal d/t pt declining HEP  -AR        User Key  (r) = Recorded By, (t) = Taken By, (c) = Cosigned By    Initials Name Provider Type Discipline    Abril Parker, OT Occupational Therapist OT          Occupational Therapy Education     Title: PT OT SLP Therapies (In Progress)     Topic: Occupational Therapy (Done)     Point: ADL training (Done)     Description: Instruct learner(s) on proper safety adaptation and remediation techniques during self care or transfers.   Instruct in proper use of assistive devices.    Learning Progress Summary           Patient Acceptance, E,TB,D, VU,NR by AR at 12/10/2018  9:15 AM    Acceptance, E,D, VU,NR by TB at 12/5/2018 11:06 AM    Comment:  Education initiated for benefits of activity/therapy, surgical precautions,  role of OT, transfer training and use of AE to maximize ADL independence                   Point: Precautions (Done)     Description: Instruct learner(s) on prescribed precautions during self-care and functional transfers.     Learning Progress Summary           Patient Acceptance, E,TB,D, VU,NR by AR at 12/10/2018  9:15 AM    Acceptance, E,D, VU,NR by TB at 12/5/2018 11:06 AM    Comment:  Education initiated for benefits of activity/therapy, surgical precautions,  role of OT, transfer training and use of AE to maximize ADL independence                               User Key     Initials Effective Dates Name Provider Type Discipline    TB 06/08/18 -  Leti Shepherd, OT Occupational Therapist OT    AR 06/22/15 -  Abril Suazo, OT Occupational Therapist OT                OT Recommendation and Plan  Outcome Summary/Treatment Plan (OT)  Daily Summary of Progress (OT): progress toward functional goals as expected, prepare for discharge  Anticipated Discharge Disposition (OT): home with home health, home with assist(pt states she plans to return home)  Reason for Discharge (OT Discharge Summary): patient met all goals and outcomes  Daily Summary of Progress (OT): progress toward functional goals as expected, prepare for discharge  Plan of Care Review  Plan of Care Reviewed With: patient  Plan of Care Reviewed With: patient  Outcome Summary: Pt completed bed mobility with conditional independence, toileting with supervision and transfer training with CGA/supervision. She declined use of knee walker and used RW. Pt with one slight instance of putting R heel down when ambulating up incline into restroom. She anticipates DC home where she has family assist. OT to DC services as she declined [facility] ADL training, declined AE and declined HEP. OT to defer ambulation to PT services. Pt has walk-in shower at home.     Outcome Measures     Row Name 12/10/18 0915 12/08/18 0919 12/07/18 1333       How much help from another person do you currently need...    Turning from your back to your side while in flat bed without using bedrails?  --  4  -SH  4  -SC    Moving from lying on back to sitting on the side of a flat bed without bedrails?  --  4   -  4  -SC    Moving to and from a bed to a chair (including a wheelchair)?  --  3  -  3  -SC    Standing up from a chair using your arms (e.g., wheelchair, bedside chair)?  --  3  -  3  -SC    Climbing 3-5 steps with a railing?  --  1  -  1  -SC    To walk in hospital room?  --  3  -  3  -SC    AM-PAC 6 Clicks Score  --  18  -SH  18  -SC       How much help from another is currently needed...    Putting on and taking off regular lower body clothing?  3  -AR  --  --    Bathing (including washing, rinsing, and drying)  3  -AR  --  --    Toileting (which includes using toilet bed pan or urinal)  3  -AR  --  --    Putting on and taking off regular upper body clothing  3  -AR  --  --    Taking care of personal grooming (such as brushing teeth)  3  -AR  --  --    Eating meals  3  -AR  --  --    Score  18  -AR  --  --       Functional Assessment    Outcome Measure Options  AM-PAC 6 Clicks Daily Activity (OT)  -AR  AM-PAC 6 Clicks Basic Mobility (PT)  -  AM-PAC 6 Clicks Basic Mobility (PT)  -SC      User Key  (r) = Recorded By, (t) = Taken By, (c) = Cosigned By    Initials Name Provider Type    SC Srinivasan Mcmahon, PT Physical Therapist     Christina Simon, PT Physical Therapist    Abril Parker, OT Occupational Therapist           Time Calculation:    Time Calculation- OT     Row Name 12/10/18 0915             Time Calculation- OT    OT Start Time  0915  -AR      Total Timed Code Minutes- OT  23 minute(s)  -AR      OT Received On  12/10/18  -AR      OT Goal Re-Cert Due Date  12/15/18  -AR         Timed Charges    62316 - OT Therapeutic Activity Minutes  5  -AR      29778 - OT Self Care/Mgmt Minutes  18  -AR        User Key  (r) = Recorded By, (t) = Taken By, (c) = Cosigned By    Initials Name Provider Type    bAril Parker, OT Occupational Therapist        Therapy Suggested Charges     Code   Minutes Charges    97838 (CPT®)  Ot Neuromusc Re Education Ea 15 Min      56704 (CPT®)  Ot Ther  Proc Ea 15 Min      96072 (CPT®) Hc Ot Therapeutic Act Ea 15 Min 5 1    24197 (CPT®) Hc Ot Manual Therapy Ea 15 Min      97780 (CPT®) Hc Ot Iontophoresis Ea 15 Min      29033 (CPT®) Hc Ot Elec Stim Ea-Per 15 Min      66172 (CPT®) Hc Ot Ultrasound Ea 15 Min      62206 (CPT®) Hc Ot Self Care/Mgmt/Train Ea 15 Min 18 1    Total  23 2        Therapy Charges for Today     Code Description Service Date Service Provider Modifiers Qty    26703681389 HC OT THERAPEUTIC ACT EA 15 MIN 12/10/2018 Abril Suazo OT GO 1    29507829939 HC OT SELF CARE/MGMT/TRAIN EA 15 MIN 12/10/2018 Abril Suazo OT GO 1               OT Discharge Summary  Anticipated Discharge Disposition (OT): home with home health, home with assist(pt states she plans to return home)  Reason for Discharge: Discharge from facility  Outcomes Achieved: Other(pt declined HEP and [facility] ADL retraining)  Discharge Destination: Home with assist, Home with home health    Abril Suazo OT  12/10/2018

## 2018-12-10 NOTE — PLAN OF CARE
Problem: Patient Care Overview  Goal: Plan of Care Review  Outcome: Outcome(s) achieved Date Met: 12/10/18   12/10/18 1242   Coping/Psychosocial   Plan of Care Reviewed With patient   OTHER   Outcome Summary Pt completed bed mobility with conditional independence, toileting with supervision and transfer training with CGA/supervision. She declined use of knee walker and used RW. Pt with one slight instance of putting R heel down when ambulating up incline into restroom. She anticipates DC home where she has family assist. OT to DC services as she declined [facility] ADL training, declined AE and declined HEP. OT to defer ambulation to PT services. Pt has walk-in shower at home.

## 2018-12-10 NOTE — PLAN OF CARE
Problem: Patient Care Overview  Goal: Plan of Care Review  Outcome: Revised   12/10/18 6920   Coping/Psychosocial   Plan of Care Reviewed With patient   Plan of Care Review   Progress improving   OTHER   Outcome Summary Able to amb 120 ft. w/ rolling knee wx (NWB RLE), W/ min A init, then CGA of PT, as well as performing 10 reps ther ex w/ signif amt of cueing for technique & safety during mobil (extremely impulsive/tremulous); recommend S.T. rehab d/t fall risk, but pt wishes to return home; modified distance for gt. goal, otherwise remaining goals are ongoing; noted low HGB ( 8,7)

## 2018-12-11 ENCOUNTER — APPOINTMENT (OUTPATIENT)
Dept: GENERAL RADIOLOGY | Facility: HOSPITAL | Age: 63
End: 2018-12-11

## 2018-12-11 ENCOUNTER — ANESTHESIA EVENT (OUTPATIENT)
Dept: PERIOP | Facility: HOSPITAL | Age: 63
End: 2018-12-11

## 2018-12-11 ENCOUNTER — ANESTHESIA (OUTPATIENT)
Dept: PERIOP | Facility: HOSPITAL | Age: 63
End: 2018-12-11

## 2018-12-11 LAB
ANION GAP SERPL CALCULATED.3IONS-SCNC: 8 MMOL/L (ref 3–11)
BACTERIA SPEC ANAEROBE CULT: NORMAL
BUN BLD-MCNC: 28 MG/DL (ref 9–23)
BUN/CREAT SERPL: 8.3 (ref 7–25)
CALCIUM SPEC-SCNC: 8.9 MG/DL (ref 8.7–10.4)
CHLORIDE SERPL-SCNC: 107 MMOL/L (ref 99–109)
CO2 SERPL-SCNC: 22 MMOL/L (ref 20–31)
CREAT BLD-MCNC: 3.36 MG/DL (ref 0.6–1.3)
DEPRECATED RDW RBC AUTO: 47.8 FL (ref 37–54)
ERYTHROCYTE [DISTWIDTH] IN BLOOD BY AUTOMATED COUNT: 13.7 % (ref 11.3–14.5)
GFR SERPL CREATININE-BSD FRML MDRD: 14 ML/MIN/1.73
GLUCOSE BLD-MCNC: 147 MG/DL (ref 70–100)
GLUCOSE BLDC GLUCOMTR-MCNC: 104 MG/DL (ref 70–130)
GLUCOSE BLDC GLUCOMTR-MCNC: 143 MG/DL (ref 70–130)
GLUCOSE BLDC GLUCOMTR-MCNC: 150 MG/DL (ref 70–130)
GLUCOSE BLDC GLUCOMTR-MCNC: 154 MG/DL (ref 70–130)
GLUCOSE BLDC GLUCOMTR-MCNC: 158 MG/DL (ref 70–130)
GLUCOSE BLDC GLUCOMTR-MCNC: 174 MG/DL (ref 70–130)
HCT VFR BLD AUTO: 30 % (ref 34.5–44)
HGB BLD-MCNC: 9.3 G/DL (ref 11.5–15.5)
INR PPP: 1.03 (ref 0.91–1.09)
MCH RBC QN AUTO: 30.4 PG (ref 27–31)
MCHC RBC AUTO-ENTMCNC: 31 G/DL (ref 32–36)
MCV RBC AUTO: 98 FL (ref 80–99)
PLATELET # BLD AUTO: 514 10*3/MM3 (ref 150–450)
PMV BLD AUTO: 9.3 FL (ref 6–12)
POTASSIUM BLD-SCNC: 3.9 MMOL/L (ref 3.5–5.5)
PROTHROMBIN TIME: 10.8 SECONDS (ref 9.6–11.5)
RBC # BLD AUTO: 3.06 10*6/MM3 (ref 3.89–5.14)
SODIUM BLD-SCNC: 137 MMOL/L (ref 132–146)
WBC NRBC COR # BLD: 11.94 10*3/MM3 (ref 3.5–10.8)

## 2018-12-11 PROCEDURE — 25010000002 HYDROMORPHONE PER 4 MG: Performed by: ANESTHESIOLOGY

## 2018-12-11 PROCEDURE — 99024 POSTOP FOLLOW-UP VISIT: CPT | Performed by: ORTHOPAEDIC SURGERY

## 2018-12-11 PROCEDURE — 85610 PROTHROMBIN TIME: CPT | Performed by: INTERNAL MEDICINE

## 2018-12-11 PROCEDURE — 76000 FLUOROSCOPY <1 HR PHYS/QHP: CPT

## 2018-12-11 PROCEDURE — 25010000002 PROPOFOL 10 MG/ML EMULSION: Performed by: ANESTHESIOLOGY

## 2018-12-11 PROCEDURE — 25010000002 FENTANYL CITRATE (PF) 100 MCG/2ML SOLUTION: Performed by: ANESTHESIOLOGY

## 2018-12-11 PROCEDURE — 80048 BASIC METABOLIC PNL TOTAL CA: CPT | Performed by: INTERNAL MEDICINE

## 2018-12-11 PROCEDURE — 25010000002 HEPARIN (PORCINE) PER 1000 UNITS: Performed by: SURGERY

## 2018-12-11 PROCEDURE — C1751 CATH, INF, PER/CENT/MIDLINE: HCPCS | Performed by: SURGERY

## 2018-12-11 PROCEDURE — 85027 COMPLETE CBC AUTOMATED: CPT | Performed by: INTERNAL MEDICINE

## 2018-12-11 PROCEDURE — 02HV33Z INSERTION OF INFUSION DEVICE INTO SUPERIOR VENA CAVA, PERCUTANEOUS APPROACH: ICD-10-PCS | Performed by: SURGERY

## 2018-12-11 PROCEDURE — 82962 GLUCOSE BLOOD TEST: CPT

## 2018-12-11 PROCEDURE — B5181ZA FLUOROSCOPY OF SUPERIOR VENA CAVA USING LOW OSMOLAR CONTRAST, GUIDANCE: ICD-10-PCS | Performed by: SURGERY

## 2018-12-11 PROCEDURE — 25010000002 ERTAPENEM PER 500 MG: Performed by: INTERNAL MEDICINE

## 2018-12-11 PROCEDURE — 97116 GAIT TRAINING THERAPY: CPT

## 2018-12-11 PROCEDURE — 99233 SBSQ HOSP IP/OBS HIGH 50: CPT | Performed by: INTERNAL MEDICINE

## 2018-12-11 PROCEDURE — 25010000002 LINEZOLID 600 MG/300ML SOLUTION: Performed by: INTERNAL MEDICINE

## 2018-12-11 PROCEDURE — 63710000001 INSULIN DETEMIR PER 5 UNITS: Performed by: INTERNAL MEDICINE

## 2018-12-11 PROCEDURE — 71045 X-RAY EXAM CHEST 1 VIEW: CPT

## 2018-12-11 RX ORDER — HEPARIN SODIUM 1000 [USP'U]/ML
INJECTION, SOLUTION INTRAVENOUS; SUBCUTANEOUS AS NEEDED
Status: DISCONTINUED | OUTPATIENT
Start: 2018-12-11 | End: 2018-12-11 | Stop reason: HOSPADM

## 2018-12-11 RX ORDER — FENTANYL CITRATE 50 UG/ML
INJECTION, SOLUTION INTRAMUSCULAR; INTRAVENOUS AS NEEDED
Status: DISCONTINUED | OUTPATIENT
Start: 2018-12-11 | End: 2018-12-11 | Stop reason: SURG

## 2018-12-11 RX ORDER — SODIUM CHLORIDE, SODIUM LACTATE, POTASSIUM CHLORIDE, CALCIUM CHLORIDE 600; 310; 30; 20 MG/100ML; MG/100ML; MG/100ML; MG/100ML
9 INJECTION, SOLUTION INTRAVENOUS CONTINUOUS
Status: CANCELLED | OUTPATIENT
Start: 2018-12-11

## 2018-12-11 RX ORDER — HYDROMORPHONE HYDROCHLORIDE 1 MG/ML
0.5 INJECTION, SOLUTION INTRAMUSCULAR; INTRAVENOUS; SUBCUTANEOUS
Status: DISCONTINUED | OUTPATIENT
Start: 2018-12-11 | End: 2018-12-11 | Stop reason: HOSPADM

## 2018-12-11 RX ORDER — ONDANSETRON 4 MG/1
4 TABLET, FILM COATED ORAL EVERY 6 HOURS PRN
Status: DISCONTINUED | OUTPATIENT
Start: 2018-12-11 | End: 2018-12-11

## 2018-12-11 RX ORDER — PROPOFOL 10 MG/ML
VIAL (ML) INTRAVENOUS CONTINUOUS PRN
Status: DISCONTINUED | OUTPATIENT
Start: 2018-12-11 | End: 2018-12-11 | Stop reason: SURG

## 2018-12-11 RX ORDER — FENTANYL CITRATE 50 UG/ML
50 INJECTION, SOLUTION INTRAMUSCULAR; INTRAVENOUS
Status: DISCONTINUED | OUTPATIENT
Start: 2018-12-11 | End: 2018-12-11 | Stop reason: HOSPADM

## 2018-12-11 RX ORDER — SODIUM CHLORIDE 0.9 % (FLUSH) 0.9 %
3 SYRINGE (ML) INJECTION EVERY 12 HOURS SCHEDULED
Status: DISCONTINUED | OUTPATIENT
Start: 2018-12-11 | End: 2018-12-11 | Stop reason: HOSPADM

## 2018-12-11 RX ORDER — LIDOCAINE HYDROCHLORIDE 10 MG/ML
INJECTION, SOLUTION INFILTRATION; PERINEURAL AS NEEDED
Status: DISCONTINUED | OUTPATIENT
Start: 2018-12-11 | End: 2018-12-11 | Stop reason: HOSPADM

## 2018-12-11 RX ORDER — LIDOCAINE HYDROCHLORIDE 10 MG/ML
0.5 INJECTION, SOLUTION EPIDURAL; INFILTRATION; INTRACAUDAL; PERINEURAL ONCE AS NEEDED
Status: DISCONTINUED | OUTPATIENT
Start: 2018-12-11 | End: 2018-12-11 | Stop reason: HOSPADM

## 2018-12-11 RX ORDER — SODIUM CHLORIDE 0.9 % (FLUSH) 0.9 %
3-10 SYRINGE (ML) INJECTION AS NEEDED
Status: DISCONTINUED | OUTPATIENT
Start: 2018-12-11 | End: 2018-12-11 | Stop reason: HOSPADM

## 2018-12-11 RX ORDER — SODIUM CHLORIDE 9 MG/ML
9 INJECTION, SOLUTION INTRAVENOUS ONCE
Status: COMPLETED | OUTPATIENT
Start: 2018-12-11 | End: 2018-12-11

## 2018-12-11 RX ORDER — CLONAZEPAM 0.5 MG/1
0.25 TABLET ORAL ONCE
Status: COMPLETED | OUTPATIENT
Start: 2018-12-11 | End: 2018-12-11

## 2018-12-11 RX ADMIN — CLONAZEPAM 0.25 MG: 0.5 TABLET ORAL at 20:48

## 2018-12-11 RX ADMIN — INSULIN DETEMIR 25 UNITS: 100 INJECTION, SOLUTION SUBCUTANEOUS at 20:50

## 2018-12-11 RX ADMIN — DILTIAZEM HYDROCHLORIDE 120 MG: 120 CAPSULE, EXTENDED RELEASE ORAL at 20:49

## 2018-12-11 RX ADMIN — INSULIN LISPRO 3 UNITS: 100 INJECTION, SOLUTION INTRAVENOUS; SUBCUTANEOUS at 20:49

## 2018-12-11 RX ADMIN — CASTOR OIL AND BALSAM, PERU: 788; 87 OINTMENT TOPICAL at 08:33

## 2018-12-11 RX ADMIN — ASPIRIN 325 MG: 325 TABLET, DELAYED RELEASE ORAL at 08:31

## 2018-12-11 RX ADMIN — DOCUSATE SODIUM 100 MG: 100 CAPSULE, LIQUID FILLED ORAL at 08:31

## 2018-12-11 RX ADMIN — HYDROMORPHONE HYDROCHLORIDE 0.5 MG: 1 INJECTION, SOLUTION INTRAMUSCULAR; INTRAVENOUS; SUBCUTANEOUS at 18:30

## 2018-12-11 RX ADMIN — SODIUM CHLORIDE 9 ML/HR: 9 INJECTION, SOLUTION INTRAVENOUS at 17:05

## 2018-12-11 RX ADMIN — INSULIN LISPRO 3 UNITS: 100 INJECTION, SOLUTION INTRAVENOUS; SUBCUTANEOUS at 08:31

## 2018-12-11 RX ADMIN — LINEZOLID 600 MG: 600 INJECTION, SOLUTION INTRAVENOUS at 02:16

## 2018-12-11 RX ADMIN — PROPOFOL 50 MCG/KG/MIN: 10 INJECTION, EMULSION INTRAVENOUS at 17:38

## 2018-12-11 RX ADMIN — LINEZOLID 600 MG: 600 INJECTION, SOLUTION INTRAVENOUS at 15:13

## 2018-12-11 RX ADMIN — ERTAPENEM SODIUM 500 MG: 1 INJECTION, POWDER, LYOPHILIZED, FOR SOLUTION INTRAMUSCULAR; INTRAVENOUS at 17:12

## 2018-12-11 RX ADMIN — FENTANYL CITRATE 100 MCG: 50 INJECTION, SOLUTION INTRAMUSCULAR; INTRAVENOUS at 17:39

## 2018-12-11 RX ADMIN — MUPIROCIN: 2 CREAM TOPICAL at 08:33
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
## 2018-12-11 NOTE — OP NOTE
Operative Note    Date of Surgery:  12/11/2018    Pre-Operative Diagnosis: Diabetic foot ulcer    Post-Operative Diagnosis: Same    Procedure:  Placement of right internal jugular dual Gro[place] catheter with fluoroscopy    Anesthesia:  Local/MAC    Surgeon:  Maribel May MD    Estimated Blood Loss:  Very minimal    Complications: None    Indication for Procedure: Mrs. Leon is a 63 years old lady who was admitted with diabetic foot ulcer of the right foot and underwent debridement of the first toe with metatarsal amputation.  She is on IV antibiotics.  She needs long-term.  She is taken the operative room for Groshong catheter placement.    Procedure: Patient was taken bilateral by anesthesia and placed supine on the table.  Following adequate IV sedation she received Invanz IV.  The chest and neck were then prepped and draped in a sterile fashion.  Timeout was observed.  The patient was placed in Trendelenburg position.  Xylocaine one percent was injected in the right medial supraclavicular region.  Percutaneous access to the right internal jugular vein was established with good blood return noted.  The wire was advanced and was noted to be in good placement with fluoroscopy.  Xylocaine one percent was injected in the right chest wall and a small stab incision was made through which the dual-lumen Groshong catheter was tunneled.  The dilator and sheath were then advanced over the wire with fluoroscopy following which the dilator and guidewire were removed and the catheter was threaded through the sheath with no difficulty.  The tip was noted to be in the SVC.  The Groshong catheter was flushed with heparinized saline with good blood return noted.  It was then anchored to the skin with 3-0 nylon suture.  The entrance site was approximated with 4-0 Monocryl subcuticular suture.  Sterile dressing was applied.  The patient tolerated procedure well with no complications.  She was taken to the recovery room in  stable condition.  Sponge count and needle count were correct at the end of the procedure.  Chest x-ray is pending.    Maribel May MD  12/11/18  6:21 PM

## 2018-12-11 NOTE — PROGRESS NOTES
INFECTIOUS DISEASE PROGRESS NOTE    Tashia Leon  1955  [id number]    Date: 12/11/2018    Admission Date: 11/26/2018      CC: diabetic foot ulcer    History of present illness:    Patient is a 63 y.o. female with chronic kidney disease stage IV, diabetes type 2 since 1990s has had history of left foot MRSA osteomyelitis treated at  several years ago.  Patient has developed calluses on her right foot and saw [facility] who did some bedside debridement in the clinic.  Patient said increasing pain and some weeping from the wounds because of low-grade temperatures patient's returns to see her podiatrist recommended admission to hospital.  Patient was seen in the emergency room started on IV antibiotics including daptomycin and ertapenem.  She had MRI of the foot which was stopped prematurely by the patient's and was not a complete study.    Asked to address long-term treatment and duration of antibiotics.    Patient lives in [place].    Patient denies C. difficile colitis history of nausea vomiting or diarrhea difficulty breathing patient denies rash.    Patient says she is a PICC line before    11/28/18; no events overnight; no fever, rash, sore throat    11/29/18; no events overnight feels well; no complaints; no diarrhea, rash, sore throat    11/30/18: No new events. Denies f/c, sob, n/v/d, rashes or sore throat.     12/1/18; no events overnight; no fever, rash, sore throat    12/2/18: no new events or complaints.  Denies f/c, sob, n/v/d, rashes.   12/3/18: No new events or complaints.  Foot without pain.  No schedule for dressing changes.  Denies f/c, sob, n/v/d, rashes.     12/6/18; doing well; no events overnight; no fever, rash, sore throat    12/7/18; no changes, wants to go home; no fever, rash, sore throat  12/10/18; no events overnight; doing well; no complaints, no fever, rash, sore throat; wants deep line, refusing to go to LTAC    12/11/18; no events overnight; feels fair, to have  groshong catheter today; no fever, rash, sore throat; son remarking that patien tis having some hallucinations this morning better currently; hoping that going home will help this improve    Past Medical History:   Diagnosis Date   • Acute bronchitis    • Acute pharyngitis    • Acute sinusitis    • Benign colonic polyp    • Edema    • GERD (gastroesophageal reflux disease)    • Hiatal hernia    • Hyperkalemia    • Hyperlipidemia    • Hypertension    • Low back pain    • Lumbar disc disease    • MRSA (methicillin resistant Staphylococcus aureus)     Osteomyelitis/methicillin-resistant Staphylococcus aureus of the left foot, 2013.   • Obesity    • Osteomyelitis (CMS/HCC)     Osteomyelitis/methicillin-resistant Staphylococcus aureus of the left foot, 2013.   • Peptic ulceration 2013    History of peptic ulcer disease, diagnosed 2013 by EGD.  Repeat EGD in 2013 was negative.   • Sepsis (CMS/HCC)     Sepsis, 2013, hospitalized at [facility].   • Tobacco use     Previous tobacco use with cessation in .   • Type 2 diabetes mellitus (CMS/[facility])     Proteinuria noted, 2014.     • Vitamin D deficiency        Past Surgical History:   Procedure Laterality Date   •  SECTION      x2   • FOOT SURGERY Left     Incision and debridement of the left foot x2.   • LASIK     • TONSILLECTOMY         Family History   Problem Relation Age of Onset   • No Known Problems Mother    • No Known Problems Father    • Breast cancer Neg Hx    • Ovarian cancer Neg Hx        Social History     Socioeconomic History   • Marital status:      Spouse name: Not on file   • Number of children: Not on file   • Years of education: Not on file   • Highest education level: Not on file   Social Needs   • Financial resource strain: Not on file   • Food insecurity - worry: Not on file   • Food insecurity - inability: Not on file   • Transportation needs - medical: Not on file    • Transportation needs - non-medical: Not on file   Occupational History   • Not on file   Tobacco Use   • Smoking status: Former Smoker     Packs/day: 1.00     Years: 30.00     Pack years: 30.00     Types: Cigarettes   • Smokeless tobacco: Never Used   • Tobacco comment: quit 7 years ago   Substance and Sexual Activity   • Alcohol use: Yes     Alcohol/week: 0.6 oz     Types: 1 Glasses of wine per week     Comment: holidays   • Drug use: No   • Sexual activity: Defer   Other Topics Concern   • Not on file   Social History Narrative   • Not on file       Allergies   Allergen Reactions   • Statins Myalgia   • Ancef [Cefazolin]    • Bactrim [Sulfamethoxazole-Trimethoprim] Nausea And Vomiting   • Cephalosporins    • Cleocin [Clindamycin Hcl]    • Clindamycin/Lincomycin    • Keflex [Cephalexin]    • Levaquin [Levofloxacin]    • Lipitor [Atorvastatin]    • Lisinopril    • Losartan Potassium Other (See Comments)     Unknown reaction   • Oxycodone    • Quinolones          Medication:    Current Facility-Administered Medications:   •  aspirin EC tablet 325 mg, 325 mg, Oral, Daily, Rosario Walter APRN, 325 mg at 12/11/18 0831  •  bisacodyl (DULCOLAX) EC tablet 10 mg, 10 mg, Oral, Daily PRN, Heavenly Medina, APRN, 10 mg at 12/08/18 0841  •  castor oil-balsam peru (VENELEX) ointment, , Topical, Q12H, Halina Walker, DO  •  dextrose (D50W) 25 g/ 50mL Intravenous Solution 25 g, 25 g, Intravenous, Q15 Min PRN, Rosario Walter, APRN  •  dextrose (GLUTOSE) oral gel 15 g, 15 g, Oral, Q15 Min PRN, Rosario Walter, APRN  •  diltiaZEM CD (CARDIZEM CD) 24 hr capsule 120 mg, 120 mg, Oral, Nightly, Rosario Walter, APRN, 120 mg at 12/10/18 2057  •  docusate sodium (COLACE) capsule 100 mg, 100 mg, Oral, Daily, Heavenly Medina, APRN, 100 mg at 12/11/18 0831  •  epoetin rebeca (EPOGEN,PROCRIT) injection 20,000 Units, 20,000 Units, Subcutaneous, Weekly, Manny Guerrero MD, 20,000 Units at 12/08/18  1458  •  ertapenem (INVanz) 500 mg in sodium chloride 0.9 % 50 mL IVPB, 500 mg, Intravenous, Q24H, Hung Haynes MD, Last Rate: 100 mL/hr at 12/10/18 1828, 500 mg at 12/10/18 1828  •  glucagon (human recombinant) (GLUCAGEN DIAGNOSTIC) injection 1 mg, 1 mg, Subcutaneous, PRN, Rosario Walter APRN  •  HYDROcodone-acetaminophen (NORCO) 7.5-325 MG per tablet 1 tablet, 1 tablet, Oral, Q6H PRN, Halina Walker DO, 1 tablet at 12/10/18 2108  •  insulin detemir (LEVEMIR) injection 25 Units, 25 Units, Subcutaneous, Nightly, Geoffrey Stanley MD, Stopped at 12/10/18 2052  •  insulin lispro (humaLOG) injection 0-14 Units, 0-14 Units, Subcutaneous, 4x Daily With Meals & Nightly, Rosario Walter APRN, 3 Units at 12/11/18 0831  •  Linezolid (ZYVOX) 600 mg 300 mL, 600 mg, Intravenous, Q12H, Hung Haynes MD, Last Rate: 300 mL/hr at 12/11/18 0216, 600 mg at 12/11/18 0216  •  melatonin sublingual tablet 5 mg, 5 mg, Sublingual, Nightly PRN, Geoffrey Stanley MD  •  mupirocin (BACTROBAN) 2 % cream, , Topical, Q24H, Chio Sterling MD  •  mupirocin (BACTROBAN) 2 % cream, , Topical, Q24H, Chio Sterling MD  •  Naloxegol Oxalate (MOVANTIK) tablet 12.5 mg *Patient Supplied Med*, 12.5 mg, Oral, Once per day on Sun Wed, Rosario Walter APRN, 12.5 mg at 12/09/18 0906  •  ondansetron (ZOFRAN) tablet 4 mg, 4 mg, Oral, Q6H PRN, 4 mg at 11/30/18 2046 **OR** ondansetron (ZOFRAN) injection 4 mg, 4 mg, Intravenous, Q6H PRN, Rosario Walter APRN, 4 mg at 12/08/18 0334  •  pantoprazole (PROTONIX) EC tablet 40 mg, 40 mg, Oral, QAM, Rosario Walter APRN, Stopped at 12/11/18 0506  •  [COMPLETED] Insert peripheral IV, , , Once **AND** sodium chloride 0.9 % flush 10 mL, 10 mL, Intravenous, PRN, Hiram Osborn MD, 10 mL at 12/04/18 1032  •  sodium chloride 0.9 % flush 3 mL, 3 mL, Intravenous, Q12H, Rosario Walter APRN, 3 mL at 12/10/18 2057  •  sodium chloride 0.9 % flush 3-10 mL, 3-10 mL,  Intravenous, PRN, Rosario Walter, KAREEM  •  sodium chloride 0.9 % infusion, 9 mL/hr, Intravenous, Continuous, Binta Patricia MD, Last Rate: 9 mL/hr at 18 1257, 9 mL/hr at 18 1257    Antibiotics:  Anti-Infectives (From admission, onward)    Ordered     Dose/Rate Route Frequency Start Stop    12/10/18 1347  ertapenem (INVanz) 500 mg in sodium chloride 0.9 % 50 mL IVPB     Ordering Provider:  Hung Haynes MD    500 mg  100 mL/hr over 30 Minutes Intravenous Every 24 Hours 12/10/18 1800 18 1759    18 1308  Linezolid (ZYVOX) 600 mg 300 mL     Ordering Provider:  Hung Haynes MD    600 mg  300 mL/hr over 60 Minutes Intravenous Every 12 Hours 18 1500 18 1459    18 2136  DAPTOmycin (CUBICIN) 500 mg in sodium chloride 0.9 % 50 mL IVPB     Ordering Provider:  Breana Chance MD    6 mg/kg × 79.2 kg (Adjusted)  100 mL/hr over 30 Minutes Intravenous Every 48 Hours 18 1800 18 1959    18 2132  ertapenem (INVanz) 500 mg in sodium chloride 0.9 % 50 mL IVPB     Niko Deleon PA reviewed the order on 18 0848.   Ordering Provider:  Niko Deleon PA    500 mg  100 mL/hr over 30 Minutes Intravenous Every 24 Hours 18 1800 18 1834    18 1616  ertapenem (INVanz) 500 mg in sodium chloride 0.9 % 50 mL IVPB     Ordering Provider:  Hiram Osborn MD    500 mg  100 mL/hr over 30 Minutes Intravenous Every 24 Hours 18 1618 18 0015    18 1616  DAPTOmycin (CUBICIN) 500 mg in sodium chloride 0.9 % 50 mL IVPB     Ordering Provider:  Hiram Osborn MD    500 mg  100 mL/hr over 30 Minutes Intravenous Every 24 Hours 18 1617 18 1935            Review of Systems:       see hpi    Physical Exam:   Vital Signs  Temp (24hrs), Av.8 °F (36.6 °C), Min:97.2 °F (36.2 °C), Max:98 °F (36.7 °C)    Temp  Min: 97.2 °F (36.2 °C)  Max: 98 °F (36.7 °C)  BP  Min: 106/54  Max: 149/70  Pulse  Min: 76  Max: 82  Resp  Min:  16  Max: 18  SpO2  Min: 98 %  Max: 99 %    GENERAL: Awake and alert, in no acute distress.   HEENT: Normocephalic, atraumatic.  PERRL. EOMI. No conjunctival injection. No icterus. Oropharynx clear without evidence of thrush or exudate.   HEART: RRR; No murmur, rubs, gallops.   LUNGS: Clear to auscultation bilaterally without wheezing, rales, rhonchi.   ABDOMEN: Soft, nontender, nondistended. Positive bowel sounds.    EXT:  No cyanosis, clubbing or edema. No cord.  MSK: FROM without joint effusions noted arms/legs.  Right foot wrapped  SKIN: Warm and dry without cutaneous eruptions on Inspection/palpation.    NEURO: Oriented to PPT. No focal deficits on motor/sensory exam at arms/legs.  PSYCHIATRIC: Normal insight and judgement. Cooperative with PE    Laboratory Data    Results from last 7 days   Lab Units  12/11/18   0636  12/10/18   0604  12/05/18   0637   WBC 10*3/mm3  11.94*  13.92*  15.15*   HEMOGLOBIN g/dL  9.3*  8.7*  8.4*   HEMATOCRIT %  30.0*  27.9*  27.1*   PLATELETS 10*3/mm3  514*  497*  444     Results from last 7 days   Lab Units  12/11/18   0636   SODIUM mmol/L  137   POTASSIUM mmol/L  3.9   CHLORIDE mmol/L  107   CO2 mmol/L  22.0   BUN mg/dL  28*   CREATININE mg/dL  3.36*   GLUCOSE mg/dL  147*   CALCIUM mg/dL  8.9                             Estimated Creatinine Clearance: 22 mL/min (A) (by C-G formula based on SCr of 3.36 mg/dL (H)).      Microbiology:  Microbiology Results Abnormal     Procedure Component Value - Date/Time    Anaerobic Culture - Tissue, Toe, Right [[id number]] Collected:  12/04/18 1207    Lab Status:  Final result Specimen:  Tissue from Toe, Right Updated:  12/11/18 1404     Culture No anaerobes isolated    Fungus Culture - Tissue, Toe, Right [[id number]] Collected:  12/04/18 1207    Lab Status:  Preliminary result Specimen:  Tissue from Toe, Right Updated:  12/11/18 1315     Fungus Culture No fungus isolated at 1 week    AFB Culture - Tissue, Toe, Right [[id number]] Collected:   12/04/18 1207    Lab Status:  Preliminary result Specimen:  Tissue from Toe, Right Updated:  12/11/18 1315     AFB Culture No AFB isolated at 1 week     AFB Stain No acid fast bacilli seen on concentrated smear    Tissue / Bone Culture - Tissue, Toe, Right [[id number]] Collected:  12/04/18 1207    Lab Status:  Final result Specimen:  Tissue from Toe, Right Updated:  12/08/18 0850     Tissue Culture No growth at 4 days     Gram Stain No WBCs or organisms seen    Eosinophil Smear - Urine, Urine, Clean Catch [[id number]]  (Normal) Collected:  12/01/18 1629    Lab Status:  Final result Specimen:  Urine, Clean Catch Updated:  12/01/18 1710     Eosinophil Smear 0 % EOS/100 Cells     Narrative:       No eosinophil seen    Blood Culture - Blood, Arm, Left [[id number]] Collected:  11/26/18 1625    Lab Status:  Final result Specimen:  Blood from Arm, Left Updated:  12/01/18 1645     Blood Culture No growth at 5 days    Blood Culture - Blood, Arm, Right [[id number]] Collected:  11/26/18 1620    Lab Status:  Final result Specimen:  Blood from Arm, Right Updated:  12/01/18 1645     Blood Culture No growth at 5 days                       Radiology:  No radiology results for the last 7 days  Doppler arterial:   Addenda:       · Moderate reduction in arterial flow in the right lower extremity. · Monophasic waveforms are present in the right lower extremity with   biphasic waveforms in the left lower extremity · Due to the patients inability to tolerate the study only RLE pressures   were obtained    Signed: 11/29/18 9861 by Gillian Becker MD   Narrative:     · Moderate reduction in arterial flow in the right lower extremity.   Normal-appearing waveforms.     Narrative:     · Left Conclusion: The left NELLI is normal.  · Right Conclusion: The right NELLI is normal.     Path from 12/4/18  RIGHT FIRST TOE AND METATARSAL, AMPUTATION:  Skin with ulceration, necrosis and focal abscess formation compatible with gangrene.  No histologic  "evidence of acute osteomyelitis on representative sections of bone.  Proximal soft tissue margins appear viable.      Impression:   Diabetic foot ulcer on right foot  Leukocytosis with neutrophilia, improving  chronic kidney disease stage IV  Focal abscess of right 1st toe  Antibiotic intolerances to cefazolin, clindamycin, Levaquin  DM II  Esr > 100  ? Hospital acquired delirium fairly mild right now    PLAN/RECOMMENDATIONS:     I find it very difficult to interpret an MRI was not completed an MRI that was not given with gadolinium because of her chronic kidney disease.    Physical exam of foot not remarkable for  Osteomyelitis.  I still have some concern with ESr > 100.    Path suggestive of abscess without osteo         Cont invanz 500 mg IV Q24H    Long discussion with patient, ;    Patient has neuropathic foot wounds and without offloading is likely to have worsening foot infection bone involvement and will require amputation.    Despite explaining this multiple times patient still apprehensive about going to LTAC.    The entire goal of LTAC of \"placement\" is for off loading of foot and at same time to continue abx which are really empiric because of negative operative cultures.    If patient refused placement and goes home I don't feel compelled to arrange outpatient iv abx therapy      Will change currently therapy to zyvox, continue invanz  groshong today  Continue invanz 500 mg iv daily x 4-6 weeks from 12/4/18  Continue zyvox 600 mg twice a day x 4-6 weeks from 12/4/18  D/w family, , will aim for home iv/po abx  Knee scooter, off loading  F/u in office in 1 week  Upon worsening may need admission for wound care/offloading/rehab    Hung Haynes MD  12/11/2018  3:03 PM                  "

## 2018-12-11 NOTE — PROGRESS NOTES
Orthopedic  Progress Note    Subjective     Post-Operative Day: 7 Days Post-Op    Systemic or Specific Complaints: s/p right 1st ray amputation, no complaints, better about not putting weight on foot  Objective     Vital signs in last 24 hours:  Temp:  [97.2 °F (36.2 °C)-98 °F (36.7 °C)] 97.7 °F (36.5 °C)  Heart Rate:  [76-82] 80  Resp:  [16-18] 18  BP: (106-160)/(54-70) 160/67    Neurovascular: No change in dense neuropathy   Wound: I changed dressing right foot, healing, no purulence, no erythema, small amount of edge necrosis on incision, no fluctuance         Data Review  Lab Results (last 24 hours)     Procedure Component Value Units Date/Time    Anaerobic Culture - Tissue, Toe, Right [[id number]] Collected:  12/04/18 1207    Specimen:  Tissue from Toe, Right Updated:  12/11/18 1404     Culture No anaerobes isolated    Fungus Culture - Tissue, Toe, Right [[id number]] Collected:  12/04/18 1207    Specimen:  Tissue from Toe, Right Updated:  12/11/18 1315     Fungus Culture No fungus isolated at 1 week    AFB Culture - Tissue, Toe, Right [[id number]] Collected:  12/04/18 1207    Specimen:  Tissue from Toe, Right Updated:  12/11/18 1315     AFB Culture No AFB isolated at 1 week     AFB Stain No acid fast bacilli seen on concentrated smear    POC Glucose Once [[id number]]  (Normal) Collected:  12/11/18 1105    Specimen:  Blood Updated:  12/11/18 1125     Glucose 104 mg/dL     POC Glucose Once [[id number]]  (Abnormal) Collected:  12/11/18 0808    Specimen:  Blood Updated:  12/11/18 0810     Glucose 158 mg/dL     Basic Metabolic Panel [[id number]]  (Abnormal) Collected:  12/11/18 0636    Specimen:  Blood Updated:  12/11/18 0721     Glucose 147 mg/dL      BUN 28 mg/dL      Creatinine 3.36 mg/dL      Sodium 137 mmol/L      Potassium 3.9 mmol/L      Chloride 107 mmol/L      CO2 22.0 mmol/L      Calcium 8.9 mg/dL      eGFR Non African Amer 14 mL/min/1.73      BUN/Creatinine Ratio 8.3     Anion Gap 8.0 mmol/L     Narrative:        [employer] Guidelines    Stage     Description        GFR  1         Normal or High     90+  2         Mild decrease      60-89  3         Moderate decrease  30-59  4         Severe decrease    15-29  5         Kidney failure     <15    The MDRD GFR formula is only valid for adults with stable renal function between ages 18 and 70.    Protime-INR [[id number]]  (Normal) Collected:  12/11/18 0636    Specimen:  Blood Updated:  12/11/18 0703     Protime 10.8 Seconds      INR 1.03    CBC (No Diff) [[id number]]  (Abnormal) Collected:  12/11/18 0636    Specimen:  Blood Updated:  12/11/18 0650     WBC 11.94 10*3/mm3      RBC 3.06 10*6/mm3      Hemoglobin 9.3 g/dL      Hematocrit 30.0 %      MCV 98.0 fL      MCH 30.4 pg      MCHC 31.0 g/dL      RDW 13.7 %      RDW-SD 47.8 fl      MPV 9.3 fL      Platelets 514 10*3/mm3     POC Glucose Once [[id number]]  (Normal) Collected:  12/10/18 2046    Specimen:  Blood Updated:  12/10/18 2049     Glucose 81 mg/dL     POC Glucose Once [[id number]]  (Abnormal) Collected:  12/10/18 1642    Specimen:  Blood Updated:  12/10/18 1703     Glucose 177 mg/dL             Assessment/Plan     Status post- stable s/p right 1st ray amputation high  Risk for BKA   She has been better about not putting foot on floor.  Ok to go home or LTAC from my standpoint, dressing change every 3-4 days, see me in 2 weeks           Chio Sterling MD    Date: 12/11/2018  Time: 3:45 PM

## 2018-12-11 NOTE — PROGRESS NOTES
"   LOS: 15 days    Patient Care Team:  Angelica Tran PA as PCP - General (Internal Medicine)  Denver Baldwin MD as Consulting Physician (Nephrology)    Subjective     Confused today. Son at bedside.     Objective     Vital Signs:  Blood pressure 149/70, pulse 82, temperature 97.2 °F (36.2 °C), temperature source Oral, resp. rate 18, height 167.6 cm (66\"), weight 109 kg (240 lb), SpO2 98 %, not currently breastfeeding.    Flowsheet Rows      First Filed Value   Admission Height  167.6 cm (66\") Documented at 11/26/2018 1315   Admission Weight  109 kg (240 lb) Documented at 11/26/2018 1315          12/10 0701 - 12/11 0700  In: 1860 [P.O.:1560]  Out: 1900 [Urine:1900]    Physical Exam:    General Appearance: WD obese WF Confused  Neuro:  Confused   Psych:  Normal mood and affect  CV:   No edema  Lungs: respirations regular and unlabored  Abdomen: not distended  :  No loya, no palp bladder  Skin: No rash, Warm and dry.  Left foot with dressing in place      Labs:  Results from last 7 days   Lab Units  12/11/18   0636  12/10/18   0604  12/05/18   0637   WBC 10*3/mm3  11.94*  13.92*  15.15*   HEMOGLOBIN g/dL  9.3*  8.7*  8.4*   PLATELETS 10*3/mm3  514*  497*  444     Results from last 7 days   Lab Units  12/11/18   0636  12/10/18   0604  12/08/18   0005  12/05/18   0637   SODIUM mmol/L  137  138  141  137   POTASSIUM mmol/L  3.9  4.2  4.4  3.9   CHLORIDE mmol/L  107  106  110*  105   CO2 mmol/L  22.0  24.0  24.0  25.0   BUN mg/dL  28*  28*  41*  51*   CREATININE mg/dL  3.36*  3.05*  3.34*  3.38*   CALCIUM mg/dL  8.9  8.8  8.2*  7.9*   PHOSPHORUS mg/dL   --    --   3.5   --    ALBUMIN g/dL   --    --   2.92*   --                    Estimated Creatinine Clearance: 22 mL/min (A) (by C-G formula based on SCr of 3.36 mg/dL (H)).         A/P:    CKD4: stable in baseline 3.5-4.0.  Likely secondary to DN. Scr 3.3 stable.     Proteinuria:  Presumed DN.  Ratio 3.1    HTN: stable.     Anemia:  On epogen. .  Transfuse " PRN for Hgb < 7    Volume:  Stable.     Diabetic Foot Ulcer    Plan:  Continue with current regimen.      Case discussed with son.     Dannie Luna MD  12/11/18  9:18 AM

## 2018-12-11 NOTE — PLAN OF CARE
Problem: Patient Care Overview  Goal: Plan of Care Review  Outcome: Ongoing (interventions implemented as appropriate)   12/10/18 2028 12/11/18 6812   Coping/Psychosocial   Plan of Care Reviewed With patient --    Plan of Care Review   Progress --  no change   OTHER   Outcome Summary --  Patient remains confused. Continues to hallucinate. No other significant changes.  Remains oriented to self, place, time, and situation- but also reports that there are people (usually children) in her room and she is not happy about it.

## 2018-12-11 NOTE — ANESTHESIA PREPROCEDURE EVALUATION
Anesthesia Evaluation     Patient summary reviewed and Nursing notes reviewed                Airway   Mallampati: I  TM distance: >3 FB  Neck ROM: full  No difficulty expected  Dental - normal exam   (+) implants    Pulmonary - negative pulmonary ROS and normal exam   Cardiovascular - normal exam    (+) hypertension, CAD, cardiac stents more than 12 months ago PVD, hyperlipidemia,  carotid artery disease      Neuro/Psych  (+) numbness (neuropathy),     GI/Hepatic/Renal/Endo    (+) morbid obesity, GERD,  renal disease CRI, diabetes mellitus,     Musculoskeletal (-) negative ROS    Abdominal  - normal exam    Bowel sounds: normal.   Substance History - negative use     OB/GYN negative ob/gyn ROS         Other                        Anesthesia Plan    ASA 3     general     intravenous induction   Anesthetic plan, all risks, benefits, and alternatives have been provided, discussed and informed consent has been obtained with: patient.    Plan discussed with CRNA.

## 2018-12-11 NOTE — PROGRESS NOTES
Continued Stay Note  [facility]     Patient Name: Tashia Leon  MRN: [id number]  Today's Date: 12/11/2018    Admit Date: 11/26/2018    Discharge Plan     Row Name 12/11/18 1713       Plan    Plan  LTAC vs home    Plan Comments  Per Vivi at [facility], they have received insurance approval for LTAC level of care. Discussed with son and MD's( Shefali Stanley and Ivy). Per her son,family would like to take her home,he feels her confusion would improve.. I spoke with Senait at [facility] office, they will check on her insurance benefits for home infusion,  I explained to son that a family member would have to be instructed and they would need to bring her back for weekly appts for lab work and Groshang dressing changes at [facility] office. He is in agreement. Case mgt will f/u in am to assist with final disposition.        Discharge Codes    No documentation.       Expected Discharge Date and Time     Expected Discharge Date Expected Discharge Time    Dec 7, 2018             Sonja C Kellerman, RN

## 2018-12-11 NOTE — PROGRESS NOTES
[facility] Medicine Services  PROGRESS NOTE    Patient Name: Tashia Leon  : 1955  MRN: [id number]    Date of Admission: 2018  Length of Stay: 15  Primary Care Physician: Angelica Tran PA    Subjective   Subjective     CC:  Diabetic foot ulcer     HPI:    No current pain. Staff reports hallucinations earlier today - talking to sister that wasn't present -- I asked patient about this and she remembers the event, knew it was a visual hallucination.     Review of Systems  Gen- No fevers, chills  CV- No chest pain, palpitations  Resp- No cough, dyspnea  GI- No N/V/D, abd pain    Otherwise ROS is negative except as mentioned in the HPI.    Objective   Objective     Vital Signs:   Temp:  [97.2 °F (36.2 °C)-98 °F (36.7 °C)] 97.7 °F (36.5 °C)  Heart Rate:  [76-82] 80  Resp:  [16-18] 18  BP: (106-160)/(54-70) 160/67        Physical Exam:  Constitutional - non toxic, in bed  HEENT-NCAT, mucous membranes moist  CV-RRR, no MRG  Resp-CTAB  Abd-soft, non-tender, non-distended, normo active bowel sounds; overweight  Ext-right foot in clean wraps, toes warm  Neuro-alert and oriented, speech clear, moves all extremities; knows year, city and hospital. answers questions appropriately and fully.   Psych-normal affect  Skin- No rash on exposed UE or LE bilaterally      Results Reviewed:  I have personally reviewed current lab, radiology, and data and agree.    Results from last 7 days   Lab Units  18   0636  12/10/18   0604  12/05/18   0637   WBC 10*3/mm3  11.94*  13.92*  15.15*   HEMOGLOBIN g/dL  9.3*  8.7*  8.4*   HEMATOCRIT %  30.0*  27.9*  27.1*   PLATELETS 10*3/mm3  514*  497*  444   INR   1.03   --    --      Results from last 7 days   Lab Units  18   0636  12/10/18   0604  18   0005   SODIUM mmol/L  137  138  141   POTASSIUM mmol/L  3.9  4.2  4.4   CHLORIDE mmol/L  107  106  110*   CO2 mmol/L  22.0  24.0  24.0   BUN mg/dL  28*  28*  41*   CREATININE mg/dL  3.36*   3.05*  3.34*   GLUCOSE mg/dL  147*  91  133*   CALCIUM mg/dL  8.9  8.8  8.2*     Estimated Creatinine Clearance: 22 mL/min (A) (by C-G formula based on SCr of 3.36 mg/dL (H)).  No results found for: BNP    Microbiology Results Abnormal     Procedure Component Value - Date/Time    Anaerobic Culture - Tissue, Toe, Right [[id number]] Collected:  12/04/18 1207    Lab Status:  Final result Specimen:  Tissue from Toe, Right Updated:  12/11/18 1404     Culture No anaerobes isolated    Fungus Culture - Tissue, Toe, Right [[id number]] Collected:  12/04/18 1207    Lab Status:  Preliminary result Specimen:  Tissue from Toe, Right Updated:  12/11/18 1315     Fungus Culture No fungus isolated at 1 week    AFB Culture - Tissue, Toe, Right [[id number]] Collected:  12/04/18 1207    Lab Status:  Preliminary result Specimen:  Tissue from Toe, Right Updated:  12/11/18 1315     AFB Culture No AFB isolated at 1 week     AFB Stain No acid fast bacilli seen on concentrated smear    Tissue / Bone Culture - Tissue, Toe, Right [[id number]] Collected:  12/04/18 1207    Lab Status:  Final result Specimen:  Tissue from Toe, Right Updated:  12/08/18 0850     Tissue Culture No growth at 4 days     Gram Stain No WBCs or organisms seen    Eosinophil Smear - Urine, Urine, Clean Catch [[id number]]  (Normal) Collected:  12/01/18 1629    Lab Status:  Final result Specimen:  Urine, Clean Catch Updated:  12/01/18 1710     Eosinophil Smear 0 % EOS/100 Cells     Narrative:       No eosinophil seen    Blood Culture - Blood, Arm, Left [[id number]] Collected:  11/26/18 1625    Lab Status:  Final result Specimen:  Blood from Arm, Left Updated:  12/01/18 1645     Blood Culture No growth at 5 days    Blood Culture - Blood, Arm, Right [[id number]] Collected:  11/26/18 1620    Lab Status:  Final result Specimen:  Blood from Arm, Right Updated:  12/01/18 1645     Blood Culture No growth at 5 days          Imaging Results (last 24 hours)     ** No results found for the  last 24 hours. **        Results for orders placed during the hospital encounter of 07/15/16   Adult transthoracic echo complete    Narrative · Left ventricular function is normal. Estimated EF = 55%.  · Trace mitral valve regurgitation is present.  · Trace tricuspid valve regurgitation is present          I have reviewed the medications.      aspirin  mg Oral Daily   castor oil-balsam peru  Topical Q12H   diltiaZEM  mg Oral Nightly   docusate sodium 100 mg Oral Daily   epoetin rebeca 20,000 Units Subcutaneous Weekly   ertapenem 500 mg Intravenous Q24H   insulin detemir 25 Units Subcutaneous Nightly   insulin lispro 0-14 Units Subcutaneous 4x Daily With Meals & Nightly   Linezolid 600 mg Intravenous Q12H   mupirocin  Topical Q24H   mupirocin  Topical Q24H   Naloxegol Oxalate 12.5 mg Oral Once per day on Sun Wed   pantoprazole 40 mg Oral QAM   sodium chloride 3 mL Intravenous Q12H         Assessment/Plan   Assessment / Plan     Active Hospital Problems    Diagnosis   • **Diabetic ulcer of right foot associated with type 2 diabetes mellitus (CMS/HCC)   • Sepsis (CMS/HCC)   • Cellulitis   • Hypokalemia   • Anemia, chronic disease   • Obesity (BMI 30-39.9)   • Impaired functional mobility, balance, gait, and endurance     Added automatically from request for surgery [id number]     • Diabetic ulcer of right midfoot associated with type 2 diabetes mellitus, with necrosis of bone (CMS/HCC)     Added automatically from request for surgery [id number]     • Cellulitis of toe of right foot     Added automatically from request for surgery [id number]     • Chronic osteomyelitis of right foot with draining sinus (CMS/HCC)     Added automatically from request for surgery [id number]     • Chronic kidney disease, stage V (CMS/HCC)   • Hyperlipidemia   • Diabetes mellitus type 2, uncontrolled, with complications (CMS/HCC)     Proteinuria noted, January 2014.       • Diabetic peripheral neuropathy (CMS/HCC)   • Essential hypertension    • Coronary artery disease involving native coronary artery of native heart without angina pectoris     1. Coronary artery disease:  a. Abnormal cardiac PET, 10/02/2013, Dr. Becker, revealing a posterolateral defect with a mild wall motion abnormality and a normal LVEF.  b. Cardiac catheterization, 10/21/2013, with placement of a 2.5 x 15 mm Xience Expedition drug-eluting stent to the proximal circumflex.  LVEF of 55% to 60%.  Noncritical disease in the LAD and right coronary.  c. Echocardiogram, 12/18/2013, shows an LVEF 50% to 55% with trace MR and mild TR.               Brief Hospital Course to date:  Sepsis, now resolved     Diabetic foot ulcer with cellulitis right foot       -s/p debridement and right 1st toe/ metarsal amputation with Dr. Patiño 12/4              -continue invanz and zyvox              -pain control- norco changed to prn q6h; dc'd dilaudid               -groshong catheter placement today              -Scooter to offload foot has been ordered and is at the bedside.         Type 2 diabetes mellitus with diabetic peripheral neuropathy              -a1c 7.6              -decreased long acting insulin as patient NPO midnight (was on levemir 40 units BID, will change to 25 units qhs alone for now)               - will need to titrate insulin back up after groshong placement and diet resumed.     CKDV              -nephrology following,  renal function limits abx choices               -Creatinine stable, continues off home rx lasix; euvolemic                 Hypokalemia              -replaced by nephrology as needed     Obesity BMI 38: Weight loss recommended     HTN: Currently normotensive     CAD: Asymptomatic, aspirin      Diabetic peripheral neuropathy: Symptomatic treatment and supportive care      Anemia of chronic disease                  Severe anxiety:improved with clonazepam. Not needed past couple of days       Debility: Difficulty walking with needed scooter.  Therapy recommended  rehabilitation facility for appropriate care and treatment but patient declining at this time, and prefers discharge home.  aware.      Hallucinations. Visual hallucinations (patient talking to sister who wasn't there) reported this morning. Patient aware of hallucination - tells me similar event occurred during last lengthy hospitalizaiton. Currently alert and oriented to person place and time. No sedating meds given recently. If continues, could consider drug reaction (ie, antibiotic), otherwise will monitor for now.     DVT Prophylaxis:  Heparin      CODE STATUS:   Code Status and Medical Interventions:   Ordered at: 11/26/18 2126     Level Of Support Discussed With:    Patient     Code Status:    CPR     Medical Interventions (Level of Support Prior to Arrest):    Full       Disposition: I expect the patient to be discharged to home        Electronically signed by Geoffrey Stanley MD, 12/11/18, 4:22 PM.

## 2018-12-11 NOTE — PLAN OF CARE
Problem: Patient Care Overview  Goal: Plan of Care Review  Outcome: Ongoing (interventions implemented as appropriate)   12/11/18 1051   Coping/Psychosocial   Plan of Care Reviewed With patient   Plan of Care Review   Progress improving   OTHER   Outcome Summary patient agitated during therapy session and needed increased assist with mobility, hogh fall risk and patient having difficulty maintaining NWB status on right, up to chair with asist x2, patient refused knee walker.

## 2018-12-11 NOTE — THERAPY TREATMENT NOTE
Acute Care - Physical Therapy Treatment Note  [facility]     Patient Name: Tashia Leon  : 1955  MRN: [id number]  Today's Date: 2018  Onset of Illness/Injury or Date of Surgery: 18  Date of Referral to PT: 18  Referring Physician: Asher    Admit Date: 2018    Visit Dx:    ICD-10-CM ICD-9-CM   1. Diabetic ulcer of other part of right foot associated with type 2 diabetes mellitus, unspecified ulcer stage (CMS/[facility]) E11.621 250.80    L97.519 707.15   2. Leukocytosis, unspecified type D72.829 288.60   3. Cellulitis of right foot L03.115 682.7   4. Chronic kidney disease, unspecified CKD stage N18.9 585.9   5. Impaired functional mobility, balance, gait, and endurance Z74.09 V49.89   6. Diabetic peripheral neuropathy (CMS/[facility]) E11.42 250.60     357.2   7. Diabetes mellitus type 2, uncontrolled, with complications (CMS/[facility]) E11.8 250.92    E11.65    8. Chronic kidney disease, stage V (CMS/[facility]) N18.5 585.5   9. Diabetic ulcer of right midfoot associated with type 2 diabetes mellitus, with necrosis of bone (CMS/[facility]) E11.621 250.80    L97.414 707.14   10. Cellulitis of toe of right foot L03.031 681.10   11. Obesity (BMI 30-39.9) E66.9 278.00   12. Chronic osteomyelitis of right foot with draining sinus (CMS/[facility]) M86.471 730.17   13. Impaired mobility and ADLs Z74.09 799.89     Patient Active Problem List   Diagnosis   • Coronary artery disease involving native coronary artery of native heart without angina pectoris   • Dyspnea   • Essential hypertension   • Hyperlipemia   • Lower extremity edema   • Carotid artery stenosis   • Carotid bruit   • Diabetes mellitus with neurological manifestations, uncontrolled (CMS/[facility])   • Diabetic peripheral neuropathy (CMS/[facility])   • Moderate nonproliferative diabetic retinopathy without macular edema associated with type 2 diabetes mellitus (CMS/[facility])   • Functional murmur   • GERD (gastroesophageal reflux disease)   • Peptic ulcer   • Vitamin D  deficiency   • Health care maintenance   • Diabetes mellitus type 2, uncontrolled, with complications (CMS/HCC)   • Hyperlipidemia   • Obesity   • Osteomyelitis (CMS/HCC)   • MRSA (methicillin resistant Staphylococcus aureus)   • Lumbar disc disease   • Hearing loss   • Hyperkalemia   • Secondary hyperparathyroidism (CMS/HCC)   • Idiopathic hypochromic anemia   • Chronic kidney disease, stage V (CMS/HCC)   • Diabetic ulcer of right foot associated with type 2 diabetes mellitus (CMS/HCC)   • Sepsis (CMS/HCC)   • Cellulitis   • Hypokalemia   • Anemia, chronic disease   • Obesity (BMI 30-39.9)   • Impaired functional mobility, balance, gait, and endurance   • Diabetic ulcer of right midfoot associated with type 2 diabetes mellitus, with necrosis of bone (CMS/HCC)   • Cellulitis of toe of right foot   • Chronic osteomyelitis of right foot with draining sinus (CMS/[facility])       Therapy Treatment    Rehabilitation Treatment Summary     Row Name 12/11/18 1001             Treatment Time/Intention    Discipline  physical therapy assistant  -AS      Document Type  therapy note (daily note)  -AS      Subjective Information  complains of;pain  -AS      Mode of Treatment  physical therapy  -AS      Patient/Family Observations  patient in supine position, son at bedside but steps out during session.   -AS      Patient Effort  good  -AS      Comment  patient agitated, impulsive and does not follow cues for safety, high fall risk.  (Significant)   -AS      Existing Precautions/Restrictions  fall;other (see comments) NWB R LE, patient refused offloading shoe on right  -AS      Treatment Considerations/Comments  non-compliant at times with NWB on right, refuses to use offloading shoe on right and placed on left for support for heel strike, pt. reported felt better on left when stepping, refused knee walker this date  -AS      Recorded by [AS] Rose Killian, PTA 12/11/18 1053      Row Name 12/11/18 1001             Cognitive  Assessment/Intervention- PT/OT    Affect/Mental Status (Cognitive)  agitated impulsive  -AS      Behavioral Issues (Cognitive)  overwhelmed easily;difficulty managing stress;uncooperative  -AS      Orientation Status (Cognition)  oriented x 3  -AS      Follows Commands (Cognition)  follows one step commands;75-90% accuracy;repetition of directions required;verbal cues/prompting required  -AS      Cognitive Function (Cognitive)  safety deficit  -AS      Safety Deficit (Cognitive)  moderate deficit;insight into deficits/self awareness;safety precautions awareness;safety precautions follow-through/compliance  -AS      Personal Safety Interventions  fall prevention program maintained;gait belt;nonskid shoes/slippers when out of bed  -AS      Recorded by [AS] Rose Killian, PTA 12/11/18 1053      Row Name 12/11/18 1001             Bed Mobility Assessment/Treatment    Supine-Sit [place] (Bed Mobility)  supervision;set up  -AS      Assistive Device (Bed Mobility)  bed rails;head of bed elevated  -AS      Comment (Bed Mobility)  patient needed cues to slow down and to all IV lines placeed in right spot for transfer, attempting to stand prior to gait belt placement.  -AS      Recorded by [AS] Rose Killian, PTA 12/11/18 1053      Row Name 12/11/18 1001             Sit-Stand Transfer    Sit-Stand [place] (Transfers)  verbal cues;minimum assist (75% patient effort)  -AS      Assistive Device (Sit-Stand Transfers)  walker, front-wheeled  -AS      Recorded by [AS] Rose Killian, PTA 12/11/18 1053      Row Name 12/11/18 1001             Stand-Sit Transfer    Stand-Sit [place]Kean (Transfers)  verbal cues;minimum assist (75% patient effort)  -AS      Assistive Device (Stand-Sit Transfers)  walker, front-wheeled  -AS      Recorded by [AS] Rose Killian, PTA 12/11/18 1053      Row Name 12/11/18 1001             Gait/Stairs Assessment/Training    28761 - Gait Training Minutes   23  -AS       Gait/Stairs Assessment/Training  gait/ambulation assistive device  -AS2      [place] Level (Gait)  verbal cues;2 person assist;moderate assist (50% patient effort)  -AS2      Assistive Device (Gait)  walker, front-wheeled  -AS2      Distance in Feet (Gait)  30  -AS2      Pattern (Gait)  step-to  -AS2      Deviations/Abnormal Patterns (Gait)  base of support, narrow;uriel decreased;gait speed decreased;stride length decreased  -AS2      Bilateral Gait Deviations  forward flexed posture;weight shift ability decreased  -AS2      Comment (Gait/Stairs)  patient very agitated when cues for NWB status on right, patient attempted to sit prior to being in front of recliner taking MOD assist to maintain upright position, patient not keeping weight off R LE when turning toward recliner. High fall risk, decreased safety awareness with all activity.  -AS2      Recorded by [AS] Rose Killian, PTA 12/11/18 1056  [AS2] Rose Killian, \[facility]\"" 12/11/18 1053      Row Name 12/11/18 1001             Therapeutic Exercise    Lower Extremity Range of Motion (Therapeutic Exercise)  -- exercises deferred due to patient refusal and agitation  -AS      Recorded by [AS] Rose Killian, \[facility]\"" 12/11/18 1053      Row Name 12/11/18 1001             Positioning and Restraints    Pre-Treatment Position  in bed  -AS      Post Treatment Position  chair  -AS      In Chair  reclined;call light within reach;notified nsg;encouraged to call for assist;exit alarm on;RLE elevated  -AS      Recorded by [AS] Rose Killian, PTA 12/11/18 1053      Row Name 12/11/18 1001             Pain Scale: Numbers Pre/Post-Treatment    Pain Scale: Numbers, Pretreatment  7/10  -AS      Pain Scale: Numbers, Post-Treatment  7/10  -AS      Pain Location - Side  Right  -AS      Pain Location  foot  -AS      Pain Intervention(s)  Repositioned;Ambulation/increased activity notified RN  -AS      Recorded by [AS] Rose Killian, PTA 12/11/18 1053      Row  Name                Wound 11/26/18 2130 Right lateral plantar diabetic/neuropathic ulceration    Wound - Properties Group Date first assessed: 11/26/18 [KF] Time first assessed: 2130 [KF] Side: Right [KF] Orientation: lateral [KF2] Location: plantar [KF] Type: diabetic/neuropathic ulceration [KF] Recorded by:  [KF] Courtney Buenrostro RN 11/27/18 0323 [KF2] Courtney Buenrostro RN 11/27/18 0325    Row Name                Wound 12/03/18 0940 Right medial gluteal    Wound - Properties Group Date first assessed: 12/03/18 [MR] Time first assessed: 0940 [MR] Side: Right [MR] Orientation: medial [MR] Location: gluteal [MR] Stage, Pressure Injury: Stage 1 [MR], healing/chronic area  Recorded by:  [MR] Michell Marion RN 12/03/18 1016    Row Name                Wound 12/04/18 1202 Right foot incision    Wound - Properties Group Date first assessed: 12/04/18 [LD] Time first assessed: 1202 [LD] Side: Right [LD] Location: foot [LD] Type: incision [LD] Recorded by:  [LD] Prema Mike RN 12/04/18 1202      User Key  (r) = Recorded By, (t) = Taken By, (c) = Cosigned By    Initials Name Effective Dates Discipline    AS Rose Killian, HALIMA 06/22/15 -  PT    Prema Aguirre RN 06/16/16 -  Nurse    Michell Soto RN 06/16/16 -  Nurse    Courtney Dale RN 06/30/16 -  Nurse          Wound 11/26/18 2130 Right lateral plantar diabetic/neuropathic ulceration (Active)   Dressing Appearance dry;intact 12/10/2018  8:55 PM       Wound 12/03/18 0940 Right medial gluteal (Active)   Dressing Appearance open to air 12/10/2018  8:55 PM   Closure Open to air 12/10/2018  8:55 PM       Wound 12/04/18 1202 Right foot incision (Active)   Dressing Appearance dry;intact 12/10/2018  8:55 PM   Dressing Care, Wound dressing changed;non-adherent;gauze;elastic bandage 12/10/2018  8:55 PM           Physical Therapy Education     Title: PT OT SLP Therapies (In Progress)     Topic: Physical Therapy (In Progress)     Point: Mobility  training (In Progress)     Learning Progress Summary           Patient Acceptance, E, NR by AS at 12/11/2018 10:54 AM    Eager, E,D, NR by DM at 12/10/2018  1:56 PM    Acceptance, E,D, VU,NR by  at 12/8/2018 10:28 AM    Eager, E,D, VU,DU,NR by SC at 12/7/2018  1:33 PM    Comment:  reviewed safety wth mobility    Eager, E, VU,DU,NR by SC at 12/4/2018  3:30 PM    Comment:  reviewed nwb r leg    Acceptance, E, NR by ES at 12/2/2018  4:11 PM    Comment:  Reviewed NWB status with pt and educated on importance of following NWB on R LE.  Educated pt on safe transfers on/off knee scooter to be able to maintain NWB.    Acceptance, E,D, NR by ES at 12/1/2018  3:15 PM    Comment:  Educated pt and family on NWB status and importance of maintaining NWB status.  Reviewed sequencing with knee scooter and correct sequencing for transfers    Acceptance, E, NR by JACEY at 11/29/2018 10:15 AM   Family Acceptance, E,D, NR by ES at 12/1/2018  3:15 PM    Comment:  Educated pt and family on NWB status and importance of maintaining NWB status.  Reviewed sequencing with knee scooter and correct sequencing for transfers                   Point: Home exercise program (In Progress)     Learning Progress Summary           Patient Acceptance, E, NR by AS at 12/11/2018 10:54 AM    Eager, E,D, NR by DM at 12/10/2018  1:56 PM    Acceptance, E,D, VU,NR by  at 12/8/2018 10:28 AM    Eager, E,D, VU,DU,NR by SC at 12/7/2018  1:33 PM    Comment:  reviewed safety wth mobility    Eager, E, VU,DU,NR by SC at 12/4/2018  3:30 PM    Comment:  reviewed nwb r leg    Acceptance, E,D, NR by ES at 12/1/2018  3:15 PM    Comment:  Educated pt and family on NWB status and importance of maintaining NWB status.  Reviewed sequencing with knee scooter and correct sequencing for transfers    Acceptance, E, NR by JACEY at 11/29/2018 10:15 AM   Family Acceptance, E,D, NR by ES at 12/1/2018  3:15 PM    Comment:  Educated pt and family on NWB status and importance of maintaining  NWB status.  Reviewed sequencing with knee scooter and correct sequencing for transfers                   Point: Body mechanics (In Progress)     Learning Progress Summary           Patient Acceptance, E, NR by AS at 12/11/2018 10:54 AM    Eager, E,D, NR by DM at 12/10/2018  1:56 PM    Acceptance, E,D, VU,NR by SH at 12/8/2018 10:28 AM    Eager, E,D, VU,DU,NR by SC at 12/7/2018  1:33 PM    Comment:  reviewed safety wth mobility    Eager, E, VU,DU,NR by SC at 12/4/2018  3:30 PM    Comment:  reviewed nwb r leg    Acceptance, E, NR by ES at 12/2/2018  4:11 PM    Comment:  Reviewed NWB status with pt and educated on importance of following NWB on R LE.  Educated pt on safe transfers on/off knee scooter to be able to maintain NWB.    Acceptance, E,D, NR by ES at 12/1/2018  3:15 PM    Comment:  Educated pt and family on NWB status and importance of maintaining NWB status.  Reviewed sequencing with knee scooter and correct sequencing for transfers    Acceptance, E, VU by KG at 11/30/2018 11:46 PM    Acceptance, E, NR by JACEY at 11/29/2018 10:15 AM   Family Acceptance, E,D, NR by ES at 12/1/2018  3:15 PM    Comment:  Educated pt and family on NWB status and importance of maintaining NWB status.  Reviewed sequencing with knee scooter and correct sequencing for transfers                   Point: Precautions (In Progress)     Learning Progress Summary           Patient Acceptance, E, NR by AS at 12/11/2018 10:54 AM    Eager, E,D, NR by DM at 12/10/2018  1:56 PM    Acceptance, E,D, VU,NR by  at 12/8/2018 10:28 AM    Eager, E,D, VU,DU,NR by SC at 12/7/2018  1:33 PM    Comment:  reviewed safety wth mobility    Eager, E, VU,DU,NR by SC at 12/4/2018  3:30 PM    Comment:  reviewed nwb r leg    Acceptance, E, NR by ES at 12/2/2018  4:11 PM    Comment:  Reviewed NWB status with pt and educated on importance of following NWB on R LE.  Educated pt on safe transfers on/off knee scooter to be able to maintain NWB.    Acceptance, E, VU by  KG at 12/1/2018 11:14 PM    Acceptance, E,D, NR by ES at 12/1/2018  3:15 PM    Comment:  Educated pt and family on NWB status and importance of maintaining NWB status.  Reviewed sequencing with knee scooter and correct sequencing for transfers    Acceptance, E, NR by JACEY at 11/29/2018 10:15 AM   Family Acceptance, E,D, NR by ES at 12/1/2018  3:15 PM    Comment:  Educated pt and family on NWB status and importance of maintaining NWB status.  Reviewed sequencing with knee scooter and correct sequencing for transfers                               User Key     Initials Effective Dates Name Provider Type Discipline    SC 06/19/15 -  Srinivasan Mcmahon, PT Physical Therapist PT    JACEY 06/19/15 -  Yany Cheek, PT Physical Therapist PT    SH 06/19/15 -  Christina Simon, PT Physical Therapist PT    DM 06/19/15 -  Kelly العراقي, PT Physical Therapist PT    AS 06/22/15 -  Rose Killian, PTA Physical Therapy Assistant PT    KG 06/16/16 -  Roseann Lazo, RN Registered Nurse Nurse     11/13/17 -  Galina Wei, PT Physical Therapist PT                PT Recommendation and Plan     Plan of Care Reviewed With: patient  Progress: improving  Outcome Summary: patient agitated during therapy session and needed increased assist with mobility, hogh fall risk and patient having difficulty maintaining NWB status on right, up to chair with asist x2, patient refused knee walker.  Outcome Measures     Row Name 12/11/18 1001 12/10/18 1213 12/10/18 0915       How much help from another person do you currently need...    Turning from your back to your side while in flat bed without using bedrails?  4  -AS  4  -DM  --    Moving from lying on back to sitting on the side of a flat bed without bedrails?  3  -AS  3  -DM  --    Moving to and from a bed to a chair (including a wheelchair)?  3  -AS  3  -DM  --    Standing up from a chair using your arms (e.g., wheelchair, bedside chair)?  3  -AS  3  -DM  --    Climbing 3-5 steps  with a railing?  1  -AS  1  -DM  --    To walk in hospital room?  2  -AS  3  -DM  --    AM-PAC 6 Clicks Score  16  -AS  17  -DM  --       How much help from another is currently needed...    Putting on and taking off regular lower body clothing?  --  --  3  -AR    Bathing (including washing, rinsing, and drying)  --  --  3  -AR    Toileting (which includes using toilet bed pan or urinal)  --  --  3  -AR    Putting on and taking off regular upper body clothing  --  --  3  -AR    Taking care of personal grooming (such as brushing teeth)  --  --  3  -AR    Eating meals  --  --  3  -AR    Score  --  --  18  -AR       Functional Assessment    Outcome Measure Options  AM-PAC 6 Clicks Basic Mobility (PT)  -AS  AM-PAC 6 Clicks Basic Mobility (PT)  -DM  AM-PAC 6 Clicks Daily Activity (OT)  -AR      User Key  (r) = Recorded By, (t) = Taken By, (c) = Cosigned By    Initials Name Provider Type    DM Kelly العراقي, PT Physical Therapist    Abril Parker, OT Occupational Therapist    AS Rose Killian, HALIMA Physical Therapy Assistant         Time Calculation:   PT Charges     Row Name 12/11/18 1001             Time Calculation    Start Time  1001  -AS      PT Received On  12/11/18  -AS      PT Goal Re-Cert Due Date  12/20/18  -AS         Timed Charges    82246 - Gait Training Minutes   23  -AS        User Key  (r) = Recorded By, (t) = Taken By, (c) = Cosigned By    Initials Name Provider Type    AS Rose Killian, HALIMA Physical Therapy Assistant        Therapy Suggested Charges     Code   Minutes Charges    99974 (CPT®) Hc Pt Neuromusc Re Education Ea 15 Min      64978 (CPT®) Hc Pt Ther Proc Ea 15 Min      64752 (CPT®) Hc Gait Training Ea 15 Min 23 2    01434 (CPT®) Hc Pt Therapeutic Act Ea 15 Min      77690 (CPT®) Hc Pt Manual Therapy Ea 15 Min      56016 (CPT®) Hc Pt Iontophoresis Ea 15 Min      15388 (CPT®) Hc Pt Elec Stim Ea-Per 15 Min      88385 (CPT®) Hc Pt Ultrasound Ea 15 Min      70464 (CPT®) Hc Pt  Self Care/Mgmt/Train Ea 15 Min      19397 (CPT®) Hc Pt Prosthetic (S) Train Initial Encounter, Each 15 Min      16010 (CPT®) Hc Pt Orthotic(S)/Prosthetic(S) Encounter, Each 15 Min      76449 (CPT®) Hc Orthotic(S) Mgmt/Train Initial Encounter, Each 15min      Total  23 2        Therapy Charges for Today     Code Description Service Date Service Provider Modifiers Qty    53103248046 HC GAIT TRAINING EA 15 MIN 12/11/2018 Rose Killian, PTA GP 2    66727542048 HC PT THER SUPP EA 15 MIN 12/11/2018 Rose Killian, PTA GP 2          PT G-Codes  Outcome Measure Options: AM-PAC 6 Clicks Basic Mobility (PT)  AM-PAC 6 Clicks Score: 16  Score: 18    Rose Killian PTA  12/11/2018

## 2018-12-11 NOTE — PLAN OF CARE
Problem: Patient Care Overview  Goal: Plan of Care Review  Outcome: Ongoing (interventions implemented as appropriate)   12/10/18 2028   Coping/Psychosocial   Plan of Care Reviewed With patient   Plan of Care Review   Progress improving   OTHER   Outcome Summary patient continues to have right foot incisional pain. Pt remains confused and continues to state that there are people in her room. Will state she has pain but will proceed to fall asleep at times. Was able to ambulate with scooter to bathroom and in hallway limited by fatigue. IV replaced. Dressing change complete swelling continues to go down. Groshong will be place tomorrow. Pt states she wants to be discharged home.        Problem: Infection, Risk/Actual (Adult)  Goal: Identify Related Risk Factors and Signs and Symptoms  Outcome: Ongoing (interventions implemented as appropriate)   12/10/18 2028   Infection, Risk/Actual (Adult)   Related Risk Factors (Infection, Risk/Actual) skin integrity impairment;chronic illness/condition;surgery/procedure;prolonged hospitalization   Signs and Symptoms (Infection, Risk/Actual) pain;weakness     Goal: Infection Prevention/Resolution  Outcome: Ongoing (interventions implemented as appropriate)   12/10/18 2028   Infection, Risk/Actual (Adult)   Infection Prevention/Resolution making progress toward outcome       Problem: Fall Risk (Adult)  Goal: Identify Related Risk Factors and Signs and Symptoms  Outcome: Ongoing (interventions implemented as appropriate)   12/10/18 2028   Fall Risk (Adult)   Related Risk Factors (Fall Risk) gait/mobility problems;impaired vision;fatigue/slow reaction;fear of falling;environment unfamiliar   Signs and Symptoms (Fall Risk) presence of risk factors     Goal: Absence of Fall  Outcome: Ongoing (interventions implemented as appropriate)   12/10/18 2028   Fall Risk (Adult)   Absence of Fall achieves outcome       Problem: Pain, Acute (Adult)  Goal: Identify Related Risk Factors and Signs  and Symptoms  Outcome: Ongoing (interventions implemented as appropriate)   12/10/18 2028   Pain, Acute (Adult)   Related Risk Factors (Acute Pain) persistent pain;knowledge deficit   Signs and Symptoms (Acute Pain) BADLs/IADLs reluctance/inability to perform;pacing/restlessness;fatigue/weakness       Problem: Diabetes, Type 2 (Adult)  Goal: Signs and Symptoms of Listed Potential Problems Will be Absent, Minimized or Managed (Diabetes, Type 2)  Outcome: Ongoing (interventions implemented as appropriate)   12/10/18 2028   Goal/Outcome Evaluation   Problems Assessed (Type 2 Diabetes) all   Problems Present (Type 2 Diabetes) hyperglycemia       Problem: Skin and Soft Tissue Infection (Adult)  Goal: Signs and Symptoms of Listed Potential Problems Will be Absent, Minimized or Managed (Skin and Soft Tissue Infection)  Outcome: Ongoing (interventions implemented as appropriate)   12/10/18 2028   Goal/Outcome Evaluation   Problems Assessed (Skin and Soft Tissue Infection) all   Problems Present (Skin/Soft Tissue Inf) pain

## 2018-12-11 NOTE — BRIEF OP NOTE
HEMODIALYSIS CATHETER INSERTION  Progress Note    Tashia Leon  11/26/2018 - 12/11/2018    Pre-op Diagnosis:   Diabetic foot ulcer       Post-Op Diagnosis Codes:     * Diabetic foot ulcer associated with diabetes mellitus due to underlying condition (CMS/[facility]) [E08.621, L97.509]    Procedure/CPT® Codes:      Procedure(s):  GROSHONG CATHETER PLACEMENT    Surgeon(s):  Maribel May MD    Anesthesia: General    Staff:   Circulator: Jen Ling RN  Scrub Person: Angelica Nieto    Estimated Blood Loss: minimal    Urine Voided: * No values recorded between 12/11/2018  5:30 PM and 12/11/2018  6:05 PM *    Specimens:                None              Complications: none      Maribel May MD     Date: 12/11/2018  Time: 6:05 PM

## 2018-12-11 NOTE — PLAN OF CARE
Problem: Patient Care Overview  Goal: Plan of Care Review  Outcome: Ongoing (interventions implemented as appropriate)   12/11/18 7780   Coping/Psychosocial   Plan of Care Reviewed With patient   Plan of Care Review   Progress no change   OTHER   Outcome Summary Pt experiencing visual hallucinations but can answer all questions appropriately. No c/o pain. Non-compliant with NWB to right foot. Dressing to right foot changed per order. VSS. Groshong cath to be placed this evening. Will cont to monitor.

## 2018-12-11 NOTE — ANESTHESIA POSTPROCEDURE EVALUATION
Patient: Tashia Leon    Procedure Summary     Date:  12/11/18 Room / Location:   [facility] /  [facility]    Anesthesia Start:  1730 Anesthesia Stop:  1809    Procedure:  GROSHONG CATHETER PLACEMENT (N/A ) Diagnosis:  Diabetic foot ulcer associated with diabetes mellitus due to underlying condition (CMS/[facility])    Surgeon:  Maribel May MD Provider:  Ivan Whitney MD    Anesthesia Type:  general ASA Status:  3          Anesthesia Type: general  Last vitals  BP   147/72 (12/11/18 1702)   Temp   97.6 °F (36.4 °C) (12/11/18 1702)   Pulse   78 (12/11/18 1702)   Resp   16 (12/11/18 1702)     SpO2   96 % (12/11/18 1702)     Post Anesthesia Care and Evaluation    Patient location during evaluation: PACU  Patient participation: complete - patient participated  Level of consciousness: awake and alert  Pain score: 0  Pain management: adequate  Airway patency: patent  Anesthetic complications: No anesthetic complications  PONV Status: none  Cardiovascular status: hemodynamically stable and acceptable  Respiratory status: nonlabored ventilation, acceptable and nasal cannula  Hydration status: acceptable

## 2018-12-12 LAB
ANION GAP SERPL CALCULATED.3IONS-SCNC: 10 MMOL/L (ref 3–11)
BUN BLD-MCNC: 32 MG/DL (ref 9–23)
BUN/CREAT SERPL: 9.6 (ref 7–25)
CALCIUM SPEC-SCNC: 8.5 MG/DL (ref 8.7–10.4)
CHLORIDE SERPL-SCNC: 110 MMOL/L (ref 99–109)
CO2 SERPL-SCNC: 20 MMOL/L (ref 20–31)
CREAT BLD-MCNC: 3.32 MG/DL (ref 0.6–1.3)
GFR SERPL CREATININE-BSD FRML MDRD: 14 ML/MIN/1.73
GLUCOSE BLD-MCNC: 208 MG/DL (ref 70–100)
GLUCOSE BLDC GLUCOMTR-MCNC: 108 MG/DL (ref 70–130)
GLUCOSE BLDC GLUCOMTR-MCNC: 133 MG/DL (ref 70–130)
GLUCOSE BLDC GLUCOMTR-MCNC: 156 MG/DL (ref 70–130)
GLUCOSE BLDC GLUCOMTR-MCNC: 215 MG/DL (ref 70–130)
POTASSIUM BLD-SCNC: 4.6 MMOL/L (ref 3.5–5.5)
SODIUM BLD-SCNC: 140 MMOL/L (ref 132–146)

## 2018-12-12 PROCEDURE — 63710000001 INSULIN DETEMIR PER 5 UNITS: Performed by: INTERNAL MEDICINE

## 2018-12-12 PROCEDURE — 25010000002 LINEZOLID 600 MG/300ML SOLUTION: Performed by: INTERNAL MEDICINE

## 2018-12-12 PROCEDURE — 99233 SBSQ HOSP IP/OBS HIGH 50: CPT | Performed by: NURSE PRACTITIONER

## 2018-12-12 PROCEDURE — 25010000002 ERTAPENEM PER 500 MG: Performed by: INTERNAL MEDICINE

## 2018-12-12 PROCEDURE — 82962 GLUCOSE BLOOD TEST: CPT

## 2018-12-12 PROCEDURE — 99024 POSTOP FOLLOW-UP VISIT: CPT | Performed by: ORTHOPAEDIC SURGERY

## 2018-12-12 PROCEDURE — 80048 BASIC METABOLIC PNL TOTAL CA: CPT | Performed by: INTERNAL MEDICINE

## 2018-12-12 RX ADMIN — INSULIN LISPRO 3 UNITS: 100 INJECTION, SOLUTION INTRAVENOUS; SUBCUTANEOUS at 09:27

## 2018-12-12 RX ADMIN — SODIUM CHLORIDE, PRESERVATIVE FREE 3 ML: 5 INJECTION INTRAVENOUS at 21:33

## 2018-12-12 RX ADMIN — HYDROCODONE BITARTRATE AND ACETAMINOPHEN 1 TABLET: 7.5; 325 TABLET ORAL at 01:25

## 2018-12-12 RX ADMIN — INSULIN DETEMIR 25 UNITS: 100 INJECTION, SOLUTION SUBCUTANEOUS at 21:34

## 2018-12-12 RX ADMIN — MUPIROCIN: 2 CREAM TOPICAL at 09:28

## 2018-12-12 RX ADMIN — LINEZOLID 600 MG: 600 INJECTION, SOLUTION INTRAVENOUS at 02:46

## 2018-12-12 RX ADMIN — CASTOR OIL AND BALSAM, PERU: 788; 87 OINTMENT TOPICAL at 21:33

## 2018-12-12 RX ADMIN — ASPIRIN 325 MG: 325 TABLET, DELAYED RELEASE ORAL at 09:28

## 2018-12-12 RX ADMIN — INSULIN LISPRO 5 UNITS: 100 INJECTION, SOLUTION INTRAVENOUS; SUBCUTANEOUS at 13:22

## 2018-12-12 RX ADMIN — DILTIAZEM HYDROCHLORIDE 120 MG: 120 CAPSULE, EXTENDED RELEASE ORAL at 21:29

## 2018-12-12 RX ADMIN — DOCUSATE SODIUM 100 MG: 100 CAPSULE, LIQUID FILLED ORAL at 09:28

## 2018-12-12 RX ADMIN — ERTAPENEM SODIUM 500 MG: 1 INJECTION, POWDER, LYOPHILIZED, FOR SOLUTION INTRAMUSCULAR; INTRAVENOUS at 17:18

## 2018-12-12 RX ADMIN — HYDROCODONE BITARTRATE AND ACETAMINOPHEN 1 TABLET: 7.5; 325 TABLET ORAL at 21:34

## 2018-12-12 RX ADMIN — CASTOR OIL AND BALSAM, PERU: 788; 87 OINTMENT TOPICAL at 09:28

## 2018-12-12 NOTE — NURSING NOTE
Patient removed initial dressing from newly placed groshong catheter site at approximately 0330 this morning.  Nursing staff x2 (Lizet Boggs, RN; uLcy Terrell RN) thoroughly cleaned the site and large surrounding area with chlorhexidine using aseptic/sterile technique, and replaced the dressing with an antimicrobial transparent dressing.  Spoke with Lilly (2H charge nurse) in regards to end caps for the catheter lumens- she states she will arrive to assess the situation shortly.

## 2018-12-12 NOTE — PLAN OF CARE
Problem: Patient Care Overview  Goal: Plan of Care Review  Outcome: Ongoing (interventions implemented as appropriate)   12/11/18 1650 12/12/18 0402   Coping/Psychosocial   Plan of Care Reviewed With patient --    Plan of Care Review   Progress --  no change   OTHER   Outcome Summary --  Patient remains oriented x4 but is still hallucinating visual/auditory. Groshong placed yesterday 12/11, pending ray results to confirm placement. See nursing note.

## 2018-12-12 NOTE — PROGRESS NOTES
"Orthopedic  Progress Note    Subjective     Post-Operative Day: 8 Day Post-Op    Systemic or Specific Complaints: s/p right 1st ray amputation, oriented x3 but confused overnight.  Her son reports \"she has been talking to you all night\"  The patient reports remembering things that I said- but not real.  She does remember the most important directive is to NOT PUT WEIGHT ON FOOT  Objective     Vital signs in last 24 hours:  Temp:  [97.2 °F (36.2 °C)-98.2 °F (36.8 °C)] 97.6 °F (36.4 °C)  Heart Rate:  [76-83] 80  Resp:  [16-20] 17  BP: ()/(51-86) 119/58    Neurovascular: No change in dense neuropathy   Wound: Right foot healing, no complication at present         Data Review  Lab Results (last 24 hours)     Procedure Component Value Units Date/Time    POC Glucose Once [[id number]]  (Abnormal) Collected:  12/11/18 2025    Specimen:  Blood Updated:  12/11/18 2041     Glucose 174 mg/dL     POC Glucose Once [[id number]]  (Abnormal) Collected:  12/11/18 1834    Specimen:  Blood Updated:  12/11/18 1852     Glucose 143 mg/dL     POC Glucose Once [[id number]]  (Abnormal) Collected:  12/11/18 1707    Specimen:  Blood Updated:  12/11/18 1709     Glucose 154 mg/dL     POC Glucose Once [[id number]]  (Abnormal) Collected:  12/11/18 1544    Specimen:  Blood Updated:  12/11/18 1545     Glucose 150 mg/dL     Anaerobic Culture - Tissue, Toe, Right [[id number]] Collected:  12/04/18 1207    Specimen:  Tissue from Toe, Right Updated:  12/11/18 1404     Culture No anaerobes isolated    Fungus Culture - Tissue, Toe, Right [[id number]] Collected:  12/04/18 1207    Specimen:  Tissue from Toe, Right Updated:  12/11/18 1315     Fungus Culture No fungus isolated at 1 week    AFB Culture - Tissue, Toe, Right [[id number]] Collected:  12/04/18 1207    Specimen:  Tissue from Toe, Right Updated:  12/11/18 1315     AFB Culture No AFB isolated at 1 week     AFB Stain No acid fast bacilli seen on concentrated smear    POC Glucose Once [[id number]]  " (Normal) Collected:  12/11/18 1105    Specimen:  Blood Updated:  12/11/18 1125     Glucose 104 mg/dL     POC Glucose Once [[id number]]  (Abnormal) Collected:  12/11/18 0808    Specimen:  Blood Updated:  12/11/18 0810     Glucose 158 mg/dL             Assessment/Plan     Status post- right 1st ray amputation- her mentation is unusual, oriented yet remembering things that did not happen.  Her son wants to take her home instead of LTAC.  I advised her that I think she would be safest at LTAC, but she does not want to go.  High risk for BKA, bilateral given density of neuropathy and lack of self care of her feet.  I will see her in 1-2 weeks, sooner if any problems.   Dressing change every 3-4 days with home health     - also will send diabetic foot care instructions home with her (in ADT)    - I will see her every 3-4 days if she stays in hospital, page me if needed      Chio Sterling MD    Date: 12/12/2018  Time: 8:06 AM

## 2018-12-12 NOTE — PROGRESS NOTES
"Tashia CASTILLO Edward  1955  [id number]    Surgery Progress Note    Date of visit: 12/12/2018    Subjective:sitting up in bed  Status post placement for Groshong catheter yesterday  Denies any pain at the site    Objective:    /58 (BP Location: Left arm, Patient Position: Lying)   Pulse 80   Temp 97.6 °F (36.4 °C) (Temporal)   Resp 17   Ht 167.6 cm (66\")   Wt 109 kg (240 lb)   SpO2 97%   Breastfeeding? No   BMI 38.74 kg/m²     Intake/Output Summary (Last 24 hours) at 12/12/2018 0854  Last data filed at 12/12/2018 0033  Gross per 24 hour   Intake --   Output 800 ml   Net -800 ml     L: Groshong catheter site with no swelling        LABS:    Results from last 7 days   Lab Units  12/11/18   0636   WBC 10*3/mm3  11.94*   HEMOGLOBIN g/dL  9.3*   HEMATOCRIT %  30.0*   PLATELETS 10*3/mm3  514*     Results from last 7 days   Lab Units  12/11/18   0636   SODIUM mmol/L  137   POTASSIUM mmol/L  3.9   CHLORIDE mmol/L  107   CO2 mmol/L  22.0   BUN mg/dL  28*   CREATININE mg/dL  3.36*   CALCIUM mg/dL  8.9   GLUCOSE mg/dL  147*     Results from last 7 days   Lab Units  12/11/18   0636   SODIUM mmol/L  137   POTASSIUM mmol/L  3.9   CHLORIDE mmol/L  107   CO2 mmol/L  22.0   BUN mg/dL  28*   CREATININE mg/dL  3.36*   GLUCOSE mg/dL  147*   CALCIUM mg/dL  8.9     No results found for: LIPASE      Assessment/ Plan: status post placement for Groshong catheter  Chest x-ray shows adequate placement  Plan per primary service  Follow-up in the office to remove the Groshong catheter down the line    Problem List Items Addressed This Visit     Diabetic peripheral neuropathy (CMS/HCC)    Relevant Medications    Insulin Lispro (HUMALOG KWIKPEN) 200 UNIT/ML solution pen-injector    Dulaglutide (TRULICITY) 1.5 MG/0.5ML solution pen-injector    Other Relevant Orders    Case Request (Completed)    Anaerobic Culture - Tissue, Toe, Right (Completed)    Fungus Culture - Tissue, Toe, Right (Completed)    Tissue / Bone Culture - Tissue, " Toe, Right (Completed)    AFB Culture - Tissue, Toe, Right (Completed)    Tissue Pathology Exam (Completed)    Diabetes mellitus type 2, uncontrolled, with complications (CMS/[facility])    Relevant Medications    Insulin Lispro (HUMALOG KWIKPEN) 200 UNIT/ML solution pen-injector    Dulaglutide (TRULICITY) 1.5 MG/0.5ML solution pen-injector    Other Relevant Orders    Case Request (Completed)    Anaerobic Culture - Tissue, Toe, Right (Completed)    Fungus Culture - Tissue, Toe, Right (Completed)    Tissue / Bone Culture - Tissue, Toe, Right (Completed)    AFB Culture - Tissue, Toe, Right (Completed)    Tissue Pathology Exam (Completed)    Osteomyelitis (CMS/[facility])    Relevant Orders    Case Request (Completed)    Anaerobic Culture - Tissue, Toe, Right (Completed)    Fungus Culture - Tissue, Toe, Right (Completed)    Tissue / Bone Culture - Tissue, Toe, Right (Completed)    AFB Culture - Tissue, Toe, Right (Completed)    Tissue Pathology Exam (Completed)    Chronic kidney disease, stage V (CMS/[facility])    Relevant Medications    furosemide (LASIX) 40 MG tablet    Other Relevant Orders    Case Request (Completed)    Anaerobic Culture - Tissue, Toe, Right (Completed)    Fungus Culture - Tissue, Toe, Right (Completed)    Tissue / Bone Culture - Tissue, Toe, Right (Completed)    AFB Culture - Tissue, Toe, Right (Completed)    Tissue Pathology Exam (Completed)    * (Principal) Diabetic ulcer of right foot associated with type 2 diabetes mellitus (CMS/[facility]) - Primary    Relevant Medications    Insulin Lispro (HUMALOG KWIKPEN) 200 UNIT/ML solution pen-injector    Dulaglutide (TRULICITY) 1.5 MG/0.5ML solution pen-injector    Other Relevant Orders    Case Request (Completed)    Anaerobic Culture - Tissue, Toe, Right (Completed)    Fungus Culture - Tissue, Toe, Right (Completed)    Tissue / Bone Culture - Tissue, Toe, Right (Completed)    AFB Culture - Tissue, Toe, Right (Completed)    Tissue Pathology Exam (Completed)    Cellulitis     Relevant Orders    Case Request (Completed)    Anaerobic Culture - Tissue, Toe, Right (Completed)    Fungus Culture - Tissue, Toe, Right (Completed)    Tissue / Bone Culture - Tissue, Toe, Right (Completed)    AFB Culture - Tissue, Toe, Right (Completed)    Tissue Pathology Exam (Completed)    Obesity (BMI 30-39.9)    Relevant Orders    Case Request (Completed)    Anaerobic Culture - Tissue, Toe, Right (Completed)    Fungus Culture - Tissue, Toe, Right (Completed)    Tissue / Bone Culture - Tissue, Toe, Right (Completed)    AFB Culture - Tissue, Toe, Right (Completed)    Tissue Pathology Exam (Completed)    Impaired functional mobility, balance, gait, and endurance    Relevant Orders    Case Request (Completed)    Anaerobic Culture - Tissue, Toe, Right (Completed)    Fungus Culture - Tissue, Toe, Right (Completed)    Tissue / Bone Culture - Tissue, Toe, Right (Completed)    AFB Culture - Tissue, Toe, Right (Completed)    Tissue Pathology Exam (Completed)      Other Visit Diagnoses     Leukocytosis, unspecified type        Cellulitis of right foot        Chronic kidney disease, unspecified CKD stage        Relevant Medications    furosemide (LASIX) 40 MG tablet    Impaired mobility and ADLs                Maribel May MD  12/12/2018  8:54 AM

## 2018-12-12 NOTE — PROGRESS NOTES
INFECTIOUS DISEASE PROGRESS NOTE    Tashia Leon  1955  [id number]    Date: 12/12/2018    Admission Date: 11/26/2018      CC: diabetic foot ulcer    History of present illness:    Patient is a 63 y.o. female with chronic kidney disease stage IV, diabetes type 2 since 1990s has had history of left foot MRSA osteomyelitis treated at  several years ago.  Patient has developed calluses on her right foot and saw [facility] who did some bedside debridement in the clinic.  Patient said increasing pain and some weeping from the wounds because of low-grade temperatures patient's returns to see her podiatrist recommended admission to hospital.  Patient was seen in the emergency room started on IV antibiotics including daptomycin and ertapenem.  She had MRI of the foot which was stopped prematurely by the patient's and was not a complete study.    Asked to address long-term treatment and duration of antibiotics.    Patient lives in [place].    Patient denies C. difficile colitis history of nausea vomiting or diarrhea difficulty breathing patient denies rash.    Patient says she is a PICC line before    11/28/18; no events overnight; no fever, rash, sore throat    11/29/18; no events overnight feels well; no complaints; no diarrhea, rash, sore throat    11/30/18: No new events. Denies f/c, sob, n/v/d, rashes or sore throat.     12/1/18; no events overnight; no fever, rash, sore throat    12/2/18: no new events or complaints.  Denies f/c, sob, n/v/d, rashes.   12/3/18: No new events or complaints.  Foot without pain.  No schedule for dressing changes.  Denies f/c, sob, n/v/d, rashes.     12/6/18; doing well; no events overnight; no fever, rash, sore throat    12/7/18; no changes, wants to go home; no fever, rash, sore throat  12/10/18; no events overnight; doing well; no complaints, no fever, rash, sore throat; wants deep line, refusing to go to LTAC    12/11/18; no events overnight; feels fair, to have  groshong catheter today; no fever, rash, sore throat; son remarking that patien tis having some hallucinations this morning better currently; hoping that going home will help this improve    18; no events overnight; patient having some hallucinations of mosquitoes around her skin; no fever, rash. Patient wants to go home  Past Medical History:   Diagnosis Date   • Acute bronchitis    • Acute pharyngitis    • Acute sinusitis    • Benign colonic polyp    • Edema    • GERD (gastroesophageal reflux disease)    • Hiatal hernia    • Hyperkalemia    • Hyperlipidemia    • Hypertension    • Low back pain    • Lumbar disc disease    • MRSA (methicillin resistant Staphylococcus aureus)     Osteomyelitis/methicillin-resistant Staphylococcus aureus of the left foot, 2013.   • Obesity    • Osteomyelitis (CMS/HCC)     Osteomyelitis/methicillin-resistant Staphylococcus aureus of the left foot, 2013.   • Peptic ulceration 2013    History of peptic ulcer disease, diagnosed 2013 by EGD.  Repeat EGD in 2013 was negative.   • Sepsis (CMS/HCC)     Sepsis, 2013, hospitalized at [facility].   • Tobacco use     Previous tobacco use with cessation in .   • Type 2 diabetes mellitus (CMS/HCC)     Proteinuria noted, 2014.     • Vitamin D deficiency        Past Surgical History:   Procedure Laterality Date   •  SECTION      x2   • FOOT SURGERY Left     Incision and debridement of the left foot x2.   • LASIK     • TONSILLECTOMY         Family History   Problem Relation Age of Onset   • No Known Problems Mother    • No Known Problems Father    • Breast cancer Neg Hx    • Ovarian cancer Neg Hx        Social History     Socioeconomic History   • Marital status:      Spouse name: Not on file   • Number of children: Not on file   • Years of education: Not on file   • Highest education level: Not on file   Social Needs   • Financial resource strain: Not on  "file   • Food insecurity - worry: Not on file   • Food insecurity - inability: Not on file   • Transportation needs - medical: Not on file   • Transportation needs - non-medical: Not on file   Occupational History   • Not on file   Tobacco Use   • Smoking status: Former Smoker     Packs/day: 1.00     Years: 30.00     Pack years: 30.00     Types: Cigarettes   • Smokeless tobacco: Never Used   • Tobacco comment: quit 7 years ago   Substance and Sexual Activity   • Alcohol use: Yes     Alcohol/week: 0.6 oz     Types: 1 Glasses of wine per week     Comment: holidays   • Drug use: No   • Sexual activity: Defer   Other Topics Concern   • Not on file   Social History Narrative   • Not on file       Allergies   Allergen Reactions   • Statins Myalgia   • Ancef [Cefazolin] Other (See Comments) and GI Intolerance     Tunnel vision.  (TOLERATES INVANZ)   • Bactrim [Sulfamethoxazole-Trimethoprim] Hives and Nausea And Vomiting   • Cephalosporins GI Intolerance   • Keflex [Cephalexin] Other (See Comments) and GI Intolerance     \"tunnel vision\"..   (pt tolerates INVANZ)   • Levaquin [Levofloxacin]    • Lipitor [Atorvastatin] Myalgia   • Lisinopril    • Losartan Potassium Other (See Comments)     Unknown reaction   • Oxycodone GI Intolerance   • Quinolones    • Cleocin [Clindamycin Hcl] Rash   • Clindamycin/Lincomycin Rash         Medication:    Current Facility-Administered Medications:   •  aspirin EC tablet 325 mg, 325 mg, Oral, Daily, Rosario Walter APRN, 325 mg at 12/12/18 0928  •  bisacodyl (DULCOLAX) EC tablet 10 mg, 10 mg, Oral, Daily PRN, Heavenly Medina, APRN, 10 mg at 12/08/18 0841  •  castor oil-balsam peru (VENELEX) ointment, , Topical, Q12H, Halina Walker, DO  •  dextrose (D50W) 25 g/ 50mL Intravenous Solution 25 g, 25 g, Intravenous, Q15 Min PRN, Rosario Walter, APRN  •  dextrose (GLUTOSE) oral gel 15 g, 15 g, Oral, Q15 Min PRN, Rosario Walter, APRN  •  diltiaZEM CD (CARDIZEM CD) 24 hr " capsule 120 mg, 120 mg, Oral, Nightly, Rosario Walter APRN, 120 mg at 12/11/18 2049  •  docusate sodium (COLACE) capsule 100 mg, 100 mg, Oral, Daily, Heavenly Medina APRN, 100 mg at 12/12/18 0928  •  epoetin rebeca (EPOGEN,PROCRIT) injection 20,000 Units, 20,000 Units, Subcutaneous, Weekly, Manny Guerrero MD, 20,000 Units at 12/08/18 1458  •  ertapenem (INVanz) 500 mg in sodium chloride 0.9 % 50 mL IVPB, 500 mg, Intravenous, Q24H, Hung Haynes MD, Last Rate: 100 mL/hr at 12/11/18 1712, 500 mg at 12/11/18 1712  •  glucagon (human recombinant) (GLUCAGEN DIAGNOSTIC) injection 1 mg, 1 mg, Subcutaneous, PRN, Rosario Walter APRN  •  HYDROcodone-acetaminophen (NORCO) 7.5-325 MG per tablet 1 tablet, 1 tablet, Oral, Q6H PRN, Halina Walker DO, 1 tablet at 12/12/18 0125  •  insulin detemir (LEVEMIR) injection 25 Units, 25 Units, Subcutaneous, Nightly, Geoffrey Stanley MD, 25 Units at 12/11/18 2050  •  insulin lispro (humaLOG) injection 0-14 Units, 0-14 Units, Subcutaneous, 4x Daily With Meals & Nightly, Rosario Walter APRN, 5 Units at 12/12/18 1322  •  melatonin sublingual tablet 5 mg, 5 mg, Sublingual, Nightly PRN, Geoffrey Stanley MD  •  mupirocin (BACTROBAN) 2 % cream, , Topical, Q24H, Chio Sterling MD  •  mupirocin (BACTROBAN) 2 % cream, , Topical, Q24H, Chio Sterling MD  •  Naloxegol Oxalate (MOVANTIK) tablet 12.5 mg *Patient Supplied Med*, 12.5 mg, Oral, Once per day on Sun Wed, Rosario Walter APRN, 12.5 mg at 12/12/18 0927  •  ondansetron (ZOFRAN) tablet 4 mg, 4 mg, Oral, Q6H PRN, 4 mg at 11/30/18 2046 **OR** ondansetron (ZOFRAN) injection 4 mg, 4 mg, Intravenous, Q6H PRN, Rosario Walter APRN, 4 mg at 12/08/18 0334  •  pantoprazole (PROTONIX) EC tablet 40 mg, 40 mg, Oral, QAM, Rosario Walter APRN, Stopped at 12/11/18 0506  •  [COMPLETED] Insert peripheral IV, , , Once **AND** sodium chloride 0.9 % flush 10 mL, 10 mL, Intravenous, PRN, Hiram Osborn  MD Burton, 10 mL at 18 1032  •  sodium chloride 0.9 % flush 3 mL, 3 mL, Intravenous, Q12H, Rosario Walter, APRN, 3 mL at 12/10/18 2057  •  sodium chloride 0.9 % flush 3-10 mL, 3-10 mL, Intravenous, PRN, Rosario Walter, APRN  •  sodium chloride 0.9 % infusion, 9 mL/hr, Intravenous, Continuous, Binta Patricia MD, Last Rate: 9 mL/hr at 18 1257, 9 mL/hr at 18 1257    Antibiotics:  Anti-Infectives (From admission, onward)    Ordered     Dose/Rate Route Frequency Start Stop    12/10/18 1347  ertapenem (INVanz) 500 mg in sodium chloride 0.9 % 50 mL IVPB     Ordering Provider:  Hung Haynes MD    500 mg  100 mL/hr over 30 Minutes Intravenous Every 24 Hours 12/10/18 1800 18 1759    18 2136  DAPTOmycin (CUBICIN) 500 mg in sodium chloride 0.9 % 50 mL IVPB     Ordering Provider:  Breana Chance MD    6 mg/kg × 79.2 kg (Adjusted)  100 mL/hr over 30 Minutes Intravenous Every 48 Hours 18 1800 18 1959    18 2132  ertapenem (INVanz) 500 mg in sodium chloride 0.9 % 50 mL IVPB     Niko Deleon PA reviewed the order on 18 0848.   Ordering Provider:  Niko Deleon PA    500 mg  100 mL/hr over 30 Minutes Intravenous Every 24 Hours 18 1800 18 1834    18 1616  ertapenem (INVanz) 500 mg in sodium chloride 0.9 % 50 mL IVPB     Ordering Provider:  Hiram Osborn MD    500 mg  100 mL/hr over 30 Minutes Intravenous Every 24 Hours 18 1618 18 0015    18 1616  DAPTOmycin (CUBICIN) 500 mg in sodium chloride 0.9 % 50 mL IVPB     Ordering Provider:  Hiram Osbonr MD    500 mg  100 mL/hr over 30 Minutes Intravenous Every 24 Hours 18 1617 11/26/18 1935            Review of Systems:       see hpi    Physical Exam:   Vital Signs  Temp (24hrs), Av.7 °F (36.5 °C), Min:97.2 °F (36.2 °C), Max:98.5 °F (36.9 °C)    Temp  Min: 97.2 °F (36.2 °C)  Max: 98.5 °F (36.9 °C)  BP  Min: 98/51  Max: 166/67  Pulse  Min: 76  Max:  83  Resp  Min: 16  Max: 20  SpO2  Min: 95 %  Max: 100 %    GENERAL: Awake and alert, in no acute distress.   HEENT: Normocephalic, atraumatic.  PERRL. EOMI. No conjunctival injection. No icterus. Oropharynx clear without evidence of thrush or exudate.   HEART: RRR; No murmur, rubs, gallops.   LUNGS: Clear to auscultation bilaterally without wheezing, rales, rhonchi.   ABDOMEN: Soft, nontender, nondistended. Positive bowel sounds.    EXT:  No cyanosis, clubbing or edema. No cord.  MSK: FROM without joint effusions noted arms/legs.  Right foot wrapped  SKIN: Warm and dry without cutaneous eruptions on Inspection/palpation.    NEURO: Oriented to PPT. No focal deficits on motor/sensory exam at arms/legs.  PSYCHIATRIC: Normal insight and judgement. Cooperative with PE    Laboratory Data    Results from last 7 days   Lab Units  12/11/18   0636  12/10/18   0604   WBC 10*3/mm3  11.94*  13.92*   HEMOGLOBIN g/dL  9.3*  8.7*   HEMATOCRIT %  30.0*  27.9*   PLATELETS 10*3/mm3  514*  497*     Results from last 7 days   Lab Units  12/12/18   1146   SODIUM mmol/L  140   POTASSIUM mmol/L  4.6   CHLORIDE mmol/L  110*   CO2 mmol/L  20.0   BUN mg/dL  32*   CREATININE mg/dL  3.32*   GLUCOSE mg/dL  208*   CALCIUM mg/dL  8.5*                             Estimated Creatinine Clearance: 22.2 mL/min (A) (by C-G formula based on SCr of 3.32 mg/dL (H)).      Microbiology:  Microbiology Results Abnormal     Procedure Component Value - Date/Time    Anaerobic Culture - Tissue, Toe, Right [[id number]] Collected:  12/04/18 1207    Lab Status:  Final result Specimen:  Tissue from Toe, Right Updated:  12/11/18 1404     Culture No anaerobes isolated    Fungus Culture - Tissue, Toe, Right [[id number]] Collected:  12/04/18 1207    Lab Status:  Preliminary result Specimen:  Tissue from Toe, Right Updated:  12/11/18 1315     Fungus Culture No fungus isolated at 1 week    AFB Culture - Tissue, Toe, Right [[id number]] Collected:  12/04/18 1207    Lab Status:   Preliminary result Specimen:  Tissue from Toe, Right Updated:  12/11/18 1315     AFB Culture No AFB isolated at 1 week     AFB Stain No acid fast bacilli seen on concentrated smear    Tissue / Bone Culture - Tissue, Toe, Right [[id number]] Collected:  12/04/18 1207    Lab Status:  Final result Specimen:  Tissue from Toe, Right Updated:  12/08/18 0850     Tissue Culture No growth at 4 days     Gram Stain No WBCs or organisms seen    Eosinophil Smear - Urine, Urine, Clean Catch [[id number]]  (Normal) Collected:  12/01/18 1629    Lab Status:  Final result Specimen:  Urine, Clean Catch Updated:  12/01/18 1710     Eosinophil Smear 0 % EOS/100 Cells     Narrative:       No eosinophil seen    Blood Culture - Blood, Arm, Left [[id number]] Collected:  11/26/18 1625    Lab Status:  Final result Specimen:  Blood from Arm, Left Updated:  12/01/18 1645     Blood Culture No growth at 5 days    Blood Culture - Blood, Arm, Right [[id number]] Collected:  11/26/18 1620    Lab Status:  Final result Specimen:  Blood from Arm, Right Updated:  12/01/18 1645     Blood Culture No growth at 5 days                       Radiology:  Xr Chest 1 View    Result Date: 12/12/2018  Deep line placement without evidence of pneumothorax in the right IJ.  D:  12/11/2018 E:  12/12/2018  This report was finalized on 12/12/2018 10:05 AM by Aniceto Deleon.      Fl C Arm During Surgery    Result Date: 12/12/2018  Fluoroscopy for deep line catheter placement.  D:  12/12/2018 E:  12/12/2018        Doppler arterial:   Addenda:       · Moderate reduction in arterial flow in the right lower extremity. · Monophasic waveforms are present in the right lower extremity with   biphasic waveforms in the left lower extremity · Due to the patients inability to tolerate the study only RLE pressures   were obtained    Signed: 11/29/18 3237 by Gillian Becker MD   Narrative:     · Moderate reduction in arterial flow in the right lower extremity.   Normal-appearing  waveforms.     Narrative:     · Left Conclusion: The left NELLI is normal.  · Right Conclusion: The right NELLI is normal.     Path from 12/4/18  RIGHT FIRST TOE AND METATARSAL, AMPUTATION:  Skin with ulceration, necrosis and focal abscess formation compatible with gangrene.  No histologic evidence of acute osteomyelitis on representative sections of bone.  Proximal soft tissue margins appear viable.      Impression:   Diabetic foot ulcer on right foot  Leukocytosis with neutrophilia, improving  chronic kidney disease stage IV  Focal abscess of right 1st toe  Antibiotic intolerances to cefazolin, clindamycin, Levaquin  DM II  Esr > 100  ? Hospital acquired delirium fairly mild right now    PLAN/RECOMMENDATIONS:     I find it very difficult to interpret an MRI was not completed an MRI that was not given with gadolinium because of her chronic kidney disease.    Physical exam of foot not remarkable for  Osteomyelitis.  I still have some concern with ESr > 100.    Path suggestive of abscess without osteo         Cont invanz 500 mg IV Q24H    Long discussion with patient, ;    Cont invanz 500 mg iv q24h x 4-6 weeks  D/c zyvox  F/u in 1 week    D/w family    Patient is at increased risk for limb loss    Hung Haynes MD  12/12/2018  3:17 PM

## 2018-12-12 NOTE — PROGRESS NOTES
"    [facility] Medicine Services  PROGRESS NOTE    Patient Name: Tashia Leon  : 1955  MRN: [id number]    Date of Admission: 2018  Length of Stay: 16  Primary Care Physician: Angelica Tran PA    Subjective   Subjective   CC:  Diabetic foot ulcer     HPI:  Patient sitting on the side of the bed in NAD.  States she wants to go home.  CM and myself explained that her abx have been changed and she needs a pre-authorization that will not be back until tomorrow.  patient and son began fighting and son refuses to allow patient to go to [facility] d/t \"hospital psychosis that can become permanent\".  No complaints.  +BM.  Son in room.     Review of Systems  Gen- No fevers, chills  CV- No chest pain, palpitations  Resp- No cough, dyspnea  GI- No N/V/D, abd pain  Otherwise ROS is negative except as mentioned in the HPI.    Objective   Objective   Vital Signs:   Temp:  [97.2 °F (36.2 °C)-98.5 °F (36.9 °C)] 98.5 °F (36.9 °C)  Heart Rate:  [76-83] 80  Resp:  [16-20] 17  BP: ()/(51-86) 116/56  Physical Exam:  Constitutional - non toxic, in bed  HEENT-NCAT, mucous membranes moist  CV-RRR, no R/G  +Murmur  Resp-CTAB  Abd-soft, non-tender, non-distended, normo active bowel sounds; overweight  Ext-right foot in clean wraps, toes warm; left foot +DP pulse  Neuro-alert and oriented to person, place, time and situation. speech clear, moves all extremities; answers questions appropriately and fully.   Skin- No rash on exposed UE or LE bilaterally  Psych-normal affect; patient fighting with her son about being discharged; talking over CM; no hallucinations observed    Results Reviewed:  I have personally reviewed current lab, radiology, and data and agree.  Results from last 7 days   Lab Units  18   0636  12/10/18   0604   WBC 10*3/mm3  11.94*  13.92*   HEMOGLOBIN g/dL  9.3*  8.7*   HEMATOCRIT %  30.0*  27.9*   PLATELETS 10*3/mm3  514*  497*   INR   1.03   --      Results from last 7 days "   Lab Units  12/12/18   1146  12/11/18   0636  12/10/18   0604   SODIUM mmol/L  140  137  138   POTASSIUM mmol/L  4.6  3.9  4.2   CHLORIDE mmol/L  110*  107  106   CO2 mmol/L  20.0  22.0  24.0   BUN mg/dL  32*  28*  28*   CREATININE mg/dL  3.32*  3.36*  3.05*   GLUCOSE mg/dL  208*  147*  91   CALCIUM mg/dL  8.5*  8.9  8.8     Estimated Creatinine Clearance: 22.2 mL/min (A) (by C-G formula based on SCr of 3.32 mg/dL (H)).  No results found for: BNP    Microbiology Results Abnormal     Procedure Component Value - Date/Time    Anaerobic Culture - Tissue, Toe, Right [[id number]] Collected:  12/04/18 1207    Lab Status:  Final result Specimen:  Tissue from Toe, Right Updated:  12/11/18 1404     Culture No anaerobes isolated    Fungus Culture - Tissue, Toe, Right [[id number]] Collected:  12/04/18 1207    Lab Status:  Preliminary result Specimen:  Tissue from Toe, Right Updated:  12/11/18 1315     Fungus Culture No fungus isolated at 1 week    AFB Culture - Tissue, Toe, Right [[id number]] Collected:  12/04/18 1207    Lab Status:  Preliminary result Specimen:  Tissue from Toe, Right Updated:  12/11/18 1315     AFB Culture No AFB isolated at 1 week     AFB Stain No acid fast bacilli seen on concentrated smear    Tissue / Bone Culture - Tissue, Toe, Right [[id number]] Collected:  12/04/18 1207    Lab Status:  Final result Specimen:  Tissue from Toe, Right Updated:  12/08/18 0850     Tissue Culture No growth at 4 days     Gram Stain No WBCs or organisms seen    Eosinophil Smear - Urine, Urine, Clean Catch [[id number]]  (Normal) Collected:  12/01/18 1629    Lab Status:  Final result Specimen:  Urine, Clean Catch Updated:  12/01/18 1710     Eosinophil Smear 0 % EOS/100 Cells     Narrative:       No eosinophil seen    Blood Culture - Blood, Arm, Left [[id number]] Collected:  11/26/18 1625    Lab Status:  Final result Specimen:  Blood from Arm, Left Updated:  12/01/18 1645     Blood Culture No growth at 5 days    Blood Culture - Blood,  Arm, Right [[id number]] Collected:  11/26/18 1620    Lab Status:  Final result Specimen:  Blood from Arm, Right Updated:  12/01/18 1645     Blood Culture No growth at 5 days        Imaging Results (last 24 hours)     Procedure Component Value Units Date/Time    FL C Arm During Surgery [[id number]] Collected:  12/12/18 1323     Updated:  12/12/18 1323    Narrative:       EXAMINATION: FL C ARM DURING SURGERY- 12/11/2018     INDICATION: E11.621-Type 2 diabetes mellitus with foot ulcer;  L97.519-Non-pressure chronic ulcer of other part of right foot with  unspecified severity; D72.829-Elevated white blood cell count,  unspecified; L03.115-Cellulitis of right lower limb; N18.9-Chronic  kidney disease, unspecified; Z74.09-Other reduced mobility; E11.42-Type  2 diabetes mellitus with diabetic polyneuropathy; E11.8-Type 2 diabetes;  deep line   catheter placement     COMPARISON: NONE     FINDINGS: 13 seconds of fluoroscopy and no images used for Groshong  catheter placement. Please see the procedure report for full details.       Impression:       Fluoroscopy for deep line catheter placement.     D:  12/12/2018  E:  12/12/2018           XR Chest 1 View [[id number]] Collected:  12/12/18 0923     Updated:  12/12/18 1007    Narrative:       EXAMINATION: XR CHEST 1 VW-12/11/2018:      INDICATION: Groshong catheter; E11.621-Type 2 diabetes mellitus with  foot ulcer; L97.519-Non-pressure chronic ulcer of other part of right  foot with unspecified severity; D72.829-Elevated white blood cell count,  unspecified; L03.115-Cellulitis of right lower limb; N18.9-Chronic  kidney disease, unspecified; Z74.09-Other reduced mobility; E11.42-Type  2 diabetes mellitus with diabetic polyneuropathy; E11.8-Type.     TECHNIQUE:  Single view frontal chest.     COMPARISONS: 10/20/2013.     FINDINGS:  Right IJ deep line placement with terminus in the SVC. No  evidence pneumothorax. No focal airspace disease or pleural effusion.  Atherosclerosis aortic  geovanna.       Impression:       Deep line placement without evidence of pneumothorax in the  right IJ.     D:  12/11/2018  E:  12/12/2018     This report was finalized on 12/12/2018 10:05 AM by Aniceto Deleon.           Results for orders placed during the hospital encounter of 07/15/16   Adult transthoracic echo complete    Narrative · Left ventricular function is normal. Estimated EF = 55%.  · Trace mitral valve regurgitation is present.  · Trace tricuspid valve regurgitation is present        I have reviewed the medications.    aspirin  mg Oral Daily   castor oil-balsam peru  Topical Q12H   diltiaZEM  mg Oral Nightly   docusate sodium 100 mg Oral Daily   epoetin rebeca 20,000 Units Subcutaneous Weekly   ertapenem 500 mg Intravenous Q24H   insulin detemir 25 Units Subcutaneous Nightly   insulin lispro 0-14 Units Subcutaneous 4x Daily With Meals & Nightly   Linezolid 600 mg Intravenous Q12H   mupirocin  Topical Q24H   mupirocin  Topical Q24H   Naloxegol Oxalate 12.5 mg Oral Once per day on Sun Wed   pantoprazole 40 mg Oral QAM   sodium chloride 3 mL Intravenous Q12H     Assessment/Plan   Assessment / Plan     Active Hospital Problems    Diagnosis   • **Diabetic ulcer of right foot associated with type 2 diabetes mellitus (CMS/HCC)   • Sepsis (CMS/HCC)   • Cellulitis   • Hypokalemia   • Anemia, chronic disease   • Obesity (BMI 30-39.9)   • Impaired functional mobility, balance, gait, and endurance     Added automatically from request for surgery [id number]     • Diabetic ulcer of right midfoot associated with type 2 diabetes mellitus, with necrosis of bone (CMS/HCC)     Added automatically from request for surgery [id number]     • Cellulitis of toe of right foot     Added automatically from request for surgery [id number]     • Chronic osteomyelitis of right foot with draining sinus (CMS/HCC)     Added automatically from request for surgery [id number]     • Chronic kidney disease, stage V (CMS/HCC)   • Hyperlipidemia   •  Diabetes mellitus type 2, uncontrolled, with complications (CMS/HCC)     Proteinuria noted, January 2014.       • Diabetic peripheral neuropathy (CMS/HCC)   • Essential hypertension   • Coronary artery disease involving native coronary artery of native heart without angina pectoris     1. Coronary artery disease:  a. Abnormal cardiac PET, 10/02/2013, Dr. Becker, revealing a posterolateral defect with a mild wall motion abnormality and a normal LVEF.  b. Cardiac catheterization, 10/21/2013, with placement of a 2.5 x 15 mm Xience Expedition drug-eluting stent to the proximal circumflex.  LVEF of 55% to 60%.  Noncritical disease in the LAD and right coronary.  c. Echocardiogram, 12/18/2013, shows an LVEF 50% to 55% with trace MR and mild TR.          Brief Hospital Course to date:  Sepsis, now resolved     Diabetic foot ulcer with cellulitis right foot       -s/p debridement and right 1st toe/ metarsal amputation with Dr. Patiño 12/4              -continue invanz and zyvox              -pain control- norco changed to prn q6h; dc'd dilaudid               -groshong catheter placement today              -Scooter to offload foot has been ordered and is at the bedside.         Type 2 diabetes mellitus with diabetic peripheral neuropathy              -a1c 7.6              -decreased long acting insulin as patient NPO midnight (was on levemir 40 units BID, will change to 25 units qhs alone for now)               - will need to titrate insulin back up after groshong placement and diet resumed.     CKDV              -nephrology following,  renal function limits abx choices               -Creatinine stable, continues off home rx lasix; euvolemic                 Hypokalemia              -replaced by nephrology as needed     Obesity BMI 38: Weight loss recommended     HTN: Currently normotensive     CAD: Asymptomatic, aspirin      Diabetic peripheral neuropathy: Symptomatic treatment and supportive care      Anemia of  chronic disease                 Severe anxiety:improved with clonazepam. Not needed past couple of days      Debility: Difficulty walking with needed scooter.  Therapy recommended rehabilitation facility for appropriate care and treatment but patient declining at this time, and prefers discharge home.     Hallucinations: Visual hallucinations (patient talking to sister who wasn't there) reported this morning. Patient aware of hallucination - tells me similar event occurred during last lengthy hospitalizaiton. Currently alert and oriented to person place and time. No sedating meds given recently. If continues, could consider drug reaction (ie, antibiotic), otherwise will monitor for now.    DC Instructions:   tried to set up HH, but the patient's insurance does not cover any HH  In [place]  Now trying to set up weekly visits to [facility] to get Groshong dressing changes, wound care and IV abx therapy.  Talking to Dr Haynes about setting that up. Due to not going to LT[facility] as all providers feel is appropriate, Dr Haynes changed abx at discharge.      --Follow up [facility] 2 weeks  --Follow up Dr Tafoya in 1-2 weeks  --Follow up with [facility] weekly for Groshong dressing changes or all of the above  --Patient should be offloading right foot completely; she denies walking on her foot, but her son has video; using walker when she needs to be using her scooter.    Good luck!     DVT Prophylaxis:  Heparin    CODE STATUS:   Code Status and Medical Interventions:   Ordered at: 11/26/18 2126     Level Of Support Discussed With:    Patient     Code Status:    CPR     Medical Interventions (Level of Support Prior to Arrest):    Full     Disposition: I expect the patient to be discharged to home    More than 50% of time spent counseling on current illness and plan of care. Case discussed with: Valarie Sandy CM, patient, patient's son and chart review.  Total time spent face to face with the patient was 30 minutes.  Total time of  the encounter was 60 minutes.    Electronically signed by KAREEM Mitchell, 12/12/18, 2:22 PM.

## 2018-12-12 NOTE — PAYOR COMM NOTE
"Abril Cast RN Utilization Review [phone]  Fax # [phone]  Ref # [id number]    Please review clinicals for continued inpt stay.  Updated clinicals faxed on 12/10.  Please call or fax with additional inpt stay.      Tashia Leon (63 y.o. Female)     Date of Birth Social Security Number Address Home Phone MRN    1955  [id number] [facility] [phone] [id number]    [employer] Marital Status          None        Admission Date Admission Type Admitting Provider Attending Provider Department, Room/Bed    11/26/18 Emergency Yue Lai MD Seward, Kathryn L, MD [address], S386/1    Discharge Date Discharge Disposition Discharge Destination                       Attending Provider:  Yue Lai MD    Allergies:  Statins, Ancef [Cefazolin], Bactrim [Sulfamethoxazole-trimethoprim], Cephalosporins, Keflex [Cephalexin], Levaquin [Levofloxacin], Lipitor [Atorvastatin], Lisinopril, Losartan Potassium, Oxycodone, Quinolones, Cleocin [Clindamycin Hcl], Clindamycin/lincomycin    Isolation:  None   Infection:  None   Code Status:  CPR    Ht:  167.6 cm (66\")   Wt:  109 kg (240 lb)    Admission Cmt:  None   Principal Problem:  Diabetic ulcer of right foot associated with type 2 diabetes mellitus (CMS/[facility]) [E11.621,L97.519]                 Active Insurance as of 11/26/2018     Primary Coverage     Payor Plan Insurance Group Employer/[place] Group    [facility] [employer] [facility] [place]     Payor Plan Address Payor Plan Phone Number Payor Plan Fax Number Effective Dates    PO    1/16/2018 - None Entered    [facility] [id number]       Subscriber Name Subscriber Birth Date Member ID       TASHIA LEON 1955 [id number]                 Emergency Contacts      (Rel.) Home Phone Work Phone Mobile Phone    Bisi Lucia (Mother) -- -- [phone]            ICU Vital Signs     Row Name 12/12/18 1139 12/12/18 0800 12/12/18 0751 " 12/12/18 0312 12/12/18 0045       Vitals    Temp  98.5 °F (36.9 °C)  --  97.6 °F (36.4 °C)  97.6 °F (36.4 °C)  98.2 °F (36.8 °C)    Temp src  Oral  --  Temporal  Temporal  Oral    Pulse  80  --  80  76  83    Heart Rate Source  Monitor  --  Monitor  Monitor  Monitor    Resp  17  --  17  18  16    Resp Rate Source  Visual  --  Visual  Visual  Visual    BP  116/56  --  119/58  124/53  143/60    BP Location  Left arm  --  Left arm  Left arm  Left arm    BP Method  Automatic  --  Automatic  Automatic  Automatic    Patient Position  Lying  --  Lying  Lying  Lying       Oxygen Therapy    SpO2  97 %  --  97 %  97 %  95 %    Device (Oxygen Therapy)  --  room air  --  --  --    Row Name 12/11/18 2022 12/11/18 1840 12/11/18 1830 12/11/18 1820 12/11/18 1815       Vitals    Temp  97.7 °F (36.5 °C)  97.5 °F (36.4 °C)  --  97.5 °F (36.4 °C)  --    Temp src  Oral  Temporal  --  --  --    Pulse  80  82  82  81  81    Heart Rate Source  Monitor  Monitor  Monitor  Monitor  Monitor    Resp  16  18  18  20  18    Resp Rate Source  Visual  Visual  Visual  Visual  Visual    BP  113/61  166/67  158/86  98/51  140/51    Noninvasive MAP (mmHg)  --  112  102  62  88    BP Location  Left arm  Left arm  Left arm  Left arm  Left arm    BP Method  Automatic  Automatic  Automatic  Automatic  Automatic    Patient Position  Lying  Lying  Lying  Lying  Lying       Oxygen Therapy    SpO2  96 %  99 %  100 %  99 %  99 %    Pulse Oximetry Type  --  --  --  --  Continuous    Device (Oxygen Therapy)  --  nasal cannula  nasal cannula  nasal cannula  nasal cannula    Flow (L/min)  --  2  2  2  2    Row Name 12/11/18 1810 12/11/18 1808 12/11/18 1702 12/11/18 1600 12/11/18 1540       Vitals    Temp  --  97.2 °F (36.2 °C)  97.6 °F (36.4 °C)  --  97.7 °F (36.5 °C)    Temp src  --  Temporal  Temporal  --  Oral    Pulse  78  80  78  --  80    Heart Rate Source  Monitor  Monitor  Monitor  --  Monitor    Resp  16  16  16  --  18    Resp Rate Source  Visual  Visual   Visual  --  Visual    BP  141/63  138/59  147/72  --  160/67    Noninvasive MAP (mmHg)  91  83  --  --  --    BP Location  Left arm  Left arm  Left arm  --  Left arm    BP Method  Automatic  Automatic  Automatic  --  Automatic    Patient Position  Lying  Lying  Lying  --  Lying       Oxygen Therapy    SpO2  100 %  100 %  96 %  --  --    Pulse Oximetry Type  Continuous  Continuous  Continuous  --  --    Device (Oxygen Therapy)  nasal cannula  nasal cannula  room air  room air  --    Flow (L/min)  2  2  --  --  --        Hospital Medications (active)       Dose Frequency Start End    aspirin EC tablet 325 mg 325 mg Daily 11/27/2018     Sig - Route: Take 1 tablet by mouth Daily. - Oral    bisacodyl (DULCOLAX) EC tablet 10 mg 10 mg Daily PRN 12/1/2018     Sig - Route: Take 2 tablets by mouth Daily As Needed for Constipation. - Oral    castor oil-balsam peru (VENELEX) ointment  Every 12 Hours Scheduled 12/3/2018     Sig - Route: Apply  topically to the appropriate area as directed Every 12 (Twelve) Hours. - Topical    Cosign for Ordering: Accepted by Halina Walker DO on 12/3/2018 11:38 AM    clonazePAM (KlonoPIN) tablet 0.25 mg 0.25 mg Once 12/11/2018 12/11/2018    Sig - Route: Take 0.5 tablets by mouth 1 (One) Time. - Oral    dextrose (D50W) 25 g/ 50mL Intravenous Solution 25 g 25 g Every 15 Minutes PRN 11/26/2018     Sig - Route: Infuse 50 mL into a venous catheter Every 15 (Fifteen) Minutes As Needed for Low Blood Sugar (Blood Sugar Less Than 70). - Intravenous    dextrose (GLUTOSE) oral gel 15 g 15 g Every 15 Minutes PRN 11/26/2018     Sig - Route: Take 15 g by mouth Every 15 (Fifteen) Minutes As Needed for Low Blood Sugar (Blood sugar less than 70). - Oral    diltiaZEM CD (CARDIZEM CD) 24 hr capsule 120 mg 120 mg Nightly 11/27/2018     Sig - Route: Take 1 capsule by mouth Every Night. - Oral    docusate sodium (COLACE) capsule 100 mg 100 mg Daily 12/1/2018     Sig - Route: Take 1 capsule by mouth Daily. - Oral     epoetin rebeca (EPOGEN,PROCRIT) injection 20,000 Units 20,000 Units Weekly 12/8/2018     Sig - Route: Inject 1 mL under the skin into the appropriate area as directed 1 (One) Time Per Week. - Subcutaneous    ertapenem (INVanz) 500 mg in sodium chloride 0.9 % 50 mL IVPB 500 mg Every 24 Hours 12/10/2018 12/17/2018    Sig - Route: Infuse 500 mg into a venous catheter Daily. - Intravenous    glucagon (human recombinant) (GLUCAGEN DIAGNOSTIC) injection 1 mg 1 mg As Needed 11/26/2018     Sig - Route: Inject 1 mg under the skin into the appropriate area as directed As Needed (Blood Glucose Less Than 70). - Subcutaneous    HYDROcodone-acetaminophen (NORCO) 7.5-325 MG per tablet 1 tablet 1 tablet Every 6 Hours PRN 12/6/2018     Sig - Route: Take 1 tablet by mouth Every 6 (Six) Hours As Needed for Moderate Pain . - Oral    Non-formulary Exception Code: Patient supplied medication    insulin detemir (LEVEMIR) injection 25 Units 25 Units Nightly 12/10/2018     Sig - Route: Inject 25 Units under the skin into the appropriate area as directed Every Night. - Subcutaneous    insulin lispro (humaLOG) injection 0-14 Units 0-14 Units 4 Times Daily With Meals & Nightly 11/26/2018     Sig - Route: Inject 0-14 Units under the skin into the appropriate area as directed 4 (Four) Times a Day With Meals & at Bedtime. - Subcutaneous    Linezolid (ZYVOX) 600 mg 300 mL 600 mg Every 12 Hours 12/7/2018 12/17/2018    Sig - Route: Infuse 300 mL into a venous catheter Every 12 (Twelve) Hours. - Intravenous    melatonin sublingual tablet 5 mg 5 mg Nightly PRN 12/10/2018     Sig - Route: Place 1 tablet under the tongue At Night As Needed for Sleep. - Sublingual    mupirocin (BACTROBAN) 2 % cream  Every 24 Hours Scheduled 11/29/2018     Sig - Route: Apply  topically to the appropriate area as directed Daily. - Topical    mupirocin (BACTROBAN) 2 % cream  Every 24 Hours Scheduled 12/5/2018     Sig - Route: Apply  topically to the appropriate area as  "directed Daily. - Topical    Naloxegol Oxalate (MOVANTIK) tablet 12.5 mg *Patient Supplied Med* 12.5 mg 2 Times Weekly 12/9/2018     Sig - Route: Take 1 tablet by mouth Once per day on Sun Wed. - Oral    Notes to Pharmacy: Wednesday and Sunday    Non-formulary Exception Code: Patient supplied medication    ondansetron (ZOFRAN) injection 4 mg 4 mg Every 6 Hours PRN 11/26/2018     Sig - Route: Infuse 2 mL into a venous catheter Every 6 (Six) Hours As Needed for Nausea or Vomiting. - Intravenous    Linked Group 1:  \"Or\" Linked Group Details        ondansetron (ZOFRAN) tablet 4 mg 4 mg Every 6 Hours PRN 11/26/2018     Sig - Route: Take 1 tablet by mouth Every 6 (Six) Hours As Needed for Nausea or Vomiting. - Oral    Linked Group 1:  \"Or\" Linked Group Details        pantoprazole (PROTONIX) EC tablet 40 mg 40 mg Every Morning 11/27/2018     Sig - Route: Take 1 tablet by mouth Every Morning. - Oral    sodium chloride 0.9 % flush 10 mL 10 mL As Needed 11/26/2018     Sig - Route: Infuse 10 mL into a venous catheter As Needed for Line Care. - Intravenous    Linked Group 2:  \"And\" Linked Group Details        sodium chloride 0.9 % flush 3 mL 3 mL Every 12 Hours Scheduled 11/26/2018     Sig - Route: Infuse 3 mL into a venous catheter Every 12 (Twelve) Hours. - Intravenous    sodium chloride 0.9 % flush 3-10 mL 3-10 mL As Needed 11/26/2018     Sig - Route: Infuse 3-10 mL into a venous catheter As Needed for Line Care. - Intravenous    sodium chloride 0.9 % infusion 9 mL/hr Continuous 12/4/2018     Sig - Route: Infuse 9 mL/hr into a venous catheter Continuous. - Intravenous    sodium chloride 0.9 % infusion 9 mL/hr Once 12/11/2018 12/11/2018    Sig - Route: Infuse 9 mL/hr into a venous catheter 1 (One) Time. - Intravenous    fentaNYL citrate (PF) (SUBLIMAZE) injection 50 mcg (Discontinued) 50 mcg Every 5 Minutes PRN 12/11/2018 12/11/2018    Sig - Route: Infuse 1 mL into a venous catheter Every 5 (Five) Minutes As Needed for " Moderate Pain . - Intravenous    Reason for Discontinue: Patient Transfer    fentaNYL citrate (PF) (SUBLIMAZE) injection (Discontinued)  As Needed 12/11/2018 12/11/2018    Sig - Route: Infuse  into a venous catheter As Needed. - Intravenous    Reason for Discontinue: Anesthesia Stop    heparin (porcine) injection (Discontinued)  As Needed 12/11/2018 12/11/2018    Sig: As Needed.    Reason for Discontinue: Patient Discharge    HYDROmorphone (DILAUDID) injection 0.5 mg (Discontinued) 0.5 mg Every 5 Minutes PRN 12/11/2018 12/11/2018    Sig - Route: Infuse 0.5 mL into a venous catheter Every 5 (Five) Minutes As Needed for Severe Pain . - Intravenous    Reason for Discontinue: Patient Transfer    lactated ringers bolus 500 mL (Discontinued) 500 mL Once As Needed 12/11/2018 12/11/2018    Sig - Route: Infuse 500 mL into a venous catheter 1 (One) Time As Needed (for hypovolemia, call anesthesiologist before initiating). - Intravenous    Reason for Discontinue: Patient Transfer    lidocaine (XYLOCAINE) 1 % injection (Discontinued)  As Needed 12/11/2018 12/11/2018    Sig: As Needed.    Reason for Discontinue: Patient Discharge    lidocaine PF 1% (XYLOCAINE) injection 0.5 mL (Discontinued) 0.5 mL Once As Needed 12/11/2018 12/11/2018    Sig - Route: Inject 0.5 mL as directed 1 (One) Time As Needed (IV Start). - Injection    Reason for Discontinue: Patient Transfer    ondansetron (ZOFRAN) tablet 4 mg (Discontinued) 4 mg Every 6 Hours PRN 12/11/2018 12/11/2018    Sig - Route: Take 1 tablet by mouth Every 6 (Six) Hours As Needed for Nausea or Vomiting. - Oral    Propofol (DIPRIVAN) injection (Discontinued)  Continuous PRN 12/11/2018 12/11/2018    Sig - Route: Infuse  into a venous catheter Continuous As Needed. - Intravenous    Reason for Discontinue: Anesthesia Stop    sodium chloride 0.9 % flush 3 mL (Discontinued) 3 mL Every 12 Hours Scheduled 12/11/2018 12/11/2018    Sig - Route: Infuse 3 mL into a venous catheter Every 12  (Twelve) Hours. - Intravenous    Reason for Discontinue: Patient Transfer    sodium chloride 0.9 % flush 3-10 mL (Discontinued) 3-10 mL As Needed 12/11/2018 12/11/2018    Sig - Route: Infuse 3-10 mL into a venous catheter As Needed for Line Care. - Intravenous    Reason for Discontinue: Patient Transfer             Operative/Procedure Notes (last 72 hours) (Notes from 12/9/2018  2:37 PM through 12/12/2018  2:37 PM)      Maribel May MD at 12/11/2018  5:48 PM        HEMODIALYSIS CATHETER INSERTION  Progress Note    Tashia Leon  11/26/2018 - 12/11/2018    Pre-op Diagnosis:   Diabetic foot ulcer       Post-Op Diagnosis Codes:     * Diabetic foot ulcer associated with diabetes mellitus due to underlying condition (CMS/[facility]) [E08.621, L97.509]    Procedure/CPT® Codes:      Procedure(s):  GROSHONG CATHETER PLACEMENT    Surgeon(s):  Maribel May MD    Anesthesia: General    Staff:   Circulator: Jen Ling RN  Scrub Person: Angelica Nieto    Estimated Blood Loss: minimal    Urine Voided: * No values recorded between 12/11/2018  5:30 PM and 12/11/2018  6:05 PM *    Specimens:                None              Complications: none      Maribel May MD     Date: 12/11/2018  Time: 6:05 PM        Electronically signed by Maribel May MD at 12/11/2018  6:05 PM     Maribel May MD at 12/11/2018  5:48 PM        Operative Note    Date of Surgery:  12/11/2018    Pre-Operative Diagnosis: Diabetic foot ulcer    Post-Operative Diagnosis: Same    Procedure:  Placement of right internal jugular dual Groshong catheter with fluoroscopy    Anesthesia:  Local/MAC    Surgeon:  Maribel May MD    Estimated Blood Loss:  Very minimal    Complications: None    Indication for Procedure: Mrs. Leon is a 63 years old lady who was admitted with diabetic foot ulcer of the right foot and underwent debridement of the first toe with metatarsal amputation.  She is on IV antibiotics.  She needs  long-term.  She is taken the operative room for Groshong catheter placement.    Procedure: Patient was taken bilateral by anesthesia and placed supine on the table.  Following adequate IV sedation she received Invanz IV.  The chest and neck were then prepped and draped in a sterile fashion.  Timeout was observed.  The patient was placed in Trendelenburg position.  Xylocaine one percent was injected in the right medial supraclavicular region.  Percutaneous access to the right internal jugular vein was established with good blood return noted.  The wire was advanced and was noted to be in good placement with fluoroscopy.  Xylocaine one percent was injected in the right chest wall and a small stab incision was made through which the dual-lumen Groshong catheter was tunneled.  The dilator and sheath were then advanced over the wire with fluoroscopy following which the dilator and guidewire were removed and the catheter was threaded through the sheath with no difficulty.  The tip was noted to be in the SVC.  The Groshong catheter was flushed with heparinized saline with good blood return noted.  It was then anchored to the skin with 3-0 nylon suture.  The entrance site was approximated with 4-0 Monocryl subcuticular suture.  Sterile dressing was applied.  The patient tolerated procedure well with no complications.  She was taken to the recovery room in stable condition.  Sponge count and needle count were correct at the end of the procedure.  Chest x-ray is pending.    Maribel May MD  12/11/18  6:21 PM    Electronically signed by Maribel May MD at 12/11/2018  6:25 PM          Physician Progress Notes (last 72 hours) (Notes from 12/9/2018  2:37 PM through 12/12/2018  2:37 PM)      Dannie Luna MD at 12/12/2018 12:05 PM             LOS: 16 days    Patient Care Team:  Angelica Tran PA as PCP - General (Internal Medicine)  Denver Baldwin MD as Consulting Physician  "(Nephrology)    Subjective     No acute events overnight. No new complaints.     Objective     Vital Signs:  Blood pressure 116/56, pulse 80, temperature 98.5 °F (36.9 °C), temperature source Oral, resp. rate 17, height 167.6 cm (66\"), weight 109 kg (240 lb), SpO2 97 %, not currently breastfeeding.    Flowsheet Rows      First Filed Value   Admission Height  167.6 cm (66\") Documented at 11/26/2018 1315   Admission Weight  109 kg (240 lb) Documented at 11/26/2018 1315          12/11 0701 - 12/12 0700  In: -   Out: 1200 [Urine:1200]    Physical Exam:    General Appearance: WD obese WF Confused  Neuro:  Confused   Psych:  Normal mood and affect  CV:   No edema  Lungs: respirations regular and unlabored  Abdomen: not distended  :  No loya, no palp bladder  Skin: No rash, Warm and dry.  Left foot with dressing in place      Labs:  Results from last 7 days   Lab Units  12/11/18   0636  12/10/18   0604   WBC 10*3/mm3  11.94*  13.92*   HEMOGLOBIN g/dL  9.3*  8.7*   PLATELETS 10*3/mm3  514*  497*     Results from last 7 days   Lab Units  12/11/18   0636  12/10/18   0604  12/08/18   0005   SODIUM mmol/L  137  138  141   POTASSIUM mmol/L  3.9  4.2  4.4   CHLORIDE mmol/L  107  106  110*   CO2 mmol/L  22.0  24.0  24.0   BUN mg/dL  28*  28*  41*   CREATININE mg/dL  3.36*  3.05*  3.34*   CALCIUM mg/dL  8.9  8.8  8.2*   PHOSPHORUS mg/dL   --    --   3.5   ALBUMIN g/dL   --    --   2.92*                   Estimated Creatinine Clearance: 22 mL/min (A) (by C-G formula based on SCr of 3.36 mg/dL (H)).        A/P:    CKD4: stable in baseline 3.5-4.0.  Likely secondary to DN. Scr 3.3 stable.     Proteinuria:  Presumed DN.  Ratio 3.1    HTN: stable.     Anemia:  On epogen. .  Transfuse PRN for Hgb < 7    Volume:  Stable.     Diabetic Foot Ulcer    Plan:  Continue with current regimen.      Follow up with NAL in 2 weeks. She has been on off metolazone and lasix for more than a week. UOP has been excellent. Renal function stable. " "Continue to hold Lasix and metolazone at discharge. Will evaluate on follow up visit.      Dannie Luna MD  12/12/18  12:05 PM           Electronically signed by Dannie Luna MD at 12/12/2018 12:36 PM     Maribel May MD at 12/12/2018  8:53 AM          Tashia Leon  1955  [id number]    Surgery Progress Note    Date of visit: 12/12/2018    Subjective:sitting up in bed  Status post placement for Groshong catheter yesterday  Denies any pain at the site    Objective:    /58 (BP Location: Left arm, Patient Position: Lying)   Pulse 80   Temp 97.6 °F (36.4 °C) (Temporal)   Resp 17   Ht 167.6 cm (66\")   Wt 109 kg (240 lb)   SpO2 97%   Breastfeeding? No   BMI 38.74 kg/m²      Intake/Output Summary (Last 24 hours) at 12/12/2018 0854  Last data filed at 12/12/2018 0033  Gross per 24 hour   Intake --   Output 800 ml   Net -800 ml     L: Groshong catheter site with no swelling        LABS:    Results from last 7 days   Lab Units  12/11/18   0636   WBC 10*3/mm3  11.94*   HEMOGLOBIN g/dL  9.3*   HEMATOCRIT %  30.0*   PLATELETS 10*3/mm3  514*     Results from last 7 days   Lab Units  12/11/18   0636   SODIUM mmol/L  137   POTASSIUM mmol/L  3.9   CHLORIDE mmol/L  107   CO2 mmol/L  22.0   BUN mg/dL  28*   CREATININE mg/dL  3.36*   CALCIUM mg/dL  8.9   GLUCOSE mg/dL  147*     Results from last 7 days   Lab Units  12/11/18   0636   SODIUM mmol/L  137   POTASSIUM mmol/L  3.9   CHLORIDE mmol/L  107   CO2 mmol/L  22.0   BUN mg/dL  28*   CREATININE mg/dL  3.36*   GLUCOSE mg/dL  147*   CALCIUM mg/dL  8.9     No results found for: LIPASE      Assessment/ Plan: status post placement for Groshong catheter  Chest x-ray shows adequate placement  Plan per primary service  Follow-up in the office to remove the Groshong catheter down the line    Problem List Items Addressed This Visit     Diabetic peripheral neuropathy (CMS/HCC)    Relevant Medications    Insulin Lispro (HUMALOG KWIKPEN) 200 " UNIT/ML solution pen-injector    Dulaglutide (TRULICITY) 1.5 MG/0.5ML solution pen-injector    Other Relevant Orders    Case Request (Completed)    Anaerobic Culture - Tissue, Toe, Right (Completed)    Fungus Culture - Tissue, Toe, Right (Completed)    Tissue / Bone Culture - Tissue, Toe, Right (Completed)    AFB Culture - Tissue, Toe, Right (Completed)    Tissue Pathology Exam (Completed)    Diabetes mellitus type 2, uncontrolled, with complications (CMS/[facility])    Relevant Medications    Insulin Lispro (HUMALOG KWIKPEN) 200 UNIT/ML solution pen-injector    Dulaglutide (TRULICITY) 1.5 MG/0.5ML solution pen-injector    Other Relevant Orders    Case Request (Completed)    Anaerobic Culture - Tissue, Toe, Right (Completed)    Fungus Culture - Tissue, Toe, Right (Completed)    Tissue / Bone Culture - Tissue, Toe, Right (Completed)    AFB Culture - Tissue, Toe, Right (Completed)    Tissue Pathology Exam (Completed)    Osteomyelitis (CMS/[facility])    Relevant Orders    Case Request (Completed)    Anaerobic Culture - Tissue, Toe, Right (Completed)    Fungus Culture - Tissue, Toe, Right (Completed)    Tissue / Bone Culture - Tissue, Toe, Right (Completed)    AFB Culture - Tissue, Toe, Right (Completed)    Tissue Pathology Exam (Completed)    Chronic kidney disease, stage V (CMS/[facility])    Relevant Medications    furosemide (LASIX) 40 MG tablet    Other Relevant Orders    Case Request (Completed)    Anaerobic Culture - Tissue, Toe, Right (Completed)    Fungus Culture - Tissue, Toe, Right (Completed)    Tissue / Bone Culture - Tissue, Toe, Right (Completed)    AFB Culture - Tissue, Toe, Right (Completed)    Tissue Pathology Exam (Completed)    * (Principal) Diabetic ulcer of right foot associated with type 2 diabetes mellitus (CMS/[facility]) - Primary    Relevant Medications    Insulin Lispro (HUMALOG KWIKPEN) 200 UNIT/ML solution pen-injector    Dulaglutide (TRULICITY) 1.5 MG/0.5ML solution pen-injector    Other Relevant Orders    Case  "Request (Completed)    Anaerobic Culture - Tissue, Toe, Right (Completed)    Fungus Culture - Tissue, Toe, Right (Completed)    Tissue / Bone Culture - Tissue, Toe, Right (Completed)    AFB Culture - Tissue, Toe, Right (Completed)    Tissue Pathology Exam (Completed)    Cellulitis    Relevant Orders    Case Request (Completed)    Anaerobic Culture - Tissue, Toe, Right (Completed)    Fungus Culture - Tissue, Toe, Right (Completed)    Tissue / Bone Culture - Tissue, Toe, Right (Completed)    AFB Culture - Tissue, Toe, Right (Completed)    Tissue Pathology Exam (Completed)    Obesity (BMI 30-39.9)    Relevant Orders    Case Request (Completed)    Anaerobic Culture - Tissue, Toe, Right (Completed)    Fungus Culture - Tissue, Toe, Right (Completed)    Tissue / Bone Culture - Tissue, Toe, Right (Completed)    AFB Culture - Tissue, Toe, Right (Completed)    Tissue Pathology Exam (Completed)    Impaired functional mobility, balance, gait, and endurance    Relevant Orders    Case Request (Completed)    Anaerobic Culture - Tissue, Toe, Right (Completed)    Fungus Culture - Tissue, Toe, Right (Completed)    Tissue / Bone Culture - Tissue, Toe, Right (Completed)    AFB Culture - Tissue, Toe, Right (Completed)    Tissue Pathology Exam (Completed)      Other Visit Diagnoses     Leukocytosis, unspecified type        Cellulitis of right foot        Chronic kidney disease, unspecified CKD stage        Relevant Medications    furosemide (LASIX) 40 MG tablet    Impaired mobility and ADLs                Maribel May MD  12/12/2018  8:54 AM      Electronically signed by Maribel May MD at 12/12/2018  8:55 AM     Chio Sterling MD at 12/12/2018  8:05 AM          Orthopedic  Progress Note    Subjective     Post-Operative Day: 8 Day Post-Op    Systemic or Specific Complaints: s/p right 1st ray amputation, oriented x3 but confused overnight.  Her son reports \"she has been talking to you all night\"  The patient reports " remembering things that I said- but not real.  She does remember the most important directive is to NOT PUT WEIGHT ON FOOT  Objective     Vital signs in last 24 hours:  Temp:  [97.2 °F (36.2 °C)-98.2 °F (36.8 °C)] 97.6 °F (36.4 °C)  Heart Rate:  [76-83] 80  Resp:  [16-20] 17  BP: ()/(51-86) 119/58    Neurovascular: No change in dense neuropathy   Wound: Right foot healing, no complication at present         Data Review  Lab Results (last 24 hours)     Procedure Component Value Units Date/Time    POC Glucose Once [[id number]]  (Abnormal) Collected:  12/11/18 2025    Specimen:  Blood Updated:  12/11/18 2041     Glucose 174 mg/dL     POC Glucose Once [[id number]]  (Abnormal) Collected:  12/11/18 1834    Specimen:  Blood Updated:  12/11/18 1852     Glucose 143 mg/dL     POC Glucose Once [[id number]]  (Abnormal) Collected:  12/11/18 1707    Specimen:  Blood Updated:  12/11/18 1709     Glucose 154 mg/dL     POC Glucose Once [[id number]]  (Abnormal) Collected:  12/11/18 1544    Specimen:  Blood Updated:  12/11/18 1545     Glucose 150 mg/dL     Anaerobic Culture - Tissue, Toe, Right [[id number]] Collected:  12/04/18 1207    Specimen:  Tissue from Toe, Right Updated:  12/11/18 1404     Culture No anaerobes isolated    Fungus Culture - Tissue, Toe, Right [[id number]] Collected:  12/04/18 1207    Specimen:  Tissue from Toe, Right Updated:  12/11/18 1315     Fungus Culture No fungus isolated at 1 week    AFB Culture - Tissue, Toe, Right [[id number]] Collected:  12/04/18 1207    Specimen:  Tissue from Toe, Right Updated:  12/11/18 1315     AFB Culture No AFB isolated at 1 week     AFB Stain No acid fast bacilli seen on concentrated smear    POC Glucose Once [[id number]]  (Normal) Collected:  12/11/18 1105    Specimen:  Blood Updated:  12/11/18 1125     Glucose 104 mg/dL     POC Glucose Once [[id number]]  (Abnormal) Collected:  12/11/18 0808    Specimen:  Blood Updated:  12/11/18 0810     Glucose 158 mg/dL              Assessment/Plan     Status post- right 1st ray amputation- her mentation is unusual, oriented yet remembering things that did not happen.  Her son wants to take her home instead of LTAC.  I advised her that I think she would be safest at LTAC, but she does not want to go.  High risk for BKA, bilateral given density of neuropathy and lack of self care of her feet.  I will see her in 1-2 weeks, sooner if any problems.   Dressing change every 3-4 days with home health     - also will send diabetic foot care instructions home with her (in ADT)    - I will see her every 3-4 days if she stays in hospital, page me if needed      Chio Sterling MD    Date: 2018  Time: 8:06 AM    Electronically signed by Chio Sterling MD at 2018  8:18 AM     Geoffrey Stanley MD at 2018  4:22 PM              [facility] Medicine Services  PROGRESS NOTE    Patient Name: Tashia Leon  : 1955  MRN: [id number]    Date of Admission: 2018  Length of Stay: 15  Primary Care Physician: Angelica Tran PA    Subjective   Subjective     CC:  Diabetic foot ulcer     HPI:    No current pain. Staff reports hallucinations earlier today - talking to sister that wasn't present -- I asked patient about this and she remembers the event, knew it was a visual hallucination.     Review of Systems  Gen- No fevers, chills  CV- No chest pain, palpitations  Resp- No cough, dyspnea  GI- No N/V/D, abd pain    Otherwise ROS is negative except as mentioned in the HPI.    Objective   Objective     Vital Signs:   Temp:  [97.2 °F (36.2 °C)-98 °F (36.7 °C)] 97.7 °F (36.5 °C)  Heart Rate:  [76-82] 80  Resp:  [16-18] 18  BP: (106-160)/(54-70) 160/67        Physical Exam:  Constitutional - non toxic, in bed  HEENT-NCAT, mucous membranes moist  CV-RRR, no MRG  Resp-CTAB  Abd-soft, non-tender, non-distended, normo active bowel sounds; overweight  Ext-right foot in clean wraps, toes warm  Neuro-alert and  oriented, speech clear, moves all extremities; knows year, city and hospital. answers questions appropriately and fully.   Psych-normal affect  Skin- No rash on exposed UE or LE bilaterally      Results Reviewed:  I have personally reviewed current lab, radiology, and data and agree.    Results from last 7 days   Lab Units  12/11/18   0636  12/10/18   0604  12/05/18   0637   WBC 10*3/mm3  11.94*  13.92*  15.15*   HEMOGLOBIN g/dL  9.3*  8.7*  8.4*   HEMATOCRIT %  30.0*  27.9*  27.1*   PLATELETS 10*3/mm3  514*  497*  444   INR   1.03   --    --      Results from last 7 days   Lab Units  12/11/18   0636  12/10/18   0604  12/08/18   0005   SODIUM mmol/L  137  138  141   POTASSIUM mmol/L  3.9  4.2  4.4   CHLORIDE mmol/L  107  106  110*   CO2 mmol/L  22.0  24.0  24.0   BUN mg/dL  28*  28*  41*   CREATININE mg/dL  3.36*  3.05*  3.34*   GLUCOSE mg/dL  147*  91  133*   CALCIUM mg/dL  8.9  8.8  8.2*     Estimated Creatinine Clearance: 22 mL/min (A) (by C-G formula based on SCr of 3.36 mg/dL (H)).  No results found for: BNP    Microbiology Results Abnormal     Procedure Component Value - Date/Time    Anaerobic Culture - Tissue, Toe, Right [[id number]] Collected:  12/04/18 1207    Lab Status:  Final result Specimen:  Tissue from Toe, Right Updated:  12/11/18 1404     Culture No anaerobes isolated    Fungus Culture - Tissue, Toe, Right [[id number]] Collected:  12/04/18 1207    Lab Status:  Preliminary result Specimen:  Tissue from Toe, Right Updated:  12/11/18 1315     Fungus Culture No fungus isolated at 1 week    AFB Culture - Tissue, Toe, Right [[id number]] Collected:  12/04/18 1207    Lab Status:  Preliminary result Specimen:  Tissue from Toe, Right Updated:  12/11/18 1315     AFB Culture No AFB isolated at 1 week     AFB Stain No acid fast bacilli seen on concentrated smear    Tissue / Bone Culture - Tissue, Toe, Right [[id number]] Collected:  12/04/18 1207    Lab Status:  Final result Specimen:  Tissue from Toe, Right Updated:   12/08/18 0850     Tissue Culture No growth at 4 days     Gram Stain No WBCs or organisms seen    Eosinophil Smear - Urine, Urine, Clean Catch [[id number]]  (Normal) Collected:  12/01/18 1629    Lab Status:  Final result Specimen:  Urine, Clean Catch Updated:  12/01/18 1710     Eosinophil Smear 0 % EOS/100 Cells     Narrative:       No eosinophil seen    Blood Culture - Blood, Arm, Left [[id number]] Collected:  11/26/18 1625    Lab Status:  Final result Specimen:  Blood from Arm, Left Updated:  12/01/18 1645     Blood Culture No growth at 5 days    Blood Culture - Blood, Arm, Right [[id number]] Collected:  11/26/18 1620    Lab Status:  Final result Specimen:  Blood from Arm, Right Updated:  12/01/18 1645     Blood Culture No growth at 5 days          Imaging Results (last 24 hours)     ** No results found for the last 24 hours. **        Results for orders placed during the hospital encounter of 07/15/16   Adult transthoracic echo complete    Narrative · Left ventricular function is normal. Estimated EF = 55%.  · Trace mitral valve regurgitation is present.  · Trace tricuspid valve regurgitation is present          I have reviewed the medications.      aspirin  mg Oral Daily   castor oil-balsam peru  Topical Q12H   diltiaZEM  mg Oral Nightly   docusate sodium 100 mg Oral Daily   epoetin rebeca 20,000 Units Subcutaneous Weekly   ertapenem 500 mg Intravenous Q24H   insulin detemir 25 Units Subcutaneous Nightly   insulin lispro 0-14 Units Subcutaneous 4x Daily With Meals & Nightly   Linezolid 600 mg Intravenous Q12H   mupirocin  Topical Q24H   mupirocin  Topical Q24H   Naloxegol Oxalate 12.5 mg Oral Once per day on Sun Wed   pantoprazole 40 mg Oral QAM   sodium chloride 3 mL Intravenous Q12H         Assessment/Plan   Assessment / Plan     Active Hospital Problems    Diagnosis   • **Diabetic ulcer of right foot associated with type 2 diabetes mellitus (CMS/HCC)   • Sepsis (CMS/[facility])   • Cellulitis   • Hypokalemia    • Anemia, chronic disease   • Obesity (BMI 30-39.9)   • Impaired functional mobility, balance, gait, and endurance     Added automatically from request for surgery [id number]     • Diabetic ulcer of right midfoot associated with type 2 diabetes mellitus, with necrosis of bone (CMS/HCC)     Added automatically from request for surgery [id number]     • Cellulitis of toe of right foot     Added automatically from request for surgery [id number]     • Chronic osteomyelitis of right foot with draining sinus (CMS/HCC)     Added automatically from request for surgery [id number]     • Chronic kidney disease, stage V (CMS/HCC)   • Hyperlipidemia   • Diabetes mellitus type 2, uncontrolled, with complications (CMS/HCC)     Proteinuria noted, January 2014.       • Diabetic peripheral neuropathy (CMS/HCC)   • Essential hypertension   • Coronary artery disease involving native coronary artery of native heart without angina pectoris     1. Coronary artery disease:  a. Abnormal cardiac PET, 10/02/2013, Dr. Becker, revealing a posterolateral defect with a mild wall motion abnormality and a normal LVEF.  b. Cardiac catheterization, 10/21/2013, with placement of a 2.5 x 15 mm Xience Expedition drug-eluting stent to the proximal circumflex.  LVEF of 55% to 60%.  Noncritical disease in the LAD and right coronary.  c. Echocardiogram, 12/18/2013, shows an LVEF 50% to 55% with trace MR and mild TR.               Brief Hospital Course to date:  Sepsis, now resolved     Diabetic foot ulcer with cellulitis right foot       -s/p debridement and right 1st toe/ metarsal amputation with Dr. Patiño 12/4              -continue invanz and zyvox              -pain control- norco changed to prn q6h; dc'd dilaudid               -groshong catheter placement today              -Scooter to offload foot has been ordered and is at the bedside.         Type 2 diabetes mellitus with diabetic peripheral neuropathy              -a1c 7.6              -decreased  long acting insulin as patient NPO midnight (was on levemir 40 units BID, will change to 25 units qhs alone for now)               - will need to titrate insulin back up after groshong placement and diet resumed.     CKDV              -nephrology following,  renal function limits abx choices               -Creatinine stable, continues off home rx lasix; euvolemic                 Hypokalemia              -replaced by nephrology as needed     Obesity BMI 38: Weight loss recommended     HTN: Currently normotensive     CAD: Asymptomatic, aspirin      Diabetic peripheral neuropathy: Symptomatic treatment and supportive care      Anemia of chronic disease                  Severe anxiety:improved with clonazepam. Not needed past couple of days       Debility: Difficulty walking with needed scooter.  Therapy recommended rehabilitation facility for appropriate care and treatment but patient declining at this time, and prefers discharge home.  aware.      Hallucinations. Visual hallucinations (patient talking to sister who wasn't there) reported this morning. Patient aware of hallucination - tells me similar event occurred during last lengthy hospitalizaiton. Currently alert and oriented to person place and time. No sedating meds given recently. If continues, could consider drug reaction (ie, antibiotic), otherwise will monitor for now.     DVT Prophylaxis:  Heparin      CODE STATUS:   Code Status and Medical Interventions:   Ordered at: 11/26/18 2126     Level Of Support Discussed With:    Patient     Code Status:    CPR     Medical Interventions (Level of Support Prior to Arrest):    Full       Disposition: I expect the patient to be discharged to home        Electronically signed by Geoffrey Stanley MD, 12/11/18, 4:22 PM.        Electronically signed by Geoffrey Stanley MD at 12/11/2018  4:30 PM     Chio Sterling MD at 12/11/2018  3:45 PM          Orthopedic  Progress Note    Subjective     Post-Operative  Day: 7 Days Post-Op    Systemic or Specific Complaints: s/p right 1st ray amputation, no complaints, better about not putting weight on foot  Objective     Vital signs in last 24 hours:  Temp:  [97.2 °F (36.2 °C)-98 °F (36.7 °C)] 97.7 °F (36.5 °C)  Heart Rate:  [76-82] 80  Resp:  [16-18] 18  BP: (106-160)/(54-70) 160/67    Neurovascular: No change in dense neuropathy   Wound: I changed dressing right foot, healing, no purulence, no erythema, small amount of edge necrosis on incision, no fluctuance         Data Review  Lab Results (last 24 hours)     Procedure Component Value Units Date/Time    Anaerobic Culture - Tissue, Toe, Right [[id number]] Collected:  12/04/18 1207    Specimen:  Tissue from Toe, Right Updated:  12/11/18 1404     Culture No anaerobes isolated    Fungus Culture - Tissue, Toe, Right [[id number]] Collected:  12/04/18 1207    Specimen:  Tissue from Toe, Right Updated:  12/11/18 1315     Fungus Culture No fungus isolated at 1 week    AFB Culture - Tissue, Toe, Right [[id number]] Collected:  12/04/18 1207    Specimen:  Tissue from Toe, Right Updated:  12/11/18 1315     AFB Culture No AFB isolated at 1 week     AFB Stain No acid fast bacilli seen on concentrated smear    POC Glucose Once [[id number]]  (Normal) Collected:  12/11/18 1105    Specimen:  Blood Updated:  12/11/18 1125     Glucose 104 mg/dL     POC Glucose Once [[id number]]  (Abnormal) Collected:  12/11/18 0808    Specimen:  Blood Updated:  12/11/18 0810     Glucose 158 mg/dL     Basic Metabolic Panel [[id number]]  (Abnormal) Collected:  12/11/18 0636    Specimen:  Blood Updated:  12/11/18 0721     Glucose 147 mg/dL      BUN 28 mg/dL      Creatinine 3.36 mg/dL      Sodium 137 mmol/L      Potassium 3.9 mmol/L      Chloride 107 mmol/L      CO2 22.0 mmol/L      Calcium 8.9 mg/dL      eGFR Non African Amer 14 mL/min/1.73      BUN/Creatinine Ratio 8.3     Anion Gap 8.0 mmol/L     Narrative:       [employer] Guidelines    Stage      Description        GFR  1         Normal or High     90+  2         Mild decrease      60-89  3         Moderate decrease  30-59  4         Severe decrease    15-29  5         Kidney failure     <15    The MDRD GFR formula is only valid for adults with stable renal function between ages 18 and 70.    Protime-INR [[id number]]  (Normal) Collected:  12/11/18 0636    Specimen:  Blood Updated:  12/11/18 0703     Protime 10.8 Seconds      INR 1.03    CBC (No Diff) [[id number]]  (Abnormal) Collected:  12/11/18 0636    Specimen:  Blood Updated:  12/11/18 0650     WBC 11.94 10*3/mm3      RBC 3.06 10*6/mm3      Hemoglobin 9.3 g/dL      Hematocrit 30.0 %      MCV 98.0 fL      MCH 30.4 pg      MCHC 31.0 g/dL      RDW 13.7 %      RDW-SD 47.8 fl      MPV 9.3 fL      Platelets 514 10*3/mm3     POC Glucose Once [[id number]]  (Normal) Collected:  12/10/18 2046    Specimen:  Blood Updated:  12/10/18 2049     Glucose 81 mg/dL     POC Glucose Once [[id number]]  (Abnormal) Collected:  12/10/18 1642    Specimen:  Blood Updated:  12/10/18 1703     Glucose 177 mg/dL             Assessment/Plan     Status post- stable s/p right 1st ray amputation high  Risk for BKA   She has been better about not putting foot on floor.  Ok to go home or LTAC from my standpoint, dressing change every 3-4 days, see me in 2 weeks           Chio Sterling MD    Date: 12/11/2018  Time: 3:45 PM    Electronically signed by Chio Sterling MD at 12/11/2018  3:47 PM     Hung Haynes MD at 12/11/2018  3:03 PM          INFECTIOUS DISEASE PROGRESS NOTE    Tashia Leon  1955  [id number]    Date: 12/11/2018    Admission Date: 11/26/2018      CC: diabetic foot ulcer    History of present illness:    Patient is a 63 y.o. female with chronic kidney disease stage IV, diabetes type 2 since 1990s has had history of left foot MRSA osteomyelitis treated at  several years ago.  Patient has developed calluses on her right foot and saw [facility] who  did some bedside debridement in the clinic.  Patient said increasing pain and some weeping from the wounds because of low-grade temperatures patient's returns to see her podiatrist recommended admission to hospital.  Patient was seen in the emergency room started on IV antibiotics including daptomycin and ertapenem.  She had MRI of the foot which was stopped prematurely by the patient's and was not a complete study.    Asked to address long-term treatment and duration of antibiotics.    Patient lives in [place].    Patient denies C. difficile colitis history of nausea vomiting or diarrhea difficulty breathing patient denies rash.    Patient says she is a PICC line before    11/28/18; no events overnight; no fever, rash, sore throat    11/29/18; no events overnight feels well; no complaints; no diarrhea, rash, sore throat    11/30/18: No new events. Denies f/c, sob, n/v/d, rashes or sore throat.     12/1/18; no events overnight; no fever, rash, sore throat    12/2/18: no new events or complaints.  Denies f/c, sob, n/v/d, rashes.   12/3/18: No new events or complaints.  Foot without pain.  No schedule for dressing changes.  Denies f/c, sob, n/v/d, rashes.     12/6/18; doing well; no events overnight; no fever, rash, sore throat    12/7/18; no changes, wants to go home; no fever, rash, sore throat  12/10/18; no events overnight; doing well; no complaints, no fever, rash, sore throat; wants deep line, refusing to go to LTAC    12/11/18; no events overnight; feels fair, to have groshong catheter today; no fever, rash, sore throat; son remarking that patien tis having some hallucinations this morning better currently; hoping that going home will help this improve    Past Medical History:   Diagnosis Date   • Acute bronchitis    • Acute pharyngitis    • Acute sinusitis    • Benign colonic polyp    • Edema    • GERD (gastroesophageal reflux disease)    • Hiatal hernia    • Hyperkalemia    • Hyperlipidemia    • Hypertension     • Low back pain    • Lumbar disc disease    • MRSA (methicillin resistant Staphylococcus aureus)     Osteomyelitis/methicillin-resistant Staphylococcus aureus of the left foot, 2013.   • Obesity    • Osteomyelitis (CMS/HCC)     Osteomyelitis/methicillin-resistant Staphylococcus aureus of the left foot, 2013.   • Peptic ulceration 2013    History of peptic ulcer disease, diagnosed 2013 by EGD.  Repeat EGD in 2013 was negative.   • Sepsis (CMS/[facility])     Sepsis, 2013, hospitalized at [facility].   • Tobacco use     Previous tobacco use with cessation in .   • Type 2 diabetes mellitus (CMS/[facility])     Proteinuria noted, 2014.     • Vitamin D deficiency        Past Surgical History:   Procedure Laterality Date   •  SECTION      x2   • FOOT SURGERY Left     Incision and debridement of the left foot x2.   • LASIK     • TONSILLECTOMY         Family History   Problem Relation Age of Onset   • No Known Problems Mother    • No Known Problems Father    • Breast cancer Neg Hx    • Ovarian cancer Neg Hx        Social History     Socioeconomic History   • Marital status:      Spouse name: Not on file   • Number of children: Not on file   • Years of education: Not on file   • Highest education level: Not on file   Social Needs   • Financial resource strain: Not on file   • Food insecurity - worry: Not on file   • Food insecurity - inability: Not on file   • Transportation needs - medical: Not on file   • Transportation needs - non-medical: Not on file   Occupational History   • Not on file   Tobacco Use   • Smoking status: Former Smoker     Packs/day: 1.00     Years: 30.00     Pack years: 30.00     Types: Cigarettes   • Smokeless tobacco: Never Used   • Tobacco comment: quit 7 years ago   Substance and Sexual Activity   • Alcohol use: Yes     Alcohol/week: 0.6 oz     Types: 1 Glasses of wine per week     Comment: holidays   • Drug use: No   •  Sexual activity: Defer   Other Topics Concern   • Not on file   Social History Narrative   • Not on file       Allergies   Allergen Reactions   • Statins Myalgia   • Ancef [Cefazolin]    • Bactrim [Sulfamethoxazole-Trimethoprim] Nausea And Vomiting   • Cephalosporins    • Cleocin [Clindamycin Hcl]    • Clindamycin/Lincomycin    • Keflex [Cephalexin]    • Levaquin [Levofloxacin]    • Lipitor [Atorvastatin]    • Lisinopril    • Losartan Potassium Other (See Comments)     Unknown reaction   • Oxycodone    • Quinolones          Medication:    Current Facility-Administered Medications:   •  aspirin EC tablet 325 mg, 325 mg, Oral, Daily, Rosario Walter APRN, 325 mg at 12/11/18 0831  •  bisacodyl (DULCOLAX) EC tablet 10 mg, 10 mg, Oral, Daily PRN, Heavenly Medina, APRN, 10 mg at 12/08/18 0841  •  castor oil-balsam peru (VENELEX) ointment, , Topical, Q12H, Halina Walker,   •  dextrose (D50W) 25 g/ 50mL Intravenous Solution 25 g, 25 g, Intravenous, Q15 Min PRN, Rosario Walter, APRN  •  dextrose (GLUTOSE) oral gel 15 g, 15 g, Oral, Q15 Min PRN, Rosario Walter, APRN  •  diltiaZEM CD (CARDIZEM CD) 24 hr capsule 120 mg, 120 mg, Oral, Nightly, Rosario Walter, APRN, 120 mg at 12/10/18 2057  •  docusate sodium (COLACE) capsule 100 mg, 100 mg, Oral, Daily, Heavenly Medina APRN, 100 mg at 12/11/18 0831  •  epoetin rebeca (EPOGEN,PROCRIT) injection 20,000 Units, 20,000 Units, Subcutaneous, Weekly, Manny Guerrero MD, 20,000 Units at 12/08/18 1458  •  ertapenem (INVanz) 500 mg in sodium chloride 0.9 % 50 mL IVPB, 500 mg, Intravenous, Q24H, Hung Haynes MD, Last Rate: 100 mL/hr at 12/10/18 1828, 500 mg at 12/10/18 1828  •  glucagon (human recombinant) (GLUCAGEN DIAGNOSTIC) injection 1 mg, 1 mg, Subcutaneous, PRN, Rosario Walter, KAREEM  •  HYDROcodone-acetaminophen (NORCO) 7.5-325 MG per tablet 1 tablet, 1 tablet, Oral, Q6H PRN, Halina Walker DO, 1 tablet at 12/10/18 2107  •   insulin detemir (LEVEMIR) injection 25 Units, 25 Units, Subcutaneous, Nightly, Geoffrey Stanley MD, Stopped at 12/10/18 2052  •  insulin lispro (humaLOG) injection 0-14 Units, 0-14 Units, Subcutaneous, 4x Daily With Meals & Nightly, Rosario Walter APRN, 3 Units at 12/11/18 0831  •  Linezolid (ZYVOX) 600 mg 300 mL, 600 mg, Intravenous, Q12H, Hung Haynes MD, Last Rate: 300 mL/hr at 12/11/18 0216, 600 mg at 12/11/18 0216  •  melatonin sublingual tablet 5 mg, 5 mg, Sublingual, Nightly PRN, Geoffrey Stanley MD  •  mupirocin (BACTROBAN) 2 % cream, , Topical, Q24H, Chio Sterling MD  •  mupirocin (BACTROBAN) 2 % cream, , Topical, Q24H, Chio Sterling MD  •  Naloxegol Oxalate (MOVANTIK) tablet 12.5 mg *Patient Supplied Med*, 12.5 mg, Oral, Once per day on Sun Wed, Rosario Walter APRN, 12.5 mg at 12/09/18 0906  •  ondansetron (ZOFRAN) tablet 4 mg, 4 mg, Oral, Q6H PRN, 4 mg at 11/30/18 2046 **OR** ondansetron (ZOFRAN) injection 4 mg, 4 mg, Intravenous, Q6H PRN, Rosario Walter APRN, 4 mg at 12/08/18 0334  •  pantoprazole (PROTONIX) EC tablet 40 mg, 40 mg, Oral, QAM, Rosario Walter APRN, Stopped at 12/11/18 0506  •  [COMPLETED] Insert peripheral IV, , , Once **AND** sodium chloride 0.9 % flush 10 mL, 10 mL, Intravenous, PRN, Hiram Osborn MD, 10 mL at 12/04/18 1032  •  sodium chloride 0.9 % flush 3 mL, 3 mL, Intravenous, Q12H, Rosario Walter APRN, 3 mL at 12/10/18 2057  •  sodium chloride 0.9 % flush 3-10 mL, 3-10 mL, Intravenous, PRN, Rosario Walter, APRN  •  sodium chloride 0.9 % infusion, 9 mL/hr, Intravenous, Continuous, Binta Patricia MD, Last Rate: 9 mL/hr at 12/04/18 1257, 9 mL/hr at 12/04/18 1257    Antibiotics:  Anti-Infectives (From admission, onward)    Ordered     Dose/Rate Route Frequency Start Stop    12/10/18 1347  ertapenem (INVanz) 500 mg in sodium chloride 0.9 % 50 mL IVPB     Ordering Provider:  Hung Haynes MD    500 mg  100 mL/hr  over 30 Minutes Intravenous Every 24 Hours 12/10/18 1800 18 1759    18 1308  Linezolid (ZYVOX) 600 mg 300 mL     Ordering Provider:  Hung Haynes MD    600 mg  300 mL/hr over 60 Minutes Intravenous Every 12 Hours 18 1500 18 1459    18 2136  DAPTOmycin (CUBICIN) 500 mg in sodium chloride 0.9 % 50 mL IVPB     Ordering Provider:  Breana Chance MD    6 mg/kg × 79.2 kg (Adjusted)  100 mL/hr over 30 Minutes Intravenous Every 48 Hours 18 1800 18 1959    18 2132  ertapenem (INVanz) 500 mg in sodium chloride 0.9 % 50 mL IVPB     Niko Deleon PA reviewed the order on 18 0848.   Ordering Provider:  Niko Deleon PA    500 mg  100 mL/hr over 30 Minutes Intravenous Every 24 Hours 18 1800 18 1834    18 1616  ertapenem (INVanz) 500 mg in sodium chloride 0.9 % 50 mL IVPB     Ordering Provider:  Hiram Osborn MD    500 mg  100 mL/hr over 30 Minutes Intravenous Every 24 Hours 18 1618 18 0015    18 1616  DAPTOmycin (CUBICIN) 500 mg in sodium chloride 0.9 % 50 mL IVPB     Ordering Provider:  Hiram Osborn MD    500 mg  100 mL/hr over 30 Minutes Intravenous Every 24 Hours 18 1617 18 1935            Review of Systems:       see hpi    Physical Exam:   Vital Signs  Temp (24hrs), Av.8 °F (36.6 °C), Min:97.2 °F (36.2 °C), Max:98 °F (36.7 °C)    Temp  Min: 97.2 °F (36.2 °C)  Max: 98 °F (36.7 °C)  BP  Min: 106/54  Max: 149/70  Pulse  Min: 76  Max: 82  Resp  Min: 16  Max: 18  SpO2  Min: 98 %  Max: 99 %    GENERAL: Awake and alert, in no acute distress.   HEENT: Normocephalic, atraumatic.  PERRL. EOMI. No conjunctival injection. No icterus. Oropharynx clear without evidence of thrush or exudate.   HEART: RRR; No murmur, rubs, gallops.   LUNGS: Clear to auscultation bilaterally without wheezing, rales, rhonchi.   ABDOMEN: Soft, nontender, nondistended. Positive bowel sounds.    EXT:  No cyanosis, clubbing or edema.  No cord.  MSK: FROM without joint effusions noted arms/legs.  Right foot wrapped  SKIN: Warm and dry without cutaneous eruptions on Inspection/palpation.    NEURO: Oriented to PPT. No focal deficits on motor/sensory exam at arms/legs.  PSYCHIATRIC: Normal insight and judgement. Cooperative with PE    Laboratory Data    Results from last 7 days   Lab Units  12/11/18   0636  12/10/18   0604  12/05/18   0637   WBC 10*3/mm3  11.94*  13.92*  15.15*   HEMOGLOBIN g/dL  9.3*  8.7*  8.4*   HEMATOCRIT %  30.0*  27.9*  27.1*   PLATELETS 10*3/mm3  514*  497*  444     Results from last 7 days   Lab Units  12/11/18   0636   SODIUM mmol/L  137   POTASSIUM mmol/L  3.9   CHLORIDE mmol/L  107   CO2 mmol/L  22.0   BUN mg/dL  28*   CREATININE mg/dL  3.36*   GLUCOSE mg/dL  147*   CALCIUM mg/dL  8.9                             Estimated Creatinine Clearance: 22 mL/min (A) (by C-G formula based on SCr of 3.36 mg/dL (H)).      Microbiology:  Microbiology Results Abnormal     Procedure Component Value - Date/Time    Anaerobic Culture - Tissue, Toe, Right [[id number]] Collected:  12/04/18 1207    Lab Status:  Final result Specimen:  Tissue from Toe, Right Updated:  12/11/18 1404     Culture No anaerobes isolated    Fungus Culture - Tissue, Toe, Right [[id number]] Collected:  12/04/18 1207    Lab Status:  Preliminary result Specimen:  Tissue from Toe, Right Updated:  12/11/18 1315     Fungus Culture No fungus isolated at 1 week    AFB Culture - Tissue, Toe, Right [[id number]] Collected:  12/04/18 1207    Lab Status:  Preliminary result Specimen:  Tissue from Toe, Right Updated:  12/11/18 1315     AFB Culture No AFB isolated at 1 week     AFB Stain No acid fast bacilli seen on concentrated smear    Tissue / Bone Culture - Tissue, Toe, Right [[id number]] Collected:  12/04/18 1207    Lab Status:  Final result Specimen:  Tissue from Toe, Right Updated:  12/08/18 0850     Tissue Culture No growth at 4 days     Gram Stain No WBCs or organisms seen     Eosinophil Smear - Urine, Urine, Clean Catch [[id number]]  (Normal) Collected:  12/01/18 1629    Lab Status:  Final result Specimen:  Urine, Clean Catch Updated:  12/01/18 1710     Eosinophil Smear 0 % EOS/100 Cells     Narrative:       No eosinophil seen    Blood Culture - Blood, Arm, Left [[id number]] Collected:  11/26/18 1625    Lab Status:  Final result Specimen:  Blood from Arm, Left Updated:  12/01/18 1645     Blood Culture No growth at 5 days    Blood Culture - Blood, Arm, Right [[id number]] Collected:  11/26/18 1620    Lab Status:  Final result Specimen:  Blood from Arm, Right Updated:  12/01/18 1645     Blood Culture No growth at 5 days                       Radiology:  No radiology results for the last 7 days  Doppler arterial:   Addenda:       · Moderate reduction in arterial flow in the right lower extremity. · Monophasic waveforms are present in the right lower extremity with   biphasic waveforms in the left lower extremity · Due to the patients inability to tolerate the study only RLE pressures   were obtained    Signed: 11/29/18 8409 by Gillian Becker MD   Narrative:     · Moderate reduction in arterial flow in the right lower extremity.   Normal-appearing waveforms.     Narrative:     · Left Conclusion: The left NELLI is normal.  · Right Conclusion: The right NELLI is normal.     Path from 12/4/18  RIGHT FIRST TOE AND METATARSAL, AMPUTATION:  Skin with ulceration, necrosis and focal abscess formation compatible with gangrene.  No histologic evidence of acute osteomyelitis on representative sections of bone.  Proximal soft tissue margins appear viable.      Impression:   Diabetic foot ulcer on right foot  Leukocytosis with neutrophilia, improving  chronic kidney disease stage IV  Focal abscess of right 1st toe  Antibiotic intolerances to cefazolin, clindamycin, Levaquin  DM II  Esr > 100  ? Hospital acquired delirium fairly mild right now    PLAN/RECOMMENDATIONS:     I find it very difficult to  "interpret an MRI was not completed an MRI that was not given with gadolinium because of her chronic kidney disease.    Physical exam of foot not remarkable for  Osteomyelitis.  I still have some concern with ESr > 100.    Path suggestive of abscess without osteo         Cont invanz 500 mg IV Q24H    Long discussion with patient, ;    Patient has neuropathic foot wounds and without offloading is likely to have worsening foot infection bone involvement and will require amputation.    Despite explaining this multiple times patient still apprehensive about going to LTAC.    The entire goal of LTAC of \"placement\" is for off loading of foot and at same time to continue abx which are really empiric because of negative operative cultures.    If patient refused placement and goes home I don't feel compelled to arrange outpatient iv abx therapy      Will change currently therapy to zyvox, continue invanz  groshong today  Continue invanz 500 mg iv daily x 4-6 weeks from 12/4/18  Continue zyvox 600 mg twice a day x 4-6 weeks from 12/4/18  D/w family, , will aim for home iv/po abx  Knee scooter, off loading  F/u in office in 1 week  Upon worsening may need admission for wound care/offloading/rehab    Hung Haynes MD  12/11/2018  3:03 PM                    Electronically signed by Hung Haynes MD at 12/11/2018  3:10 PM     Dannie Luna MD at 12/11/2018  9:18 AM             LOS: 15 days    Patient Care Team:  Angelica Tran PA as PCP - General (Internal Medicine)  Denver Baldwin MD as Consulting Physician (Nephrology)    Subjective     Confused today. Son at bedside.     Objective     Vital Signs:  Blood pressure 149/70, pulse 82, temperature 97.2 °F (36.2 °C), temperature source Oral, resp. rate 18, height 167.6 cm (66\"), weight 109 kg (240 lb), SpO2 98 %, not currently breastfeeding.    Flowsheet Rows      First Filed Value   Admission Height  167.6 cm (66\") Documented " at 2018 1315   Admission Weight  109 kg (240 lb) Documented at 2018 1315          12/10 0701 -  07  In: 1860 [P.O.:1560]  Out: 1900 [Urine:1900]    Physical Exam:    General Appearance: WD obese WF Confused  Neuro:  Confused   Psych:  Normal mood and affect  CV:   No edema  Lungs: respirations regular and unlabored  Abdomen: not distended  :  No loya, no palp bladder  Skin: No rash, Warm and dry.  Left foot with dressing in place      Labs:  Results from last 7 days   Lab Units  18   0636  12/10/18   0604  18   0637   WBC 10*3/mm3  11.94*  13.92*  15.15*   HEMOGLOBIN g/dL  9.3*  8.7*  8.4*   PLATELETS 10*3/mm3  514*  497*  444     Results from last 7 days   Lab Units  18   0636  12/10/18   0604  18   0005  18   0637   SODIUM mmol/L  137  138  141  137   POTASSIUM mmol/L  3.9  4.2  4.4  3.9   CHLORIDE mmol/L  107  106  110*  105   CO2 mmol/L  22.0  24.0  24.0  25.0   BUN mg/dL  28*  28*  41*  51*   CREATININE mg/dL  3.36*  3.05*  3.34*  3.38*   CALCIUM mg/dL  8.9  8.8  8.2*  7.9*   PHOSPHORUS mg/dL   --    --   3.5   --    ALBUMIN g/dL   --    --   2.92*   --                    Estimated Creatinine Clearance: 22 mL/min (A) (by C-G formula based on SCr of 3.36 mg/dL (H)).        A/P:    CKD4: stable in baseline 3.5-4.0.  Likely secondary to DN. Scr 3.3 stable.     Proteinuria:  Presumed DN.  Ratio 3.1    HTN: stable.     Anemia:  On epogen. .  Transfuse PRN for Hgb < 7    Volume:  Stable.     Diabetic Foot Ulcer    Plan:  Continue with current regimen.      Case discussed with son.     Dannie Luna MD  18  9:18 AM           Electronically signed by Dannie Luna MD at 2018 11:56 AM     Geoffrey Stanley MD at 12/10/2018  6:40 PM              [facility] Medicine Services  PROGRESS NOTE    Patient Name: Tashia Leon  : 1955  MRN: [id number]    Date of Admission: 2018  Length of Stay: 14  Primary  Care Physician: Angelica Tran PA    Subjective   Subjective     CC:  Diabetic foot ulcer     HPI:    Denies current pain. Eager for discharge or transfer.    Review of Systems  Gen- No fevers, chills  CV- No chest pain, palpitations  Resp- No cough, dyspnea  GI- No N/V/D, abd pain    Otherwise ROS is negative except as mentioned in the HPI.    Objective   Objective     Vital Signs:   Temp:  [97.9 °F (36.6 °C)-98.8 °F (37.1 °C)] 98 °F (36.7 °C)  Heart Rate:  [73-82] 78  Resp:  [16] 16  BP: (121-147)/(57-69) 134/61        Physical Exam:  Constitutional - no acute distress, in bed  HEENT-NCAT, mucous membranes moist  CV-RRR no MRG  Resp-CTAB  Abd-soft, non-tender, non-distended, normo active bowel sounds; overweight  Ext-right foot in clean wraps, toes warm  Neuro-alert and oriented, speech clear, moves all extremities   Psych-normal affect  Skin- No rash on exposed UE or LE bilaterally      Results Reviewed:  I have personally reviewed current lab, radiology, and data and agree.    Results from last 7 days   Lab Units  12/10/18   0604  12/05/18   0637  12/04/18   0639   WBC 10*3/mm3  13.92*  15.15*  12.87*   HEMOGLOBIN g/dL  8.7*  8.4*  8.5*   HEMATOCRIT %  27.9*  27.1*  27.3*   PLATELETS 10*3/mm3  497*  444  415     Results from last 7 days   Lab Units  12/10/18   0604  12/08/18   0005  12/05/18   0637   SODIUM mmol/L  138  141  137   POTASSIUM mmol/L  4.2  4.4  3.9   CHLORIDE mmol/L  106  110*  105   CO2 mmol/L  24.0  24.0  25.0   BUN mg/dL  28*  41*  51*   CREATININE mg/dL  3.05*  3.34*  3.38*   GLUCOSE mg/dL  91  133*  135*   CALCIUM mg/dL  8.8  8.2*  7.9*     Estimated Creatinine Clearance: 24.2 mL/min (A) (by C-G formula based on SCr of 3.05 mg/dL (H)).  No results found for: BNP    Microbiology Results Abnormal     Procedure Component Value - Date/Time    Anaerobic Culture - Tissue, Toe, Right [[id number]] Collected:  12/04/18 1207    Lab Status:  Preliminary result Specimen:  Tissue from Toe, Right Updated:   12/10/18 1321     Culture No anaerobes isolated    Fungus Culture - Tissue, Toe, Right [[id number]] Collected:  12/04/18 1207    Lab Status:  Preliminary result Specimen:  Tissue from Toe, Right Updated:  12/09/18 1315     Fungus Culture No fungus isolated at less than 1 week    AFB Culture - Tissue, Toe, Right [[id number]] Collected:  12/04/18 1207    Lab Status:  Preliminary result Specimen:  Tissue from Toe, Right Updated:  12/09/18 1315     AFB Culture No AFB isolated at less than 1 week     AFB Stain No acid fast bacilli seen on concentrated smear    Tissue / Bone Culture - Tissue, Toe, Right [[id number]] Collected:  12/04/18 1207    Lab Status:  Final result Specimen:  Tissue from Toe, Right Updated:  12/08/18 0850     Tissue Culture No growth at 4 days     Gram Stain No WBCs or organisms seen    Eosinophil Smear - Urine, Urine, Clean Catch [[id number]]  (Normal) Collected:  12/01/18 1629    Lab Status:  Final result Specimen:  Urine, Clean Catch Updated:  12/01/18 1710     Eosinophil Smear 0 % EOS/100 Cells     Narrative:       No eosinophil seen    Blood Culture - Blood, Arm, Left [[id number]] Collected:  11/26/18 1625    Lab Status:  Final result Specimen:  Blood from Arm, Left Updated:  12/01/18 1645     Blood Culture No growth at 5 days    Blood Culture - Blood, Arm, Right [[id number]] Collected:  11/26/18 1620    Lab Status:  Final result Specimen:  Blood from Arm, Right Updated:  12/01/18 1645     Blood Culture No growth at 5 days          Imaging Results (last 24 hours)     ** No results found for the last 24 hours. **        Results for orders placed during the hospital encounter of 07/15/16   Adult transthoracic echo complete    Narrative · Left ventricular function is normal. Estimated EF = 55%.  · Trace mitral valve regurgitation is present.  · Trace tricuspid valve regurgitation is present          I have reviewed the medications.      aspirin  mg Oral Daily   castor oil-balsam peru  Topical Q12H    diltiaZEM  mg Oral Nightly   docusate sodium 100 mg Oral Daily   epoetin rebeca 20,000 Units Subcutaneous Weekly   ertapenem 500 mg Intravenous Q24H   insulin detemir 40 Units Subcutaneous Q12H   insulin lispro 0-14 Units Subcutaneous 4x Daily With Meals & Nightly   Linezolid 600 mg Intravenous Q12H   mupirocin  Topical Q24H   mupirocin  Topical Q24H   Naloxegol Oxalate 12.5 mg Oral Once per day on Sun Wed   pantoprazole 40 mg Oral QAM   sodium chloride 3 mL Intravenous Q12H         Assessment/Plan   Assessment / Plan     Active Hospital Problems    Diagnosis   • **Diabetic ulcer of right foot associated with type 2 diabetes mellitus (CMS/HCC)   • Sepsis (CMS/HCC)   • Cellulitis   • Hypokalemia   • Anemia, chronic disease   • Obesity (BMI 30-39.9)   • Impaired functional mobility, balance, gait, and endurance     Added automatically from request for surgery [id number]     • Diabetic ulcer of right midfoot associated with type 2 diabetes mellitus, with necrosis of bone (CMS/HCC)     Added automatically from request for surgery [id number]     • Cellulitis of toe of right foot     Added automatically from request for surgery [id number]     • Chronic osteomyelitis of right foot with draining sinus (CMS/HCC)     Added automatically from request for surgery [id number]     • Chronic kidney disease, stage V (CMS/HCC)   • Hyperlipidemia   • Diabetes mellitus type 2, uncontrolled, with complications (CMS/HCC)     Proteinuria noted, January 2014.       • Diabetic peripheral neuropathy (CMS/HCC)   • Essential hypertension   • Coronary artery disease involving native coronary artery of native heart without angina pectoris     2. Coronary artery disease:  a. Abnormal cardiac PET, 10/02/2013, Dr. Becker, revealing a posterolateral defect with a mild wall motion abnormality and a normal LVEF.  b. Cardiac catheterization, 10/21/2013, with placement of a 2.5 x 15 mm Xience Expedition drug-eluting stent to the proximal circumflex.   LVEF of 55% to 60%.  Noncritical disease in the LAD and right coronary.  c. Echocardiogram, 12/18/2013, shows an LVEF 50% to 55% with trace MR and mild TR.               Brief Hospital Course to date:  1.  Sepsis, now resolved     2.  Diabetic foot ulcer with cellulitis right foot       -s/p debridement and right 1st toe/ metarsal amputation with Dr. Patiño 12/4              -continue invanz and zyvox              -pain control- norco changed to prn q6h; dc'd dilaudid               -groshong catheter placement tomorrow.              -Scooter to offload foot has been ordered and is at the bedside.  Patient has seen PT who taught her appropriate use but are concerned about her weakness and stability.        3.   Type 2 diabetes mellitus with diabetic peripheral neuropathy              -a1c 7.6              -decreased long acting insulin as patient NPO midnight (was on levemir 40 units BID, will change to 25 units qhs alone for now)               - continue glucose checks ac and hs, added 2 am glucose check with bmp am as well     4.  CKDV              -nephrology following,  renal function limits abx choices               -Creatinine stable, continues off home rx lasix; euvolemic                 5.  Hypokalemia              -replaced by nephrology as needed     6. Obesity BMI 38: Weight loss recommended     7.  HTN: Currently normotensive     8.  CAD: Asymptomatic, aspirin     9.  Diabetic peripheral neuropathy: Symptomatic treatment and supportive care     10.  Anemia of chronic disease                 11.  Severe anxiety:improved with clonazepam. Not needed past couple of days      12.  Debility: Difficulty walking with needed scooter.  Therapy recommended rehabilitation facility for appropriate care and treatment.      DVT Prophylaxis:  Heparin      CODE STATUS:   Code Status and Medical Interventions:   Ordered at: 11/26/18 5489     Level Of Support Discussed With:    Patient     Code Status:    CPR     Medical  "Interventions (Level of Support Prior to Arrest):    Full       Disposition: I expect the patient to be discharged to LTAC/Rehab    Discussed tunneled catheter placement with Infectious Disease Dr Haynes and General Surgery Dr May      Electronically signed by Geoffrey Stanley MD, 12/10/18, 6:41 PM.        Electronically signed by Geoffrey Stanley MD at 12/10/2018  6:53 PM     Maribel May MD at 12/10/2018  3:44 PM          Tashia Leon  1955  [id number]    Surgery Progress Note    Date of visit: 12/10/2018    Subjective: He has evaluated the patient for placement of Groshong catheter  Patient sitting up in bed    Objective:    /62 (BP Location: Left arm, Patient Position: Lying)   Pulse 73   Temp 98.1 °F (36.7 °C) (Oral)   Resp 16   Ht 167.6 cm (66\")   Wt 109 kg (240 lb)   SpO2 98%   BMI 38.74 kg/m²      Intake/Output Summary (Last 24 hours) at 12/10/2018 1544  Last data filed at 12/10/2018 1425  Gross per 24 hour   Intake 1260 ml   Output --   Net 1260 ml       CV: Regular rate and rhythm  L: normal air entry        LABS:    Results from last 7 days   Lab Units  12/10/18   0604   WBC 10*3/mm3  13.92*   HEMOGLOBIN g/dL  8.7*   HEMATOCRIT %  27.9*   PLATELETS 10*3/mm3  497*     Results from last 7 days   Lab Units  12/10/18   0604   SODIUM mmol/L  138   POTASSIUM mmol/L  4.2   CHLORIDE mmol/L  106   CO2 mmol/L  24.0   BUN mg/dL  28*   CREATININE mg/dL  3.05*   CALCIUM mg/dL  8.8   GLUCOSE mg/dL  91     Results from last 7 days   Lab Units  12/10/18   0604   SODIUM mmol/L  138   POTASSIUM mmol/L  4.2   CHLORIDE mmol/L  106   CO2 mmol/L  24.0   BUN mg/dL  28*   CREATININE mg/dL  3.05*   GLUCOSE mg/dL  91   CALCIUM mg/dL  8.8     No results found for: LIPASE      Assessment/ Plan: Status post I&D with right great toe amputation with history of diabetic foot ulcer  Patient is on IV antibiotics  She is not a candidate for PICC line placement secondary to underlying chronic kidney " disease  Plan to place a double-lumen Groshong catheter tomorrow  I discussed the procedure at length with her.  The risks of anesthesia, infection, bleeding, possible pneumothorax were discussed.  She understands and is willing to proceed with the plan as outlined.    Problem List Items Addressed This Visit     Diabetic peripheral neuropathy (CMS/[facility])    Relevant Medications    Insulin Lispro (HUMALOG KWIKPEN) 200 UNIT/ML solution pen-injector    Dulaglutide (TRULICITY) 1.5 MG/0.5ML solution pen-injector    Other Relevant Orders    Case Request (Completed)    Anaerobic Culture - Tissue, Toe, Right (Completed)    Fungus Culture - Tissue, Toe, Right (Completed)    Tissue / Bone Culture - Tissue, Toe, Right (Completed)    AFB Culture - Tissue, Toe, Right (Completed)    Tissue Pathology Exam (Completed)    Diabetes mellitus type 2, uncontrolled, with complications (CMS/[facility])    Relevant Medications    Insulin Lispro (HUMALOG KWIKPEN) 200 UNIT/ML solution pen-injector    Dulaglutide (TRULICITY) 1.5 MG/0.5ML solution pen-injector    Other Relevant Orders    Case Request (Completed)    Anaerobic Culture - Tissue, Toe, Right (Completed)    Fungus Culture - Tissue, Toe, Right (Completed)    Tissue / Bone Culture - Tissue, Toe, Right (Completed)    AFB Culture - Tissue, Toe, Right (Completed)    Tissue Pathology Exam (Completed)    Osteomyelitis (CMS/[facility])    Relevant Orders    Case Request (Completed)    Anaerobic Culture - Tissue, Toe, Right (Completed)    Fungus Culture - Tissue, Toe, Right (Completed)    Tissue / Bone Culture - Tissue, Toe, Right (Completed)    AFB Culture - Tissue, Toe, Right (Completed)    Tissue Pathology Exam (Completed)    Chronic kidney disease, stage V (CMS/[facility])    Relevant Medications    furosemide (LASIX) 40 MG tablet    Other Relevant Orders    Case Request (Completed)    Anaerobic Culture - Tissue, Toe, Right (Completed)    Fungus Culture - Tissue, Toe, Right (Completed)    Tissue / Bone Culture  - Tissue, Toe, Right (Completed)    AFB Culture - Tissue, Toe, Right (Completed)    Tissue Pathology Exam (Completed)    * (Principal) Diabetic ulcer of right foot associated with type 2 diabetes mellitus (CMS/[facility]) - Primary    Relevant Medications    Insulin Lispro (HUMALOG KWIKPEN) 200 UNIT/ML solution pen-injector    Dulaglutide (TRULICITY) 1.5 MG/0.5ML solution pen-injector    Other Relevant Orders    Case Request (Completed)    Anaerobic Culture - Tissue, Toe, Right (Completed)    Fungus Culture - Tissue, Toe, Right (Completed)    Tissue / Bone Culture - Tissue, Toe, Right (Completed)    AFB Culture - Tissue, Toe, Right (Completed)    Tissue Pathology Exam (Completed)    Cellulitis    Relevant Orders    Case Request (Completed)    Anaerobic Culture - Tissue, Toe, Right (Completed)    Fungus Culture - Tissue, Toe, Right (Completed)    Tissue / Bone Culture - Tissue, Toe, Right (Completed)    AFB Culture - Tissue, Toe, Right (Completed)    Tissue Pathology Exam (Completed)    Obesity (BMI 30-39.9)    Relevant Orders    Case Request (Completed)    Anaerobic Culture - Tissue, Toe, Right (Completed)    Fungus Culture - Tissue, Toe, Right (Completed)    Tissue / Bone Culture - Tissue, Toe, Right (Completed)    AFB Culture - Tissue, Toe, Right (Completed)    Tissue Pathology Exam (Completed)    Impaired functional mobility, balance, gait, and endurance    Relevant Orders    Case Request (Completed)    Anaerobic Culture - Tissue, Toe, Right (Completed)    Fungus Culture - Tissue, Toe, Right (Completed)    Tissue / Bone Culture - Tissue, Toe, Right (Completed)    AFB Culture - Tissue, Toe, Right (Completed)    Tissue Pathology Exam (Completed)      Other Visit Diagnoses     Leukocytosis, unspecified type        Cellulitis of right foot        Chronic kidney disease, unspecified CKD stage        Relevant Medications    furosemide (LASIX) 40 MG tablet    Impaired mobility and ADLs                Maribel May,  MD  12/10/2018  3:44 PM      Electronically signed by Maribel May MD at 12/10/2018  3:46 PM     Hung Haynes MD at 12/10/2018  3:30 PM          INFECTIOUS DISEASE PROGRESS NOTE    Tashia Leon  1955  [id number]    Date: 12/10/2018    Admission Date: 11/26/2018      CC: diabetic foot ulcer    History of present illness:    Patient is a 63 y.o. female with chronic kidney disease stage IV, diabetes type 2 since 1990s has had history of left foot MRSA osteomyelitis treated at  several years ago.  Patient has developed calluses on her right foot and saw [facility] who did some bedside debridement in the clinic.  Patient said increasing pain and some weeping from the wounds because of low-grade temperatures patient's returns to see her podiatrist recommended admission to hospital.  Patient was seen in the emergency room started on IV antibiotics including daptomycin and ertapenem.  She had MRI of the foot which was stopped prematurely by the patient's and was not a complete study.    Asked to address long-term treatment and duration of antibiotics.    Patient lives in [place].    Patient denies C. difficile colitis history of nausea vomiting or diarrhea difficulty breathing patient denies rash.    Patient says she is a PICC line before    11/28/18; no events overnight; no fever, rash, sore throat    11/29/18; no events overnight feels well; no complaints; no diarrhea, rash, sore throat    11/30/18: No new events. Denies f/c, sob, n/v/d, rashes or sore throat.     12/1/18; no events overnight; no fever, rash, sore throat    12/2/18: no new events or complaints.  Denies f/c, sob, n/v/d, rashes.   12/3/18: No new events or complaints.  Foot without pain.  No schedule for dressing changes.  Denies f/c, sob, n/v/d, rashes.     12/6/18; doing well; no events overnight; no fever, rash, sore throat    12/7/18; no changes, wants to go home; no fever, rash, sore throat  12/10/18; no events  overnight; doing well; no complaints, no fever, rash, sore throat; wants deep line, refusing to go to LTAC    Past Medical History:   Diagnosis Date   • Acute bronchitis    • Acute pharyngitis    • Acute sinusitis    • Benign colonic polyp    • Edema    • GERD (gastroesophageal reflux disease)    • Hiatal hernia    • Hyperkalemia    • Hyperlipidemia    • Hypertension    • Low back pain    • Lumbar disc disease    • MRSA (methicillin resistant Staphylococcus aureus)     Osteomyelitis/methicillin-resistant Staphylococcus aureus of the left foot, 2013.   • Obesity    • Osteomyelitis (CMS/HCC)     Osteomyelitis/methicillin-resistant Staphylococcus aureus of the left foot, 2013.   • Peptic ulceration 2013    History of peptic ulcer disease, diagnosed 2013 by EGD.  Repeat EGD in 2013 was negative.   • Sepsis (CMS/HCC)     Sepsis, 2013, hospitalized at [facility].   • Tobacco use     Previous tobacco use with cessation in .   • Type 2 diabetes mellitus (CMS/HCC)     Proteinuria noted, 2014.     • Vitamin D deficiency        Past Surgical History:   Procedure Laterality Date   •  SECTION      x2   • FOOT SURGERY Left     Incision and debridement of the left foot x2.   • LASIK     • TONSILLECTOMY         Family History   Problem Relation Age of Onset   • No Known Problems Mother    • No Known Problems Father    • Breast cancer Neg Hx    • Ovarian cancer Neg Hx        Social History     Socioeconomic History   • Marital status:      Spouse name: Not on file   • Number of children: Not on file   • Years of education: Not on file   • Highest education level: Not on file   Social Needs   • Financial resource strain: Not on file   • Food insecurity - worry: Not on file   • Food insecurity - inability: Not on file   • Transportation needs - medical: Not on file   • Transportation needs - non-medical: Not on file   Occupational History   • Not  on file   Tobacco Use   • Smoking status: Former Smoker     Packs/day: 1.00     Years: 30.00     Pack years: 30.00     Types: Cigarettes   • Smokeless tobacco: Never Used   • Tobacco comment: quit 7 years ago   Substance and Sexual Activity   • Alcohol use: Yes     Alcohol/week: 0.6 oz     Types: 1 Glasses of wine per week     Comment: holidays   • Drug use: No   • Sexual activity: Defer   Other Topics Concern   • Not on file   Social History Narrative   • Not on file       Allergies   Allergen Reactions   • Statins Myalgia   • Ancef [Cefazolin]    • Bactrim [Sulfamethoxazole-Trimethoprim] Nausea And Vomiting   • Cephalosporins    • Cleocin [Clindamycin Hcl]    • Clindamycin/Lincomycin    • Keflex [Cephalexin]    • Levaquin [Levofloxacin]    • Lipitor [Atorvastatin]    • Lisinopril    • Losartan Potassium Other (See Comments)     Unknown reaction   • Oxycodone    • Quinolones          Medication:    Current Facility-Administered Medications:   •  aspirin EC tablet 325 mg, 325 mg, Oral, Daily, Rosario Walter, APRN, 325 mg at 12/10/18 0836  •  bisacodyl (DULCOLAX) EC tablet 10 mg, 10 mg, Oral, Daily PRN, Heavenly Medina, APRN, 10 mg at 12/08/18 0841  •  castor oil-balsam peru (VENELEX) ointment, , Topical, Q12H, Halina Walker,   •  dextrose (D50W) 25 g/ 50mL Intravenous Solution 25 g, 25 g, Intravenous, Q15 Min PRN, Rosario Walter, APRN  •  dextrose (GLUTOSE) oral gel 15 g, 15 g, Oral, Q15 Min PRN, Rosario Walter W, APRN  •  diltiaZEM CD (CARDIZEM CD) 24 hr capsule 120 mg, 120 mg, Oral, Nightly, Rosario Walter, APRN, 120 mg at 12/09/18 2118  •  docusate sodium (COLACE) capsule 100 mg, 100 mg, Oral, Daily, Heavenly Medina, APRN, 100 mg at 12/10/18 0836  •  epoetin rebeca (EPOGEN,PROCRIT) injection 20,000 Units, 20,000 Units, Subcutaneous, Weekly, Manny Guerrero MD, 20,000 Units at 12/08/18 6325  •  ertapenem (INVanz) 500 mg in sodium chloride 0.9 % 50 mL IVPB, 500 mg,  Intravenous, Q24H, Hung Haynes MD  •  glucagon (human recombinant) (GLUCAGEN DIAGNOSTIC) injection 1 mg, 1 mg, Subcutaneous, PRN, Rosario Walter APRN  •  HYDROcodone-acetaminophen (NORCO) 7.5-325 MG per tablet 1 tablet, 1 tablet, Oral, Q6H PRN, Halina Walker DO, 1 tablet at 12/10/18 0509  •  insulin detemir (LEVEMIR) injection 40 Units, 40 Units, Subcutaneous, Q12H, Gonzalo Schmidt MD, 40 Units at 12/10/18 0837  •  insulin lispro (humaLOG) injection 0-14 Units, 0-14 Units, Subcutaneous, 4x Daily With Meals & Nightly, Rosario Walter APRN, 3 Units at 12/10/18 1258  •  Linezolid (ZYVOX) 600 mg 300 mL, 600 mg, Intravenous, Q12H, Hung Haynes MD, Last Rate: 300 mL/hr at 12/10/18 1425, 600 mg at 12/10/18 1425  •  melatonin sublingual tablet 5 mg, 5 mg, Sublingual, Nightly PRN, Geoffrey Stanley MD  •  mupirocin (BACTROBAN) 2 % cream, , Topical, Q24H, Chio Sterling MD  •  mupirocin (BACTROBAN) 2 % cream, , Topical, Q24H, Chio Sterling MD  •  Naloxegol Oxalate (MOVANTIK) tablet 12.5 mg *Patient Supplied Med*, 12.5 mg, Oral, Once per day on Sun Wed, Rosario Walter APRN, 12.5 mg at 12/09/18 0906  •  ondansetron (ZOFRAN) tablet 4 mg, 4 mg, Oral, Q6H PRN, 4 mg at 11/30/18 2046 **OR** ondansetron (ZOFRAN) injection 4 mg, 4 mg, Intravenous, Q6H PRN, Rosario Walter APRN, 4 mg at 12/08/18 0334  •  pantoprazole (PROTONIX) EC tablet 40 mg, 40 mg, Oral, QAM, Rosario Walter APRN, 40 mg at 12/10/18 0509  •  [COMPLETED] Insert peripheral IV, , , Once **AND** sodium chloride 0.9 % flush 10 mL, 10 mL, Intravenous, PRN, Hiram Osborn MD, 10 mL at 12/04/18 1032  •  sodium chloride 0.9 % flush 3 mL, 3 mL, Intravenous, Q12H, Rosario Walter APRN, 3 mL at 12/10/18 0844  •  sodium chloride 0.9 % flush 3-10 mL, 3-10 mL, Intravenous, PRN, Rosario Walter, APRN  •  sodium chloride 0.9 % infusion, 9 mL/hr, Intravenous, Continuous, Binta Patricia MD, Last  Rate: 9 mL/hr at 18 1257, 9 mL/hr at 18 1257    Antibiotics:  Anti-Infectives (From admission, onward)    Ordered     Dose/Rate Route Frequency Start Stop    12/10/18 1347  ertapenem (INVanz) 500 mg in sodium chloride 0.9 % 50 mL IVPB     Ordering Provider:  Hung Haynes MD    500 mg  100 mL/hr over 30 Minutes Intravenous Every 24 Hours 12/10/18 1800 18 1759    18 1308  Linezolid (ZYVOX) 600 mg 300 mL     Ordering Provider:  Hung Haynes MD    600 mg  300 mL/hr over 60 Minutes Intravenous Every 12 Hours 18 1500 18 1459    18 2136  DAPTOmycin (CUBICIN) 500 mg in sodium chloride 0.9 % 50 mL IVPB     Ordering Provider:  Breana Chance MD    6 mg/kg × 79.2 kg (Adjusted)  100 mL/hr over 30 Minutes Intravenous Every 48 Hours 18 1800 18 1959    18 2132  ertapenem (INVanz) 500 mg in sodium chloride 0.9 % 50 mL IVPB     Niko Deleon PA reviewed the order on 18 0848.   Ordering Provider:  Niko Deleon PA    500 mg  100 mL/hr over 30 Minutes Intravenous Every 24 Hours 18 1800 18 1834    18 1616  ertapenem (INVanz) 500 mg in sodium chloride 0.9 % 50 mL IVPB     Ordering Provider:  Hiram Osborn MD    500 mg  100 mL/hr over 30 Minutes Intravenous Every 24 Hours 18 1618 18 0015    18 1616  DAPTOmycin (CUBICIN) 500 mg in sodium chloride 0.9 % 50 mL IVPB     Ordering Provider:  Hiram Osborn MD    500 mg  100 mL/hr over 30 Minutes Intravenous Every 24 Hours 18 1617 18 1935            Review of Systems:       see hpi    Physical Exam:   Vital Signs  Temp (24hrs), Av.3 °F (36.8 °C), Min:97.9 °F (36.6 °C), Max:98.8 °F (37.1 °C)    Temp  Min: 97.9 °F (36.6 °C)  Max: 98.8 °F (37.1 °C)  BP  Min: 121/57  Max: 147/69  Pulse  Min: 73  Max: 82  Resp  Min: 16  Max: 16  SpO2  Min: 95 %  Max: 98 %    GENERAL: Awake and alert, in no acute distress.   HEENT: Normocephalic, atraumatic.  PERRL.  EOMI. No conjunctival injection. No icterus. Oropharynx clear without evidence of thrush or exudate.   HEART: RRR; No murmur, rubs, gallops.   LUNGS: Clear to auscultation bilaterally without wheezing, rales, rhonchi.   ABDOMEN: Soft, nontender, nondistended. Positive bowel sounds.    EXT:  No cyanosis, clubbing or edema. No cord.  MSK: FROM without joint effusions noted arms/legs.  Right foot wrapped  SKIN: Warm and dry without cutaneous eruptions on Inspection/palpation.    NEURO: Oriented to PPT. No focal deficits on motor/sensory exam at arms/legs.  PSYCHIATRIC: Normal insight and judgement. Cooperative with PE    Laboratory Data    Results from last 7 days   Lab Units  12/10/18   0604  12/05/18   0637  12/04/18   0639   WBC 10*3/mm3  13.92*  15.15*  12.87*   HEMOGLOBIN g/dL  8.7*  8.4*  8.5*   HEMATOCRIT %  27.9*  27.1*  27.3*   PLATELETS 10*3/mm3  497*  444  415     Results from last 7 days   Lab Units  12/10/18   0604   SODIUM mmol/L  138   POTASSIUM mmol/L  4.2   CHLORIDE mmol/L  106   CO2 mmol/L  24.0   BUN mg/dL  28*   CREATININE mg/dL  3.05*   GLUCOSE mg/dL  91   CALCIUM mg/dL  8.8                             Estimated Creatinine Clearance: 24.2 mL/min (A) (by C-G formula based on SCr of 3.05 mg/dL (H)).      Microbiology:  Microbiology Results Abnormal     Procedure Component Value - Date/Time    Anaerobic Culture - Tissue, Toe, Right [[id number]] Collected:  12/04/18 1207    Lab Status:  Preliminary result Specimen:  Tissue from Toe, Right Updated:  12/10/18 1321     Culture No anaerobes isolated    Fungus Culture - Tissue, Toe, Right [[id number]] Collected:  12/04/18 1207    Lab Status:  Preliminary result Specimen:  Tissue from Toe, Right Updated:  12/09/18 1315     Fungus Culture No fungus isolated at less than 1 week    AFB Culture - Tissue, Toe, Right [[id number]] Collected:  12/04/18 1207    Lab Status:  Preliminary result Specimen:  Tissue from Toe, Right Updated:  12/09/18 1315     AFB Culture No  AFB isolated at less than 1 week     AFB Stain No acid fast bacilli seen on concentrated smear    Tissue / Bone Culture - Tissue, Toe, Right [[id number]] Collected:  12/04/18 1207    Lab Status:  Final result Specimen:  Tissue from Toe, Right Updated:  12/08/18 0850     Tissue Culture No growth at 4 days     Gram Stain No WBCs or organisms seen    Eosinophil Smear - Urine, Urine, Clean Catch [[id number]]  (Normal) Collected:  12/01/18 1629    Lab Status:  Final result Specimen:  Urine, Clean Catch Updated:  12/01/18 1710     Eosinophil Smear 0 % EOS/100 Cells     Narrative:       No eosinophil seen    Blood Culture - Blood, Arm, Left [[id number]] Collected:  11/26/18 1625    Lab Status:  Final result Specimen:  Blood from Arm, Left Updated:  12/01/18 1645     Blood Culture No growth at 5 days    Blood Culture - Blood, Arm, Right [[id number]] Collected:  11/26/18 1620    Lab Status:  Final result Specimen:  Blood from Arm, Right Updated:  12/01/18 1645     Blood Culture No growth at 5 days                       Radiology:  No radiology results for the last 7 days  Doppler arterial:   Addenda:       · Moderate reduction in arterial flow in the right lower extremity. · Monophasic waveforms are present in the right lower extremity with   biphasic waveforms in the left lower extremity · Due to the patients inability to tolerate the study only RLE pressures   were obtained    Signed: 11/29/18 2245 by Gillian Becker MD   Narrative:     · Moderate reduction in arterial flow in the right lower extremity.   Normal-appearing waveforms.     Narrative:     · Left Conclusion: The left NELLI is normal.  · Right Conclusion: The right NELLI is normal.     Path from 12/4/18  RIGHT FIRST TOE AND METATARSAL, AMPUTATION:  Skin with ulceration, necrosis and focal abscess formation compatible with gangrene.  No histologic evidence of acute osteomyelitis on representative sections of bone.  Proximal soft tissue margins appear  "viable.      Impression:   Diabetic foot ulcer on right foot  Leukocytosis with neutrophilia, improving  chronic kidney disease stage IV  Focal abscess of right 1st toe  Antibiotic intolerances to cefazolin, clindamycin, Levaquin  DM II  Esr > 100    PLAN/RECOMMENDATIONS:     I find it very difficult to interpret an MRI was not completed an MRI that was not given with gadolinium because of her chronic kidney disease.    Physical exam of foot not remarkable for  Osteomyelitis.  I still have some concern with ESr > 100.    Path suggestive of abscess without osteo         Cont invanz 500 mg IV Q24H    Long discussion with patient, ;    Patient has neuropathic foot wounds and without offloading is likely to have worsening foot infection bone involvement and will require amputation.    Despite explaining this multiple times patient still apprehensive about going to LTAC.    The entire goal of LTAC of \"placement\" is for off loading of foot and at same time to continue abx which are really empiric because of negative operative cultures.    If patient refused placement and goes home I don't feel compelled to arrange outpatient iv abx therapy      Will change currently therapy to zyvox, continue invanz    Patient refusing LTAC  Ask [facility] surgery to place groshong  Anticipate 4-6 weeks of iv abx from 12/4/18    If patient goes home I have concern that she will not offload her foot; patient is at high risk for treatment failure, limb loss    D/w Dr. Jaden Haynes MD  12/10/2018  3:30 PM                    Electronically signed by Hung Haynes MD at 12/10/2018  3:33 PM     Dannie Luna MD at 12/10/2018 11:22 AM             LOS: 14 days    Patient Care Team:  Angelica Tran PA as PCP - General (Internal Medicine)  Denver Baldwin MD as Consulting Physician (Nephrology)    Subjective     No new events    Objective     Vital Signs:  Blood pressure 135/63, pulse 78, temperature " "97.9 °F (36.6 °C), temperature source Oral, resp. rate 16, height 167.6 cm (66\"), weight 109 kg (240 lb), SpO2 98 %, not currently breastfeeding.    Flowsheet Rows      First Filed Value   Admission Height  167.6 cm (66\") Documented at 11/26/2018 1315   Admission Weight  109 kg (240 lb) Documented at 11/26/2018 1315          12/09 0701 - 12/10 0700  In: 360 [P.O.:60]  Out: 750 [Urine:750]    Physical Exam:    General Appearance: WD obese WF NAD  Neuro:   awake alert   Psych:  Normal mood and affect  CV:   No edema  Lungs: respirations regular and unlabored  Abdomen: not distended  :  No loya, no palp bladder  Skin: No rash, Warm and dry.  Left foot with dressing in place      Labs:  Results from last 7 days   Lab Units  12/10/18   0604  12/05/18   0637  12/04/18   0639   WBC 10*3/mm3  13.92*  15.15*  12.87*   HEMOGLOBIN g/dL  8.7*  8.4*  8.5*   PLATELETS 10*3/mm3  497*  444  415     Results from last 7 days   Lab Units  12/10/18   0604  12/08/18   0005  12/05/18   0637  12/04/18   0639   SODIUM mmol/L  138  141  137  140   POTASSIUM mmol/L  4.2  4.4  3.9  3.9   CHLORIDE mmol/L  106  110*  105  107   CO2 mmol/L  24.0  24.0  25.0  20.0   BUN mg/dL  28*  41*  51*  58*   CREATININE mg/dL  3.05*  3.34*  3.38*  3.56*   CALCIUM mg/dL  8.8  8.2*  7.9*  8.1*   PHOSPHORUS mg/dL   --   3.5   --    --    ALBUMIN g/dL   --   2.92*   --    --                    Estimated Creatinine Clearance: 24.2 mL/min (A) (by C-G formula based on SCr of 3.05 mg/dL (H)).        A/P:    CKD4: stable in baseline 3.5-4.0.  Likely secondary to DN. Scr 3.0 stable.     Proteinuria:  Presumed DN.  Ratio 3.1    HTN: stable.     Anemia:  On epogen. .  Transfuse PRN for Hgb < 7    Volume:  Stable.     Diabetic Foot Ulcer    Plan:  Continue with current regimen.     Dannie Luna MD  12/10/18  11:22 AM           Electronically signed by Dannie Luna MD at 12/10/2018 12:05 PM       Consult Notes (last 72 hours) (Notes from 12/9/2018  " 2:38 PM through 12/12/2018  2:38 PM)     No notes of this type exist for this encounter.

## 2018-12-12 NOTE — PROGRESS NOTES
Continued Stay Note  [facility]     Patient Name: Tashia Leon  MRN: [id number]  Today's Date: 12/12/2018    Admit Date: 11/26/2018    Discharge Plan     Row Name 12/12/18 1457       Plan    Plan  Home w/ daily IV infusions    Patient/Family in Agreement with Plan  yes    Plan Comments  CM consulted to arrange daily IV invanz, PO zyvox, weekly groshong care and dressing changes, and weekly labs. Spoke with Cara at [facility], based out of [place], and they are in network with patient's insurance. Referral faxed and they will run the Invanz for cost. [facility] will provide medications, supplies, and education to patient and family once patient is discharged. A PA is needed for Zyvox and this has been initiated through [employer]. Once a PA is received, [facility] pharmacy will run the medication to determine cost. PA currently pending. CM called all 4 HH agencies available in [facility] and none are in network with patient's insurance. Spoke with Katiana at [facility] and Dr. Haynes is agreeable to patient infusing weekly at the office and receiving her weekly line care and lab draws at that time. D/w patient and she states she is agreeable to weekly office visits. Patient's f/u appt is scheduled with Dr. Haynes on Thursday, 12/20, at 1230. This has been added to the AVS.    Final Discharge Disposition Code  01 - home or self-care        Discharge Codes    No documentation.       Expected Discharge Date and Time     Expected Discharge Date Expected Discharge Time    Dec 7, 2018             Valarie Bal RN

## 2018-12-12 NOTE — DISCHARGE PLACEMENT REQUEST
"Tashia Leon (63 y.o. Female)       GLADIS Sheppard Case Manager, [phone]        [address] [address]  Phone:  [phone]  Fax:          Patient:     Tashia Leon MRN:  [id number]   [address]  [facility] [address] :  1955  SSN:    Phone: [phone] Sex:  F      INSURANCE PAYOR PLAN GROUP # SUBSCRIBER ID   Primary:    [employer] [id number] [id number]   Admitting Diagnosis: Diabetic ulcer of other part of right foot associated with type 2 diabetes mellitus, unspecified ulcer stage (CMS/HCC) [E11.621, L97.519]  Order Date:  Dec 11, 2018         Case Management  Consult       (Order ID: [id number])     Diagnosis:         Priority:  Routine Expected Date:   Expiration Date:        Interval:  Once Count:    Comments: 1) invanz 500 mg iv daily x 4-6 weeks from 18  1.5) oral zyvox 600 mg every 12 hours \"\"  2)weekly groshong care  3)check cbc, cmp, esr, q Monday  4)fax ID note to [facility] [id number]  5)schedule f/u with me in 1 week  Reason for Consult? [facility] to offer opat infusions     Specimen Type:   Specimen Source:   Specimen Taken Date:   Specimen Taken Time:                   Authorizing Provider:Maribel May MD  Authorizing Provider's NPI: [id number]  Order Entered By: Hung Haynes MD 2018  3:09 PM     Electronically signed by: Maribel May MD 2018  3:09 PM                  Date of Birth Social Security Number Address Home Phone MRN    1955  [address]  [facility] [address] [phone] [id number]    Muslim Marital Status          None        Admission Date Admission Type Admitting Provider Attending Provider Department, Room/Bed    18 Emergency Yue Lai MD Seward, Kathryn L, MD [address], S[address]/1    Discharge Date Discharge Disposition Discharge Destination                       Attending " "Provider:  Yue Lai MD    Allergies:  Statins, Ancef [Cefazolin], Bactrim [Sulfamethoxazole-trimethoprim], Cephalosporins, Keflex [Cephalexin], Levaquin [Levofloxacin], Lipitor [Atorvastatin], Lisinopril, Losartan Potassium, Oxycodone, Quinolones, Cleocin [Clindamycin Hcl], Clindamycin/lincomycin    Isolation:  None   Infection:  None   Code Status:  CPR    Ht:  167.6 cm (66\")   Wt:  109 kg (240 lb)    Admission Cmt:  None   Principal Problem:  Diabetic ulcer of right foot associated with type 2 diabetes mellitus (CMS/[facility]) [E11.621,L97.519]                 Active Insurance as of 2018     Primary Coverage     Payor Plan Insurance Group Employer/Plan Group    [employer] [id number]     Payor Plan Address Payor Plan Phone Number Payor Plan Fax Number Effective Dates    PO    2018 - None Entered    [facility] [id number]       Subscriber Name Subscriber Birth Date Member ID       TASHIA LEON 1955 [id number]                 Emergency Contacts      (Rel.) Home Phone Work Phone Mobile Phone    Bisi Lucia (Mother) -- -- [phone]            Insurance Information                [employer] Phone:     Subscriber: Tashia Leon Subscriber#: [id number]    Group#: [id number] Precert#:              History & Physical      Breana Chance MD at 2018  8:15 PM              [facility] Services  HISTORY AND PHYSICAL    Patient Name: Tashia Leon  : 1955  MRN: [id number]  Primary Care Physician: Angelica Tran PA  Date of admission: 2018      Subjective   Subjective     Chief Complaint:  Wound on right foot    HPI:  Tashia Leon is a 63 y.o. female with a history of DM2, CKDIV, HTN, HLD, CAD, prior left foot MRSA osteomyelitis, presents to the ED with complaints of a wound on the plantar aspect of her right foot.  She reports that three weeks ago her podiatrist trimmed the " callous on the sole of her foot.  She reports that the callous started to grow back but was not healed all the way.  On Wednesday she developed increased pain and swelling in her right foot, and on Saturday her wound started oozing.  She reports having a fever of 100.9 since Saturday with chills, but denies shortness of air, cough, chest pain, vomiting, diarrhea, abdominal pain, or any other complaints at this time.  She was advised by her nephrologist to come to the ED for IV antibiotics secondary to multiple drug allergies and not doing well with oral antibiotics in the past.  A MRI was ordered in the ED to r/o osteomyelitis, but unfortunately the patient prematurely stopped it prior to completion.  The ED physician spoke with Dr. Tiwari of ID who recommended starting the patient on Invanz and Daptomycin for antibiotic coverage.  The patient is being admitted to the Hospitalist for further evaluation and mangement.      Review of Systems   Constitutional: Positive for chills and fever. Negative for activity change, appetite change, diaphoresis, fatigue and unexpected weight change.   HENT: Negative.    Eyes: Negative.    Respiratory: Negative.    Cardiovascular: Negative.    Gastrointestinal: Positive for nausea. Negative for abdominal distention, abdominal pain, blood in stool, diarrhea and vomiting.   Endocrine: Negative.    Genitourinary: Negative.    Musculoskeletal: Negative.    Skin: Positive for wound (right foot). Negative for color change, pallor and rash.   Allergic/Immunologic: Negative.    Neurological: Negative.    Hematological: Negative.    Psychiatric/Behavioral: Negative.         Otherwise 10-system ROS reviewed and is negative except as mentioned in the HPI.    Personal History     Past Medical History:   Diagnosis Date   • Acute bronchitis    • Acute pharyngitis    • Acute sinusitis    • Benign colonic polyp    • Edema    • GERD (gastroesophageal reflux disease)    • Hiatal hernia    •  Hyperkalemia    • Hyperlipidemia    • Hypertension    • Low back pain    • Lumbar disc disease    • MRSA (methicillin resistant Staphylococcus aureus)     Osteomyelitis/methicillin-resistant Staphylococcus aureus of the left foot, 2013.   • Obesity    • Osteomyelitis (CMS/HCC)     Osteomyelitis/methicillin-resistant Staphylococcus aureus of the left foot, 2013.   • Peptic ulceration 2013    History of peptic ulcer disease, diagnosed 2013 by EGD.  Repeat EGD in 2013 was negative.   • Sepsis (CMS/[facility])     Sepsis, 2013, hospitalized at [facility].   • Tobacco use     Previous tobacco use with cessation in .   • Type 2 diabetes mellitus (CMS/[facility])     Proteinuria noted, 2014.     • Vitamin D deficiency        Past Surgical History:   Procedure Laterality Date   •  SECTION      x2   • FOOT SURGERY Left     Incision and debridement of the left foot x2.   • LASIK     • TONSILLECTOMY         Family History: family history includes No Known Problems in her father and mother. Otherwise pertinent FHx was reviewed and unremarkable.     Social History:  reports that she has quit smoking. Her smoking use included cigarettes. She has a 30.00 pack-year smoking history. she has never used smokeless tobacco. She reports that she drinks about 0.6 oz of alcohol per week. She reports that she does not use drugs.  Social History     Social History Narrative   • Not on file       Medications:  Available home medication information reviewed.    Allergies   Allergen Reactions   • Statins Myalgia   • Ancef [Cefazolin]    • Bactrim [Sulfamethoxazole-Trimethoprim] Nausea And Vomiting   • Cephalosporins    • Cleocin [Clindamycin Hcl]    • Clindamycin/Lincomycin    • Keflex [Cephalexin]    • Levaquin [Levofloxacin]    • Lipitor [Atorvastatin]    • Lisinopril    • Losartan Potassium Other (See Comments)     Unknown reaction   • Oxycodone    • Quinolones         Objective   Objective     Vital Signs:   Temp:  [98.6 °F (37 °C)-99.9 °F (37.7 °C)] 98.6 °F (37 °C)  Heart Rate:  [91] 91  Resp:  [14-16] 16  BP: (100-123)/(49-64) 103/49        Physical Exam   Constitutional: She is oriented to person, place, and time. She appears well-developed. No distress.   HENT:   Head: Normocephalic.   Eyes: Pupils are equal, round, and reactive to light.   Neck: Normal range of motion. Neck supple.   Cardiovascular: Normal rate, regular rhythm, normal heart sounds and intact distal pulses. Exam reveals no gallop and no friction rub.   No murmur heard.  Pulmonary/Chest: Effort normal and breath sounds normal. No stridor. No respiratory distress. She has no wheezes. She has no rales.   Abdominal: Soft. Bowel sounds are normal. She exhibits no distension and no mass. There is no tenderness. There is no rebound and no guarding.   Musculoskeletal: Normal range of motion. She exhibits no edema, tenderness or deformity.   Neurological: She is alert and oriented to person, place, and time. No cranial nerve deficit.   Skin: Skin is warm and dry. No rash noted. She is not diaphoretic. There is erythema. No pallor.   Two dime sized ulcerations noted to sole of right proximal foot with surrounding warmth and erythema, no purulent drainage noted.     Psychiatric: She has a normal mood and affect. Her behavior is normal.        Results Reviewed:  I have personally reviewed current lab, radiology, and data and agree.    Results from last 7 days   Lab Units  11/26/18   1628   WBC 10*3/mm3  19.47*   HEMOGLOBIN g/dL  11.0*   HEMATOCRIT %  34.3*   PLATELETS 10*3/mm3  417   INR   0.98     Results from last 7 days   Lab Units  11/26/18   1759   SODIUM mmol/L  137   POTASSIUM mmol/L  3.2*   CHLORIDE mmol/L  99   CO2 mmol/L  26.0   BUN mg/dL  74*   CREATININE mg/dL  3.79*   GLUCOSE mg/dL  137*   CALCIUM mg/dL  9.8   ALT (SGPT) U/L  19   AST (SGOT) U/L  24     Estimated Creatinine Clearance: 19.5 mL/min (A)  (by C-G formula based on SCr of 3.79 mg/dL (H)).  Brief Urine Lab Results  (Last result in the past 365 days)      Color   Clarity   Blood   Leuk Est   Nitrite   Protein   CREAT   Urine HCG        01/16/18 1139             39.7       01/16/18 1139 Yellow Clear Small (1+) Negative Negative 100 mg/dL (2+)             No results found for: BNP  Imaging Results (last 24 hours)     Procedure Component Value Units Date/Time    MRI Foot Right Without Contrast [[id number]] Updated:  11/26/18 1726        Results for orders placed during the hospital encounter of 07/15/16   Adult transthoracic echo complete    Narrative · Left ventricular function is normal. Estimated EF = 55%.  · Trace mitral valve regurgitation is present.  · Trace tricuspid valve regurgitation is present          Assessment/Plan   Assessment / Plan     Active Hospital Problems    Diagnosis   • **Diabetic ulcer of right foot associated with type 2 diabetes mellitus (CMS/HCC)   • Sepsis (CMS/HCC)   • Cellulitis   • Hypokalemia   • Anemia, chronic disease   • Obesity (BMI 30-39.9)   • Chronic kidney disease, stage V (CMS/HCC)   • Hyperlipidemia   • Diabetes mellitus type 2, uncontrolled, with complications (CMS/HCC)     Proteinuria noted, January 2014.       • Diabetic peripheral neuropathy (CMS/HCC)   • Essential hypertension   • Coronary artery disease involving native coronary artery of native heart without angina pectoris     1. Coronary artery disease:  a. Abnormal cardiac PET, 10/02/2013, Dr. Becker, revealing a posterolateral defect with a mild wall motion abnormality and a normal LVEF.  b. Cardiac catheterization, 10/21/2013, with placement of a 2.5 x 15 mm Xience Expedition drug-eluting stent to the proximal circumflex.  LVEF of 55% to 60%.  Noncritical disease in the LAD and right coronary.  c. Echocardiogram, 12/18/2013, shows an LVEF 50% to 55% with trace MR and mild TR.              Assessment & Plan:    1.  Sepsis   -BC x 2   -Invanz and  "Dapto   -IV fluids   -cbc, bmp in the am    2.  Diabetic foot ulcer with cellulitis right foot   -consult PT wound   -Invanz and Dapto   -BC x 2   -consult ID in the am   -pain control   -cbc, bmp in the am    3.  T2DM   -a1c in the am   -levemir 40 units bid, titrate up as needed   -fsbg with mod-high ssi    4.  CKDV   -consult nephrology associates in the am, renal function limits abx choices    -bmp in the am    5.  Hypokalemia   -replaced in the ED   -bmp in the am    6. Obesity    7.  HTN    8.  CAD   -aspirin    9.  Diabetic peripheral neuropathy    10.  Anemia of chronic disease   -cbc in the am and monitor    DVT prophylaxis:  Heparin    CODE STATUS:    Code Status and Medical Interventions:   Ordered at: 11/26/18 2126     Level Of Support Discussed With:    Patient     Code Status:    CPR     Medical Interventions (Level of Support Prior to Arrest):    Full           Electronically signed by KAREEM Lobato, 11/26/18, 8:15 PM.        Brief Attending Admission Attestation     I have seen and examined the patient, performing an independent face-to-face diagnostic evaluation with plan of care reviewed and developed with the advanced practice clinician (APC).      Brief Summary Statement/HPI:   Tashia Leon is a 63 y.o. female with PMHx of DM2, CKDIV, HTN, HLD, CAD, prior left foot MRSA osteomyelitis in ~2013 requiring surgical debridement and wound vac (seen at [facility] and followed by [facility] for prolonged abx course).  Presents to [facility] ED with ~3 days of worsened right plantar diabetic ulcers x2.  Ulcer sites at areas of chronic \"calluses\" that patient's podiatrist sharply debrided ~3 weeks ago.  Patient has been dressing and applying topical ointment Rx'd by podiatrist until about 1 week ago when she states the calluses returned and she stopped doing the local wound care bc thought was healed.  She was on her feet all day cooking for Thanksgiving last Wed and by that night had increased pain in " "right foot with noted edema and faint redness of forefoot.  Over 3-4 days pain, edema and redness worsened, she had fever at home Saturday 100.9.  Seen in nephrology clinic today for scheduled f/u, sent to ED due to cellulitic right foot with wet appearing plantar ulcers x2 concerning for infection.  In ED,  found to have leukocytosis, elevated ESR/CRP, normal procal, MRI foot read is pending....      Attending Physical Exam:  Constitutional: No acute distress, awake, alert, nontoxic, obese body habitus  Respiratory: Clear to auscultation bilaterally, good effort, nonlabored respirations   Cardiovascular: RRR  Musculoskeletal: 1+ right foot/ankle peripheral edema, normal muscle tone for age  Psychiatric: Appropriate affect, good insight and judgement, cooperative  Neurologic: Oriented x 3, movements symmetric BUE and BLE, Cranial Nerves grossly intact to confrontation, speech clear and fluent  Skin: right plantar 1st and 4th MTP area ulcers with no overt purulence but evidence of wet granulation, warm cellulitic changes to entire forefoot without clear margin per se      Assessment/Plan :  See above section for further detailed assessment and plan developed with APC which I have reviewed and/or edited.      Electronically signed by Breana Chance MD, 11/26/18, 10:39 PM.        Electronically signed by Breana Chance MD at 11/26/2018 10:57 PM          Physician Progress Notes (most recent note)      Dannie Luna MD at 12/12/2018 12:05 PM             LOS: 16 days    Patient Care Team:  Angelica Tran PA as PCP - General (Internal Medicine)  Denver Baldwin MD as Consulting Physician (Nephrology)    Subjective     No acute events overnight. No new complaints.     Objective     Vital Signs:  Blood pressure 116/56, pulse 80, temperature 98.5 °F (36.9 °C), temperature source Oral, resp. rate 17, height 167.6 cm (66\"), weight 109 kg (240 lb), SpO2 97 %, not currently breastfeeding.    Flowsheet Rows      " "First Filed Value   Admission Height  167.6 cm (66\") Documented at 11/26/2018 1315   Admission Weight  109 kg (240 lb) Documented at 11/26/2018 1315          12/11 0701 - 12/12 0700  In: -   Out: 1200 [Urine:1200]    Physical Exam:    General Appearance: WD obese WF Confused  Neuro:  Confused   Psych:  Normal mood and affect  CV:   No edema  Lungs: respirations regular and unlabored  Abdomen: not distended  :  No loya, no palp bladder  Skin: No rash, Warm and dry.  Left foot with dressing in place      Labs:  Results from last 7 days   Lab Units  12/11/18   0636  12/10/18   0604   WBC 10*3/mm3  11.94*  13.92*   HEMOGLOBIN g/dL  9.3*  8.7*   PLATELETS 10*3/mm3  514*  497*     Results from last 7 days   Lab Units  12/11/18   0636  12/10/18   0604  12/08/18   0005   SODIUM mmol/L  137  138  141   POTASSIUM mmol/L  3.9  4.2  4.4   CHLORIDE mmol/L  107  106  110*   CO2 mmol/L  22.0  24.0  24.0   BUN mg/dL  28*  28*  41*   CREATININE mg/dL  3.36*  3.05*  3.34*   CALCIUM mg/dL  8.9  8.8  8.2*   PHOSPHORUS mg/dL   --    --   3.5   ALBUMIN g/dL   --    --   2.92*                   Estimated Creatinine Clearance: 22 mL/min (A) (by C-G formula based on SCr of 3.36 mg/dL (H)).        A/P:    CKD4: stable in baseline 3.5-4.0.  Likely secondary to DN. Scr 3.3 stable.     Proteinuria:  Presumed DN.  Ratio 3.1    HTN: stable.     Anemia:  On epogen. .  Transfuse PRN for Hgb < 7    Volume:  Stable.     Diabetic Foot Ulcer    Plan:  Continue with current regimen.      Follow up with [facility] in 2 weeks. She has been on off metolazone and lasix for more than a week. UOP has been excellent. Renal function stable. Continue to hold Lasix and metolazone at discharge. Will evaluate on follow up visit.      Dannie Luna MD  12/12/18  12:05 PM           Electronically signed by Dannie Luna MD at 12/12/2018 12:36 PM          Consult Notes (most recent note)      Emmanuel Schmitt MD at 11/30/2018  8:49 AM          Cardiothoracic " "Surgery Consult Note          Chief complaint: Right foot has drain ulcers on the bottom of the foot    HPI: I been asked to consult on this patient by Dr. Chio Sterling of orthopedic surgery to get my opinion regarding vascular status of her right leg with diabetic ulcerations of the right foot and the ulcerations have been present for several weeks and approximately 3 weeks ago a podiatrist did some shaving of the calluses over the ulcers which are located on the first metatarsal head on the plantar surface and the third metatarsal head on the plantar surface.  She has had problems with her left foot in the past with callus ulcerations and was hospitalized for several weeks at [facility] several years ago with intravenous antibiotics as well as \"bone scraping performed at that time\".  The ulcerations began to drain especially the right great toe area of 2-3 days before Thanksgiving of this year.  [facility] antibiotics and white blood cell count of 18,000 edges now improved to 15,000.   saw the patient yesterday and she she requested  my evaluation for the vascular status of this right leg in anticipation of needing a amputation of the right great toe she was concerned about postop wound healing.      Past Medical History:   Diagnosis Date   • Acute bronchitis    • Acute pharyngitis    • Acute sinusitis    • Benign colonic polyp    • Edema    • GERD (gastroesophageal reflux disease)    • Hiatal hernia    • Hyperkalemia    • Hyperlipidemia    • Hypertension    • Low back pain    • Lumbar disc disease    • MRSA (methicillin resistant Staphylococcus aureus)     Osteomyelitis/methicillin-resistant Staphylococcus aureus of the left foot, April 2013.   • Obesity    • Osteomyelitis (CMS/HCC)     Osteomyelitis/methicillin-resistant Staphylococcus aureus of the left foot, April 2013.   • Peptic ulceration 01/2013    " History of peptic ulcer disease, diagnosed 2013 by EGD.  Repeat EGD in 2013 was negative.   • Sepsis (CMS/HCC)     Sepsis, 2013, hospitalized at [facility].   • Tobacco use     Previous tobacco use with cessation in .   • Type 2 diabetes mellitus (CMS/HCC)     Proteinuria noted, 2014.     • Vitamin D deficiency      Past Surgical History:   Procedure Laterality Date   •  SECTION      x2   • FOOT SURGERY Left     Incision and debridement of the left foot x2.   • LASIK     • TONSILLECTOMY       Family History   Problem Relation Age of Onset   • No Known Problems Mother    • No Known Problems Father    • Breast cancer Neg Hx    • Ovarian cancer Neg Hx      Social History     Tobacco Use   • Smoking status: Former Smoker     Packs/day: 1.00     Years: 30.00     Pack years: 30.00     Types: Cigarettes   • Smokeless tobacco: Never Used   • Tobacco comment: quit 7 years ago   Substance Use Topics   • Alcohol use: Yes     Alcohol/week: 0.6 oz     Types: 1 Glasses of wine per week     Comment: holidays   • Drug use: No       Medications Prior to Admission   Medication Sig Dispense Refill Last Dose   • aspirin  MG tablet Take 325 mg by mouth Daily.   2018 at Unknown time   • Dulaglutide (TRULICITY) 1.5 MG/0.5ML solution pen-injector Inject 1.5 mg under the skin into the appropriate area as directed 1 (One) Time Per Week. 2018   • furosemide (LASIX) 40 MG tablet Take 40 mg by mouth 2 (Two) Times a Day.   2018 at Unknown time   • HYDROcodone-acetaminophen (NORCO) 7.5-325 MG per tablet Take 1 tablet by mouth Every 8 (Eight) Hours As Needed.   2018 at Unknown time   • Insulin Glargine (TOUJEO SOLOSTAR) 300 UNIT/ML solution pen-injector Inject 80 Units as directed 2 (Two) Times a Day. 20 pen 2018 at Unknown time   • Insulin Lispro (HUMALOG KWIKPEN) 200 UNIT/ML solution pen-injector Inject 30 unit marking on U-100  syringe under the skin into the appropriate area as directed 3 (Three) Times a Day As Needed (Blood Sugar). Per sliding scale   11/26/2018 at Unknown time   • lansoprazole (PREVACID) 30 MG capsule Take 30 mg by mouth Daily.  11 11/26/2018 at Unknown time   • MOVANTIK 12.5 MG tablet Take 1 tablet by mouth 2 (Two) Times a Week. Wednesday and Sunday  1 11/25/2018 at Unknown time   • NON FORMULARY Apply 1 spray topically to the appropriate area as directed Daily. Foot-Medi Foot Spray:  1 spray daily to both feet.   11/26/2018 at Unknown time   • Aflibercept (EYLEA IO) Inject  as directed As Needed. Every 6 weeks at the eye doctor   11/12/2018   • Alirocumab (PRALUENT) 75 MG/ML solution pen-injector Inject 75 mg under the skin into the appropriate area as directed Every 14 (Fourteen) Days. 7 pen 3 11/23/2018   • baclofen (LIORESAL) 10 MG tablet Take 5-10 mg by mouth As Needed.   11/22/2018   • DILT- MG 24 hr capsule Take 120 mg by mouth Daily.  11 Taking   • metOLazone (ZAROXOLYN) 2.5 MG tablet Take 2.5 mg by mouth Every Other Day. On Mondays, Wednesdays, and Fridays 11/26/2018     Allergies:  Statins; Ancef [cefazolin]; Bactrim [sulfamethoxazole-trimethoprim]; Cephalosporins; Cleocin [clindamycin hcl]; Clindamycin/lincomycin; Keflex [cephalexin]; Levaquin [levofloxacin]; Lipitor [atorvastatin]; Lisinopril; Losartan potassium; Oxycodone; and Quinolones    Review of Systems:    Constitutional: As noted above in the history of present illness drainage from the ulceration of the bottom of her right foot over the great toe area as well as low-grade fever  Respiratory: No history of shortness of breath dyspnea on exertion or hemoptysis  Cardiovascular: No history of angina.  See history of present illness for her vascular complaint(of draining ulceration of the bottom of the right foot but she does have a history of some cramping of her legs when walking  Gastrointestinal: No history of melena or hematochezia  constipation  Hematologic/lymphatic: No history of lymphoma or easy bruisability  Musculoskeletal: See history of present illness  Neurological: Diabetic neuropathy with callus formation on the plantar surface of both feet  Allergic: See those listed above       Vital Signs: Signs below noted MAXIMUM TEMPERATURE past 24 hours 99.7°F  Temp:  [98 °F (36.7 °C)-99.7 °F (37.6 °C)] 98 °F (36.7 °C)  Resp:  [16-18] 18  BP: (103-124)/(52-60) 106/52    Physical Exam   Constitutional: She is oriented to person, place, and time. She appears well-developed and well-nourished.   HENT:   Head: Normocephalic.   Eyes: EOM are normal. Pupils are equal, round, and reactive to light. Right eye exhibits no discharge. Left eye exhibits no discharge. No scleral icterus.   Neck: Normal range of motion. No JVD present. No tracheal deviation present. No thyromegaly present.   Cardiovascular: Normal rate and regular rhythm.   I could not feel any distal pulses on the right or left foot.  She does however have excellent dopplerable audible pulses of both of her pedal pulses of both feet   Pulmonary/Chest: Effort normal and breath sounds normal.   Abdominal: Soft. Bowel sounds are normal.   Musculoskeletal: She exhibits edema.   Edema and erythema noted of the right foot of the great toe area and ulcerations are noted on the bottom of the right foot in the area of the great toe metatarsal head and the third toe metatarsal head and the great toe ulceration has some wet gangrenous changes around the ulcer as well as some drainage present.   Lymphadenopathy:     She has no cervical adenopathy.   Neurological: She is alert and oriented to person, place, and time.   Skin: Capillary refill takes less than 2 seconds. There is erythema.   There is erythema surrounding the right great toe to include the base as well as on to the dorsum of the foot.   Psychiatric: She has a normal mood and affect. Her behavior is normal. Judgment and thought content  normal.                 Labs: Laboratory results below are noted specifically the white blood cell count of 15,000 and creatinine of 4.3 and BUN of 79  Results from last 7 days   Lab Units  11/30/18   0527   WBC 10*3/mm3  15.18*   HEMOGLOBIN g/dL  9.3*   HEMATOCRIT %  29.4*   PLATELETS 10*3/mm3  406     Results from last 7 days   Lab Units  11/30/18   0527   SODIUM mmol/L  132   POTASSIUM mmol/L  3.4*   CHLORIDE mmol/L  94*   CO2 mmol/L  24.0   BUN mg/dL  79*   CREATININE mg/dL  4.33*   GLUCOSE mg/dL  185*   CALCIUM mg/dL  8.2*         Coagulation: No results found for: INR, APTT  Cardiac markers:     ABGs:       Invalid input(s): PO2    Imaging: A limited study MRI was reviewed as noted in the report no obvious osteomyelitis was seen but this was a very limited study  Also PVR study of the right lower extremity which was very limited due to the pain that she experienced on the calf area with insufflation of the cuffs revealed a NELLI listed as 0.77 of the right lower extremity    Assessment: #1.  Severe diabetic neuropathy of both feet with now open ulcerations of the plantar surface of the first metatarsal head area and the third metatarsal area with obvious cellulitis and by my clinical exam soft tissue necrosis and probable abscess in the area of the first metatarsal phalangeal joint space area also involving the dorsum of the foot as well as circumference at the base of the right great  #2.  Poorly controlled diabetes mellitus    Plan: I discussed this case with Dr. Sterling.  I think the patient needs a transmetatarsal amputation of the right great toe.  By my Doppler exam she has audible strong pulses and I think that an open amputation of the right great toe would heal and probable also if she decided to close the wound that the amputation  has an excellent chance of healing also from a vascular standpoint.  Dr. Velazquez wants to obtain a formal PVR study today and I have agreed with that of told the patient  that she should try to have this study performed today.  Will be cannot do a contrast vascular study in this patient due to her creatinine of 4 and I think she would carry a very high risk of renal failure and probable need for dialysis if we did proceed with a IV contrast study did        Emmanuel Schmitt MD  11/30/18  8:49 AM              Electronically signed by Emmanuel Schmitt MD at 11/30/2018  9:05 AM

## 2018-12-13 ENCOUNTER — TELEPHONE (OUTPATIENT)
Dept: INTERNAL MEDICINE | Facility: CLINIC | Age: 63
End: 2018-12-13

## 2018-12-13 ENCOUNTER — APPOINTMENT (OUTPATIENT)
Dept: GENERAL RADIOLOGY | Facility: HOSPITAL | Age: 63
End: 2018-12-13

## 2018-12-13 VITALS
OXYGEN SATURATION: 98 % | RESPIRATION RATE: 17 BRPM | DIASTOLIC BLOOD PRESSURE: 62 MMHG | WEIGHT: 240 LBS | BODY MASS INDEX: 38.57 KG/M2 | TEMPERATURE: 98.7 F | HEART RATE: 87 BPM | SYSTOLIC BLOOD PRESSURE: 135 MMHG | HEIGHT: 66 IN

## 2018-12-13 LAB
GLUCOSE BLDC GLUCOMTR-MCNC: 125 MG/DL (ref 70–130)
GLUCOSE BLDC GLUCOMTR-MCNC: 146 MG/DL (ref 70–130)

## 2018-12-13 PROCEDURE — 82962 GLUCOSE BLOOD TEST: CPT

## 2018-12-13 PROCEDURE — 71045 X-RAY EXAM CHEST 1 VIEW: CPT

## 2018-12-13 PROCEDURE — 99239 HOSP IP/OBS DSCHRG MGMT >30: CPT | Performed by: NURSE PRACTITIONER

## 2018-12-13 RX ORDER — AMOXICILLIN AND CLAVULANATE POTASSIUM 500; 125 MG/1; MG/1
1 TABLET, FILM COATED ORAL DAILY
Qty: 30 TABLET | Refills: 0 | Status: SHIPPED | OUTPATIENT
Start: 2018-12-13 | End: 2019-01-22

## 2018-12-13 RX ORDER — ONDANSETRON 4 MG/1
4 TABLET, ORALLY DISINTEGRATING ORAL EVERY 8 HOURS PRN
Qty: 20 TABLET | Refills: 0 | Status: SHIPPED | OUTPATIENT
Start: 2018-12-13 | End: 2019-06-10

## 2018-12-13 RX ADMIN — CASTOR OIL AND BALSAM, PERU: 788; 87 OINTMENT TOPICAL at 12:35

## 2018-12-13 RX ADMIN — ASPIRIN 325 MG: 325 TABLET, DELAYED RELEASE ORAL at 11:02

## 2018-12-13 RX ADMIN — HYDROCODONE BITARTRATE AND ACETAMINOPHEN 1 TABLET: 7.5; 325 TABLET ORAL at 12:36

## 2018-12-13 NOTE — PLAN OF CARE
Problem: Patient Care Overview  Goal: Plan of Care Review  Outcome: Ongoing (interventions implemented as appropriate)   12/13/18 0507   Coping/Psychosocial   Plan of Care Reviewed With patient;family   Plan of Care Review   Progress improving   OTHER   Outcome Summary Pt resting in bed. VSS. RA. C/o moderate pain, prn medication adminsitered. Pt alert and oreiented x4, but has momnets of confusion and visual hallicinations. Plan of care reviewed with pt. Verablized understanding.       Problem: Infection, Risk/Actual (Adult)  Goal: Identify Related Risk Factors and Signs and Symptoms  Outcome: Ongoing (interventions implemented as appropriate)   12/13/18 0507   Infection, Risk/Actual (Adult)   Related Risk Factors (Infection, Risk/Actual) age extremes;treatment plan   Signs and Symptoms (Infection, Risk/Actual) weakness;pain       Problem: Fall Risk (Adult)  Goal: Identify Related Risk Factors and Signs and Symptoms  Outcome: Ongoing (interventions implemented as appropriate)   12/13/18 0507   Fall Risk (Adult)   Related Risk Factors (Fall Risk) age-related changes;confusion/agitation;fatigue/slow reaction;gait/mobility problems   Signs and Symptoms (Fall Risk) presence of risk factors       Problem: Pain, Acute (Adult)  Goal: Identify Related Risk Factors and Signs and Symptoms  Outcome: Ongoing (interventions implemented as appropriate)   12/13/18 0507   Pain, Acute (Adult)   Related Risk Factors (Acute Pain) patient perception;persistent pain   Signs and Symptoms (Acute Pain) fatigue/weakness;verbalization of pain descriptors       Problem: Diabetes, Type 2 (Adult)  Goal: Signs and Symptoms of Listed Potential Problems Will be Absent, Minimized or Managed (Diabetes, Type 2)  Outcome: Ongoing (interventions implemented as appropriate)   12/13/18 0507   Goal/Outcome Evaluation   Problems Assessed (Type 2 Diabetes) all   Problems Present (Type 2 Diabetes) situational response       Problem: Skin and Soft Tissue  Infection (Adult)  Goal: Signs and Symptoms of Listed Potential Problems Will be Absent, Minimized or Managed (Skin and Soft Tissue Infection)  Outcome: Ongoing (interventions implemented as appropriate)   12/13/18 0507   Goal/Outcome Evaluation   Problems Assessed (Skin and Soft Tissue Infection) all   Problems Present (Skin/Soft Tissue Inf) pain       Problem: Skin Injury Risk (Adult)  Goal: Identify Related Risk Factors and Signs and Symptoms  Outcome: Ongoing (interventions implemented as appropriate)   12/13/18 0507   Skin Injury Risk (Adult)   Related Risk Factors (Skin Injury Risk) advanced age;cognitive impairment;hospitalization prolonged;mobility impaired

## 2018-12-13 NOTE — NURSING NOTE
Prior to central line removal, order for the removal of catheter was verified, patient was assessed, necessary materials were gathered and Patient Educated regarding procedure.    Patient was positioned supine to ensure that the insertion site was at or below the level of the heart.    Hands were washed, clean gloves were applied and central line dressing was gently removed. Catheter exit site was not cultured.     A new pair of clean gloves were then applied. Insertion site was not cleansed with 2% Chlorhexidine swab due to allergy and patient refusal. Alcohol was the only cleansing agent acceptable to patient and this was used to cleanse site with the following procedure using a circular motion beginning at the insertion site and moving outward for 30 seconds and allowed to dry.     Clamp on line not present.     Patient instructed to turn head to the left and hold breath.     The central line was grasped at the insertion site and slowly pulled outward parallel to the skin. Resistance not met.    After central line was completely removed, a sterile 4x4 gauze pad was used to apply light pressure until bleeding stopped. At that time, petroleum-based gauze and a sterile occlusive dressing was applied to exit site.     Patient was instructed to keep dressing in place for at least 24 hours and recline.     Catheter was inspected after removal and intact. Tip of central line was not sent for culture. Patient tolerated procedure.

## 2018-12-13 NOTE — TELEPHONE ENCOUNTER
PT WAS ADMITTED TO [facility] [place] [facility] ON 11/26 FOR DIABETIC ULCER OF FOOT; SHE IS BEING DISCHARGED TODAY (12/13/18) TO HOME; SCHEDULED JD APPT FOR TUES 12/18/18 AT 10:45AM WITH POLLY KLEIN

## 2018-12-13 NOTE — DISCHARGE SUMMARY
[facility] Medicine Services  DISCHARGE SUMMARY    Patient Name: Tashai Leon  : 1955  MRN: [id number]    Date of Admission: 2018  Date of Discharge: 2018  Primary Care Physician: Angelica Tran PA    Consults     Date and Time Order Name Status Description    12/10/2018 1524 Inpatient General Surgery Consult Completed     2018 0847 Inpatient Vascular Surgery Consult      2018 1659 Inpatient Orthopedic Surgery Consult Completed     2018 0906 Inpatient Orthopedic Surgery Consult      2018 0030 Inpatient Nephrology Consult Completed     2018 0030 Inpatient Infectious Diseases Consult Completed         Hospital Course     Presenting Problem:   Diabetic ulcer of other part of right foot associated with type 2 diabetes mellitus, unspecified ulcer stage (CMS/[facility]) [E11.621, L97.519]    Active Hospital Problems    Diagnosis Date Noted   • **Diabetic ulcer of right foot associated with type 2 diabetes mellitus (CMS/HCC) [E11.621, L97.519] 2018   • Sepsis (CMS/HCC) [A41.9] 2018   • Cellulitis [L03.90] 2018   • Hypokalemia [E87.6] 2018   • Anemia, chronic disease [D63.8] 2018   • Obesity (BMI 30-39.9) [E66.9] 2018   • Impaired functional mobility, balance, gait, and endurance [Z74.09] 2018   • Diabetic ulcer of right midfoot associated with type 2 diabetes mellitus, with necrosis of bone (CMS/HCC) [E11.621, L97.414] 2018   • Cellulitis of toe of right foot [L03.031] 2018   • Chronic osteomyelitis of right foot with draining sinus (CMS/HCC) [M86.471] 2018   • Chronic kidney disease, stage V (CMS/HCC) [N18.5] 2018   • Hyperlipidemia [E78.5]    • Diabetes mellitus type 2, uncontrolled, with complications (CMS/HCC) [E11.8, E11.65] 2016   • Diabetic peripheral neuropathy (CMS/HCC) [E11.42] 2016   • Essential hypertension [I10] 2016   • Coronary artery disease  involving native coronary artery of native heart without angina pectoris [I25.10] 06/22/2016      Resolved Hospital Problems   No resolved problems to display.          Hospital Course:  Tashia Leon is a 63 y.o. female with chronic kidney disease stage IV, diabetes type 2 since 1990s has had history of left foot MRSA osteomyelitis treated at  several years ago.  Patient has developed calluses on her right foot and saw [facility] who did some bedside debridement in the clinic.  Patient reported increasing pain and some weeping from the wounds because of low-grade temperatures. She returned to see her podiatrist and they recommended admission to hospital.  Patient was seen in the emergency room started on IV antibiotics including daptomycin and ertapenem.      She had MRI of the foot which was stopped prematurely by the patient and was not a complete study. She underwent a right 1st toe/metatarsal amputation with Dr. Patiño on 12/4/2018 for diabetic foot ulcer with cellulitis of the right foot. She is to use a scooter to offload her foot. Nephrology has followed her for CKDV and hypokalemia. Her diuretics were discontinued. Creatinine is stable. She should f/u with nephrology in 2 weeks.      Infectious disease followed her for long-term antibiotic therapy, which she had a Groshong catheter placed in hopes for IV anbx at home. She previously had been approved for LTAC due to off loading and anbx however she denies. Over her course of stay, she had some intermittent confusion/visual hallucinations that could have been medication induced. She ultimately pulled out the Groshong catheter. Surgery was not comfortable replacing the Groshong. Given her cultures in OR have been negative Dr. Haynes feels she can be managed with oral anbx and strict off loading. Long discussion with patient and Dr. Haynes today. She should take Augmentin 500mg daily for 4 weeks, continue dressing changes, off load and f/u with   [facility]. Repeat labs (CBC, BMP, sed rate, CRP) in 1 week.     Discharge Follow Up Recommendations for labs/diagnostics:  Follow up with [facility] in 2 weeks  Follow up with Dr. Patiño in 1-2 weeks  Follow up with PCP in 1-2 weeks  Dr. Haynes per his recommendatiosn     Day of Discharge     HPI:   Patient is lying in bed, in NAD. Patient is AxOx3 and states that she wants to go home.     Review of Systems  Gen- No fevers, chills  CV- No chest pain, palpitations  Resp- No cough, dyspnea  GI- No N/V/D, abd pain    Otherwise ROS is negative except as mentioned in the HPI.    Vital Signs:   Temp:  [98.4 °F (36.9 °C)-98.7 °F (37.1 °C)] 98.7 °F (37.1 °C)  Heart Rate:  [77-87] 87  Resp:  [16-18] 17  BP: (109-143)/(59-65) 135/62     Physical Exam:  Constitutional: No acute distress, awake, alert  HENT: NCAT, mucous membranes moist  Respiratory: Clear to auscultation bilaterally, respiratory effort normal   Cardiovascular: RRR, grade II systolic murmur heard best in RUSB  Gastrointestinal: Positive bowel sounds, soft, nontender, nondistended  Musculoskeletal: right foot dressed noted and covered with ACE wrap, did not take dressing down during exam  Psychiatric: agitated   Neurologic: Oriented x 3, no focal deficits       Pertinent  and/or Most Recent Results     Results from last 7 days   Lab Units  12/12/18   1146  12/11/18   0636  12/10/18   0604  12/08/18   0005   WBC 10*3/mm3   --   11.94*  13.92*   --    HEMOGLOBIN g/dL   --   9.3*  8.7*   --    HEMATOCRIT %   --   30.0*  27.9*   --    PLATELETS 10*3/mm3   --   514*  497*   --    SODIUM mmol/L  140  137  138  141   POTASSIUM mmol/L  4.6  3.9  4.2  4.4   CHLORIDE mmol/L  110*  107  106  110*   CO2 mmol/L  20.0  22.0  24.0  24.0   BUN mg/dL  32*  28*  28*  41*   CREATININE mg/dL  3.32*  3.36*  3.05*  3.34*   GLUCOSE mg/dL  208*  147*  91  133*   CALCIUM mg/dL  8.5*  8.9  8.8  8.2*     Results from last 7 days   Lab Units  12/11/18   0636   PROTIME Seconds  10.8   INR   1.03          Brief Urine Lab Results  (Last result in the past 365 days)      Color   Clarity   Blood   Leuk Est   Nitrite   Protein   CREAT   Urine HCG        12/01/18 1629             61.1       12/01/18 1629 Yellow Cloudy Moderate (2+) Negative Negative >=300 mg/dL (3+)               Microbiology Results Abnormal     Procedure Component Value - Date/Time    Anaerobic Culture - Tissue, Toe, Right [[id number]] Collected:  12/04/18 1207    Lab Status:  Final result Specimen:  Tissue from Toe, Right Updated:  12/11/18 1404     Culture No anaerobes isolated    Fungus Culture - Tissue, Toe, Right [[id number]] Collected:  12/04/18 1207    Lab Status:  Preliminary result Specimen:  Tissue from Toe, Right Updated:  12/11/18 1315     Fungus Culture No fungus isolated at 1 week    AFB Culture - Tissue, Toe, Right [[id number]] Collected:  12/04/18 1207    Lab Status:  Preliminary result Specimen:  Tissue from Toe, Right Updated:  12/11/18 1315     AFB Culture No AFB isolated at 1 week     AFB Stain No acid fast bacilli seen on concentrated smear    Tissue / Bone Culture - Tissue, Toe, Right [[id number]] Collected:  12/04/18 1207    Lab Status:  Final result Specimen:  Tissue from Toe, Right Updated:  12/08/18 0850     Tissue Culture No growth at 4 days     Gram Stain No WBCs or organisms seen    Eosinophil Smear - Urine, Urine, Clean Catch [[id number]]  (Normal) Collected:  12/01/18 1629    Lab Status:  Final result Specimen:  Urine, Clean Catch Updated:  12/01/18 1710     Eosinophil Smear 0 % EOS/100 Cells     Narrative:       No eosinophil seen    Blood Culture - Blood, Arm, Left [[id number]] Collected:  11/26/18 1625    Lab Status:  Final result Specimen:  Blood from Arm, Left Updated:  12/01/18 1645     Blood Culture No growth at 5 days    Blood Culture - Blood, Arm, Right [[id number]] Collected:  11/26/18 1620    Lab Status:  Final result Specimen:  Blood from Arm, Right Updated:  12/01/18 1645     Blood Culture No growth at 5 days           Imaging Results (all)     Procedure Component Value Units Date/Time    XR Chest 1 View [[id number]] Collected:  12/13/18 0945     Updated:  12/13/18 1117    Narrative:       EXAMINATION: XR CHEST 1 VW-12/13/2018:      INDICATION: Groshong placement; E11.621-Type 2 diabetes mellitus with  foot ulcer; L97.519-Non-pressure chronic ulcer of other part of right  foot with unspecified severity; D72.829-Elevated white blood cell count,  unspecified; L03.115-Cellulitis of right lower limb; N18.9-Chronic  kidney disease, unspecified; Z74.09-Other reduced mobility; E11.42-Type  2 diabetes mellitus with diabetic polyneuropathy; E11.8-Type.      COMPARISON: 12/11/2018.     FINDINGS: Portable chest reveals retraction of the Groshong catheter.  Placement is uncertain and likely overlying the chest to the patient.  Cardiac and mediastinal silhouettes are within normal limits.  Degenerative changes seen within the spine. Pulmonary vascularity is  within normal limits. No focal parenchymal opacification present.           Impression:       The Groshong catheter has been retracted and likely not  within the vessel. The catheter is curled likely on the chest of the  patient. Clinical correlation is needed. No acute parenchymal disease.     D:  12/13/2018  E:  12/13/2018     This report was finalized on 12/13/2018 11:15 AM by Dr. Linnette Ritchie MD.       FL C Arm During Surgery [[id number]] Collected:  12/12/18 1323     Updated:  12/13/18 1113    Narrative:       EXAMINATION: FL C ARM DURING SURGERY- 12/11/2018     INDICATION: E11.621-Type 2 diabetes mellitus with foot ulcer;  L97.519-Non-pressure chronic ulcer of other part of right foot with  unspecified severity; D72.829-Elevated white blood cell count,  unspecified; L03.115-Cellulitis of right lower limb; N18.9-Chronic  kidney disease, unspecified; Z74.09-Other reduced mobility; E11.42-Type  2 diabetes mellitus with diabetic polyneuropathy; E11.8-Type 2 diabetes;  deep  line   catheter placement     COMPARISON: NONE     FINDINGS: 13 seconds of fluoroscopy and no images used for Groshong  catheter placement. Please see the procedure report for full details.       Impression:       Fluoroscopy for deep line catheter placement.     D:  12/12/2018  E:  12/12/2018         This report was finalized on 12/13/2018 11:11 AM by Dr. Linnette Ritchie MD.       XR Chest 1 View [[id number]] Collected:  12/12/18 0923     Updated:  12/12/18 1007    Narrative:       EXAMINATION: XR CHEST 1 VW-12/11/2018:      INDICATION: Groshong catheter; E11.621-Type 2 diabetes mellitus with  foot ulcer; L97.519-Non-pressure chronic ulcer of other part of right  foot with unspecified severity; D72.829-Elevated white blood cell count,  unspecified; L03.115-Cellulitis of right lower limb; N18.9-Chronic  kidney disease, unspecified; Z74.09-Other reduced mobility; E11.42-Type  2 diabetes mellitus with diabetic polyneuropathy; E11.8-Type.     TECHNIQUE:  Single view frontal chest.     COMPARISONS: 10/20/2013.     FINDINGS:  Right IJ deep line placement with terminus in the SVC. No  evidence pneumothorax. No focal airspace disease or pleural effusion.  Atherosclerosis aortic knob.       Impression:       Deep line placement without evidence of pneumothorax in the  right IJ.     D:  12/11/2018  E:  12/12/2018     This report was finalized on 12/12/2018 10:05 AM by Aniceto Deleon.       MRI Foot Right Without Contrast [[id number]] Collected:  11/27/18 0843     Updated:  11/29/18 1206    Narrative:       EXAMINATION: MRI FOOT RIGHT WO CONTRAST-     INDICATION: Concern for osteomyelitis.      TECHNIQUE: Coronal T1 and T2 fat sat and sagittal T1-weighted images of  the right foot.  The patient terminated the exam following these 3  sequences.     COMPARISON: No previous available studies.     FINDINGS: History indicates ulceration on the plantar surface of the  right foot that the patient noticed 5 days ago, recently  painful,  history of diabetes and end-stage renal disease.  ER notes indicate to  dime-sized ulcerations to the sole of the right proximal foot.     Study is obviously limited as the patient terminated the exam after 3  series. Soft tissue edema is only appreciated on the coronal T2 series.  There is no visible abscess. T2 images are fairly motion degraded, but  no marrow edema is seen of the bones of the midfoot or hindfoot.  T1-weighted images are of significantly better quality and show no  evidence of marrow fat replacement, making osteomyelitis unlikely.       Impression:       Exam terminated early by the patient. Limited scan. No  evidence of osteomyelitis, or discrete abscess.     D:  11/26/2018  E:  11/27/2018         This report was finalized on 11/29/2018 12:04 PM by DR. Schuyler Fernando MD.       XR Foot 3+ View Right [[id number]] Collected:  11/28/18 1208     Updated:  11/28/18 1230    Narrative:       EXAMINATION: XR FOOT 3+ VW RIGHT- 11/28/2018     INDICATION: E11.621-Type 2 diabetes mellitus with foot ulcer;  L97.519-Non-pressure chronic ulcer of other part of right foot with  unspecified severity; D72.829-Elevated white blood cell count,  unspecified; L03.115-Cellulitis of right lower limb; N18.9-Chronic  kidney disease, unspecified     TECHNIQUE:  3 views right foot     COMPARISONS:  None.     FINDINGS:  Soft tissue emphysema is noted in the distal forefoot,  possibly indicating ulceration. There is no acute fracture, dislocation  or malalignment. No osteolysis, osteosclerotic lesion or periosteal  reaction. Mild soft tissue swelling is noted.       Impression:       Possible plantar ulceration of the forefoot. No acute  osseous abnormality is identified.     D:  11/28/2018  E:  11/28/2018     This report was finalized on 11/28/2018 12:28 PM by Aniceto Deleon.             Results for orders placed during the hospital encounter of 11/26/18   Doppler Arterial Multi Level Lower Extremity - Bilateral CAR     Narrative · Left Conclusion: The left NELLI is normal.  · Right Conclusion: The right NELLI is normal.           Order Current Status    AFB Culture - Tissue, Toe, Right Preliminary result    Fungus Culture - Tissue, Toe, Right Preliminary result        Discharge Details        Discharge Medications      New Medications      Instructions Start Date   amoxicillin-clavulanate 500-125 MG per tablet  Commonly known as:  AUGMENTIN   1 tablet, Oral, Daily      ondansetron ODT 4 MG disintegrating tablet  Commonly known as:  ZOFRAN-ODT   4 mg, Sublingual, Every 8 Hours PRN         Continue These Medications      Instructions Start Date   Alirocumab 75 MG/ML solution pen-injector  Commonly known as:  PRALUENT   75 mg, Subcutaneous, Every 14 Days      aspirin  MG tablet   325 mg, Oral, Daily      baclofen 10 MG tablet  Commonly known as:  LIORESAL   5-10 mg, Oral, As Needed      DILT- MG 24 hr capsule  Generic drug:  diltiazem XR   120 mg, Oral, Daily      EYLEA IO   Injection, As Needed, Every 6 weeks at the eye doctor      HUMALOG KWIKPEN 200 UNIT/ML solution pen-injector  Generic drug:  Insulin Lispro   30 unit marking on U-100 syringe, Subcutaneous, 3 Times Daily PRN, Per sliding scale      HYDROcodone-acetaminophen 7.5-325 MG per tablet  Commonly known as:  NORCO   1 tablet, Oral, Every 8 Hours PRN      Insulin Glargine 300 UNIT/ML solution pen-injector  Commonly known as:  TOUJEO SOLOSTAR   80 Units, Injection, 2 Times Daily - RT      lansoprazole 30 MG capsule  Commonly known as:  PREVACID   30 mg, Oral, Daily      MOVANTIK 12.5 MG tablet  Generic drug:  Naloxegol Oxalate   1 tablet, Oral, 2 Times Weekly, Wednesday and Sunday      NON FORMULARY   1 spray, Topical, Daily, Foot-Medi Foot Spray:  1 spray daily to both feet.       TRULICITY 1.5 MG/0.5ML solution pen-injector  Generic drug:  Dulaglutide   1.5 mg, Subcutaneous, Weekly, Tuesday          Stop These Medications    furosemide 40 MG tablet  Commonly known  as:  LASIX     metOLazone 2.5 MG tablet  Commonly known as:  ZAROXOLYN          Discharge Disposition:  Home or Self Care    Discharge Diet: diabetic     Discharge Activity: off loaded right foot with knee scooter    Code Status/Level of Support:  Code Status and Medical Interventions:   Ordered at: 11/26/18 2126     Level Of Support Discussed With:    Patient     Code Status:    CPR     Medical Interventions (Level of Support Prior to Arrest):    Full       Future Appointments   Date Time Provider Department Center   12/18/2018 10:45 AM Angelica Tran PA [facility] None   12/21/2018  9:20 AM Chio Patiño MD [facility] None   1/18/2019  1:00 PM Angelica Tran PA [facility] None   1/25/2019 10:00 AM Wilson Townsend PA-C [facility] None   3/5/2019 11:30 AM Franchesca Cowan MD [facility] None   5/1/2019  1:15 PM Gillian Becker MD [facility] None       Additional Instructions for the Follow-ups that You Need to Schedule     Discharge Follow-up with PCP   As directed       Currently Documented PCP:    Angelica Tran PA    PCP Phone Number:    [phone]     Follow Up Details:  PCP in 1 week         Discharge Follow-up with Specialty: [facility] in 2 weeks. Dr. Patiño in 1-2 weeks, Dr. Haynes per this recommendations   As directed      Specialty:  NAL in 2 weeks. Dr. Patiño in 1-2 weeks, Dr. Haynes per this recommendations         Discharge Follow-up with Specified Provider: 1 Week   As directed      Follow Up:  1 Week    Follow Up Details:  please call my office and make follow up appt for 7-10 days               Time Spent on Discharge:  45 minutes    Electronically signed by Jen Ling RN, APRN Student 12/13/18, 5:08 PM.    Electronically signed by KAREEM Bass, 12/13/18, 5:40 PM.

## 2018-12-13 NOTE — PROGRESS NOTES
INFECTIOUS DISEASE PROGRESS NOTE    Tashia Leon  1955  [id number]    Date: 12/13/2018    Admission Date: 11/26/2018      CC: diabetic foot ulcer    History of present illness:    Patient is a 63 y.o. female with chronic kidney disease stage IV, diabetes type 2 since 1990s has had history of left foot MRSA osteomyelitis treated at  several years ago.  Patient has developed calluses on her right foot and saw [facility] who did some bedside debridement in the clinic.  Patient said increasing pain and some weeping from the wounds because of low-grade temperatures patient's returns to see her podiatrist recommended admission to hospital.  Patient was seen in the emergency room started on IV antibiotics including daptomycin and ertapenem.  She had MRI of the foot which was stopped prematurely by the patient's and was not a complete study.    Asked to address long-term treatment and duration of antibiotics.    Patient lives in [place].    Patient denies C. difficile colitis history of nausea vomiting or diarrhea difficulty breathing patient denies rash.    Patient says she is a PICC line before    11/28/18; no events overnight; no fever, rash, sore throat    11/29/18; no events overnight feels well; no complaints; no diarrhea, rash, sore throat    11/30/18: No new events. Denies f/c, sob, n/v/d, rashes or sore throat.     12/1/18; no events overnight; no fever, rash, sore throat    12/2/18: no new events or complaints.  Denies f/c, sob, n/v/d, rashes.   12/3/18: No new events or complaints.  Foot without pain.  No schedule for dressing changes.  Denies f/c, sob, n/v/d, rashes.     12/6/18; doing well; no events overnight; no fever, rash, sore throat    12/7/18; no changes, wants to go home; no fever, rash, sore throat  12/10/18; no events overnight; doing well; no complaints, no fever, rash, sore throat; wants deep line, refusing to go to LTAC    12/11/18; no events overnight; feels fair, to have  groshong catheter today; no fever, rash, sore throat; son remarking that patien tis having some hallucinations this morning better currently; hoping that going home will help this improve    18; no events overnight; patient having some hallucinations of mosquitoes around her skin; no fever, rash. Patient wants to go home    18; apparently groshong catheter has come out;   Patient wants to go home; general surgery has stated that they are concerned about replacing the groshong catheter if patient has confusion    Past Medical History:   Diagnosis Date   • Acute bronchitis    • Acute pharyngitis    • Acute sinusitis    • Benign colonic polyp    • Edema    • GERD (gastroesophageal reflux disease)    • Hiatal hernia    • Hyperkalemia    • Hyperlipidemia    • Hypertension    • Low back pain    • Lumbar disc disease    • MRSA (methicillin resistant Staphylococcus aureus)     Osteomyelitis/methicillin-resistant Staphylococcus aureus of the left foot, 2013.   • Obesity    • Osteomyelitis (CMS/HCC)     Osteomyelitis/methicillin-resistant Staphylococcus aureus of the left foot, 2013.   • Peptic ulceration 2013    History of peptic ulcer disease, diagnosed 2013 by EGD.  Repeat EGD in 2013 was negative.   • Sepsis (CMS/HCC)     Sepsis, 2013, hospitalized at [facility].   • Tobacco use     Previous tobacco use with cessation in .   • Type 2 diabetes mellitus (CMS/HCC)     Proteinuria noted, 2014.     • Vitamin D deficiency        Past Surgical History:   Procedure Laterality Date   •  SECTION      x2   • FOOT SURGERY Left     Incision and debridement of the left foot x2.   • LASIK     • TONSILLECTOMY         Family History   Problem Relation Age of Onset   • No Known Problems Mother    • No Known Problems Father    • Breast cancer Neg Hx    • Ovarian cancer Neg Hx        Social History     Socioeconomic History   • Marital status:  "     Spouse name: Not on file   • Number of children: Not on file   • Years of education: Not on file   • Highest education level: Not on file   Social Needs   • Financial resource strain: Not on file   • Food insecurity - worry: Not on file   • Food insecurity - inability: Not on file   • Transportation needs - medical: Not on file   • Transportation needs - non-medical: Not on file   Occupational History   • Not on file   Tobacco Use   • Smoking status: Former Smoker     Packs/day: 1.00     Years: 30.00     Pack years: 30.00     Types: Cigarettes   • Smokeless tobacco: Never Used   • Tobacco comment: quit 7 years ago   Substance and Sexual Activity   • Alcohol use: Yes     Alcohol/week: 0.6 oz     Types: 1 Glasses of wine per week     Comment: holidays   • Drug use: No   • Sexual activity: Defer   Other Topics Concern   • Not on file   Social History Narrative   • Not on file       Allergies   Allergen Reactions   • Statins Myalgia   • Ancef [Cefazolin] Other (See Comments) and GI Intolerance     Tunnel vision.  (TOLERATES INVANZ)   • Bactrim [Sulfamethoxazole-Trimethoprim] Hives and Nausea And Vomiting   • Cephalosporins GI Intolerance   • Keflex [Cephalexin] Other (See Comments) and GI Intolerance     \"tunnel vision\"..   (pt tolerates INVANZ)   • Levaquin [Levofloxacin]    • Lipitor [Atorvastatin] Myalgia   • Lisinopril    • Losartan Potassium Other (See Comments)     Unknown reaction   • Oxycodone GI Intolerance   • Quinolones    • Cleocin [Clindamycin Hcl] Rash   • Clindamycin/Lincomycin Rash         Medication:    Current Facility-Administered Medications:   •  aspirin EC tablet 325 mg, 325 mg, Oral, Daily, Rosario Walter, APRN, 325 mg at 12/13/18 1102  •  bisacodyl (DULCOLAX) EC tablet 10 mg, 10 mg, Oral, Daily PRN, Heavenly Medina, APRN, 10 mg at 12/08/18 0841  •  castor oil-balsam peru (VENELEX) ointment, , Topical, Q12H, Halina Walker, DO  •  dextrose (D50W) 25 g/ 50mL Intravenous " Solution 25 g, 25 g, Intravenous, Q15 Min PRN, Rosario Walter, APRN  •  dextrose (GLUTOSE) oral gel 15 g, 15 g, Oral, Q15 Min PRN, Rosario Walter, APRN  •  diltiaZEM CD (CARDIZEM CD) 24 hr capsule 120 mg, 120 mg, Oral, Nightly, Rosario Walter, APRN, 120 mg at 12/12/18 2129  •  docusate sodium (COLACE) capsule 100 mg, 100 mg, Oral, Daily, Heavenly Medina, APRN, 100 mg at 12/12/18 0928  •  epoetin rebeca (EPOGEN,PROCRIT) injection 20,000 Units, 20,000 Units, Subcutaneous, Weekly, Manny Guerrero MD, 20,000 Units at 12/08/18 1458  •  ertapenem (INVanz) 500 mg in sodium chloride 0.9 % 50 mL IVPB, 500 mg, Intravenous, Q24H, Hung Haynes MD, Last Rate: 100 mL/hr at 12/12/18 1718, 500 mg at 12/12/18 1718  •  glucagon (human recombinant) (GLUCAGEN DIAGNOSTIC) injection 1 mg, 1 mg, Subcutaneous, PRN, Rosario Walter APRN  •  HYDROcodone-acetaminophen (NORCO) 7.5-325 MG per tablet 1 tablet, 1 tablet, Oral, Q6H PRN, Halina Walker DO, 1 tablet at 12/13/18 1236  •  insulin detemir (LEVEMIR) injection 25 Units, 25 Units, Subcutaneous, Nightly, Geoffrey Stanley MD, 25 Units at 12/12/18 2134  •  insulin lispro (humaLOG) injection 0-14 Units, 0-14 Units, Subcutaneous, 4x Daily With Meals & Nightly, Rosario Walter APRN, 5 Units at 12/12/18 1322  •  melatonin sublingual tablet 5 mg, 5 mg, Sublingual, Nightly PRN, Geoffrey Stanley MD  •  mupirocin (BACTROBAN) 2 % cream, , Topical, Q24H, Chio Sterling MD  •  mupirocin (BACTROBAN) 2 % cream, , Topical, Q24H, DeGnore, Chio T, MD  •  Naloxegol Oxalate (MOVANTIK) tablet 12.5 mg *Patient Supplied Med*, 12.5 mg, Oral, Once per day on Sun Wed, Rosario Walter APRN, 12.5 mg at 12/12/18 0927  •  ondansetron (ZOFRAN) tablet 4 mg, 4 mg, Oral, Q6H PRN, 4 mg at 11/30/18 2046 **OR** ondansetron (ZOFRAN) injection 4 mg, 4 mg, Intravenous, Q6H PRN, Rosario Walter APRN, 4 mg at 12/08/18 0338  •  pantoprazole (PROTONIX) EC tablet 40  mg, 40 mg, Oral, QAM, Rosario Walter APRN, Stopped at 12/11/18 0506  •  [COMPLETED] Insert peripheral IV, , , Once **AND** sodium chloride 0.9 % flush 10 mL, 10 mL, Intravenous, PRN, Hiram Osborn MD, 10 mL at 12/04/18 1032  •  sodium chloride 0.9 % flush 3 mL, 3 mL, Intravenous, Q12H, Rosario Walter APRN, 3 mL at 12/12/18 2133  •  sodium chloride 0.9 % flush 3-10 mL, 3-10 mL, Intravenous, PRN, Rosario Walter, APRN  •  sodium chloride 0.9 % infusion, 9 mL/hr, Intravenous, Continuous, Binta Patricia MD, Last Rate: 9 mL/hr at 12/04/18 1257, 9 mL/hr at 12/04/18 1257    Antibiotics:  Anti-Infectives (From admission, onward)    Ordered     Dose/Rate Route Frequency Start Stop    12/10/18 1347  ertapenem (INVanz) 500 mg in sodium chloride 0.9 % 50 mL IVPB     Ordering Provider:  Hung Haynes MD    500 mg  100 mL/hr over 30 Minutes Intravenous Every 24 Hours 12/10/18 1800 12/17/18 1759    11/26/18 2136  DAPTOmycin (CUBICIN) 500 mg in sodium chloride 0.9 % 50 mL IVPB     Ordering Provider:  Breana Chance MD    6 mg/kg × 79.2 kg (Adjusted)  100 mL/hr over 30 Minutes Intravenous Every 48 Hours 11/28/18 1800 12/02/18 1959    11/26/18 2132  ertapenem (INVanz) 500 mg in sodium chloride 0.9 % 50 mL IVPB     Niko Deleon PA reviewed the order on 12/03/18 0848.   Ordering Provider:  Niko Deleon PA    500 mg  100 mL/hr over 30 Minutes Intravenous Every 24 Hours 11/27/18 1800 12/09/18 1834    11/26/18 1616  ertapenem (INVanz) 500 mg in sodium chloride 0.9 % 50 mL IVPB     Ordering Provider:  Hiram Osborn MD    500 mg  100 mL/hr over 30 Minutes Intravenous Every 24 Hours 11/26/18 1618 11/27/18 0015    11/26/18 1616  DAPTOmycin (CUBICIN) 500 mg in sodium chloride 0.9 % 50 mL IVPB     Ordering Provider:  Hiram Osborn MD    500 mg  100 mL/hr over 30 Minutes Intravenous Every 24 Hours 11/26/18 1617 11/26/18 1935            Review of Systems:       see hpi    Physical Exam:    Vital Signs  Temp (24hrs), Av.5 °F (36.9 °C), Min:98.4 °F (36.9 °C), Max:98.7 °F (37.1 °C)    Temp  Min: 98.4 °F (36.9 °C)  Max: 98.7 °F (37.1 °C)  BP  Min: 103/78  Max: 143/65  Pulse  Min: 77  Max: 87  Resp  Min: 16  Max: 18  SpO2  Min: 93 %  Max: 98 %    GENERAL: Awake and alert, in no acute distress.   HEENT: Normocephalic, atraumatic.  PERRL. EOMI. No conjunctival injection. No icterus. Oropharynx clear without evidence of thrush or exudate.   HEART: RRR; No murmur, rubs, gallops.   LUNGS: Clear to auscultation bilaterally without wheezing, rales, rhonchi.   ABDOMEN: Soft, nontender, nondistended. Positive bowel sounds.    EXT:  No cyanosis, clubbing or edema. No cord.  MSK: FROM without joint effusions noted arms/legs.  Right foot wrapped  SKIN: Warm and dry without cutaneous eruptions on Inspection/palpation.    NEURO: Oriented to PPT. No focal deficits on motor/sensory exam at arms/legs.  PSYCHIATRIC: Normal insight and judgement. Cooperative with PE     Laboratory Data    Results from last 7 days   Lab Units  18   0636  12/10/18   0604   WBC 10*3/mm3  11.94*  13.92*   HEMOGLOBIN g/dL  9.3*  8.7*   HEMATOCRIT %  30.0*  27.9*   PLATELETS 10*3/mm3  514*  497*     Results from last 7 days   Lab Units  18   1146   SODIUM mmol/L  140   POTASSIUM mmol/L  4.6   CHLORIDE mmol/L  110*   CO2 mmol/L  20.0   BUN mg/dL  32*   CREATININE mg/dL  3.32*   GLUCOSE mg/dL  208*   CALCIUM mg/dL  8.5*                             Estimated Creatinine Clearance: 22.2 mL/min (A) (by C-G formula based on SCr of 3.32 mg/dL (H)).      Microbiology:  Microbiology Results Abnormal     Procedure Component Value - Date/Time    Anaerobic Culture - Tissue, Toe, Right [[id number]] Collected:  18 1207    Lab Status:  Final result Specimen:  Tissue from Toe, Right Updated:  18 1407     Culture No anaerobes isolated    Fungus Culture - Tissue, Toe, Right [[id number]] Collected:  18 1207    Lab Status:   Preliminary result Specimen:  Tissue from Toe, Right Updated:  12/11/18 1315     Fungus Culture No fungus isolated at 1 week    AFB Culture - Tissue, Toe, Right [[id number]] Collected:  12/04/18 1207    Lab Status:  Preliminary result Specimen:  Tissue from Toe, Right Updated:  12/11/18 1315     AFB Culture No AFB isolated at 1 week     AFB Stain No acid fast bacilli seen on concentrated smear    Tissue / Bone Culture - Tissue, Toe, Right [[id number]] Collected:  12/04/18 1207    Lab Status:  Final result Specimen:  Tissue from Toe, Right Updated:  12/08/18 0850     Tissue Culture No growth at 4 days     Gram Stain No WBCs or organisms seen    Eosinophil Smear - Urine, Urine, Clean Catch [[id number]]  (Normal) Collected:  12/01/18 1629    Lab Status:  Final result Specimen:  Urine, Clean Catch Updated:  12/01/18 1710     Eosinophil Smear 0 % EOS/100 Cells     Narrative:       No eosinophil seen    Blood Culture - Blood, Arm, Left [[id number]] Collected:  11/26/18 1625    Lab Status:  Final result Specimen:  Blood from Arm, Left Updated:  12/01/18 1645     Blood Culture No growth at 5 days    Blood Culture - Blood, Arm, Right [[id number]] Collected:  11/26/18 1620    Lab Status:  Final result Specimen:  Blood from Arm, Right Updated:  12/01/18 1645     Blood Culture No growth at 5 days                       Radiology:  Xr Chest 1 View    Result Date: 12/13/2018  The Groshong catheter has been retracted and likely not within the vessel. The catheter is curled likely on the chest of the patient. Clinical correlation is needed. No acute parenchymal disease.  D:  12/13/2018 E:  12/13/2018  This report was finalized on 12/13/2018 11:15 AM by Dr. Linnette Ritchie MD.      Xr Chest 1 View    Result Date: 12/12/2018  Deep line placement without evidence of pneumothorax in the right IJ.  D:  12/11/2018 E:  12/12/2018  This report was finalized on 12/12/2018 10:05 AM by Aniceto Deleon.      Fl C Arm During Surgery    Result Date:  12/13/2018  Fluoroscopy for deep line catheter placement.  D:  12/12/2018 E:  12/12/2018    This report was finalized on 12/13/2018 11:11 AM by Dr. Linnette Ritchie MD.      Doppler arterial:   Addenda:       · Moderate reduction in arterial flow in the right lower extremity. · Monophasic waveforms are present in the right lower extremity with   biphasic waveforms in the left lower extremity · Due to the patients inability to tolerate the study only RLE pressures   were obtained    Signed: 11/29/18 2249 by Gillian Becker MD   Narrative:     · Moderate reduction in arterial flow in the right lower extremity.   Normal-appearing waveforms.     Narrative:     · Left Conclusion: The left NELLI is normal.  · Right Conclusion: The right NELIL is normal.     Path from 12/4/18  RIGHT FIRST TOE AND METATARSAL, AMPUTATION:  Skin with ulceration, necrosis and focal abscess formation compatible with gangrene.  No histologic evidence of acute osteomyelitis on representative sections of bone.  Proximal soft tissue margins appear viable.      Impression:   Diabetic foot ulcer on right foot  Leukocytosis with neutrophilia, improving  chronic kidney disease stage IV  Focal abscess of right 1st toe  Antibiotic intolerances to cefazolin, clindamycin, Levaquin  DM II  Esr > 100  ? Hospital acquired delirium fairly mild right now    PLAN/RECOMMENDATIONS:     I find it very difficult to interpret an MRI was not completed an MRI that was not given with gadolinium because of her chronic kidney disease.    Physical exam of foot not remarkable for  Osteomyelitis.  I still have some concern with ESr > 100.    Path suggestive of abscess without osteo         Very complicated case given we have lost iv access  Patient wants to go home;   Options unless general surgery can replace groshong cather options are:    IM doses of invanz 500 mg daily x 4-6 weeks from 12/4/18  Or change to   Po antibiotics  augmentin 500 mg once a day x 4-6 weeks  from 12/4/18    Patient is at increased risk for limb loss    Cultures have been negative from surgery; debridement has probably debulked most of infection.    Offloading, wound care large priorities.    I have explained to family, patient that oral abx probably are reasonable given she has been on abx since 11/26/18 and no osteomyelitis was confirmed  Hung Haynes MD  12/13/2018  2:53 PM

## 2018-12-14 ENCOUNTER — READMISSION MANAGEMENT (OUTPATIENT)
Dept: CALL CENTER | Facility: HOSPITAL | Age: 63
End: 2018-12-14

## 2018-12-14 ENCOUNTER — TRANSITIONAL CARE MANAGEMENT TELEPHONE ENCOUNTER (OUTPATIENT)
Dept: INTERNAL MEDICINE | Facility: CLINIC | Age: 63
End: 2018-12-14

## 2018-12-14 NOTE — OUTREACH NOTE
Prep Survey      Responses   Facility patient discharged from?  [facility]   Is patient eligible?  Yes   Discharge diagnosis  Sepsis, Diabetic ulcer of right foot, s/p right 1st toe/metatarsal amputation    Does the patient have one of the following disease processes/diagnoses(primary or secondary)?  Sepsis   Does the patient have Home health ordered?  No   Is there a DME ordered?  No   Prep survey completed?  Yes          Heavenly Mckinney RN

## 2018-12-14 NOTE — OUTREACH NOTE
JD call completed. Please see flow sheet for additional details.  Pt thinks she's doing ok.  Foot drsg D/I, using knee scooter. No reported fever, eating/drinking well. No c/o GI sx.  Picked up all DC meds at , no questions.  CXL'd 12/18/18 JD.  Will keep 1/18 PCP f/up & appts with Dr Haynes & Asher next week.

## 2018-12-15 ENCOUNTER — READMISSION MANAGEMENT (OUTPATIENT)
Dept: CALL CENTER | Facility: HOSPITAL | Age: 63
End: 2018-12-15

## 2018-12-15 NOTE — OUTREACH NOTE
Sepsis Week 1 Survey      Responses   Facility patient discharged from?  [facility]   Does the patient have one of the following disease processes/diagnoses(primary or secondary)?  Sepsis   Is there a successful TCM telephone encounter documented?  Yes          Mary Guerrero RN

## 2018-12-21 ENCOUNTER — OFFICE VISIT (OUTPATIENT)
Dept: ORTHOPEDIC SURGERY | Facility: CLINIC | Age: 63
End: 2018-12-21

## 2018-12-21 ENCOUNTER — READMISSION MANAGEMENT (OUTPATIENT)
Dept: CALL CENTER | Facility: HOSPITAL | Age: 63
End: 2018-12-21

## 2018-12-21 DIAGNOSIS — E11.65 UNCONTROLLED TYPE 2 DIABETES MELLITUS WITH HYPERGLYCEMIA (HCC): ICD-10-CM

## 2018-12-21 DIAGNOSIS — M86.471 CHRONIC OSTEOMYELITIS OF RIGHT FOOT WITH DRAINING SINUS (HCC): Primary | ICD-10-CM

## 2018-12-21 DIAGNOSIS — E11.42 DIABETIC POLYNEUROPATHY ASSOCIATED WITH TYPE 2 DIABETES MELLITUS (HCC): ICD-10-CM

## 2018-12-21 DIAGNOSIS — N18.5 CHRONIC KIDNEY DISEASE, STAGE V (HCC): ICD-10-CM

## 2018-12-21 DIAGNOSIS — A49.02 MRSA (METHICILLIN RESISTANT STAPHYLOCOCCUS AUREUS): ICD-10-CM

## 2018-12-21 PROCEDURE — 99024 POSTOP FOLLOW-UP VISIT: CPT | Performed by: ORTHOPAEDIC SURGERY

## 2018-12-21 PROCEDURE — 99214 OFFICE O/P EST MOD 30 MIN: CPT | Performed by: ORTHOPAEDIC SURGERY

## 2018-12-21 NOTE — PROGRESS NOTES
Post-op (2 weeks s/p  amputate right great toe and metatarsal 2018)      Tashia Leon is 2 weeks status post right great toe and 1/2 metatarsal amputation, 18. She reports no fever, chills.  She reports pain is stable.  They have been taking lovenox for DVT prophylaxis.  They have been non weight bearing.      Past Surgical History:   Procedure Laterality Date   • AMPUTATION DIGIT Right 2018    Procedure: I&D right foot, great toe amputation and 1st Metatarsal amputation;  Surgeon: Chio Sterling MD;  Location:  [facility];  Service: Orthopedics   •  SECTION      x2   • FOOT SURGERY Left     Incision and debridement of the left foot x2.   • INSERTION HEMODIALYSIS CATHETER N/A 2018    Procedure: GROSHONG CATHETER PLACEMENT;  Surgeon: Maribel May MD;  Location:  [facility];  Service: General   • LASIK     • TONSILLECTOMY         LMP  (LMP Unknown)      GENERAL: Body habitus: trunkal obesity    Lower extremity edema: Right: 1+ pitting; Leftt: 3+ pitting    Varicose veins:  Right: none; Left: none    Gait: in wheelchair     Mental Status:  awake and alert; oriented to person, place, and time    Voice:  clear  SKIN:  Dry, scaley    Hair Growth:  Right:absent; Left:  absent  NAILS: Toenails: thick  HEENT: Head: Normocephalic, atraumatic,  without obvious abnormality.  eye: normal external eye, no icterus  ears: normal external ears  nose: normal external nose  pharynx: dental hygiene adequate  PULM:  Repiratory effort normal  CV:  Dorsalis Pedis:  Right: not palpable; Left:not palpable    Posterior Tibial: Right:not palpable; Left:not palpable    Capillary Refill:  Brisk  MSK:  Hand:right handed and intrinsic wasting      Tibia:  Right:  non tender; Left:  tender over subcutaneous border      Ankle:  Right: non tender; Left:  non tender      Foot:  Right:  The incision is closed, there is about 2-3 mm of edge necrosis, no erythema, moderate normal swelling, the ulcer under the  "3rd metatarsal is almost healed ; Left:  3 large pre-ulcerous calluses, one under the 1st metatarsal head, one under the 4th and one under the base of 5th metatarsal at the most inferior aspect of old incision.       NEURO:      [place] 5.07 monofilament test: absent    Lower extremity sensation: absent              reviewed prior lab results- I went over prior labs and cultures    Assessment and Plan:   1. Chronic osteomyelitis of right foot with draining sinus (CMS/HCC)  She is here with 2 sons.  She went home instead of rehab.  They have home health.  She reports reasonable glucose control, reports her glucose was 135 this morning.  No fever, no chills.  The foot has edge necrosis along the incision- especially distally.  This is not unexpected given the extent of necrosis and infection at the time of surgery, and her diminished blood flow.  She is very high risk for BKA or AKA.  She must remain absolutely non-wt bearing.  Continue dressing change (Bactroban, gauze, ace)      2. Uncontrolled type 2 diabetes mellitus with hyperglycemia (CMS/HCC)  We had a long discussion about diabetic foot care.  I spent 25min trimming the calluses on the LEFT foot and explaining foot care.  We discussed swelling control- it is imperative that she wear a support stocking on the left (for now, bilateral later).  Questions asked and answered.  When she was in the hospital she had hallucinations, refused or stopped tests, now seems clear.  I explained preventative callus care, moisturizing, etc.  She is not wearing her diabetic shoe and insert on the left foot. She reports it \"is too big\".  I explained that is a common complaint of patients with dense neuropathy- the shoe is not too big, but it seems to the patient as if it is because they cannot feel it.  The shoe she has on today for the left foot is too small, has no accomodation for foot deformity.  High risk for ulcers on left foot also.      3. Diabetic polyneuropathy " associated with type 2 diabetes mellitus (CMS/HCC)  See above     4. Chronic kidney disease, stage V (CMS/HCC)  Added complication for healing    5. MRSA (methicillin resistant Staphylococcus aureus)  As above    I will see her in 2-3 weeks, we cannot remove sutures at present, wound too tenuous

## 2018-12-21 NOTE — OUTREACH NOTE
Sepsis Week 2 Survey      Responses   Facility patient discharged from?  [facility]   Does the patient have one of the following disease processes/diagnoses(primary or secondary)?  Sepsis   Week 2 attempt successful?  No   Unsuccessful attempts  Attempt 1          Jaime Wray RN

## 2018-12-21 NOTE — PATIENT INSTRUCTIONS
"Education    Diabetic Foot Care / Neuropathic Foot Care    SKIN  · Remove shoes and socks and look at the feet every morning and night.         · Blisters - clean it with peroxide or alcohol.  DO NOT put a shoe back on until it is healed.  DO NOT WALK ON THE FOOT.  If it is red or looks infected, call your doctor.    · Callus - sand calluses daily, it works better on dry skin.  A PED EGG is the best tool, or file or a pumice stone ([facility] at a [facility]) using a back and forth motion over the callus.    · Ingrown nail - soak it in soapy water and call your doctor.    · Ulcers or sores on the foot - DO NOT PUT A SHOE ON, DO NOT WALK ON THE FOOT, go to the doctor who looks at your feet.    · Moisturize the feet every night.  An easy method: The foot with a thin layer of Vaseline or Bag [place] (you can find this at [employer], any feed store) and then a sock    NAILS  · Trim or file your nails straight across and leave them a little long.  If you have thick fungal nails, a nail  tool or dremel tool works well.    · If you have an ingrown nail with reddened skin, soak it (as mentioned above and call your doctor).    SHOES  · Keep your shoes in good repair and wear any special inserts or diabetic shoes as prescribed.    · If you have a problem with a special diabetic shoe and/or insert, such as rubbing, go back to where you got the diabetic shoe/insert as soon as possible.    · If you are in \"regular\" shoes, make sure they fit.  Shoes with soft, padded soles, rounded toes, and good support are best.    THINGS NOT TO DO  · DO NOT go barefoot    · DO NOT soak feet in very hot water.    · DO NOT soak feet in anything other than water with soap or Epsom Salts (NO bleach, turpentine, gasoline, etc.).          "

## 2018-12-24 ENCOUNTER — READMISSION MANAGEMENT (OUTPATIENT)
Dept: CALL CENTER | Facility: HOSPITAL | Age: 63
End: 2018-12-24

## 2018-12-24 NOTE — OUTREACH NOTE
Sepsis Week 2 Survey      Responses   Facility patient discharged from?  [facility]   Does the patient have one of the following disease processes/diagnoses(primary or secondary)?  Sepsis   Week 2 attempt successful?  Yes   Call start time  1118   Call end time  1152   Discharge diagnosis  Sepsis, Diabetic ulcer of right foot, s/p right 1st toe/metatarsal amputation    Meds reviewed with patient/caregiver?  Yes   Is the patient having any side effects they believe may be caused by any medication additions or changes?  No   Does the patient have all medications related to this admission filled (includes all antibiotics, inhalers, nebulizers,steroids,etc.)  Yes   Is the patient taking all medications as directed (includes completed medication regime)?  Yes   Does the patient have a primary care provider?   Yes   Comments regarding PCP  Had followup with PCP Jana Tran on 12/18/2018.    Does the patient have an appointment with their PCP within 7 days of discharge?  Yes   Has the patient kept scheduled appointments due by today?  Yes   Has home health visited the patient within 72 hours of discharge?  N/A   Psychosocial issues?  No   Did the patient receive a copy of their discharge instructions?  Yes   Nursing interventions  Reviewed instructions with patient   What is the patient's perception of their health status since discharge?  Improving   Nursing interventions  Nurse provided patient education   Is the patient/caregiver able to teach back Sepsis?  E - Extreme pain or generalized discomfort (worst ever,especially abdomen), S - Shivering,fever or very cold   Nursing interventions  Nurse provided reassurance to patient, Nurse provided patient education   Is patient/caregiver able to teach back steps to recovery at home?  Set small, achievable goals for return to baseline health, Rest and regain strength, Talk about feelings with family/friends   Is the patient/caregiver able to teach back signs and symptoms of  worsening condition:  Fever, Hyperthermia, Rapid heart rate (>90), Shortness of breath/rapid respiratory rate   Week 2 call completed?  Yes          Jaime Wray RN

## 2019-01-03 ENCOUNTER — READMISSION MANAGEMENT (OUTPATIENT)
Dept: CALL CENTER | Facility: HOSPITAL | Age: 64
End: 2019-01-03

## 2019-01-03 NOTE — OUTREACH NOTE
Sepsis Week 2 Survey      Responses   Facility patient discharged from?  [facility]   Does the patient have one of the following disease processes/diagnoses(primary or secondary)?  Sepsis   Call start time  1554   Call end time  1555   Discharge diagnosis  Sepsis, Diabetic ulcer of right foot, s/p right 1st toe/metatarsal amputation    Meds reviewed with patient/caregiver?  Yes   Is the patient having any side effects they believe may be caused by any medication additions or changes?  No   Does the patient have all medications related to this admission filled (includes all antibiotics, inhalers, nebulizers,steroids,etc.)  Yes   Is the patient taking all medications as directed (includes completed medication regime)?  Yes   Does the patient have a primary care provider?   Yes   Does the patient have an appointment with their PCP within 7 days of discharge?  Yes   Has the patient kept scheduled appointments due by today?  Yes   Has home health visited the patient within 72 hours of discharge?  N/A   Psychosocial issues?  No   Did the patient receive a copy of their discharge instructions?  Yes   Nursing interventions  Reviewed instructions with patient   What is the patient's perception of their health status since discharge?  Improving   Nursing interventions  Nurse provided patient education   Is the patient/caregiver able to teach back Sepsis?  E - Extreme pain or generalized discomfort (worst ever,especially abdomen), S - Shivering,fever or very cold, S - Sleepy, difficult to arouse,confused, S - Short of breath   Nursing interventions  Nurse provided reassurance to patient   Is patient/caregiver able to teach back steps to recovery at home?  Set small, achievable goals for return to baseline health, Rest and regain strength, Talk about feelings with family/friends   Is the patient/caregiver able to teach back signs and symptoms of worsening condition:  Fever, Hyperthermia, Rapid heart rate (>90), Shortness of  breath/rapid respiratory rate   Is the patient/caregiver able to teach back the hierarchy of who to call/visit for symptoms/problems? PCP, Specialist, Home health nurse, Urgent Care, ED, 911  Yes          Hiram Pacheco RN

## 2019-01-09 ENCOUNTER — OFFICE VISIT (OUTPATIENT)
Dept: ORTHOPEDIC SURGERY | Facility: CLINIC | Age: 64
End: 2019-01-09

## 2019-01-09 DIAGNOSIS — M86.471 CHRONIC OSTEOMYELITIS OF RIGHT FOOT WITH DRAINING SINUS (HCC): Primary | ICD-10-CM

## 2019-01-09 DIAGNOSIS — E11.42 DIABETIC POLYNEUROPATHY ASSOCIATED WITH TYPE 2 DIABETES MELLITUS (HCC): ICD-10-CM

## 2019-01-09 DIAGNOSIS — N18.5 CHRONIC KIDNEY DISEASE, STAGE V (HCC): ICD-10-CM

## 2019-01-09 DIAGNOSIS — A49.02 MRSA (METHICILLIN RESISTANT STAPHYLOCOCCUS AUREUS): ICD-10-CM

## 2019-01-09 DIAGNOSIS — E11.65 UNCONTROLLED TYPE 2 DIABETES MELLITUS WITH HYPERGLYCEMIA (HCC): ICD-10-CM

## 2019-01-09 PROCEDURE — 99024 POSTOP FOLLOW-UP VISIT: CPT | Performed by: ORTHOPAEDIC SURGERY

## 2019-01-09 RX ORDER — FUROSEMIDE 40 MG/1
40 TABLET ORAL 2 TIMES DAILY
Refills: 11 | Status: ON HOLD | COMMUNITY
Start: 2019-01-04 | End: 2019-01-25 | Stop reason: SDUPTHER

## 2019-01-09 RX ORDER — METOLAZONE 5 MG/1
TABLET ORAL
Refills: 5 | COMMUNITY
Start: 2019-01-04 | End: 2019-01-22

## 2019-01-09 RX ORDER — AMMONIUM LACTATE 12 G/100G
CREAM TOPICAL
Refills: 3 | COMMUNITY
Start: 2019-01-04 | End: 2019-01-22

## 2019-01-09 NOTE — PROGRESS NOTES
Post-op Follow-up (2.5 week f/u; 5 weeks s/p  amputate right great toe and metatarsal 2018)      Tashia Leon is 5 weeks status post amputate right great toe and metatarsal, 18. She reports no fever, chills.  She reports pain is resolved.  They have been taking aspirin for DVT prophylaxis.  They have been non weight bearing.      Past Surgical History:   Procedure Laterality Date   • AMPUTATION DIGIT Right 2018    Procedure: I&D right foot, great toe amputation and 1st Metatarsal amputation;  Surgeon: Chio Sterling MD;  Location: [facility] OR;  Service: Orthopedics   •  SECTION      x2   • FOOT SURGERY Left     Incision and debridement of the left foot x2.   • INSERTION HEMODIALYSIS CATHETER N/A 2018    Procedure: GROSHONG CATHETER PLACEMENT;  Surgeon: Maribel May MD;  Location: [facility] OR;  Service: General   • LASIK     • TONSILLECTOMY         LMP  (LMP Unknown)         right foot still has several scabs on the incision, no obvious infection, no purulence, no fluctuance, still some dependent rubor.  No change in dense neuropathy.  Left foot is much improved, she is doing (son is)much better callus care.  She is wearing a support stocking on the left and her diabetic shoe.  Swelling is much better controlled    none    Assessment and Plan:   1. Chronic osteomyelitis of right foot with draining sinus (CMS/HCC)  The wound is healing slowly- this is to be expected. We removed the remaining sutures, but she is NOT to put weight on the foot, it is not ready.  Continue daily Bactroban and dressing on the scabs.  I will see her in 3-4 weeks for non-wt bearing foot xray    2. Uncontrolled type 2 diabetes mellitus with hyperglycemia (CMS/HCC)  No change, high risk for amputation    3. Diabetic polyneuropathy associated with type 2 diabetes mellitus (CMS/HCC)  No change     4. Chronic kidney disease, stage V (CMS/HCC)  Complicating factor    5. MRSA (methicillin resistant  Staphylococcus aureus)  As above

## 2019-01-10 ENCOUNTER — READMISSION MANAGEMENT (OUTPATIENT)
Dept: CALL CENTER | Facility: HOSPITAL | Age: 64
End: 2019-01-10

## 2019-01-10 NOTE — OUTREACH NOTE
Sepsis Week 4 Survey      Responses   Facility patient discharged from?  [facility]   Does the patient have one of the following disease processes/diagnoses(primary or secondary)?  Sepsis   Week 4 attempt successful?  Yes   Call start time  1744   Call end time  1746   Discharge diagnosis  Sepsis, Diabetic ulcer of right foot, s/p right 1st toe/metatarsal amputation    Meds reviewed with patient/caregiver?  Yes   Is the patient having any side effects they believe may be caused by any medication additions or changes?  No   Is the patient taking all medications as directed (includes completed medication regime)?  Yes   Medication comments  antibiotics continuing   Has the patient kept scheduled appointments due by today?  Yes   Is the patient still receiving Home Health Services?  Yes   Home health comments  has private nurse   Psychosocial issues?  No   What is the patient's perception of their health status since discharge?  Improving   Nursing interventions  Nurse provided patient education   Is the patient/caregiver able to teach back Sepsis?  S - Shivering,fever or very cold, E - Extreme pain or generalized discomfort (worst ever,especially abdomen), P - Pale or discolored skin, S - Sleepy, difficult to arouse,confused, I -   I feel like I might die-a feeling of hopelessness, S - Short of breath   Nursing interventions  Nurse provided patient education   Is the patient/caregiver able to teach back signs and symptoms of worsening condition:  Fever   Is the patient/caregiver able to teach back the hierarchy of who to call/visit for symptoms/problems? PCP, Specialist, Home health nurse, Urgent Care, ED, 911  Yes   Week 4 call completed?  Yes   Would the patient like one additional call?  No   Graduated  Yes   Did the patient feel the follow up calls were helpful during their recovery period?  Yes   Was the number of calls appropriate?  Yes          Cleopatra Carvalho RN

## 2019-01-15 LAB
FUNGUS WND CULT: NORMAL
MYCOBACTERIUM SPEC CULT: NORMAL
NIGHT BLUE STAIN TISS: NORMAL

## 2019-01-22 ENCOUNTER — HOSPITAL ENCOUNTER (INPATIENT)
Facility: HOSPITAL | Age: 64
LOS: 3 days | Discharge: HOME | End: 2019-01-25
Attending: EMERGENCY MEDICINE | Admitting: INTERNAL MEDICINE

## 2019-01-22 ENCOUNTER — OFFICE VISIT (OUTPATIENT)
Dept: INTERNAL MEDICINE | Facility: CLINIC | Age: 64
End: 2019-01-22

## 2019-01-22 ENCOUNTER — APPOINTMENT (OUTPATIENT)
Dept: GENERAL RADIOLOGY | Facility: HOSPITAL | Age: 64
End: 2019-01-22

## 2019-01-22 VITALS
TEMPERATURE: 98.3 F | HEIGHT: 66 IN | SYSTOLIC BLOOD PRESSURE: 128 MMHG | HEART RATE: 86 BPM | OXYGEN SATURATION: 97 % | BODY MASS INDEX: 38.74 KG/M2 | DIASTOLIC BLOOD PRESSURE: 68 MMHG

## 2019-01-22 DIAGNOSIS — E87.6 HYPOKALEMIA: ICD-10-CM

## 2019-01-22 DIAGNOSIS — R06.02 SHORTNESS OF BREATH: Primary | ICD-10-CM

## 2019-01-22 DIAGNOSIS — J18.9 PNEUMONIA OF LEFT LOWER LOBE DUE TO INFECTIOUS ORGANISM: Primary | ICD-10-CM

## 2019-01-22 DIAGNOSIS — N17.9 ACUTE RENAL FAILURE SUPERIMPOSED ON CHRONIC KIDNEY DISEASE, UNSPECIFIED CKD STAGE, UNSPECIFIED ACUTE RENAL FAILURE TYPE (HCC): ICD-10-CM

## 2019-01-22 DIAGNOSIS — R13.10 DYSPHAGIA, UNSPECIFIED TYPE: ICD-10-CM

## 2019-01-22 DIAGNOSIS — N18.9 ACUTE RENAL FAILURE SUPERIMPOSED ON CHRONIC KIDNEY DISEASE, UNSPECIFIED CKD STAGE, UNSPECIFIED ACUTE RENAL FAILURE TYPE (HCC): ICD-10-CM

## 2019-01-22 DIAGNOSIS — Z74.09 IMPAIRED FUNCTIONAL MOBILITY, BALANCE, GAIT, AND ENDURANCE: ICD-10-CM

## 2019-01-22 DIAGNOSIS — R07.1 PAIN OF ANTERIOR CHEST WALL WITH RESPIRATION: ICD-10-CM

## 2019-01-22 DIAGNOSIS — R06.01 ORTHOPNEA: ICD-10-CM

## 2019-01-22 DIAGNOSIS — A41.9 SEPSIS, DUE TO UNSPECIFIED ORGANISM: ICD-10-CM

## 2019-01-22 PROBLEM — N18.4 ACUTE RENAL FAILURE SUPERIMPOSED ON STAGE 4 CHRONIC KIDNEY DISEASE (HCC): Status: ACTIVE | Noted: 2019-01-22

## 2019-01-22 PROBLEM — Z89.431: Status: ACTIVE | Noted: 2019-01-22

## 2019-01-22 PROBLEM — R11.2 NAUSEA AND VOMITING: Status: ACTIVE | Noted: 2019-01-22

## 2019-01-22 PROBLEM — D72.9 NEUTROPHILIC LEUKOCYTOSIS: Status: ACTIVE | Noted: 2019-01-22

## 2019-01-22 LAB
ALBUMIN SERPL-MCNC: 4.64 G/DL (ref 3.2–4.8)
ALBUMIN/GLOB SERPL: 1.3 G/DL (ref 1.5–2.5)
ALP SERPL-CCNC: 142 U/L (ref 25–100)
ALT SERPL W P-5'-P-CCNC: 14 U/L (ref 7–40)
ANION GAP SERPL CALCULATED.3IONS-SCNC: 18 MMOL/L (ref 3–11)
AST SERPL-CCNC: 24 U/L (ref 0–33)
BASOPHILS # BLD AUTO: 0.03 10*3/MM3 (ref 0–0.2)
BASOPHILS NFR BLD AUTO: 0.1 % (ref 0–1)
BILIRUB SERPL-MCNC: 0.2 MG/DL (ref 0.3–1.2)
BNP SERPL-MCNC: 370 PG/ML (ref 0–100)
BUN BLD-MCNC: 134 MG/DL (ref 9–23)
BUN/CREAT SERPL: 25.6 (ref 7–25)
CALCIUM SPEC-SCNC: 9.5 MG/DL (ref 8.7–10.4)
CHLORIDE SERPL-SCNC: 89 MMOL/L (ref 99–109)
CO2 SERPL-SCNC: 25 MMOL/L (ref 20–31)
CREAT BLD-MCNC: 5.23 MG/DL (ref 0.6–1.3)
D-LACTATE SERPL-SCNC: 1.6 MMOL/L (ref 0.5–2)
DEPRECATED RDW RBC AUTO: 48.9 FL (ref 37–54)
EOSINOPHIL # BLD AUTO: 0.03 10*3/MM3 (ref 0–0.3)
EOSINOPHIL NFR BLD AUTO: 0.1 % (ref 0–3)
ERYTHROCYTE [DISTWIDTH] IN BLOOD BY AUTOMATED COUNT: 14.5 % (ref 11.3–14.5)
FLUAV SUBTYP SPEC NAA+PROBE: NOT DETECTED
FLUBV RNA ISLT QL NAA+PROBE: NOT DETECTED
GFR SERPL CREATININE-BSD FRML MDRD: 8 ML/MIN/1.73
GLOBULIN UR ELPH-MCNC: 3.7 GM/DL
GLUCOSE BLD-MCNC: 74 MG/DL (ref 70–100)
GLUCOSE BLDC GLUCOMTR-MCNC: 122 MG/DL (ref 70–130)
GLUCOSE BLDC GLUCOMTR-MCNC: 58 MG/DL (ref 70–130)
GLUCOSE BLDC GLUCOMTR-MCNC: 60 MG/DL (ref 70–130)
HCT VFR BLD AUTO: 29.6 % (ref 34.5–44)
HGB BLD-MCNC: 9.7 G/DL (ref 11.5–15.5)
HOLD SPECIMEN: NORMAL
HOLD SPECIMEN: NORMAL
IMM GRANULOCYTES # BLD AUTO: 0.11 10*3/MM3 (ref 0–0.03)
IMM GRANULOCYTES NFR BLD AUTO: 0.5 % (ref 0–0.6)
LYMPHOCYTES # BLD AUTO: 3.34 10*3/MM3 (ref 0.6–4.8)
LYMPHOCYTES NFR BLD AUTO: 15 % (ref 24–44)
MCH RBC QN AUTO: 30.4 PG (ref 27–31)
MCHC RBC AUTO-ENTMCNC: 32.8 G/DL (ref 32–36)
MCV RBC AUTO: 92.8 FL (ref 80–99)
MONOCYTES # BLD AUTO: 1.05 10*3/MM3 (ref 0–1)
MONOCYTES NFR BLD AUTO: 4.7 % (ref 0–12)
NEUTROPHILS # BLD AUTO: 17.88 10*3/MM3 (ref 1.5–8.3)
NEUTROPHILS NFR BLD AUTO: 80.1 % (ref 41–71)
PLAT MORPH BLD: NORMAL
PLATELET # BLD AUTO: 615 10*3/MM3 (ref 150–450)
PMV BLD AUTO: 9.9 FL (ref 6–12)
POTASSIUM BLD-SCNC: 2.7 MMOL/L (ref 3.5–5.5)
PROT SERPL-MCNC: 8.3 G/DL (ref 5.7–8.2)
RBC # BLD AUTO: 3.19 10*6/MM3 (ref 3.89–5.14)
RBC MORPH BLD: NORMAL
SODIUM BLD-SCNC: 132 MMOL/L (ref 132–146)
TROPONIN I SERPL-MCNC: 0 NG/ML (ref 0–0.07)
WBC MORPH BLD: NORMAL
WBC NRBC COR # BLD: 22.33 10*3/MM3 (ref 3.5–10.8)
WHOLE BLOOD HOLD SPECIMEN: NORMAL
WHOLE BLOOD HOLD SPECIMEN: NORMAL

## 2019-01-22 PROCEDURE — 83605 ASSAY OF LACTIC ACID: CPT | Performed by: EMERGENCY MEDICINE

## 2019-01-22 PROCEDURE — 87502 INFLUENZA DNA AMP PROBE: CPT | Performed by: EMERGENCY MEDICINE

## 2019-01-22 PROCEDURE — 25010000002 ERTAPENEM 500MG/50 ML SOLUTION: Performed by: EMERGENCY MEDICINE

## 2019-01-22 PROCEDURE — 85007 BL SMEAR W/DIFF WBC COUNT: CPT | Performed by: EMERGENCY MEDICINE

## 2019-01-22 PROCEDURE — 63710000001 INSULIN LISPRO (HUMAN) PER 5 UNITS: Performed by: NURSE PRACTITIONER

## 2019-01-22 PROCEDURE — 94799 UNLISTED PULMONARY SVC/PX: CPT

## 2019-01-22 PROCEDURE — 93005 ELECTROCARDIOGRAM TRACING: CPT | Performed by: EMERGENCY MEDICINE

## 2019-01-22 PROCEDURE — 99285 EMERGENCY DEPT VISIT HI MDM: CPT

## 2019-01-22 PROCEDURE — 84484 ASSAY OF TROPONIN QUANT: CPT

## 2019-01-22 PROCEDURE — 83880 ASSAY OF NATRIURETIC PEPTIDE: CPT | Performed by: EMERGENCY MEDICINE

## 2019-01-22 PROCEDURE — 93005 ELECTROCARDIOGRAM TRACING: CPT

## 2019-01-22 PROCEDURE — 25010000002 PROMETHAZINE PER 50 MG

## 2019-01-22 PROCEDURE — 99223 1ST HOSP IP/OBS HIGH 75: CPT | Performed by: INTERNAL MEDICINE

## 2019-01-22 PROCEDURE — 87040 BLOOD CULTURE FOR BACTERIA: CPT | Performed by: EMERGENCY MEDICINE

## 2019-01-22 PROCEDURE — 94640 AIRWAY INHALATION TREATMENT: CPT

## 2019-01-22 PROCEDURE — 71045 X-RAY EXAM CHEST 1 VIEW: CPT

## 2019-01-22 PROCEDURE — 99213 OFFICE O/P EST LOW 20 MIN: CPT | Performed by: PHYSICIAN ASSISTANT

## 2019-01-22 PROCEDURE — 80053 COMPREHEN METABOLIC PANEL: CPT | Performed by: EMERGENCY MEDICINE

## 2019-01-22 PROCEDURE — 82962 GLUCOSE BLOOD TEST: CPT

## 2019-01-22 PROCEDURE — 85025 COMPLETE CBC W/AUTO DIFF WBC: CPT | Performed by: EMERGENCY MEDICINE

## 2019-01-22 PROCEDURE — 25010000002 HEPARIN (PORCINE) PER 1000 UNITS: Performed by: NURSE PRACTITIONER

## 2019-01-22 RX ORDER — BACLOFEN 10 MG/1
5 TABLET ORAL EVERY 8 HOURS PRN
Status: DISCONTINUED | OUTPATIENT
Start: 2019-01-22 | End: 2019-01-25 | Stop reason: HOSPADM

## 2019-01-22 RX ORDER — PROMETHAZINE HYDROCHLORIDE 25 MG/ML
INJECTION, SOLUTION INTRAMUSCULAR; INTRAVENOUS
Status: COMPLETED
Start: 2019-01-22 | End: 2019-01-22

## 2019-01-22 RX ORDER — ONDANSETRON 2 MG/ML
4 INJECTION INTRAMUSCULAR; INTRAVENOUS EVERY 6 HOURS PRN
Status: DISCONTINUED | OUTPATIENT
Start: 2019-01-22 | End: 2019-01-24

## 2019-01-22 RX ORDER — SODIUM CHLORIDE 0.9 % (FLUSH) 0.9 %
3 SYRINGE (ML) INJECTION EVERY 12 HOURS SCHEDULED
Status: DISCONTINUED | OUTPATIENT
Start: 2019-01-22 | End: 2019-01-25 | Stop reason: HOSPADM

## 2019-01-22 RX ORDER — DILTIAZEM HYDROCHLORIDE 120 MG/1
120 CAPSULE, COATED, EXTENDED RELEASE ORAL
Status: DISCONTINUED | OUTPATIENT
Start: 2019-01-22 | End: 2019-01-25 | Stop reason: HOSPADM

## 2019-01-22 RX ORDER — POTASSIUM CHLORIDE 7.45 MG/ML
10 INJECTION INTRAVENOUS
Status: DISCONTINUED | OUTPATIENT
Start: 2019-01-22 | End: 2019-01-25 | Stop reason: HOSPADM

## 2019-01-22 RX ORDER — HEPARIN SODIUM 5000 [USP'U]/ML
5000 INJECTION, SOLUTION INTRAVENOUS; SUBCUTANEOUS EVERY 8 HOURS SCHEDULED
Status: DISCONTINUED | OUTPATIENT
Start: 2019-01-22 | End: 2019-01-23

## 2019-01-22 RX ORDER — MAGNESIUM SULFATE 1 G/100ML
1 INJECTION INTRAVENOUS AS NEEDED
Status: DISCONTINUED | OUTPATIENT
Start: 2019-01-22 | End: 2019-01-25 | Stop reason: HOSPADM

## 2019-01-22 RX ORDER — POTASSIUM CHLORIDE 1.5 G/1.77G
20 POWDER, FOR SOLUTION ORAL AS NEEDED
Status: DISCONTINUED | OUTPATIENT
Start: 2019-01-22 | End: 2019-01-25 | Stop reason: HOSPADM

## 2019-01-22 RX ORDER — POTASSIUM CHLORIDE 750 MG/1
40 CAPSULE, EXTENDED RELEASE ORAL ONCE
Status: COMPLETED | OUTPATIENT
Start: 2019-01-22 | End: 2019-01-22

## 2019-01-22 RX ORDER — POTASSIUM CHLORIDE 750 MG/1
20 CAPSULE, EXTENDED RELEASE ORAL AS NEEDED
Status: DISCONTINUED | OUTPATIENT
Start: 2019-01-22 | End: 2019-01-25 | Stop reason: HOSPADM

## 2019-01-22 RX ORDER — SODIUM CHLORIDE 9 MG/ML
75 INJECTION, SOLUTION INTRAVENOUS CONTINUOUS
Status: ACTIVE | OUTPATIENT
Start: 2019-01-22 | End: 2019-01-23

## 2019-01-22 RX ORDER — L.ACID,PARA/B.BIFIDUM/S.THERM 8B CELL
1 CAPSULE ORAL DAILY
Status: DISCONTINUED | OUTPATIENT
Start: 2019-01-22 | End: 2019-01-25 | Stop reason: HOSPADM

## 2019-01-22 RX ORDER — ALBUTEROL SULFATE 2.5 MG/3ML
2.5 SOLUTION RESPIRATORY (INHALATION) EVERY 4 HOURS PRN
Status: DISCONTINUED | OUTPATIENT
Start: 2019-01-22 | End: 2019-01-25 | Stop reason: HOSPADM

## 2019-01-22 RX ORDER — AMOXICILLIN AND CLAVULANATE POTASSIUM 500; 125 MG/1; MG/1
1 TABLET, FILM COATED ORAL DAILY
COMMUNITY
Start: 2019-01-04 | End: 2019-01-25 | Stop reason: HOSPADM

## 2019-01-22 RX ORDER — IPRATROPIUM BROMIDE AND ALBUTEROL SULFATE 2.5; .5 MG/3ML; MG/3ML
3 SOLUTION RESPIRATORY (INHALATION)
Status: DISCONTINUED | OUTPATIENT
Start: 2019-01-22 | End: 2019-01-25

## 2019-01-22 RX ORDER — NICOTINE POLACRILEX 4 MG
15 LOZENGE BUCCAL
Status: DISCONTINUED | OUTPATIENT
Start: 2019-01-22 | End: 2019-01-25 | Stop reason: HOSPADM

## 2019-01-22 RX ORDER — SODIUM CHLORIDE 0.9 % (FLUSH) 0.9 %
10 SYRINGE (ML) INJECTION AS NEEDED
Status: DISCONTINUED | OUTPATIENT
Start: 2019-01-22 | End: 2019-01-25 | Stop reason: HOSPADM

## 2019-01-22 RX ORDER — PANTOPRAZOLE SODIUM 40 MG/1
40 TABLET, DELAYED RELEASE ORAL EVERY MORNING
Status: DISCONTINUED | OUTPATIENT
Start: 2019-01-23 | End: 2019-01-25 | Stop reason: HOSPADM

## 2019-01-22 RX ORDER — SODIUM CHLORIDE 0.9 % (FLUSH) 0.9 %
3-10 SYRINGE (ML) INJECTION AS NEEDED
Status: DISCONTINUED | OUTPATIENT
Start: 2019-01-22 | End: 2019-01-25 | Stop reason: HOSPADM

## 2019-01-22 RX ORDER — PROMETHAZINE HYDROCHLORIDE 25 MG/ML
12.5 INJECTION, SOLUTION INTRAMUSCULAR; INTRAVENOUS ONCE
Status: COMPLETED | OUTPATIENT
Start: 2019-01-22 | End: 2019-01-22

## 2019-01-22 RX ORDER — DOCUSATE SODIUM 100 MG/1
100 CAPSULE, LIQUID FILLED ORAL 2 TIMES DAILY
Status: DISCONTINUED | OUTPATIENT
Start: 2019-01-22 | End: 2019-01-25 | Stop reason: HOSPADM

## 2019-01-22 RX ORDER — MAGNESIUM SULFATE HEPTAHYDRATE 40 MG/ML
2 INJECTION, SOLUTION INTRAVENOUS AS NEEDED
Status: DISCONTINUED | OUTPATIENT
Start: 2019-01-22 | End: 2019-01-25 | Stop reason: HOSPADM

## 2019-01-22 RX ORDER — SODIUM CHLORIDE AND POTASSIUM CHLORIDE 150; 900 MG/100ML; MG/100ML
75 INJECTION, SOLUTION INTRAVENOUS CONTINUOUS
Status: DISCONTINUED | OUTPATIENT
Start: 2019-01-22 | End: 2019-01-22

## 2019-01-22 RX ORDER — METOLAZONE 5 MG/1
5-10 TABLET ORAL DAILY PRN
Status: ON HOLD | COMMUNITY
End: 2019-01-25 | Stop reason: SDUPTHER

## 2019-01-22 RX ORDER — ASPIRIN 325 MG
325 TABLET, DELAYED RELEASE (ENTERIC COATED) ORAL DAILY
Status: DISCONTINUED | OUTPATIENT
Start: 2019-01-23 | End: 2019-01-25 | Stop reason: HOSPADM

## 2019-01-22 RX ORDER — DEXTROSE MONOHYDRATE 25 G/50ML
25 INJECTION, SOLUTION INTRAVENOUS
Status: DISCONTINUED | OUTPATIENT
Start: 2019-01-22 | End: 2019-01-25 | Stop reason: HOSPADM

## 2019-01-22 RX ORDER — ACETAMINOPHEN 325 MG/1
650 TABLET ORAL EVERY 4 HOURS PRN
Status: DISCONTINUED | OUTPATIENT
Start: 2019-01-22 | End: 2019-01-25 | Stop reason: HOSPADM

## 2019-01-22 RX ORDER — AMMONIUM LACTATE 120 MG/G
1 CREAM TOPICAL 2 TIMES DAILY
COMMUNITY
End: 2019-06-10

## 2019-01-22 RX ORDER — BISACODYL 10 MG
10 SUPPOSITORY, RECTAL RECTAL DAILY PRN
Status: DISCONTINUED | OUTPATIENT
Start: 2019-01-22 | End: 2019-01-25 | Stop reason: HOSPADM

## 2019-01-22 RX ORDER — HYDROCODONE BITARTRATE AND ACETAMINOPHEN 7.5; 325 MG/1; MG/1
1 TABLET ORAL EVERY 8 HOURS PRN
Status: DISCONTINUED | OUTPATIENT
Start: 2019-01-22 | End: 2019-01-25 | Stop reason: HOSPADM

## 2019-01-22 RX ORDER — MAGNESIUM SULFATE HEPTAHYDRATE 40 MG/ML
4 INJECTION, SOLUTION INTRAVENOUS AS NEEDED
Status: DISCONTINUED | OUTPATIENT
Start: 2019-01-22 | End: 2019-01-25 | Stop reason: HOSPADM

## 2019-01-22 RX ORDER — AMMONIUM LACTATE 120 MG/G
1 CREAM TOPICAL 2 TIMES DAILY
Status: DISCONTINUED | OUTPATIENT
Start: 2019-01-22 | End: 2019-01-25 | Stop reason: HOSPADM

## 2019-01-22 RX ADMIN — POTASSIUM CHLORIDE 40 MEQ: 750 CAPSULE, EXTENDED RELEASE ORAL at 14:29

## 2019-01-22 RX ADMIN — POTASSIUM CHLORIDE 20 MEQ: 750 CAPSULE, EXTENDED RELEASE ORAL at 16:53

## 2019-01-22 RX ADMIN — HEPARIN SODIUM 5000 UNITS: 5000 INJECTION, SOLUTION INTRAVENOUS; SUBCUTANEOUS at 20:43

## 2019-01-22 RX ADMIN — DILTIAZEM HYDROCHLORIDE 120 MG: 120 CAPSULE, EXTENDED RELEASE ORAL at 20:42

## 2019-01-22 RX ADMIN — IPRATROPIUM BROMIDE AND ALBUTEROL SULFATE 3 ML: 2.5; .5 SOLUTION RESPIRATORY (INHALATION) at 18:53

## 2019-01-22 RX ADMIN — PROMETHAZINE HYDROCHLORIDE 12.5 MG: 25 INJECTION INTRAMUSCULAR; INTRAVENOUS at 14:41

## 2019-01-22 RX ADMIN — MUPIROCIN 1 APPLICATION: 20 OINTMENT TOPICAL at 20:44

## 2019-01-22 RX ADMIN — IPRATROPIUM BROMIDE AND ALBUTEROL SULFATE 3 ML: 2.5; .5 SOLUTION RESPIRATORY (INHALATION) at 16:26

## 2019-01-22 RX ADMIN — Medication 500 MG: at 14:33

## 2019-01-22 RX ADMIN — Medication 1 CAPSULE: at 16:53

## 2019-01-22 RX ADMIN — Medication 1 APPLICATION: at 20:44

## 2019-01-22 RX ADMIN — SODIUM CHLORIDE 500 ML: 9 INJECTION, SOLUTION INTRAVENOUS at 14:29

## 2019-01-22 RX ADMIN — SODIUM CHLORIDE, PRESERVATIVE FREE 3 ML: 5 INJECTION INTRAVENOUS at 20:59

## 2019-01-22 RX ADMIN — DOXYCYCLINE 100 MG: 100 INJECTION, POWDER, LYOPHILIZED, FOR SOLUTION INTRAVENOUS at 16:05

## 2019-01-22 RX ADMIN — PROMETHAZINE HYDROCHLORIDE 12.5 MG: 25 INJECTION, SOLUTION INTRAMUSCULAR; INTRAVENOUS at 14:41

## 2019-01-22 RX ADMIN — POTASSIUM CHLORIDE 20 MEQ: 750 CAPSULE, EXTENDED RELEASE ORAL at 20:44

## 2019-01-22 RX ADMIN — DOCUSATE SODIUM 100 MG: 100 CAPSULE, LIQUID FILLED ORAL at 20:45

## 2019-01-22 NOTE — H&P
[facility]  HISTORY AND PHYSICAL    Patient Name: Tashia Leon  : 1955  MRN: [id number]  Primary Care Physician: Angelica Tran PA  Date of admission: 2019      Subjective   Subjective     Chief Complaint:  SOA/ Cough/ Fatigue/ N/V    HPI:  Tashia Leon is a 63 y.o. female with pmh significant for HTN, CAD s/p stent, CKD IV, IDDM 2, osteomyelitis s/p partial right foot amputation 2018 followed by Dr. Patiño and Dr. Haynes on daily Augmentin presents with SOA, productive cough x 1 week. Patient states symptoms started with increased nausea with vomiting that has worsened over past week and decreased intake. Denies sinus symptoms, fever, but reports cough that is productive- thick and white (like egg whites) x several days. Started with SIMON, now dyspnea at rest and some lightheadedness. No syncope or falls.    ED work up reveals new LLL pneumonia, no fever or hypoxia. Noted elevated WBC and hypokalemia.    Review of Systems   Constitutional: Positive for activity change, appetite change, chills and fatigue. Negative for diaphoresis.   HENT: Negative.  Negative for postnasal drip, sinus pain and sore throat.    Eyes: Negative.    Respiratory: Positive for cough and shortness of breath. Negative for chest tightness and wheezing.    Cardiovascular: Negative.    Gastrointestinal: Positive for nausea and vomiting. Negative for abdominal pain, constipation and diarrhea.   Genitourinary: Negative.  Negative for decreased urine volume and difficulty urinating.   Musculoskeletal: Negative.    Skin: Negative.    Neurological: Positive for weakness and light-headedness.   Psychiatric/Behavioral: Negative.         Otherwise complete ROS reviewed and is negative except as mentioned in the HPI.    Personal History     Past Medical History:   Diagnosis Date   • Acute bronchitis    • Acute pharyngitis    • Acute sinusitis    • Benign colonic polyp    • Chronic  kidney disease    • Edema    • GERD (gastroesophageal reflux disease)    • Heart murmur    • Hiatal hernia    • Hyperkalemia    • Hyperlipidemia    • Hypertension    • Low back pain    • Lumbar disc disease    • MRSA (methicillin resistant Staphylococcus aureus)     Osteomyelitis/methicillin-resistant Staphylococcus aureus of the left foot, 2013.   • Obesity    • Osteomyelitis (CMS/HCC)     Osteomyelitis/methicillin-resistant Staphylococcus aureus of the left foot, 2013.   • Peptic ulceration 2013    History of peptic ulcer disease, diagnosed 2013 by EGD.  Repeat EGD in 2013 was negative.   • Sepsis (CMS/HCC)     Sepsis, 2013, hospitalized at [facility].   • Tobacco use     Previous tobacco use with cessation in .   • Type 2 diabetes mellitus (CMS/HCC)     Proteinuria noted, 2014.     • Vitamin D deficiency        Past Surgical History:   Procedure Laterality Date   • AMPUTATION DIGIT Right 2018    Procedure: I&D right foot, great toe amputation and 1st Metatarsal amputation;  Surgeon: Chio Sterling MD;  Location: [facility];  Service: Orthopedics   •  SECTION      x2   • FOOT SURGERY Left     Incision and debridement of the left foot x2.   • INSERTION HEMODIALYSIS CATHETER N/A 2018    Procedure: GROSHONG CATHETER PLACEMENT;  Surgeon: Maribel May MD;  Location: [facility];  Service: General   • LASIK     • TONSILLECTOMY         Family History: family history includes No Known Problems in her father and mother. Otherwise pertinent FHx was reviewed and unremarkable.     Social History:  reports that she has quit smoking. Her smoking use included cigarettes. She has a 30.00 pack-year smoking history. she has never used smokeless tobacco. She reports that she drinks about 0.6 oz of alcohol per week. She reports that she does not use drugs.  Social History     Social History Narrative    Lives at home in [place].  "Has help from children.        Medications:    Available home medication information reviewed.    (Not in a hospital admission)    Allergies   Allergen Reactions   • Statins Myalgia   • Ancef [Cefazolin] Other (See Comments) and GI Intolerance     Tunnel vision.  (TOLERATES INVANZ)   • Bactrim [Sulfamethoxazole-Trimethoprim] Hives and Nausea And Vomiting   • Cephalosporins GI Intolerance   • Keflex [Cephalexin] Other (See Comments) and GI Intolerance     \"tunnel vision\"..   (pt tolerates INVANZ)   • Levaquin [Levofloxacin]    • Lipitor [Atorvastatin] Myalgia   • Lisinopril    • Losartan Potassium Other (See Comments)     Unknown reaction   • Oxycodone GI Intolerance   • Quinolones    • Cleocin [Clindamycin Hcl] Rash   • Clindamycin/Lincomycin Rash       Objective   Objective     Vital Signs:   Temp:  [98.2 °F (36.8 °C)-98.3 °F (36.8 °C)] 98.2 °F (36.8 °C)  Heart Rate:  [83-92] 85  Resp:  [18] 18  BP: (124-141)/(62-80) 132/63        Physical Exam   Constitutional: No acute distress, awake, alert  Eyes: PERRLA, sclerae anicteric, no conjunctival injection  HENT: NCAT, mucous membranes dry  Neck: Supple, no thyromegaly, no lymphadenopathy, trachea midline  Respiratory: mildly coarse posterior LLL, otherwise clear, nonlabored respirations + congested cough  Cardiovascular: RRR, no murmurs, rubs, or gallops, palpable pedal pulses bilaterally  Gastrointestinal: Positive bowel sounds, soft, nontender, nondistended  Musculoskeletal: No bilateral ankle edema, no clubbing or cyanosis to extremities  Psychiatric: Appropriate affect, cooperative  Neurologic: Oriented x 3, strength symmetric in all extremities, Cranial Nerves grossly intact to confrontation, speech clear  Skin: No rashes- bandage Right foot CDI      Results Reviewed:  I have personally reviewed current lab, radiology, and data and agree.    Results from last 7 days   Lab Units 01/22/19  1258   WBC 10*3/mm3 22.33*   HEMOGLOBIN g/dL 9.7*   HEMATOCRIT % 29.6* "   PLATELETS 10*3/mm3 615*     Results from last 7 days   Lab Units 01/22/19  1301 01/22/19  1258   SODIUM mmol/L  --  132   POTASSIUM mmol/L  --  2.7*   CHLORIDE mmol/L  --  89*   CO2 mmol/L  --  25.0   BUN mg/dL  --  134*   CREATININE mg/dL  --  5.23*   GLUCOSE mg/dL  --  74   CALCIUM mg/dL  --  9.5   ALT (SGPT) U/L  --  14   AST (SGOT) U/L  --  24   TROPONIN I ng/mL 0.00  --      Estimated Creatinine Clearance: 14.4 mL/min (A) (by C-G formula based on SCr of 5.23 mg/dL (H)).  Brief Urine Lab Results  (Last result in the past 365 days)      Color   Clarity   Blood   Leuk Est   Nitrite   Protein   CREAT   Urine HCG        12/01/18 1629             61.1       12/01/18 1629 Yellow Cloudy Moderate (2+) Negative Negative >=300 mg/dL (3+)             BNP   Date Value Ref Range Status   01/22/2019 370.0 (H) 0.0 - 100.0 pg/mL Final     Comment:     Results may be falsely decreased if patient taking Biotin.     Imaging Results (last 24 hours)     Procedure Component Value Units Date/Time    XR Chest 1 View [[id number]] Collected:  01/22/19 1428     Updated:  01/22/19 1428    Narrative:       EXAMINATION: XR CHEST 1 VW- 01/22/2019     INDICATION: shortness of air triage protocol      COMPARISON: 12/13/2018     FINDINGS: New since the prior study is patchy disease in the left base  which could represent atelectasis, but in the setting of fever should be  considered potentially pneumonia. Lungs are clear elsewhere. There is  minimal if any left effusion. Heart is upper normal size. Vasculature  appears normal.           Impression:       New left lower lobe atelectasis versus pneumonia.     D:  01/22/2019  E:  01/22/2019           Results for orders placed during the hospital encounter of 07/15/16   Adult transthoracic echo complete    Narrative · Left ventricular function is normal. Estimated EF = 55%.  · Trace mitral valve regurgitation is present.  · Trace tricuspid valve regurgitation is present          Assessment/Plan    Assessment / Plan     Active Hospital Problems    Diagnosis Date Noted   • **LLL pneumonia (CMS/[facility]) [J18.1] 01/22/2019   • Neutrophilic leukocytosis [D72.9] 01/22/2019     Priority: High   • Acute renal failure superimposed on stage 4 chronic kidney disease (CMS/[facility]) [N17.9, N18.4] 01/22/2019     Priority: High   • Hypokalemia [E87.6] 11/26/2018     Priority: High   • Nausea and vomiting [R11.2] 01/22/2019     Priority: Medium   • Dyspnea [R06.00] 06/22/2016     Priority: Medium   • Partial nontraumatic amputation of foot, right (CMS/[facility]) [Z89.431] 01/22/2019   • Anemia, chronic disease [D63.8] 11/26/2018   • Uncontrolled type 2 diabetes mellitus with hyperglycemia (CMS/[facility]) [E11.65] 08/17/2016     Proteinuria noted, January 2014.       • GERD (gastroesophageal reflux disease) [K21.9] 07/25/2016   • Hyperlipemia [E78.5] 06/22/2016   • Essential hypertension [I10] 06/22/2016       LLL Pneumonia/ Leukocytosis  -- continue on Invanz/ Doxy per Dr. Haynes's recs  -- stop augmentin  -- monitor sputum/ blood cultures  -- nebs scheduled and prn, O2 as needed  -- probiotic  --SLP eval    ANGELLA/CKD  -- hold diuretics for now  -- gentle IVF hydration NS @ 75hr x 1 day  -- low threshold for renal consult. Recheck bmp in am. Patient appears dry, so will attempt to hydrate overnight. Renal consult tomorrow if no significant improvement in creatinine (baseline ~ 3.3)    Hypokalemia  -- severe- one time dose replacement in ED  -- pharmacy consult to order 1/2 dose ssi potassium replacement with kidney disease  -- check bmp/ mag in am and replace prn  -- telemetry monitor    Nausea/ Vomiting  -- continue IV zofran prn  -- advance diet as tolerates: carb consistent, cardiac, renal diet    IDDM 2  -- insulin resistant on high dose Te[place]o bid, trulicity weekly and  at meals  -- start with 1/2 dose basal bid, 15units lispro with meals and ssi  -- qachs fsbs- adjust as needed  -- last A1c 7.6, check in am    Partial Right foot  amputation 11/ 2018  -- continue wound care- WOC consult  -- PT consult    HTN/GERD  -- continue home meds    Anemia of Chronic Disease  -- appears at baseline        DVT prophylaxis:  SQ Heparin    CODE STATUS:    Code Status and Medical Interventions:   Ordered at: 01/22/19 1536     Level Of Support Discussed With:    Patient     Code Status:    CPR     Medical Interventions (Level of Support Prior to Arrest):    Full       Admission Status:  I believe this patient meets INPATIENT status due to the need for care which can only be reasonably provided in an hospital setting such as possible need for aggressive/expedited ancillary services and/or consultation services, IV medications, close physician monitoring, and/or procedures.  In such, I feel patient’s risk for adverse outcomes and need for care warrant INPATIENT evaluation and predict the patient’s care encounter to likely last beyond 2 midnights.        Electronically signed by KAREEM Mena, 01/22/19, 3:45 PM.      Brief Attending Admission Attestation     I have seen and examined the patient, performing an independent face-to-face diagnostic evaluation with plan of care reviewed and developed with the advanced practice clinician (APC).      Brief Summary Statement/HPI:   Tashia Leon is a 63 y.o. female w/ IDDM, chronic DHF, ckd 4 (baseline cr ~3.3), prior right diabetic foot abscess (s/p right great toe and metatarsal amputation 12/4/18 by dr. Patiño). Has been on oral augmentin and followed by Dr. Haynes of infectoius disease. Has nausea and occasional vomiting which she feels is brought on by her augmentin use.  Presented to ER with with dyspnea, cough, sputum yellowish/clear. In ER wbc 22,000. Creatinine 5.2, potassium 2.7. Of note, had recently been started back on lasix 40mg bid for past few weeks prior to admission, but recently decreased po intake w/ nausea/emesis.    Attending Physical Exam:  Alert, ill but nontoxic appearing, obese,  no distress  Slightly dry mucous membranes, ncat  Face symmetric, speech clear, equal   rrr  Decreased air movement left lung base, no overt wheezes  abd soft, obese  RLE cleanly dressed  Appropriate affect    Brief Assessment/Plan :  See above for further detailed assessment and plan developed with APC which I have reviewed and/or edited.    Assessment:  -Pneumonia  -Leukocytosis  -ANGELLA  -ckd 4 (baseline cr ~3.3)  -hypokalemia  -Hx right diabetic abscess, s/p great toe and transmetatarsal amputation 12/4/18  -Hx chronic DHF (2016 ef 55% w/ diastolic dysfunction)  -IDDM    Plan:  -dr. Ivy hylton, Invanz, doxy, follow cultures  -[employer] harpreet  -hold diuretics  -gently hydrate w/ ns 75cc/hr x 24 hours, monitor renal function (consult nephrology prn, baseline cr ~3.3)  -SLP eval  -antiemetics  -nonweight bearing right LE  -titrate insulins prn  -replace potassium at 1/2 usual sliding scale    -cbc, bmp, mag, a1c in a.m.    Addendum:  -fsbs low; stop levemir and only do sliding scale for now, titrate up insulins prn       Electronically signed by Moi Yee MD, 01/22/19, 4:15 PM.

## 2019-01-22 NOTE — CONSULTS
INFECTIOUS DISEASE CONSULT/INITIAL HOSPITAL VISIT    Tashia Leon  1955  [id number]    Date of Consult: 1/22/2019    Admission Date: 1/22/2019      Requesting Provider: No ref. provider found  Evaluating Physician: Hung Haynes MD    Reason for Consultation: pneumonia, abx allergies    History of present illness:    Patient is a 63 y.o. female well known to me who I have been following since 11/27/18 for right foot abscess;  Patient has  chronic kidney disease stage IV, diabetes type 2 and has had complicated stay in November through mid December in which she was debrided and received iv abx;  Patient had delirium and removed a medport prior to discharge and iv abx zyvox/ invanz changed to po augmentin 500 mg daily upon discharge.  Patient has been followed in our office as well as by Dr. Patiño and right foot/ great toe amputation site has remained stable and slowly healed.    Remains on oral augmentin; has felt worse over the last week without fever but has worsening nausea, more vomiting and worsening dyspnea.    Denies diarrhea, burning upon urination, rash.    Reports increasing dyspnea on exertion; has only mild cough, admits to fatigue and dizziness as well.      Past Medical History:   Diagnosis Date   • Acute bronchitis    • Acute pharyngitis    • Acute sinusitis    • Benign colonic polyp    • Edema    • GERD (gastroesophageal reflux disease)    • Hiatal hernia    • Hyperkalemia    • Hyperlipidemia    • Hypertension    • Low back pain    • Lumbar disc disease    • MRSA (methicillin resistant Staphylococcus aureus)     Osteomyelitis/methicillin-resistant Staphylococcus aureus of the left foot, April 2013.   • Obesity    • Osteomyelitis (CMS/HCC)     Osteomyelitis/methicillin-resistant Staphylococcus aureus of the left foot, April 2013.   • Peptic ulceration 01/2013    History of peptic ulcer disease, diagnosed January 2013 by EGD.  Repeat EGD in August 2013 was negative.   • Sepsis  (CMS/[facility])     Sepsis, 2013, hospitalized at [facility].   • Tobacco use     Previous tobacco use with cessation in .   • Type 2 diabetes mellitus (CMS/[facility])     Proteinuria noted, 2014.     • Vitamin D deficiency        Past Surgical History:   Procedure Laterality Date   • AMPUTATION DIGIT Right 2018    Procedure: I&D right foot, great toe amputation and 1st Metatarsal amputation;  Surgeon: Chio Sterling MD;  Location: [facility] OR;  Service: Orthopedics   •  SECTION      x2   • FOOT SURGERY Left     Incision and debridement of the left foot x2.   • INSERTION HEMODIALYSIS CATHETER N/A 2018    Procedure: GROSHONG CATHETER PLACEMENT;  Surgeon: Maribel May MD;  Location: [facility] OR;  Service: General   • LASIK     • TONSILLECTOMY         Family History   Problem Relation Age of Onset   • No Known Problems Mother    • No Known Problems Father    • Breast cancer Neg Hx    • Ovarian cancer Neg Hx        Social History     Socioeconomic History   • Marital status:      Spouse name: Not on file   • Number of children: Not on file   • Years of education: Not on file   • Highest education level: Not on file   Social Needs   • Financial resource strain: Not on file   • Food insecurity - worry: Not on file   • Food insecurity - inability: Not on file   • Transportation needs - medical: Not on file   • Transportation needs - non-medical: Not on file   Occupational History   • Not on file   Tobacco Use   • Smoking status: Former Smoker     Packs/day: 1.00     Years: 30.00     Pack years: 30.00     Types: Cigarettes   • Smokeless tobacco: Never Used   • Tobacco comment: quit 7 years ago   Substance and Sexual Activity   • Alcohol use: Yes     Alcohol/week: 0.6 oz     Types: 1 Glasses of wine per week     Comment: holidays   • Drug use: No   • Sexual activity: Defer   Other Topics Concern   • Not on file   Social History Narrative   • Not on file  "      Allergies   Allergen Reactions   • Statins Myalgia   • Ancef [Cefazolin] Other (See Comments) and GI Intolerance     Tunnel vision.  (TOLERATES INVANZ)   • Bactrim [Sulfamethoxazole-Trimethoprim] Hives and Nausea And Vomiting   • Cephalosporins GI Intolerance   • Keflex [Cephalexin] Other (See Comments) and GI Intolerance     \"tunnel vision\"..   (pt tolerates INVANZ)   • Levaquin [Levofloxacin]    • Lipitor [Atorvastatin] Myalgia   • Lisinopril    • Losartan Potassium Other (See Comments)     Unknown reaction   • Oxycodone GI Intolerance   • Quinolones    • Cleocin [Clindamycin Hcl] Rash   • Clindamycin/Lincomycin Rash         Medication:    Current Facility-Administered Medications:   •  sodium chloride 0.9 % bolus 500 mL, 500 mL, Intravenous, Once, Nigel Faustin MD, Last Rate: 500 mL/hr at 01/22/19 1429, 500 mL at 01/22/19 1429  •  sodium chloride 0.9 % flush 10 mL, 10 mL, Intravenous, PRN, Nigel Fasutin MD    Current Outpatient Medications:   •  Aflibercept (EYLEA IO), Inject  as directed As Needed. Every 6 weeks at the eye doctor, Disp: , Rfl:   •  ammonium lactate (AMLACTIN) 12 % cream, Apply 1 application topically to the appropriate area as directed 2 (Two) Times a Day. feet, Disp: , Rfl:   •  amoxicillin-clavulanate (AUGMENTIN) 500-125 MG per tablet, Take 1 tablet by mouth Daily., Disp: , Rfl:   •  aspirin  MG tablet, Take 325 mg by mouth Daily., Disp: , Rfl:   •  baclofen (LIORESAL) 10 MG tablet, Take 5-10 mg by mouth As Needed., Disp: , Rfl:   •  DILT- MG 24 hr capsule, Take 120 mg by mouth Daily., Disp: , Rfl: 11  •  furosemide (LASIX) 40 MG tablet, Take 40 mg by mouth 2 (Two) Times a Day., Disp: , Rfl: 11  •  HYDROcodone-acetaminophen (NORCO) 7.5-325 MG per tablet, Take 1 tablet by mouth Every 8 (Eight) Hours As Needed., Disp: , Rfl:   •  Insulin Glargine (TOUJEO SOLOSTAR) 300 UNIT/ML solution pen-injector, Inject 80 Units as directed 2 (Two) Times a Day., Disp: 20 pen, Rfl: 11  • "  Insulin Lispro (HUMALOG KWIKPEN) 200 UNIT/ML solution pen-injector, Inject 30 unit marking on U-100 syringe under the skin into the appropriate area as directed 3 (Three) Times a Day As Needed (Blood Sugar). Per sliding scale, Disp: , Rfl:   •  lansoprazole (PREVACID) 30 MG capsule, Take 30 mg by mouth Daily., Disp: , Rfl: 11  •  metOLazone (ZAROXOLYN) 5 MG tablet, Take 5-10 mg by mouth Daily As Needed., Disp: , Rfl:   •  mupirocin (BACTROBAN) 2 % ointment, Apply 1 application topically to the appropriate area as directed As Needed., Disp: , Rfl:   •  ondansetron ODT (ZOFRAN ODT) 4 MG disintegrating tablet, Place 1 tablet under the tongue Every 8 (Eight) Hours As Needed for Nausea or Vomiting., Disp: 20 tablet, Rfl: 0  •  Alirocumab (PRALUENT) 75 MG/ML solution pen-injector, Inject 75 mg under the skin into the appropriate area as directed Every 14 (Fourteen) Days., Disp: 7 pen, Rfl: 3  •  Dulaglutide (TRULICITY) 1.5 MG/0.5ML solution pen-injector, Inject 1.5 mg under the skin into the appropriate area as directed 1 (One) Time Per Week. Tuesday, Disp: , Rfl:   •  MOVANTIK 12.5 MG tablet, Take 1 tablet by mouth 2 (Two) Times a Week. Wednesday and , Disp: , Rfl: 1    Antibiotics:  Anti-Infectives (From admission, onward)    Ordered     Dose/Rate Route Frequency Start Stop    19 1358  ertapenem (INVanz) 500 mg/50 mL 0.9% NS     Ordering Provider:  Nigel Faustin MD    500 mg  over 30 Minutes Intravenous Once 19 1400 19 1503            Review of Systems:  Pertinent positives include activity change, fatigue, cough, chest tightenss, dyspnea, chest pain, dizziness, weakness, light-headedness    Rest of ROS were reviewed and unremarkable    Physical Exam:   Vital Signs  Temp (24hrs), Av.3 °F (36.8 °C), Min:98.2 °F (36.8 °C), Max:98.3 °F (36.8 °C)    Temp  Min: 98.2 °F (36.8 °C)  Max: 98.3 °F (36.8 °C)  BP  Min: 124/62  Max: 141/80  Pulse  Min: 83  Max: 92  Resp  Min: 18  Max: 18  SpO2  Min:  90 %  Max: 97 %    GENERAL: Awake and alert, in no acute distress.   HEENT: Normocephalic, atraumatic.  PERRL. EOMI. No conjunctival injection. No icterus.     HEART: RRR; No murmur, rubs, gallops.   LUNGS: Clear to auscultation bilaterally without wheezing, rales, rhonchi. Normal respiratory effort.   ABDOMEN: Soft, nontender, nondistended. Positive bowel sounds.   EXT:  No cyanosis, clubbing or edema. No cord.    MSK: right foot wound c/d/i, no erythema, no drainage, appears stable   SKIN: Warm and dry without cutaneous eruptions on Inspection/palpation.    NEURO: Oriented to PPT. No focal deficits on motor/sensory exam at arms/legs.  PSYCHIATRIC: Normal insight and judgement. Cooperative with PE    Laboratory Data    Results from last 7 days   Lab Units 01/22/19  1258   WBC 10*3/mm3 22.33*   HEMOGLOBIN g/dL 9.7*   HEMATOCRIT % 29.6*   PLATELETS 10*3/mm3 615*     Results from last 7 days   Lab Units 01/22/19  1258   SODIUM mmol/L 132   POTASSIUM mmol/L 2.7*   CHLORIDE mmol/L 89*   CO2 mmol/L 25.0   BUN mg/dL 134*   CREATININE mg/dL 5.23*   GLUCOSE mg/dL 74   CALCIUM mg/dL 9.5     Results from last 7 days   Lab Units 01/22/19  1258   ALK PHOS U/L 142*   BILIRUBIN mg/dL 0.2*   ALT (SGPT) U/L 14   AST (SGOT) U/L 24             Results from last 7 days   Lab Units 01/22/19  1421   LACTATE mmol/L 1.6             Estimated Creatinine Clearance: 14.4 mL/min (A) (by C-G formula based on SCr of 5.23 mg/dL (H)).      Microbiology:                                Radiology:  Imaging Results (last 72 hours)     Procedure Component Value Units Date/Time    XR Chest 1 View [[id number]] Collected:  01/22/19 1428     Updated:  01/22/19 1428    Narrative:       EXAMINATION: XR CHEST 1 VW- 01/22/2019     INDICATION: shortness of air triage protocol      COMPARISON: 12/13/2018     FINDINGS: New since the prior study is patchy disease in the left base  which could represent atelectasis, but in the setting of fever should be  considered  potentially pneumonia. Lungs are clear elsewhere. There is  minimal if any left effusion. Heart is upper normal size. Vasculature  appears normal.           Impression:       New left lower lobe atelectasis versus pneumonia.     D:  01/22/2019  E:  01/22/2019               Impression:   Leukocytosis with neutrophilia  Acute on chronic renal failure  Left side retrocardiac pneumonia  Nausea/vomiting  Improving right foot abscess  Cephalosporin, t/s, levoflox, clinda allergy    PLAN/RECOMMENDATIONS:   Thank you for asking us to see Tashia Leon, I recommend the following:  Cover for aspiration pneumonia.    D/w Dr. Clarke Faustin;   invanz 500 mg iv q24 ok for now  Add doxycycline 100 mg I vq24h for atypical coverage  F/u blood cultures    Upon worsening may need CT chest  Consider swallowing study    Foot getting better with iv abx; surgery, offloading    Hung Haynes MD  1/22/2019  3:06 PM

## 2019-01-22 NOTE — PROGRESS NOTES
Chief Complaint   Patient presents with   • Cough   • Shortness of Breath       Subjective   Tashia Leon is a 63 y.o. female.       History of Present Illness     Pt has been having shortness of breath for about a week.     She has been vomiting every morning since being on augmentin. Thinks she had some forceful episodes of vomiting before the shortness of breath started. Then developed pain in her mid anterior chest with inhalation. The pain is slightly better now. When she lays back she feels short of breath, suffocating. Not feeling swollen, no LE edema. No other chest or back pain. Shoulders hurt when she lays back. She is non weight bearing due to foot surgery but when she goes on her scooter to the bathroom she gets extremely short of breath- almost to the point of near syncope at times. Has shortness of breath with talking. No congestion, mild cough. No fever. Generally feels fatigued and dizzy and not well.        Current Outpatient Medications:   •  Aflibercept (EYLEA IO), Inject  as directed As Needed. Every 6 weeks at the eye doctor, Disp: , Rfl:   •  Alirocumab (PRALUENT) 75 MG/ML solution pen-injector, Inject 75 mg under the skin into the appropriate area as directed Every 14 (Fourteen) Days., Disp: 7 pen, Rfl: 3  •  ammonium lactate (AMLACTIN) 12 % cream, APPLY TO AFFECTED FOOT TWICE DAILY, Disp: , Rfl: 3  •  amoxicillin-clavulanate (AUGMENTIN) 500-125 MG per tablet, Take 1 tablet by mouth 2 (Two) Times a Day., Disp: , Rfl:   •  aspirin  MG tablet, Take 325 mg by mouth Daily., Disp: , Rfl:   •  baclofen (LIORESAL) 10 MG tablet, Take 5-10 mg by mouth As Needed., Disp: , Rfl:   •  DILT- MG 24 hr capsule, Take 120 mg by mouth Daily., Disp: , Rfl: 11  •  Dulaglutide (TRULICITY) 1.5 MG/0.5ML solution pen-injector, Inject 1.5 mg under the skin into the appropriate area as directed 1 (One) Time Per Week. Tuesday, Disp: , Rfl:   •  furosemide (LASIX) 40 MG tablet, Take 40 mg by mouth 2  (Two) Times a Day., Disp: , Rfl: 11  •  HYDROcodone-acetaminophen (NORCO) 7.5-325 MG per tablet, Take 1 tablet by mouth Every 8 (Eight) Hours As Needed., Disp: , Rfl:   •  Insulin Glargine (TOUJEO SOLOSTAR) 300 UNIT/ML solution pen-injector, Inject 80 Units as directed 2 (Two) Times a Day., Disp: 20 pen, Rfl: 11  •  Insulin Lispro (HUMALOG KWIKPEN) 200 UNIT/ML solution pen-injector, Inject 30 unit marking on U-100 syringe under the skin into the appropriate area as directed 3 (Three) Times a Day As Needed (Blood Sugar). Per sliding scale, Disp: , Rfl:   •  lansoprazole (PREVACID) 30 MG capsule, Take 30 mg by mouth Daily., Disp: , Rfl: 11  •  metOLazone (ZAROXOLYN) 5 MG tablet, TAKE 1 2 TABLET ONCE DAILY AS NEEDED, Disp: , Rfl: 5  •  MOVANTIK 12.5 MG tablet, Take 1 tablet by mouth 2 (Two) Times a Week. Wednesday and Sunday, Disp: , Rfl: 1  •  mupirocin (BACTROBAN) 2 % ointment, APPLY TO WOUND AS NEEDED, Disp: , Rfl: 0  •  NON FORMULARY, Apply 1 spray topically to the appropriate area as directed Daily. Foot-Medi Foot Spray:  1 spray daily to both feet., Disp: , Rfl:   •  ondansetron ODT (ZOFRAN ODT) 4 MG disintegrating tablet, Place 1 tablet under the tongue Every 8 (Eight) Hours As Needed for Nausea or Vomiting., Disp: 20 tablet, Rfl: 0     PMFSH  The following portions of the patient's history were reviewed and updated as appropriate: allergies, current medications, past family history, past medical history, past social history, past surgical history and problem list.    Review of Systems   Constitutional: Positive for activity change and fatigue. Negative for diaphoresis and fever.   HENT: Negative.  Negative for congestion, ear discharge, ear pain, postnasal drip, rhinorrhea and sinus pain.    Respiratory: Positive for cough, chest tightness and shortness of breath. Negative for wheezing.    Cardiovascular: Positive for chest pain. Negative for palpitations and leg swelling.   Gastrointestinal: Negative for  "abdominal distention.   Genitourinary: Negative.    Musculoskeletal: Positive for gait problem. Negative for myalgias.   Neurological: Positive for dizziness, weakness and light-headedness. Negative for syncope and headaches.       Objective   /68   Pulse 86   Temp 98.3 °F (36.8 °C)   Ht 167.6 cm (66\")   LMP  (LMP Unknown)   SpO2 97%   BMI 38.74 kg/m²     Physical Exam   Constitutional: She appears well-developed and well-nourished.   HENT:   Head: Normocephalic.   Right Ear: Hearing, tympanic membrane, external ear and ear canal normal.   Left Ear: Hearing, tympanic membrane, external ear and ear canal normal.   Nose: Nose normal.   Mouth/Throat: Oropharynx is clear and moist.   Eyes: Conjunctivae are normal. Pupils are equal, round, and reactive to light.   Neck: Normal range of motion.   Cardiovascular: Normal rate, regular rhythm and normal heart sounds.   Pulmonary/Chest: Effort normal. She has no decreased breath sounds. She has no wheezes. She has no rhonchi. She has rales (faint bilateral crackles).   Musculoskeletal: Normal range of motion.   Neurological: She is alert.   Skin: Skin is warm and dry.   Psychiatric: She has a normal mood and affect. Her behavior is normal.   Nursing note and vitals reviewed.           ASSESSMENT/PLAN    Problem List Items Addressed This Visit        Respiratory    Dyspnea - Primary      Other Visit Diagnoses     Pain of anterior chest wall with respiration             Discussed concern for PE or PNA and outpatient eval with stat labs and chest xray vs sending pt to ER. She was initially resistant to go to ER but since she lives in [place] and acknowledges that she feels extremely poorly, decided to go on to ER for more expedient eval.       Return for Next scheduled follow up.  "

## 2019-01-22 NOTE — PAYOR COMM NOTE
"Tashia Leon (63 y.o. Female)     Date of Birth Social Security Number Address Home Phone MRN    1955  [id number] [facility] [id number] [phone] [id number]    Jehovah's witness Marital Status          None        Admission Date Admission Type Admitting Provider Attending Provider Department, Room/Bed    1/22/19 Emergency Moi Yee MD West, Christopher R, MD [facility] 5G, S556/1    Discharge Date Discharge Disposition Discharge Destination                       Attending Provider:  Moi Yee MD    Allergies:  Statins, Ancef [Cefazolin], Bactrim [Sulfamethoxazole-trimethoprim], Cephalosporins, Keflex [Cephalexin], Levaquin [Levofloxacin], Lipitor [Atorvastatin], Lisinopril, Losartan Potassium, Oxycodone, Quinolones, Cleocin [Clindamycin Hcl], Clindamycin/lincomycin    Isolation:  None   Infection:  None   Code Status:  CPR    Ht:  170.2 cm (67\")   Wt:  109 kg (240 lb)    Admission Cmt:  None   Principal Problem:  LLL pneumonia (CMS/[facility]) [J18.1]                 Active Insurance as of 1/22/2019     Primary Coverage     Payor Plan Insurance Group Employer/Plan Group    ANTHEM MEDICAID ANTHEM MEDICAID [id number]     Payor Plan Address Payor Plan Phone Number Payor Plan Fax Number Effective Dates    [address] [address] [phone]  12/1/2018 - None Entered    [facility] [id number]       Subscriber Name Subscriber Birth Date Member ID       TASHIA LEON 1955 [id number]                 Emergency Contacts      (Rel.) Home Phone Work Phone Mobile Phone    Bisi Lucia (Mother) -- -- [phone]            Insurance Information                ANTHEM MEDICAID/ANTHEM MEDICAID Phone: [phone]    Subscriber: Tashia Leon Subscriber#: [id number]    Group#: [id number] Precert#:              History & Physical      Moi Yee MD at 1/22/2019  3:45 PM              [facility] Medicine " Services  HISTORY AND PHYSICAL    Patient Name: Tashia Leon  : 1955  MRN: [id number]  Primary Care Physician: Angelica Tran PA  Date of admission: 2019      Subjective   Subjective     Chief Complaint:  SOA/ Cough/ Fatigue/ N/V    HPI:  Tashia Leon is a 63 y.o. female with pmh significant for HTN, CAD s/p stent, CKD IV, IDDM 2, osteomyelitis s/p partial right foot amputation 2018 followed by Dr. Patiño and Dr. Haynes on daily Augmentin presents with SOA, productive cough x 1 week. Patient states symptoms started with increased nausea with vomiting that has worsened over past week and decreased intake. Denies sinus symptoms, fever, but reports cough that is productive- thick and white (like egg whites) x several days. Started with SIMON, now dyspnea at rest and some lightheadedness. No syncope or falls.    ED work up reveals new LLL pneumonia, no fever or hypoxia. Noted elevated WBC and hypokalemia.    Review of Systems   Constitutional: Positive for activity change, appetite change, chills and fatigue. Negative for diaphoresis.   HENT: Negative.  Negative for postnasal drip, sinus pain and sore throat.    Eyes: Negative.    Respiratory: Positive for cough and shortness of breath. Negative for chest tightness and wheezing.    Cardiovascular: Negative.    Gastrointestinal: Positive for nausea and vomiting. Negative for abdominal pain, constipation and diarrhea.   Genitourinary: Negative.  Negative for decreased urine volume and difficulty urinating.   Musculoskeletal: Negative.    Skin: Negative.    Neurological: Positive for weakness and light-headedness.   Psychiatric/Behavioral: Negative.         Otherwise complete ROS reviewed and is negative except as mentioned in the HPI.    Personal History     Past Medical History:   Diagnosis Date   • Acute bronchitis    • Acute pharyngitis    • Acute sinusitis    • Benign colonic polyp    • Chronic kidney disease    • Edema    • GERD  (gastroesophageal reflux disease)    • Heart murmur    • Hiatal hernia    • Hyperkalemia    • Hyperlipidemia    • Hypertension    • Low back pain    • Lumbar disc disease    • MRSA (methicillin resistant Staphylococcus aureus)     Osteomyelitis/methicillin-resistant Staphylococcus aureus of the left foot, 2013.   • Obesity    • Osteomyelitis (CMS/HCC)     Osteomyelitis/methicillin-resistant Staphylococcus aureus of the left foot, 2013.   • Peptic ulceration 2013    History of peptic ulcer disease, diagnosed 2013 by EGD.  Repeat EGD in 2013 was negative.   • Sepsis (CMS/HCC)     Sepsis, 2013, hospitalized at [facility].   • Tobacco use     Previous tobacco use with cessation in .   • Type 2 diabetes mellitus (CMS/[facility])     Proteinuria noted, 2014.     • Vitamin D deficiency        Past Surgical History:   Procedure Laterality Date   • AMPUTATION DIGIT Right 2018    Procedure: I&D right foot, great toe amputation and 1st Metatarsal amputation;  Surgeon: Chio Sterling MD;  Location: [facility];  Service: Orthopedics   •  SECTION      x2   • FOOT SURGERY Left     Incision and debridement of the left foot x2.   • INSERTION HEMODIALYSIS CATHETER N/A 2018    Procedure: GROSHONG CATHETER PLACEMENT;  Surgeon: Maribel May MD;  Location: [facility];  Service: General   • LASIK     • TONSILLECTOMY         Family History: family history includes No Known Problems in her father and mother. Otherwise pertinent FHx was reviewed and unremarkable.     Social History:  reports that she has quit smoking. Her smoking use included cigarettes. She has a 30.00 pack-year smoking history. she has never used smokeless tobacco. She reports that she drinks about 0.6 oz of alcohol per week. She reports that she does not use drugs.  Social History     Social History Narrative    Lives at home in [place]. Has help from children.   "      Medications:    Available home medication information reviewed.    (Not in a hospital admission)    Allergies   Allergen Reactions   • Statins Myalgia   • Ancef [Cefazolin] Other (See Comments) and GI Intolerance     Tunnel vision.  (TOLERATES INVANZ)   • Bactrim [Sulfamethoxazole-Trimethoprim] Hives and Nausea And Vomiting   • Cephalosporins GI Intolerance   • Keflex [Cephalexin] Other (See Comments) and GI Intolerance     \"tunnel vision\"..   (pt tolerates INVANZ)   • Levaquin [Levofloxacin]    • Lipitor [Atorvastatin] Myalgia   • Lisinopril    • Losartan Potassium Other (See Comments)     Unknown reaction   • Oxycodone GI Intolerance   • Quinolones    • Cleocin [Clindamycin Hcl] Rash   • Clindamycin/Lincomycin Rash       Objective   Objective     Vital Signs:   Temp:  [98.2 °F (36.8 °C)-98.3 °F (36.8 °C)] 98.2 °F (36.8 °C)  Heart Rate:  [83-92] 85  Resp:  [18] 18  BP: (124-141)/(62-80) 132/63        Physical Exam   Constitutional: No acute distress, awake, alert  Eyes: PERRLA, sclerae anicteric, no conjunctival injection  HENT: NCAT, mucous membranes dry  Neck: Supple, no thyromegaly, no lymphadenopathy, trachea midline  Respiratory: mildly coarse posterior LLL, otherwise clear, nonlabored respirations + congested cough  Cardiovascular: RRR, no murmurs, rubs, or gallops, palpable pedal pulses bilaterally  Gastrointestinal: Positive bowel sounds, soft, nontender, nondistended  Musculoskeletal: No bilateral ankle edema, no clubbing or cyanosis to extremities  Psychiatric: Appropriate affect, cooperative  Neurologic: Oriented x 3, strength symmetric in all extremities, Cranial Nerves grossly intact to confrontation, speech clear  Skin: No rashes- bandage Right foot CDI      Results Reviewed:  I have personally reviewed current lab, radiology, and data and agree.    Results from last 7 days   Lab Units 01/22/19  1258   WBC 10*3/mm3 22.33*   HEMOGLOBIN g/dL 9.7*   HEMATOCRIT % 29.6*   PLATELETS 10*3/mm3 615* "     Results from last 7 days   Lab Units 01/22/19  1301 01/22/19  1258   SODIUM mmol/L  --  132   POTASSIUM mmol/L  --  2.7*   CHLORIDE mmol/L  --  89*   CO2 mmol/L  --  25.0   BUN mg/dL  --  134*   CREATININE mg/dL  --  5.23*   GLUCOSE mg/dL  --  74   CALCIUM mg/dL  --  9.5   ALT (SGPT) U/L  --  14   AST (SGOT) U/L  --  24   TROPONIN I ng/mL 0.00  --      Estimated Creatinine Clearance: 14.4 mL/min (A) (by C-G formula based on SCr of 5.23 mg/dL (H)).  Brief Urine Lab Results  (Last result in the past 365 days)      Color   Clarity   Blood   Leuk Est   Nitrite   Protein   CREAT   Urine HCG        12/01/18 1629             61.1       12/01/18 1629 Yellow Cloudy Moderate (2+) Negative Negative >=300 mg/dL (3+)             BNP   Date Value Ref Range Status   01/22/2019 370.0 (H) 0.0 - 100.0 pg/mL Final     Comment:     Results may be falsely decreased if patient taking Biotin.     Imaging Results (last 24 hours)     Procedure Component Value Units Date/Time    XR Chest 1 View [[id number]] Collected:  01/22/19 1428     Updated:  01/22/19 1428    Narrative:       EXAMINATION: XR CHEST 1 VW- 01/22/2019     INDICATION: shortness of air triage protocol      COMPARISON: 12/13/2018     FINDINGS: New since the prior study is patchy disease in the left base  which could represent atelectasis, but in the setting of fever should be  considered potentially pneumonia. Lungs are clear elsewhere. There is  minimal if any left effusion. Heart is upper normal size. Vasculature  appears normal.           Impression:       New left lower lobe atelectasis versus pneumonia.     D:  01/22/2019  E:  01/22/2019           Results for orders placed during the hospital encounter of 07/15/16   Adult transthoracic echo complete    Narrative · Left ventricular function is normal. Estimated EF = 55%.  · Trace mitral valve regurgitation is present.  · Trace tricuspid valve regurgitation is present          Assessment/Plan   Assessment / Plan      Active Hospital Problems    Diagnosis Date Noted   • **LLL pneumonia (CMS/[facility]) [J18.1] 01/22/2019   • Neutrophilic leukocytosis [D72.9] 01/22/2019     Priority: High   • Acute renal failure superimposed on stage 4 chronic kidney disease (CMS/[facility]) [N17.9, N18.4] 01/22/2019     Priority: High   • Hypokalemia [E87.6] 11/26/2018     Priority: High   • Nausea and vomiting [R11.2] 01/22/2019     Priority: Medium   • Dyspnea [R06.00] 06/22/2016     Priority: Medium   • Partial nontraumatic amputation of foot, right (CMS/[facility]) [Z89.431] 01/22/2019   • Anemia, chronic disease [D63.8] 11/26/2018   • Uncontrolled type 2 diabetes mellitus with hyperglycemia (CMS/HCC) [E11.65] 08/17/2016     Proteinuria noted, January 2014.       • GERD (gastroesophageal reflux disease) [K21.9] 07/25/2016   • Hyperlipemia [E78.5] 06/22/2016   • Essential hypertension [I10] 06/22/2016       LLL Pneumonia/ Leukocytosis  -- continue on Invanz/ Doxy per Dr. Haynes's recs  -- stop augmentin  -- monitor sputum/ blood cultures  -- nebs scheduled and prn, O2 as needed  -- probiotic  --SLP eval    ANGELLA/CKD  -- hold diuretics for now  -- gentle IVF hydration NS @ 75hr x 1 day  -- low threshold for renal consult. Recheck bmp in am. Patient appears dry, so will attempt to hydrate overnight. Renal consult tomorrow if no significant improvement in creatinine (baseline ~ 3.3)    Hypokalemia  -- severe- one time dose replacement in ED  -- pharmacy consult to order 1/2 dose ssi potassium replacement with kidney disease  -- check bmp/ mag in am and replace prn  -- telemetry monitor    Nausea/ Vomiting  -- continue IV zofran prn  -- advance diet as tolerates: carb consistent, cardiac, renal diet    IDDM 2  -- insulin resistant on high dose Teujeo bid, trulicity weekly and  at meals  -- start with 1/2 dose basal bid, 15units lispro with meals and ssi  -- qachs fsbs- adjust as needed  -- last A1c 7.6, check in am    Partial Right foot amputation 11/ 2018  --  continue wound care- WOC consult  -- PT consult    HTN/GERD  -- continue home meds    Anemia of Chronic Disease  -- appears at baseline        DVT prophylaxis:  SQ Heparin    CODE STATUS:    Code Status and Medical Interventions:   Ordered at: 01/22/19 1536     Level Of Support Discussed With:    Patient     Code Status:    CPR     Medical Interventions (Level of Support Prior to Arrest):    Full       Admission Status:  I believe this patient meets INPATIENT status due to the need for care which can only be reasonably provided in an hospital setting such as possible need for aggressive/expedited ancillary services and/or consultation services, IV medications, close physician monitoring, and/or procedures.  In such, I feel patient’s risk for adverse outcomes and need for care warrant INPATIENT evaluation and predict the patient’s care encounter to likely last beyond 2 midnights.        Electronically signed by KAREEM Mena, 01/22/19, 3:45 PM.      Brief Attending Admission Attestation     I have seen and examined the patient, performing an independent face-to-face diagnostic evaluation with plan of care reviewed and developed with the advanced practice clinician (APC).      Brief Summary Statement/HPI:   Tashia Leon is a 63 y.o. female w/ IDDM, chronic DHF, ckd 4 (baseline cr ~3.3), prior right diabetic foot abscess (s/p right great toe and metatarsal amputation 12/4/18 by dr. Patiño). Has been on oral augmentin and followed by Dr. Haynes of infectoius disease. Has nausea and occasional vomiting which she feels is brought on by her augmentin use.  Presented to ER with with dyspnea, cough, sputum yellowish/clear. In ER wbc 22,000. Creatinine 5.2, potassium 2.7. Of note, had recently been started back on lasix 40mg bid for past few weeks prior to admission, but recently decreased po intake w/ nausea/emesis.    Attending Physical Exam:  Alert, ill but nontoxic appearing, obese, no distress  Slightly  dry mucous membranes, ncat  Face symmetric, speech clear, equal   rrr  Decreased air movement left lung base, no overt wheezes  abd soft, obese  RLE cleanly dressed  Appropriate affect    Brief Assessment/Plan :  See above for further detailed assessment and plan developed with APC which I have reviewed and/or edited.    Assessment:  -Pneumonia  -Leukocytosis  -ANGELLA  -ckd 4 (baseline cr ~3.3)  -hypokalemia  -Hx right diabetic abscess, s/p great toe and transmetatarsal amputation 12/4/18  -Hx chronic DHF (2016 ef 55% w/ diastolic dysfunction)  -IDDM    Plan:  -dr. Ivy hylton, Invanz, doxy, follow cultures  -[employer] harpreet  -hold diuretics  -gently hydrate w/ ns 75cc/hr x 24 hours, monitor renal function (consult nephrology prn, baseline cr ~3.3)  -SLP eval  -antiemetics  -nonweight bearing right LE  -titrate insulins prn  -replace potassium at 1/2 usual sliding scale    -cbc, bmp, mag, a1c in a.m.      Electronically signed by Moi Yee MD, 01/22/19, 4:15 PM.               Electronically signed by Moi Yee MD at 1/22/2019  4:26 PM       Hospital Medications (all)       Dose Frequency Start End    acetaminophen (TYLENOL) tablet 650 mg 650 mg Every 4 Hours PRN 1/22/2019     Sig - Route: Take 2 tablets by mouth Every 4 (Four) Hours As Needed for Mild Pain . - Oral    albuterol (PROVENTIL) nebulizer solution 0.083% 2.5 mg/3mL 2.5 mg Every 4 Hours PRN 1/22/2019     Sig - Route: Take 2.5 mg by nebulization Every 4 (Four) Hours As Needed for Wheezing or Shortness of Air. - Nebulization    ammonium lactate (AMLACTIN) cream 1 application 1 application 2 Times Daily 1/22/2019     Sig - Route: Apply 1 application topically to the appropriate area as directed 2 (Two) Times a Day. - Topical    aspirin EC tablet 325 mg 325 mg Daily 1/23/2019     Sig - Route: Take 1 tablet by mouth Daily. - Oral    baclofen (LIORESAL) tablet 5 mg 5 mg Every 8 Hours PRN 1/22/2019     Sig - Route: Take 0.5 tablets  by mouth Every 8 (Eight) Hours As Needed for Muscle Spasms. - Oral    bisacodyl (DULCOLAX) suppository 10 mg 10 mg Daily PRN 1/22/2019     Sig - Route: Insert 1 suppository into the rectum Daily As Needed for Constipation. - Rectal    dextrose (D50W) 25 g/ 50mL Intravenous Solution 25 g 25 g Every 15 Minutes PRN 1/22/2019     Sig - Route: Infuse 50 mL into a venous catheter Every 15 (Fifteen) Minutes As Needed for Low Blood Sugar (Blood Sugar Less Than 70). - Intravenous    dextrose (GLUTOSE) oral gel 15 g 15 g Every 15 Minutes PRN 1/22/2019     Sig - Route: Take 15 g by mouth Every 15 (Fifteen) Minutes As Needed for Low Blood Sugar (Blood sugar less than 70). - Oral    diltiaZEM CD (CARDIZEM CD) 24 hr capsule 120 mg 120 mg Every 24 Hours Scheduled 1/22/2019     Sig - Route: Take 1 capsule by mouth Daily. - Oral    docusate sodium (COLACE) capsule 100 mg 100 mg 2 Times Daily 1/22/2019     Sig - Route: Take 1 capsule by mouth 2 (Two) Times a Day. - Oral    doxycycline (VIBRAMYCIN) 100 mg/100 mL 0.9% NS  mg Every 12 Hours 1/22/2019 1/29/2019    Sig - Route: Infuse 100 mL into a venous catheter Every 12 (Twelve) Hours. - Intravenous    ertapenem (INVanz) 500 mg/50 mL 0.9%  mg Once 1/22/2019 1/22/2019    Sig - Route: Infuse 50 mL into a venous catheter 1 (One) Time. - Intravenous    ertapenem (INVanz) 500 mg/50 mL 0.9%  mg Every 24 Hours 1/23/2019 1/29/2019    Sig - Route: Infuse 50 mL into a venous catheter Daily. - Intravenous    glucagon (human recombinant) (GLUCAGEN DIAGNOSTIC) injection 1 mg 1 mg As Needed 1/22/2019     Sig - Route: Inject 1 mg under the skin into the appropriate area as directed As Needed (Blood Glucose Less Than 70). - Subcutaneous    heparin (porcine) 5000 UNIT/ML injection 5,000 Units 5,000 Units Every 8 Hours Scheduled 1/22/2019     Sig - Route: Inject 1 mL under the skin into the appropriate area as directed Every 8 (Eight) Hours. - Subcutaneous    HYDROcodone-acetaminophen  "(NORCO) 7.5-325 MG per tablet 1 tablet 1 tablet Every 8 Hours PRN 1/22/2019     Sig - Route: Take 1 tablet by mouth Every 8 (Eight) Hours As Needed for Moderate Pain . - Oral    insulin detemir (LEVEMIR) injection 20 Units 20 Units Every 12 Hours Scheduled 1/22/2019     Sig - Route: Inject 20 Units under the skin into the appropriate area as directed Every 12 (Twelve) Hours. - Subcutaneous    insulin lispro (humaLOG) injection 0-9 Units 0-9 Units 4 Times Daily With Meals & Nightly 1/22/2019     Sig - Route: Inject 0-9 Units under the skin into the appropriate area as directed 4 (Four) Times a Day With Meals & at Bedtime. - Subcutaneous    ipratropium-albuterol (DUO-NEB) nebulizer solution 3 mL 3 mL Every 4 Hours - RT 1/22/2019     Sig - Route: Take 3 mL by nebulization Every 4 (Four) Hours. - Nebulization    lactobacillus acidophilus (RISAQUAD) capsule 1 capsule 1 capsule Daily 1/22/2019     Sig - Route: Take 1 capsule by mouth Daily. - Oral    Magnesium Sulfate 2 gram infusion- Mg 1.6 - 1.9 mg/dL 2 g As Needed 1/22/2019     Sig - Route: Infuse 50 mL into a venous catheter As Needed (Mg 1.6 - 1.9 mg/dL). - Intravenous    Linked Group 1:  \"Or\" Linked Group Details        magnesium sulfate 3 gram infusion (1gm x 3) - Mg 1.1 - 1.5 mg/dL 1 g As Needed 1/22/2019     Sig - Route: Infuse 100 mL into a venous catheter As Needed (Mg 1.1 - 1.5 mg/dL). - Intravenous    Linked Group 1:  \"Or\" Linked Group Details        magnesium sulfate 4 gram infusion - Mg less than or equal to 1mg/dL 4 g As Needed 1/22/2019     Sig - Route: Infuse 100 mL into a venous catheter As Needed (Mg less than or equal to 1mg/dL). - Intravenous    Linked Group 1:  \"Or\" Linked Group Details        mupirocin (BACTROBAN) 2 % ointment 1 application 1 application Every 12 Hours Scheduled 1/22/2019     Sig - Route: Apply 1 application topically to the appropriate area as directed Every 12 (Twelve) Hours. - Topical    ondansetron (ZOFRAN) injection 4 mg 4 mg " "Every 6 Hours PRN 1/22/2019     Sig - Route: Infuse 2 mL into a venous catheter Every 6 (Six) Hours As Needed for Nausea or Vomiting. - Intravenous    pantoprazole (PROTONIX) EC tablet 40 mg 40 mg Every Morning 1/23/2019     Sig - Route: Take 1 tablet by mouth Every Morning. - Oral    Pharmacy Consult  Continuous PRN 1/22/2019     Sig - Route: Continuous As Needed for Consult. - Does not apply    potassium chloride (KLOR-CON) packet 20 mEq 20 mEq As Needed 1/22/2019     Sig - Route: Take 20 mEq by mouth As Needed (potassium replacement, see admin instructions). - Oral    Linked Group 2:  \"Or\" Linked Group Details        potassium chloride (MICRO-K) CR capsule 20 mEq 20 mEq As Needed 1/22/2019     Sig - Route: Take 2 capsules by mouth As Needed (potassium replacement.  see admin instructions). - Oral    Linked Group 2:  \"Or\" Linked Group Details        potassium chloride (MICRO-K) CR capsule 40 mEq 40 mEq Once 1/22/2019 1/22/2019    Sig - Route: Take 4 capsules by mouth 1 (One) Time. - Oral    potassium chloride 10 mEq in 100 mL IVPB 10 mEq Every 1 Hour PRN 1/22/2019     Sig - Route: Infuse 100 mL into a venous catheter Every 1 (One) Hour As Needed (potassium protocol PERIPHERAL - see admin instructions). - Intravenous    Linked Group 2:  \"Or\" Linked Group Details        promethazine (PHENERGAN) injection 12.5 mg 12.5 mg Once 1/22/2019 1/22/2019    Sig - Route: Infuse 0.5 mL into a venous catheter 1 (One) Time. - Intravenous    Cosign for Ordering: Accepted by Nigel Faustin MD on 1/22/2019  2:53 PM    sodium chloride 0.9 % bolus 500 mL 500 mL Once 1/22/2019 1/22/2019    Sig - Route: Infuse 500 mL into a venous catheter 1 (One) Time. - Intravenous    sodium chloride 0.9 % flush 10 mL 10 mL As Needed 1/22/2019     Sig - Route: Infuse 10 mL into a venous catheter As Needed for Line Care. - Intravenous    Cosign for Ordering: Accepted by Nigel Faustin MD on 1/22/2019  1:15 PM    sodium chloride 0.9 % flush 3 mL 3 " mL Every 12 Hours Scheduled 1/22/2019     Sig - Route: Infuse 3 mL into a venous catheter Every 12 (Twelve) Hours. - Intravenous    sodium chloride 0.9 % flush 3-10 mL 3-10 mL As Needed 1/22/2019     Sig - Route: Infuse 3-10 mL into a venous catheter As Needed for Line Care. - Intravenous    sodium chloride 0.9 % infusion 75 mL/hr Continuous 1/22/2019 1/23/2019    Sig - Route: Infuse 75 mL/hr into a venous catheter Continuous. - Intravenous    insulin detemir (LEVEMIR) injection 30 Units (Discontinued) 30 Units Every 12 Hours Scheduled 1/22/2019 1/22/2019    Sig - Route: Inject 30 Units under the skin into the appropriate area as directed Every 12 (Twelve) Hours. - Subcutaneous    insulin detemir (LEVEMIR) injection 40 Units (Discontinued) 40 Units Every 12 Hours Scheduled 1/22/2019 1/22/2019    Sig - Route: Inject 40 Units under the skin into the appropriate area as directed Every 12 (Twelve) Hours. - Subcutaneous    insulin lispro (humaLOG) injection 15 Units (Discontinued) 15 Units 3 Times Daily With Meals 1/22/2019 1/22/2019    Sig - Route: Inject 15 Units under the skin into the appropriate area as directed 3 (Three) Times a Day With Meals. - Subcutaneous    sodium chloride 0.9 % with KCl 20 mEq/L infusion (Discontinued) 75 mL/hr Continuous 1/22/2019 1/22/2019    Sig - Route: Infuse 75 mL/hr into a venous catheter Continuous. - Intravenous          Orders (last 24 hrs)     Start     Ordered    01/23/19 1000  ertapenem (INVanz) 500 mg/50 mL 0.9% NS  Every 24 Hours      01/22/19 1518    01/23/19 0900  aspirin EC tablet 325 mg  Daily      01/22/19 1638    01/23/19 0700  pantoprazole (PROTONIX) EC tablet 40 mg  Every Morning      01/22/19 1638    01/23/19 0600  Basic Metabolic Panel  Morning Draw      01/22/19 1536    01/23/19 0600  CBC Auto Differential  Morning Draw      01/22/19 1536    01/23/19 0600  Magnesium  Morning Draw      01/22/19 1536    01/23/19 0600  Hemoglobin A1c  Morning Draw      01/22/19 1536     01/22/19 2100  diltiaZEM CD (CARDIZEM CD) 24 hr capsule 120 mg  Every 24 Hours Scheduled      01/22/19 1638    01/22/19 2100  mupirocin (BACTROBAN) 2 % ointment 1 application  Every 12 Hours Scheduled      01/22/19 1638    01/22/19 2100  ammonium lactate (AMLACTIN) cream 1 application  2 Times Daily      01/22/19 1638    01/22/19 2100  sodium chloride 0.9 % flush 3 mL  Every 12 Hours Scheduled      01/22/19 1536 01/22/19 2100  docusate sodium (COLACE) capsule 100 mg  2 Times Daily      01/22/19 1536 01/22/19 2100  insulin detemir (LEVEMIR) injection 30 Units  Every 12 Hours Scheduled,   Status:  Discontinued      01/22/19 1536 01/22/19 2100  insulin detemir (LEVEMIR) injection 40 Units  Every 12 Hours Scheduled,   Status:  Discontinued      01/22/19 1538 01/22/19 2100  insulin detemir (LEVEMIR) injection 20 Units  Every 12 Hours Scheduled      01/22/19 1657    01/22/19 1800  Incentive Spirometry  Every 4 Hours While Awake      01/22/19 1536    01/22/19 1800  insulin lispro (humaLOG) injection 0-9 Units  4 Times Daily With Meals & Nightly      01/22/19 1536    01/22/19 1800  insulin lispro (humaLOG) injection 15 Units  3 Times Daily With Meals,   Status:  Discontinued      01/22/19 1538    01/22/19 1702  POC Glucose Once  Once      01/22/19 1654    01/22/19 1701  POC Glucose Once  Once      01/22/19 1653    01/22/19 1700  POC Glucose 4x Daily AC & at Bedtime  4 Times Daily Before Meals & at Bedtime      01/22/19 1536    01/22/19 1638  baclofen (LIORESAL) tablet 5 mg  Every 8 Hours PRN      01/22/19 1638    01/22/19 1638  HYDROcodone-acetaminophen (NORCO) 7.5-325 MG per tablet 1 tablet  Every 8 Hours PRN      01/22/19 1638    01/22/19 1619  Non Weight Bearing  Once     Comments:  Right leg    01/22/19 1618    01/22/19 1615  Incentive Spirometry  Every Hour While Awake      01/22/19 1614    01/22/19 1615  Inpatient Admission  Once      01/22/19 1615    01/22/19 1607  SLP Consult: Eval & Treat  Once       01/22/19 1606    01/22/19 1600  doxycycline (VIBRAMYCIN) 100 mg/100 mL 0.9% NS MBP  Every 12 Hours      01/22/19 1518    01/22/19 1600  Vital Signs  Every 4 Hours      01/22/19 1536    01/22/19 1556  potassium chloride (MICRO-K) CR capsule 20 mEq  As Needed      01/22/19 1557    01/22/19 1556  potassium chloride (KLOR-CON) packet 20 mEq  As Needed      01/22/19 1557    01/22/19 1556  potassium chloride 10 mEq in 100 mL IVPB  Every 1 Hour PRN      01/22/19 1557    01/22/19 1555  Wound Ostomy Eval & Treat  Once     Comments:  Recent partial right foot amputation    01/22/19 1554    01/22/19 1552  sodium chloride 0.9 % infusion  Continuous      01/22/19 1550    01/22/19 1550  lactobacillus acidophilus (RISAQUAD) capsule 1 capsule  Daily      01/22/19 1548    01/22/19 1538  heparin (porcine) 5000 UNIT/ML injection 5,000 Units  Every 8 Hours Scheduled      01/22/19 1536    01/22/19 1538  ipratropium-albuterol (DUO-NEB) nebulizer solution 3 mL  Every 4 Hours - RT      01/22/19 1536    01/22/19 1535  Pharmacy Consult  Continuous PRN      01/22/19 1536    01/22/19 1535  magnesium sulfate 4 gram infusion - Mg less than or equal to 1mg/dL  As Needed      01/22/19 1536    01/22/19 1535  magnesium sulfate 3 gram infusion (1gm x 3) - Mg 1.1 - 1.5 mg/dL  As Needed      01/22/19 1536    01/22/19 1535  Magnesium Sulfate 2 gram infusion- Mg 1.6 - 1.9 mg/dL  As Needed      01/22/19 1536    01/22/19 1534  Do NOT Hold Basal Insulin When Patient is NPO, Hold Bolus Dose if NPO  Continuous      01/22/19 1536    01/22/19 1534  Follow [facility] Hypoglycemia Standing Orders For Blood Glucose Less Than 70 mg/dL  Until Discontinued      01/22/19 1536    01/22/19 1534  Hypoglycemia Treatment - Alert Patient That is Not NPO and Can Safely Swallow  Until Discontinued     Comments:  Administer 4 oz Fruit Juice OR 4 oz Regular Soda OR 8 oz Milk OR 15-30 grams (1 tube) of Glucose Gel.  Recheck Blood Glucose 15 Minutes After Ingestion, Repeat Treatment &  Continue to Recheck Blood Sugar Every 15 Minutes Until Blood Glucose is 70 mg/dL or Higher.  Once Blood Glucose is 70 mg/dL or Higher and if It Will Be more than 60 Minutes Until the Next Meal, Provide Appropriate Snack (Including Carbohydrate Food) Based on Meal Plan Order. Give Meal Tray As Soon As Possible.    01/22/19 1536    01/22/19 1534  Hypoglycemia Treatment - Patient Has IV Access - Unresponsive, NPO or Unable To Safely Swallow  Until Discontinued     Comments:  Administer 25g (50ml) D50W IV Push.  Recheck Blood Glucose 15 Minutes After Administration, if Blood Glucose Remains Less Than 70 mg/dl, Repeat Treatment   Recheck Blood Glucose 15 Minutes After 2nd Administration, if Blood Glucose Remains Less Than 70 mg/dL After 2nd Dose of D50W, Contact Provider for Further Treatment Orders & Consider Adding IVF With D5W for Maintenance    01/22/19 1536    01/22/19 1534  Hypoglycemia Treatment - Patient Without IV Access - Unresponsive, NPO or Unable To Safely Swallow  Until Discontinued     Comments:  Administer 1mg Glucagon SQ & Establish IV Access.  Turn Patient on Side - Nausea / Vomiting May Occur.  Recheck Blood Glucose 15 Minutes After Administration.  If Blood Glucose Remains Less Than 70, Administer 25g D50W IV Push (50ml).  Recheck Blood Glucose 15 Minutes After Administration of D50W, if Blood Glucose Remains Less Than 70 mg/dl, Contact Provider for Further Treatment Orders & Consider Adding IVF With D5 for Maintenance    01/22/19 1536    01/22/19 1534  Notify Provider of event. Communicate needs and document in EMR.  Once      01/22/19 1536    01/22/19 1534  Document event and patients response to treatment in EMR, any medications given to be documented on MAR.  Once      01/22/19 1536    01/22/19 1533  dextrose (GLUTOSE) oral gel 15 g  Every 15 Minutes PRN      01/22/19 1536    01/22/19 1533  dextrose (D50W) 25 g/ 50mL Intravenous Solution 25 g  Every 15 Minutes PRN      01/22/19 1536    01/22/19  1533  glucagon (human recombinant) (GLUCAGEN DIAGNOSTIC) injection 1 mg  As Needed      01/22/19 1536    01/22/19 1533  ondansetron (ZOFRAN) injection 4 mg  Every 6 Hours PRN      01/22/19 1536    01/22/19 1533  bisacodyl (DULCOLAX) suppository 10 mg  Daily PRN      01/22/19 1536    01/22/19 1532  albuterol (PROVENTIL) nebulizer solution 0.083% 2.5 mg/3mL  Every 4 Hours PRN      01/22/19 1536    01/22/19 1532  acetaminophen (TYLENOL) tablet 650 mg  Every 4 Hours PRN      01/22/19 1536    01/22/19 1532  PT Consult: Eval & Treat  Once      01/22/19 1536    01/22/19 1531  Cardiac Monitoring  Continuous      01/22/19 1536    01/22/19 1531  Diet Regular; Cardiac, Consistent Carbohydrate, Renal  Diet Effective Now      01/22/19 1536    01/22/19 1531  Inpatient Infectious Diseases Consult  Once     Specialty:  Infectious Diseases  Provider:  Hung Haynes MD    01/22/19 1536    01/22/19 1530  sodium chloride 0.9 % with KCl 20 mEq/L infusion  Continuous,   Status:  Discontinued      01/22/19 1528    01/22/19 1530  Pulse Oximetry on Admit  Once      01/22/19 1536    01/22/19 1530  Notify Provider if Patient Requires Greater Than 4LPM of Oxygen  Until Discontinued      01/22/19 1536    01/22/19 1530  Code Status and Medical Interventions:  Continuous      01/22/19 1536    01/22/19 1530  Intake & Output  Every Shift      01/22/19 1536    01/22/19 1530  Weigh Patient  Once      01/22/19 1536    01/22/19 1530  Insert Peripheral IV  Once      01/22/19 1536    01/22/19 1530  Saline Lock & Maintain IV Access  Continuous      01/22/19 1536    01/22/19 1530  Pneumonia Finding Seen on CXR  Once      01/22/19 1536    01/22/19 1529  sodium chloride 0.9 % flush 3-10 mL  As Needed      01/22/19 1536    01/22/19 1528  Respiratory Culture - Sputum, Cough  Once      01/22/19 1528    01/22/19 1441  Scan Slide  Once      01/22/19 1440    01/22/19 1435  promethazine (PHENERGAN) injection 12.5 mg  Once      01/22/19 1433    01/22/19 1416   sodium chloride 0.9 % bolus 500 mL  Once      01/22/19 1417    01/22/19 1417  Influenza A & B, RT PCR - Swab, Nasopharynx  Once      01/22/19 1416    01/22/19 1417  Tele Bed Request  Once      01/22/19 1416    01/22/19 1407  potassium chloride (MICRO-K) CR capsule 40 mEq  Once      01/22/19 1405    01/22/19 1400  ertapenem (INVanz) 500 mg/50 mL 0.9% NS  Once      01/22/19 1358    01/22/19 1359  Lactic Acid, Plasma  STAT      01/22/19 1358    01/22/19 1358  Critical Care  Once     Comments:  This order was created via procedure documentation    01/22/19 1357    01/22/19 1358  Blood Culture - Blood,  Every 30 Minutes     Comments:  Collect 30 minutes apart or from a second site.      01/22/19 1358    01/22/19 1318  POC Troponin, Rapid  Once      01/22/19 1301    01/22/19 1316  ECG 12 Lead  Once      01/22/19 1315    01/22/19 1316  POC Glucose Once  Once,   Status:  Canceled      01/22/19 1315    01/22/19 1249  NPO Diet  Diet Effective Now,   Status:  Canceled      01/22/19 1248    01/22/19 1249  Undress and Gown  Once      01/22/19 1248    01/22/19 1249  Cardiac monitoring  Per Hospital Policy,   Status:  Canceled      01/22/19 1248    01/22/19 1249  Continuous Pulse Oximetry  Per Hospital Policy      01/22/19 1248    01/22/19 1249  Vital Signs  Per Hospital Policy      01/22/19 1248    01/22/19 1249  ECG 12 Lead  Once      01/22/19 1248    01/22/19 1249  XR Chest 1 View  1 Time Imaging      01/22/19 1248    01/22/19 1249  Insert peripheral IV  Once      01/22/19 1248    01/22/19 1249  [place] Draw  Once      01/22/19 1248    01/22/19 1249  CBC & Differential  Once      01/22/19 1248    01/22/19 1249  Comprehensive Metabolic Panel  Once      01/22/19 1248    01/22/19 1249  BNP  Once      01/22/19 1248    01/22/19 1249  POCT Troponin, Rapid  Once      01/22/19 1248    01/22/19 1249  Light Blue Top  PROCEDURE ONCE      01/22/19 1248    01/22/19 1249  Green Top (Gel)  PROCEDURE ONCE      01/22/19 1248    01/22/19 1249   Lavender Top  PROCEDURE ONCE      01/22/19 1248    01/22/19 1249  Gold Top - SST  PROCEDURE ONCE      01/22/19 1248    01/22/19 1249  Green Top (No Gel)  PROCEDURE ONCE,   Status:  Canceled      01/22/19 1248    01/22/19 1249  CBC Auto Differential  PROCEDURE ONCE      01/22/19 1248    01/22/19 1248  sodium chloride 0.9 % flush 10 mL  As Needed      01/22/19 1248    Unscheduled  Oxygen Therapy- Nasal Cannula; 2 LPM; Titrate for SPO2: equal to or greater than, 92%  Continuous PRN      01/22/19 1248    Unscheduled  Blood Gas, Arterial  As Needed     Comments:  Respiratory Distress      01/22/19 1536    Unscheduled  Up With Assistance  As Needed      01/22/19 1536    Unscheduled  Magnesium  As Needed      01/22/19 1536    Unscheduled  Potassium  As Needed      01/22/19 1536    Unscheduled  Magnesium  As Needed      01/22/19 1557    Unscheduled  Potassium  As Needed      01/22/19 1557    --  ammonium lactate (AMLACTIN) 12 % cream  2 Times Daily      01/22/19 1452    --  metOLazone (ZAROXOLYN) 5 MG tablet  Daily PRN      01/22/19 1452    --  mupirocin (BACTROBAN) 2 % ointment  As Needed      01/22/19 1452

## 2019-01-22 NOTE — ED PROVIDER NOTES
"Subjective   Tashia Leon is a 63 y.o.female with a history of kidney failure who presents to the ED with complaints of shortness of breath. The patient reports having shortness of breath for the past 8-10 days. Her shortness of breath is worsened with any exertion, but she says it is still present at rest. Lying down flat worsens her shortness of breath. She has a productive cough and describes her sputum as looking like \"egg white\".  She also endorses having nausea. She denies any fever, shakes, chills, diaphoresis, chest pain, rhinorrhea, or palpitations. 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 She can not bear weight and has to move throughout her house on a knee scooter. She has no history of CHF or blood clots. She is wearing compression stocking on her left lower extremity. She had a coronary stent placed in 2013. The patient said she was able to breathe normally 2 weeks ago. She has no history of COPD, pneumonia, asthma, or emphysema. There are no other acute complaints at this time.        History provided by:  Patient  Shortness of Breath   Severity:  Moderate  Onset quality:  Gradual  Duration:  10 days  Timing:  Constant  Progression:  Unchanged  Chronicity:  New  Relieved by:  Nothing  Worsened by:  Exertion (and lying flat)  Associated symptoms: cough and sputum production    Associated symptoms: no chest pain, no diaphoresis and no fever        Review of Systems   Constitutional: Negative for chills, diaphoresis and fever.   HENT: Negative for rhinorrhea.    Respiratory: Positive for cough, sputum production and shortness of breath.    Cardiovascular: Negative for chest pain and palpitations.   Gastrointestinal: Positive for nausea.   All other systems reviewed and are negative.      Past Medical History:   Diagnosis Date   • Acute bronchitis    • Acute pharyngitis    • Acute sinusitis    • Benign colonic polyp    • Edema    • GERD (gastroesophageal reflux disease)    • " "Hiatal hernia    • Hyperkalemia    • Hyperlipidemia    • Hypertension    • Low back pain    • Lumbar disc disease    • MRSA (methicillin resistant Staphylococcus aureus)     Osteomyelitis/methicillin-resistant Staphylococcus aureus of the left foot, 2013.   • Obesity    • Osteomyelitis (CMS/HCC)     Osteomyelitis/methicillin-resistant Staphylococcus aureus of the left foot, 2013.   • Peptic ulceration 2013    History of peptic ulcer disease, diagnosed 2013 by EGD.  Repeat EGD in 2013 was negative.   • Sepsis (CMS/[facility])     Sepsis, 2013, hospitalized at [facility].   • Tobacco use     Previous tobacco use with cessation in .   • Type 2 diabetes mellitus (CMS/HCC)     Proteinuria noted, 2014.     • Vitamin D deficiency        Allergies   Allergen Reactions   • Statins Myalgia   • Ancef [Cefazolin] Other (See Comments) and GI Intolerance     Tunnel vision.  (TOLERATES INVANZ)   • Bactrim [Sulfamethoxazole-Trimethoprim] Hives and Nausea And Vomiting   • Cephalosporins GI Intolerance   • Keflex [Cephalexin] Other (See Comments) and GI Intolerance     \"tunnel vision\"..   (pt tolerates INVANZ)   • Levaquin [Levofloxacin]    • Lipitor [Atorvastatin] Myalgia   • Lisinopril    • Losartan Potassium Other (See Comments)     Unknown reaction   • Oxycodone GI Intolerance   • Quinolones    • Cleocin [Clindamycin Hcl] Rash   • Clindamycin/Lincomycin Rash       Past Surgical History:   Procedure Laterality Date   • AMPUTATION DIGIT Right 2018    Procedure: I&D right foot, great toe amputation and 1st Metatarsal amputation;  Surgeon: Chio Sterling MD;  Location:  [facility];  Service: Orthopedics   •  SECTION      x2   • FOOT SURGERY Left     Incision and debridement of the left foot x2.   • INSERTION HEMODIALYSIS CATHETER N/A 2018    Procedure: GROSHONG CATHETER PLACEMENT;  Surgeon: Maribel May MD;  Location:  [facility];  Service: " General   • LASIK     • TONSILLECTOMY         Family History   Problem Relation Age of Onset   • No Known Problems Mother    • No Known Problems Father    • Breast cancer Neg Hx    • Ovarian cancer Neg Hx        Social History     Socioeconomic History   • Marital status:      Spouse name: Not on file   • Number of children: Not on file   • Years of education: Not on file   • Highest education level: Not on file   Tobacco Use   • Smoking status: Former Smoker     Packs/day: 1.00     Years: 30.00     Pack years: 30.00     Types: Cigarettes   • Smokeless tobacco: Never Used   • Tobacco comment: quit 7 years ago   Substance and Sexual Activity   • Alcohol use: Yes     Alcohol/week: 0.6 oz     Types: 1 Glasses of wine per week     Comment: holidays   • Drug use: No   • Sexual activity: Defer         Objective   Physical Exam   Constitutional: She is oriented to person, place, and time. She appears well-developed and well-nourished. No distress.   HENT:   Head: Normocephalic and atraumatic.   Nose: Nose normal.   Airway patent.    Eyes: Conjunctivae are normal. No scleral icterus.   She is a touch pale on the inner eyelid.    Neck: Normal range of motion. Neck supple. No JVD present.   Cardiovascular: Normal rate and regular rhythm.   Murmur heard.   Systolic murmur is present with a grade of 1/6.  1/6 early systolic murmur.    Pulmonary/Chest: Effort normal. No respiratory distress. She has rales (minor rales) in the left lower field.   Abdominal: Soft. Bowel sounds are normal. There is no tenderness.   Musculoskeletal: She exhibits edema.   2+ pretibial edema left leg. She is wearing a compression hose.     She has 2+ pretibial edema on the right leg.    Lymphadenopathy:     She has no cervical adenopathy.   Neurological: She is alert and oriented to person, place, and time.   Skin: Skin is warm and dry.   Psychiatric: She has a normal mood and affect. Her behavior is normal.   Nursing note and vitals  reviewed.      Critical Care  Performed by: Nigel Faustin MD  Authorized by: Nigel Faustin MD     Critical care provider statement:     Critical care time (minutes):  30    Critical care time was exclusive of:  Separately billable procedures and treating other patients    Critical care was necessary to treat or prevent imminent or life-threatening deterioration of the following conditions:  Sepsis, respiratory failure and renal failure    Critical care was time spent personally by me on the following activities:  Development of treatment plan with patient or surrogate, discussions with consultants, evaluation of patient's response to treatment, examination of patient, obtaining history from patient or surrogate, ordering and performing treatments and interventions, ordering and review of radiographic studies, ordering and review of laboratory studies, pulse oximetry, re-evaluation of patient's condition and review of old charts  Comments:      Critical care secondary to early recognition of sepsis and initiation of proper antibiotic therapy, guided by renal failure and need to start fluid treatment and potassium replacement.  Abnormal, non-specific ECGs of concern but no obvious, significant cardiac disease by exam or lab.               ED Course  ED Course as of Jan 22 1515   Tue Jan 22, 2019   1352 Initial sepsis screen negative, positive after leucocytosis found on CBC.  Her creatinine is significantly worse than 1 1/2 months ago, so sepsis certainly possible, though infectious source if any not yet elucidated.  Awaiting CXR reading.  [LI]   1353 Discussed CXR with Dr. Fernando - patchy infiltrate LLL.  [LI]   1354 Discussed the patient's chest X ray over the phone with Dr. Fernando.   [TJ]   1358 Reevaluated the patient at bedside and updated her on findings and plan for further care.   [TJ]   1402 Paged Dr. Haynes.   [TJ]   1402 Paged the Hospitalist.   [TJ]   1413 Discussed the patient's case with Dr. Chance,  Hospitalist, who will admit.   [TJ]   1434 Discussed with Dr. Haynes. He agrees with decision to start the patient with INVanz. He will come see the patient in the emergency department.   [TJ]   1438 Update the patient on Dr. Haynes's plan to visit her.   [TJ]      ED Course User Index  [LI] Nigel Faustin MD  [TJ] Jaiden Miles     Recent Results (from the past 24 hour(s))   Comprehensive Metabolic Panel    Collection Time: 01/22/19 12:58 PM   Result Value Ref Range    Glucose 74 70 - 100 mg/dL     (H) 9 - 23 mg/dL    Creatinine 5.23 (H) 0.60 - 1.30 mg/dL    Sodium 132 132 - 146 mmol/L    Potassium 2.7 (C) 3.5 - 5.5 mmol/L    Chloride 89 (L) 99 - 109 mmol/L    CO2 25.0 20.0 - 31.0 mmol/L    Calcium 9.5 8.7 - 10.4 mg/dL    Total Protein 8.3 (H) 5.7 - 8.2 g/dL    Albumin 4.64 3.20 - 4.80 g/dL    ALT (SGPT) 14 7 - 40 U/L    AST (SGOT) 24 0 - 33 U/L    Alkaline Phosphatase 142 (H) 25 - 100 U/L    Total Bilirubin 0.2 (L) 0.3 - 1.2 mg/dL    eGFR Non African Amer 8 (L) >60 mL/min/1.73    Globulin 3.7 gm/dL    A/G Ratio 1.3 (L) 1.5 - 2.5 g/dL    BUN/Creatinine Ratio 25.6 (H) 7.0 - 25.0    Anion Gap 18.0 (H) 3.0 - 11.0 mmol/L   BNP    Collection Time: 01/22/19 12:58 PM   Result Value Ref Range    .0 (H) 0.0 - 100.0 pg/mL   Light Blue Top    Collection Time: 01/22/19 12:58 PM   Result Value Ref Range    Extra Tube hold for add-on    Green Top (Gel)    Collection Time: 01/22/19 12:58 PM   Result Value Ref Range    Extra Tube Hold for add-ons.    Lavender Top    Collection Time: 01/22/19 12:58 PM   Result Value Ref Range    Extra Tube hold for add-on    Gold Top - SST    Collection Time: 01/22/19 12:58 PM   Result Value Ref Range    Extra Tube Hold for add-ons.    CBC Auto Differential    Collection Time: 01/22/19 12:58 PM   Result Value Ref Range    WBC 22.33 (H) 3.50 - 10.80 10*3/mm3    RBC 3.19 (L) 3.89 - 5.14 10*6/mm3    Hemoglobin 9.7 (L) 11.5 - 15.5 g/dL    Hematocrit 29.6 (L) 34.5 - 44.0 %    MCV 92.8 80.0  "- 99.0 fL    MCH 30.4 27.0 - 31.0 pg    MCHC 32.8 32.0 - 36.0 g/dL    RDW 14.5 11.3 - 14.5 %    RDW-SD 48.9 37.0 - 54.0 fl    MPV 9.9 6.0 - 12.0 fL    Platelets 615 (H) 150 - 450 10*3/mm3   POC Troponin, Rapid    Collection Time: 01/22/19  1:01 PM   Result Value Ref Range    Troponin I 0.00 0.00 - 0.07 ng/mL     Note: In addition to lab results from this visit, the labs listed above may include labs taken at another facility or during a different encounter within the last 24 hours. Please correlate lab times with ED admission and discharge times for further clarification of the services performed during this visit.    XR Chest 1 View   Preliminary Result   New left lower lobe atelectasis versus pneumonia.       D:  01/22/2019   E:  01/22/2019            Vitals:    01/22/19 1248 01/22/19 1325 01/22/19 1400   BP: 141/80 124/62 129/70   BP Location: Left arm     Patient Position: Sitting     Pulse: 83 92 86   Resp: 18     Temp: 98.2 °F (36.8 °C)     TempSrc: Oral     SpO2: 95% 90% 92%   Weight: 109 kg (240 lb)     Height: 170.2 cm (67\")       Medications   sodium chloride 0.9 % flush 10 mL (not administered)   ertapenem (INVanz) 500 mg/50 mL 0.9% NS (not administered)   potassium chloride (MICRO-K) CR capsule 40 mEq (not administered)   sodium chloride 0.9 % bolus 500 mL (not administered)     ECG/EMG Results (last 24 hours)     Procedure Component Value Units Date/Time    ECG 12 Lead [[id number]] Collected:  01/22/19 1253     Updated:  01/22/19 1253        ECG 12 Lead   Final Result   Test Reason : dyspnea, abnormal earlier ECG   Blood Pressure : **/** mmHG   Vent. Rate : 091 BPM     Atrial Rate : 091 BPM      P-R Int : 128 ms          QRS Dur : 092 ms       QT Int : 450 ms       P-R-T Axes : 051 021 050 degrees      QTc Int : 553 ms      Sinus rhythm with occasional premature ventricular complexes and premature   atrial complexes   Acute pericarditis   Nonspecific T wave abnormality   Prolonged QT   Abnormal ECG "   When compared with ECG of 22-JAN-2019 12:53,   premature ventricular complexes are now present   Confirmed by HITESH VÁSQUEZ MD ([id number]) on 1/22/2019 1:35:07 PM      Referred By:  FAHAD           Confirmed By:HITESH VÁSQUEZ MD      ECG 12 Lead   Final Result   Test Reason : SOA Protocol   Blood Pressure : **/** mmHG   Vent. Rate : 094 BPM     Atrial Rate : 094 BPM      P-R Int : 098 ms          QRS Dur : 092 ms       QT Int : 418 ms       P-R-T Axes : 034 024 046 degrees      QTc Int : 522 ms      Sinus rhythm with short HI with premature atrial complexes   ST elevation, consider inferolateral injury or acute infarct   Prolonged QT   Abnormal ECG   When compared with ECG of 04-DEC-2018 00:18,   Significant changes have occurred   Confirmed by HITESH VÁSQUEZ MD ([id number]) on 1/22/2019 1:18:29 PM      Referred By:  EDMD           Confirmed By:HITESH VÁSQUEZ MD                          MDM  Number of Diagnoses or Management Options  Acute renal failure superimposed on chronic kidney disease, unspecified CKD stage, unspecified acute renal failure type (CMS/HCC):   Pneumonia of left lower lobe due to infectious organism (CMS/HCC):   Sepsis, due to unspecified organism (CMS/HCC):      Amount and/or Complexity of Data Reviewed  Decide to obtain previous medical records or to obtain history from someone other than the patient: yes    Critical Care  Total time providing critical care: 30-74 minutes      Final diagnoses:   Pneumonia of left lower lobe due to infectious organism (CMS/HCC)   Acute renal failure superimposed on chronic kidney disease, unspecified CKD stage, unspecified acute renal failure type (CMS/HCC)   Sepsis, due to unspecified organism (CMS/HCC)   Hypokalemia       Documentation assistance provided by josue Miles.  Information recorded by the josue was done at my direction and has been verified and validated by me.     Jaiden Miles  01/22/19 1321       Jaiden Miles  01/22/19 1357       Jd  Jaiden  01/22/19 142       Nigel Faustin MD  01/22/19 [id number]

## 2019-01-23 LAB
ANION GAP SERPL CALCULATED.3IONS-SCNC: 19 MMOL/L (ref 3–11)
BASOPHILS # BLD AUTO: 0.02 10*3/MM3 (ref 0–0.2)
BASOPHILS NFR BLD AUTO: 0.1 % (ref 0–1)
BUN BLD-MCNC: 130 MG/DL (ref 9–23)
BUN/CREAT SERPL: 26.7 (ref 7–25)
CALCIUM SPEC-SCNC: 7.9 MG/DL (ref 8.7–10.4)
CHLORIDE SERPL-SCNC: 96 MMOL/L (ref 99–109)
CO2 SERPL-SCNC: 22 MMOL/L (ref 20–31)
CREAT BLD-MCNC: 4.86 MG/DL (ref 0.6–1.3)
DEPRECATED RDW RBC AUTO: 50.6 FL (ref 37–54)
EOSINOPHIL # BLD AUTO: 0.02 10*3/MM3 (ref 0–0.3)
EOSINOPHIL NFR BLD AUTO: 0.1 % (ref 0–3)
ERYTHROCYTE [DISTWIDTH] IN BLOOD BY AUTOMATED COUNT: 14.8 % (ref 11.3–14.5)
GFR SERPL CREATININE-BSD FRML MDRD: 9 ML/MIN/1.73
GLUCOSE BLD-MCNC: 51 MG/DL (ref 70–100)
GLUCOSE BLDC GLUCOMTR-MCNC: 106 MG/DL (ref 70–130)
GLUCOSE BLDC GLUCOMTR-MCNC: 203 MG/DL (ref 70–130)
GLUCOSE BLDC GLUCOMTR-MCNC: 54 MG/DL (ref 70–130)
GLUCOSE BLDC GLUCOMTR-MCNC: 97 MG/DL (ref 70–130)
HBA1C MFR BLD: 6.8 % (ref 4.8–5.6)
HCT VFR BLD AUTO: 25 % (ref 34.5–44)
HGB BLD-MCNC: 8 G/DL (ref 11.5–15.5)
IMM GRANULOCYTES # BLD AUTO: 0.09 10*3/MM3 (ref 0–0.03)
IMM GRANULOCYTES NFR BLD AUTO: 0.5 % (ref 0–0.6)
LYMPHOCYTES # BLD AUTO: 2.11 10*3/MM3 (ref 0.6–4.8)
LYMPHOCYTES NFR BLD AUTO: 12.2 % (ref 24–44)
MAGNESIUM SERPL-MCNC: 2.2 MG/DL (ref 1.3–2.7)
MCH RBC QN AUTO: 30.1 PG (ref 27–31)
MCHC RBC AUTO-ENTMCNC: 32 G/DL (ref 32–36)
MCV RBC AUTO: 94 FL (ref 80–99)
MONOCYTES # BLD AUTO: 1.28 10*3/MM3 (ref 0–1)
MONOCYTES NFR BLD AUTO: 7.4 % (ref 0–12)
NEUTROPHILS # BLD AUTO: 13.83 10*3/MM3 (ref 1.5–8.3)
NEUTROPHILS NFR BLD AUTO: 80.2 % (ref 41–71)
PLATELET # BLD AUTO: 553 10*3/MM3 (ref 150–450)
PMV BLD AUTO: 9.8 FL (ref 6–12)
POTASSIUM BLD-SCNC: 2.9 MMOL/L (ref 3.5–5.5)
POTASSIUM BLD-SCNC: 3 MMOL/L (ref 3.5–5.5)
POTASSIUM BLD-SCNC: 3.2 MMOL/L (ref 3.5–5.5)
RBC # BLD AUTO: 2.66 10*6/MM3 (ref 3.89–5.14)
SODIUM BLD-SCNC: 137 MMOL/L (ref 132–146)
WBC NRBC COR # BLD: 17.26 10*3/MM3 (ref 3.5–10.8)

## 2019-01-23 PROCEDURE — 94799 UNLISTED PULMONARY SVC/PX: CPT

## 2019-01-23 PROCEDURE — 99233 SBSQ HOSP IP/OBS HIGH 50: CPT | Performed by: INTERNAL MEDICINE

## 2019-01-23 PROCEDURE — 94640 AIRWAY INHALATION TREATMENT: CPT

## 2019-01-23 PROCEDURE — 85025 COMPLETE CBC W/AUTO DIFF WBC: CPT | Performed by: NURSE PRACTITIONER

## 2019-01-23 PROCEDURE — 83735 ASSAY OF MAGNESIUM: CPT | Performed by: NURSE PRACTITIONER

## 2019-01-23 PROCEDURE — 97162 PT EVAL MOD COMPLEX 30 MIN: CPT

## 2019-01-23 PROCEDURE — 25010000002 ERTAPENEM 500MG/50 ML SOLUTION: Performed by: INTERNAL MEDICINE

## 2019-01-23 PROCEDURE — 80048 BASIC METABOLIC PNL TOTAL CA: CPT | Performed by: NURSE PRACTITIONER

## 2019-01-23 PROCEDURE — 84132 ASSAY OF SERUM POTASSIUM: CPT | Performed by: INTERNAL MEDICINE

## 2019-01-23 PROCEDURE — 83036 HEMOGLOBIN GLYCOSYLATED A1C: CPT | Performed by: NURSE PRACTITIONER

## 2019-01-23 PROCEDURE — 25010000002 HEPARIN (PORCINE) PER 1000 UNITS: Performed by: NURSE PRACTITIONER

## 2019-01-23 PROCEDURE — 25010000002 HEPARIN (PORCINE) PER 1000 UNITS: Performed by: INTERNAL MEDICINE

## 2019-01-23 PROCEDURE — 25010000002 ONDANSETRON PER 1 MG: Performed by: NURSE PRACTITIONER

## 2019-01-23 PROCEDURE — 87070 CULTURE OTHR SPECIMN AEROBIC: CPT | Performed by: NURSE PRACTITIONER

## 2019-01-23 PROCEDURE — 87205 SMEAR GRAM STAIN: CPT | Performed by: NURSE PRACTITIONER

## 2019-01-23 PROCEDURE — 82962 GLUCOSE BLOOD TEST: CPT

## 2019-01-23 PROCEDURE — 92610 EVALUATE SWALLOWING FUNCTION: CPT

## 2019-01-23 RX ORDER — SODIUM CHLORIDE 9 MG/ML
75 INJECTION, SOLUTION INTRAVENOUS CONTINUOUS
Status: ACTIVE | OUTPATIENT
Start: 2019-01-23 | End: 2019-01-24

## 2019-01-23 RX ORDER — HEPARIN SODIUM 5000 [USP'U]/ML
5000 INJECTION, SOLUTION INTRAVENOUS; SUBCUTANEOUS EVERY 12 HOURS SCHEDULED
Status: DISCONTINUED | OUTPATIENT
Start: 2019-01-23 | End: 2019-01-25 | Stop reason: HOSPADM

## 2019-01-23 RX ADMIN — DOCUSATE SODIUM 100 MG: 100 CAPSULE, LIQUID FILLED ORAL at 20:47

## 2019-01-23 RX ADMIN — MUPIROCIN 1 APPLICATION: 20 OINTMENT TOPICAL at 08:59

## 2019-01-23 RX ADMIN — DOCUSATE SODIUM 100 MG: 100 CAPSULE, LIQUID FILLED ORAL at 09:00

## 2019-01-23 RX ADMIN — IPRATROPIUM BROMIDE AND ALBUTEROL SULFATE 3 ML: 2.5; .5 SOLUTION RESPIRATORY (INHALATION) at 16:09

## 2019-01-23 RX ADMIN — HEPARIN SODIUM 5000 UNITS: 5000 INJECTION INTRAVENOUS; SUBCUTANEOUS at 20:48

## 2019-01-23 RX ADMIN — INSULIN LISPRO 4 UNITS: 100 INJECTION, SOLUTION INTRAVENOUS; SUBCUTANEOUS at 20:48

## 2019-01-23 RX ADMIN — SODIUM CHLORIDE, PRESERVATIVE FREE 3 ML: 5 INJECTION INTRAVENOUS at 09:00

## 2019-01-23 RX ADMIN — PANTOPRAZOLE SODIUM 40 MG: 40 TABLET, DELAYED RELEASE ORAL at 06:02

## 2019-01-23 RX ADMIN — POTASSIUM CHLORIDE 20 MEQ: 750 CAPSULE, EXTENDED RELEASE ORAL at 06:23

## 2019-01-23 RX ADMIN — POTASSIUM CHLORIDE 20 MEQ: 750 CAPSULE, EXTENDED RELEASE ORAL at 09:00

## 2019-01-23 RX ADMIN — Medication 500 MG: at 10:30

## 2019-01-23 RX ADMIN — HEPARIN SODIUM 5000 UNITS: 5000 INJECTION, SOLUTION INTRAVENOUS; SUBCUTANEOUS at 13:32

## 2019-01-23 RX ADMIN — POTASSIUM CHLORIDE 20 MEQ: 750 CAPSULE, EXTENDED RELEASE ORAL at 20:48

## 2019-01-23 RX ADMIN — DILTIAZEM HYDROCHLORIDE 120 MG: 120 CAPSULE, EXTENDED RELEASE ORAL at 20:48

## 2019-01-23 RX ADMIN — IPRATROPIUM BROMIDE AND ALBUTEROL SULFATE 3 ML: 2.5; .5 SOLUTION RESPIRATORY (INHALATION) at 23:03

## 2019-01-23 RX ADMIN — IPRATROPIUM BROMIDE AND ALBUTEROL SULFATE 3 ML: 2.5; .5 SOLUTION RESPIRATORY (INHALATION) at 19:01

## 2019-01-23 RX ADMIN — ONDANSETRON 4 MG: 2 INJECTION INTRAMUSCULAR; INTRAVENOUS at 13:31

## 2019-01-23 RX ADMIN — IPRATROPIUM BROMIDE AND ALBUTEROL SULFATE 3 ML: 2.5; .5 SOLUTION RESPIRATORY (INHALATION) at 07:21

## 2019-01-23 RX ADMIN — Medication 1 CAPSULE: at 09:00

## 2019-01-23 RX ADMIN — IPRATROPIUM BROMIDE AND ALBUTEROL SULFATE 3 ML: 2.5; .5 SOLUTION RESPIRATORY (INHALATION) at 12:06

## 2019-01-23 RX ADMIN — SODIUM CHLORIDE 75 ML/HR: 9 INJECTION, SOLUTION INTRAVENOUS at 15:32

## 2019-01-23 RX ADMIN — SODIUM CHLORIDE, PRESERVATIVE FREE 3 ML: 5 INJECTION INTRAVENOUS at 20:49

## 2019-01-23 RX ADMIN — DOXYCYCLINE 100 MG: 100 INJECTION, POWDER, LYOPHILIZED, FOR SOLUTION INTRAVENOUS at 04:12

## 2019-01-23 RX ADMIN — DOXYCYCLINE 100 MG: 100 INJECTION, POWDER, LYOPHILIZED, FOR SOLUTION INTRAVENOUS at 15:32

## 2019-01-23 RX ADMIN — ONDANSETRON 4 MG: 2 INJECTION INTRAMUSCULAR; INTRAVENOUS at 04:56

## 2019-01-23 RX ADMIN — ASPIRIN 325 MG: 325 TABLET, DELAYED RELEASE ORAL at 09:00

## 2019-01-23 RX ADMIN — POTASSIUM CHLORIDE 20 MEQ: 750 CAPSULE, EXTENDED RELEASE ORAL at 13:32

## 2019-01-23 RX ADMIN — HEPARIN SODIUM 5000 UNITS: 5000 INJECTION, SOLUTION INTRAVENOUS; SUBCUTANEOUS at 06:02

## 2019-01-23 NOTE — PLAN OF CARE
Problem: Patient Care Overview  Goal: Plan of Care Review  Outcome: Ongoing (interventions implemented as appropriate)   01/23/19 1412   OTHER   Outcome Summary PT evaluation completed. Pt ambulated 60' with knee walker with CGA on 2 Lo2. Pt fatigues quickly.   Coping/Psychosocial   Plan of Care Reviewed With patient;family

## 2019-01-23 NOTE — THERAPY EVALUATION
Acute Care - Physical Therapy Initial Evaluation  [facility]     Patient Name: Tashia Leon  : 1955  MRN: [id number]  Today's Date: 2019   Onset of Illness/Injury or Date of Surgery: 19  Date of Referral to PT: 19  Referring Physician: KAREEM Garay      Admit Date: 2019    Visit Dx:     ICD-10-CM ICD-9-CM   1. Pneumonia of left lower lobe due to infectious organism (CMS/[facility]) J18.1 486   2. Acute renal failure superimposed on chronic kidney disease, unspecified CKD stage, unspecified acute renal failure type (CMS/[facility]) N17.9 584.9    N18.9 585.9   3. Sepsis, due to unspecified organism (CMS/[facility]) A41.9 038.9     995.91   4. Hypokalemia E87.6 276.8   5. Dysphagia, unspecified type R13.10 787.20   6. Impaired functional mobility, balance, gait, and endurance Z74.09 V49.89     Patient Active Problem List   Diagnosis   • Coronary artery disease involving native coronary artery of native heart without angina pectoris   • Dyspnea   • Essential hypertension   • Hyperlipemia   • Lower extremity edema   • Carotid artery stenosis   • Carotid bruit   • Diabetes mellitus with neurological manifestations, uncontrolled (CMS/[facility])   • Diabetic polyneuropathy associated with type 2 diabetes mellitus (CMS/[facility])   • Moderate nonproliferative diabetic retinopathy without macular edema associated with type 2 diabetes mellitus (CMS/[facility])   • Functional murmur   • GERD (gastroesophageal reflux disease)   • Peptic ulcer   • Vitamin D deficiency   • Health care maintenance   • Uncontrolled type 2 diabetes mellitus with hyperglycemia (CMS/[facility])   • Hyperlipidemia   • Obesity   • Osteomyelitis (CMS/[facility])   • MRSA (methicillin resistant Staphylococcus aureus)   • Lumbar disc disease   • Hearing loss   • Hyperkalemia   • Secondary hyperparathyroidism (CMS/[facility])   • Idiopathic hypochromic anemia   • Chronic kidney disease, stage V (CMS/[facility])   • Diabetic ulcer of right foot associated with type 2 diabetes mellitus  (CMS/HCC)   • Sepsis (CMS/HCC)   • Cellulitis   • Hypokalemia   • Anemia, chronic disease   • Obesity (BMI 30-39.9)   • Impaired functional mobility, balance, gait, and endurance   • Diabetic ulcer of right midfoot associated with type 2 diabetes mellitus, with necrosis of bone (CMS/HCC)   • Cellulitis of toe of right foot   • Chronic osteomyelitis of right foot with draining sinus (CMS/HCC)   • LLL pneumonia (CMS/HCC)   • Partial nontraumatic amputation of foot, right (CMS/HCC)   • Neutrophilic leukocytosis   • Acute renal failure superimposed on stage 4 chronic kidney disease (CMS/HCC)   • Nausea and vomiting   • Pneumonia of left lower lobe due to infectious organism (CMS/HCC)     Past Medical History:   Diagnosis Date   • Acute bronchitis    • Acute pharyngitis    • Acute sinusitis    • Benign colonic polyp    • Chronic kidney disease    • Edema    • GERD (gastroesophageal reflux disease)    • Heart murmur    • Hiatal hernia    • Hyperkalemia    • Hyperlipidemia    • Hypertension    • Low back pain    • Lumbar disc disease    • MRSA (methicillin resistant Staphylococcus aureus)     Osteomyelitis/methicillin-resistant Staphylococcus aureus of the left foot, April 2013.   • Obesity    • Osteomyelitis (CMS/HCC)     Osteomyelitis/methicillin-resistant Staphylococcus aureus of the left foot, April 2013.   • Peptic ulceration 01/2013    History of peptic ulcer disease, diagnosed January 2013 by EGD.  Repeat EGD in August 2013 was negative.   • Sepsis (CMS/HCC)     Sepsis, April 2013, hospitalized at [facility].   • Tobacco use     Previous tobacco use with cessation in 2008.   • Type 2 diabetes mellitus (CMS/HCC)     Proteinuria noted, January 2014.     • Vitamin D deficiency      Past Surgical History:   Procedure Laterality Date   • AMPUTATION DIGIT Right 12/4/2018    Procedure: I&D right foot, great toe amputation and 1st Metatarsal amputation;  Surgeon: Chio Sterling MD;  Location:   [facility];  Service: Orthopedics   •  SECTION      x2   • FOOT SURGERY Left     Incision and debridement of the left foot x2.   • INSERTION HEMODIALYSIS CATHETER N/A 2018    Procedure: GROSHONG CATHETER PLACEMENT;  Surgeon: Maribel May MD;  Location: [facility] OR;  Service: General   • LASIK     • TONSILLECTOMY          PT ASSESSMENT (last 12 hours)      Physical Therapy Evaluation     Row Name 19 1412          PT Evaluation Time/Intention    Subjective Information  no complaints  -     Document Type  evaluation  -     Mode of Treatment  individual therapy;physical therapy  -     Patient Effort  good  -     Symptoms Noted During/After Treatment  fatigue;shortness of breath  -     Row Name 19 1412          General Information    Patient Profile Reviewed?  yes  -     Onset of Illness/Injury or Date of Surgery  19  -     Referring Physician  KAREEM Garay  -     Patient Observations  alert;cooperative;agree to therapy  -     Patient/Family Observations  Son present.  -     General Observations of Patient  Pt supine in bed, on 2 Lo2.  -     Prior Level of Function  independent:;all household mobility;min assist:;grooming;dressing;bathing;dependent:;home management;cooking;cleaning;driving;shopping;using stairs Pt s/p foot sx 2018, NWB RLE.  -     Equipment Currently Used at Home  -- knee walker  -     Pertinent History of Current Functional Problem  Pt is 62 y/o female s/p 8-10 history of SOA, vomitting, chest pain. Pt reported just starting augmentin. Pt s/p R foot sx 2018 and NWB RLE. Pt diagnosed with LLL pneumonia, acute on chronic kidney disease, and sepsis.  -     Existing Precautions/Restrictions  non-weight bearing;oxygen therapy device and L/min  (Significant)  NWB RLE  -     Risks Reviewed  patient and family:;increased discomfort  -     Benefits Reviewed  patient and family:;improve function;increase independence;increase  strength;increase balance  -     Barriers to Rehab  previous functional deficit;medically complex  -     Row Name 01/23/19 1412          Relationship/Environment    Primary Source of Support/Comfort  child(jeannie)  -     Lives With  parent(s)  -     Row Name 01/23/19 1412          Resource/Environmental Concerns    Current Living Arrangements  home/apartment/condo  -     Row Name 01/23/19 1412          Cognitive Assessment/Intervention- PT/OT    Orientation Status (Cognition)  oriented x 4  -     Follows Commands (Cognition)  WNL  -     Row Name 01/23/19 1412          Bed Mobility Assessment/Treatment    Bed Mobility Assessment/Treatment  supine-sit;rolling left  -     Rolling Left Potter (Bed Mobility)  supervision  -     Supine-Sit Potter (Bed Mobility)  supervision  -     Bed Mobility, Safety Issues  decreased use of legs for bridging/pushing  -     Assistive Device (Bed Mobility)  bed rails;head of bed elevated  -     Row Name 01/23/19 1412          Transfer Assessment/Treatment    Transfer Assessment/Treatment  sit-stand transfer;stand-sit transfer;stand pivot/stand step transfer  -     Maintains Weight-bearing Status (Transfers)  verbal cues to maintain  -     Sit-Stand Potter (Transfers)  verbal cues;contact guard  -     Stand-Sit Potter (Transfers)  verbal cues  -     Row Name 01/23/19 1412          Sit-Stand Transfer    Assistive Device (Sit-Stand Transfers)  walker, knee scooter  -     Row Name 01/23/19 1412          Stand-Sit Transfer    Assistive Device (Stand-Sit Transfers)  walker, knee scooter  -[facility] Name 01/23/19 1412          Stand Pivot/Stand Step Transfer    Stand Pivot/Stand Step Potter  verbal cues;minimum assist (75% patient effort)  -     Assistive Device (Stand Pivot Stand Step Transfer)  -- none; vc to maintain NWB.  -     Row Name 01/23/19 1412          Gait/Stairs Assessment/Training    Gait/Stairs Assessment/Training   gait/ambulation independence;distance ambulated;gait/ambulation assistive device  -     [place] Level (Gait)  contact guard;verbal cues  -     Assistive Device (Gait)  walker, knee scooter  -     Distance in Feet (Gait)  60  -SH     Pattern (Gait)  step-to  -SH     Comment (Gait/Stairs)  Pt able to position knee scooter and manuever with minimal to no cues. Pt knee scooter does not have a locking mechanism; brake must be held in place to lock. Pt fatigued quickly.  -SH     Row Name 01/23/19 1412          General ROM    GENERAL ROM COMMENTS  B LE WNL  -SH     Row Name 01/23/19 1412          MMT (Manual Muscle Testing)    General MMT Comments  B LE 2/5  -     Row Name             Wound 01/22/19 1622 Right anterior toe amputation stump    Wound - Properties Group Date first assessed: 01/22/19  -BR Time first assessed: 1622  -BR Present On Admission : yes  -BR Side: Right  -BR Orientation: anterior  -BR Location: toe  -BR Type: amputation stump  -BR    Row Name 01/23/19 1412          Coping    Observed Emotional State  calm;cooperative  -     Verbalized Emotional State  acceptance  -     Row Name 01/23/19 1412          Physical Therapy Clinical Impression    Date of Referral to PT  01/23/19  -     PT Diagnosis (PT Clinical Impression)  Impaired Functional Mobility  -     Functional Level at Time of Evaluation (PT Clinical Impression)  CGA, knee walker for mobility  -     Patient/Family Goals Statement (PT Clinical Impression)  Go home  -     Criteria for Skilled Interventions Met (PT Clinical Impression)  yes;treatment indicated  -     Rehab Potential (PT Clinical Summary)  good, to achieve stated therapy goals  -     Row Name 01/23/19 1412          Vital Signs    Pre SpO2 (%)  96  -SH     O2 Delivery Pre Treatment  supplemental O2 2 Lo2  -SH     Intra SpO2 (%)  95  -SH     O2 Delivery Intra Treatment  supplemental O2  -SH     Post SpO2 (%)  96  -SH     O2 Delivery Post Treatment   supplemental O2  -SH     Pre Patient Position  Supine  -SH     Intra Patient Position  Standing  -SH     Post Patient Position  Sitting  -SH     Row Name 01/23/19 1412          Physical Therapy Goals    Bed Mobility Goal Selection (PT)  bed mobility, PT goal 1  -SH     Transfer Goal Selection (PT)  transfer, PT goal 1  -SH     Gait Training Goal Selection (PT)  gait training, PT goal 1  -     Row Name 01/23/19 1412          Bed Mobility Goal 1 (PT)    Activity/Assistive Device (Bed Mobility Goal 1, PT)  bed mobility activities, all  -SH     [place] Level/Cues Needed (Bed Mobility Goal 1, PT)  conditional independence  -SH     Time Frame (Bed Mobility Goal 1, PT)  10 days  -SH     Progress/Outcomes (Bed Mobility Goal 1, PT)  goal ongoing  -     Row Name 01/23/19 1412          Transfer Goal 1 (PT)    Activity/Assistive Device (Transfer Goal 1, PT)  transfers, all  -SH     [place] Level/Cues Needed (Transfer Goal 1, PT)  supervision required  -SH     Time Frame (Transfer Goal 1, PT)  10 days  -SH     Progress/Outcome (Transfer Goal 1, PT)  goal ongoing  -     Row Name 01/23/19 1412          Gait Training Goal 1 (PT)    Activity/Assistive Device (Gait Training Goal 1, PT)  gait (walking locomotion);assistive device use  -SH     [place] Level (Gait Training Goal 1, PT)  contact guard assist  -SH     Distance (Gait Goal 1, PT)  250  -SH     Time Frame (Gait Training Goal 1, PT)  10 days  -SH     Progress/Outcome (Gait Training Goal 1, PT)  goal ongoing  -[facility] Name 01/23/19 1412          Positioning and Restraints    Pre-Treatment Position  in bed  -SH     Post Treatment Position  chair  -SH     In Chair  reclined;call light within reach;encouraged to call for assist;with family/caregiver  -     Row Name 01/23/19 1412          Living Environment    Home Accessibility  wheelchair accessible  -       User Key  (r) = Recorded By, (t) = Taken By, (c) = Cosigned By    Initials Name Provider Type      Christina Simon PT Physical Therapist    Lisbet Waddell, RN Registered Nurse        Physical Therapy Education     Title: PT OT SLP Therapies (In Progress)     Topic: Physical Therapy (In Progress)     Point: Mobility training (Done)     Learning Progress Summary           Patient Acceptance, E,D, VU,NR by  at 1/23/2019  2:12 PM                   Point: Home exercise program (Done)     Learning Progress Summary           Patient Acceptance, E,D, VU,NR by  at 1/23/2019  2:12 PM                               User Key     Initials Effective Dates Name Provider Type Discipline     06/19/15 -  Christina Simon PT Physical Therapist PT              PT Recommendation and Plan  Anticipated Discharge Disposition (PT): home with home health, home with assist  Therapy Frequency (PT Clinical Impression): daily  Outcome Summary/Treatment Plan (PT)  Anticipated Discharge Disposition (PT): home with home health, home with assist  Plan of Care Reviewed With: patient, family  Outcome Summary: PT evaluation completed. Pt ambulated 60' with knee walker with CGA on 2 Lo2. Pt fatigues quickly.  Outcome Measures     Row Name 01/23/19 1412             How much help from another person do you currently need...    Turning from your back to your side while in flat bed without using bedrails?  4  -SH      Moving from lying on back to sitting on the side of a flat bed without bedrails?  4  -SH      Moving to and from a bed to a chair (including a wheelchair)?  3  -SH      Standing up from a chair using your arms (e.g., wheelchair, bedside chair)?  3  -SH      Climbing 3-5 steps with a railing?  1  -SH      To walk in hospital room?  3  -SH      AM-PAC 6 Clicks Score  18  -         Functional Assessment    Outcome Measure Options  AM-PAC 6 Clicks Basic Mobility (PT)  -        User Key  (r) = Recorded By, (t) = Taken By, (c) = Cosigned By    Initials Name Provider Type     Christina Simon PT Physical Therapist          Time Calculation:   PT Charges     Row Name 01/23/19 1412             Time Calculation    Start Time  1412  -SH      PT Received On  01/23/19  -      PT Goal Re-Cert Due Date  02/02/19  -        User Key  (r) = Recorded By, (t) = Taken By, (c) = Cosigned By    Initials Name Provider Type     Christina Simon, PT Physical Therapist        Therapy Suggested Charges     Code   Minutes Charges    None           Therapy Charges for Today     Code Description Service Date Service Provider Modifiers Qty    [id number] HC PT EVAL MOD COMPLEXITY 4 1/23/2019 Christina Simon, PT GP 1          PT G-Codes  Outcome Measure Options: AM-PAC 6 Clicks Basic Mobility (PT)  AM-PAC 6 Clicks Score: 18      Christina Simon PT  1/23/2019

## 2019-01-23 NOTE — PLAN OF CARE
Problem: Patient Care Overview  Goal: Plan of Care Review  Outcome: Ongoing (interventions implemented as appropriate)   01/23/19 0850   Plan of Care Review   Progress no change   OTHER   Outcome Summary Consulted to assess right foot r/t prior amputation site per Dr. Sterling on 12-4-18. Patient has been treating with Bactroban and wraps daily. At this time wound is dry, intact with scabbed and callused areas. Will continue with current POC as proscribed by Dr. Sterling. Will order Bactroban. Changed dressing and to be done daily. Please see orders. No further need for WOC nurse. Will sign off. Thanks    Coping/Psychosocial   Plan of Care Reviewed With patient

## 2019-01-23 NOTE — CONSULTS
NAL Consult Note    Tashia Leon  1955  [id number]    Date of Admit:  1/22/2019    Date of Consult: 1/23/2019        Requesting Provider: Moi Vega MD  Evaluating Physician: Yoel Sharpe MD        Reason for Consultation:  STAGE 5 CKD.  History of present illness:    Patient is a 63 y.o.  Yr old female KNOWN TO [facility] WITH STAGE 4-5 CKD  ( IS TRYING GET TRANSPLANT AT [facility] BUT TOLD TO LOSE MORE WT. HAS SEEN DR BLEVINS AND WANTS TO DO PD IF NEEDS DIALYSIS 1ST ) SECONDARY TO DM NEPHROPATHY, ANEMIA, DM, HTN, HLP, DM RETINOPATHY, HYPERPARATHYROIDISM ( LAST PTH  ) PVD, CAD WHO WE ARE ASKED TO SEE BY DR VEGA BECAUSE OF WORSENING RENAL FXN. CREAT 5.23 YESTERDAY AND 4.86 TODAY, CREAT WAS 4.5 WHEN WE LAST SAW HER IN THE OFFICE 9/2018.HAS NEPHROTIC RANGE PROTEINURIA ( LAST P/C RATIO WAS 3704 9/2018 )   Past Medical History:   Diagnosis Date   • Acute bronchitis    • Acute pharyngitis    • Acute sinusitis    • Benign colonic polyp    • Chronic kidney disease    • Edema    • GERD (gastroesophageal reflux disease)    • Heart murmur    • Hiatal hernia    • Hyperkalemia    • Hyperlipidemia    • Hypertension    • Low back pain    • Lumbar disc disease    • MRSA (methicillin resistant Staphylococcus aureus)     Osteomyelitis/methicillin-resistant Staphylococcus aureus of the left foot, April 2013.   • Obesity    • Osteomyelitis (CMS/HCC)     Osteomyelitis/methicillin-resistant Staphylococcus aureus of the left foot, April 2013.   • Peptic ulceration 01/2013    History of peptic ulcer disease, diagnosed January 2013 by EGD.  Repeat EGD in August 2013 was negative.   • Sepsis (CMS/HCC)     Sepsis, April 2013, hospitalized at [facility].   • Tobacco use     Previous tobacco use with cessation in 2008.   • Type 2 diabetes mellitus (CMS/HCC)     Proteinuria noted, January 2014.     • Vitamin D deficiency        Past Surgical History:   Procedure Laterality Date   • AMPUTATION  "DIGIT Right 2018    Procedure: I&D right foot, great toe amputation and 1st Metatarsal amputation;  Surgeon: Chio Sterling MD;  Location: [facility];  Service: Orthopedics   •  SECTION      x2   • FOOT SURGERY Left     Incision and debridement of the left foot x2.   • INSERTION HEMODIALYSIS CATHETER N/A 2018    Procedure: GROSHONG CATHETER PLACEMENT;  Surgeon: Maribel May MD;  Location: [facility];  Service: General   • LASIK     • TONSILLECTOMY         Social History     Socioeconomic History   • Marital status:      Spouse name: Not on file   • Number of children: Not on file   • Years of education: Not on file   • Highest education level: Not on file   Tobacco Use   • Smoking status: Former Smoker     Packs/day: 1.00     Years: 30.00     Pack years: 30.00     Types: Cigarettes   • Smokeless tobacco: Never Used   • Tobacco comment: quit 7 years ago   Substance and Sexual Activity   • Alcohol use: Yes     Alcohol/week: 0.6 oz     Types: 1 Glasses of wine per week     Comment: holidays   • Drug use: No   • Sexual activity: Defer   Social History Narrative    Lives at home in [place]. Has help from children.        family history includes No Known Problems in her father and mother. NO FH OF RENAL DZ.    Allergies   Allergen Reactions   • Statins Myalgia   • Ancef [Cefazolin] Other (See Comments) and GI Intolerance     Tunnel vision.  (TOLERATES INVANZ)   • Bactrim [Sulfamethoxazole-Trimethoprim] Hives and Nausea And Vomiting   • Cephalosporins GI Intolerance   • Keflex [Cephalexin] Other (See Comments) and GI Intolerance     \"tunnel vision\"..   (pt tolerates INVANZ)   • Levaquin [Levofloxacin]    • Lipitor [Atorvastatin] Myalgia   • Lisinopril    • Losartan Potassium Other (See Comments)     Unknown reaction   • Oxycodone GI Intolerance   • Quinolones    • Cleocin [Clindamycin Hcl] Rash   • Clindamycin/Lincomycin Rash       Medication:    Current Facility-Administered " Medications:   •  acetaminophen (TYLENOL) tablet 650 mg, 650 mg, Oral, Q4H PRN, Lilly Garya APRN  •  albuterol (PROVENTIL) nebulizer solution 0.083% 2.5 mg/3mL, 2.5 mg, Nebulization, Q4H PRN, Lilly Garay APRLUZ  •  ammonium lactate (AMLACTIN) cream 1 application, 1 application, Topical, BID, Lilly Garay APRN, 1 application at 01/22/19 2044  •  aspirin EC tablet 325 mg, 325 mg, Oral, Daily, Lilly Garay APRN, 325 mg at 01/23/19 0900  •  baclofen (LIORESAL) tablet 5 mg, 5 mg, Oral, Q8H PRN, Lilly Garay APRN  •  bisacodyl (DULCOLAX) suppository 10 mg, 10 mg, Rectal, Daily PRN, Lilly Garay APRN  •  dextrose (D50W) 25 g/ 50mL Intravenous Solution 25 g, 25 g, Intravenous, Q15 Min PRN, Lilly Garay APRN  •  dextrose (GLUTOSE) oral gel 15 g, 15 g, Oral, Q15 Min PRN, Lilly Garay APRN  •  diltiaZEM CD (CARDIZEM CD) 24 hr capsule 120 mg, 120 mg, Oral, Q24H, Lilly Garay APRN, 120 mg at 01/22/19 2042  •  docusate sodium (COLACE) capsule 100 mg, 100 mg, Oral, BID, Lilly Garay APRN, 100 mg at 01/23/19 0900  •  doxycycline (VIBRAMYCIN) 100 mg/100 mL 0.9% NS MBP, 100 mg, Intravenous, Q12H, Hung Haynes MD, 100 mg at 01/23/19 1532  •  ertapenem (INVanz) 500 mg/50 mL 0.9% NS, 500 mg, Intravenous, Q24H, Hung Haynes MD, 500 mg at 01/23/19 1030  •  glucagon (human recombinant) (GLUCAGEN DIAGNOSTIC) injection 1 mg, 1 mg, Subcutaneous, PRN, Lilly Garay APRN  •  heparin (porcine) 5000 UNIT/ML injection 5,000 Units, 5,000 Units, Subcutaneous, Q8H, Lilly Garay APRN, 5,000 Units at 01/23/19 1332  •  HYDROcodone-acetaminophen (NORCO) 7.5-325 MG per tablet 1 tablet, 1 tablet, Oral, Q8H PRN, Lilly Garay APRN  •  insulin lispro (humaLOG) injection 0-9 Units, 0-9 Units, Subcutaneous, 4x Daily With Meals & Nightly, Lilly Garay APRN  •  ipratropium-albuterol (DUO-NEB) nebulizer solution 3 mL, 3 mL, Nebulization, Q4H - RT, Lilly Garay  R, APRN, 3 mL at 01/23/19 1609  •  lactobacillus acidophilus (RISAQUAD) capsule 1 capsule, 1 capsule, Oral, Daily, Lilly Garay, APRN, 1 capsule at 01/23/19 0900  •  magnesium sulfate 4 gram infusion - Mg less than or equal to 1mg/dL, 4 g, Intravenous, PRN **OR** magnesium sulfate 3 gram infusion (1gm x 3) - Mg 1.1 - 1.5 mg/dL, 1 g, Intravenous, PRN **OR** Magnesium Sulfate 2 gram infusion- Mg 1.6 - 1.9 mg/dL, 2 g, Intravenous, PRN, Lilly Garay R, APRN  •  [START ON 1/24/2019] mupirocin (BACTROBAN) 2 % ointment, , Topical, Q24H, Moi Yee MD  •  ondansetron (ZOFRAN) injection 4 mg, 4 mg, Intravenous, Q6H PRN, Lilly Garay, APRN, 4 mg at 01/23/19 1331  •  pantoprazole (PROTONIX) EC tablet 40 mg, 40 mg, Oral, QAM, Lilly Garay, APRN, 40 mg at 01/23/19 0602  •  potassium chloride (MICRO-K) CR capsule 20 mEq, 20 mEq, Oral, PRN, 20 mEq at 01/23/19 1332 **OR** potassium chloride (KLOR-CON) packet 20 mEq, 20 mEq, Oral, PRN **OR** potassium chloride 10 mEq in 100 mL IVPB, 10 mEq, Intravenous, Q1H PRN, Moi Yee MD  •  sodium chloride 0.9 % flush 10 mL, 10 mL, Intravenous, PRN, Nigel Faustin MD  •  sodium chloride 0.9 % flush 3 mL, 3 mL, Intravenous, Q12H, Lilly Garay APRN, 3 mL at 01/23/19 0900  •  sodium chloride 0.9 % flush 3-10 mL, 3-10 mL, Intravenous, PRN, Lilly Garay, APRN  •  sodium chloride 0.9 % infusion, 75 mL/hr, Intravenous, Continuous, Moi Yee MD, Last Rate: 75 mL/hr at 01/23/19 1532, 75 mL/hr at 01/23/19 1532    Medications Prior to Admission   Medication Sig Dispense Refill Last Dose   • Aflibercept (EYLEA IO) Inject  as directed As Needed. Every 6 weeks at the eye doctor   Past Month at Unknown time   • ammonium lactate (AMLACTIN) 12 % cream Apply 1 application topically to the appropriate area as directed 2 (Two) Times a Day. feet   1/21/2019 at Unknown time   • amoxicillin-clavulanate (AUGMENTIN) 500-125 MG per tablet Take 1 tablet by  mouth Daily.   1/21/2019 at Unknown time   • aspirin  MG tablet Take 325 mg by mouth Daily.   1/22/2019 at Unknown time   • baclofen (LIORESAL) 10 MG tablet Take 5-10 mg by mouth As Needed.   Past Month at Unknown time   • DILT- MG 24 hr capsule Take 120 mg by mouth Daily.  11 1/22/2019 at Unknown time   • furosemide (LASIX) 40 MG tablet Take 40 mg by mouth 2 (Two) Times a Day.  11 1/22/2019 at Unknown time   • HYDROcodone-acetaminophen (NORCO) 7.5-325 MG per tablet Take 1 tablet by mouth Every 8 (Eight) Hours As Needed.   1/22/2019 at Unknown time   • Insulin Glargine (TOUJEO SOLOSTAR) 300 UNIT/ML solution pen-injector Inject 80 Units as directed 2 (Two) Times a Day. 20 pen 11 1/22/2019 at Unknown time   • Insulin Lispro (HUMALOG KWIKPEN) 200 UNIT/ML solution pen-injector Inject 30 unit marking on U-100 syringe under the skin into the appropriate area as directed 3 (Three) Times a Day As Needed (Blood Sugar). Per sliding scale   1/21/2019 at Unknown time   • lansoprazole (PREVACID) 30 MG capsule Take 30 mg by mouth Daily.  11 1/22/2019 at Unknown time   • metOLazone (ZAROXOLYN) 5 MG tablet Take 5-10 mg by mouth Daily As Needed.   1/21/2019 at Unknown time   • mupirocin (BACTROBAN) 2 % ointment Apply 1 application topically to the appropriate area as directed As Needed.   1/21/2019 at Unknown time   • ondansetron ODT (ZOFRAN ODT) 4 MG disintegrating tablet Place 1 tablet under the tongue Every 8 (Eight) Hours As Needed for Nausea or Vomiting. 20 tablet 0 1/21/2019 at Unknown time   • Alirocumab (PRALUENT) 75 MG/ML solution pen-injector Inject 75 mg under the skin into the appropriate area as directed Every 14 (Fourteen) Days. 7 pen 3 1/11/2019   • Dulaglutide (TRULICITY) 1.5 MG/0.5ML solution pen-injector Inject 1.5 mg under the skin into the appropriate area as directed 1 (One) Time Per Week. Tuesday   1/15/2019   • MOVANTIK 12.5 MG tablet Take 1 tablet by mouth 2 (Two) Times a Week. Wednesday and  "Sunday 1 1/20/2019       Review of Systems:    Constitutional-- No Fever. + chills. NO sweats. No significant change in weight. DECREASED ACTIVITY/APPETITE. + FATIGUE.  Eye-- no diplopia, no conjunctivitis  ENT-- No new hearing or throat complaints.  No epistaxis or oral sores. No odynophagia or dysphagia. No headache, photophobia or neck stiffness.  CV-- No chest pain, palpitations,  or edema  Resp-- + SOB/cough.NO Hemoptysis  GI- + nausea/vomiting. NO  diarrhea.  No hematochezia, melena, or hematemesis.  -- No dysuria, hematuria, or flank pain. No lower tract obstructive symptoms  Skin-- No rash OR ITCHING.  Lymph- no painful or swollen lymph nodes in neck/axilla or groin.   Heme- No active bruising or bleeding; no Hx of DVT or PE.  MS-- no swelling or pain in the joints  Neuro-- No acute focal weakness or numbness in the arms or legs.  No seizures. + WEAKNESS AND LIGHT-HEADEDNESS.  Psych--No anxiety or depression  Endo--No cold or heat intolerance.  No polyuria, polydipsia, or polyphagia    Full review of systems reviewed and negative otherwise for acute complaints    Physical Exam:   Vital Signs   Blood pressure 131/44, pulse 76, temperature 97.9 °F (36.6 °C), temperature source Oral, resp. rate 16, height 170.2 cm (67\"), weight 105 kg (230 lb 8 oz), SpO2 96 %, not currently breastfeeding.     GENERAL: Awake and alert, in no acute distress.   HEENT: Normocephalic, atraumatic.  PER.  No conjunctivitis. No icterus. Oropharynx clear without evidence of thrush or exudate. No evidence of peridontal disease.    NECK: Supple without nuchal rigidity. No mass.  LYMPH: No painful cervical, axillary or inguinal lymphadenopathy.  HEART: RRR; No murmur, rubs, gallops. No bruits in neck, abdomen, or groins, no edema  LUNGS: Normal respiratory effort. Nonlabored. No dullness.  No crepitus.  Clear to auscultation bilaterally without wheezing, rales, rhonchi.  ABDOMEN: Soft, nontender, nondistended. Positive bowel sounds. No " rebound or guarding. NO mass or HSM.  JOINTS:  Full range of motion, no redness or tenderness.  EXT:  No cyanosis, clubbing or edema.  :  BLADDER NOT DISTENDED. NO CVAT.  SKIN: Warm and dry without rash  NEURO: Oriented to PPT. No focal neurological deficits. Strength equal bilateral  PSYCHIATRIC: Normal insight and judgement. Cooperative with PE    Laboratory Data  Results from last 7 days   Lab Units 01/23/19  0532 01/22/19  1258   HEMOGLOBIN g/dL 8.0* 9.7*   HEMATOCRIT % 25.0* 29.6*     Results from last 7 days   Lab Units 01/23/19  0532 01/23/19  0123 01/22/19  1258   SODIUM mmol/L 137  --  132   POTASSIUM mmol/L 3.2* 2.9* 2.7*   CHLORIDE mmol/L 96*  --  89*   CO2 mmol/L 22.0  --  25.0   BUN mg/dL 130*  --  134*   CREATININE mg/dL 4.86*  --  5.23*   CALCIUM mg/dL 7.9*  --  9.5   MAGNESIUM mg/dL  --  2.2  --    ALBUMIN g/dL  --   --  4.64     Results from last 7 days   Lab Units 01/23/19  0532   GLUCOSE mg/dL 51*         Results from last 7 days   Lab Units 01/22/19  1258   ALK PHOS U/L 142*   BILIRUBIN mg/dL 0.2*   ALT (SGPT) U/L 14   AST (SGOT) U/L 24     Estimated Creatinine Clearance: 15.2 mL/min (A) (by C-G formula based on SCr of 4.86 mg/dL (H)).    Radiology:      Impression: ANGELLA ON STAGE 5 CKD. PATIENT WANTS TO DO PD. GFR IMPROVED AND NEAR BASELINE. ANEMIA.      PLAN: Thank you for asking us to see Tashia Leon, I recommend the following: CHECK FE. LAB IN THE AM. IF RENAL FXN DOES NOT IMPROVE FURTHER THEN MAY NEED TO PLACE PD CATH SOON AND START CAPD TRAINING. I DISCUSSED THIS WITH THE PATIENT.     Yoel Sharpe MD  1/23/2019  5:16 PM

## 2019-01-23 NOTE — PLAN OF CARE
Problem: Patient Care Overview  Goal: Plan of Care Review  Outcome: Ongoing (interventions implemented as appropriate)   01/23/19 1423   Plan of Care Review   Progress no change   OTHER   Outcome Summary pt still on 2L NC. more for comfort than actual requirement. potassium replaced, redraw ordered. pt up to [facility]. complains of nausea, prn zofran given. no other complaints at this time. will continue to monitor.    Coping/Psychosocial   Plan of Care Reviewed With patient

## 2019-01-23 NOTE — PLAN OF CARE
Problem: Patient Care Overview  Goal: Plan of Care Review  Outcome: Ongoing (interventions implemented as appropriate)    Goal: Discharge Needs Assessment  Outcome: Ongoing (interventions implemented as appropriate)    Goal: Interprofessional Rounds/Family Conf  Outcome: Ongoing (interventions implemented as appropriate)      Problem: Fall Risk (Adult)  Goal: Identify Related Risk Factors and Signs and Symptoms  Outcome: Ongoing (interventions implemented as appropriate)    Goal: Absence of Fall  Outcome: Ongoing (interventions implemented as appropriate)

## 2019-01-23 NOTE — CONSULTS
INFECTIOUS DISEASE CONSULT/INITIAL HOSPITAL VISIT    Tashia Leon  1955  [id number]    Date of Consult: 1/23/2019    Admission Date: 1/22/2019      Requesting Provider: Moi Yee MD  Evaluating Physician: Hung Haynes MD    Reason for Consultation: pneumonia, abx allergies    History of present illness:    Patient is a 63 y.o. female well known to me who I have been following since 11/27/18 for right foot abscess;  Patient has  chronic kidney disease stage IV, diabetes type 2 and has had complicated stay in November through mid December in which she was debrided and received iv abx;  Patient had delirium and removed a medport prior to discharge and iv abx zyvox/ invanz changed to po augmentin 500 mg daily upon discharge.  Patient has been followed in our office as well as by Dr. Patiño and right foot/ great toe amputation site has remained stable and slowly healed.    Remains on oral augmentin; has felt worse over the last week without fever but has worsening nausea, more vomiting and worsening dyspnea.    Denies diarrhea, burning upon urination, rash.    Reports increasing dyspnea on exertion; has only mild cough, admits to fatigue and dizziness as well.      1/23/19; no events overnight; feels well; no complaints, no diarrhea, rash, sore throat;    Feels mildly better; no rash, no yeast infection; family by bedside    Past Medical History:   Diagnosis Date   • Acute bronchitis    • Acute pharyngitis    • Acute sinusitis    • Benign colonic polyp    • Chronic kidney disease    • Edema    • GERD (gastroesophageal reflux disease)    • Heart murmur    • Hiatal hernia    • Hyperkalemia    • Hyperlipidemia    • Hypertension    • Low back pain    • Lumbar disc disease    • MRSA (methicillin resistant Staphylococcus aureus)     Osteomyelitis/methicillin-resistant Staphylococcus aureus of the left foot, April 2013.   • Obesity    • Osteomyelitis (CMS/[facility])      Osteomyelitis/methicillin-resistant Staphylococcus aureus of the left foot, 2013.   • Peptic ulceration 2013    History of peptic ulcer disease, diagnosed 2013 by EGD.  Repeat EGD in 2013 was negative.   • Sepsis (CMS/[facility])     Sepsis, 2013, hospitalized at [facility].   • Tobacco use     Previous tobacco use with cessation in .   • Type 2 diabetes mellitus (CMS/[facility])     Proteinuria noted, 2014.     • Vitamin D deficiency        Past Surgical History:   Procedure Laterality Date   • AMPUTATION DIGIT Right 2018    Procedure: I&D right foot, great toe amputation and 1st Metatarsal amputation;  Surgeon: Chio Sterling MD;  Location: [facility] OR;  Service: Orthopedics   •  SECTION      x2   • FOOT SURGERY Left     Incision and debridement of the left foot x2.   • INSERTION HEMODIALYSIS CATHETER N/A 2018    Procedure: GROSHONG CATHETER PLACEMENT;  Surgeon: Maribel May MD;  Location: [facility] OR;  Service: General   • LASIK     • TONSILLECTOMY         Family History   Problem Relation Age of Onset   • No Known Problems Mother    • No Known Problems Father    • Breast cancer Neg Hx    • Ovarian cancer Neg Hx        Social History     Socioeconomic History   • Marital status:      Spouse name: Not on file   • Number of children: Not on file   • Years of education: Not on file   • Highest education level: Not on file   Social Needs   • Financial resource strain: Not on file   • Food insecurity - worry: Not on file   • Food insecurity - inability: Not on file   • Transportation needs - medical: Not on file   • Transportation needs - non-medical: Not on file   Occupational History   • Not on file   Tobacco Use   • Smoking status: Former Smoker     Packs/day: 1.00     Years: 30.00     Pack years: 30.00     Types: Cigarettes   • Smokeless tobacco: Never Used   • Tobacco comment: quit 7 years ago   Substance and Sexual Activity  "  • Alcohol use: Yes     Alcohol/week: 0.6 oz     Types: 1 Glasses of wine per week     Comment: holidays   • Drug use: No   • Sexual activity: Defer   Other Topics Concern   • Not on file   Social History Narrative    Lives at home in [place]. Has help from children.        Allergies   Allergen Reactions   • Statins Myalgia   • Ancef [Cefazolin] Other (See Comments) and GI Intolerance     Tunnel vision.  (TOLERATES INVANZ)   • Bactrim [Sulfamethoxazole-Trimethoprim] Hives and Nausea And Vomiting   • Cephalosporins GI Intolerance   • Keflex [Cephalexin] Other (See Comments) and GI Intolerance     \"tunnel vision\"..   (pt tolerates INVANZ)   • Levaquin [Levofloxacin]    • Lipitor [Atorvastatin] Myalgia   • Lisinopril    • Losartan Potassium Other (See Comments)     Unknown reaction   • Oxycodone GI Intolerance   • Quinolones    • Cleocin [Clindamycin Hcl] Rash   • Clindamycin/Lincomycin Rash         Medication:    Current Facility-Administered Medications:   •  acetaminophen (TYLENOL) tablet 650 mg, 650 mg, Oral, Q4H PRN, Lilly Garay, APRN  •  albuterol (PROVENTIL) nebulizer solution 0.083% 2.5 mg/3mL, 2.5 mg, Nebulization, Q4H PRN, Lilly Garay, APRN  •  ammonium lactate (AMLACTIN) cream 1 application, 1 application, Topical, BID, Lilly Garay, KAREEM, 1 application at 01/22/19 2044  •  aspirin EC tablet 325 mg, 325 mg, Oral, Daily, Lilly Garay APRN, 325 mg at 01/23/19 0900  •  baclofen (LIORESAL) tablet 5 mg, 5 mg, Oral, Q8H PRN, Lilly Garay, APRN  •  bisacodyl (DULCOLAX) suppository 10 mg, 10 mg, Rectal, Daily PRN, Lilly Garay, APRN  •  dextrose (D50W) 25 g/ 50mL Intravenous Solution 25 g, 25 g, Intravenous, Q15 Min PRN, Lilly Garay, APRN  •  dextrose (GLUTOSE) oral gel 15 g, 15 g, Oral, Q15 Min PRN, Lilly Garay, APRN  •  diltiaZEM CD (CARDIZEM CD) 24 hr capsule 120 mg, 120 mg, Oral, Q24H, Lilly Garay APRN, 120 mg at 01/22/19 2042  •  docusate sodium (COLACE) " capsule 100 mg, 100 mg, Oral, BID, Lilly Garay APRN, 100 mg at 01/23/19 0900  •  doxycycline (VIBRAMYCIN) 100 mg/100 mL 0.9% NS MBP, 100 mg, Intravenous, Q12H, Hung Haynes MD, 100 mg at 01/23/19 0412  •  ertapenem (INVanz) 500 mg/50 mL 0.9% NS, 500 mg, Intravenous, Q24H, Hung Haynes MD, 500 mg at 01/23/19 1030  •  glucagon (human recombinant) (GLUCAGEN DIAGNOSTIC) injection 1 mg, 1 mg, Subcutaneous, PRN, Lilly Garay APRN  •  heparin (porcine) 5000 UNIT/ML injection 5,000 Units, 5,000 Units, Subcutaneous, Q8H, Lilly Garay APRN, 5,000 Units at 01/23/19 1332  •  HYDROcodone-acetaminophen (NORCO) 7.5-325 MG per tablet 1 tablet, 1 tablet, Oral, Q8H PRN, Lilly Garay APRN  •  insulin lispro (humaLOG) injection 0-9 Units, 0-9 Units, Subcutaneous, 4x Daily With Meals & Nightly, Lilly Garay APRN  •  ipratropium-albuterol (DUO-NEB) nebulizer solution 3 mL, 3 mL, Nebulization, Q4H - RT, Lilly Garay APRN, 3 mL at 01/23/19 1206  •  lactobacillus acidophilus (RISAQUAD) capsule 1 capsule, 1 capsule, Oral, Daily, Lilly Garay APRN, 1 capsule at 01/23/19 0900  •  magnesium sulfate 4 gram infusion - Mg less than or equal to 1mg/dL, 4 g, Intravenous, PRN **OR** magnesium sulfate 3 gram infusion (1gm x 3) - Mg 1.1 - 1.5 mg/dL, 1 g, Intravenous, PRN **OR** Magnesium Sulfate 2 gram infusion- Mg 1.6 - 1.9 mg/dL, 2 g, Intravenous, PRN, Lilly Garay APRN  •  [START ON 1/24/2019] mupirocin (BACTROBAN) 2 % ointment, , Topical, Q24H, Moi Yee MD  •  ondansetron (ZOFRAN) injection 4 mg, 4 mg, Intravenous, Q6H PRN, Lilly Garay, APRN, 4 mg at 01/23/19 1331  •  pantoprazole (PROTONIX) EC tablet 40 mg, 40 mg, Oral, QAM, Lilly Garay, APRN, 40 mg at 01/23/19 0602  •  potassium chloride (MICRO-K) CR capsule 20 mEq, 20 mEq, Oral, PRN, 20 mEq at 01/23/19 1332 **OR** potassium chloride (KLOR-CON) packet 20 mEq, 20 mEq, Oral, PRN **OR** potassium chloride 10  mEq in 100 mL IVPB, 10 mEq, Intravenous, Q1H PRN, Moi Yee MD  •  sodium chloride 0.9 % flush 10 mL, 10 mL, Intravenous, PRN, Nigel Faustin MD  •  sodium chloride 0.9 % flush 3 mL, 3 mL, Intravenous, Q12H, Lilly Garay, APRN, 3 mL at 19 0900  •  sodium chloride 0.9 % flush 3-10 mL, 3-10 mL, Intravenous, PRN, Annalee Garayecca LUANA, APRN  •  sodium chloride 0.9 % infusion, 75 mL/hr, Intravenous, Continuous, Annalee Garayecca LUANA, APRN  •  sodium chloride 0.9 % infusion, 75 mL/hr, Intravenous, Continuous, Moi Yee MD    Antibiotics:  Anti-Infectives (From admission, onward)    Ordered     Dose/Rate Route Frequency Start Stop    19 1518  ertapenem (INVanz) 500 mg/50 mL 0.9% NS     Ordering Provider:  Hung Haynes MD    500 mg  over 30 Minutes Intravenous Every 24 Hours 19 1000 19 0959    19 1518  doxycycline (VIBRAMYCIN) 100 mg/100 mL 0.9% NS MBP     Ordering Provider:  Hung Haynes MD    100 mg  over 60 Minutes Intravenous Every 12 Hours 19 1600 19 1559    19 1358  ertapenem (INVanz) 500 mg/50 mL 0.9% NS     Ordering Provider:  Nigel Faustin MD    500 mg  over 30 Minutes Intravenous Once 19 1400 19 1530            Review of Systems:  See hpi    Physical Exam:   Vital Signs  Temp (24hrs), Av.2 °F (36.8 °C), Min:97.9 °F (36.6 °C), Max:99 °F (37.2 °C)    Temp  Min: 97.9 °F (36.6 °C)  Max: 99 °F (37.2 °C)  BP  Min: 101/51  Max: 133/76  Pulse  Min: 70  Max: 122  Resp  Min: 16  Max: 26  SpO2  Min: 92 %  Max: 97 %    GENERAL: Awake and alert, in no acute distress.   HEENT: Normocephalic, atraumatic.  PERRL. EOMI. No conjunctival injection. No icterus.     HEART: RRR; No murmur, rubs, gallops.   LUNGS: Clear to auscultation bilaterally without wheezing, rales, rhonchi. Normal respiratory effort.   ABDOMEN: Soft, nontender, nondistended. Positive bowel sounds.   EXT:  No cyanosis, clubbing or edema. No cord.    MSK:  right foot wound c/d/i, no erythema, no drainage, appears stable   SKIN: Warm and dry without cutaneous eruptions on Inspection/palpation.    NEURO: Oriented to PPT. No focal deficits on motor/sensory exam at arms/legs.  PSYCHIATRIC: Normal insight and judgement. Cooperative with PE    Laboratory Data    Results from last 7 days   Lab Units 01/23/19  0532 01/22/19  1258   WBC 10*3/mm3 17.26* 22.33*   HEMOGLOBIN g/dL 8.0* 9.7*   HEMATOCRIT % 25.0* 29.6*   PLATELETS 10*3/mm3 553* 615*     Results from last 7 days   Lab Units 01/23/19  0532   SODIUM mmol/L 137   POTASSIUM mmol/L 3.2*   CHLORIDE mmol/L 96*   CO2 mmol/L 22.0   BUN mg/dL 130*   CREATININE mg/dL 4.86*   GLUCOSE mg/dL 51*   CALCIUM mg/dL 7.9*     Results from last 7 days   Lab Units 01/22/19  1258   ALK PHOS U/L 142*   BILIRUBIN mg/dL 0.2*   ALT (SGPT) U/L 14   AST (SGOT) U/L 24             Results from last 7 days   Lab Units 01/22/19  1421   LACTATE mmol/L 1.6             Estimated Creatinine Clearance: 15.2 mL/min (A) (by C-G formula based on SCr of 4.86 mg/dL (H)).      Microbiology:                                Radiology:  Imaging Results (last 72 hours)     Procedure Component Value Units Date/Time    XR Chest 1 View [[id number]] Collected:  01/22/19 1428     Updated:  01/22/19 1428    Narrative:       EXAMINATION: XR CHEST 1 VW- 01/22/2019     INDICATION: shortness of air triage protocol      COMPARISON: 12/13/2018     FINDINGS: New since the prior study is patchy disease in the left base  which could represent atelectasis, but in the setting of fever should be  considered potentially pneumonia. Lungs are clear elsewhere. There is  minimal if any left effusion. Heart is upper normal size. Vasculature  appears normal.           Impression:       New left lower lobe atelectasis versus pneumonia.     D:  01/22/2019  E:  01/22/2019               Impression:   Leukocytosis with neutrophilia  Acute on chronic renal failure  Left side retrocardiac  pneumonia  Nausea/vomiting  Improving right foot abscess  Cephalosporin, t/s, levoflox, clinda allergy    PLAN/RECOMMENDATIONS:   Thank you for asking us to see Tashia Leon, I recommend the following:  Cover for aspiration pneumonia.    D/w Dr. Yee  cont  invanz 500 mg iv q24 ok for now  cont doxycycline 100 mg I vq24h for atypical coverage    F/u blood cultures    D/w family  Patient showing improvement in creatinine, wbc  Continue current therapy  Hung Haynes MD  1/23/2019  2:56 PM

## 2019-01-23 NOTE — PROGRESS NOTES
Discharge Planning Assessment  [facility]     Patient Name: Tashia Leon  MRN: [id number]  Today's Date: 1/23/2019    Admit Date: 1/22/2019    Discharge Needs Assessment     Row Name 01/23/19 0941       Living Environment    Lives With  child(jeannie), adult;parent(s)    Current Living Arrangements  home/apartment/condo    Living Arrangement Comments  Lives with her mother and granddaughter in one level home.       Discharge Needs Assessment    Equipment Currently Used at Home  rollator    Equipment Needed After Discharge  none    Discharge Coordination/Progress  States she has a nurse relative who changes her foot dressing. Foot surgery back in Nov. Denies being current with         Discharge Plan     Row Name 01/23/19 0943       Plan    Plan  Home    Patient/Family in Agreement with Plan  yes    Plan Comments  I spoke with the pt. Her goal is home at DC. Denies DC needs at this time.    Row Name 01/23/19 0856       Plan    Final Discharge Disposition Code  01 - home or self-care        Destination      No service coordination in this encounter.      Durable Medical Equipment      No service coordination in this encounter.      Dialysis/Infusion      No service coordination in this encounter.      Home Medical Care      No service coordination in this encounter.      Community Resources      No service coordination in this encounter.        Expected Discharge Date and Time     Expected Discharge Date Expected Discharge Time    Jan 25, 2019         Demographic Summary    No documentation.       Functional Status     Row Name 01/23/19 0941       Functional Status    Usual Activity Tolerance  moderate       Functional Status, IADL    Medications  independent    Meal Preparation  independent    Housekeeping  independent    Laundry  independent    Shopping  independent        Psychosocial    No documentation.       Abuse/Neglect    No documentation.       Legal    No documentation.       Substance Abuse    No  documentation.       Patient Forms    No documentation.           Monet Begum RN

## 2019-01-23 NOTE — PROGRESS NOTES
[facility] Medicine Services  PROGRESS NOTE    Patient Name: Tashia Leon  : 1955  MRN: [id number]    Date of Admission: 2019  Length of Stay: 1  Primary Care Physician: Angelica Tran PA    Subjective   Subjective     CC:  Pneumonia, freda    HPI:  Feels better today but still not baseline. Cough improved. Tolerating some po.     Review of Systems  No headache    Otherwise ROS is negative except as mentioned in the HPI.    Objective   Objective     Vital Signs:   Temp:  [97.9 °F (36.6 °C)-99 °F (37.2 °C)] 97.9 °F (36.6 °C)  Heart Rate:  [] 76  Resp:  [16-26] 16  BP: (101-133)/(44-76) 131/44        Physical Exam:  Alert, ill but nontoxic appearing, obese, no distress  Slightly dry mucous membranes, ncat  Face symmetric, speech clear, equal   rrr  Decreased air movement left lung base, no overt wheezes  abd soft, obese  RLE cleanly dressed  Appropriate affect    Results Reviewed:  I have personally reviewed current lab, radiology, and data and agree.    Results from last 7 days   Lab Units 19  0532 19  1258   WBC 10*3/mm3 17.26* 22.33*   HEMOGLOBIN g/dL 8.0* 9.7*   HEMATOCRIT % 25.0* 29.6*   PLATELETS 10*3/mm3 553* 615*     Results from last 7 days   Lab Units 19  0532 19  0123 19  1301 19  1258   SODIUM mmol/L 137  --   --  132   POTASSIUM mmol/L 3.2* 2.9*  --  2.7*   CHLORIDE mmol/L 96*  --   --  89*   CO2 mmol/L 22.0  --   --  25.0   BUN mg/dL 130*  --   --  134*   CREATININE mg/dL 4.86*  --   --  5.23*   GLUCOSE mg/dL 51*  --   --  74   CALCIUM mg/dL 7.9*  --   --  9.5   ALT (SGPT) U/L  --   --   --  14   AST (SGOT) U/L  --   --   --  24   TROPONIN I ng/mL  --   --  0.00  --      Estimated Creatinine Clearance: 15.2 mL/min (A) (by C-G formula based on SCr of 4.86 mg/dL (H)).    BNP   Date Value Ref Range Status   2019 370.0 (H) 0.0 - 100.0 pg/mL Final     Comment:     Results may be falsely decreased if patient  taking Biotin.       Microbiology Results Abnormal     Procedure Component Value - Date/Time    Blood Culture - Blood, Arm, Left [[id number]] Collected:  01/22/19 1421    Lab Status:  Preliminary result Specimen:  Blood from Arm, Left Updated:  01/23/19 1516     Blood Culture No growth at 24 hours    Blood Culture - Blood, Arm, Right [[id number]] Collected:  01/22/19 1455    Lab Status:  Preliminary result Specimen:  Blood from Arm, Right Updated:  01/23/19 1516     Blood Culture No growth at 24 hours    Respiratory Culture - Sputum, Cough [[id number]] Collected:  01/23/19 0739    Lab Status:  Preliminary result Specimen:  Sputum from Cough Updated:  01/23/19 0929     Gram Stain Few (2+) WBCs per low power field      Moderate (3+) Epithelial cells per low power field      Many (4+) Gram positive cocci in pairs and chains      Rare (1+) Gram positive cocci in groups      Few (2+) Gram negative bacilli    Influenza A & B, RT PCR - Swab, Nasopharynx [[id number]]  (Normal) Collected:  01/22/19 1427    Lab Status:  Final result Specimen:  Swab from Nasopharynx Updated:  01/22/19 1519     Influenza A PCR Not Detected     Influenza B PCR Not Detected          Imaging Results (last 24 hours)     Procedure Component Value Units Date/Time    XR Chest 1 View [[id number]] Collected:  01/22/19 1428     Updated:  01/22/19 2123    Narrative:       EXAMINATION: XR CHEST 1 VW- 01/22/2019     INDICATION: shortness of air triage protocol      COMPARISON: 12/13/2018     FINDINGS: New since the prior study is patchy disease in the left base  which could represent atelectasis, but in the setting of fever should be  considered potentially pneumonia. Lungs are clear elsewhere. There is  minimal if any left effusion. Heart is upper normal size. Vasculature  appears normal.           Impression:       New left lower lobe atelectasis versus pneumonia.     D:  01/22/2019  E:  01/22/2019     This report was finalized on 1/22/2019 9:21 PM by DR. Payan  MD Abdullahi.             Results for orders placed during the hospital encounter of 07/15/16   Adult transthoracic echo complete    Narrative · Left ventricular function is normal. Estimated EF = 55%.  · Trace mitral valve regurgitation is present.  · Trace tricuspid valve regurgitation is present          I have reviewed the medications:    Current Facility-Administered Medications:   •  acetaminophen (TYLENOL) tablet 650 mg, 650 mg, Oral, Q4H PRN, Sweta Garayca R, APRN  •  albuterol (PROVENTIL) nebulizer solution 0.083% 2.5 mg/3mL, 2.5 mg, Nebulization, Q4H PRN, Lilly Garay R, APRN  •  ammonium lactate (AMLACTIN) cream 1 application, 1 application, Topical, BID, Lilly Garay APRN, 1 application at 01/22/19 2044  •  aspirin EC tablet 325 mg, 325 mg, Oral, Daily, Sweta Garayca LUANA, APRN, 325 mg at 01/23/19 0900  •  baclofen (LIORESAL) tablet 5 mg, 5 mg, Oral, Q8H PRN, Lilly Garay, APRN  •  bisacodyl (DULCOLAX) suppository 10 mg, 10 mg, Rectal, Daily PRN, Lilly Garay R, APRN  •  dextrose (D50W) 25 g/ 50mL Intravenous Solution 25 g, 25 g, Intravenous, Q15 Min PRN, Sweta Garayca R, APRN  •  dextrose (GLUTOSE) oral gel 15 g, 15 g, Oral, Q15 Min PRN, Sweta Garayca R, APRN  •  diltiaZEM CD (CARDIZEM CD) 24 hr capsule 120 mg, 120 mg, Oral, Q24H, Sweta Garayca R, APRN, 120 mg at 01/22/19 2042  •  docusate sodium (COLACE) capsule 100 mg, 100 mg, Oral, BID, Lilly Garay, APRN, 100 mg at 01/23/19 0900  •  doxycycline (VIBRAMYCIN) 100 mg/100 mL 0.9% NS MBP, 100 mg, Intravenous, Q12H, Hung Haynes MD, 100 mg at 01/23/19 1532  •  ertapenem (INVanz) 500 mg/50 mL 0.9% NS, 500 mg, Intravenous, Q24H, Hung Haynes MD, 500 mg at 01/23/19 1030  •  glucagon (human recombinant) (GLUCAGEN DIAGNOSTIC) injection 1 mg, 1 mg, Subcutaneous, PRN, Lilly Garay, APRN  •  heparin (porcine) 5000 UNIT/ML injection 5,000 Units, 5,000 Units, Subcutaneous, Q8H, Lilly Garay, KAREEM, 5,000 Units at  01/23/19 1332  •  HYDROcodone-acetaminophen (NORCO) 7.5-325 MG per tablet 1 tablet, 1 tablet, Oral, Q8H PRN, Lilly Garay APRN  •  insulin lispro (humaLOG) injection 0-9 Units, 0-9 Units, Subcutaneous, 4x Daily With Meals & Nightly, Lilly Garay APRN  •  ipratropium-albuterol (DUO-NEB) nebulizer solution 3 mL, 3 mL, Nebulization, Q4H - RT, Lilly Garay APRN, 3 mL at 01/23/19 1609  •  lactobacillus acidophilus (RISAQUAD) capsule 1 capsule, 1 capsule, Oral, Daily, Lilly Garay APRN, 1 capsule at 01/23/19 0900  •  magnesium sulfate 4 gram infusion - Mg less than or equal to 1mg/dL, 4 g, Intravenous, PRN **OR** magnesium sulfate 3 gram infusion (1gm x 3) - Mg 1.1 - 1.5 mg/dL, 1 g, Intravenous, PRN **OR** Magnesium Sulfate 2 gram infusion- Mg 1.6 - 1.9 mg/dL, 2 g, Intravenous, PRN, Lilly Garay APRN  •  [START ON 1/24/2019] mupirocin (BACTROBAN) 2 % ointment, , Topical, Q24H, Moi Yee MD  •  ondansetron (ZOFRAN) injection 4 mg, 4 mg, Intravenous, Q6H PRN, Lilly Garay APRN, 4 mg at 01/23/19 1331  •  pantoprazole (PROTONIX) EC tablet 40 mg, 40 mg, Oral, QAM, Lilly Garay APRN, 40 mg at 01/23/19 0602  •  potassium chloride (MICRO-K) CR capsule 20 mEq, 20 mEq, Oral, PRN, 20 mEq at 01/23/19 1332 **OR** potassium chloride (KLOR-CON) packet 20 mEq, 20 mEq, Oral, PRN **OR** potassium chloride 10 mEq in 100 mL IVPB, 10 mEq, Intravenous, Q1H PRN, Moi Yee MD  •  sodium chloride 0.9 % flush 10 mL, 10 mL, Intravenous, PRN, Nigel Faustin MD  •  sodium chloride 0.9 % flush 3 mL, 3 mL, Intravenous, Q12H, Lilly Garay APRN, 3 mL at 01/23/19 0900  •  sodium chloride 0.9 % flush 3-10 mL, 3-10 mL, Intravenous, PRN, Lilly Garay APRN  •  sodium chloride 0.9 % infusion, 75 mL/hr, Intravenous, Continuous, Moi Yee MD, Last Rate: 75 mL/hr at 01/23/19 1532, 75 mL/hr at 01/23/19 1532      Assessment/Plan   Assessment / Plan     Active Hospital Problems     Diagnosis Date Noted   • **LLL pneumonia (CMS/[facility]) [J18.1] 01/22/2019   • Partial nontraumatic amputation of foot, right (CMS/[facility]) [Z89.431] 01/22/2019   • Neutrophilic leukocytosis [D72.9] 01/22/2019   • Acute renal failure superimposed on stage 4 chronic kidney disease (CMS/[facility]) [N17.9, N18.4] 01/22/2019   • Nausea and vomiting [R11.2] 01/22/2019   • Pneumonia of left lower lobe due to infectious organism (CMS/[facility]) [J18.1] 01/22/2019   • Hypokalemia [E87.6] 11/26/2018   • Anemia, chronic disease [D63.8] 11/26/2018   • Uncontrolled type 2 diabetes mellitus with hyperglycemia (CMS/[facility]) [E11.65] 08/17/2016     Proteinuria noted, January 2014.       • GERD (gastroesophageal reflux disease) [K21.9] 07/25/2016   • Dyspnea [R06.00] 06/22/2016   • Hyperlipemia [E78.5] 06/22/2016   • Essential hypertension [I10] 06/22/2016          Brief Hospital Course to date:  Tashia Leon is a 63 y.o. female w/ IDDM, chronic DHF, ckd 4 (baseline cr ~3.3), prior right diabetic foot abscess (s/p right great toe and metatarsal amputation 12/4/18 by dr. Patiño). Has been on oral augmentin and followed by Dr. Haynes of infectoius disease. Has nausea and occasional vomiting which she feels is brought on by her augmentin use.  Presented to ER with with dyspnea, cough, sputum yellowish/clear. In ER wbc 22,000. Creatinine 5.2, potassium 2.7. Of note, had recently been started back on lasix 40mg bid for past few weeks prior to admission, but recently decreased po intake w/ nausea/emesis.        -Pneumonia  -ANGELLA upon Ckd 4 (baseline cr ~3.3)  -hypokalemia  -Hx right diabetic abscess, s/p great toe and transmetatarsal amputation 12/4/18  -Hx chronic DHF (2016 ef 55% w/ diastolic dysfunction)  -IDDM w/ hypoglycemia  -Anemia (normocytic)    Plan:  -dr. Ivy hylton, Invanz, doxy, follow cultures  -continue duonebs, pulm toilet  -continue gentle hydration w/ ns 75cc/hr x another 24 hours  -dr. blevins of nephrology to see and assist in  optimizing renal function  -nonweight bearing right LE  -titrate insulins prn (normally on significant insulin doses, currently requiring only sliding scale)  -replace potassium at 1/2 usual sliding scale  -wean oxygen as tolerates    -cbc, bmp, iron, b12, guaic stool in a.m.    -home couple days when feeling better +/- oxygen, and ok w/ consultants    DVT Prophylaxis:  Sq heparin      CODE STATUS:   Code Status and Medical Interventions:   Ordered at: 01/22/19 6194     Level Of Support Discussed With:    Patient     Code Status:    CPR     Medical Interventions (Level of Support Prior to Arrest):    Full         Electronically signed by Moi Yee MD, 01/23/19, 5:26 PM.

## 2019-01-23 NOTE — THERAPY EVALUATION
Acute Care - Speech Language Pathology   Swallow Initial Evaluation [facility]     Patient Name: Tashia Leon  : 1955  MRN: [id number]  Today's Date: 2019               Admit Date: 2019    Visit Dx:     ICD-10-CM ICD-9-CM   1. Pneumonia of left lower lobe due to infectious organism (CMS/[facility]) J18.1 486   2. Acute renal failure superimposed on chronic kidney disease, unspecified CKD stage, unspecified acute renal failure type (CMS/[facility]) N17.9 584.9    N18.9 585.9   3. Sepsis, due to unspecified organism (CMS/[facility]) A41.9 038.9     995.91   4. Hypokalemia E87.6 276.8   5. Dysphagia, unspecified type R13.10 787.20     Patient Active Problem List   Diagnosis   • Coronary artery disease involving native coronary artery of native heart without angina pectoris   • Dyspnea   • Essential hypertension   • Hyperlipemia   • Lower extremity edema   • Carotid artery stenosis   • Carotid bruit   • Diabetes mellitus with neurological manifestations, uncontrolled (CMS/[facility])   • Diabetic polyneuropathy associated with type 2 diabetes mellitus (CMS/[facility])   • Moderate nonproliferative diabetic retinopathy without macular edema associated with type 2 diabetes mellitus (CMS/[facility])   • Functional murmur   • GERD (gastroesophageal reflux disease)   • Peptic ulcer   • Vitamin D deficiency   • Health care maintenance   • Uncontrolled type 2 diabetes mellitus with hyperglycemia (CMS/[facility])   • Hyperlipidemia   • Obesity   • Osteomyelitis (CMS/[facility])   • MRSA (methicillin resistant Staphylococcus aureus)   • Lumbar disc disease   • Hearing loss   • Hyperkalemia   • Secondary hyperparathyroidism (CMS/[facility])   • Idiopathic hypochromic anemia   • Chronic kidney disease, stage V (CMS/[facility])   • Diabetic ulcer of right foot associated with type 2 diabetes mellitus (CMS/[facility])   • Sepsis (CMS/[facility])   • Cellulitis   • Hypokalemia   • Anemia, chronic disease   • Obesity (BMI 30-39.9)   • Impaired functional mobility, balance, gait, and  endurance   • Diabetic ulcer of right midfoot associated with type 2 diabetes mellitus, with necrosis of bone (CMS/HCC)   • Cellulitis of toe of right foot   • Chronic osteomyelitis of right foot with draining sinus (CMS/HCC)   • LLL pneumonia (CMS/HCC)   • Partial nontraumatic amputation of foot, right (CMS/HCC)   • Neutrophilic leukocytosis   • Acute renal failure superimposed on stage 4 chronic kidney disease (CMS/HCC)   • Nausea and vomiting   • Pneumonia of left lower lobe due to infectious organism (CMS/HCC)     Past Medical History:   Diagnosis Date   • Acute bronchitis    • Acute pharyngitis    • Acute sinusitis    • Benign colonic polyp    • Chronic kidney disease    • Edema    • GERD (gastroesophageal reflux disease)    • Heart murmur    • Hiatal hernia    • Hyperkalemia    • Hyperlipidemia    • Hypertension    • Low back pain    • Lumbar disc disease    • MRSA (methicillin resistant Staphylococcus aureus)     Osteomyelitis/methicillin-resistant Staphylococcus aureus of the left foot, 2013.   • Obesity    • Osteomyelitis (CMS/HCC)     Osteomyelitis/methicillin-resistant Staphylococcus aureus of the left foot, 2013.   • Peptic ulceration 2013    History of peptic ulcer disease, diagnosed 2013 by EGD.  Repeat EGD in 2013 was negative.   • Sepsis (CMS/HCC)     Sepsis, 2013, hospitalized at [facility].   • Tobacco use     Previous tobacco use with cessation in .   • Type 2 diabetes mellitus (CMS/HCC)     Proteinuria noted, 2014.     • Vitamin D deficiency      Past Surgical History:   Procedure Laterality Date   • AMPUTATION DIGIT Right 2018    Procedure: I&D right foot, great toe amputation and 1st Metatarsal amputation;  Surgeon: Chio Sterling MD;  Location: [facility];  Service: Orthopedics   •  SECTION      x2   • FOOT SURGERY Left     Incision and debridement of the left foot x2.   • INSERTION HEMODIALYSIS CATHETER  N/A 12/11/2018    Procedure: GROSHONG CATHETER PLACEMENT;  Surgeon: Maribel May MD;  Location: [facility];  Service: General   • LASIK     • TONSILLECTOMY          SWALLOW EVALUATION (last 72 hours)      SLP Adult Swallow Evaluation     Row Name 01/23/19 1200                   Rehab Evaluation    Document Type  evaluation  -JA        Subjective Information  no complaints  -JA        Patient Observations  alert;cooperative  -JA        Patient Effort  fair  -JA        Comment  Pt reports she doesn't have a swallowing problem   -JA           General Information    Patient Profile Reviewed  yes  -JA        Pertinent History Of Current Problem  LLL PNA   -JA        Current Method of Nutrition  regular textures  -JA        Precautions/Limitations, Vision  WFL  -JA        Precautions/Limitations, Hearing  WFL  -JA        Prior Level of Function-Communication  WFL  -JA        Prior Level of Function-Swallowing  no diet consistency restrictions  -JA        Plans/Goals Discussed with  patient  -JA        Barriers to Rehab  none identified  -JA           Oral Motor and Function    Dentition Assessment  natural, present and adequate  -JA        Secretion Management  WNL/WFL  -JA        Volitional Swallow  WFL  -JA        Volitional Cough  WFL  -JA           Oral Musculature and Cranial Nerve Assessment    Oral Motor General Assessment  WFL  -JA           General Eating/Swallowing Observations    Respiratory Support Currently in Use  room air  -JA        Eating/Swallowing Skills  self-fed  -JA        Positioning During Eating  upright 90 degree  -JA        Utensils Used  cup;spoon;straw  -JA        Consistencies Trialed  regular textures;pureed with some mashed;thin liquids;nectar/syrup-thick liquids  -JA           Respiratory    Respiratory Status  WFL  -JA           Clinical Swallow Eval    Oral Prep Phase  WFL  -JA        Oral Transit  WFL  -JA        Oral Residue  WFL  -JA        Pharyngeal Phase  suspected  pharyngeal impairment  -        Esophageal Phase  unremarkable  -        Clinical Swallow Evaluation Summary  Clinical swallow evaluation completed. Pt presents w/ cough at baseline and after thin liquids via straw. No overt signs/symptoms of aspiration w/ nectar, pureed and solid. Recommend: Regular and nectar thick liquids, straws ok, meds w/ thickened liquids. FEES. Pt says she is fine and doesn't have swallowing problems and doesn't want an instrumental or modified diet. Notified MD who says he will re-consult if patient changes her mind. Will sign off.   -           Pharyngeal Phase Concerns    Pharyngeal Phase Concerns  cough  -ANTONIO        Cough  thin  -           Clinical Impression    SLP Swallowing Diagnosis  functional oral phase  -        Functional Impact  risk of aspiration/pneumonia  -        Rehab Potential/Prognosis, Swallowing  good, to achieve stated therapy goals  -        Swallow Criteria for Skilled Therapeutic Interventions Met  patient/family declined skilled intervention at this time  -           Recommendations    Therapy Frequency (Swallow)  evaluation only  -        SLP Diet Recommendation  mechanical soft with no mixed consistencies;nectar thick liquids  -        Recommended Diagnostics  FEES  -        Recommended Precautions and Strategies  upright posture during/after eating  -        SLP Rec. for Method of Medication Administration  with thick liquids  -        Monitor for Signs of Aspiration  yes;notify SLP if any concerns  -        Anticipated Dischage Disposition  home  -          User Key  (r) = Recorded By, (t) = Taken By, (c) = Cosigned By    Initials Name Effective Dates    Svetlana Paulino, CCC-SLP 06/22/15 -           EDUCATION  The patient has been educated in the following areas:   Dysphagia (Swallowing Impairment) Oral Care/Hydration Modified Diet Instruction.    SLP Recommendation and Plan  SLP Swallowing Diagnosis: functional oral  phase  SLP Diet Recommendation: mechanical soft with no mixed consistencies, nectar thick liquids  Recommended Precautions and Strategies: upright posture during/after eating     Monitor for Signs of Aspiration: yes, notify SLP if any concerns  Recommended Diagnostics: FEES  Swallow Criteria for Skilled Therapeutic Interventions Met: patient/family declined skilled intervention at this time  Anticipated Dischage Disposition: home  Rehab Potential/Prognosis, Swallowing: good, to achieve stated therapy goals  Therapy Frequency (Swallow): evaluation only          Plan of Care Reviewed With: patient  Plan of Care Review  Plan of Care Reviewed With: patient           Time Calculation:   Time Calculation- SLP     Row Name 01/23/19 1251             Time Calculation- SLP    SLP Start Time  1200  -JA      SLP Received On  01/23/19  -ANTONIO        User Key  (r) = Recorded By, (t) = Taken By, (c) = Cosigned By    Initials Name Provider Type    Svetlana Paulino CCC-SLP Speech and Language Pathologist          Therapy Charges for Today     Code Description Service Date Service Provider Modifiers Qty    [id number]  ST EVAL ORAL PHARYNG SWALLOW 3 1/23/2019 Svetlana Angulo CCC-SLP GN 1               Svetlana HACKETT. SAMARA Angulo  1/23/2019

## 2019-01-23 NOTE — PLAN OF CARE
Problem: Patient Care Overview  Goal: Plan of Care Review  Outcome: Ongoing (interventions implemented as appropriate)   01/23/19 1250   Coping/Psychosocial   Plan of Care Reviewed With patient   SLP evaluation completed. Will sign-off as pt declined therapy and evaluation. Please see note for further details and recommendations.

## 2019-01-24 LAB
ALBUMIN SERPL-MCNC: 3.24 G/DL (ref 3.2–4.8)
ANION GAP SERPL CALCULATED.3IONS-SCNC: 14 MMOL/L (ref 3–11)
BUN BLD-MCNC: 114 MG/DL (ref 9–23)
BUN/CREAT SERPL: 27.7 (ref 7–25)
CALCIUM SPEC-SCNC: 7.7 MG/DL (ref 8.7–10.4)
CHLORIDE SERPL-SCNC: 99 MMOL/L (ref 99–109)
CO2 SERPL-SCNC: 22 MMOL/L (ref 20–31)
CREAT BLD-MCNC: 4.11 MG/DL (ref 0.6–1.3)
DEPRECATED RDW RBC AUTO: 51.7 FL (ref 37–54)
ERYTHROCYTE [DISTWIDTH] IN BLOOD BY AUTOMATED COUNT: 15.1 % (ref 11.3–14.5)
FERRITIN SERPL-MCNC: 817 NG/ML (ref 10–291)
GFR SERPL CREATININE-BSD FRML MDRD: 11 ML/MIN/1.73
GLUCOSE BLD-MCNC: 185 MG/DL (ref 70–100)
GLUCOSE BLDC GLUCOMTR-MCNC: 114 MG/DL (ref 70–130)
GLUCOSE BLDC GLUCOMTR-MCNC: 123 MG/DL (ref 70–130)
GLUCOSE BLDC GLUCOMTR-MCNC: 133 MG/DL (ref 70–130)
GLUCOSE BLDC GLUCOMTR-MCNC: 203 MG/DL (ref 70–130)
GLUCOSE BLDC GLUCOMTR-MCNC: 268 MG/DL (ref 70–130)
GLUCOSE BLDC GLUCOMTR-MCNC: 42 MG/DL (ref 70–130)
GLUCOSE BLDC GLUCOMTR-MCNC: 57 MG/DL (ref 70–130)
GLUCOSE BLDC GLUCOMTR-MCNC: 67 MG/DL (ref 70–130)
HCT VFR BLD AUTO: 24.2 % (ref 34.5–44)
HGB BLD-MCNC: 7.4 G/DL (ref 11.5–15.5)
IRON 24H UR-MRATE: 20 MCG/DL (ref 50–175)
IRON SATN MFR SERPL: 9 % (ref 15–50)
MCH RBC QN AUTO: 29.2 PG (ref 27–31)
MCHC RBC AUTO-ENTMCNC: 30.6 G/DL (ref 32–36)
MCV RBC AUTO: 95.7 FL (ref 80–99)
PHOSPHATE SERPL-MCNC: 6.8 MG/DL (ref 2.4–5.1)
PLATELET # BLD AUTO: 537 10*3/MM3 (ref 150–450)
PMV BLD AUTO: 9.9 FL (ref 6–12)
POTASSIUM BLD-SCNC: 3.9 MMOL/L (ref 3.5–5.5)
RBC # BLD AUTO: 2.53 10*6/MM3 (ref 3.89–5.14)
SODIUM BLD-SCNC: 135 MMOL/L (ref 132–146)
TIBC SERPL-MCNC: 234 MCG/DL (ref 250–450)
VIT B12 BLD-MCNC: 510 PG/ML (ref 211–911)
WBC NRBC COR # BLD: 15.7 10*3/MM3 (ref 3.5–10.8)

## 2019-01-24 PROCEDURE — 83540 ASSAY OF IRON: CPT | Performed by: INTERNAL MEDICINE

## 2019-01-24 PROCEDURE — 82607 VITAMIN B-12: CPT | Performed by: INTERNAL MEDICINE

## 2019-01-24 PROCEDURE — 25010000002 ERTAPENEM 500MG/50 ML SOLUTION: Performed by: INTERNAL MEDICINE

## 2019-01-24 PROCEDURE — 94799 UNLISTED PULMONARY SVC/PX: CPT

## 2019-01-24 PROCEDURE — 25010000002 ONDANSETRON PER 1 MG: Performed by: NURSE PRACTITIONER

## 2019-01-24 PROCEDURE — 25010000002 PROMETHAZINE PER 50 MG: Performed by: INTERNAL MEDICINE

## 2019-01-24 PROCEDURE — 83550 IRON BINDING TEST: CPT | Performed by: INTERNAL MEDICINE

## 2019-01-24 PROCEDURE — 82962 GLUCOSE BLOOD TEST: CPT

## 2019-01-24 PROCEDURE — 80069 RENAL FUNCTION PANEL: CPT | Performed by: INTERNAL MEDICINE

## 2019-01-24 PROCEDURE — 25010000002 HEPARIN (PORCINE) PER 1000 UNITS: Performed by: INTERNAL MEDICINE

## 2019-01-24 PROCEDURE — 82728 ASSAY OF FERRITIN: CPT | Performed by: INTERNAL MEDICINE

## 2019-01-24 PROCEDURE — 25010000002 NA FERRIC GLUC CPLX PER 12.5 MG: Performed by: INTERNAL MEDICINE

## 2019-01-24 PROCEDURE — 85027 COMPLETE CBC AUTOMATED: CPT | Performed by: INTERNAL MEDICINE

## 2019-01-24 PROCEDURE — 99232 SBSQ HOSP IP/OBS MODERATE 35: CPT | Performed by: INTERNAL MEDICINE

## 2019-01-24 RX ORDER — PROMETHAZINE HYDROCHLORIDE 12.5 MG/1
12.5 TABLET ORAL EVERY 6 HOURS PRN
Status: DISCONTINUED | OUTPATIENT
Start: 2019-01-24 | End: 2019-01-25 | Stop reason: HOSPADM

## 2019-01-24 RX ORDER — PROMETHAZINE HYDROCHLORIDE 12.5 MG/1
12.5 SUPPOSITORY RECTAL EVERY 6 HOURS PRN
Status: DISCONTINUED | OUTPATIENT
Start: 2019-01-24 | End: 2019-01-25 | Stop reason: HOSPADM

## 2019-01-24 RX ORDER — PROMETHAZINE HYDROCHLORIDE 6.25 MG/5ML
12.5 SYRUP ORAL EVERY 6 HOURS PRN
Status: DISCONTINUED | OUTPATIENT
Start: 2019-01-24 | End: 2019-01-25 | Stop reason: HOSPADM

## 2019-01-24 RX ORDER — SENNA AND DOCUSATE SODIUM 50; 8.6 MG/1; MG/1
2 TABLET, FILM COATED ORAL 2 TIMES DAILY
Status: DISCONTINUED | OUTPATIENT
Start: 2019-01-24 | End: 2019-01-25 | Stop reason: HOSPADM

## 2019-01-24 RX ORDER — PROMETHAZINE HYDROCHLORIDE 25 MG/ML
12.5 INJECTION, SOLUTION INTRAMUSCULAR; INTRAVENOUS EVERY 6 HOURS PRN
Status: DISCONTINUED | OUTPATIENT
Start: 2019-01-24 | End: 2019-01-25 | Stop reason: HOSPADM

## 2019-01-24 RX ADMIN — DOCUSATE SODIUM 100 MG: 100 CAPSULE, LIQUID FILLED ORAL at 08:41

## 2019-01-24 RX ADMIN — ASPIRIN 325 MG: 325 TABLET, DELAYED RELEASE ORAL at 08:41

## 2019-01-24 RX ADMIN — ONDANSETRON 4 MG: 2 INJECTION INTRAMUSCULAR; INTRAVENOUS at 06:22

## 2019-01-24 RX ADMIN — PROMETHAZINE HYDROCHLORIDE 12.5 MG: 25 INJECTION INTRAMUSCULAR; INTRAVENOUS at 18:24

## 2019-01-24 RX ADMIN — Medication 500 MG: at 08:42

## 2019-01-24 RX ADMIN — HEPARIN SODIUM 5000 UNITS: 5000 INJECTION INTRAVENOUS; SUBCUTANEOUS at 20:09

## 2019-01-24 RX ADMIN — IPRATROPIUM BROMIDE AND ALBUTEROL SULFATE 3 ML: 2.5; .5 SOLUTION RESPIRATORY (INHALATION) at 03:00

## 2019-01-24 RX ADMIN — IPRATROPIUM BROMIDE AND ALBUTEROL SULFATE 3 ML: 2.5; .5 SOLUTION RESPIRATORY (INHALATION) at 19:32

## 2019-01-24 RX ADMIN — SODIUM CHLORIDE 75 ML/HR: 9 INJECTION, SOLUTION INTRAVENOUS at 01:27

## 2019-01-24 RX ADMIN — POTASSIUM CHLORIDE 20 MEQ: 750 CAPSULE, EXTENDED RELEASE ORAL at 01:27

## 2019-01-24 RX ADMIN — IPRATROPIUM BROMIDE AND ALBUTEROL SULFATE 3 ML: 2.5; .5 SOLUTION RESPIRATORY (INHALATION) at 07:53

## 2019-01-24 RX ADMIN — DOCUSATE SODIUM 100 MG: 100 CAPSULE, LIQUID FILLED ORAL at 20:09

## 2019-01-24 RX ADMIN — HEPARIN SODIUM 5000 UNITS: 5000 INJECTION INTRAVENOUS; SUBCUTANEOUS at 08:41

## 2019-01-24 RX ADMIN — IPRATROPIUM BROMIDE AND ALBUTEROL SULFATE 3 ML: 2.5; .5 SOLUTION RESPIRATORY (INHALATION) at 23:23

## 2019-01-24 RX ADMIN — MUPIROCIN: 20 OINTMENT TOPICAL at 08:42

## 2019-01-24 RX ADMIN — POTASSIUM CHLORIDE 20 MEQ: 750 CAPSULE, EXTENDED RELEASE ORAL at 06:21

## 2019-01-24 RX ADMIN — IPRATROPIUM BROMIDE AND ALBUTEROL SULFATE 3 ML: 2.5; .5 SOLUTION RESPIRATORY (INHALATION) at 12:47

## 2019-01-24 RX ADMIN — Medication 1 CAPSULE: at 08:41

## 2019-01-24 RX ADMIN — DOCUSATE SODIUM AND SENNOSIDES 2 TABLET: 8.6; 5 TABLET, FILM COATED ORAL at 20:09

## 2019-01-24 RX ADMIN — PANTOPRAZOLE SODIUM 40 MG: 40 TABLET, DELAYED RELEASE ORAL at 06:21

## 2019-01-24 RX ADMIN — DOXYCYCLINE 100 MG: 100 INJECTION, POWDER, LYOPHILIZED, FOR SOLUTION INTRAVENOUS at 18:19

## 2019-01-24 RX ADMIN — METHYLNALTREXONE BROMIDE 6 MG: 12 INJECTION, SOLUTION SUBCUTANEOUS at 18:24

## 2019-01-24 RX ADMIN — DILTIAZEM HYDROCHLORIDE 120 MG: 120 CAPSULE, EXTENDED RELEASE ORAL at 20:09

## 2019-01-24 RX ADMIN — Medication 15 G: at 02:56

## 2019-01-24 RX ADMIN — SODIUM CHLORIDE 125 MG: 9 INJECTION, SOLUTION INTRAVENOUS at 16:12

## 2019-01-24 RX ADMIN — ONDANSETRON 4 MG: 2 INJECTION INTRAMUSCULAR; INTRAVENOUS at 11:58

## 2019-01-24 RX ADMIN — DOXYCYCLINE 100 MG: 100 INJECTION, POWDER, LYOPHILIZED, FOR SOLUTION INTRAVENOUS at 02:59

## 2019-01-24 NOTE — PLAN OF CARE
Problem: Patient Care Overview  Goal: Plan of Care Review  Outcome: Ongoing (interventions implemented as appropriate)   01/24/19 1723   Plan of Care Review   Progress no change   OTHER   Outcome Summary VSS. ID/ Renal/ WOC following. N/V medication switched to phenergan. Relistor given to help with bowel motility. Continue to monitor.   Coping/Psychosocial   Plan of Care Reviewed With patient     Goal: Individualization and Mutuality  Outcome: Ongoing (interventions implemented as appropriate)    Goal: Discharge Needs Assessment  Outcome: Ongoing (interventions implemented as appropriate)    Goal: Interprofessional Rounds/Family Conf  Outcome: Ongoing (interventions implemented as appropriate)      Problem: Fall Risk (Adult)  Goal: Identify Related Risk Factors and Signs and Symptoms  Outcome: Ongoing (interventions implemented as appropriate)    Goal: Absence of Fall  Outcome: Ongoing (interventions implemented as appropriate)      Problem: Renal Failure/Kidney Injury, Acute (Adult)  Goal: Signs and Symptoms of Listed Potential Problems Will be Absent, Minimized or Managed (Renal Failure/Kidney Injury, Acute)  Outcome: Ongoing (interventions implemented as appropriate)      Problem: Pneumonia (Adult)  Goal: Signs and Symptoms of Listed Potential Problems Will be Absent, Minimized or Managed (Pneumonia)  Outcome: Ongoing (interventions implemented as appropriate)

## 2019-01-24 NOTE — PROGRESS NOTES
"   LOS: 2 days    Patient Care Team:  Angelica Tran PA as PCP - General (Internal Medicine)  Denver Baldwin MD as Consulting Physician (Nephrology)  Hung Haynes MD as Consulting Physician (Infectious Diseases)    Reason For Visit:  F/U ANGELLA ON STAGE 5 CKD.  Subjective           Review of Systems:    Pulm: No soa   CV:  No CP      Objective       ammonium lactate 1 application Topical BID   aspirin  mg Oral Daily   diltiaZEM  mg Oral Q24H   docusate sodium 100 mg Oral BID   doxycycline 100 mg Intravenous Q12H   ertapenem 500 mg Intravenous Q24H   ferric gluconate (FERRLECIT) IVPB 125 mg Intravenous Daily   heparin (porcine) 5,000 Units Subcutaneous Q12H   insulin lispro 0-9 Units Subcutaneous 4x Daily With Meals & Nightly   ipratropium-albuterol 3 mL Nebulization Q4H - RT   lactobacillus acidophilus 1 capsule Oral Daily   mupirocin  Topical Q24H   pantoprazole 40 mg Oral QAM   sodium chloride 3 mL Intravenous Q12H       sodium chloride 75 mL/hr Last Rate: 75 mL/hr (01/24/19 0127)         Vital Signs:  Blood pressure 114/58, pulse 80, temperature 98.1 °F (36.7 °C), temperature source Oral, resp. rate 18, height 170.2 cm (67\"), weight 105 kg (230 lb 8 oz), SpO2 94 %, not currently breastfeeding.    Flowsheet Rows      First Filed Value   Admission Height  170.2 cm (67\") Documented at 01/22/2019 1248   Admission Weight  109 kg (240 lb) Documented at 01/22/2019 1248          01/23 0701 - 01/24 0700  In: 1801.1 [I.V.:1701.1]  Out: 1300 [Urine:1300]    Physical Exam:    General Appearance: NAD, alert and cooperative, Ox3  Eyes: PER, conjunctivae and sclerae normal, no icterus  Lungs: respirations regular and unlabored, no crepitus, clear to auscultation  Heart/CV: regular rhythm & normal rate, no murmur, no gallop, no rub and no edema  Abdomen: not distended, soft, non-tender, no masses,  bowel sounds present  Skin: No rash, Warm and dry    Radiology:            Labs: IRON SAT 9%. FERRITIN " 817.  Results from last 7 days   Lab Units 01/24/19  0621 01/23/19  0532 01/22/19  1258   WBC 10*3/mm3 15.70* 17.26* 22.33*   HEMOGLOBIN g/dL 7.4* 8.0* 9.7*   HEMATOCRIT % 24.2* 25.0* 29.6*   PLATELETS 10*3/mm3 537* 553* 615*     Results from last 7 days   Lab Units 01/24/19  0621 01/23/19  1738 01/23/19  0532 01/23/19  0123 01/22/19  1258   SODIUM mmol/L 135  --  137  --  132   POTASSIUM mmol/L 3.9 3.0* 3.2* 2.9* 2.7*   CHLORIDE mmol/L 99  --  96*  --  89*   CO2 mmol/L 22.0  --  22.0  --  25.0   BUN mg/dL 114*  --  130*  --  134*   CREATININE mg/dL 4.11*  --  4.86*  --  5.23*   CALCIUM mg/dL 7.7*  --  7.9*  --  9.5   PHOSPHORUS mg/dL 6.8*  --   --   --   --    MAGNESIUM mg/dL  --   --   --  2.2  --    ALBUMIN g/dL 3.24  --   --   --  4.64     Results from last 7 days   Lab Units 01/24/19  0621   GLUCOSE mg/dL 185*       Results from last 7 days   Lab Units 01/22/19  1258   ALK PHOS U/L 142*   BILIRUBIN mg/dL 0.2*   ALT (SGPT) U/L 14   AST (SGOT) U/L 24                 Estimated Creatinine Clearance: 17.9 mL/min (A) (by C-G formula based on SCr of 4.11 mg/dL (H)).      Assessment       LLL pneumonia (CMS/HCC)    Dyspnea    Essential hypertension    Hyperlipemia    GERD (gastroesophageal reflux disease)    Uncontrolled type 2 diabetes mellitus with hyperglycemia (CMS/HCC)    Hypokalemia    Anemia, chronic disease    Partial nontraumatic amputation of foot, right (CMS/HCC)    Neutrophilic leukocytosis    Acute renal failure superimposed on stage 4 chronic kidney disease (CMS/HCC)    Nausea and vomiting    Pneumonia of left lower lobe due to infectious organism (CMS/HCC)            Impression: ANGELLA ON CKD. GFR IMPROVING BACK TO BASELINE. ANEMIA WITH LOW FE STORES.            Recommendations: IV FE.      Yoel Sharpe MD  01/24/19  1:35 PM

## 2019-01-24 NOTE — PLAN OF CARE
Problem: Patient Care Overview  Goal: Plan of Care Review   01/24/19 6489   Plan of Care Review   Progress no change   OTHER   Outcome Summary Blood Sugar at , at 0252 pt sugar was 42, insta glucose gel given x 1, juice/PB/ice cream/crackers given, sugar up to 114. Pt required oxygen while sleeping r/t sleep apnea. Very small nose bleed reported, pt concerned. Humidification on oxygen. Pt does not use oxygen at home. Replacing K+ again r/t recheck of 3.0. UOP adequate. Last BM 1/20. Still need occult stool. Pt didnt get much rest tonight. VSS. NAD noted.    Coping/Psychosocial   Plan of Care Reviewed With patient

## 2019-01-24 NOTE — PLAN OF CARE
Problem: Patient Care Overview  Goal: Plan of Care Review  Outcome: Ongoing (interventions implemented as appropriate)   01/24/19 1401   Plan of Care Review   Progress no change   Coping/Psychosocial   Plan of Care Reviewed With patient

## 2019-01-24 NOTE — PROGRESS NOTES
[facility] Medicine Services  PROGRESS NOTE    Patient Name: Tashia Leon  : 1955  MRN: [id number]    Date of Admission: 2019  Length of Stay: 2  Primary Care Physician: Angelica Tran PA    Subjective   Subjective     CC:  Pneumonia, freda    HPI:  Feels better today but still not baseline. Cough improved. Tolerating some po.     Review of Systems  No headache    Otherwise ROS is negative except as mentioned in the HPI.    Objective   Objective     Vital Signs:   Temp:  [98.1 °F (36.7 °C)-98.2 °F (36.8 °C)] 98.1 °F (36.7 °C)  Heart Rate:  [75-89] 80  Resp:  [16-18] 18  BP: (114-119)/(46-58) 114/58        Physical Exam:  Alert, ill but nontoxic appearing, obese, no distress  Slightly dry mucous membranes, ncat  Face symmetric, speech clear, equal   rrr  Decreased air movement left lung base, no overt wheezes  abd soft, obese  RLE cleanly dressed  Appropriate affect    Results Reviewed:  I have personally reviewed current lab, radiology, and data and agree.    Results from last 7 days   Lab Units 19  0621 19  0532 19  1258   WBC 10*3/mm3 15.70* 17.26* 22.33*   HEMOGLOBIN g/dL 7.4* 8.0* 9.7*   HEMATOCRIT % 24.2* 25.0* 29.6*   PLATELETS 10*3/mm3 537* 553* 615*     Results from last 7 days   Lab Units 19  0621 19  1738 19  0532  19  1301 19  1258   SODIUM mmol/L 135  --  137  --   --  132   POTASSIUM mmol/L 3.9 3.0* 3.2*   < >  --  2.7*   CHLORIDE mmol/L 99  --  96*  --   --  89*   CO2 mmol/L 22.0  --  22.0  --   --  25.0   BUN mg/dL 114*  --  130*  --   --  134*   CREATININE mg/dL 4.11*  --  4.86*  --   --  5.23*   GLUCOSE mg/dL 185*  --  51*  --   --  74   CALCIUM mg/dL 7.7*  --  7.9*  --   --  9.5   ALT (SGPT) U/L  --   --   --   --   --  14   AST (SGOT) U/L  --   --   --   --   --  24   TROPONIN I ng/mL  --   --   --   --  0.00  --     < > = values in this interval not displayed.     Estimated Creatinine Clearance:  17.9 mL/min (A) (by C-G formula based on SCr of 4.11 mg/dL (H)).    BNP   Date Value Ref Range Status   01/22/2019 370.0 (H) 0.0 - 100.0 pg/mL Final     Comment:     Results may be falsely decreased if patient taking Biotin.       Microbiology Results Abnormal     Procedure Component Value - Date/Time    Blood Culture - Blood, Arm, Left [[id number]] Collected:  01/22/19 1421    Lab Status:  Preliminary result Specimen:  Blood from Arm, Left Updated:  01/24/19 1515     Blood Culture No growth at 2 days    Blood Culture - Blood, Arm, Right [[id number]] Collected:  01/22/19 1455    Lab Status:  Preliminary result Specimen:  Blood from Arm, Right Updated:  01/24/19 1515     Blood Culture No growth at 2 days    Respiratory Culture - Sputum, Cough [[id number]] Collected:  01/23/19 0739    Lab Status:  Preliminary result Specimen:  Sputum from Cough Updated:  01/24/19 0751     Respiratory Culture Light growth (2+) Normal Respiratory Elle     Gram Stain Few (2+) WBCs per low power field      Moderate (3+) Epithelial cells per low power field      Many (4+) Gram positive cocci in pairs and chains      Rare (1+) Gram positive cocci in groups      Few (2+) Gram negative bacilli    Influenza A & B, RT PCR - Swab, Nasopharynx [[id number]]  (Normal) Collected:  01/22/19 1427    Lab Status:  Final result Specimen:  Swab from Nasopharynx Updated:  01/22/19 1510     Influenza A PCR Not Detected     Influenza B PCR Not Detected          Imaging Results (last 24 hours)     ** No results found for the last 24 hours. **          Results for orders placed during the hospital encounter of 07/15/16   Adult transthoracic echo complete    Narrative · Left ventricular function is normal. Estimated EF = 55%.  · Trace mitral valve regurgitation is present.  · Trace tricuspid valve regurgitation is present          I have reviewed the medications:    Current Facility-Administered Medications:   •  acetaminophen (TYLENOL) tablet 650 mg, 650 mg,  Oral, Q4H PRN, Lilly Garay, APRN  •  albuterol (PROVENTIL) nebulizer solution 0.083% 2.5 mg/3mL, 2.5 mg, Nebulization, Q4H PRN, Lilly Garay, APRN  •  ammonium lactate (AMLACTIN) cream 1 application, 1 application, Topical, BID, Lilly Garay APRN, 1 application at 01/22/19 2044  •  aspirin EC tablet 325 mg, 325 mg, Oral, Daily, Lilly Garay, APRN, 325 mg at 01/24/19 0841  •  baclofen (LIORESAL) tablet 5 mg, 5 mg, Oral, Q8H PRN, Lilly Garay, APRN  •  bisacodyl (DULCOLAX) suppository 10 mg, 10 mg, Rectal, Daily PRN, Lilly Garay, APRN  •  dextrose (D50W) 25 g/ 50mL Intravenous Solution 25 g, 25 g, Intravenous, Q15 Min PRN, Lilly Garay, APRN  •  dextrose (GLUTOSE) oral gel 15 g, 15 g, Oral, Q15 Min PRN, Lilly Garay APRN, 15 g at 01/24/19 0256  •  diltiaZEM CD (CARDIZEM CD) 24 hr capsule 120 mg, 120 mg, Oral, Q24H, Lilly Garay, APRN, 120 mg at 01/23/19 2048  •  docusate sodium (COLACE) capsule 100 mg, 100 mg, Oral, BID, Lilly Garay APRN, 100 mg at 01/24/19 0841  •  doxycycline (VIBRAMYCIN) 100 mg/100 mL 0.9% NS MBP, 100 mg, Intravenous, Q12H, Hung Haynes MD, 100 mg at 01/24/19 0259  •  ertapenem (INVanz) 500 mg/50 mL 0.9% NS, 500 mg, Intravenous, Q24H, Hung Haynes MD, 500 mg at 01/24/19 0842  •  ferric gluconate (FERRLECIT) 125 mg in sodium chloride 0.9 % 110 mL IVPB, 125 mg, Intravenous, Daily, Yoel Sharpe MD, Last Rate: 55 mL/hr at 01/24/19 1612, 125 mg at 01/24/19 1612  •  glucagon (human recombinant) (GLUCAGEN DIAGNOSTIC) injection 1 mg, 1 mg, Subcutaneous, PRN, Lilly Garay APRN  •  heparin (porcine) 5000 UNIT/ML injection 5,000 Units, 5,000 Units, Subcutaneous, Q12H, Moi Yee MD, 5,000 Units at 01/24/19 0841  •  HYDROcodone-acetaminophen (NORCO) 7.5-325 MG per tablet 1 tablet, 1 tablet, Oral, Q8H PRN, Lilly Garay APRN  •  insulin lispro (humaLOG) injection 0-9 Units, 0-9 Units, Subcutaneous, 4x  Daily With Meals & Nightly, Lilly Garay APRN, 4 Units at 01/23/19 2048  •  ipratropium-albuterol (DUO-NEB) nebulizer solution 3 mL, 3 mL, Nebulization, Q4H - RT, Lilly Garay APRN, 3 mL at 01/24/19 1247  •  lactobacillus acidophilus (RISAQUAD) capsule 1 capsule, 1 capsule, Oral, Daily, Lilly Garay APRN, 1 capsule at 01/24/19 0841  •  magnesium sulfate 4 gram infusion - Mg less than or equal to 1mg/dL, 4 g, Intravenous, PRN **OR** magnesium sulfate 3 gram infusion (1gm x 3) - Mg 1.1 - 1.5 mg/dL, 1 g, Intravenous, PRN **OR** Magnesium Sulfate 2 gram infusion- Mg 1.6 - 1.9 mg/dL, 2 g, Intravenous, PRN, Lilly Garay APRLUZ  •  methylnaltrexone (RELISTOR) injection 6 mg, 6 mg, Subcutaneous, Once, Moi Yee MD  •  mupirocin (BACTROBAN) 2 % ointment, , Topical, Q24H, Moi Yee MD  •  pantoprazole (PROTONIX) EC tablet 40 mg, 40 mg, Oral, QAM, Lilly Garay APRN, 40 mg at 01/24/19 0621  •  polyethylene glycol 3350 powder (packet), 17 g, Oral, Daily PRN, Moi Yee MD  •  potassium chloride (MICRO-K) CR capsule 20 mEq, 20 mEq, Oral, PRN, 20 mEq at 01/24/19 0621 **OR** potassium chloride (KLOR-CON) packet 20 mEq, 20 mEq, Oral, PRN **OR** potassium chloride 10 mEq in 100 mL IVPB, 10 mEq, Intravenous, Q1H PRN, Moi Yee MD  •  promethazine (PHENERGAN) injection 12.5 mg, 12.5 mg, Intravenous, Q6H PRN **OR** promethazine (PHENERGAN) tablet 12.5 mg, 12.5 mg, Oral, Q6H PRN **OR** promethazine (PHENERGAN) 6.25 MG/5ML syrup 12.5 mg, 12.5 mg, Oral, Q6H PRN **OR** promethazine (PHENERGAN) suppository 12.5 mg, 12.5 mg, Rectal, Q6H PRN, Moi Yee MD  •  sennosides-docusate sodium (SENOKOT-S) 8.6-50 MG tablet 2 tablet, 2 tablet, Oral, BID, Moi Yee MD  •  sodium chloride 0.9 % flush 10 mL, 10 mL, Intravenous, PRN, Nigel Faustin MD  •  sodium chloride 0.9 % flush 3 mL, 3 mL, Intravenous, Q12H, Lilly Garay, APRN, 3 mL at 01/23/19 2049  •   sodium chloride 0.9 % flush 3-10 mL, 3-10 mL, Intravenous, PRN, Lilly Garay, APRN      Assessment/Plan   Assessment / Plan     Active Hospital Problems    Diagnosis Date Noted   • **LLL pneumonia (CMS/HCC) [J18.1] 01/22/2019   • Partial nontraumatic amputation of foot, right (CMS/HCC) [Z89.431] 01/22/2019   • Neutrophilic leukocytosis [D72.9] 01/22/2019   • Acute renal failure superimposed on stage 4 chronic kidney disease (CMS/[facility]) [N17.9, N18.4] 01/22/2019   • Nausea and vomiting [R11.2] 01/22/2019   • Pneumonia of left lower lobe due to infectious organism (CMS/HCC) [J18.1] 01/22/2019   • Hypokalemia [E87.6] 11/26/2018   • Anemia, chronic disease [D63.8] 11/26/2018   • Uncontrolled type 2 diabetes mellitus with hyperglycemia (CMS/HCC) [E11.65] 08/17/2016     Proteinuria noted, January 2014.       • GERD (gastroesophageal reflux disease) [K21.9] 07/25/2016   • Dyspnea [R06.00] 06/22/2016   • Hyperlipemia [E78.5] 06/22/2016   • Essential hypertension [I10] 06/22/2016          Brief Hospital Course to date:  Tashai Leon is a 63 y.o. female w/ IDDM, chronic DHF, ckd 4 (baseline cr ~3.3), prior right diabetic foot abscess (s/p right great toe and metatarsal amputation 12/4/18 by dr. Patiño). Has been on oral augmentin and followed by Dr. Haynes of infectoius disease. Has nausea and occasional vomiting which she feels is brought on by her augmentin use.  Presented to ER with with dyspnea, cough, sputum yellowish/clear. In ER wbc 22,000. Creatinine 5.2, potassium 2.7. Of note, had recently been started back on lasix 40mg bid for past few weeks prior to admission, but recently decreased po intake w/ nausea/emesis.        -Pneumonia  -ANGELLA upon Ckd 4 (baseline cr ~3.3)  -Hypokalemia  -Hx right diabetic abscess, s/p great toe and transmetatarsal amputation 12/4/18  -Hx chronic DHF (2016 ef 55% w/ diastolic dysfunction)  -IDDM w/ hypoglycemia  -Iron def Anemia  -Nausea  -Constipation w/ chronic opioid  use    Plan:  -abx per dr. Haynes (invanz, juanitay)  -iv iron ordered (no overt blood loss noted)  -gentle fluids finishing today; renal following (future Peritoneal dialysis plans), renal function continues to slowly improve toward baseline  -try dose of relistor; add stool softeners  -change to phenergan (nurse to give prior to antibiotic administration)  -right foot (recent infection/surgery), stable  -currently on sliding scale humalog (on significant insulin doses at home), titrate insulins prn  -bmp, hgb in a.m. (on iv iron)    -likely home in couple days once renal fxn near baseline & ok w/ renal, moving bowels & tolerating po; final abx per Dr. Haynes    DVT Prophylaxis:  Sq heparin      CODE STATUS:   Code Status and Medical Interventions:   Ordered at: 01/22/19 2378     Level Of Support Discussed With:    Patient     Code Status:    CPR     Medical Interventions (Level of Support Prior to Arrest):    Full         Electronically signed by Moi Yee MD, 01/24/19, 4:44 PM.

## 2019-01-24 NOTE — CONSULTS
INFECTIOUS DISEASE CONSULT/INITIAL HOSPITAL VISIT    Tashia Leon  1955  [id number]    Date of Consult: 1/24/2019    Admission Date: 1/22/2019      Requesting Provider: Moi Yee MD  Evaluating Physician: Hung Haynes MD    Reason for Consultation: pneumonia, abx allergies    History of present illness:    Patient is a 63 y.o. female well known to me who I have been following since 11/27/18 for right foot abscess;  Patient has  chronic kidney disease stage IV, diabetes type 2 and has had complicated stay in November through mid December in which she was debrided and received iv abx;  Patient had delirium and removed a medport prior to discharge and iv abx zyvox/ invanz changed to po augmentin 500 mg daily upon discharge.  Patient has been followed in our office as well as by Dr. Patiño and right foot/ great toe amputation site has remained stable and slowly healed.    Remains on oral augmentin; has felt worse over the last week without fever but has worsening nausea, more vomiting and worsening dyspnea.    Denies diarrhea, burning upon urination, rash.    Reports increasing dyspnea on exertion; has only mild cough, admits to fatigue and dizziness as well.      1/23/19; no events overnight; feels well; no complaints, no diarrhea, rash, sore throat;    Feels mildly better; no rash, no yeast infection; family by bedside  1/24/19; no events overnight; feels well; no complaints, no fever, rash, sore throat, has some nausea, breathing better, making urine    Past Medical History:   Diagnosis Date   • Acute bronchitis    • Acute pharyngitis    • Acute sinusitis    • Benign colonic polyp    • Chronic kidney disease    • Edema    • GERD (gastroesophageal reflux disease)    • Heart murmur    • Hiatal hernia    • Hyperkalemia    • Hyperlipidemia    • Hypertension    • Low back pain    • Lumbar disc disease    • MRSA (methicillin resistant Staphylococcus aureus)      Osteomyelitis/methicillin-resistant Staphylococcus aureus of the left foot, 2013.   • Obesity    • Osteomyelitis (CMS/[facility])     Osteomyelitis/methicillin-resistant Staphylococcus aureus of the left foot, 2013.   • Peptic ulceration 2013    History of peptic ulcer disease, diagnosed 2013 by EGD.  Repeat EGD in 2013 was negative.   • Sepsis (CMS/HCC)     Sepsis, 2013, hospitalized at [facility].   • Tobacco use     Previous tobacco use with cessation in .   • Type 2 diabetes mellitus (CMS/[facility])     Proteinuria noted, 2014.     • Vitamin D deficiency        Past Surgical History:   Procedure Laterality Date   • AMPUTATION DIGIT Right 2018    Procedure: I&D right foot, great toe amputation and 1st Metatarsal amputation;  Surgeon: Chio Sterling MD;  Location:  [facility];  Service: Orthopedics   •  SECTION      x2   • FOOT SURGERY Left     Incision and debridement of the left foot x2.   • INSERTION HEMODIALYSIS CATHETER N/A 2018    Procedure: GROSHONG CATHETER PLACEMENT;  Surgeon: Maribel May MD;  Location:  [facility];  Service: General   • LASIK     • TONSILLECTOMY         Family History   Problem Relation Age of Onset   • No Known Problems Mother    • No Known Problems Father    • Breast cancer Neg Hx    • Ovarian cancer Neg Hx        Social History     Socioeconomic History   • Marital status:      Spouse name: Not on file   • Number of children: Not on file   • Years of education: Not on file   • Highest education level: Not on file   Social Needs   • Financial resource strain: Not on file   • Food insecurity - worry: Not on file   • Food insecurity - inability: Not on file   • Transportation needs - medical: Not on file   • Transportation needs - non-medical: Not on file   Occupational History   • Not on file   Tobacco Use   • Smoking status: Former Smoker     Packs/day: 1.00     Years: 30.00     Pack years:  "30.00     Types: Cigarettes   • Smokeless tobacco: Never Used   • Tobacco comment: quit 7 years ago   Substance and Sexual Activity   • Alcohol use: Yes     Alcohol/week: 0.6 oz     Types: 1 Glasses of wine per week     Comment: holidays   • Drug use: No   • Sexual activity: Defer   Other Topics Concern   • Not on file   Social History Narrative    Lives at home in [place]. Has help from children.        Allergies   Allergen Reactions   • Statins Myalgia   • Ancef [Cefazolin] Other (See Comments) and GI Intolerance     Tunnel vision.  (TOLERATES INVANZ)   • Bactrim [Sulfamethoxazole-Trimethoprim] Hives and Nausea And Vomiting   • Cephalosporins GI Intolerance   • Keflex [Cephalexin] Other (See Comments) and GI Intolerance     \"tunnel vision\"..   (pt tolerates INVANZ)   • Levaquin [Levofloxacin]    • Lipitor [Atorvastatin] Myalgia   • Lisinopril    • Losartan Potassium Other (See Comments)     Unknown reaction   • Oxycodone GI Intolerance   • Quinolones    • Cleocin [Clindamycin Hcl] Rash   • Clindamycin/Lincomycin Rash         Medication:    Current Facility-Administered Medications:   •  acetaminophen (TYLENOL) tablet 650 mg, 650 mg, Oral, Q4H PRN, Lilly Garay, APRN  •  albuterol (PROVENTIL) nebulizer solution 0.083% 2.5 mg/3mL, 2.5 mg, Nebulization, Q4H PRN, Lilly Garay, APRN  •  ammonium lactate (AMLACTIN) cream 1 application, 1 application, Topical, BID, Lilly Garay APRN, 1 application at 01/22/19 2044  •  aspirin EC tablet 325 mg, 325 mg, Oral, Daily, Lilly Garay APRN, 325 mg at 01/24/19 0841  •  baclofen (LIORESAL) tablet 5 mg, 5 mg, Oral, Q8H PRN, Lilly Garay, APRN  •  bisacodyl (DULCOLAX) suppository 10 mg, 10 mg, Rectal, Daily PRN, Lilly Garay, APRN  •  dextrose (D50W) 25 g/ 50mL Intravenous Solution 25 g, 25 g, Intravenous, Q15 Min PRN, Lilly Garay, APRN  •  dextrose (GLUTOSE) oral gel 15 g, 15 g, Oral, Q15 Min PRN, Lilly Garay, APRN, 15 g at 01/24/19 " 0256  •  diltiaZEM CD (CARDIZEM CD) 24 hr capsule 120 mg, 120 mg, Oral, Q24H, Lilly Garay APRN, 120 mg at 01/23/19 2048  •  docusate sodium (COLACE) capsule 100 mg, 100 mg, Oral, BID, Lilly Garay APRN, 100 mg at 01/24/19 0841  •  doxycycline (VIBRAMYCIN) 100 mg/100 mL 0.9% NS MBP, 100 mg, Intravenous, Q12H, Hung Haynes MD, 100 mg at 01/24/19 0259  •  ertapenem (INVanz) 500 mg/50 mL 0.9% NS, 500 mg, Intravenous, Q24H, Hung Haynes MD, 500 mg at 01/24/19 0842  •  ferric gluconate (FERRLECIT) 125 mg in sodium chloride 0.9 % 110 mL IVPB, 125 mg, Intravenous, Daily, Yoel Sharpe MD  •  glucagon (human recombinant) (GLUCAGEN DIAGNOSTIC) injection 1 mg, 1 mg, Subcutaneous, PRN, Lilly Garay APRN  •  heparin (porcine) 5000 UNIT/ML injection 5,000 Units, 5,000 Units, Subcutaneous, Q12H, Moi Yee MD, 5,000 Units at 01/24/19 0841  •  HYDROcodone-acetaminophen (NORCO) 7.5-325 MG per tablet 1 tablet, 1 tablet, Oral, Q8H PRN, Lilly Garay APRN  •  insulin lispro (humaLOG) injection 0-9 Units, 0-9 Units, Subcutaneous, 4x Daily With Meals & Nightly, Lilly Garay APRN, 4 Units at 01/23/19 2048  •  ipratropium-albuterol (DUO-NEB) nebulizer solution 3 mL, 3 mL, Nebulization, Q4H - RT, Lilly Garay APRN, 3 mL at 01/24/19 1247  •  lactobacillus acidophilus (RISAQUAD) capsule 1 capsule, 1 capsule, Oral, Daily, Lilly Garay APRN, 1 capsule at 01/24/19 0841  •  magnesium sulfate 4 gram infusion - Mg less than or equal to 1mg/dL, 4 g, Intravenous, PRN **OR** magnesium sulfate 3 gram infusion (1gm x 3) - Mg 1.1 - 1.5 mg/dL, 1 g, Intravenous, PRN **OR** Magnesium Sulfate 2 gram infusion- Mg 1.6 - 1.9 mg/dL, 2 g, Intravenous, PRN, Lilly Garay APRN  •  mupirocin (BACTROBAN) 2 % ointment, , Topical, Q24H, Moi Yee MD  •  ondansetron (ZOFRAN) injection 4 mg, 4 mg, Intravenous, Q6H PRN, Lilly Garay APRN, 4 mg at 01/24/19 1158  •   pantoprazole (PROTONIX) EC tablet 40 mg, 40 mg, Oral, QAM, Lilly Garay, APRN, 40 mg at 19 0621  •  potassium chloride (MICRO-K) CR capsule 20 mEq, 20 mEq, Oral, PRN, 20 mEq at 19 0621 **OR** potassium chloride (KLOR-CON) packet 20 mEq, 20 mEq, Oral, PRN **OR** potassium chloride 10 mEq in 100 mL IVPB, 10 mEq, Intravenous, Q1H PRN, Moi Yee MD  •  sodium chloride 0.9 % flush 10 mL, 10 mL, Intravenous, PRN, Nigel Faustin MD  •  sodium chloride 0.9 % flush 3 mL, 3 mL, Intravenous, Q12H, Lilly Garay APRN, 3 mL at 19  •  sodium chloride 0.9 % flush 3-10 mL, 3-10 mL, Intravenous, PRN, Lilly Garay APRN    Antibiotics:  Anti-Infectives (From admission, onward)    Ordered     Dose/Rate Route Frequency Start Stop    19 1518  ertapenem (INVanz) 500 mg/50 mL 0.9% NS     Ordering Provider:  Hung Haynes MD    500 mg  over 30 Minutes Intravenous Every 24 Hours 19 1000 19 0959    19 1518  doxycycline (VIBRAMYCIN) 100 mg/100 mL 0.9% NS MBP     Ordering Provider:  Hung Haynes MD    100 mg  over 60 Minutes Intravenous Every 12 Hours 19 1600 19 1559    19 1358  ertapenem (INVanz) 500 mg/50 mL 0.9% NS     Ordering Provider:  Nigel Faustin MD    500 mg  over 30 Minutes Intravenous Once 19 1400 19 1530            Review of Systems:  See hpi    Physical Exam:   Vital Signs  Temp (24hrs), Av.2 °F (36.8 °C), Min:98.1 °F (36.7 °C), Max:98.2 °F (36.8 °C)    Temp  Min: 98.1 °F (36.7 °C)  Max: 98.2 °F (36.8 °C)  BP  Min: 114/58  Max: 119/46  Pulse  Min: 75  Max: 89  Resp  Min: 16  Max: 18  SpO2  Min: 89 %  Max: 97 %    GENERAL: Awake and alert, in no acute distress.   HEENT: Normocephalic, atraumatic.  PERRL. EOMI. No conjunctival injection. No icterus.     HEART: RRR; No murmur, rubs, gallops.   LUNGS: Clear to auscultation bilaterally without wheezing, rales, rhonchi. Normal respiratory effort.   ABDOMEN:  Soft, nontender, nondistended. Positive bowel sounds.   EXT:  No cyanosis, clubbing or edema. No cord.    MSK: right foot wound c/d/i, no erythema, no drainage, appears stable   SKIN: Warm and dry without cutaneous eruptions on Inspection/palpation.    NEURO: Oriented to PPT. No focal deficits on motor/sensory exam at arms/legs.  PSYCHIATRIC: Normal insight and judgement. Cooperative with PE    Laboratory Data    Results from last 7 days   Lab Units 01/24/19  0621 01/23/19  0532 01/22/19  1258   WBC 10*3/mm3 15.70* 17.26* 22.33*   HEMOGLOBIN g/dL 7.4* 8.0* 9.7*   HEMATOCRIT % 24.2* 25.0* 29.6*   PLATELETS 10*3/mm3 537* 553* 615*     Results from last 7 days   Lab Units 01/24/19  0621   SODIUM mmol/L 135   POTASSIUM mmol/L 3.9   CHLORIDE mmol/L 99   CO2 mmol/L 22.0   BUN mg/dL 114*   CREATININE mg/dL 4.11*   GLUCOSE mg/dL 185*   CALCIUM mg/dL 7.7*     Results from last 7 days   Lab Units 01/22/19  1258   ALK PHOS U/L 142*   BILIRUBIN mg/dL 0.2*   ALT (SGPT) U/L 14   AST (SGOT) U/L 24             Results from last 7 days   Lab Units 01/22/19  1421   LACTATE mmol/L 1.6             Estimated Creatinine Clearance: 17.9 mL/min (A) (by C-G formula based on SCr of 4.11 mg/dL (H)).      Microbiology:                                Radiology:  Imaging Results (last 72 hours)     Procedure Component Value Units Date/Time    XR Chest 1 View [[id number]] Collected:  01/22/19 1428     Updated:  01/22/19 1428    Narrative:       EXAMINATION: XR CHEST 1 VW- 01/22/2019     INDICATION: shortness of air triage protocol      COMPARISON: 12/13/2018     FINDINGS: New since the prior study is patchy disease in the left base  which could represent atelectasis, but in the setting of fever should be  considered potentially pneumonia. Lungs are clear elsewhere. There is  minimal if any left effusion. Heart is upper normal size. Vasculature  appears normal.           Impression:       New left lower lobe atelectasis versus pneumonia.     D:   01/22/2019  E:  01/22/2019               Impression:   Leukocytosis with neutrophilia improving  Acute on chronic renal failure improving  Left side retrocardiac pneumonia  Nausea/vomiting  Improving right foot abscess  Cephalosporin, t/s, levoflox, clinda allergy    PLAN/RECOMMENDATIONS:   Thank you for asking us to see Tashia Leon, I recommend the following:  Cover for aspiration pneumonia.    D/w Dr. Yee  cont  invanz 500 mg iv q24 ok for now  cont doxycycline 100 mg I vqq 12 h for atypical coverage    F/u blood cultures    D/w family  Patient showing improvement in creatinine, wbc  Continue current therapy  Hung Haynes MD  1/24/2019  3:59 PM

## 2019-01-25 ENCOUNTER — TELEPHONE (OUTPATIENT)
Dept: INTERNAL MEDICINE | Facility: CLINIC | Age: 64
End: 2019-01-25

## 2019-01-25 VITALS
WEIGHT: 229.8 LBS | OXYGEN SATURATION: 95 % | DIASTOLIC BLOOD PRESSURE: 78 MMHG | SYSTOLIC BLOOD PRESSURE: 125 MMHG | RESPIRATION RATE: 18 BRPM | BODY MASS INDEX: 36.07 KG/M2 | TEMPERATURE: 97.8 F | HEART RATE: 81 BPM | HEIGHT: 67 IN

## 2019-01-25 LAB
ANION GAP SERPL CALCULATED.3IONS-SCNC: 15 MMOL/L (ref 3–11)
BACTERIA SPEC RESP CULT: NORMAL
BUN BLD-MCNC: 92 MG/DL (ref 9–23)
BUN/CREAT SERPL: 22.9 (ref 7–25)
CALCIUM SPEC-SCNC: 8.1 MG/DL (ref 8.7–10.4)
CHLORIDE SERPL-SCNC: 103 MMOL/L (ref 99–109)
CO2 SERPL-SCNC: 22 MMOL/L (ref 20–31)
CREAT BLD-MCNC: 4.02 MG/DL (ref 0.6–1.3)
GFR SERPL CREATININE-BSD FRML MDRD: 11 ML/MIN/1.73
GLUCOSE BLD-MCNC: 119 MG/DL (ref 70–100)
GLUCOSE BLDC GLUCOMTR-MCNC: 117 MG/DL (ref 70–130)
GLUCOSE BLDC GLUCOMTR-MCNC: 118 MG/DL (ref 70–130)
GLUCOSE BLDC GLUCOMTR-MCNC: 124 MG/DL (ref 70–130)
GRAM STN SPEC: NORMAL
HCT VFR BLD AUTO: 26.2 % (ref 34.5–44)
HGB BLD-MCNC: 8.1 G/DL (ref 11.5–15.5)
POTASSIUM BLD-SCNC: 3.8 MMOL/L (ref 3.5–5.5)
SODIUM BLD-SCNC: 140 MMOL/L (ref 132–146)

## 2019-01-25 PROCEDURE — 63710000001 PROMETHAZINE PER 12.5 MG: Performed by: INTERNAL MEDICINE

## 2019-01-25 PROCEDURE — 80048 BASIC METABOLIC PNL TOTAL CA: CPT | Performed by: INTERNAL MEDICINE

## 2019-01-25 PROCEDURE — 82962 GLUCOSE BLOOD TEST: CPT

## 2019-01-25 PROCEDURE — 25010000002 NA FERRIC GLUC CPLX PER 12.5 MG: Performed by: INTERNAL MEDICINE

## 2019-01-25 PROCEDURE — 25010000002 HEPARIN (PORCINE) PER 1000 UNITS: Performed by: INTERNAL MEDICINE

## 2019-01-25 PROCEDURE — 85014 HEMATOCRIT: CPT | Performed by: INTERNAL MEDICINE

## 2019-01-25 PROCEDURE — 85018 HEMOGLOBIN: CPT | Performed by: INTERNAL MEDICINE

## 2019-01-25 PROCEDURE — 99239 HOSP IP/OBS DSCHRG MGMT >30: CPT | Performed by: INTERNAL MEDICINE

## 2019-01-25 PROCEDURE — 94799 UNLISTED PULMONARY SVC/PX: CPT

## 2019-01-25 RX ORDER — FUROSEMIDE 40 MG/1
40 TABLET ORAL 2 TIMES DAILY
Refills: 11
Start: 2019-01-28 | End: 2019-04-14 | Stop reason: SDUPTHER

## 2019-01-25 RX ORDER — IPRATROPIUM BROMIDE AND ALBUTEROL SULFATE 2.5; .5 MG/3ML; MG/3ML
3 SOLUTION RESPIRATORY (INHALATION) EVERY 4 HOURS PRN
Status: DISCONTINUED | OUTPATIENT
Start: 2019-01-25 | End: 2019-01-25 | Stop reason: HOSPADM

## 2019-01-25 RX ORDER — DOXYCYCLINE 100 MG/1
100 CAPSULE ORAL 2 TIMES DAILY
Qty: 14 CAPSULE | Refills: 0 | Status: SHIPPED | OUTPATIENT
Start: 2019-01-25 | End: 2019-02-01

## 2019-01-25 RX ORDER — L.ACID,PARA/B.BIFIDUM/S.THERM 8B CELL
1 CAPSULE ORAL DAILY
Qty: 30 CAPSULE | Refills: 0 | Status: SHIPPED | OUTPATIENT
Start: 2019-01-26 | End: 2019-06-10

## 2019-01-25 RX ORDER — METOLAZONE 5 MG/1
5-10 TABLET ORAL DAILY PRN
Start: 2019-01-28

## 2019-01-25 RX ADMIN — PANTOPRAZOLE SODIUM 40 MG: 40 TABLET, DELAYED RELEASE ORAL at 06:13

## 2019-01-25 RX ADMIN — ASPIRIN 325 MG: 325 TABLET, DELAYED RELEASE ORAL at 09:22

## 2019-01-25 RX ADMIN — Medication 1 CAPSULE: at 09:23

## 2019-01-25 RX ADMIN — HEPARIN SODIUM 5000 UNITS: 5000 INJECTION INTRAVENOUS; SUBCUTANEOUS at 09:23

## 2019-01-25 RX ADMIN — SODIUM CHLORIDE 125 MG: 9 INJECTION, SOLUTION INTRAVENOUS at 12:45

## 2019-01-25 RX ADMIN — MUPIROCIN: 20 OINTMENT TOPICAL at 06:17

## 2019-01-25 RX ADMIN — IPRATROPIUM BROMIDE AND ALBUTEROL SULFATE 3 ML: 2.5; .5 SOLUTION RESPIRATORY (INHALATION) at 02:59

## 2019-01-25 RX ADMIN — DOXYCYCLINE 100 MG: 100 INJECTION, POWDER, LYOPHILIZED, FOR SOLUTION INTRAVENOUS at 06:13

## 2019-01-25 RX ADMIN — PROMETHAZINE HYDROCHLORIDE 12.5 MG: 12.5 TABLET ORAL at 06:13

## 2019-01-25 NOTE — PROGRESS NOTES
INFECTIOUS DISEASE CONSULT/INITIAL HOSPITAL VISIT    Tashia Leon  1955  [id number]    Date of Consult: 1/25/2019    Admission Date: 1/22/2019      Requesting Provider: Moi Yee MD  Evaluating Physician: Hung Haynes MD    Reason for Consultation: pneumonia, abx allergies    History of present illness:    Patient is a 63 y.o. female well known to me who I have been following since 11/27/18 for right foot abscess;  Patient has  chronic kidney disease stage IV, diabetes type 2 and has had complicated stay in November through mid December in which she was debrided and received iv abx;  Patient had delirium and removed a medport prior to discharge and iv abx zyvox/ invanz changed to po augmentin 500 mg daily upon discharge.  Patient has been followed in our office as well as by Dr. Patiño and right foot/ great toe amputation site has remained stable and slowly healed.    Remains on oral augmentin; has felt worse over the last week without fever but has worsening nausea, more vomiting and worsening dyspnea.    Denies diarrhea, burning upon urination, rash.    Reports increasing dyspnea on exertion; has only mild cough, admits to fatigue and dizziness as well.      1/23/19; no events overnight; feels well; no complaints, no diarrhea, rash, sore throat;    Feels mildly better; no rash, no yeast infection; family by bedside  1/24/19; no events overnight; feels well; no complaints, no fever, rash, sore throat, has some nausea, breathing better, making urine    1/25/19; feels better; wants to go home; no diarrhea,rash, sore throat    Past Medical History:   Diagnosis Date   • Acute bronchitis    • Acute pharyngitis    • Acute sinusitis    • Benign colonic polyp    • Chronic kidney disease    • Edema    • GERD (gastroesophageal reflux disease)    • Heart murmur    • Hiatal hernia    • Hyperkalemia    • Hyperlipidemia    • Hypertension    • Low back pain    • Lumbar disc disease    • MRSA  (methicillin resistant Staphylococcus aureus)     Osteomyelitis/methicillin-resistant Staphylococcus aureus of the left foot, 2013.   • Obesity    • Osteomyelitis (CMS/[facility])     Osteomyelitis/methicillin-resistant Staphylococcus aureus of the left foot, 2013.   • Peptic ulceration 2013    History of peptic ulcer disease, diagnosed 2013 by EGD.  Repeat EGD in 2013 was negative.   • Sepsis (CMS/[facility])     Sepsis, 2013, hospitalized at [facility].   • Tobacco use     Previous tobacco use with cessation in .   • Type 2 diabetes mellitus (CMS/[facility])     Proteinuria noted, 2014.     • Vitamin D deficiency        Past Surgical History:   Procedure Laterality Date   • AMPUTATION DIGIT Right 2018    Procedure: I&D right foot, great toe amputation and 1st Metatarsal amputation;  Surgeon: Chio Sterling MD;  Location:  [facility];  Service: Orthopedics   •  SECTION      x2   • FOOT SURGERY Left     Incision and debridement of the left foot x2.   • INSERTION HEMODIALYSIS CATHETER N/A 2018    Procedure: GROSHONG CATHETER PLACEMENT;  Surgeon: Maribel May MD;  Location:  [facility];  Service: General   • LASIK     • TONSILLECTOMY         Family History   Problem Relation Age of Onset   • No Known Problems Mother    • No Known Problems Father    • Breast cancer Neg Hx    • Ovarian cancer Neg Hx        Social History     Socioeconomic History   • Marital status:      Spouse name: Not on file   • Number of children: Not on file   • Years of education: Not on file   • Highest education level: Not on file   Social Needs   • Financial resource strain: Not on file   • Food insecurity - worry: Not on file   • Food insecurity - inability: Not on file   • Transportation needs - medical: Not on file   • Transportation needs - non-medical: Not on file   Occupational History   • Not on file   Tobacco Use   • Smoking status: Former Smoker      "Packs/day: 1.00     Years: 30.00     Pack years: 30.00     Types: Cigarettes   • Smokeless tobacco: Never Used   • Tobacco comment: quit 7 years ago   Substance and Sexual Activity   • Alcohol use: Yes     Alcohol/week: 0.6 oz     Types: 1 Glasses of wine per week     Comment: holidays   • Drug use: No   • Sexual activity: Defer   Other Topics Concern   • Not on file   Social History Narrative    Lives at home in [place]. Has help from children.        Allergies   Allergen Reactions   • Statins Myalgia   • Ancef [Cefazolin] Other (See Comments) and GI Intolerance     Tunnel vision.  (TOLERATES INVANZ)   • Bactrim [Sulfamethoxazole-Trimethoprim] Hives and Nausea And Vomiting   • Cephalosporins GI Intolerance   • Keflex [Cephalexin] Other (See Comments) and GI Intolerance     \"tunnel vision\"..   (pt tolerates INVANZ)   • Levaquin [Levofloxacin]    • Lipitor [Atorvastatin] Myalgia   • Lisinopril    • Losartan Potassium Other (See Comments)     Unknown reaction   • Oxycodone GI Intolerance   • Quinolones    • Cleocin [Clindamycin Hcl] Rash   • Clindamycin/Lincomycin Rash         Medication:    Current Facility-Administered Medications:   •  acetaminophen (TYLENOL) tablet 650 mg, 650 mg, Oral, Q4H PRN, Lilly Garay APRN  •  albuterol (PROVENTIL) nebulizer solution 0.083% 2.5 mg/3mL, 2.5 mg, Nebulization, Q4H PRN, Lilly Garay, KAREEM  •  ammonium lactate (AMLACTIN) cream 1 application, 1 application, Topical, BID, Lilly Garay APRN, 1 application at 01/22/19 2044  •  aspirin EC tablet 325 mg, 325 mg, Oral, Daily, Lilly Garay APRN, 325 mg at 01/25/19 0922  •  baclofen (LIORESAL) tablet 5 mg, 5 mg, Oral, Q8H PRN, Lilly Garay APRN  •  bisacodyl (DULCOLAX) suppository 10 mg, 10 mg, Rectal, Daily PRN, Lilly Garay, APRN  •  dextrose (D50W) 25 g/ 50mL Intravenous Solution 25 g, 25 g, Intravenous, Q15 Min PRN, Lilly Garay APRLUZ  •  dextrose (GLUTOSE) oral gel 15 g, 15 g, Oral, Q15 Min " PRN, Lilly Garay APRN, 15 g at 01/24/19 0256  •  diltiaZEM CD (CARDIZEM CD) 24 hr capsule 120 mg, 120 mg, Oral, Q24H, Lilly Garay APRN, 120 mg at 01/24/19 2009  •  docusate sodium (COLACE) capsule 100 mg, 100 mg, Oral, BID, Lilly Garay APRN, 100 mg at 01/24/19 2009  •  doxycycline (VIBRAMYCIN) 100 mg/100 mL 0.9% NS MBP, 100 mg, Intravenous, Q12H, Hung Haynes MD, 100 mg at 01/25/19 0613  •  ertapenem (INVanz) 500 mg/50 mL 0.9% NS, 500 mg, Intravenous, Q24H, Hung Haynes MD, 500 mg at 01/24/19 0842  •  ertapenem (INVanz) 500 mg/50 mL 0.9% NS, 500 mg, Intravenous, Once, Shefali Bailey II, DO  •  ferric gluconate (FERRLECIT) 125 mg in sodium chloride 0.9 % 110 mL IVPB, 125 mg, Intravenous, Daily, Yoel Sharpe MD, Last Rate: 55 mL/hr at 01/24/19 1612, 125 mg at 01/24/19 1612  •  glucagon (human recombinant) (GLUCAGEN DIAGNOSTIC) injection 1 mg, 1 mg, Subcutaneous, PRN, Lilly Garay APRN  •  heparin (porcine) 5000 UNIT/ML injection 5,000 Units, 5,000 Units, Subcutaneous, Q12H, Moi Yee MD, 5,000 Units at 01/25/19 0923  •  HYDROcodone-acetaminophen (NORCO) 7.5-325 MG per tablet 1 tablet, 1 tablet, Oral, Q8H PRN, Lilly Garay APRN  •  insulin lispro (humaLOG) injection 0-9 Units, 0-9 Units, Subcutaneous, 4x Daily With Meals & Nightly, Lilly Garay APRN, 4 Units at 01/23/19 2048  •  ipratropium-albuterol (DUO-NEB) nebulizer solution 3 mL, 3 mL, Nebulization, Q4H PRN, Lynn, Shefali M II, DO  •  lactobacillus acidophilus (RISAQUAD) capsule 1 capsule, 1 capsule, Oral, Daily, Lilly Garay, APRN, 1 capsule at 01/25/19 0923  •  magnesium sulfate 4 gram infusion - Mg less than or equal to 1mg/dL, 4 g, Intravenous, PRN **OR** magnesium sulfate 3 gram infusion (1gm x 3) - Mg 1.1 - 1.5 mg/dL, 1 g, Intravenous, PRN **OR** Magnesium Sulfate 2 gram infusion- Mg 1.6 - 1.9 mg/dL, 2 g, Intravenous, PRN, Lilly Garay, APRN  •  mupirocin (BACTROBAN)  2 % ointment, , Topical, Q24H, Moi Yee MD  •  pantoprazole (PROTONIX) EC tablet 40 mg, 40 mg, Oral, QAM, Lilly Garay APRN, 40 mg at 01/25/19 0613  •  polyethylene glycol 3350 powder (packet), 17 g, Oral, Daily PRN, Moi Yee MD  •  potassium chloride (MICRO-K) CR capsule 20 mEq, 20 mEq, Oral, PRN, 20 mEq at 01/24/19 0621 **OR** potassium chloride (KLOR-CON) packet 20 mEq, 20 mEq, Oral, PRN **OR** potassium chloride 10 mEq in 100 mL IVPB, 10 mEq, Intravenous, Q1H PRN, Moi Yee MD  •  promethazine (PHENERGAN) injection 12.5 mg, 12.5 mg, Intravenous, Q6H PRN, 12.5 mg at 01/24/19 1824 **OR** promethazine (PHENERGAN) tablet 12.5 mg, 12.5 mg, Oral, Q6H PRN, 12.5 mg at 01/25/19 0613 **OR** promethazine (PHENERGAN) 6.25 MG/5ML syrup 12.5 mg, 12.5 mg, Oral, Q6H PRN **OR** promethazine (PHENERGAN) suppository 12.5 mg, 12.5 mg, Rectal, Q6H PRN, Moi Yee MD  •  sennosides-docusate sodium (SENOKOT-S) 8.6-50 MG tablet 2 tablet, 2 tablet, Oral, BID, Moi Yee MD, 2 tablet at 01/24/19 2009  •  sodium chloride 0.9 % flush 10 mL, 10 mL, Intravenous, PRN, Nigel Faustin MD  •  sodium chloride 0.9 % flush 3 mL, 3 mL, Intravenous, Q12H, Lilly Garay APRN, 3 mL at 01/23/19 2049  •  sodium chloride 0.9 % flush 3-10 mL, 3-10 mL, Intravenous, PRN, Lilly Garay APRN    Antibiotics:  Anti-Infectives (From admission, onward)    Ordered     Dose/Rate Route Frequency Start Stop    01/25/19 1145  ertapenem (INVanz) 500 mg/50 mL 0.9% NS     Comments:  Do not send, already have dose   Ordering Provider:  Shefali Bailey II, DO    500 mg  over 30 Minutes Intravenous Once 01/25/19 1430      01/22/19 1518  ertapenem (INVanz) 500 mg/50 mL 0.9% NS     Ordering Provider:  Hung Haynes MD    500 mg  over 30 Minutes Intravenous Every 24 Hours 01/23/19 1000 01/29/19 0959    01/22/19 1518  doxycycline (VIBRAMYCIN) 100 mg/100 mL 0.9% NS MBP     Ordering Provider:  Ivy  Hung Reyes MD    100 mg  over 60 Minutes Intravenous Every 12 Hours 19 1600 19 1559    19 1358  ertapenem (INVanz) 500 mg/50 mL 0.9% NS     Ordering Provider:  Nigel Faustin MD    500 mg  over 30 Minutes Intravenous Once 19 1400 19 1530            Review of Systems:  See hpi    Physical Exam:   Vital Signs  Temp (24hrs), Av.5 °F (36.4 °C), Min:97.1 °F (36.2 °C), Max:97.8 °F (36.6 °C)    Temp  Min: 97.1 °F (36.2 °C)  Max: 97.8 °F (36.6 °C)  BP  Min: 112/47  Max: 129/39  Pulse  Min: 78  Max: 94  Resp  Min: 16  Max: 18  SpO2  Min: 92 %  Max: 95 %    GENERAL: Awake and alert, in no acute distress.   HEENT: Normocephalic, atraumatic.  PERRL. EOMI. No conjunctival injection. No icterus.     HEART: RRR; No murmur, rubs, gallops.   LUNGS: Clear to auscultation bilaterally without wheezing, rales, rhonchi. Normal respiratory effort.   ABDOMEN: Soft, nontender, nondistended. Positive bowel sounds.   EXT:  No cyanosis, clubbing or edema. No cord.    MSK: right foot wound c/d/i, no erythema, no drainage, appears stable   SKIN: Warm and dry without cutaneous eruptions on Inspection/palpation.    NEURO: Oriented to PPT. No focal deficits on motor/sensory exam at arms/legs.  PSYCHIATRIC: Normal insight and judgement. Cooperative with PE    Laboratory Data    Results from last 7 days   Lab Units 19  0410 19  0621 19  0532 19  1258   WBC 10*3/mm3  --  15.70* 17.26* 22.33*   HEMOGLOBIN g/dL 8.1* 7.4* 8.0* 9.7*   HEMATOCRIT % 26.2* 24.2* 25.0* 29.6*   PLATELETS 10*3/mm3  --  537* 553* 615*     Results from last 7 days   Lab Units 19  0410   SODIUM mmol/L 140   POTASSIUM mmol/L 3.8   CHLORIDE mmol/L 103   CO2 mmol/L 22.0   BUN mg/dL 92*   CREATININE mg/dL 4.02*   GLUCOSE mg/dL 119*   CALCIUM mg/dL 8.1*     Results from last 7 days   Lab Units 19  1258   ALK PHOS U/L 142*   BILIRUBIN mg/dL 0.2*   ALT (SGPT) U/L 14   AST (SGOT) U/L 24             Results from  last 7 days   Lab Units 01/22/19  1421   LACTATE mmol/L 1.6             Estimated Creatinine Clearance: 18.2 mL/min (A) (by C-G formula based on SCr of 4.02 mg/dL (H)).      Microbiology:                                Radiology:  Imaging Results (last 72 hours)     Procedure Component Value Units Date/Time    XR Chest 1 View [[id number]] Collected:  01/22/19 1428     Updated:  01/22/19 1428    Narrative:       EXAMINATION: XR CHEST 1 VW- 01/22/2019     INDICATION: shortness of air triage protocol      COMPARISON: 12/13/2018     FINDINGS: New since the prior study is patchy disease in the left base  which could represent atelectasis, but in the setting of fever should be  considered potentially pneumonia. Lungs are clear elsewhere. There is  minimal if any left effusion. Heart is upper normal size. Vasculature  appears normal.           Impression:       New left lower lobe atelectasis versus pneumonia.     D:  01/22/2019  E:  01/22/2019               Impression:   Leukocytosis with neutrophilia improving  Acute on chronic renal failure improving  Left side retrocardiac pneumonia  Nausea/vomiting  Improving right foot abscess  Cephalosporin, t/s, levoflox, clinda allergy    PLAN/RECOMMENDATIONS:   Thank you for asking us to see Tashia Leon, I recommend the following:  Cover for aspiration pneumonia.    Foot infection appears resolved;  D/c invanz upon discharge  Complete 1 week of doxy 100 mg po bid    D/w Dr. Bailey  Dp/cm time 20 minutes      Hung Haynes MD  1/25/2019  3:31 PM                     None

## 2019-01-25 NOTE — PLAN OF CARE
Problem: Patient Care Overview  Goal: Plan of Care Review  Outcome: Ongoing (interventions implemented as appropriate)   01/25/19 3442   Plan of Care Review   Progress improving   OTHER   Outcome Summary No major events overnight. Did not admin HS insulin (sugar 268) due to hypoglycemic episode night before and pt blood sugar was 124 at around 0330. VSS. No complaints voiced. Pt has rested better tonight. No BM so far this shift. Senna and Colace given at HS. No SOB reported.    Coping/Psychosocial   Plan of Care Reviewed With patient

## 2019-01-25 NOTE — PROGRESS NOTES
"   LOS: 3 days    Patient Care Team:  Angelica Tran PA as PCP - General (Internal Medicine)  Denver Baldwin MD as Consulting Physician (Nephrology)  Hung Haynes MD as Consulting Physician (Infectious Diseases)    Reason For Visit:  STAGE 5 CKD.  History of present illness:    Diabetic nephropathy CK D stage 4-5 has seen Dr. Mika Garay for possible PD placement, hyper parathyroidism intact .  Admitted with acute on chronic renal failure.      Subjective     Renal Function improving no new events no obvious distress.      Review of Systems:    Denies nausea vomiting chest pain shortness of breath dysuria or hematuria.      Objective       ammonium lactate 1 application Topical BID   aspirin  mg Oral Daily   diltiaZEM  mg Oral Q24H   docusate sodium 100 mg Oral BID   doxycycline 100 mg Intravenous Q12H   ertapenem 500 mg Intravenous Q24H   ertapenem 500 mg Intravenous Once   ferric gluconate (FERRLECIT) IVPB 125 mg Intravenous Daily   heparin (porcine) 5,000 Units Subcutaneous Q12H   insulin lispro 0-9 Units Subcutaneous 4x Daily With Meals & Nightly   lactobacillus acidophilus 1 capsule Oral Daily   mupirocin  Topical Q24H   pantoprazole 40 mg Oral QAM   sennosides-docusate sodium 2 tablet Oral BID   sodium chloride 3 mL Intravenous Q12H            Vital Signs:  Blood pressure 125/78, pulse 81, temperature 97.8 °F (36.6 °C), temperature source Oral, resp. rate 18, height 170.2 cm (67\"), weight 104 kg (229 lb 12.8 oz), SpO2 95 %, not currently breastfeeding.    Flowsheet Rows      First Filed Value   Admission Height  170.2 cm (67\") Documented at 01/22/2019 1248   Admission Weight  109 kg (240 lb) Documented at 01/22/2019 1248          01/24 0701 - 01/25 0700  In: 1931.1 [I.V.:1721.1]  Out: 2150 [Urine:2150]    Physical Exam:    General Appearance: Awake, alert oriented ×3 no obvious distress.  Eyes: PER, conjunctivae and sclerae normal, no icterus  Lungs: respirations regular " and unlabored, no crepitus, clear to auscultation  Heart/CV: regular rhythm & normal rate, no murmur, no gallop, no rub  Extremities: no edema  Abdomen: not distended, soft, non-tender, no masses,  bowel sounds present  Skin: No rash, Warm and dry    Radiology:    Labs: IRON SAT 9%. FERRITIN 817.  Results from last 7 days   Lab Units 01/25/19  0410 01/24/19  0621 01/23/19  0532 01/22/19  1258   WBC 10*3/mm3  --  15.70* 17.26* 22.33*   HEMOGLOBIN g/dL 8.1* 7.4* 8.0* 9.7*   HEMATOCRIT % 26.2* 24.2* 25.0* 29.6*   PLATELETS 10*3/mm3  --  537* 553* 615*     Results from last 7 days   Lab Units 01/25/19  0410 01/24/19  0621 01/23/19  1738 01/23/19  0532 01/23/19  0123 01/22/19  1258   SODIUM mmol/L 140 135  --  137  --  132   POTASSIUM mmol/L 3.8 3.9 3.0* 3.2* 2.9* 2.7*   CHLORIDE mmol/L 103 99  --  96*  --  89*   CO2 mmol/L 22.0 22.0  --  22.0  --  25.0   BUN mg/dL 92* 114*  --  130*  --  134*   CREATININE mg/dL 4.02* 4.11*  --  4.86*  --  5.23*   CALCIUM mg/dL 8.1* 7.7*  --  7.9*  --  9.5   PHOSPHORUS mg/dL  --  6.8*  --   --   --   --    MAGNESIUM mg/dL  --   --   --   --  2.2  --    ALBUMIN g/dL  --  3.24  --   --   --  4.64     Results from last 7 days   Lab Units 01/25/19  0410   GLUCOSE mg/dL 119*       Results from last 7 days   Lab Units 01/22/19  1258   ALK PHOS U/L 142*   BILIRUBIN mg/dL 0.2*   ALT (SGPT) U/L 14   AST (SGOT) U/L 24     Estimated Creatinine Clearance: 18.2 mL/min (A) (by C-G formula based on SCr of 4.02 mg/dL (H)).      Assessment       LLL pneumonia (CMS/HCC)    Dyspnea    Essential hypertension    Hyperlipemia    GERD (gastroesophageal reflux disease)    Uncontrolled type 2 diabetes mellitus with hyperglycemia (CMS/HCC)    Hypokalemia    Anemia, chronic disease    Partial nontraumatic amputation of foot, right (CMS/HCC)    Neutrophilic leukocytosis    Acute renal failure superimposed on stage 4 chronic kidney disease (CMS/HCC)    Nausea and vomiting    Pneumonia of left lower lobe due to  infectious organism (CMS/HCC)      Impression:   ANGELLA ON CKD baseline creatinine 4.5 and patient would like to do peritoneal dialysis when the time comes..  Renal function slowly improving towards baseline.   ANEMIA WITH LOW FE STORES.    Recommendations:   IV IRON  Continue with the current management..  Patient can be discharged to home from renal point follow-up outpatient in 3 weeks      Christin Gonzalez MD  01/25/19  3:18 PM

## 2019-01-25 NOTE — DISCHARGE SUMMARY
[facility] Medicine Services  DISCHARGE SUMMARY    Patient Name: Tashia Leon  : 1955  MRN: [id number]    Date of Admission: 2019  Date of Discharge: 2019  Primary Care Physician: Angelica Tran PA    Consults     Date and Time Order Name Status Description    2019 1536 Inpatient Infectious Diseases Consult Completed           Hospital Course     Presenting Problem:   Pneumonia of left lower lobe due to infectious organism (CMS/[facility]) [J18.1]    Active Hospital Problems    Diagnosis Date Noted   • **LLL pneumonia (CMS/[facility]) [J18.1] 2019   • Partial nontraumatic amputation of foot, right (CMS/[facility]) [Z89.431] 2019   • Neutrophilic leukocytosis [D72.9] 2019   • Acute renal failure superimposed on stage 4 chronic kidney disease (CMS/[facility]) [N17.9, N18.4] 2019   • Nausea and vomiting [R11.2] 2019   • Pneumonia of left lower lobe due to infectious organism (CMS/HCC) [J18.1] 2019   • Hypokalemia [E87.6] 2018   • Anemia, chronic disease [D63.8] 2018   • Uncontrolled type 2 diabetes mellitus with hyperglycemia (CMS/[facility]) [E11.65] 2016   • GERD (gastroesophageal reflux disease) [K21.9] 2016   • Dyspnea [R06.00] 2016   • Hyperlipemia [E78.5] 2016   • Essential hypertension [I10] 2016      Resolved Hospital Problems   No resolved problems to display.          Hospital Course:  Tashia Leon is a 63 y.o. female admitted from home with bacterial pneumonia and ANGELLA.    Bacterial LLL pneumonia: 62 y/o female admitted with above. Upon arrival patient was placed on IV antibiotics and ID was consulted given her complicated history. ID followed the patient throughout her stay. Patients cultures remained negative throughout her stay and patient improved on broad spectrum antibiotic therapy. I spoke with Dr. Haynes prior to discharge and he recommended PO doxycycline x 7 days and f/u in 1-2 weeks.    ANGELLA on  CKD 4: Patient with creatinine elevation to 5.2 upon arrival from prior baseline of ~3. This was likely due to above in setting of PO lasix and metolazone use. Those were held and she was hydrated with IV fluids. Her creatinine was improving on day of d/c but not quite back to baseline. She will continue to hold her diuretics until Monday or sooner if she develops symptoms of volume overload. She will follow up with PCP in 1 week with bmp and NAL in 3 weeks.      Discharge Follow Up Recommendations for labs/diagnostics:   PCP in 1 week, Recommend BMP   ID in 1-2 weeks   NAL in 3 weeks    Day of Discharge     HPI: Lying in bed without family in room. Wants to go home. No complaints.     Review of Systems  Gen- No fevers, chills  CV- No chest pain, palpitations  Resp- No cough, dyspnea  GI- No N/V/D, abd pain    Otherwise ROS is negative except as mentioned in the HPI.    Vital Signs:   Temp:  [97.1 °F (36.2 °C)-97.8 °F (36.6 °C)] 97.8 °F (36.6 °C)  Heart Rate:  [78-94] 81  Resp:  [16-18] 18  BP: (112-129)/(39-78) 125/78     Physical Exam:  Constitutional: No acute distress, awake, alert, obese  HENT: NCAT, mucous membranes moist  Respiratory: Clear to auscultation bilaterally, respiratory effort normal   Cardiovascular: RRR, no murmurs, rubs, or gallops, palpable pedal pulses bilaterally  Gastrointestinal: Positive bowel sounds, soft, nontender, nondistended  Musculoskeletal: Right foot dressed.  Psychiatric: Appropriate affect, cooperative  Neurologic: Oriented x 3, strength symmetric in all extremities, Cranial Nerves grossly intact to confrontation, speech clear  Skin: No rashes    Pertinent  and/or Most Recent Results     Results from last 7 days   Lab Units 01/25/19  0410 01/24/19  0621 01/23/19  1738 01/23/19  0532 01/23/19  0123 01/22/19  1258   WBC 10*3/mm3  --  15.70*  --  17.26*  --  22.33*   HEMOGLOBIN g/dL 8.1* 7.4*  --  8.0*  --  9.7*   HEMATOCRIT % 26.2* 24.2*  --  25.0*  --  29.6*   PLATELETS 10*3/mm3   --  537*  --  553*  --  615*   SODIUM mmol/L 140 135  --  137  --  132   POTASSIUM mmol/L 3.8 3.9 3.0* 3.2* 2.9* 2.7*   CHLORIDE mmol/L 103 99  --  96*  --  89*   CO2 mmol/L 22.0 22.0  --  22.0  --  25.0   BUN mg/dL 92* 114*  --  130*  --  134*   CREATININE mg/dL 4.02* 4.11*  --  4.86*  --  5.23*   GLUCOSE mg/dL 119* 185*  --  51*  --  74   CALCIUM mg/dL 8.1* 7.7*  --  7.9*  --  9.5     Results from last 7 days   Lab Units 01/22/19  1258   BILIRUBIN mg/dL 0.2*   ALK PHOS U/L 142*   ALT (SGPT) U/L 14   AST (SGOT) U/L 24           Invalid input(s): TG, LDLCALC, LDLREALC  Results from last 7 days   Lab Units 01/23/19  0532 01/22/19  1301 01/22/19  1258   HEMOGLOBIN A1C % 6.80*  --   --    BNP pg/mL  --   --  370.0*   TROPONIN I ng/mL  --  0.00  --        Brief Urine Lab Results  (Last result in the past 365 days)      Color   Clarity   Blood   Leuk Est   Nitrite   Protein   CREAT   Urine HCG        12/01/18 1629             61.1       12/01/18 1629 Yellow Cloudy Moderate (2+) Negative Negative >=300 mg/dL (3+)               Microbiology Results Abnormal     Procedure Component Value - Date/Time    Blood Culture - Blood, Arm, Left [[id number]] Collected:  01/22/19 1421    Lab Status:  Preliminary result Specimen:  Blood from Arm, Left Updated:  01/25/19 1515     Blood Culture No growth at 3 days    Blood Culture - Blood, Arm, Right [[id number]] Collected:  01/22/19 1455    Lab Status:  Preliminary result Specimen:  Blood from Arm, Right Updated:  01/25/19 1515     Blood Culture No growth at 3 days    Respiratory Culture - Sputum, Cough [[id number]] Collected:  01/23/19 0703    Lab Status:  Final result Specimen:  Sputum from Cough Updated:  01/25/19 0738     Respiratory Culture Moderate growth (3+) Normal Respiratory Elle     Gram Stain Few (2+) WBCs per low power field      Moderate (3+) Epithelial cells per low power field      Many (4+) Gram positive cocci in pairs and chains      Rare (1+) Gram positive cocci in  groups      Few (2+) Gram negative bacilli    Influenza A & B, RT PCR - Swab, Nasopharynx [[id number]]  (Normal) Collected:  01/22/19 1427    Lab Status:  Final result Specimen:  Swab from Nasopharynx Updated:  01/22/19 1519     Influenza A PCR Not Detected     Influenza B PCR Not Detected          Imaging Results (all)     Procedure Component Value Units Date/Time    XR Chest 1 View [[id number]] Collected:  01/22/19 1428     Updated:  01/22/19 2123    Narrative:       EXAMINATION: XR CHEST 1 VW- 01/22/2019     INDICATION: shortness of air triage protocol      COMPARISON: 12/13/2018     FINDINGS: New since the prior study is patchy disease in the left base  which could represent atelectasis, but in the setting of fever should be  considered potentially pneumonia. Lungs are clear elsewhere. There is  minimal if any left effusion. Heart is upper normal size. Vasculature  appears normal.           Impression:       New left lower lobe atelectasis versus pneumonia.     D:  01/22/2019  E:  01/22/2019     This report was finalized on 1/22/2019 9:21 PM by DR. Schuyler Fernando MD.             Results for orders placed during the hospital encounter of 11/26/18   Doppler Arterial Multi Level Lower Extremity - Bilateral CAR    Narrative · Left Conclusion: The left NELLI is normal.  · Right Conclusion: The right NELLI is normal.          Results for orders placed during the hospital encounter of 11/26/18   Doppler Arterial Multi Level Lower Extremity - Bilateral CAR    Narrative · Left Conclusion: The left NELLI is normal.  · Right Conclusion: The right NELLI is normal.          Results for orders placed during the hospital encounter of 07/15/16   Adult transthoracic echo complete    Narrative · Left ventricular function is normal. Estimated EF = 55%.  · Trace mitral valve regurgitation is present.  · Trace tricuspid valve regurgitation is present           Order Current Status    Blood Culture - Blood, Arm, Left Preliminary result    Blood  Culture - Blood, Arm, Right Preliminary result        Discharge Details        Discharge Medications      New Medications      Instructions Start Date   doxycycline 100 MG capsule  Commonly known as:  MONODOX   100 mg, Oral, 2 Times Daily      lactobacillus acidophilus capsule capsule   1 capsule, Oral, Daily         Changes to Medications      Instructions Start Date   furosemide 40 MG tablet  Commonly known as:  LASIX  What changed:  These instructions start on 1/28/2019. If you are unsure what to do until then, ask your doctor or other care provider.   40 mg, Oral, 2 Times Daily   Start Date:  1/28/2019     metOLazone 5 MG tablet  Commonly known as:  ZAROXOLYN  What changed:    · reasons to take this  · These instructions start on 1/28/2019. If you are unsure what to do until then, ask your doctor or other care provider.   5-10 mg, Oral, Daily PRN   Start Date:  1/28/2019        Continue These Medications      Instructions Start Date   Alirocumab 75 MG/ML solution pen-injector  Commonly known as:  PRALUENT   75 mg, Subcutaneous, Every 14 Days      ammonium lactate 12 % cream  Commonly known as:  AMLACTIN   1 application, Topical, 2 Times Daily, feet       aspirin  MG tablet   325 mg, Oral, Daily      baclofen 10 MG tablet  Commonly known as:  LIORESAL   5-10 mg, Oral, As Needed      BACTROBAN 2 % ointment  Generic drug:  mupirocin   1 application, Topical, As Needed      DILT- MG 24 hr capsule  Generic drug:  diltiazem XR   120 mg, Oral, Daily      EYLEA IO   Injection, As Needed, Every 6 weeks at the eye doctor      HUMALOG KWIKPEN 200 UNIT/ML solution pen-injector  Generic drug:  Insulin Lispro   30 unit marking on U-100 syringe, Subcutaneous, 3 Times Daily PRN, Per sliding scale      HYDROcodone-acetaminophen 7.5-325 MG per tablet  Commonly known as:  NORCO   1 tablet, Oral, Every 8 Hours PRN      Insulin Glargine 300 UNIT/ML solution pen-injector  Commonly known as:  TOUJEO SOLOSTAR   80 Units,  "Injection, 2 Times Daily - RT      lansoprazole 30 MG capsule  Commonly known as:  PREVACID   30 mg, Oral, Daily      MOVANTIK 12.5 MG tablet  Generic drug:  Naloxegol Oxalate   1 tablet, Oral, 2 Times Weekly, Wednesday and Sunday      ondansetron ODT 4 MG disintegrating tablet  Commonly known as:  ZOFRAN-ODT   4 mg, Sublingual, Every 8 Hours PRN      TRULICITY 1.5 MG/0.5ML solution pen-injector  Generic drug:  Dulaglutide   1.5 mg, Subcutaneous, Weekly, Tuesday          Stop These Medications    amoxicillin-clavulanate 500-125 MG per tablet  Commonly known as:  AUGMENTIN            Allergies   Allergen Reactions   • Statins Myalgia   • Ancef [Cefazolin] Other (See Comments) and GI Intolerance     Tunnel vision.  (TOLERATES INVANZ)   • Bactrim [Sulfamethoxazole-Trimethoprim] Hives and Nausea And Vomiting   • Cephalosporins GI Intolerance   • Keflex [Cephalexin] Other (See Comments) and GI Intolerance     \"tunnel vision\"..   (pt tolerates INVANZ)   • Levaquin [Levofloxacin]    • Lipitor [Atorvastatin] Myalgia   • Lisinopril    • Losartan Potassium Other (See Comments)     Unknown reaction   • Oxycodone GI Intolerance   • Quinolones    • Cleocin [Clindamycin Hcl] Rash   • Clindamycin/Lincomycin Rash         Discharge Disposition:  Home or Self Care    Discharge Diet:  Diet Order   Procedures   • Diet Regular; Cardiac, Consistent Carbohydrate, Renal         Discharge Activity: As tolerated.      Special Instructions:  Hold your diuretics until Monday then resume. May resume early if you develop worsening swelling of extremities or shortness of breath.    CODE STATUS:    Code Status and Medical Interventions:   Ordered at: 01/22/19 1536     Level Of Support Discussed With:    Patient     Code Status:    CPR     Medical Interventions (Level of Support Prior to Arrest):    Full         Future Appointments   Date Time Provider Department Center   2/4/2019  1:30 PM Chio Sterling MD [facility] None   2/15/2019  2:45 PM " Angelica Tran PA [facility] None   3/5/2019 11:30 AM Franchesca Cowan MD [facility] None   5/1/2019  1:15 PM Gillian Becker MD [facility] None       Additional Instructions for the Follow-ups that You Need to Schedule     Discharge Follow-up with PCP   As directed       Currently Documented PCP:    Angelica rTan PA    PCP Phone Number:    [phone]     Follow Up Details:  1 week hospital follow up         Discharge Follow-up with Specialty: ID - Ivy; 1 Week   As directed      Specialty:  ID - Ivy    Follow Up:  1 Week         Discharge Follow-up with Specialty: NAL; 3 Weeks   As directed      Specialty:  NAL    Follow Up:  3 Weeks               Time Spent on Discharge:  40 minutes    Electronically signed by Shefali Bailey II, DO, 01/25/19, 3:32 PM.

## 2019-01-25 NOTE — TELEPHONE ENCOUNTER
Noted. Thanks. Not sure if this needs to be sent on to the [facility] nurse since f/u is so soon after discharge.

## 2019-01-26 ENCOUNTER — READMISSION MANAGEMENT (OUTPATIENT)
Dept: CALL CENTER | Facility: HOSPITAL | Age: 64
End: 2019-01-26

## 2019-01-26 NOTE — OUTREACH NOTE
Prep Survey      Responses   Facility patient discharged from?  [facility]   Is patient eligible?  Yes   Discharge diagnosis  pneumonia   Does the patient have one of the following disease processes/diagnoses(primary or secondary)?  COPD/Pneumonia   Does the patient have Home health ordered?  No   Is there a DME ordered?  No   Prep survey completed?  Yes          Lizet Sanchez RN

## 2019-01-27 ENCOUNTER — READMISSION MANAGEMENT (OUTPATIENT)
Dept: CALL CENTER | Facility: HOSPITAL | Age: 64
End: 2019-01-27

## 2019-01-27 LAB
BACTERIA SPEC AEROBE CULT: NORMAL
BACTERIA SPEC AEROBE CULT: NORMAL

## 2019-01-27 NOTE — OUTREACH NOTE
COPD/PN Week 1 Survey      Responses   Facility patient discharged from?  [facility]   Does the patient have one of the following disease processes/diagnoses(primary or secondary)?  COPD/Pneumonia   Is there a successful TCM telephone encounter documented?  No   Was the primary reason for admission:  Pneumonia   Week 1 attempt successful?  Yes   Call start time  1718   Revoke  Decline to participate   Call end time  1714          Kirstin Mata, RN

## 2019-01-28 NOTE — TELEPHONE ENCOUNTER
TCM calls are not required for patients who are seen within 2 business days of discharge however the pt's insurance, [place] [employer], does not participate with TCM, so TCM will not be applicable for this patient.

## 2019-01-28 NOTE — PROGRESS NOTES
I have reviewed the notes, assessments, and/or procedures performed by Angelica Tran PA-C, I concur with her/his documentation of Tashia Leon.

## 2019-01-31 ENCOUNTER — TELEPHONE (OUTPATIENT)
Dept: INTERNAL MEDICINE | Facility: CLINIC | Age: 64
End: 2019-01-31

## 2019-01-31 NOTE — TELEPHONE ENCOUNTER
"Pt called back and states she has \"completely recovered\" from the pneumonia. Pt says she is breathing easier.  She denies any chest pain, SOB, fever, chills, n/v, BLE edema, or pain.  Pt states she has almost completed antibiotics. She states she is still unable to bear weight on right foot following recent partial foot amputation and is following up with ortho 2/4/19.  Pt declined 1-2 week hospital f/u .She has appt 2/15/19 with Angelica Tran for physical however appt notes indicate appt needs reschedule.  She wishes to proceed with r/s physical. Contacted PCP [facility] staff and requested assistance rescheduling as requested.  Pt denies any questions, concerns, or needs at time of call.  "

## 2019-01-31 NOTE — TELEPHONE ENCOUNTER
Called pt in regards to missed appt with Angelica Tran 1/28/19 for hospital f/u.  No answer.  LVM requesting a return call.

## 2019-02-04 ENCOUNTER — OFFICE VISIT (OUTPATIENT)
Dept: ORTHOPEDIC SURGERY | Facility: CLINIC | Age: 64
End: 2019-02-04

## 2019-02-04 DIAGNOSIS — E11.65 UNCONTROLLED TYPE 2 DIABETES MELLITUS WITH HYPERGLYCEMIA (HCC): ICD-10-CM

## 2019-02-04 DIAGNOSIS — N18.5 CHRONIC KIDNEY DISEASE, STAGE V (HCC): ICD-10-CM

## 2019-02-04 DIAGNOSIS — E11.42 DIABETIC POLYNEUROPATHY ASSOCIATED WITH TYPE 2 DIABETES MELLITUS (HCC): ICD-10-CM

## 2019-02-04 DIAGNOSIS — Z47.89 AFTERCARE FOLLOWING SURGERY OF THE MUSCULOSKELETAL SYSTEM: Primary | ICD-10-CM

## 2019-02-04 PROCEDURE — 99024 POSTOP FOLLOW-UP VISIT: CPT | Performed by: ORTHOPAEDIC SURGERY

## 2019-02-04 NOTE — PROGRESS NOTES
Post-op (2.5 week f/u; 9 weeks s/p  amputate right great toe and metatarsal 2018)      Tashia Leon is 8 weeks status post amputate right great toe and metatarsal, 18. She reports no fever, chills.  She reports pain is resolved.  They have been taking off meds now for DVT prophylaxis.  They have been non weight bearing.      Past Surgical History:   Procedure Laterality Date   • AMPUTATION DIGIT Right 2018    Procedure: I&D right foot, great toe amputation and 1st Metatarsal amputation;  Surgeon: Chio Sterling MD;  Location:  [facility];  Service: Orthopedics   •  SECTION      x2   • FOOT SURGERY Left     Incision and debridement of the left foot x2.   • INSERTION HEMODIALYSIS CATHETER N/A 2018    Procedure: GROSHONG CATHETER PLACEMENT;  Surgeon: Maribel May MD;  Location:  [facility];  Service: General   • LASIK     • TONSILLECTOMY         LMP  (LMP Unknown)         No erythema, no drainage, no sign of infection, normal post op swelling. Right foot incision is healed and the ulcer under the 3/4 metatarsal head is healed.  Left foot ulcers are healed, much better callus care    ordered and reviewed x-rays today    Assessment and Plan:   1. Aftercare following surgery of the musculoskeletal system  She is healed, the ulcers on both feet are healed. We again discussed how to keep her feet healed.  She must be very diligent with foot care to avoid bilateral amputations.  She has diabetic shoes and inserts.  I will see her as needed  - XR Foot 2 View Right    2. Uncontrolled type 2 diabetes mellitus with hyperglycemia (CMS/HCC)  Improving glucose control as renal failure worsens    3. Diabetic polyneuropathy associated with type 2 diabetes mellitus (CMS/HCC)  Dense neuropathy     4. Chronic kidney disease, stage V (CMS/HCC)  She was admitted to  in January with pneumonia, creatinine was over 4. Dialysis is impending.  This makes it even more important that she take good  care of her feet.

## 2019-02-12 ENCOUNTER — TELEPHONE (OUTPATIENT)
Dept: INTERNAL MEDICINE | Facility: CLINIC | Age: 64
End: 2019-02-12

## 2019-02-13 NOTE — TELEPHONE ENCOUNTER
Returned patient call, stated she needed PA for Trulicity.  I advised her that I worked on it yesterday and just got the approval. Will send notice to pharmacy

## 2019-03-05 ENCOUNTER — OFFICE VISIT (OUTPATIENT)
Dept: INTERNAL MEDICINE | Facility: CLINIC | Age: 64
End: 2019-03-05

## 2019-03-05 ENCOUNTER — OFFICE VISIT (OUTPATIENT)
Dept: ENDOCRINOLOGY | Facility: CLINIC | Age: 64
End: 2019-03-05

## 2019-03-05 VITALS
DIASTOLIC BLOOD PRESSURE: 76 MMHG | BODY MASS INDEX: 37.2 KG/M2 | HEIGHT: 67 IN | WEIGHT: 237 LBS | OXYGEN SATURATION: 99 % | HEART RATE: 97 BPM | SYSTOLIC BLOOD PRESSURE: 126 MMHG

## 2019-03-05 VITALS
DIASTOLIC BLOOD PRESSURE: 76 MMHG | SYSTOLIC BLOOD PRESSURE: 126 MMHG | HEIGHT: 67 IN | WEIGHT: 237.1 LBS | BODY MASS INDEX: 37.21 KG/M2 | HEART RATE: 97 BPM | OXYGEN SATURATION: 99 %

## 2019-03-05 DIAGNOSIS — E11.65 UNCONTROLLED TYPE 2 DIABETES MELLITUS WITH HYPERGLYCEMIA (HCC): ICD-10-CM

## 2019-03-05 DIAGNOSIS — E11.3399 MODERATE NONPROLIFERATIVE DIABETIC RETINOPATHY WITHOUT MACULAR EDEMA ASSOCIATED WITH TYPE 2 DIABETES MELLITUS, UNSPECIFIED LATERALITY (HCC): ICD-10-CM

## 2019-03-05 DIAGNOSIS — E78.2 MIXED HYPERLIPIDEMIA: ICD-10-CM

## 2019-03-05 DIAGNOSIS — Z00.00 HEALTH CARE MAINTENANCE: ICD-10-CM

## 2019-03-05 DIAGNOSIS — E11.42 DIABETIC POLYNEUROPATHY ASSOCIATED WITH TYPE 2 DIABETES MELLITUS (HCC): Primary | ICD-10-CM

## 2019-03-05 DIAGNOSIS — E55.9 VITAMIN D DEFICIENCY: Primary | ICD-10-CM

## 2019-03-05 DIAGNOSIS — N25.81 SECONDARY HYPERPARATHYROIDISM (HCC): ICD-10-CM

## 2019-03-05 DIAGNOSIS — D63.8 ANEMIA, CHRONIC DISEASE: ICD-10-CM

## 2019-03-05 DIAGNOSIS — I10 ESSENTIAL HYPERTENSION: ICD-10-CM

## 2019-03-05 LAB
25(OH)D3 SERPL-MCNC: 32.2 NG/ML
ALBUMIN SERPL-MCNC: 3.75 G/DL (ref 3.2–4.8)
ALBUMIN/GLOB SERPL: 1.4 G/DL (ref 1.5–2.5)
ALP SERPL-CCNC: 95 U/L (ref 25–100)
ALT SERPL W P-5'-P-CCNC: 13 U/L (ref 7–40)
ANION GAP SERPL CALCULATED.3IONS-SCNC: 15 MMOL/L (ref 3–11)
AST SERPL-CCNC: 20 U/L (ref 0–33)
BASOPHILS # BLD AUTO: 0.06 10*3/MM3 (ref 0–0.2)
BASOPHILS NFR BLD AUTO: 0.4 % (ref 0–1)
BILIRUB SERPL-MCNC: 0.1 MG/DL (ref 0.3–1.2)
BUN BLD-MCNC: 82 MG/DL (ref 9–23)
BUN/CREAT SERPL: 20 (ref 7–25)
CALCIUM SPEC-SCNC: 8.8 MG/DL (ref 8.7–10.4)
CHLORIDE SERPL-SCNC: 104 MMOL/L (ref 99–109)
CO2 SERPL-SCNC: 21 MMOL/L (ref 20–31)
CREAT BLD-MCNC: 4.11 MG/DL (ref 0.6–1.3)
DEPRECATED RDW RBC AUTO: 53.6 FL (ref 37–54)
EOSINOPHIL # BLD AUTO: 0.11 10*3/MM3 (ref 0–0.3)
EOSINOPHIL NFR BLD AUTO: 0.7 % (ref 0–3)
ERYTHROCYTE [DISTWIDTH] IN BLOOD BY AUTOMATED COUNT: 15.2 % (ref 11.3–14.5)
GFR SERPL CREATININE-BSD FRML MDRD: 11 ML/MIN/1.73
GLOBULIN UR ELPH-MCNC: 2.7 GM/DL
GLUCOSE BLD-MCNC: 165 MG/DL (ref 70–100)
HCT VFR BLD AUTO: 34.5 % (ref 34.5–44)
HGB BLD-MCNC: 10.6 G/DL (ref 11.5–15.5)
IMM GRANULOCYTES # BLD AUTO: 0.07 10*3/MM3 (ref 0–0.05)
IMM GRANULOCYTES NFR BLD AUTO: 0.5 % (ref 0–0.6)
LYMPHOCYTES # BLD AUTO: 4.74 10*3/MM3 (ref 0.6–4.8)
LYMPHOCYTES NFR BLD AUTO: 31.3 % (ref 24–44)
MCH RBC QN AUTO: 29.6 PG (ref 27–31)
MCHC RBC AUTO-ENTMCNC: 30.7 G/DL (ref 32–36)
MCV RBC AUTO: 96.4 FL (ref 80–99)
MONOCYTES # BLD AUTO: 0.61 10*3/MM3 (ref 0–1)
MONOCYTES NFR BLD AUTO: 4 % (ref 0–12)
NEUTROPHILS # BLD AUTO: 9.64 10*3/MM3 (ref 1.5–8.3)
NEUTROPHILS NFR BLD AUTO: 63.6 % (ref 41–71)
PLATELET # BLD AUTO: 387 10*3/MM3 (ref 150–450)
PMV BLD AUTO: 11 FL (ref 6–12)
POTASSIUM BLD-SCNC: 3.5 MMOL/L (ref 3.5–5.5)
PROT SERPL-MCNC: 6.4 G/DL (ref 5.7–8.2)
RBC # BLD AUTO: 3.58 10*6/MM3 (ref 3.89–5.14)
SODIUM BLD-SCNC: 140 MMOL/L (ref 132–146)
WBC NRBC COR # BLD: 15.16 10*3/MM3 (ref 3.5–10.8)

## 2019-03-05 PROCEDURE — 99214 OFFICE O/P EST MOD 30 MIN: CPT | Performed by: INTERNAL MEDICINE

## 2019-03-05 PROCEDURE — 82306 VITAMIN D 25 HYDROXY: CPT | Performed by: PHYSICIAN ASSISTANT

## 2019-03-05 PROCEDURE — 99396 PREV VISIT EST AGE 40-64: CPT | Performed by: PHYSICIAN ASSISTANT

## 2019-03-05 PROCEDURE — 80053 COMPREHEN METABOLIC PANEL: CPT | Performed by: PHYSICIAN ASSISTANT

## 2019-03-05 PROCEDURE — 85025 COMPLETE CBC W/AUTO DIFF WBC: CPT | Performed by: PHYSICIAN ASSISTANT

## 2019-03-05 RX ORDER — DOXYCYCLINE 100 MG/1
100 CAPSULE ORAL 2 TIMES DAILY
Refills: 0 | COMMUNITY
Start: 2019-02-19 | End: 2019-06-10

## 2019-03-05 RX ORDER — FLASH GLUCOSE SENSOR
1 KIT MISCELLANEOUS WEEKLY
Qty: 3 EACH | Refills: 5 | Status: SHIPPED | OUTPATIENT
Start: 2019-03-05 | End: 2019-03-06 | Stop reason: SDUPTHER

## 2019-03-05 RX ORDER — FLASH GLUCOSE SENSOR
1 KIT MISCELLANEOUS ONCE
Qty: 1 DEVICE | Refills: 0 | Status: SHIPPED | OUTPATIENT
Start: 2019-03-05 | End: 2019-03-06 | Stop reason: SDUPTHER

## 2019-03-05 NOTE — PROGRESS NOTES
"Chief Complaint   Patient presents with   • Annual Exam       Subjective   Tashia Leon is a 63 y.o. female.       History of Present Illness     The patient is being seen for a health maintenance evaluation.  The last health maintenance was 1 year(s) ago.    Social History  Tashia  does not smoke cigarettes. Quit smoking 10 years ago. Smoked at least 30 years about a pack a day.  She drinks rare alcohol. 1-2 times a year.  She does not use illicit drugs.    General History  Tashia  does have regular dental visits.  She does complain of vision problems. Wears reading glasses, had lasik surgery. Last eye exam was 3/2019.  Immunizations are up to date. The patient needs the following immunizations: pneumovax done 2013; shingrix recommended    Lifestyle  Tashia  consumes in general, a \"healthy\" diet  .  She exercises rarely.    Reproductive Health  Tashia  is postmenopausal.  She reports periods are nonexistent.  She is not sexually active. Her contraceptive plan is no method.    Screening  Last pap was 2016 by Dr Davis. History of abnormal pap smear or family history of gyn cancer: no personal history of abnormal pap; sister with abnormal pap smears- no cancer  Last mammogram was 8/2018. Personal or family history of abnormal mammograms or breast cancer: none  Last colonoscopy was 10/2019 by [facility]. Family history of colon cancer: 1st cousin with rectal cancer  Last DEXA was 2018.       Pt noticed a new blister on her right foot about a month ago. She has been seeing the podiatrist and it is healing. Had xray yesterday that was normal.    Saw her nephrologist about 3 weeks ago. Had anemia at that time but they did not want to go ahead with iron transfusion until levels were a little lower. Last iron transfusion was January. Creatinine was back to baseline.               Current Outpatient Medications:   •  Aflibercept (EYLEA IO), Inject  as directed As Needed. Every 6 weeks at the eye doctor, Disp: , Rfl: "   •  Alirocumab (PRALUENT) 75 MG/ML solution pen-injector, Inject 75 mg under the skin into the appropriate area as directed Every 14 (Fourteen) Days., Disp: 7 pen, Rfl: 3  •  ammonium lactate (AMLACTIN) 12 % cream, Apply 1 application topically to the appropriate area as directed 2 (Two) Times a Day. feet, Disp: , Rfl:   •  aspirin  MG tablet, Take 325 mg by mouth Daily., Disp: , Rfl:   •  baclofen (LIORESAL) 10 MG tablet, Take 5-10 mg by mouth As Needed., Disp: , Rfl:   •  DILT- MG 24 hr capsule, Take 120 mg by mouth Daily., Disp: , Rfl: 11  •  doxycycline (MONODOX) 100 MG capsule, Take 100 mg by mouth 2 (Two) Times a Day., Disp: , Rfl: 0  •  Dulaglutide (TRULICITY) 1.5 MG/0.5ML solution pen-injector, Inject 1.5 mg under the skin into the appropriate area as directed 1 (One) Time Per Week. Tuesday, Disp: 2 pen, Rfl: 5  •  furosemide (LASIX) 40 MG tablet, Take 1 tablet by mouth 2 (Two) Times a Day., Disp: , Rfl: 11  •  HYDROcodone-acetaminophen (NORCO) 7.5-325 MG per tablet, Take 1 tablet by mouth Every 8 (Eight) Hours As Needed., Disp: , Rfl:   •  Insulin Lispro (HUMALOG KWIKPEN) 200 UNIT/ML solution pen-injector, Inject 30 unit marking on U-100 syringe under the skin into the appropriate area as directed 3 (Three) Times a Day As Needed (Blood Sugar). Per sliding scale, Disp: , Rfl:   •  lactobacillus acidophilus (RISAQUAD) capsule capsule, Take 1 capsule by mouth Daily., Disp: 30 capsule, Rfl: 0  •  lansoprazole (PREVACID) 30 MG capsule, Take 30 mg by mouth Daily., Disp: , Rfl: 11  •  metOLazone (ZAROXOLYN) 5 MG tablet, Take 1-2 tablets by mouth Daily As Needed (dyspnea, weight gain)., Disp: , Rfl:   •  MOVANTIK 12.5 MG tablet, Take 1 tablet by mouth 2 (Two) Times a Week. Wednesday and Sunday, Disp: , Rfl: 1  •  mupirocin (BACTROBAN) 2 % ointment, Apply 1 application topically to the appropriate area as directed As Needed., Disp: , Rfl:   •  ondansetron ODT (ZOFRAN ODT) 4 MG disintegrating tablet,  "Place 1 tablet under the tongue Every 8 (Eight) Hours As Needed for Nausea or Vomiting., Disp: 20 tablet, Rfl: 0  •  insulin aspart (NOVOLOG FLEXPEN) 100 UNIT/ML solution pen-injector sc pen, 50 Units Three Times Daily, Disp: 45 pen, Rfl: 5  •  Insulin Glargine (TOUJEO MAX SOLOSTAR) 300 UNIT/ML solution pen-injector, Inject 80 Units under the skin into the appropriate area as directed 2 (Two) Times a Day., Disp: 6 pen, Rfl: 6     PMFSH  The following portions of the patient's history were reviewed and updated as appropriate: allergies, current medications, past family history, past medical history, past social history, past surgical history and problem list.    Review of Systems   Constitutional: Positive for fatigue. Negative for appetite change, fever and unexpected weight change.   HENT: Negative for ear pain, facial swelling and sore throat.    Eyes: Negative for pain and visual disturbance.   Respiratory: Negative for chest tightness, shortness of breath and wheezing.    Cardiovascular: Negative for chest pain and palpitations.   Gastrointestinal: Negative for abdominal pain and blood in stool.   Endocrine: Negative.    Genitourinary: Negative for difficulty urinating and hematuria.   Musculoskeletal: Negative for joint swelling.   Neurological: Negative for dizziness, tremors, seizures, syncope and headaches.   Hematological: Negative for adenopathy.   Psychiatric/Behavioral: Negative.        Objective   /76   Pulse 97   Ht 170.2 cm (67\")   Wt 108 kg (237 lb)   LMP  (LMP Unknown)   SpO2 99%   BMI 37.12 kg/m²     Physical Exam   Constitutional: She is oriented to person, place, and time. She appears well-developed and well-nourished. No distress.   HENT:   Head: Normocephalic and atraumatic. Hair is normal.   Right Ear: Hearing, tympanic membrane, external ear and ear canal normal. No drainage. No decreased hearing is noted.   Left Ear: Hearing, tympanic membrane, external ear and ear canal normal. " No decreased hearing is noted.   Nose: No nasal deformity.   Mouth/Throat: Oropharynx is clear and moist.   Eyes: Conjunctivae, EOM and lids are normal. Pupils are equal, round, and reactive to light. Lids are everted and swept, no foreign bodies found. Right eye exhibits no discharge. Left eye exhibits no discharge.   Fundoscopic exam:       The right eye shows red reflex.        The left eye shows red reflex.   Neck: Normal range of motion. Neck supple. No JVD present. No tracheal deviation present. No thyromegaly present.   Cardiovascular: Normal rate, regular rhythm, normal heart sounds, intact distal pulses and normal pulses. Exam reveals no gallop and no friction rub.   No murmur heard.  Pulmonary/Chest: Effort normal and breath sounds normal. No respiratory distress. She has no wheezes. She has no rales. She exhibits no tenderness.   Abdominal: Soft. Bowel sounds are normal. She exhibits no distension and no mass. There is no tenderness. There is no rebound and no guarding. No hernia.   Musculoskeletal: Normal range of motion. She exhibits no edema, tenderness or deformity.   Lymphadenopathy:     She has no cervical adenopathy.        Right: No inguinal adenopathy present.        Left: No inguinal adenopathy present.   Neurological: She is alert and oriented to person, place, and time. She has normal reflexes. She displays normal reflexes. No cranial nerve deficit. She exhibits normal muscle tone. Coordination normal.   Skin: Skin is warm and dry. No rash noted. She is not diaphoretic. No erythema.   Psychiatric: She has a normal mood and affect. Her behavior is normal. Judgment and thought content normal.   Nursing note and vitals reviewed.           ASSESSMENT/PLAN    Problem List Items Addressed This Visit        Cardiovascular and Mediastinum    Essential hypertension     Hypertension is unchanged.  Continue current treatment regimen.  Dietary sodium restriction.  Weight loss.  Regular aerobic  exercise.  Continue current medications.  Blood pressure will be reassessed at the next regular appointment.         Relevant Orders    Comprehensive Metabolic Panel (Completed)       Digestive    Vitamin D deficiency - Primary    Relevant Orders    CBC & Differential (Completed)    Vitamin D 25 Hydroxy (Completed)    CBC Auto Differential (Completed)       Hematopoietic and Hemostatic    Anemia, chronic disease     Check cbc. Further recommendations based on results.              Other    Health care maintenance     Immunizations: shingrix recommended; others utd  Eye exam: done 3/2019  Pap Smear: pt declined pap smear today  Mammogram: due 8/2019  Dexa: done 2018  Colonoscopy: done 10/2019  Labs: fasting labs ordered    Counseled patient regarding multimodal approach with healthy nutrition, healthy sleep, regular physical activity, social activities, counseling, safety measures and medications.                     Return in about 6 months (around 9/5/2019) for Recheck, Annual in 1 year.

## 2019-03-05 NOTE — PROGRESS NOTES
Chief complaint  Diabetes (Follow UP  Has been having a lot of lows. Reduced Toujoe by 20 units.  Humalog to 10 units )    Subjective   Tashia Leon is a 63 y.o. female is here today for follow-up.  Diabetes mellitus type 2 diagnosed in mid 90s.   Medications: Humalog and Toujeo  Diabetic complications: Diabetic foot ulcer. Diab retinopathy. CKD - stable function.   Eye care: diabetic retinopathy by last exam, seeing eye dr regularly.   Podiatry visit - every 2 weeks currently, diabetic shoes rx given. She underwent toe amputation in 1/2019, no has ulcer on the different toe.   Patient was evaluated for the kidney transplant and went through testing.      Hyerlipidemia with high LDL. Side effects on statins . She developed SOB on crestor and lipitor, simvastatin, Livalo.Doing well on Praluent, which was started Nov 2015, doing well since then, LDL <100     Patient had hospitalization for toe amputation in 1/2019, then had anemia and possible PNA> Felt much better after blood transfusion.   She had more frequent hypoglycemia while inpatient and after incresaed activity. Reduced insulin doses.     Medications    Current Outpatient Medications:   •  Aflibercept (EYLEA IO), Inject  as directed As Needed. Every 6 weeks at the eye doctor, Disp: , Rfl:   •  Alirocumab (PRALUENT) 75 MG/ML solution pen-injector, Inject 75 mg under the skin into the appropriate area as directed Every 14 (Fourteen) Days., Disp: 7 pen, Rfl: 3  •  ammonium lactate (AMLACTIN) 12 % cream, Apply 1 application topically to the appropriate area as directed 2 (Two) Times a Day. feet, Disp: , Rfl:   •  aspirin  MG tablet, Take 325 mg by mouth Daily., Disp: , Rfl:   •  baclofen (LIORESAL) 10 MG tablet, Take 5-10 mg by mouth As Needed., Disp: , Rfl:   •  DILT- MG 24 hr capsule, Take 120 mg by mouth Daily., Disp: , Rfl: 11  •  doxycycline (MONODOX) 100 MG capsule, Take 100 mg by mouth 2 (Two) Times a Day., Disp: , Rfl: 0  •  Dulaglutide  (TRULICITY) 1.5 MG/0.5ML solution pen-injector, Inject 1.5 mg under the skin into the appropriate area as directed 1 (One) Time Per Week. Tuesday, Disp: 2 pen, Rfl: 5  •  furosemide (LASIX) 40 MG tablet, Take 1 tablet by mouth 2 (Two) Times a Day., Disp: , Rfl: 11  •  HYDROcodone-acetaminophen (NORCO) 7.5-325 MG per tablet, Take 1 tablet by mouth Every 8 (Eight) Hours As Needed., Disp: , Rfl:   •  Insulin Glargine (TOUJEO SOLOSTAR) 300 UNIT/ML solution pen-injector, Inject 80 Units as directed 2 (Two) Times a Day., Disp: 20 pen, Rfl: 11  •  Insulin Lispro (HUMALOG KWIKPEN) 200 UNIT/ML solution pen-injector, Inject 30 unit marking on U-100 syringe under the skin into the appropriate area as directed 3 (Three) Times a Day As Needed (Blood Sugar). Per sliding scale, Disp: , Rfl:   •  lactobacillus acidophilus (RISAQUAD) capsule capsule, Take 1 capsule by mouth Daily., Disp: 30 capsule, Rfl: 0  •  lansoprazole (PREVACID) 30 MG capsule, Take 30 mg by mouth Daily., Disp: , Rfl: 11  •  metOLazone (ZAROXOLYN) 5 MG tablet, Take 1-2 tablets by mouth Daily As Needed (dyspnea, weight gain)., Disp: , Rfl:   •  MOVANTIK 12.5 MG tablet, Take 1 tablet by mouth 2 (Two) Times a Week. Wednesday and Sunday, Disp: , Rfl: 1  •  mupirocin (BACTROBAN) 2 % ointment, Apply 1 application topically to the appropriate area as directed As Needed., Disp: , Rfl:   •  ondansetron ODT (ZOFRAN ODT) 4 MG disintegrating tablet, Place 1 tablet under the tongue Every 8 (Eight) Hours As Needed for Nausea or Vomiting., Disp: 20 tablet, Rfl: 0    PMH  The following portions of the patient's history were reviewed and updated as appropriate: allergies, current medications, past family history, past medical history, past social history, past surgical history and problem list.    Review of systems  Review of Systems   Constitutional: Positive for fatigue and unexpected weight change (weight gain). Negative for activity change, appetite change, chills and  "diaphoresis.   HENT: Negative for congestion, ear pain, facial swelling, hearing loss, postnasal drip, sore throat, trouble swallowing and voice change.    Eyes: Negative.  Negative for redness, itching and visual disturbance.   Respiratory: Negative for cough and shortness of breath.    Cardiovascular: Positive for leg swelling. Negative for chest pain and palpitations.   Gastrointestinal: Negative for abdominal distention, abdominal pain, constipation, diarrhea, nausea and vomiting.   Endocrine:        As listed in HPI   Genitourinary: Negative.    Musculoskeletal: Negative for arthralgias, back pain and myalgias.   Skin: Negative.    Allergic/Immunologic: Negative.    Neurological: Positive for weakness and light-headedness. Negative for dizziness, tremors, syncope and headaches.   Hematological: Negative.    Psychiatric/Behavioral: Negative.    All other systems reviewed and are negative.      Physical exam  Objective   Blood pressure 126/76, pulse 97, height 170.2 cm (67\"), weight 108 kg (237 lb 1.6 oz), SpO2 99 %, not currently breastfeeding.   Physical Exam   Constitutional: She is oriented to person, place, and time. She appears well-developed and well-nourished.   HENT:   Head: Normocephalic and atraumatic.   Eyes: Conjunctivae are normal.   Neck: No thyromegaly present.   The neck is supple and symmetric   Cardiovascular: Normal rate, regular rhythm and normal heart sounds.   Pulmonary/Chest: Effort normal and breath sounds normal.   Musculoskeletal:   Right foot - dressed in boot   Lymphadenopathy:     She has no cervical adenopathy.   Neurological: She is alert and oriented to person, place, and time.   Skin: Skin is warm and dry. No rash noted.   Psychiatric: She has a normal mood and affect. Thought content normal.   Vitals reviewed.        LABS AND IMAGING  Results for orders placed or performed during the hospital encounter of 01/22/19   Blood Culture - Blood, Arm, Left   Result Value Ref Range    " Blood Culture No growth at 5 days    Blood Culture - Blood, Arm, Right   Result Value Ref Range    Blood Culture No growth at 5 days    Influenza A & B, RT PCR - Swab, Nasopharynx   Result Value Ref Range    Influenza A PCR Not Detected Not Detected    Influenza B PCR Not Detected Not Detected   Respiratory Culture - Sputum, Cough   Result Value Ref Range    Respiratory Culture Moderate growth (3+) Normal Respiratory Elle     Gram Stain Few (2+) WBCs per low power field     Gram Stain Moderate (3+) Epithelial cells per low power field     Gram Stain Many (4+) Gram positive cocci in pairs and chains     Gram Stain Rare (1+) Gram positive cocci in groups     Gram Stain Few (2+) Gram negative bacilli    Comprehensive Metabolic Panel   Result Value Ref Range    Glucose 74 70 - 100 mg/dL     (H) 9 - 23 mg/dL    Creatinine 5.23 (H) 0.60 - 1.30 mg/dL    Sodium 132 132 - 146 mmol/L    Potassium 2.7 (C) 3.5 - 5.5 mmol/L    Chloride 89 (L) 99 - 109 mmol/L    CO2 25.0 20.0 - 31.0 mmol/L    Calcium 9.5 8.7 - 10.4 mg/dL    Total Protein 8.3 (H) 5.7 - 8.2 g/dL    Albumin 4.64 3.20 - 4.80 g/dL    ALT (SGPT) 14 7 - 40 U/L    AST (SGOT) 24 0 - 33 U/L    Alkaline Phosphatase 142 (H) 25 - 100 U/L    Total Bilirubin 0.2 (L) 0.3 - 1.2 mg/dL    eGFR Non African Amer 8 (L) >60 mL/min/1.73    Globulin 3.7 gm/dL    A/G Ratio 1.3 (L) 1.5 - 2.5 g/dL    BUN/Creatinine Ratio 25.6 (H) 7.0 - 25.0    Anion Gap 18.0 (H) 3.0 - 11.0 mmol/L   BNP   Result Value Ref Range    .0 (H) 0.0 - 100.0 pg/mL   CBC Auto Differential   Result Value Ref Range    WBC 22.33 (H) 3.50 - 10.80 10*3/mm3    RBC 3.19 (L) 3.89 - 5.14 10*6/mm3    Hemoglobin 9.7 (L) 11.5 - 15.5 g/dL    Hematocrit 29.6 (L) 34.5 - 44.0 %    MCV 92.8 80.0 - 99.0 fL    MCH 30.4 27.0 - 31.0 pg    MCHC 32.8 32.0 - 36.0 g/dL    RDW 14.5 11.3 - 14.5 %    RDW-SD 48.9 37.0 - 54.0 fl    MPV 9.9 6.0 - 12.0 fL    Platelets 615 (H) 150 - 450 10*3/mm3    Neutrophil % 80.1 (H) 41.0 - 71.0 %     Lymphocyte % 15.0 (L) 24.0 - 44.0 %    Monocyte % 4.7 0.0 - 12.0 %    Eosinophil % 0.1 0.0 - 3.0 %    Basophil % 0.1 0.0 - 1.0 %    Immature Grans % 0.5 0.0 - 0.6 %    Neutrophils, Absolute 17.88 (H) 1.50 - 8.30 10*3/mm3    Lymphocytes, Absolute 3.34 0.60 - 4.80 10*3/mm3    Monocytes, Absolute 1.05 (H) 0.00 - 1.00 10*3/mm3    Eosinophils, Absolute 0.03 0.00 - 0.30 10*3/mm3    Basophils, Absolute 0.03 0.00 - 0.20 10*3/mm3    Immature Grans, Absolute 0.11 (H) 0.00 - 0.03 10*3/mm3   Lactic Acid, Plasma   Result Value Ref Range    Lactate 1.6 0.5 - 2.0 mmol/L   Scan Slide   Result Value Ref Range    RBC Morphology Normal Normal    WBC Morphology Normal Normal    Platelet Morphology Normal Normal   Potassium   Result Value Ref Range    Potassium 2.9 (L) 3.5 - 5.5 mmol/L   Basic Metabolic Panel   Result Value Ref Range    Glucose 51 (L) 70 - 100 mg/dL     (H) 9 - 23 mg/dL    Creatinine 4.86 (H) 0.60 - 1.30 mg/dL    Sodium 137 132 - 146 mmol/L    Potassium 3.2 (L) 3.5 - 5.5 mmol/L    Chloride 96 (L) 99 - 109 mmol/L    CO2 22.0 20.0 - 31.0 mmol/L    Calcium 7.9 (L) 8.7 - 10.4 mg/dL    eGFR Non African Amer 9 (L) >60 mL/min/1.73    BUN/Creatinine Ratio 26.7 (H) 7.0 - 25.0    Anion Gap 19.0 (H) 3.0 - 11.0 mmol/L   CBC Auto Differential   Result Value Ref Range    WBC 17.26 (H) 3.50 - 10.80 10*3/mm3    RBC 2.66 (L) 3.89 - 5.14 10*6/mm3    Hemoglobin 8.0 (L) 11.5 - 15.5 g/dL    Hematocrit 25.0 (L) 34.5 - 44.0 %    MCV 94.0 80.0 - 99.0 fL    MCH 30.1 27.0 - 31.0 pg    MCHC 32.0 32.0 - 36.0 g/dL    RDW 14.8 (H) 11.3 - 14.5 %    RDW-SD 50.6 37.0 - 54.0 fl    MPV 9.8 6.0 - 12.0 fL    Platelets 553 (H) 150 - 450 10*3/mm3    Neutrophil % 80.2 (H) 41.0 - 71.0 %    Lymphocyte % 12.2 (L) 24.0 - 44.0 %    Monocyte % 7.4 0.0 - 12.0 %    Eosinophil % 0.1 0.0 - 3.0 %    Basophil % 0.1 0.0 - 1.0 %    Immature Grans % 0.5 0.0 - 0.6 %    Neutrophils, Absolute 13.83 (H) 1.50 - 8.30 10*3/mm3    Lymphocytes, Absolute 2.11 0.60 - 4.80  10*3/mm3    Monocytes, Absolute 1.28 (H) 0.00 - 1.00 10*3/mm3    Eosinophils, Absolute 0.02 0.00 - 0.30 10*3/mm3    Basophils, Absolute 0.02 0.00 - 0.20 10*3/mm3    Immature Grans, Absolute 0.09 (H) 0.00 - 0.03 10*3/mm3   Hemoglobin A1c   Result Value Ref Range    Hemoglobin A1C 6.80 (H) 4.80 - 5.60 %   Magnesium   Result Value Ref Range    Magnesium 2.2 1.3 - 2.7 mg/dL   Potassium   Result Value Ref Range    Potassium 3.0 (L) 3.5 - 5.5 mmol/L   CBC (No Diff)   Result Value Ref Range    WBC 15.70 (H) 3.50 - 10.80 10*3/mm3    RBC 2.53 (L) 3.89 - 5.14 10*6/mm3    Hemoglobin 7.4 (L) 11.5 - 15.5 g/dL    Hematocrit 24.2 (L) 34.5 - 44.0 %    MCV 95.7 80.0 - 99.0 fL    MCH 29.2 27.0 - 31.0 pg    MCHC 30.6 (L) 32.0 - 36.0 g/dL    RDW 15.1 (H) 11.3 - 14.5 %    RDW-SD 51.7 37.0 - 54.0 fl    MPV 9.9 6.0 - 12.0 fL    Platelets 537 (H) 150 - 450 10*3/mm3   Iron Profile   Result Value Ref Range    Iron 20 (L) 50 - 175 mcg/dL    TIBC 234 (L) 250 - 450 mcg/dL    Iron Saturation 9 (L) 15 - 50 %   Vitamin B12   Result Value Ref Range    Vitamin B-12 510 211 - 911 pg/mL   Renal Function Panel   Result Value Ref Range    Glucose 185 (H) 70 - 100 mg/dL     (H) 9 - 23 mg/dL    Creatinine 4.11 (H) 0.60 - 1.30 mg/dL    Sodium 135 132 - 146 mmol/L    Potassium 3.9 3.5 - 5.5 mmol/L    Chloride 99 99 - 109 mmol/L    CO2 22.0 20.0 - 31.0 mmol/L    Calcium 7.7 (L) 8.7 - 10.4 mg/dL    Albumin 3.24 3.20 - 4.80 g/dL    Phosphorus 6.8 (H) 2.4 - 5.1 mg/dL    Anion Gap 14.0 (H) 3.0 - 11.0 mmol/L    BUN/Creatinine Ratio 27.7 (H) 7.0 - 25.0    eGFR Non African Amer 11 (L) >60 mL/min/1.73   Ferritin   Result Value Ref Range    Ferritin 817.00 (H) 10.00 - 291.00 ng/mL   Basic Metabolic Panel   Result Value Ref Range    Glucose 119 (H) 70 - 100 mg/dL    BUN 92 (H) 9 - 23 mg/dL    Creatinine 4.02 (H) 0.60 - 1.30 mg/dL    Sodium 140 132 - 146 mmol/L    Potassium 3.8 3.5 - 5.5 mmol/L    Chloride 103 99 - 109 mmol/L    CO2 22.0 20.0 - 31.0 mmol/L     Calcium 8.1 (L) 8.7 - 10.4 mg/dL    eGFR Non African Amer 11 (L) >60 mL/min/1.73    BUN/Creatinine Ratio 22.9 7.0 - 25.0    Anion Gap 15.0 (H) 3.0 - 11.0 mmol/L   Hemoglobin & Hematocrit, Blood   Result Value Ref Range    Hemoglobin 8.1 (L) 11.5 - 15.5 g/dL    Hematocrit 26.2 (L) 34.5 - 44.0 %   POC Troponin, Rapid   Result Value Ref Range    Troponin I 0.00 0.00 - 0.07 ng/mL   POC Glucose Once   Result Value Ref Range    Glucose 58 (L) 70 - 130 mg/dL   POC Glucose Once   Result Value Ref Range    Glucose 60 (L) 70 - 130 mg/dL   POC Glucose Once   Result Value Ref Range    Glucose 122 70 - 130 mg/dL   POC Glucose Once   Result Value Ref Range    Glucose 54 (L) 70 - 130 mg/dL   POC Glucose Once   Result Value Ref Range    Glucose 106 70 - 130 mg/dL   POC Glucose Once   Result Value Ref Range    Glucose 97 70 - 130 mg/dL   POC Glucose Once   Result Value Ref Range    Glucose 203 (H) 70 - 130 mg/dL   POC Glucose Once   Result Value Ref Range    Glucose 42 (C) 70 - 130 mg/dL   POC Glucose Once   Result Value Ref Range    Glucose 57 (L) 70 - 130 mg/dL   POC Glucose Once   Result Value Ref Range    Glucose 67 (L) 70 - 130 mg/dL   POC Glucose Once   Result Value Ref Range    Glucose 114 70 - 130 mg/dL   POC Glucose Once   Result Value Ref Range    Glucose 203 (H) 70 - 130 mg/dL   POC Glucose Once   Result Value Ref Range    Glucose 133 (H) 70 - 130 mg/dL   POC Glucose Once   Result Value Ref Range    Glucose 123 70 - 130 mg/dL   POC Glucose Once   Result Value Ref Range    Glucose 268 (H) 70 - 130 mg/dL   POC Glucose Once   Result Value Ref Range    Glucose 124 70 - 130 mg/dL   POC Glucose Once   Result Value Ref Range    Glucose 118 70 - 130 mg/dL   POC Glucose Once   Result Value Ref Range    Glucose 117 70 - 130 mg/dL   Light Blue Top   Result Value Ref Range    Extra Tube hold for add-on    Green Top (Gel)   Result Value Ref Range    Extra Tube Hold for add-ons.    Lavender Top   Result Value Ref Range    Extra Tube  hold for add-on    Gold Top - SST   Result Value Ref Range    Extra Tube Hold for add-ons.            Assessment  Assessment/Plan   Problem List Items Addressed This Visit        Cardiovascular and Mediastinum    Hyperlipemia       Endocrine    Diabetic polyneuropathy associated with type 2 diabetes mellitus (CMS/HCC) - Primary    Moderate nonproliferative diabetic retinopathy without macular edema associated with type 2 diabetes mellitus (CMS/[facility])    Uncontrolled type 2 diabetes mellitus with hyperglycemia (CMS/[facility])    Secondary hyperparathyroidism (CMS/[facility])          Plan  1.Diabetes mellitus type 2 with hx diabetic foot ulcer, retinopathy and CKD.   She has more hypoglycemia and insulin titration was reviewed with her.   Toujeo max and Trulicity samples provided.   I have also sent a prescription for .  Patient is agreed candidate for this device considering her multiple daily injections requirements.    There are no Patient Instructions on file for this visit.  2. Hyperlipidemia  Continue Praluent every 2 weeks.  She had reaction to crestor, livalo and lipitor and severe peripheral vascular disease. No alternatives exist.      3. CKD / edema - stable, planned for kidney transplant.     4. Foot ulcer - healing well.     Recent labs and hospitalization records reviewed.      Medications refilled. Hypoglycemia precautions and insulin instructions reviewed.     Follow-up in 3-4 months.     16     min  of  26 min face-to-face visit time spent for coordination of care and counselling regarding identified problems as outlined in the objective, assessment and discussion portions of the documentation.

## 2019-03-06 ENCOUNTER — TELEPHONE (OUTPATIENT)
Dept: INTERNAL MEDICINE | Facility: CLINIC | Age: 64
End: 2019-03-06

## 2019-03-06 RX ORDER — FLASH GLUCOSE SENSOR
1 KIT MISCELLANEOUS ONCE
Qty: 1 DEVICE | Refills: 0 | Status: SHIPPED | OUTPATIENT
Start: 2019-03-06 | End: 2019-03-06 | Stop reason: SDUPTHER

## 2019-03-06 RX ORDER — FLASH GLUCOSE SENSOR
1 KIT MISCELLANEOUS WEEKLY
Qty: 3 EACH | Refills: 5 | Status: SHIPPED | OUTPATIENT
Start: 2019-03-06 | End: 2019-03-06 | Stop reason: SDUPTHER

## 2019-03-06 RX ORDER — FLASH GLUCOSE SCANNING READER
1 EACH MISCELLANEOUS 4 TIMES DAILY
Qty: 1 DEVICE | Refills: 0 | Status: SHIPPED | OUTPATIENT
Start: 2019-03-06 | End: 2019-03-13 | Stop reason: CLARIF

## 2019-03-06 RX ORDER — FLASH GLUCOSE SENSOR
KIT MISCELLANEOUS
Qty: 3 EACH | Refills: 5 | Status: SHIPPED | OUTPATIENT
Start: 2019-03-06 | End: 2019-03-13 | Stop reason: CLARIF

## 2019-03-06 NOTE — TELEPHONE ENCOUNTER
Called [facility], per rep pt is not scheduled currently but they usually send out reminders to pt's once they're due, she is a pt of Dr. Corrales, per rep pt can all herself at [phone] to go ahead and get it scheduled for October, called and notified pt, left call back number if anymore questions or concerns.

## 2019-03-06 NOTE — TELEPHONE ENCOUNTER
----- Message from ALYSSIA Orellana sent at 3/5/2019 12:09 PM EST -----  Pt is due for repeat colonoscopy this year (October) with [facility]. Will you check to see if they will notify pt when it is due? Thanks

## 2019-03-06 NOTE — TELEPHONE ENCOUNTER
PT CALLED TO SAY DO NOT CALL [facility] EVER AGAIN SHE WANTED TO USE THE MEDICINE SHOP ONLY    PLEASE CALL HER REFILLS THERE THE ONES WE SENT TO [facility] AND CALL THE PT BACK WHEN THIS HAS BEEN DONE    PTS NUMBER [phone]

## 2019-03-07 ENCOUNTER — TELEPHONE (OUTPATIENT)
Dept: INTERNAL MEDICINE | Facility: CLINIC | Age: 64
End: 2019-03-07

## 2019-03-07 DIAGNOSIS — D72.829 LEUKOCYTOSIS, UNSPECIFIED TYPE: Primary | ICD-10-CM

## 2019-03-07 NOTE — TELEPHONE ENCOUNTER
Called and informed pt, pt is wondering because her white blood count is elevated does that mean that she needs a stronger abx?    Mailed copy of results to pt.

## 2019-03-07 NOTE — TELEPHONE ENCOUNTER
Pt is requesting the results of the her labs that she had on 03/05/19. Please call her back at [phone]

## 2019-03-07 NOTE — TELEPHONE ENCOUNTER
Labs showed anemia is better: hemoglobin is 10.6 and hematocrit is 34.5. White count and creatinine are still elevated at 15.16 and 4.11.    Everything else looks fine. Send a copy of results if she would like. Thanks

## 2019-03-08 NOTE — ASSESSMENT & PLAN NOTE
Immunizations: shingrix recommended; others utd  Eye exam: done 3/2019  Pap Smear: pt declined pap smear today  Mammogram: due 8/2019  Dexa: done 2018  Colonoscopy: done 10/2019  Labs: fasting labs ordered    Counseled patient regarding multimodal approach with healthy nutrition, healthy sleep, regular physical activity, social activities, counseling, safety measures and medications.

## 2019-03-08 NOTE — TELEPHONE ENCOUNTER
Her white count has been elevated over the past year and is stable. We can monitor to make sure it remains stable. We can recheck in 1 month.

## 2019-03-13 ENCOUNTER — TELEPHONE (OUTPATIENT)
Dept: INTERNAL MEDICINE | Facility: CLINIC | Age: 64
End: 2019-03-13

## 2019-03-18 DIAGNOSIS — I25.10 CORONARY ARTERY DISEASE INVOLVING NATIVE CORONARY ARTERY OF NATIVE HEART WITHOUT ANGINA PECTORIS: ICD-10-CM

## 2019-03-18 NOTE — TELEPHONE ENCOUNTER
PHARMACY STATES THAT THE PATIENT'S INSURANCE WILL NOT COVER TOUJEO AND WOULD LIKE TO HAVE THIS CHANGED TO BASAGLAR.

## 2019-03-20 ENCOUNTER — TELEPHONE (OUTPATIENT)
Dept: CARDIOLOGY | Facility: CLINIC | Age: 64
End: 2019-03-20

## 2019-03-20 NOTE — TELEPHONE ENCOUNTER
PT has osteomyelitis and needs toe amputation- office is requesting cardiac clearance- she is scheduled for Friday for the procedure-    Dr. Becker reviewed patient's chart- she should have a stress test for clearance. She also has carotid stenosis and we referred her to  Given last year but she cancelled that appointment and never rescheduled.     Given her history, she would be considered high risk surgery.     This was relayed to Ursula and I provided her with Dr. Becker's cell number in case he needs to discuss anything with her. Will fax records and copy of this note to Ursula at [phone].

## 2019-03-20 NOTE — TELEPHONE ENCOUNTER
I overlooked her appointment with Dr. Maldonado- Dr. Maldonado did not want to do anything further regarding her carotid stenosis since she is asymptomatic- Given that information, PT is cleared for surgery but at a moderate risk-this was relayed to both PT and Ursula at Dr. Mccurdy's office-     PT questioned why her surgery in December was not an issue and I Explained that they did not ask for cariology clearance-

## 2019-03-21 ENCOUNTER — OUTSIDE FACILITY SERVICE (OUTPATIENT)
Dept: CARDIOLOGY | Facility: CLINIC | Age: 64
End: 2019-03-21

## 2019-03-21 PROCEDURE — 93018 CV STRESS TEST I&R ONLY: CPT | Performed by: INTERNAL MEDICINE

## 2019-03-21 PROCEDURE — 78452 HT MUSCLE IMAGE SPECT MULT: CPT | Performed by: INTERNAL MEDICINE

## 2019-03-22 RX ORDER — INSULIN GLARGINE 100 [IU]/ML
INJECTION, SOLUTION SUBCUTANEOUS
Qty: 48 PEN | Refills: 11 | Status: SHIPPED | OUTPATIENT
Start: 2019-03-22 | End: 2020-01-10 | Stop reason: CLARIF

## 2019-04-08 DIAGNOSIS — IMO0002 UNCONTROLLED TYPE 2 DIABETES MELLITUS WITH COMPLICATION, WITH LONG-TERM CURRENT USE OF INSULIN: ICD-10-CM

## 2019-04-09 RX ORDER — PEN NEEDLE, DIABETIC 31 GX3/16"
1 NEEDLE, DISPOSABLE MISCELLANEOUS
Qty: 200 EACH | Refills: 6 | Status: SHIPPED | OUTPATIENT
Start: 2019-04-09 | End: 2019-11-04 | Stop reason: SDUPTHER

## 2019-04-09 RX ORDER — FUROSEMIDE 40 MG/1
TABLET ORAL
Qty: 120 TABLET | Refills: 11 | OUTPATIENT
Start: 2019-04-09

## 2019-04-12 ENCOUNTER — TELEPHONE (OUTPATIENT)
Dept: INTERNAL MEDICINE | Facility: CLINIC | Age: 64
End: 2019-04-12

## 2019-04-12 RX ORDER — ASPIRIN 325 MG
325 TABLET, DELAYED RELEASE (ENTERIC COATED) ORAL DAILY
Qty: 30 TABLET | Refills: 11 | Status: SHIPPED | OUTPATIENT
Start: 2019-04-12 | End: 2020-03-25

## 2019-04-14 RX ORDER — FUROSEMIDE 40 MG/1
TABLET ORAL
Qty: 60 TABLET | Refills: 11 | Status: SHIPPED | OUTPATIENT
Start: 2019-04-14 | End: 2020-03-25

## 2019-04-19 ENCOUNTER — TELEPHONE (OUTPATIENT)
Dept: INTERNAL MEDICINE | Facility: CLINIC | Age: 64
End: 2019-04-19

## 2019-04-19 NOTE — TELEPHONE ENCOUNTER
THE PATIENT STATES THAT HER INSURANCE COMPANY WILL NOT APPROVE PRAULANT MEDICATION AND SHE WOULD LIKE TO KNOW IF REPATA IS THE SAME AS THE MEDICATION THAT IS NOT BEING COVERED AND IF SO COULD SHE TRY THAT INSTEAD? PLEASE GIVE THE PATIENT A CALL BACK [phone]

## 2019-04-24 ENCOUNTER — TELEPHONE (OUTPATIENT)
Dept: INTERNAL MEDICINE | Facility: CLINIC | Age: 64
End: 2019-04-24

## 2019-04-24 NOTE — TELEPHONE ENCOUNTER
PATIENT IS TELLING PHARMACY THAT SHE WANTS THE PRALUENT PRESCRIPTION AND TO GET A PRIOR AUTH  IN ORDER TO BE COVERED PER PHARMACY PER INSURANCE COMPANY. PATIENT IS BEING PERSISTENT AND WANTS THE PRALUENT MEDICATION. PHARMACY NUMBER [phone]

## 2019-04-24 NOTE — TELEPHONE ENCOUNTER
PATIENT IS REQUESTING A RETURN CALL TO SPEAK WITH YOU. SHE STATES DURING THE CALL THAT IT WAS TOO MUCH TO PUT IN A MESSAGE SO NO FURTHER DETAILS WERE GIVEN.        BACK [phone]

## 2019-04-30 ENCOUNTER — TELEPHONE (OUTPATIENT)
Dept: INTERNAL MEDICINE | Facility: CLINIC | Age: 64
End: 2019-04-30

## 2019-04-30 NOTE — TELEPHONE ENCOUNTER
PT WAS TOLD THAT HER MEDICATION PRAGULANT WAS TAKEN CARE OF AND SHE WOULDN'T HAVE ANY ISSUES.    HER PHARM SAID THEY ARE NOT ABLE TO FILL IT.    PLEASE ADVISE.

## 2019-05-17 ENCOUNTER — TELEPHONE (OUTPATIENT)
Dept: INTERNAL MEDICINE | Facility: CLINIC | Age: 64
End: 2019-05-17

## 2019-05-17 NOTE — TELEPHONE ENCOUNTER
Resent RX, but also called Pharmacy as we have patient listed in chart as taking Novolog.  Pharmacy stated they had not been getting RX, they are going to different location???  Gave verbal

## 2019-05-17 NOTE — TELEPHONE ENCOUNTER
Pharmacy called. Patients humalog is not covered but novolog is. Can we change rx? Please advise. Thanks.

## 2019-05-23 ENCOUNTER — HOSPITAL ENCOUNTER (OUTPATIENT)
Dept: ONCOLOGY | Facility: HOSPITAL | Age: 64
Setting detail: INFUSION SERIES
Discharge: HOME | End: 2019-05-23

## 2019-05-23 VITALS
WEIGHT: 230 LBS | RESPIRATION RATE: 18 BRPM | DIASTOLIC BLOOD PRESSURE: 49 MMHG | HEIGHT: 67 IN | SYSTOLIC BLOOD PRESSURE: 114 MMHG | BODY MASS INDEX: 36.1 KG/M2 | TEMPERATURE: 97.7 F | HEART RATE: 82 BPM

## 2019-05-23 DIAGNOSIS — D50.9 IDIOPATHIC HYPOCHROMIC ANEMIA: Primary | ICD-10-CM

## 2019-05-23 DIAGNOSIS — N18.5 CHRONIC KIDNEY DISEASE, STAGE V (HCC): ICD-10-CM

## 2019-05-23 PROCEDURE — 25010000002 FERRIC CARBOXYMALTOSE 750 MG/15ML SOLUTION 15 ML VIAL: Performed by: INTERNAL MEDICINE

## 2019-05-23 PROCEDURE — 96374 THER/PROPH/DIAG INJ IV PUSH: CPT

## 2019-05-23 RX ADMIN — FERRIC CARBOXYMALTOSE INJECTION 750 MG: 50 INJECTION, SOLUTION INTRAVENOUS at 14:00

## 2019-05-30 ENCOUNTER — APPOINTMENT (OUTPATIENT)
Dept: ONCOLOGY | Facility: HOSPITAL | Age: 64
End: 2019-05-30

## 2019-05-31 ENCOUNTER — HOSPITAL ENCOUNTER (OUTPATIENT)
Dept: ONCOLOGY | Facility: HOSPITAL | Age: 64
Setting detail: INFUSION SERIES
Discharge: HOME | End: 2019-05-31

## 2019-05-31 VITALS
DIASTOLIC BLOOD PRESSURE: 47 MMHG | SYSTOLIC BLOOD PRESSURE: 124 MMHG | TEMPERATURE: 97.6 F | HEART RATE: 83 BPM | WEIGHT: 230 LBS | BODY MASS INDEX: 36.1 KG/M2 | RESPIRATION RATE: 16 BRPM | HEIGHT: 67 IN

## 2019-05-31 DIAGNOSIS — D50.9 IDIOPATHIC HYPOCHROMIC ANEMIA: Primary | ICD-10-CM

## 2019-05-31 DIAGNOSIS — N18.5 CHRONIC KIDNEY DISEASE, STAGE V (HCC): ICD-10-CM

## 2019-05-31 PROCEDURE — 96374 THER/PROPH/DIAG INJ IV PUSH: CPT

## 2019-05-31 PROCEDURE — 25010000002 FERRIC CARBOXYMALTOSE 750 MG/15ML SOLUTION 15 ML VIAL: Performed by: INTERNAL MEDICINE

## 2019-05-31 PROCEDURE — 96365 THER/PROPH/DIAG IV INF INIT: CPT

## 2019-05-31 RX ADMIN — FERRIC CARBOXYMALTOSE INJECTION 750 MG: 50 INJECTION, SOLUTION INTRAVENOUS at 13:33

## 2019-06-10 ENCOUNTER — OFFICE VISIT (OUTPATIENT)
Dept: ENDOCRINOLOGY | Facility: CLINIC | Age: 64
End: 2019-06-10

## 2019-06-10 ENCOUNTER — APPOINTMENT (OUTPATIENT)
Dept: CARDIOLOGY | Facility: HOSPITAL | Age: 64
End: 2019-06-10

## 2019-06-10 VITALS
HEART RATE: 88 BPM | DIASTOLIC BLOOD PRESSURE: 79 MMHG | BODY MASS INDEX: 36.1 KG/M2 | WEIGHT: 230 LBS | HEIGHT: 67 IN | SYSTOLIC BLOOD PRESSURE: 118 MMHG | OXYGEN SATURATION: 98 %

## 2019-06-10 DIAGNOSIS — IMO0002 DIABETES MELLITUS WITH NEUROLOGICAL MANIFESTATIONS, UNCONTROLLED: Primary | ICD-10-CM

## 2019-06-10 LAB
GLUCOSE BLDC GLUCOMTR-MCNC: 101 MG/DL (ref 70–130)
HBA1C MFR BLD: 7.4 %

## 2019-06-10 PROCEDURE — 83036 HEMOGLOBIN GLYCOSYLATED A1C: CPT | Performed by: INTERNAL MEDICINE

## 2019-06-10 PROCEDURE — 82947 ASSAY GLUCOSE BLOOD QUANT: CPT | Performed by: INTERNAL MEDICINE

## 2019-06-10 PROCEDURE — 99214 OFFICE O/P EST MOD 30 MIN: CPT | Performed by: INTERNAL MEDICINE

## 2019-06-10 NOTE — PATIENT INSTRUCTIONS
-Toujeo/Basaglar 80 Units BID.   -Continue Novolog 50  units three times a day  -Trulicity 1.5 mg     Results for orders placed or performed in visit on 06/10/19   POC Glucose Fingerstick   Result Value Ref Range    Glucose 101 70 - 130 mg/dL   POC Glycosylated Hemoglobin (Hb A1C)   Result Value Ref Range    Hemoglobin A1C 7.4 %

## 2019-06-10 NOTE — PROGRESS NOTES
Chief complaint  Diabetes (Follow UP: November, had toe removed, in February, developed a blister on other toe, Removed other toes in February, DOes not feel like responding to Basaglar since Insurance required her to change to Basaglar )    Subjective   Tashia Leon is a 63 y.o. female is here today for follow-up.  Diabetes mellitus type 2 diagnosed in mid 90s.   Medications: Humalog and Toujeo  Diabetic complications: Diabetic foot ulcer. Diab retinopathy. CKD - stable function.   Eye care: diabetic retinopathy by last exam, seeing eye dr regularly.   Podiatry visit - She underwent toe amputation in 1/2019, sees podiatrist regularly.   Patient was evaluated for the kidney transplant and went through testing.      Hyerlipidemia with high LDL. Side effects on statins . Doing well on Praluent, which was started Nov 2015, doing well since then, LDL <100    Patient reported that after change from Toujeo to Basaglar and from U-200 humalog to Novolog the glucose is higher. She underwent iron transfusion and feels better since then    Medications    Current Outpatient Medications:   •  Alirocumab (PRALUENT) 75 MG/ML solution pen-injector, Inject 75 mg under the skin into the appropriate area as directed Every 14 (Fourteen) Days., Disp: 2 pen, Rfl: 11  •  aspirin  MG tablet, Take 1 tablet by mouth Daily., Disp: 30 tablet, Rfl: 11  •  baclofen (LIORESAL) 10 MG tablet, Take 5-10 mg by mouth As Needed., Disp: , Rfl:   •  Dulaglutide (TRULICITY) 1.5 MG/0.5ML solution pen-injector, Inject 1.5 mg under the skin into the appropriate area as directed 1 (One) Time Per Week. Tuesday, Disp: 2 pen, Rfl: 5  •  furosemide (LASIX) 40 MG tablet, TAKE 1 TABLET TWICE DAILY, Disp: 60 tablet, Rfl: 11  •  GLOBAL EASE INJECT PEN NEEDLES 31G X 5 MM misc, INJECT 1 EACH UNDER THE SKIN 6 (SIX) TIMES A DAY., Disp: 200 each, Rfl: 6  •  HYDROcodone-acetaminophen (NORCO) 7.5-325 MG per tablet, Take 1 tablet by mouth Every 8 (Eight) Hours As  Needed., Disp: , Rfl:   •  insulin aspart (NOVOLOG FLEXPEN) 100 UNIT/ML solution pen-injector sc pen, 50 Units Three Times Daily, Disp: 45 pen, Rfl: 5  •  Insulin Glargine (BASAGLAR KWIKPEN) 100 UNIT/ML injection pen, 80 units two times daily, Disp: 48 pen, Rfl: 11  •  lansoprazole (PREVACID) 30 MG capsule, Take 30 mg by mouth Daily., Disp: , Rfl: 11  •  metOLazone (ZAROXOLYN) 5 MG tablet, Take 1-2 tablets by mouth Daily As Needed (dyspnea, weight gain)., Disp: , Rfl:   •  mupirocin (BACTROBAN) 2 % ointment, Apply 1 application topically to the appropriate area as directed As Needed., Disp: , Rfl:   •  Aflibercept (EYLEA IO), Inject  as directed As Needed. Every 6 weeks at the eye doctor, Disp: , Rfl:   •  ammonium lactate (AMLACTIN) 12 % cream, Apply 1 application topically to the appropriate area as directed 2 (Two) Times a Day. feet, Disp: , Rfl:   •  DILT- MG 24 hr capsule, Take 120 mg by mouth Daily., Disp: , Rfl: 11  •  doxycycline (MONODOX) 100 MG capsule, Take 100 mg by mouth 2 (Two) Times a Day., Disp: , Rfl: 0  •  Insulin Lispro (HUMALOG KWIKPEN) 200 UNIT/ML solution pen-injector, Inject 30 unit marking on U-100 syringe under the skin into the appropriate area as directed 3 (Three) Times a Day As Needed (Blood Sugar). Per sliding scale, Disp: , Rfl:   •  lactobacillus acidophilus (RISAQUAD) capsule capsule, Take 1 capsule by mouth Daily., Disp: 30 capsule, Rfl: 0  •  MOVANTIK 12.5 MG tablet, Take 1 tablet by mouth 2 (Two) Times a Week. Wednesday and Sunday, Disp: , Rfl: 1  •  ondansetron ODT (ZOFRAN ODT) 4 MG disintegrating tablet, Place 1 tablet under the tongue Every 8 (Eight) Hours As Needed for Nausea or Vomiting., Disp: 20 tablet, Rfl: 0    PMH  The following portions of the patient's history were reviewed and updated as appropriate: allergies, current medications, past family history, past medical history, past social history, past surgical history and problem list.    Review of systems  Review  "of Systems   Constitutional: Positive for fatigue and unexpected weight change (weight gain). Negative for activity change, appetite change, chills and diaphoresis.   HENT: Negative for congestion, ear pain, facial swelling, hearing loss, postnasal drip, sore throat, trouble swallowing and voice change.    Eyes: Negative.  Negative for redness, itching and visual disturbance.   Respiratory: Negative for cough and shortness of breath.    Cardiovascular: Positive for leg swelling. Negative for chest pain and palpitations.   Gastrointestinal: Negative for abdominal distention, abdominal pain, constipation, diarrhea, nausea and vomiting.   Endocrine:        As listed in HPI   Genitourinary: Negative.    Musculoskeletal: Negative for arthralgias, back pain and myalgias.   Skin: Negative.    Allergic/Immunologic: Negative.    Neurological: Positive for weakness and light-headedness. Negative for dizziness, tremors, syncope and headaches.   Hematological: Negative.    Psychiatric/Behavioral: Negative.    All other systems reviewed and are negative.      Physical exam  Objective   Blood pressure 118/79, pulse 88, height 170.2 cm (67\"), weight 104 kg (230 lb), SpO2 98 %, not currently breastfeeding.   Physical Exam   Constitutional: She is oriented to person, place, and time. She appears well-developed and well-nourished.   HENT:   Head: Normocephalic and atraumatic.   Eyes: Conjunctivae are normal.   Neck: No thyromegaly present.   The neck is supple and symmetric   Cardiovascular: Normal rate, regular rhythm and normal heart sounds.   Pulmonary/Chest: Effort normal and breath sounds normal.   Musculoskeletal:   Right foot - dressed in boot   Lymphadenopathy:     She has no cervical adenopathy.   Neurological: She is alert and oriented to person, place, and time.   Skin: Skin is warm and dry. No rash noted.   Psychiatric: She has a normal mood and affect. Thought content normal.   Vitals reviewed.        LABS AND " IMAGING  Results for orders placed or performed in visit on 06/10/19   POC Glucose Fingerstick   Result Value Ref Range    Glucose 101 70 - 130 mg/dL   POC Glycosylated Hemoglobin (Hb A1C)   Result Value Ref Range    Hemoglobin A1C 7.4 %           Assessment  Assessment/Plan   Problem List Items Addressed This Visit        Endocrine    Diabetes mellitus with neurological manifestations, uncontrolled (CMS/[facility]) - Primary    Relevant Orders    POC Glucose Fingerstick (Completed)    POC Glycosylated Hemoglobin (Hb A1C) (Completed)          Plan  1.Diabetes mellitus type 2 with hx diabetic foot ulcer, retinopathy and CKD.     Toujeo max samples provided. Will attempt to send rx.   Her control has worsened after Basaglar initiation.    There are no Patient Instructions on file for this visit.  2. Hyperlipidemia  Continue Praluent every 2 weeks.  She had reaction to crestor, livalo and lipitor and severe peripheral vascular disease. No alternatives exist.      3. CKD / edema - stable, planned for kidney transplant.     4. Foot ulcer - healing well.        Medications refilled. Hypoglycemia precautions and insulin instructions reviewed.     Follow-up in 3-4 months.     16     min  of  26 min face-to-face visit time spent for coordination of care and counselling regarding identified problems as outlined in the objective, assessment and discussion portions of the documentation.

## 2019-06-20 ENCOUNTER — OFFICE VISIT (OUTPATIENT)
Dept: NEUROSURGERY | Facility: CLINIC | Age: 64
End: 2019-06-20

## 2019-06-20 VITALS
TEMPERATURE: 97.9 F | DIASTOLIC BLOOD PRESSURE: 50 MMHG | HEIGHT: 67 IN | BODY MASS INDEX: 38.61 KG/M2 | WEIGHT: 246 LBS | SYSTOLIC BLOOD PRESSURE: 118 MMHG

## 2019-06-20 DIAGNOSIS — I65.21 CAROTID STENOSIS, ASYMPTOMATIC, RIGHT: Primary | ICD-10-CM

## 2019-06-20 PROCEDURE — 99213 OFFICE O/P EST LOW 20 MIN: CPT | Performed by: RADIOLOGY

## 2019-06-20 NOTE — PROGRESS NOTES
NAME: KADEN CERVANTES   DOS: 2019  : 1955  PCP: Angelica Tran PA    Chief Complaint:    Chief Complaint   Patient presents with   • Follow-up     bilateral carotid stenosis       History of Present Illness:  63 y.o. female known to the neuro interventional service, having been previously seen for asymptomatic, bilateral carotid artery stenosis.  She has extensive plaque formation throughout her carotid/subclavian vasculature, as well as coronary vasculature.  She has a history of chronic kidney disease, as well as severe dyslipidemia (on Praluent).  She is on an aspirin antiplatelet regimen, and presents today for routine follow-up.  She remains asymptomatic and denies any symptoms of unilateral weakness/numbness, no speech or new vision changes.       Past Medical History:  Past Medical History:   Diagnosis Date   • Acute bronchitis    • Acute pharyngitis    • Acute sinusitis    • Benign colonic polyp    • Chronic kidney disease    • Edema    • GERD (gastroesophageal reflux disease)    • Heart murmur    • Hiatal hernia    • Hyperkalemia    • Hyperlipidemia    • Hypertension    • Low back pain    • Lumbar disc disease    • MRSA (methicillin resistant Staphylococcus aureus)     Osteomyelitis/methicillin-resistant Staphylococcus aureus of the left foot, 2013.   • Obesity    • Osteomyelitis (CMS/HCC)     Osteomyelitis/methicillin-resistant Staphylococcus aureus of the left foot, 2013.   • Peptic ulceration 2013    History of peptic ulcer disease, diagnosed 2013 by EGD.  Repeat EGD in 2013 was negative.   • Sepsis (CMS/HCC)     Sepsis, 2013, hospitalized at [facility].   • Tobacco use     Previous tobacco use with cessation in .   • Type 2 diabetes mellitus (CMS/HCC)     Proteinuria noted, 2014.     • Vitamin D deficiency        Past Surgical History:  Past Surgical History:   Procedure Laterality Date   • AMPUTATION DIGIT  Right 2018    Procedure: I&D right foot, great toe amputation and 1st Metatarsal amputation;  Surgeon: Chio Sterling MD;  Location: [facility] OR;  Service: Orthopedics   •  SECTION      x2   • FOOT SURGERY Left     Incision and debridement of the left foot x2.   • INSERTION HEMODIALYSIS CATHETER N/A 2018    Procedure: GROSHONG CATHETER PLACEMENT;  Surgeon: Maribel May MD;  Location: [facility] OR;  Service: General   • LASIK     • TONSILLECTOMY         Review of Systems:        Review of Systems   Musculoskeletal: Positive for back pain.   All other systems reviewed and are negative.       Medications    Current Outpatient Medications:   •  Alirocumab (PRALUENT) 75 MG/ML solution pen-injector, Inject 75 mg under the skin into the appropriate area as directed Every 14 (Fourteen) Days., Disp: 2 pen, Rfl: 11  •  aspirin  MG tablet, Take 1 tablet by mouth Daily., Disp: 30 tablet, Rfl: 11  •  baclofen (LIORESAL) 10 MG tablet, Take 5-10 mg by mouth As Needed., Disp: , Rfl:   •  Dulaglutide (TRULICITY) 1.5 MG/0.5ML solution pen-injector, Inject 1.5 mg under the skin into the appropriate area as directed 1 (One) Time Per Week. Tuesday, Disp: 2 pen, Rfl: 5  •  furosemide (LASIX) 40 MG tablet, TAKE 1 TABLET TWICE DAILY, Disp: 60 tablet, Rfl: 11  •  GLOBAL EASE INJECT PEN NEEDLES 31G X 5 MM misc, INJECT 1 EACH UNDER THE SKIN 6 (SIX) TIMES A DAY., Disp: 200 each, Rfl: 6  •  HYDROcodone-acetaminophen (NORCO) 7.5-325 MG per tablet, Take 1 tablet by mouth Every 8 (Eight) Hours As Needed., Disp: , Rfl:   •  insulin aspart (NOVOLOG FLEXPEN) 100 UNIT/ML solution pen-injector sc pen, 50 Units Three Times Daily, Disp: 45 pen, Rfl: 5  •  Insulin Glargine (BASAGLAR KWIKPEN) 100 UNIT/ML injection pen, 80 units two times daily, Disp: 48 pen, Rfl: 11  •  Insulin Glargine (TOUJEO MAX SOLOSTAR) 300 UNIT/ML solution pen-injector, Inject 80 Units under the skin into the appropriate area as directed 2 (Two) Times a  "Day., Disp: 3 pen, Rfl: 6  •  lansoprazole (PREVACID) 30 MG capsule, Take 30 mg by mouth Daily., Disp: , Rfl: 11  •  metOLazone (ZAROXOLYN) 5 MG tablet, Take 1-2 tablets by mouth Daily As Needed (dyspnea, weight gain)., Disp: , Rfl:   •  mupirocin (BACTROBAN) 2 % ointment, Apply 1 application topically to the appropriate area as directed As Needed., Disp: , Rfl:     Allergies:  Allergies   Allergen Reactions   • Statins Myalgia   • Ancef [Cefazolin] Other (See Comments) and GI Intolerance     Tunnel vision.  (TOLERATES INVANZ)   • Bactrim [Sulfamethoxazole-Trimethoprim] Hives and Nausea And Vomiting   • Cephalosporins GI Intolerance   • Keflex [Cephalexin] Other (See Comments) and GI Intolerance     \"tunnel vision\"..   (pt tolerates INVANZ)   • Levaquin [Levofloxacin]    • Lipitor [Atorvastatin] Myalgia   • Lisinopril    • Losartan Potassium Other (See Comments)     Unknown reaction   • Oxycodone GI Intolerance   • Quinolones    • Cleocin [Clindamycin Hcl] Rash   • Clindamycin/Lincomycin Rash       Social Hx:  Social History     Tobacco Use   • Smoking status: Former Smoker     Packs/day: 1.00     Years: 30.00     Pack years: 30.00     Types: Cigarettes   • Smokeless tobacco: Never Used   • Tobacco comment: quit 7 years ago   Substance Use Topics   • Alcohol use: Yes     Alcohol/week: 0.6 oz     Types: 1 Glasses of wine per week     Comment: holidays   • Drug use: No       Family Hx:  Family History   Problem Relation Age of Onset   • No Known Problems Mother    • No Known Problems Father    • Breast cancer Neg Hx    • Ovarian cancer Neg Hx        Review of Imaging:  Carotid duplex from [facility] dated 3/21/2019 was reviewed along with its corresponding radiologic report.  Comparison is made to prior carotid duplex from [facility] dated 5/15/2018.  There remains extensive, heavily calcified plaque throughout the bilateral carotid and subclavian vasculature with moderate " areas of stenosis noted.  However, given differences in technique, there appears to be essentially a stable appearance of her plaque/disease.  Peak velocities within the right carotid vasculature are 292 cm/s, with an ICA/CCA CCA ratio of 1.5 (was 220 cm/s, ratio 2.0).  Peak velocities within the left carotid vasculature are 205 cm/s, ratio 1.5 (was 275 cm/s, ratio 1.0).  There is antegrade flow noted within the right vertebral artery, bidirectional flow is noted within the left vertebral artery, with probable bilateral subclavian stenoses.    Physical Examination:  Vitals:    06/20/19 1433   BP: 118/50   Temp: 97.9 °F (36.6 °C)        General Appearance:   Well developed, well nourished, well groomed, alert, and cooperative.  Cardiovascular: Regular rate and rhythm.  Harsh bilateral carotid and sternal bruits.      Neurological examination:  Neurologic Exam     Mental Status   Oriented to person, place, and time.   Speech: speech is normal   Level of consciousness: alert    Cranial Nerves   Cranial nerves II through XII intact.     Motor Exam     Strength   Strength 5/5 throughout.     Sensory Exam   Light touch normal.     Gait, Coordination, and Reflexes     Gait  Gait: normal      Diagnoses/Plan:    Ms. Leon is a 63 y.o. female being followed for asymptomatic, bilateral carotid stenosis.  I have reviewed her most recent carotid duplex, and in comparison to prior studies from 2014 and 2016.  While there remains extensive plaque formation throughout the bilateral carotid and subclavian vasculature, this appears essentially stable compared to the prior studies.  She remains on Praluent for her dyslipidemia, and aspirin antiplatelet regimen.      The report of the carotid duplex suggested follow-up CT angiogram for further evaluation, but they did not have the benefit of the prior carotid duplexes at [facility].  Additionally, given the extensive calcified nature of her plaque, I suspect that the CT  angiogram will be significantly limited by the calcified nature of her disease.  I did give her the option of MR angiogram for further evaluation/characterization, but given apparent stability on duplex and the asymptomatic nature of her carotid disease, we agreed to continue conservative management with follow-up carotid duplex in 12 months time to ensure stability and exclude any progression of disease and might necessitate further treatment/intervention.  She will contact our office and/or call 911 if she develops any stroke or TIA-like symptoms during the interim.

## 2019-06-24 ENCOUNTER — APPOINTMENT (OUTPATIENT)
Dept: CARDIOLOGY | Facility: HOSPITAL | Age: 64
End: 2019-06-24

## 2019-06-26 ENCOUNTER — TELEPHONE (OUTPATIENT)
Dept: INTERNAL MEDICINE | Facility: CLINIC | Age: 64
End: 2019-06-26

## 2019-06-26 DIAGNOSIS — I25.10 CORONARY ARTERY DISEASE INVOLVING NATIVE CORONARY ARTERY OF NATIVE HEART WITHOUT ANGINA PECTORIS: Primary | ICD-10-CM

## 2019-06-26 NOTE — TELEPHONE ENCOUNTER
PT. WOULD LIKE TO SWITCH TO A NEW CARDIOLOGIST IN [place]; PLEASE SEND A REFERRAL TO DR. RICARDO MACK FAX NUMBER IS [phone]

## 2019-09-03 ENCOUNTER — OFFICE VISIT (OUTPATIENT)
Dept: INTERNAL MEDICINE | Facility: CLINIC | Age: 64
End: 2019-09-03

## 2019-09-03 VITALS
WEIGHT: 247 LBS | DIASTOLIC BLOOD PRESSURE: 80 MMHG | SYSTOLIC BLOOD PRESSURE: 140 MMHG | BODY MASS INDEX: 38.77 KG/M2 | HEART RATE: 99 BPM | OXYGEN SATURATION: 100 % | HEIGHT: 67 IN

## 2019-09-03 DIAGNOSIS — L97.521 SKIN ULCER OF LEFT FOOT, LIMITED TO BREAKDOWN OF SKIN (HCC): Primary | ICD-10-CM

## 2019-09-03 PROCEDURE — 87077 CULTURE AEROBIC IDENTIFY: CPT | Performed by: PHYSICIAN ASSISTANT

## 2019-09-03 PROCEDURE — 99213 OFFICE O/P EST LOW 20 MIN: CPT | Performed by: PHYSICIAN ASSISTANT

## 2019-09-03 PROCEDURE — 87186 SC STD MICRODIL/AGAR DIL: CPT | Performed by: PHYSICIAN ASSISTANT

## 2019-09-03 PROCEDURE — 87070 CULTURE OTHR SPECIMN AEROBIC: CPT | Performed by: PHYSICIAN ASSISTANT

## 2019-09-03 PROCEDURE — 87205 SMEAR GRAM STAIN: CPT | Performed by: PHYSICIAN ASSISTANT

## 2019-09-03 RX ORDER — AMOXICILLIN AND CLAVULANATE POTASSIUM 875; 125 MG/1; MG/1
1 TABLET, FILM COATED ORAL 2 TIMES DAILY
Qty: 20 TABLET | Refills: 0 | Status: SHIPPED | OUTPATIENT
Start: 2019-09-03 | End: 2020-01-10

## 2019-09-03 NOTE — PROGRESS NOTES
Chief Complaint   Patient presents with   • Follow-up     states she has a foot ulcer       Subjective   Tashia Leon is a 63 y.o. female.       History of Present Illness     Pt has had toe removal this past year. Initially had her right great toe removed and then lost the rest of her toes due to spreading gangrene. She was on augmentin to prevent spread of infection at that time. Dr Patiño did initial surgery and then Dr Mccurdy in [place] did the other toes. She has appt with Dr Mccurdy later this week.    She has history of MRSA on her left foot, had to wear wound vac in 2013. Since then she has a scar on her left foot. 3 days ago the scar came off and had open area on the left side of her foot. She had some doxycycline at home and has been taking 100 mg BID since then.        Current Outpatient Medications:   •  Alirocumab (PRALUENT) 75 MG/ML solution pen-injector, Inject 75 mg under the skin into the appropriate area as directed Every 14 (Fourteen) Days., Disp: 2 pen, Rfl: 11  •  aspirin  MG tablet, Take 1 tablet by mouth Daily., Disp: 30 tablet, Rfl: 11  •  Dulaglutide (TRULICITY) 1.5 MG/0.5ML solution pen-injector, Inject 1.5 mg under the skin into the appropriate area as directed 1 (One) Time Per Week. Tuesday, Disp: 2 pen, Rfl: 5  •  furosemide (LASIX) 40 MG tablet, TAKE 1 TABLET TWICE DAILY, Disp: 60 tablet, Rfl: 11  •  GLOBAL EASE INJECT PEN NEEDLES 31G X 5 MM misc, INJECT 1 EACH UNDER THE SKIN 6 (SIX) TIMES A DAY., Disp: 200 each, Rfl: 6  •  HYDROcodone-acetaminophen (NORCO) 7.5-325 MG per tablet, Take 1 tablet by mouth Every 8 (Eight) Hours As Needed., Disp: , Rfl:   •  insulin aspart (NOVOLOG FLEXPEN) 100 UNIT/ML solution pen-injector sc pen, 50 Units Three Times Daily, Disp: 45 pen, Rfl: 5  •  Insulin Glargine (BASAGLAR KWIKPEN) 100 UNIT/ML injection pen, 80 units two times daily, Disp: 48 pen, Rfl: 11  •  Insulin Glargine (TOUJEO MAX SOLOSTAR) 300 UNIT/ML solution pen-injector, Inject  "80 Units under the skin into the appropriate area as directed 2 (Two) Times a Day., Disp: 3 pen, Rfl: 6  •  lansoprazole (PREVACID) 30 MG capsule, Take 30 mg by mouth Daily., Disp: , Rfl: 11  •  metOLazone (ZAROXOLYN) 5 MG tablet, Take 1-2 tablets by mouth Daily As Needed (dyspnea, weight gain)., Disp: , Rfl:   •  amoxicillin-clavulanate (AUGMENTIN) 875-125 MG per tablet, Take 1 tablet by mouth 2 (Two) Times a Day., Disp: 20 tablet, Rfl: 0     PMFSH  The following portions of the patient's history were reviewed and updated as appropriate: allergies, current medications, past family history, past medical history, past social history, past surgical history and problem list.    Review of Systems   Constitutional: Negative for activity change, appetite change, fatigue, fever and unexpected weight change.   HENT: Negative for facial swelling, mouth sores and trouble swallowing.    Eyes: Negative for pain, discharge, itching and visual disturbance.   Respiratory: Negative for chest tightness, shortness of breath and wheezing.    Cardiovascular: Negative for chest pain and palpitations.   Gastrointestinal: Negative for abdominal pain, diarrhea, nausea and vomiting.   Endocrine: Negative.    Genitourinary: Negative.    Musculoskeletal: Negative for joint swelling.   Skin: Positive for wound.   Allergic/Immunologic: Negative.    Neurological: Negative for seizures and syncope.   Hematological: Does not bruise/bleed easily.   Psychiatric/Behavioral: Negative.        Objective   /80 (BP Location: Left arm, Patient Position: Sitting)   Pulse 99   Ht 170.2 cm (67.01\")   Wt 112 kg (247 lb)   LMP  (LMP Unknown)   SpO2 100%   BMI 38.68 kg/m²     Physical Exam   Constitutional: She appears well-developed and well-nourished.   HENT:   Head: Normocephalic.   Right Ear: Hearing, tympanic membrane, external ear and ear canal normal.   Left Ear: Hearing, tympanic membrane, external ear and ear canal normal.   Nose: Nose " normal.   Mouth/Throat: Oropharynx is clear and moist.   Eyes: Conjunctivae are normal. Pupils are equal, round, and reactive to light.   Neck: Normal range of motion.   Cardiovascular: Normal rate, regular rhythm and normal heart sounds.   Pulmonary/Chest: Effort normal and breath sounds normal. She has no decreased breath sounds. She has no wheezes. She has no rhonchi. She has no rales.   Musculoskeletal: Normal range of motion.        Neurological: She is alert.   Skin: Skin is warm and dry.   Psychiatric: She has a normal mood and affect. Her behavior is normal.   Nursing note and vitals reviewed.      Results for orders placed or performed in visit on 09/03/19   Wound Culture - Wound, Foot, Left   Result Value Ref Range    Gram Stain No WBCs seen     Gram Stain Rare (1+) Gram variable bacilli         ASSESSMENT/PLAN    Problem List Items Addressed This Visit     None      Visit Diagnoses     Skin ulcer of left foot, limited to breakdown of skin (CMS/[facility])    -  Primary    Wound culture sent. Continue doxycycline and add augmentin due to previous infection and amputation. Keep pending follow up with surgeon this wk. Call if additional referral needed to wound care or ID.    Relevant Orders    Wound Culture - Wound, Foot, Left (Completed)               Return in about 6 months (around 3/3/2020) for Annual with fasting labs.

## 2019-09-06 ENCOUNTER — TELEPHONE (OUTPATIENT)
Dept: INTERNAL MEDICINE | Facility: CLINIC | Age: 64
End: 2019-09-06

## 2019-09-06 NOTE — TELEPHONE ENCOUNTER
----- Message from ALYSSIA Orellana sent at 9/6/2019  4:44 PM EDT -----  I called this patient to give her results and to get an update after her visit with the surgeon and to see what he recommends, had to leave a message to call back. If she calls back, please let her know that her wound culture grew a tiny amount of bacteria that is resistant to the antibiotics she is on. We may need to do a referral to Infectious Disease based on what the surgeon said.

## 2019-09-06 NOTE — PROGRESS NOTES
I called this patient to give her results and to get an update after her visit with the surgeon and to see what he recommends, had to leave a message to call back. If she calls back, please let her know that her wound culture grew a tiny amount of bacteria that is resistant to the antibiotics she is on. We may need to do a referral to Infectious Disease based on what the surgeon said.

## 2019-09-07 LAB
BACTERIA SPEC AEROBE CULT: ABNORMAL
GRAM STN SPEC: ABNORMAL
GRAM STN SPEC: ABNORMAL

## 2019-09-09 ENCOUNTER — TELEPHONE (OUTPATIENT)
Dept: INTERNAL MEDICINE | Facility: CLINIC | Age: 64
End: 2019-09-09

## 2019-09-09 DIAGNOSIS — L97.521 SKIN ULCER OF LEFT FOOT, LIMITED TO BREAKDOWN OF SKIN (HCC): Primary | ICD-10-CM

## 2019-09-09 NOTE — TELEPHONE ENCOUNTER
Spoke with patient regarding foot wound and scant growth of resistant bacteria. She saw the PA at the general surgeon's office and was instructed to keep the area clean and dry. She has not had any worsening, also no improvement. Decided to proceed with wound care at [facility] where she can be monitored by MD due to high risk for osteomyelitis.

## 2019-11-04 DIAGNOSIS — IMO0002 UNCONTROLLED TYPE 2 DIABETES MELLITUS WITH COMPLICATION, WITH LONG-TERM CURRENT USE OF INSULIN: ICD-10-CM

## 2019-11-04 RX ORDER — PEN NEEDLE, DIABETIC 31 GX3/16"
NEEDLE, DISPOSABLE MISCELLANEOUS
Qty: 200 EACH | Refills: 1 | Status: SHIPPED | OUTPATIENT
Start: 2019-11-04 | End: 2020-01-07

## 2019-11-08 ENCOUNTER — TELEPHONE (OUTPATIENT)
Dept: INTERNAL MEDICINE | Facility: CLINIC | Age: 64
End: 2019-11-08

## 2019-11-08 NOTE — TELEPHONE ENCOUNTER
THE [facility] IS CALLING IN REGARDS TO SOME ABNORMAL LABS FOR PATIENT. PLEASE CONTACT [facility] -[phone]

## 2020-01-01 ENCOUNTER — OFFICE VISIT (OUTPATIENT)
Dept: NEUROSURGERY | Facility: CLINIC | Age: 65
End: 2020-01-01

## 2020-01-01 ENCOUNTER — TELEPHONE (OUTPATIENT)
Dept: ENDOCRINOLOGY | Facility: CLINIC | Age: 65
End: 2020-01-01

## 2020-01-01 ENCOUNTER — READMISSION MANAGEMENT (OUTPATIENT)
Dept: CALL CENTER | Facility: HOSPITAL | Age: 65
End: 2020-01-01

## 2020-01-01 ENCOUNTER — HOSPITAL ENCOUNTER (OUTPATIENT)
Dept: CARDIOLOGY | Facility: HOSPITAL | Age: 65
Discharge: HOME | End: 2020-12-07
Admitting: RADIOLOGY

## 2020-01-01 ENCOUNTER — HOSPITAL ENCOUNTER (INPATIENT)
Facility: HOSPITAL | Age: 65
LOS: 1 days | Discharge: HOME | End: 2020-12-12
Attending: RADIOLOGY | Admitting: RADIOLOGY

## 2020-01-01 ENCOUNTER — PREP FOR SURGERY (OUTPATIENT)
Dept: OTHER | Facility: HOSPITAL | Age: 65
End: 2020-01-01

## 2020-01-01 ENCOUNTER — APPOINTMENT (OUTPATIENT)
Dept: CARDIOLOGY | Facility: HOSPITAL | Age: 65
End: 2020-01-01

## 2020-01-01 ENCOUNTER — OFFICE VISIT (OUTPATIENT)
Dept: ENDOCRINOLOGY | Facility: CLINIC | Age: 65
End: 2020-01-01

## 2020-01-01 ENCOUNTER — TELEPHONE (OUTPATIENT)
Dept: NEUROSURGERY | Facility: CLINIC | Age: 65
End: 2020-01-01

## 2020-01-01 VITALS — HEIGHT: 67 IN | WEIGHT: 240.3 LBS | TEMPERATURE: 97.6 F | BODY MASS INDEX: 37.72 KG/M2

## 2020-01-01 VITALS
RESPIRATION RATE: 18 BRPM | SYSTOLIC BLOOD PRESSURE: 122 MMHG | OXYGEN SATURATION: 96 % | DIASTOLIC BLOOD PRESSURE: 76 MMHG | WEIGHT: 234.8 LBS | HEART RATE: 102 BPM | HEIGHT: 67 IN | BODY MASS INDEX: 36.85 KG/M2

## 2020-01-01 VITALS
DIASTOLIC BLOOD PRESSURE: 54 MMHG | RESPIRATION RATE: 16 BRPM | HEIGHT: 67 IN | WEIGHT: 249.12 LBS | BODY MASS INDEX: 39.1 KG/M2 | SYSTOLIC BLOOD PRESSURE: 105 MMHG | HEART RATE: 82 BPM | OXYGEN SATURATION: 95 % | TEMPERATURE: 98.2 F

## 2020-01-01 VITALS — WEIGHT: 230 LBS | BODY MASS INDEX: 36.1 KG/M2 | HEIGHT: 67 IN

## 2020-01-01 VITALS
SYSTOLIC BLOOD PRESSURE: 132 MMHG | HEIGHT: 67 IN | WEIGHT: 123 LBS | TEMPERATURE: 97.8 F | DIASTOLIC BLOOD PRESSURE: 64 MMHG | BODY MASS INDEX: 19.3 KG/M2

## 2020-01-01 DIAGNOSIS — N18.5 CHRONIC KIDNEY DISEASE, STAGE V (HCC): ICD-10-CM

## 2020-01-01 DIAGNOSIS — E11.42 DIABETIC POLYNEUROPATHY ASSOCIATED WITH TYPE 2 DIABETES MELLITUS (HCC): ICD-10-CM

## 2020-01-01 DIAGNOSIS — E11.65 UNCONTROLLED TYPE 2 DIABETES MELLITUS WITH HYPERGLYCEMIA (HCC): Primary | ICD-10-CM

## 2020-01-01 DIAGNOSIS — I65.21 CAROTID STENOSIS, ASYMPTOMATIC, RIGHT: Primary | ICD-10-CM

## 2020-01-01 DIAGNOSIS — I65.22 CAROTID STENOSIS, ASYMPTOMATIC, LEFT: Primary | ICD-10-CM

## 2020-01-01 DIAGNOSIS — IMO0002 DIABETES MELLITUS WITH NEUROLOGICAL MANIFESTATIONS, UNCONTROLLED: Primary | ICD-10-CM

## 2020-01-01 DIAGNOSIS — I65.22 CAROTID STENOSIS, LEFT: ICD-10-CM

## 2020-01-01 DIAGNOSIS — E11.65 UNCONTROLLED TYPE 2 DIABETES MELLITUS WITH HYPERGLYCEMIA (HCC): ICD-10-CM

## 2020-01-01 DIAGNOSIS — Z98.890 HISTORY OF LEFT COMMON CAROTID ARTERY STENT PLACEMENT: Primary | ICD-10-CM

## 2020-01-01 DIAGNOSIS — Z95.828 HISTORY OF LEFT COMMON CAROTID ARTERY STENT PLACEMENT: Primary | ICD-10-CM

## 2020-01-01 DIAGNOSIS — I65.21 CAROTID STENOSIS, RIGHT: ICD-10-CM

## 2020-01-01 DIAGNOSIS — E11.3399 MODERATE NONPROLIFERATIVE DIABETIC RETINOPATHY WITHOUT MACULAR EDEMA ASSOCIATED WITH TYPE 2 DIABETES MELLITUS, UNSPECIFIED LATERALITY (HCC): ICD-10-CM

## 2020-01-01 DIAGNOSIS — IMO0002 UNCONTROLLED TYPE 2 DIABETES MELLITUS WITH COMPLICATION, WITH LONG-TERM CURRENT USE OF INSULIN: ICD-10-CM

## 2020-01-01 DIAGNOSIS — I65.21 CAROTID STENOSIS, ASYMPTOMATIC, RIGHT: ICD-10-CM

## 2020-01-01 DIAGNOSIS — IMO0002 DIABETES MELLITUS WITH NEUROLOGICAL MANIFESTATIONS, UNCONTROLLED: ICD-10-CM

## 2020-01-01 DIAGNOSIS — I65.22 CAROTID STENOSIS, ASYMPTOMATIC, LEFT: ICD-10-CM

## 2020-01-01 LAB
ACT BLD: 268 SECONDS (ref 82–152)
ANION GAP SERPL CALCULATED.3IONS-SCNC: 17 MMOL/L (ref 5–15)
ANION GAP SERPL CALCULATED.3IONS-SCNC: 18 MMOL/L (ref 5–15)
BASOPHILS # BLD AUTO: 0.1 10*3/MM3 (ref 0–0.2)
BASOPHILS NFR BLD AUTO: 0.9 % (ref 0–1.5)
BH CV ECHO MEAS - BSA(HAYCOCK): 2.3 M^2
BH CV ECHO MEAS - BSA(HAYCOCK): 2.4 M^2
BH CV ECHO MEAS - BSA: 2.2 M^2
BH CV ECHO MEAS - BSA: 2.2 M^2
BH CV ECHO MEAS - BZI_BMI: 36.6 KILOGRAMS/M^2
BH CV ECHO MEAS - BZI_BMI: 39.3 KILOGRAMS/M^2
BH CV ECHO MEAS - BZI_METRIC_HEIGHT: 170.2 CM
BH CV ECHO MEAS - BZI_METRIC_HEIGHT: 170.2 CM
BH CV ECHO MEAS - BZI_METRIC_WEIGHT: 106.1 KG
BH CV ECHO MEAS - BZI_METRIC_WEIGHT: 113.9 KG
BH CV LEFT CCA HIDDEN LRR: 1 CM/S
BH CV VAS CAROTID LEFT DISTAL STENT EDV: 22 CM/S
BH CV VAS CAROTID LEFT DISTAL STENT: 177 CM/S
BH CV VAS CAROTID LEFT DISTAL TO STENT EDV: 25 CM/S
BH CV VAS CAROTID LEFT DISTAL TO STENT: 126 CM/S
BH CV VAS CAROTID LEFT MID STENT EDV: 22 CM/S
BH CV VAS CAROTID LEFT MID STENT: 128 CM/S
BH CV VAS CAROTID LEFT PROXIMAL STENT EDV: 25 CM/S
BH CV VAS CAROTID LEFT PROXIMAL STENT: 137 CM/S
BH CV VAS CAROTID LEFT PROXIMAL TO STENT: 177 CM/S
BH CV VAS CAROTID LEFT STENT NATIVE VESSEL PROXIMAL ED: 29 CM/S
BH CV XLRA MEAS LEFT DIST CCA EDV: 22.2 CM/SEC
BH CV XLRA MEAS LEFT DIST CCA EDV: 22.7 CM/SEC
BH CV XLRA MEAS LEFT DIST CCA PSV: 150 CM/SEC
BH CV XLRA MEAS LEFT DIST CCA PSV: 177.4 CM/SEC
BH CV XLRA MEAS LEFT DIST ICA EDV: 21 CM/SEC
BH CV XLRA MEAS LEFT DIST ICA EDV: 26.8 CM/SEC
BH CV XLRA MEAS LEFT DIST ICA PSV: 163.4 CM/SEC
BH CV XLRA MEAS LEFT DIST ICA PSV: 63.5 CM/SEC
BH CV XLRA MEAS LEFT ICA/CCA RATIO: 1.2
BH CV XLRA MEAS LEFT ICA/CCA RATIO: 9.1
BH CV XLRA MEAS LEFT MID CCA EDV: 13.1 CM/SEC
BH CV XLRA MEAS LEFT MID CCA EDV: 22.2 CM/SEC
BH CV XLRA MEAS LEFT MID CCA PSV: 128.4 CM/SEC
BH CV XLRA MEAS LEFT MID CCA PSV: 70.3 CM/SEC
BH CV XLRA MEAS LEFT MID ICA EDV: 24.8 CM/SEC
BH CV XLRA MEAS LEFT MID ICA EDV: 29.2 CM/SEC
BH CV XLRA MEAS LEFT MID ICA PSV: 134.2 CM/SEC
BH CV XLRA MEAS LEFT MID ICA PSV: 86.6 CM/SEC
BH CV XLRA MEAS LEFT PROX CCA EDV: 14.4 CM/SEC
BH CV XLRA MEAS LEFT PROX CCA EDV: 29.2 CM/SEC
BH CV XLRA MEAS LEFT PROX CCA PSV: 177.4 CM/SEC
BH CV XLRA MEAS LEFT PROX CCA PSV: 59.4 CM/SEC
BH CV XLRA MEAS LEFT PROX ECA EDV: 35.9 CM/SEC
BH CV XLRA MEAS LEFT PROX ECA PSV: 186 CM/SEC
BH CV XLRA MEAS LEFT PROX ECA PSV: 81.8 CM/SEC
BH CV XLRA MEAS LEFT PROX ICA EDV: 25.8 CM/SEC
BH CV XLRA MEAS LEFT PROX ICA EDV: 38.5 CM/SEC
BH CV XLRA MEAS LEFT PROX ICA PSV: 149 CM/SEC
BH CV XLRA MEAS LEFT PROX ICA PSV: 155.2 CM/SEC
BH CV XLRA MEAS LEFT PROX SCLA PSV: 210.1 CM/SEC
BH CV XLRA MEAS LEFT PROX SCLA PSV: 309 CM/SEC
BH CV XLRA MEAS LEFT VERTEBRAL A PSV: 23.1 CM/SEC
BH CV XLRA MEAS RIGHT DIST CCA EDV: 22.8 CM/SEC
BH CV XLRA MEAS RIGHT DIST CCA PSV: 106.1 CM/SEC
BH CV XLRA MEAS RIGHT DIST ICA EDV: 43 CM/SEC
BH CV XLRA MEAS RIGHT DIST ICA PSV: 152 CM/SEC
BH CV XLRA MEAS RIGHT ICA/CCA RATIO: 2.8
BH CV XLRA MEAS RIGHT MID CCA EDV: 18 CM/SEC
BH CV XLRA MEAS RIGHT MID CCA PSV: 156 CM/SEC
BH CV XLRA MEAS RIGHT MID ICA EDV: 66 CM/SEC
BH CV XLRA MEAS RIGHT MID ICA PSV: 284.4 CM/SEC
BH CV XLRA MEAS RIGHT PROX CCA EDV: 25.3 CM/SEC
BH CV XLRA MEAS RIGHT PROX CCA PSV: 195 CM/SEC
BH CV XLRA MEAS RIGHT PROX ECA EDV: 20.1 CM/SEC
BH CV XLRA MEAS RIGHT PROX ECA PSV: 235 CM/SEC
BH CV XLRA MEAS RIGHT PROX ICA EDV: 109 CM/SEC
BH CV XLRA MEAS RIGHT PROX ICA PSV: 430 CM/SEC
BH CV XLRA MEAS RIGHT PROX SCLA EDV: 23.9 CM/SEC
BH CV XLRA MEAS RIGHT PROX SCLA PSV: 183.8 CM/SEC
BH CV XLRA MEAS RIGHT VERTEBRAL A EDV: 33.5 CM/SEC
BH CV XLRA MEAS RIGHT VERTEBRAL A PSV: 114.5 CM/SEC
BUN SERPL-MCNC: 76 MG/DL (ref 8–23)
BUN SERPL-MCNC: 81 MG/DL (ref 8–23)
BUN/CREAT SERPL: 7.9 (ref 7–25)
BUN/CREAT SERPL: 8.5 (ref 7–25)
CALCIUM SPEC-SCNC: 8 MG/DL (ref 8.6–10.5)
CALCIUM SPEC-SCNC: 8.9 MG/DL (ref 8.6–10.5)
CHLORIDE SERPL-SCNC: 100 MMOL/L (ref 98–107)
CHLORIDE SERPL-SCNC: 99 MMOL/L (ref 98–107)
CO2 SERPL-SCNC: 20 MMOL/L (ref 22–29)
CO2 SERPL-SCNC: 22 MMOL/L (ref 22–29)
CREAT SERPL-MCNC: 9.55 MG/DL (ref 0.57–1)
CREAT SERPL-MCNC: 9.64 MG/DL (ref 0.57–1)
DEPRECATED RDW RBC AUTO: 54.2 FL (ref 37–54)
DEPRECATED RDW RBC AUTO: 55.2 FL (ref 37–54)
EOSINOPHIL # BLD AUTO: 0.34 10*3/MM3 (ref 0–0.4)
EOSINOPHIL NFR BLD AUTO: 3.2 % (ref 0.3–6.2)
ERYTHROCYTE [DISTWIDTH] IN BLOOD BY AUTOMATED COUNT: 14.4 % (ref 12.3–15.4)
ERYTHROCYTE [DISTWIDTH] IN BLOOD BY AUTOMATED COUNT: 14.5 % (ref 12.3–15.4)
GFR SERPL CREATININE-BSD FRML MDRD: 4 ML/MIN/1.73
GFR SERPL CREATININE-BSD FRML MDRD: 4 ML/MIN/1.73
GFR SERPL CREATININE-BSD FRML MDRD: ABNORMAL ML/MIN/{1.73_M2}
GFR SERPL CREATININE-BSD FRML MDRD: ABNORMAL ML/MIN/{1.73_M2}
GLUCOSE BLDC GLUCOMTR-MCNC: 114 MG/DL (ref 70–130)
GLUCOSE BLDC GLUCOMTR-MCNC: 125 MG/DL (ref 70–130)
GLUCOSE BLDC GLUCOMTR-MCNC: 129 MG/DL (ref 70–130)
GLUCOSE BLDC GLUCOMTR-MCNC: 173 MG/DL (ref 70–130)
GLUCOSE BLDC GLUCOMTR-MCNC: 197 MG/DL (ref 70–130)
GLUCOSE SERPL-MCNC: 118 MG/DL (ref 65–99)
GLUCOSE SERPL-MCNC: 148 MG/DL (ref 65–99)
HBA1C MFR BLD: 6.8 %
HCT VFR BLD AUTO: 29.6 % (ref 34–46.6)
HCT VFR BLD AUTO: 34.4 % (ref 34–46.6)
HGB BLD-MCNC: 10.2 G/DL (ref 12–15.9)
HGB BLD-MCNC: 8.9 G/DL (ref 12–15.9)
IMM GRANULOCYTES # BLD AUTO: 0.1 10*3/MM3 (ref 0–0.05)
IMM GRANULOCYTES NFR BLD AUTO: 0.9 % (ref 0–0.5)
LEFT ARM BP: NORMAL MMHG
LYMPHOCYTES # BLD AUTO: 2.76 10*3/MM3 (ref 0.7–3.1)
LYMPHOCYTES NFR BLD AUTO: 25.7 % (ref 19.6–45.3)
MCH RBC QN AUTO: 31.1 PG (ref 26.6–33)
MCH RBC QN AUTO: 31.2 PG (ref 26.6–33)
MCHC RBC AUTO-ENTMCNC: 29.7 G/DL (ref 31.5–35.7)
MCHC RBC AUTO-ENTMCNC: 30.1 G/DL (ref 31.5–35.7)
MCV RBC AUTO: 103.9 FL (ref 79–97)
MCV RBC AUTO: 104.9 FL (ref 79–97)
MONOCYTES # BLD AUTO: 0.94 10*3/MM3 (ref 0.1–0.9)
MONOCYTES NFR BLD AUTO: 8.8 % (ref 5–12)
NEUTROPHILS NFR BLD AUTO: 6.5 10*3/MM3 (ref 1.7–7)
NEUTROPHILS NFR BLD AUTO: 60.5 % (ref 42.7–76)
NRBC BLD AUTO-RTO: 0.6 /100 WBC (ref 0–0.2)
PLATELET # BLD AUTO: 273 10*3/MM3 (ref 140–450)
PLATELET # BLD AUTO: 322 10*3/MM3 (ref 140–450)
PMV BLD AUTO: 10.3 FL (ref 6–12)
PMV BLD AUTO: 10.5 FL (ref 6–12)
POTASSIUM SERPL-SCNC: 4.1 MMOL/L (ref 3.5–5.2)
POTASSIUM SERPL-SCNC: 4.3 MMOL/L (ref 3.5–5.2)
RBC # BLD AUTO: 2.85 10*6/MM3 (ref 3.77–5.28)
RBC # BLD AUTO: 3.28 10*6/MM3 (ref 3.77–5.28)
RIGHT ARM BP: NORMAL MMHG
SARS-COV-2 RNA RESP QL NAA+PROBE: NOT DETECTED
SODIUM SERPL-SCNC: 137 MMOL/L (ref 136–145)
SODIUM SERPL-SCNC: 139 MMOL/L (ref 136–145)
WBC # BLD AUTO: 10.74 10*3/MM3 (ref 3.4–10.8)
WBC # BLD AUTO: 12.96 10*3/MM3 (ref 3.4–10.8)

## 2020-01-01 PROCEDURE — C1894 INTRO/SHEATH, NON-LASER: HCPCS | Performed by: RADIOLOGY

## 2020-01-01 PROCEDURE — B3151ZZ FLUOROSCOPY OF BILATERAL COMMON CAROTID ARTERIES USING LOW OSMOLAR CONTRAST: ICD-10-PCS | Performed by: RADIOLOGY

## 2020-01-01 PROCEDURE — 63710000001 INSULIN LISPRO (HUMAN) PER 5 UNITS

## 2020-01-01 PROCEDURE — 93882 EXTRACRANIAL UNI/LTD STUDY: CPT

## 2020-01-01 PROCEDURE — 99024 POSTOP FOLLOW-UP VISIT: CPT | Performed by: PHYSICIAN ASSISTANT

## 2020-01-01 PROCEDURE — B31D1ZZ FLUOROSCOPY OF RIGHT VERTEBRAL ARTERY USING LOW OSMOLAR CONTRAST: ICD-10-PCS | Performed by: RADIOLOGY

## 2020-01-01 PROCEDURE — C1760 CLOSURE DEV, VASC: HCPCS | Performed by: RADIOLOGY

## 2020-01-01 PROCEDURE — 36225 PLACE CATH SUBCLAVIAN ART: CPT | Performed by: RADIOLOGY

## 2020-01-01 PROCEDURE — 3E1M39Z IRRIGATION OF PERITONEAL CAVITY USING DIALYSATE, PERCUTANEOUS APPROACH: ICD-10-PCS | Performed by: INTERNAL MEDICINE

## 2020-01-01 PROCEDURE — 85027 COMPLETE CBC AUTOMATED: CPT | Performed by: RADIOLOGY

## 2020-01-01 PROCEDURE — 63710000001 INSULIN DETEMIR PER 5 UNITS: Performed by: INTERNAL MEDICINE

## 2020-01-01 PROCEDURE — C1884 EMBOLIZATION PROTECT SYST: HCPCS | Performed by: RADIOLOGY

## 2020-01-01 PROCEDURE — C1876 STENT, NON-COA/NON-COV W/DEL: HCPCS | Performed by: RADIOLOGY

## 2020-01-01 PROCEDURE — C1766 INTRO/SHEATH,STRBLE,NON-PEEL: HCPCS | Performed by: RADIOLOGY

## 2020-01-01 PROCEDURE — B3121ZZ FLUOROSCOPY OF LEFT SUBCLAVIAN ARTERY USING LOW OSMOLAR CONTRAST: ICD-10-PCS | Performed by: RADIOLOGY

## 2020-01-01 PROCEDURE — 95251 CONT GLUC MNTR ANALYSIS I&R: CPT | Performed by: INTERNAL MEDICINE

## 2020-01-01 PROCEDURE — 93880 EXTRACRANIAL BILAT STUDY: CPT

## 2020-01-01 PROCEDURE — 85347 COAGULATION TIME ACTIVATED: CPT

## 2020-01-01 PROCEDURE — 83036 HEMOGLOBIN GLYCOSYLATED A1C: CPT | Performed by: INTERNAL MEDICINE

## 2020-01-01 PROCEDURE — C1769 GUIDE WIRE: HCPCS | Performed by: RADIOLOGY

## 2020-01-01 PROCEDURE — 93882 EXTRACRANIAL UNI/LTD STUDY: CPT | Performed by: INTERNAL MEDICINE

## 2020-01-01 PROCEDURE — 80048 BASIC METABOLIC PNL TOTAL CA: CPT | Performed by: RADIOLOGY

## 2020-01-01 PROCEDURE — G0269 OCCLUSIVE DEVICE IN VEIN ART: HCPCS | Performed by: RADIOLOGY

## 2020-01-01 PROCEDURE — 36226 PLACE CATH VERTEBRAL ART: CPT | Performed by: RADIOLOGY

## 2020-01-01 PROCEDURE — 93880 EXTRACRANIAL BILAT STUDY: CPT | Performed by: INTERNAL MEDICINE

## 2020-01-01 PROCEDURE — 82962 GLUCOSE BLOOD TEST: CPT

## 2020-01-01 PROCEDURE — 99214 OFFICE O/P EST MOD 30 MIN: CPT | Performed by: RADIOLOGY

## 2020-01-01 PROCEDURE — 99024 POSTOP FOLLOW-UP VISIT: CPT | Performed by: RADIOLOGY

## 2020-01-01 PROCEDURE — 0 IODIXANOL PER 1 ML: Performed by: RADIOLOGY

## 2020-01-01 PROCEDURE — 99442 PR PHYS/QHP TELEPHONE EVALUATION 11-20 MIN: CPT | Performed by: INTERNAL MEDICINE

## 2020-01-01 PROCEDURE — 25010000002 FENTANYL CITRATE (PF) 100 MCG/2ML SOLUTION: Performed by: RADIOLOGY

## 2020-01-01 PROCEDURE — 99222 1ST HOSP IP/OBS MODERATE 55: CPT | Performed by: INTERNAL MEDICINE

## 2020-01-01 PROCEDURE — 85025 COMPLETE CBC W/AUTO DIFF WBC: CPT | Performed by: RADIOLOGY

## 2020-01-01 PROCEDURE — 037L3DZ DILATION OF LEFT INTERNAL CAROTID ARTERY WITH INTRALUMINAL DEVICE, PERCUTANEOUS APPROACH: ICD-10-PCS | Performed by: RADIOLOGY

## 2020-01-01 PROCEDURE — 99214 OFFICE O/P EST MOD 30 MIN: CPT | Performed by: INTERNAL MEDICINE

## 2020-01-01 PROCEDURE — 36223 PLACE CATH CAROTID/INOM ART: CPT | Performed by: RADIOLOGY

## 2020-01-01 PROCEDURE — 37215 TRANSCATH STENT CCA W/EPS: CPT | Performed by: RADIOLOGY

## 2020-01-01 PROCEDURE — 25010000002 HEPARIN (PORCINE) PER 1000 UNITS: Performed by: RADIOLOGY

## 2020-01-01 PROCEDURE — U0004 COV-19 TEST NON-CDC HGH THRU: HCPCS | Performed by: NURSE PRACTITIONER

## 2020-01-01 PROCEDURE — 25010000002 MIDAZOLAM PER 1 MG: Performed by: RADIOLOGY

## 2020-01-01 PROCEDURE — C1725 CATH, TRANSLUMIN NON-LASER: HCPCS | Performed by: RADIOLOGY

## 2020-01-01 DEVICE — CLOSED CELL SELF-EXPANDING STENT
Type: IMPLANTABLE DEVICE | Status: FUNCTIONAL
Brand: CAROTID WALLSTENT®

## 2020-01-01 RX ORDER — CLOPIDOGREL BISULFATE 75 MG/1
75 TABLET ORAL DAILY
Qty: 30 TABLET | Refills: 5 | Status: SHIPPED | OUTPATIENT
Start: 2020-01-01 | End: 2021-01-01 | Stop reason: SDUPTHER

## 2020-01-01 RX ORDER — FENTANYL CITRATE 50 UG/ML
INJECTION, SOLUTION INTRAMUSCULAR; INTRAVENOUS AS NEEDED
Status: DISCONTINUED | OUTPATIENT
Start: 2020-01-01 | End: 2020-01-01 | Stop reason: HOSPADM

## 2020-01-01 RX ORDER — BLOOD SUGAR DIAGNOSTIC
STRIP MISCELLANEOUS
Qty: 100 EACH | Refills: 11 | Status: SHIPPED | OUTPATIENT
Start: 2020-01-01

## 2020-01-01 RX ORDER — LIDOCAINE HYDROCHLORIDE 10 MG/ML
INJECTION, SOLUTION EPIDURAL; INFILTRATION; INTRACAUDAL; PERINEURAL AS NEEDED
Status: DISCONTINUED | OUTPATIENT
Start: 2020-01-01 | End: 2020-01-01 | Stop reason: HOSPADM

## 2020-01-01 RX ORDER — IODIXANOL 320 MG/ML
INJECTION, SOLUTION INTRAVASCULAR AS NEEDED
Status: DISCONTINUED | OUTPATIENT
Start: 2020-01-01 | End: 2020-01-01 | Stop reason: HOSPADM

## 2020-01-01 RX ORDER — SODIUM CHLORIDE 9 MG/ML
75 INJECTION, SOLUTION INTRAVENOUS CONTINUOUS
Status: DISCONTINUED | OUTPATIENT
Start: 2020-01-01 | End: 2020-01-01

## 2020-01-01 RX ORDER — CLOPIDOGREL BISULFATE 75 MG/1
75 TABLET ORAL DAILY
Status: DISCONTINUED | OUTPATIENT
Start: 2020-01-01 | End: 2020-01-01 | Stop reason: HOSPADM

## 2020-01-01 RX ORDER — PROCHLORPERAZINE 25 MG/1
1 SUPPOSITORY RECTAL ONCE
Qty: 1 DEVICE | Refills: 0 | Status: SHIPPED | OUTPATIENT
Start: 2020-01-01 | End: 2021-01-01 | Stop reason: SDUPTHER

## 2020-01-01 RX ORDER — SODIUM CHLORIDE, SODIUM LACTATE, CALCIUM CHLORIDE, MAGNESIUM CHLORIDE AND DEXTROSE 1.5; 538; 448; 25.7; 5.08 G/100ML; MG/100ML; MG/100ML; MG/100ML; MG/100ML
2000 INJECTION, SOLUTION INTRAPERITONEAL 3 TIMES DAILY
Status: DISCONTINUED | OUTPATIENT
Start: 2020-01-01 | End: 2020-01-01 | Stop reason: HOSPADM

## 2020-01-01 RX ORDER — NICOTINE POLACRILEX 4 MG
15 LOZENGE BUCCAL
Status: DISCONTINUED | OUTPATIENT
Start: 2020-01-01 | End: 2020-01-01 | Stop reason: HOSPADM

## 2020-01-01 RX ORDER — FUROSEMIDE 40 MG/1
40 TABLET ORAL 2 TIMES DAILY
Status: DISCONTINUED | OUTPATIENT
Start: 2020-01-01 | End: 2020-01-01

## 2020-01-01 RX ORDER — INSULIN LISPRO 200 [IU]/ML
50 INJECTION, SOLUTION SUBCUTANEOUS 3 TIMES DAILY
Qty: 20 PEN | Refills: 11 | Status: SHIPPED | OUTPATIENT
Start: 2020-01-01 | End: 2020-01-01 | Stop reason: CLARIF

## 2020-01-01 RX ORDER — ASPIRIN 325 MG
325 TABLET, DELAYED RELEASE (ENTERIC COATED) ORAL DAILY
Status: DISCONTINUED | OUTPATIENT
Start: 2020-01-01 | End: 2020-01-01 | Stop reason: HOSPADM

## 2020-01-01 RX ORDER — FUROSEMIDE 80 MG
80 TABLET ORAL
Status: DISCONTINUED | OUTPATIENT
Start: 2020-01-01 | End: 2020-01-01

## 2020-01-01 RX ORDER — PHENYLEPHRINE HCL IN 0.9% NACL 0.5 MG/5ML
.5-3 SYRINGE (ML) INTRAVENOUS
Status: DISCONTINUED | OUTPATIENT
Start: 2020-01-01 | End: 2020-01-01 | Stop reason: HOSPADM

## 2020-01-01 RX ORDER — ASPIRIN 325 MG
325 TABLET, DELAYED RELEASE (ENTERIC COATED) ORAL DAILY
Status: DISCONTINUED | OUTPATIENT
Start: 2020-01-01 | End: 2020-01-01 | Stop reason: SDUPTHER

## 2020-01-01 RX ORDER — SODIUM CHLORIDE 0.9 % (FLUSH) 0.9 %
3 SYRINGE (ML) INJECTION EVERY 12 HOURS SCHEDULED
Status: CANCELLED | OUTPATIENT
Start: 2020-01-01

## 2020-01-01 RX ORDER — HEPARIN SODIUM 1000 [USP'U]/ML
INJECTION, SOLUTION INTRAVENOUS; SUBCUTANEOUS AS NEEDED
Status: DISCONTINUED | OUTPATIENT
Start: 2020-01-01 | End: 2020-01-01 | Stop reason: HOSPADM

## 2020-01-01 RX ORDER — SODIUM CHLORIDE 0.9 % (FLUSH) 0.9 %
3 SYRINGE (ML) INJECTION EVERY 12 HOURS SCHEDULED
Status: DISCONTINUED | OUTPATIENT
Start: 2020-01-01 | End: 2020-01-01 | Stop reason: HOSPADM

## 2020-01-01 RX ORDER — FUROSEMIDE 80 MG
80 TABLET ORAL
Status: DISCONTINUED | OUTPATIENT
Start: 2020-01-01 | End: 2020-01-01 | Stop reason: HOSPADM

## 2020-01-01 RX ORDER — AMOXICILLIN 250 MG
2 CAPSULE ORAL 2 TIMES DAILY PRN
Status: DISCONTINUED | OUTPATIENT
Start: 2020-01-01 | End: 2020-01-01

## 2020-01-01 RX ORDER — ALIROCUMAB 75 MG/ML
75 INJECTION, SOLUTION SUBCUTANEOUS
Qty: 2 ML | Refills: 5 | Status: SHIPPED | OUTPATIENT
Start: 2020-01-01 | End: 2021-01-01 | Stop reason: SDUPTHER

## 2020-01-01 RX ORDER — HYDROCODONE BITARTRATE AND ACETAMINOPHEN 7.5; 325 MG/1; MG/1
1 TABLET ORAL EVERY 8 HOURS PRN
Status: DISCONTINUED | OUTPATIENT
Start: 2020-01-01 | End: 2020-01-01 | Stop reason: HOSPADM

## 2020-01-01 RX ORDER — AMOXICILLIN 250 MG
1 CAPSULE ORAL NIGHTLY
Status: DISCONTINUED | OUTPATIENT
Start: 2020-01-01 | End: 2020-01-01 | Stop reason: HOSPADM

## 2020-01-01 RX ORDER — INSULIN LISPRO 200 [IU]/ML
50 INJECTION, SOLUTION SUBCUTANEOUS 3 TIMES DAILY
Qty: 10 PEN | Refills: 10 | Status: SHIPPED | OUTPATIENT
Start: 2020-01-01 | End: 2021-01-01 | Stop reason: SDUPTHER

## 2020-01-01 RX ORDER — PANTOPRAZOLE SODIUM 40 MG/1
40 TABLET, DELAYED RELEASE ORAL EVERY MORNING
Status: DISCONTINUED | OUTPATIENT
Start: 2020-01-01 | End: 2020-01-01 | Stop reason: HOSPADM

## 2020-01-01 RX ORDER — PROCHLORPERAZINE 25 MG/1
1 SUPPOSITORY RECTAL TAKE AS DIRECTED
Qty: 3 EACH | Refills: 11 | Status: SHIPPED | OUTPATIENT
Start: 2020-01-01 | End: 2021-01-01 | Stop reason: SDUPTHER

## 2020-01-01 RX ORDER — SODIUM CHLORIDE 9 MG/ML
50 INJECTION, SOLUTION INTRAVENOUS CONTINUOUS
Status: CANCELLED | OUTPATIENT
Start: 2020-01-01

## 2020-01-01 RX ORDER — MIDAZOLAM HYDROCHLORIDE 1 MG/ML
INJECTION INTRAMUSCULAR; INTRAVENOUS AS NEEDED
Status: DISCONTINUED | OUTPATIENT
Start: 2020-01-01 | End: 2020-01-01 | Stop reason: HOSPADM

## 2020-01-01 RX ORDER — CLOPIDOGREL BISULFATE 75 MG/1
300 TABLET ORAL ONCE
Status: COMPLETED | OUTPATIENT
Start: 2020-01-01 | End: 2020-01-01

## 2020-01-01 RX ORDER — INSULIN GLARGINE 300 U/ML
INJECTION, SOLUTION SUBCUTANEOUS
Qty: 6 ML | Refills: 3 | Status: SHIPPED | OUTPATIENT
Start: 2020-01-01 | End: 2020-01-01

## 2020-01-01 RX ORDER — INSULIN GLARGINE 300 U/ML
80 INJECTION, SOLUTION SUBCUTANEOUS 2 TIMES DAILY
Qty: 6 PEN | Refills: 10 | Status: SHIPPED | OUTPATIENT
Start: 2020-01-01 | End: 2021-01-01 | Stop reason: SDUPTHER

## 2020-01-01 RX ORDER — SODIUM CHLORIDE 0.9 % (FLUSH) 0.9 %
1-10 SYRINGE (ML) INJECTION AS NEEDED
Status: DISCONTINUED | OUTPATIENT
Start: 2020-01-01 | End: 2020-01-01

## 2020-01-01 RX ORDER — FUROSEMIDE 40 MG/1
TABLET ORAL
Qty: 60 TABLET | Refills: 1 | Status: SHIPPED | OUTPATIENT
Start: 2020-01-01

## 2020-01-01 RX ORDER — PEN NEEDLE, DIABETIC 31 GX3/16"
NEEDLE, DISPOSABLE MISCELLANEOUS
Qty: 200 EACH | Refills: 5 | Status: SHIPPED | OUTPATIENT
Start: 2020-01-01

## 2020-01-01 RX ORDER — PROCHLORPERAZINE 25 MG/1
1 SUPPOSITORY RECTAL TAKE AS DIRECTED
Qty: 1 EACH | Refills: 3 | Status: SHIPPED | OUTPATIENT
Start: 2020-01-01 | End: 2021-01-01 | Stop reason: SDUPTHER

## 2020-01-01 RX ORDER — DEXTROSE MONOHYDRATE 25 G/50ML
25 INJECTION, SOLUTION INTRAVENOUS
Status: DISCONTINUED | OUTPATIENT
Start: 2020-01-01 | End: 2020-01-01 | Stop reason: HOSPADM

## 2020-01-01 RX ORDER — CALCIUM ACETATE 667 MG/1
667 CAPSULE ORAL
Status: DISCONTINUED | OUTPATIENT
Start: 2020-01-01 | End: 2020-01-01 | Stop reason: HOSPADM

## 2020-01-01 RX ORDER — SODIUM CHLORIDE 0.9 % (FLUSH) 0.9 %
1-10 SYRINGE (ML) INJECTION AS NEEDED
Status: CANCELLED | OUTPATIENT
Start: 2020-01-01

## 2020-01-01 RX ORDER — INSULIN GLARGINE 300 U/ML
INJECTION, SOLUTION SUBCUTANEOUS
Qty: 6 ML | Refills: 3 | Status: SHIPPED | OUTPATIENT
Start: 2020-01-01 | End: 2020-01-01 | Stop reason: SDUPTHER

## 2020-01-01 RX ORDER — SODIUM CHLORIDE 0.9 % (FLUSH) 0.9 %
10 SYRINGE (ML) INJECTION AS NEEDED
Status: DISCONTINUED | OUTPATIENT
Start: 2020-01-01 | End: 2020-01-01 | Stop reason: HOSPADM

## 2020-01-01 RX ORDER — SODIUM CHLORIDE 9 MG/ML
50 INJECTION, SOLUTION INTRAVENOUS CONTINUOUS
Status: DISCONTINUED | OUTPATIENT
Start: 2020-01-01 | End: 2020-01-01

## 2020-01-01 RX ORDER — INSULIN ASPART 100 [IU]/ML
INJECTION, SOLUTION INTRAVENOUS; SUBCUTANEOUS
Qty: 20 PEN | Refills: 11 | Status: ON HOLD | OUTPATIENT
Start: 2020-01-01 | End: 2021-01-01

## 2020-01-01 RX ORDER — INSULIN LISPRO 200 [IU]/ML
50 INJECTION, SOLUTION SUBCUTANEOUS 3 TIMES DAILY
Qty: 20 PEN | Refills: 11 | Status: SHIPPED | OUTPATIENT
Start: 2020-01-01 | End: 2020-01-01

## 2020-01-01 RX ORDER — BLOOD-GLUCOSE METER
1 EACH MISCELLANEOUS ONCE
Qty: 1 KIT | Refills: 0 | Status: SHIPPED | OUTPATIENT
Start: 2020-01-01 | End: 2020-01-01

## 2020-01-01 RX ORDER — METOLAZONE 5 MG/1
5 TABLET ORAL DAILY PRN
Status: DISCONTINUED | OUTPATIENT
Start: 2020-01-01 | End: 2020-01-01 | Stop reason: HOSPADM

## 2020-01-01 RX ORDER — BLOOD SUGAR DIAGNOSTIC
STRIP MISCELLANEOUS
Qty: 50 EACH | Refills: 11 | Status: SHIPPED | OUTPATIENT
Start: 2020-01-01 | End: 2020-01-01 | Stop reason: SDUPTHER

## 2020-01-01 RX ORDER — SODIUM CHLORIDE 0.9 % (FLUSH) 0.9 %
3 SYRINGE (ML) INJECTION EVERY 12 HOURS SCHEDULED
Status: DISCONTINUED | OUTPATIENT
Start: 2020-01-01 | End: 2020-01-01

## 2020-01-01 RX ADMIN — SODIUM CHLORIDE 50 ML/HR: 9 INJECTION, SOLUTION INTRAVENOUS at 08:43

## 2020-01-01 RX ADMIN — PANTOPRAZOLE SODIUM 40 MG: 40 TABLET, DELAYED RELEASE ORAL at 08:24

## 2020-01-01 RX ADMIN — SODIUM CHLORIDE, SODIUM LACTATE, CALCIUM CHLORIDE, MAGNESIUM CHLORIDE AND DEXTROSE 2000 ML: 1.5; 538; 448; 25.7; 5.08 INJECTION, SOLUTION INTRAPERITONEAL at 03:04

## 2020-01-01 RX ADMIN — CLOPIDOGREL BISULFATE 300 MG: 75 TABLET ORAL at 08:36

## 2020-01-01 RX ADMIN — FUROSEMIDE 40 MG: 40 TABLET ORAL at 20:42

## 2020-01-01 RX ADMIN — FUROSEMIDE 80 MG: 80 TABLET ORAL at 08:24

## 2020-01-01 RX ADMIN — CALCIUM ACETATE 667 MG: 667 CAPSULE ORAL at 17:01

## 2020-01-01 RX ADMIN — CALCIUM ACETATE 667 MG: 667 CAPSULE ORAL at 08:24

## 2020-01-01 RX ADMIN — CLOPIDOGREL BISULFATE 75 MG: 75 TABLET ORAL at 08:24

## 2020-01-01 RX ADMIN — HYDROCODONE BITARTRATE AND ACETAMINOPHEN 1 TABLET: 7.5; 325 TABLET ORAL at 23:18

## 2020-01-01 RX ADMIN — ASPIRIN 325 MG: 325 TABLET, COATED ORAL at 08:24

## 2020-01-01 RX ADMIN — INSULIN LISPRO 3 UNITS: 100 INJECTION, SOLUTION INTRAVENOUS; SUBCUTANEOUS at 21:27

## 2020-01-01 RX ADMIN — SODIUM CHLORIDE, PRESERVATIVE FREE 3 ML: 5 INJECTION INTRAVENOUS at 20:43

## 2020-01-01 RX ADMIN — SODIUM CHLORIDE, SODIUM LACTATE, CALCIUM CHLORIDE, MAGNESIUM CHLORIDE AND DEXTROSE 2000 ML: 1.5; 538; 448; 25.7; 5.08 INJECTION, SOLUTION INTRAPERITONEAL at 17:58

## 2020-01-01 RX ADMIN — SODIUM CHLORIDE, SODIUM LACTATE, CALCIUM CHLORIDE, MAGNESIUM CHLORIDE AND DEXTROSE 2000 ML: 1.5; 538; 448; 25.7; 5.08 INJECTION, SOLUTION INTRAPERITONEAL at 22:45

## 2020-01-01 RX ADMIN — HYDROCODONE BITARTRATE AND ACETAMINOPHEN 1 TABLET: 7.5; 325 TABLET ORAL at 14:19

## 2020-01-01 RX ADMIN — FUROSEMIDE 80 MG: 80 TABLET ORAL at 21:27

## 2020-01-06 DIAGNOSIS — IMO0002 UNCONTROLLED TYPE 2 DIABETES MELLITUS WITH COMPLICATION, WITH LONG-TERM CURRENT USE OF INSULIN: ICD-10-CM

## 2020-01-07 ENCOUNTER — TELEPHONE (OUTPATIENT)
Dept: INTERNAL MEDICINE | Facility: CLINIC | Age: 65
End: 2020-01-07

## 2020-01-07 RX ORDER — PEN NEEDLE, DIABETIC 31 GX3/16"
NEEDLE, DISPOSABLE MISCELLANEOUS
Qty: 200 EACH | Refills: 1 | Status: SHIPPED | OUTPATIENT
Start: 2020-01-07 | End: 2020-03-25

## 2020-01-07 NOTE — TELEPHONE ENCOUNTER
PHARMACY CALLED STATING THAT THE PTS NEW INSURANCE WILL COVER HUMALOG, WILL NOT COVER NOVOLOG.     PHARMACY: [phone]

## 2020-01-10 ENCOUNTER — OFFICE VISIT (OUTPATIENT)
Dept: ENDOCRINOLOGY | Facility: CLINIC | Age: 65
End: 2020-01-10

## 2020-01-10 ENCOUNTER — TELEPHONE (OUTPATIENT)
Dept: ENDOCRINOLOGY | Facility: CLINIC | Age: 65
End: 2020-01-10

## 2020-01-10 VITALS
WEIGHT: 258.7 LBS | BODY MASS INDEX: 40.6 KG/M2 | OXYGEN SATURATION: 98 % | SYSTOLIC BLOOD PRESSURE: 140 MMHG | HEART RATE: 101 BPM | HEIGHT: 67 IN | DIASTOLIC BLOOD PRESSURE: 80 MMHG

## 2020-01-10 DIAGNOSIS — E78.2 MIXED HYPERLIPIDEMIA: ICD-10-CM

## 2020-01-10 DIAGNOSIS — E11.65 UNCONTROLLED TYPE 2 DIABETES MELLITUS WITH HYPERGLYCEMIA (HCC): ICD-10-CM

## 2020-01-10 DIAGNOSIS — N18.5 CHRONIC KIDNEY DISEASE, STAGE V (HCC): ICD-10-CM

## 2020-01-10 DIAGNOSIS — IMO0002 DIABETES MELLITUS WITH NEUROLOGICAL MANIFESTATIONS, UNCONTROLLED: Primary | ICD-10-CM

## 2020-01-10 LAB
GLUCOSE BLDC GLUCOMTR-MCNC: 115 MG/DL (ref 70–130)
HBA1C MFR BLD: 7.8 %

## 2020-01-10 PROCEDURE — 83036 HEMOGLOBIN GLYCOSYLATED A1C: CPT | Performed by: INTERNAL MEDICINE

## 2020-01-10 PROCEDURE — 99214 OFFICE O/P EST MOD 30 MIN: CPT | Performed by: INTERNAL MEDICINE

## 2020-01-10 PROCEDURE — 82947 ASSAY GLUCOSE BLOOD QUANT: CPT | Performed by: INTERNAL MEDICINE

## 2020-01-10 RX ORDER — PROCHLORPERAZINE 25 MG/1
1 SUPPOSITORY RECTAL TAKE AS DIRECTED
Qty: 1 EACH | Refills: 3 | Status: SHIPPED | OUTPATIENT
Start: 2020-01-10 | End: 2020-04-29 | Stop reason: SDUPTHER

## 2020-01-10 RX ORDER — PROCHLORPERAZINE 25 MG/1
1 SUPPOSITORY RECTAL ONCE
Qty: 1 DEVICE | Refills: 0 | Status: SHIPPED | OUTPATIENT
Start: 2020-01-10 | End: 2020-01-01 | Stop reason: SDUPTHER

## 2020-01-10 RX ORDER — PROCHLORPERAZINE 25 MG/1
1 SUPPOSITORY RECTAL TAKE AS DIRECTED
Qty: 3 EACH | Refills: 11 | Status: SHIPPED | OUTPATIENT
Start: 2020-01-10 | End: 2020-04-29 | Stop reason: SDUPTHER

## 2020-01-10 NOTE — PROGRESS NOTES
Chief complaint  Diabetes (Follow Up)    Subjective   Tashia Leon is a 64 y.o. female is here today for follow-up.  Diabetes mellitus type 2 diagnosed in mid 1990s.   Medications: Humalog and Toujeo  Diabetic complications: Diabetic foot ulcer. Diab retinopathy. CKD - stable function.   Eye care: diabetic retinopathy by last exam, seeing eye dr regularly.  Podiatry visit - She underwent toe amputation in 1/2019, sees podiatrist regularly.   Patient was evaluated for the kidney transplant and went through testing.      Hyperlipidemia with high LDL. Side effects on statins . Doing well on Praluent, which was started Nov 2015, doing well since then, LDL <100.    Patient reported that after change from Toujeo to Basaglar and from U-200 humalog to Novolog the glucose is higher. This year she has a new insurance and it could be switched back to HUmalog. SHe would like to return to [facility] as it was working much better. She also inquired about the CGM.   Patient tests 4-5 times a day and has 5 injections of insulin daily. She would benefit greatly from CGM and it would simplify her regimen.   C/o leg swelling. She follows with kidney specialist with repeat labs.     Medications    Current Outpatient Medications:   •  Alirocumab (PRALUENT) 75 MG/ML solution pen-injector, Inject 75 mg under the skin into the appropriate area as directed Every 14 (Fourteen) Days., Disp: 2 pen, Rfl: 11  •  aspirin  MG tablet, Take 1 tablet by mouth Daily., Disp: 30 tablet, Rfl: 11  •  Dulaglutide (TRULICITY) 1.5 MG/0.5ML solution pen-injector, Inject 1.5 mg under the skin into the appropriate area as directed 1 (One) Time Per Week. Tuesday, Disp: 2 pen, Rfl: 5  •  furosemide (LASIX) 40 MG tablet, TAKE 1 TABLET TWICE DAILY, Disp: 60 tablet, Rfl: 11  •  GLOBAL EASE INJECT PEN NEEDLES 31G X 5 MM misc, USE 6 TIMES DAILY, Disp: 200 each, Rfl: 1  •  HYDROcodone-acetaminophen (NORCO) 7.5-325 MG per tablet, Take 1 tablet by mouth Every 8  (Eight) Hours As Needed., Disp: , Rfl:   •  lansoprazole (PREVACID) 30 MG capsule, Take 30 mg by mouth Daily., Disp: , Rfl: 11  •  metOLazone (ZAROXOLYN) 5 MG tablet, Take 1-2 tablets by mouth Daily As Needed (dyspnea, weight gain)., Disp: , Rfl:   •  Continuous Blood Gluc  (DEXCOM G6 ) device, 1 each 1 (One) Time for 1 dose., Disp: 1 Device, Rfl: 0  •  Continuous Blood Gluc Sensor (DEXCOM G6 SENSOR), 1 each Take As Directed., Disp: 3 each, Rfl: 11  •  Continuous Blood Gluc Transmit (DEXCOM G6 TRANSMITTER) misc, 1 each Take As Directed., Disp: 1 each, Rfl: 3  •  Insulin Glargine, 2 Unit Dial, (TOUJEO MAX SOLOSTAR) 300 UNIT/ML solution pen-injector injection, Inject 80 Units under the skin into the appropriate area as directed 2 (Two) Times a Day., Disp: 6 pen, Rfl: 11  •  Insulin Lispro (HUMALOG KWIKPEN) 200 UNIT/ML solution pen-injector, Inject 50 Units under the skin into the appropriate area as directed 3 (Three) Times a Day., Disp: 20 pen, Rfl: 11    PMH  The following portions of the patient's history were reviewed and updated as appropriate: allergies, current medications, past family history, past medical history, past social history, past surgical history and problem list.    Review of systems  Review of Systems   Constitutional: Positive for fatigue and unexpected weight change (weight gain, appears fluid related ). Negative for activity change, appetite change, chills and diaphoresis.   HENT: Negative for congestion, ear pain, facial swelling, hearing loss, postnasal drip, sore throat, trouble swallowing and voice change.    Eyes: Negative.  Negative for redness, itching and visual disturbance.   Respiratory: Negative for cough and shortness of breath.    Cardiovascular: Positive for leg swelling. Negative for chest pain and palpitations.   Gastrointestinal: Negative for abdominal distention, abdominal pain, constipation, diarrhea, nausea and vomiting.   Endocrine:        As listed in HPI  "  Genitourinary: Negative.    Musculoskeletal: Negative for arthralgias, back pain and myalgias.   Skin: Negative.    Allergic/Immunologic: Negative.    Neurological: Positive for weakness and light-headedness. Negative for dizziness, tremors, syncope and headaches.   Hematological: Negative.    Psychiatric/Behavioral: Negative.    All other systems reviewed and are negative.      Physical exam  Objective   Blood pressure 140/80, pulse 101, height 170.2 cm (67.01\"), weight 117 kg (258 lb 11.2 oz), SpO2 98 %, not currently breastfeeding.   Physical Exam   Constitutional: She is oriented to person, place, and time. She appears well-developed and well-nourished.   HENT:   Head: Normocephalic and atraumatic.   Eyes: Conjunctivae are normal.   Neck: No thyromegaly present.   The neck is supple and symmetric   Cardiovascular: Normal rate, regular rhythm and normal heart sounds.   Pulmonary/Chest: Effort normal and breath sounds normal. She has no rales.   Musculoskeletal: She exhibits edema (2+).   Right foot - dressed in boot   Lymphadenopathy:     She has no cervical adenopathy.   Neurological: She is alert and oriented to person, place, and time.   Skin: Skin is warm and dry. No rash noted.   Psychiatric: She has a normal mood and affect. Thought content normal.   Vitals reviewed.        LABS AND IMAGING  Results for orders placed or performed in visit on 01/10/20   POC Glucose Fingerstick   Result Value Ref Range    Glucose 115 70 - 130 mg/dL   POC Glycosylated Hemoglobin (Hb A1C)   Result Value Ref Range    Hemoglobin A1C 7.8 %           Assessment  Assessment/Plan   Problem List Items Addressed This Visit        Cardiovascular and Mediastinum    Hyperlipemia       Endocrine    Diabetes mellitus with neurological manifestations, uncontrolled (CMS/[facility]) - Primary    Relevant Medications    Dulaglutide (TRULICITY) 1.5 MG/0.5ML solution pen-injector    Insulin Glargine, 2 Unit Dial, (TOUJEO MAX SOLOSTAR) 300 UNIT/ML " solution pen-injector injection    Insulin Lispro (HUMALOG KWIKPEN) 200 UNIT/ML solution pen-injector    Other Relevant Orders    POC Glucose Fingerstick (Completed)    POC Glycosylated Hemoglobin (Hb A1C) (Completed)    Uncontrolled type 2 diabetes mellitus with hyperglycemia (CMS/[facility])    Relevant Medications    Dulaglutide (TRULICITY) 1.5 MG/0.5ML solution pen-injector    Insulin Glargine, 2 Unit Dial, (TOUJEO MAX SOLOSTAR) 300 UNIT/ML solution pen-injector injection    Insulin Lispro (HUMALOG KWIKPEN) 200 UNIT/ML solution pen-injector       Genitourinary    Chronic kidney disease, stage V (CMS/[facility])          Plan  1.Diabetes mellitus type 2 with hx diabetic foot ulcer, retinopathy and CKD.     Toujeo Max and Humalog U-200 prescription sent and coupons provided.   She has a large doses of administration and would benefit from concentrated insulin options.   She also would be a great candidate for the Dexcom CGM, referral sent to the [facility] specialty pharmacy. She tests 4-5 times daily and administers 5 injection a day. New insulin instructions provided.     Patient Instructions     -Toujeo 80 Units BID.   -Continue Humalog 50  units three times a day  -Trulicity 1.5 mg     Results for orders placed or performed in visit on 01/10/20   POC Glucose Fingerstick   Result Value Ref Range    Glucose 115 70 - 130 mg/dL   POC Glycosylated Hemoglobin (Hb A1C)   Result Value Ref Range    Hemoglobin A1C 7.8 %     2. Hyperlipidemia  Continue Praluent every 2 weeks.  She had reaction to crestor, livalo and lipitor and severe peripheral vascular disease. No alternatives exist.      3. CKD / edema - stable, edema is increased, continue diuretics.     Medications refilled. Hypoglycemia precautions and insulin instructions reviewed.     Follow-up in 3-4 months.     16     min  of  26 min face-to-face visit time spent for coordination of care and counselling regarding identified problems as outlined in the objective, assessment  and discussion portions of the documentation.    Electronically signed,

## 2020-01-10 NOTE — TELEPHONE ENCOUNTER
Spoke with Shaun at [facility]  He will forward the information to Rhona with [employer], as she advises us to send to [facility] originally.

## 2020-01-10 NOTE — PATIENT INSTRUCTIONS
-Toujeo 80 Units BID.   -Continue Humalog 50  units three times a day  -Trulicity 1.5 mg     Results for orders placed or performed in visit on 01/10/20   POC Glucose Fingerstick   Result Value Ref Range    Glucose 115 70 - 130 mg/dL   POC Glycosylated Hemoglobin (Hb A1C)   Result Value Ref Range    Hemoglobin A1C 7.8 %

## 2020-01-10 NOTE — TELEPHONE ENCOUNTER
YUE WITH [facility] CALLED TO LET YOU KNOW THAT HE FOUND THE PATIENT'S CURRENT INSURANCE AND FOUND THAT THEY ARE NOT CONTRACTED WITH HER CARRIER. HE CAN REFER TO [facility] OR YOU CAN CALL IN TO ANOTHER PHARMACY/SUPPLIER.    CALL BACK [phone] YUE

## 2020-01-14 NOTE — PROGRESS NOTES
CC: Obstetric visit    HPI: 29-year-old  2 para 1 presents at 9-0/7 weeks for her scheduled obstetric visit.  She is feeling well.  She does not have nausea or vomiting.  Does not have vaginal bleeding.    Assessment and plan    1.  Intrauterine pregnancy at 9-0/7 weeks  2.  Last ultrasound showed gestational sac, but did not show the fetal pole.  I recommend an ultrasound today to confirm viability.  The patient agrees.  3.  Counseled regarding a cog recommendations for the influenza vaccine in pregnancy.  The patient would like to proceed.  4.  Prenatal labs today.  5.  Counseled regarding genetic screening.  The patient declines this.   INFECTIOUS DISEASE PROGRESS NOTE    Tashia Leon  1955  [id number]    Date: 12/10/2018    Admission Date: 11/26/2018      CC: diabetic foot ulcer    History of present illness:    Patient is a 63 y.o. female with chronic kidney disease stage IV, diabetes type 2 since 1990s has had history of left foot MRSA osteomyelitis treated at  several years ago.  Patient has developed calluses on her right foot and saw [facility] who did some bedside debridement in the clinic.  Patient said increasing pain and some weeping from the wounds because of low-grade temperatures patient's returns to see her podiatrist recommended admission to hospital.  Patient was seen in the emergency room started on IV antibiotics including daptomycin and ertapenem.  She had MRI of the foot which was stopped prematurely by the patient's and was not a complete study.    Asked to address long-term treatment and duration of antibiotics.    Patient lives in [place].    Patient denies C. difficile colitis history of nausea vomiting or diarrhea difficulty breathing patient denies rash.    Patient says she is a PICC line before    11/28/18; no events overnight; no fever, rash, sore throat    11/29/18; no events overnight feels well; no complaints; no diarrhea, rash, sore throat    11/30/18: No new events. Denies f/c, sob, n/v/d, rashes or sore throat.     12/1/18; no events overnight; no fever, rash, sore throat    12/2/18: no new events or complaints.  Denies f/c, sob, n/v/d, rashes.   12/3/18: No new events or complaints.  Foot without pain.  No schedule for dressing changes.  Denies f/c, sob, n/v/d, rashes.     12/6/18; doing well; no events overnight; no fever, rash, sore throat    12/7/18; no changes, wants to go home; no fever, rash, sore throat  12/10/18; no events overnight; doing well; no complaints, no fever, rash, sore throat; wants deep line, refusing to go to LTAC    Past Medical History:   Diagnosis Date   • Acute  bronchitis    • Acute pharyngitis    • Acute sinusitis    • Benign colonic polyp    • Edema    • GERD (gastroesophageal reflux disease)    • Hiatal hernia    • Hyperkalemia    • Hyperlipidemia    • Hypertension    • Low back pain    • Lumbar disc disease    • MRSA (methicillin resistant Staphylococcus aureus)     Osteomyelitis/methicillin-resistant Staphylococcus aureus of the left foot, 2013.   • Obesity    • Osteomyelitis (CMS/HCC)     Osteomyelitis/methicillin-resistant Staphylococcus aureus of the left foot, 2013.   • Peptic ulceration 2013    History of peptic ulcer disease, diagnosed 2013 by EGD.  Repeat EGD in 2013 was negative.   • Sepsis (CMS/HCC)     Sepsis, 2013, hospitalized at [facility].   • Tobacco use     Previous tobacco use with cessation in .   • Type 2 diabetes mellitus (CMS/HCC)     Proteinuria noted, 2014.     • Vitamin D deficiency        Past Surgical History:   Procedure Laterality Date   •  SECTION      x2   • FOOT SURGERY Left     Incision and debridement of the left foot x2.   • LASIK     • TONSILLECTOMY         Family History   Problem Relation Age of Onset   • No Known Problems Mother    • No Known Problems Father    • Breast cancer Neg Hx    • Ovarian cancer Neg Hx        Social History     Socioeconomic History   • Marital status:      Spouse name: Not on file   • Number of children: Not on file   • Years of education: Not on file   • Highest education level: Not on file   Social Needs   • Financial resource strain: Not on file   • Food insecurity - worry: Not on file   • Food insecurity - inability: Not on file   • Transportation needs - medical: Not on file   • Transportation needs - non-medical: Not on file   Occupational History   • Not on file   Tobacco Use   • Smoking status: Former Smoker     Packs/day: 1.00     Years: 30.00     Pack years: 30.00     Types: Cigarettes   • Smokeless  tobacco: Never Used   • Tobacco comment: quit 7 years ago   Substance and Sexual Activity   • Alcohol use: Yes     Alcohol/week: 0.6 oz     Types: 1 Glasses of wine per week     Comment: holidays   • Drug use: No   • Sexual activity: Defer   Other Topics Concern   • Not on file   Social History Narrative   • Not on file       Allergies   Allergen Reactions   • Statins Myalgia   • Ancef [Cefazolin]    • Bactrim [Sulfamethoxazole-Trimethoprim] Nausea And Vomiting   • Cephalosporins    • Cleocin [Clindamycin Hcl]    • Clindamycin/Lincomycin    • Keflex [Cephalexin]    • Levaquin [Levofloxacin]    • Lipitor [Atorvastatin]    • Lisinopril    • Losartan Potassium Other (See Comments)     Unknown reaction   • Oxycodone    • Quinolones          Medication:    Current Facility-Administered Medications:   •  aspirin EC tablet 325 mg, 325 mg, Oral, Daily, Rosario Walter APRN, 325 mg at 12/10/18 0836  •  bisacodyl (DULCOLAX) EC tablet 10 mg, 10 mg, Oral, Daily PRN, Heavenly Medina APRN, 10 mg at 12/08/18 0841  •  castor oil-balsam peru (VENELEX) ointment, , Topical, Q12H, Halina Walker, DO  •  dextrose (D50W) 25 g/ 50mL Intravenous Solution 25 g, 25 g, Intravenous, Q15 Min PRN, Rosario Walter, APRN  •  dextrose (GLUTOSE) oral gel 15 g, 15 g, Oral, Q15 Min PRN, Rosario Walter, APRN  •  diltiaZEM CD (CARDIZEM CD) 24 hr capsule 120 mg, 120 mg, Oral, Nightly, Rosario Walter, APRN, 120 mg at 12/09/18 2118  •  docusate sodium (COLACE) capsule 100 mg, 100 mg, Oral, Daily, Heavenly Medina APRN, 100 mg at 12/10/18 0836  •  epoetin rebeca (EPOGEN,PROCRIT) injection 20,000 Units, 20,000 Units, Subcutaneous, Weekly, Manny Guerrero MD, 20,000 Units at 12/08/18 1458  •  ertapenem (INVanz) 500 mg in sodium chloride 0.9 % 50 mL IVPB, 500 mg, Intravenous, Q24H, Hung Haynes MD  •  glucagon (human recombinant) (GLUCAGEN DIAGNOSTIC) injection 1 mg, 1 mg, Subcutaneous, PRN, Rosario Walter  NERY APRN  •  HYDROcodone-acetaminophen (NORCO) 7.5-325 MG per tablet 1 tablet, 1 tablet, Oral, Q6H PRN, Halina Walker, , 1 tablet at 12/10/18 0509  •  insulin detemir (LEVEMIR) injection 40 Units, 40 Units, Subcutaneous, Q12H, Gonzalo Schmidt MD, 40 Units at 12/10/18 0837  •  insulin lispro (humaLOG) injection 0-14 Units, 0-14 Units, Subcutaneous, 4x Daily With Meals & Nightly, Rosario Walter APRN, 3 Units at 12/10/18 1258  •  Linezolid (ZYVOX) 600 mg 300 mL, 600 mg, Intravenous, Q12H, Hung Haynes MD, Last Rate: 300 mL/hr at 12/10/18 1425, 600 mg at 12/10/18 1425  •  melatonin sublingual tablet 5 mg, 5 mg, Sublingual, Nightly PRN, Geoffrey Stanley MD  •  mupirocin (BACTROBAN) 2 % cream, , Topical, Q24H, Chio Sterling MD  •  mupirocin (BACTROBAN) 2 % cream, , Topical, Q24H, Chio Sterling MD  •  Naloxegol Oxalate (MOVANTIK) tablet 12.5 mg *Patient Supplied Med*, 12.5 mg, Oral, Once per day on Sun Wed, Rosario Walter APRN, 12.5 mg at 12/09/18 0906  •  ondansetron (ZOFRAN) tablet 4 mg, 4 mg, Oral, Q6H PRN, 4 mg at 11/30/18 2046 **OR** ondansetron (ZOFRAN) injection 4 mg, 4 mg, Intravenous, Q6H PRN, Rosario Walter APRN, 4 mg at 12/08/18 0334  •  pantoprazole (PROTONIX) EC tablet 40 mg, 40 mg, Oral, QAM, Rosario Walter APRN, 40 mg at 12/10/18 0509  •  [COMPLETED] Insert peripheral IV, , , Once **AND** sodium chloride 0.9 % flush 10 mL, 10 mL, Intravenous, PRN, Hiram Osborn MD, 10 mL at 12/04/18 1032  •  sodium chloride 0.9 % flush 3 mL, 3 mL, Intravenous, Q12H, Rosario Walter APRN, 3 mL at 12/10/18 0844  •  sodium chloride 0.9 % flush 3-10 mL, 3-10 mL, Intravenous, PRN, Rosario Walter APRN  •  sodium chloride 0.9 % infusion, 9 mL/hr, Intravenous, Continuous, Binta Patricia MD, Last Rate: 9 mL/hr at 12/04/18 1257, 9 mL/hr at 12/04/18 1257    Antibiotics:  Anti-Infectives (From admission, onward)    Ordered     Dose/Rate Route Frequency Start  Stop    12/10/18 1347  ertapenem (INVanz) 500 mg in sodium chloride 0.9 % 50 mL IVPB     Ordering Provider:  Hung Haynes MD    500 mg  100 mL/hr over 30 Minutes Intravenous Every 24 Hours 12/10/18 1800 18 1759    18 1308  Linezolid (ZYVOX) 600 mg 300 mL     Ordering Provider:  Hung Haynes MD    600 mg  300 mL/hr over 60 Minutes Intravenous Every 12 Hours 18 1500 18 1459    18 2136  DAPTOmycin (CUBICIN) 500 mg in sodium chloride 0.9 % 50 mL IVPB     Ordering Provider:  Breana Chance MD    6 mg/kg × 79.2 kg (Adjusted)  100 mL/hr over 30 Minutes Intravenous Every 48 Hours 18 1800 18 1959    18 2132  ertapenem (INVanz) 500 mg in sodium chloride 0.9 % 50 mL IVPB     Niko Deleon PA reviewed the order on 18 0848.   Ordering Provider:  Niko Deleon PA    500 mg  100 mL/hr over 30 Minutes Intravenous Every 24 Hours 18 1800 18 1834    18 1616  ertapenem (INVanz) 500 mg in sodium chloride 0.9 % 50 mL IVPB     Ordering Provider:  Hiram Osborn MD    500 mg  100 mL/hr over 30 Minutes Intravenous Every 24 Hours 18 1618 18 0015    18 1616  DAPTOmycin (CUBICIN) 500 mg in sodium chloride 0.9 % 50 mL IVPB     Ordering Provider:  Hiram Osborn MD    500 mg  100 mL/hr over 30 Minutes Intravenous Every 24 Hours 18 1617 18 1935            Review of Systems:       see hpi    Physical Exam:   Vital Signs  Temp (24hrs), Av.3 °F (36.8 °C), Min:97.9 °F (36.6 °C), Max:98.8 °F (37.1 °C)    Temp  Min: 97.9 °F (36.6 °C)  Max: 98.8 °F (37.1 °C)  BP  Min: 121/57  Max: 147/69  Pulse  Min: 73  Max: 82  Resp  Min: 16  Max: 16  SpO2  Min: 95 %  Max: 98 %    GENERAL: Awake and alert, in no acute distress.   HEENT: Normocephalic, atraumatic.  PERRL. EOMI. No conjunctival injection. No icterus. Oropharynx clear without evidence of thrush or exudate.   HEART: RRR; No murmur, rubs, gallops.   LUNGS: Clear to  auscultation bilaterally without wheezing, rales, rhonchi.   ABDOMEN: Soft, nontender, nondistended. Positive bowel sounds.    EXT:  No cyanosis, clubbing or edema. No cord.  MSK: FROM without joint effusions noted arms/legs.  Right foot wrapped  SKIN: Warm and dry without cutaneous eruptions on Inspection/palpation.    NEURO: Oriented to PPT. No focal deficits on motor/sensory exam at arms/legs.  PSYCHIATRIC: Normal insight and judgement. Cooperative with PE    Laboratory Data    Results from last 7 days   Lab Units  12/10/18   0604  12/05/18   0637  12/04/18   0639   WBC 10*3/mm3  13.92*  15.15*  12.87*   HEMOGLOBIN g/dL  8.7*  8.4*  8.5*   HEMATOCRIT %  27.9*  27.1*  27.3*   PLATELETS 10*3/mm3  497*  444  415     Results from last 7 days   Lab Units  12/10/18   0604   SODIUM mmol/L  138   POTASSIUM mmol/L  4.2   CHLORIDE mmol/L  106   CO2 mmol/L  24.0   BUN mg/dL  28*   CREATININE mg/dL  3.05*   GLUCOSE mg/dL  91   CALCIUM mg/dL  8.8                             Estimated Creatinine Clearance: 24.2 mL/min (A) (by C-G formula based on SCr of 3.05 mg/dL (H)).      Microbiology:  Microbiology Results Abnormal     Procedure Component Value - Date/Time    Anaerobic Culture - Tissue, Toe, Right [[id number]] Collected:  12/04/18 1207    Lab Status:  Preliminary result Specimen:  Tissue from Toe, Right Updated:  12/10/18 1321     Culture No anaerobes isolated    Fungus Culture - Tissue, Toe, Right [[id number]] Collected:  12/04/18 1207    Lab Status:  Preliminary result Specimen:  Tissue from Toe, Right Updated:  12/09/18 1315     Fungus Culture No fungus isolated at less than 1 week    AFB Culture - Tissue, Toe, Right [[id number]] Collected:  12/04/18 1207    Lab Status:  Preliminary result Specimen:  Tissue from Toe, Right Updated:  12/09/18 1315     AFB Culture No AFB isolated at less than 1 week     AFB Stain No acid fast bacilli seen on concentrated smear    Tissue / Bone Culture - Tissue, Toe, Right [[id number]]  Collected:  12/04/18 1207    Lab Status:  Final result Specimen:  Tissue from Toe, Right Updated:  12/08/18 0850     Tissue Culture No growth at 4 days     Gram Stain No WBCs or organisms seen    Eosinophil Smear - Urine, Urine, Clean Catch [[id number]]  (Normal) Collected:  12/01/18 1629    Lab Status:  Final result Specimen:  Urine, Clean Catch Updated:  12/01/18 1710     Eosinophil Smear 0 % EOS/100 Cells     Narrative:       No eosinophil seen    Blood Culture - Blood, Arm, Left [[id number]] Collected:  11/26/18 1625    Lab Status:  Final result Specimen:  Blood from Arm, Left Updated:  12/01/18 1645     Blood Culture No growth at 5 days    Blood Culture - Blood, Arm, Right [[id number]] Collected:  11/26/18 1620    Lab Status:  Final result Specimen:  Blood from Arm, Right Updated:  12/01/18 1645     Blood Culture No growth at 5 days                       Radiology:  No radiology results for the last 7 days  Doppler arterial:   Addenda:       · Moderate reduction in arterial flow in the right lower extremity. · Monophasic waveforms are present in the right lower extremity with   biphasic waveforms in the left lower extremity · Due to the patients inability to tolerate the study only RLE pressures   were obtained    Signed: 11/29/18 2390 by Gillian Becker MD   Narrative:     · Moderate reduction in arterial flow in the right lower extremity.   Normal-appearing waveforms.     Narrative:     · Left Conclusion: The left NELLI is normal.  · Right Conclusion: The right NELLI is normal.     Path from 12/4/18  RIGHT FIRST TOE AND METATARSAL, AMPUTATION:  Skin with ulceration, necrosis and focal abscess formation compatible with gangrene.  No histologic evidence of acute osteomyelitis on representative sections of bone.  Proximal soft tissue margins appear viable.      Impression:   Diabetic foot ulcer on right foot  Leukocytosis with neutrophilia, improving  chronic kidney disease stage IV  Focal abscess of right 1st  "toe  Antibiotic intolerances to cefazolin, clindamycin, Levaquin  DM II  Esr > 100    PLAN/RECOMMENDATIONS:     I find it very difficult to interpret an MRI was not completed an MRI that was not given with gadolinium because of her chronic kidney disease.    Physical exam of foot not remarkable for  Osteomyelitis.  I still have some concern with ESr > 100.    Path suggestive of abscess without osteo         Cont invanz 500 mg IV Q24H    Long discussion with patient, ;    Patient has neuropathic foot wounds and without offloading is likely to have worsening foot infection bone involvement and will require amputation.    Despite explaining this multiple times patient still apprehensive about going to LTAC.    The entire goal of LTAC of \"placement\" is for off loading of foot and at same time to continue abx which are really empiric because of negative operative cultures.    If patient refused placement and goes home I don't feel compelled to arrange outpatient iv abx therapy      Will change currently therapy to zyvox, continue invanz    Patient refusing LTAC  Ask [facility] surgery to place [facility]ong  Anticipate 4-6 weeks of iv abx from 12/4/18    If patient goes home I have concern that she will not offload her foot; patient is at high risk for treatment failure, limb loss    D/w Dr. Jaden Haynes MD  12/10/2018  3:30 PM                  "

## 2020-01-27 ENCOUNTER — TELEPHONE (OUTPATIENT)
Dept: INTERNAL MEDICINE | Facility: CLINIC | Age: 65
End: 2020-01-27

## 2020-02-11 NOTE — TELEPHONE ENCOUNTER
Called and spoke to patient. Stated she has been in touch with Dexcom and has been informed it should be arriving over the next few weeks. Had no further questions/concerns at this time.

## 2020-02-18 ENCOUNTER — TELEPHONE (OUTPATIENT)
Dept: ENDOCRINOLOGY | Facility: CLINIC | Age: 65
End: 2020-02-18

## 2020-02-18 NOTE — TELEPHONE ENCOUNTER
[facility] [phone] EXT [phone]  [phone]    WE FAXED THEM PTS LAST NOTE AND IT NEEDS TO BE ELECTRONICALLY SIGNED BY JOSEPHINE

## 2020-02-19 NOTE — TELEPHONE ENCOUNTER
Do you mind to edit your last note to state Electronically signed. [facility] will not accept it unless it STATES that.  Sorry

## 2020-02-20 PROBLEM — D50.9 IRON DEFICIENCY ANEMIA, UNSPECIFIED: Status: ACTIVE | Noted: 2020-02-20

## 2020-02-25 ENCOUNTER — HOSPITAL ENCOUNTER (OUTPATIENT)
Dept: ONCOLOGY | Facility: HOSPITAL | Age: 65
Setting detail: INFUSION SERIES
Discharge: HOME | End: 2020-02-25

## 2020-02-25 VITALS
SYSTOLIC BLOOD PRESSURE: 118 MMHG | HEIGHT: 67 IN | TEMPERATURE: 97.9 F | DIASTOLIC BLOOD PRESSURE: 83 MMHG | WEIGHT: 243 LBS | RESPIRATION RATE: 18 BRPM | HEART RATE: 71 BPM | BODY MASS INDEX: 38.14 KG/M2

## 2020-02-25 DIAGNOSIS — D50.9 IRON DEFICIENCY ANEMIA, UNSPECIFIED IRON DEFICIENCY ANEMIA TYPE: Primary | ICD-10-CM

## 2020-02-25 DIAGNOSIS — N18.5 CHRONIC KIDNEY DISEASE, STAGE V (HCC): ICD-10-CM

## 2020-02-25 DIAGNOSIS — D50.9 IDIOPATHIC HYPOCHROMIC ANEMIA: ICD-10-CM

## 2020-02-25 PROCEDURE — 25010000002 FERRIC CARBOXYMALTOSE 750 MG/15ML SOLUTION 15 ML VIAL: Performed by: INTERNAL MEDICINE

## 2020-02-25 PROCEDURE — 96365 THER/PROPH/DIAG IV INF INIT: CPT

## 2020-02-25 RX ADMIN — FERRIC CARBOXYMALTOSE INJECTION 750 MG: 50 INJECTION, SOLUTION INTRAVENOUS at 13:49

## 2020-02-27 ENCOUNTER — HOSPITAL ENCOUNTER (OUTPATIENT)
Dept: ONCOLOGY | Facility: HOSPITAL | Age: 65
Setting detail: INFUSION SERIES
End: 2020-02-27

## 2020-02-28 ENCOUNTER — APPOINTMENT (OUTPATIENT)
Dept: ONCOLOGY | Facility: HOSPITAL | Age: 65
End: 2020-02-28

## 2020-03-03 ENCOUNTER — HOSPITAL ENCOUNTER (OUTPATIENT)
Dept: ONCOLOGY | Facility: HOSPITAL | Age: 65
Setting detail: INFUSION SERIES
Discharge: HOME | End: 2020-03-03

## 2020-03-03 VITALS
HEART RATE: 83 BPM | TEMPERATURE: 97.6 F | HEIGHT: 67 IN | RESPIRATION RATE: 16 BRPM | SYSTOLIC BLOOD PRESSURE: 135 MMHG | WEIGHT: 248 LBS | BODY MASS INDEX: 38.92 KG/M2 | DIASTOLIC BLOOD PRESSURE: 52 MMHG

## 2020-03-03 DIAGNOSIS — D50.9 IDIOPATHIC HYPOCHROMIC ANEMIA: ICD-10-CM

## 2020-03-03 DIAGNOSIS — N18.5 CHRONIC KIDNEY DISEASE, STAGE V (HCC): ICD-10-CM

## 2020-03-03 DIAGNOSIS — D50.9 IRON DEFICIENCY ANEMIA, UNSPECIFIED IRON DEFICIENCY ANEMIA TYPE: Primary | ICD-10-CM

## 2020-03-03 PROCEDURE — 96365 THER/PROPH/DIAG IV INF INIT: CPT

## 2020-03-03 PROCEDURE — 25010000002 FERRIC CARBOXYMALTOSE 750 MG/15ML SOLUTION 15 ML VIAL: Performed by: INTERNAL MEDICINE

## 2020-03-03 RX ADMIN — FERRIC CARBOXYMALTOSE INJECTION 750 MG: 50 INJECTION, SOLUTION INTRAVENOUS at 13:51

## 2020-03-05 ENCOUNTER — APPOINTMENT (OUTPATIENT)
Dept: ONCOLOGY | Facility: HOSPITAL | Age: 65
End: 2020-03-05

## 2020-03-05 ENCOUNTER — TELEPHONE (OUTPATIENT)
Dept: ENDOCRINOLOGY | Facility: CLINIC | Age: 65
End: 2020-03-05

## 2020-03-05 NOTE — TELEPHONE ENCOUNTER
Contacted patient to advise her that [facility] needed blood sugar logs where she is testing 4 times daily before they will give Dexcom.  Patient stated she would make a copy of her book and send them to us.  Once I receive them. I will fax to [facility]

## 2020-03-10 ENCOUNTER — TELEPHONE (OUTPATIENT)
Dept: ENDOCRINOLOGY | Facility: CLINIC | Age: 65
End: 2020-03-10

## 2020-03-10 ENCOUNTER — TELEPHONE (OUTPATIENT)
Dept: INTERNAL MEDICINE | Facility: CLINIC | Age: 65
End: 2020-03-10

## 2020-03-10 NOTE — TELEPHONE ENCOUNTER
PATIENT IS CALLING NEEDING [employer] EMAIL ADDRESS TO SEND BLOOD SUGAR LOG. SHE NEEDS A CALL BACK WITH EMAIL ADDRESS INFORMATION TO BE ABLE TO SEND LOG SHEET. PHONE NUMBER [phone]

## 2020-03-10 NOTE — TELEPHONE ENCOUNTER
Just letting you be aware patient stated she e-mailed you het Blood Sugar reading for [facility]. If you would like you can forward it to me ans I will print them and fax to [facility]

## 2020-03-10 NOTE — TELEPHONE ENCOUNTER
PT VOICED UNDERSTANDING OF NO SHOW APPT ON 03/09/2020. STATED THAT SHE LOOKED FOR THE OFFICE BUILDING BUT COULD NOT FIND IT UNTIL AFTER 2 HOURS OF HER APPT.

## 2020-03-25 DIAGNOSIS — IMO0002 UNCONTROLLED TYPE 2 DIABETES MELLITUS WITH COMPLICATION, WITH LONG-TERM CURRENT USE OF INSULIN: ICD-10-CM

## 2020-03-25 RX ORDER — PEN NEEDLE, DIABETIC 31 GX3/16"
NEEDLE, DISPOSABLE MISCELLANEOUS
Qty: 200 EACH | Refills: 1 | Status: SHIPPED | OUTPATIENT
Start: 2020-03-25 | End: 2020-04-20

## 2020-03-25 RX ORDER — FUROSEMIDE 40 MG/1
TABLET ORAL
Qty: 60 TABLET | Refills: 0 | Status: SHIPPED | OUTPATIENT
Start: 2020-03-25 | End: 2020-04-20

## 2020-04-08 ENCOUNTER — TELEPHONE (OUTPATIENT)
Dept: ENDOCRINOLOGY | Facility: CLINIC | Age: 65
End: 2020-04-08

## 2020-04-08 NOTE — TELEPHONE ENCOUNTER
PATIENT STILL DOES NOT HAVE HER CONTINUOUS GLUCOSE MONITOR. SHE IS CALLING TO CHECK THE STATUS OF THIS MONITOR. PATIENTS NUMBER [phone]

## 2020-04-08 NOTE — TELEPHONE ENCOUNTER
Returned patient call, advised her that I have faxed all information and an additional document yesterday., I refaxed a Status Update to [facility] today

## 2020-04-20 DIAGNOSIS — IMO0002 UNCONTROLLED TYPE 2 DIABETES MELLITUS WITH COMPLICATION, WITH LONG-TERM CURRENT USE OF INSULIN: ICD-10-CM

## 2020-04-20 RX ORDER — PEN NEEDLE, DIABETIC 31 GX3/16"
NEEDLE, DISPOSABLE MISCELLANEOUS
Qty: 200 EACH | Refills: 1 | Status: SHIPPED | OUTPATIENT
Start: 2020-04-20 | End: 2020-06-23

## 2020-04-20 RX ORDER — FUROSEMIDE 40 MG/1
TABLET ORAL
Qty: 60 TABLET | Refills: 1 | Status: SHIPPED | OUTPATIENT
Start: 2020-04-20 | End: 2020-06-23

## 2020-04-21 NOTE — TELEPHONE ENCOUNTER
Why was it denied? She meets guidelines for sensor - tests 5 times a day and gives insulin injections 4-5 times daily. Can you look into a reason. Maybe documentation of the numbers is missing or something else simple? I am coming tomorrow to the office to work on papers. Maybe we can submit an appeal?

## 2020-04-27 NOTE — TELEPHONE ENCOUNTER
Per denial it states it was denied because the patient does not have Type 1 Diabetes and for them to approve for [employer] pt must have Type 1 Diabetes and difficulty controlling blood sugars.

## 2020-04-28 NOTE — TELEPHONE ENCOUNTER
She is type 2, insulin dependent since 1990. It is managed as type 1 now. She should not get denied based on this. Has multiple insullininjections, tests sufficient to qualify for the pump.

## 2020-04-29 ENCOUNTER — OFFICE VISIT (OUTPATIENT)
Dept: ENDOCRINOLOGY | Facility: CLINIC | Age: 65
End: 2020-04-29

## 2020-04-29 ENCOUNTER — TELEPHONE (OUTPATIENT)
Dept: ENDOCRINOLOGY | Facility: CLINIC | Age: 65
End: 2020-04-29

## 2020-04-29 VITALS — HEIGHT: 67 IN | WEIGHT: 240 LBS | BODY MASS INDEX: 37.67 KG/M2

## 2020-04-29 DIAGNOSIS — E11.65 UNCONTROLLED TYPE 2 DIABETES MELLITUS WITH HYPERGLYCEMIA (HCC): Primary | ICD-10-CM

## 2020-04-29 DIAGNOSIS — N18.5 CHRONIC KIDNEY DISEASE, STAGE V (HCC): ICD-10-CM

## 2020-04-29 DIAGNOSIS — E78.5 HYPERLIPIDEMIA, UNSPECIFIED HYPERLIPIDEMIA TYPE: ICD-10-CM

## 2020-04-29 DIAGNOSIS — E11.65 UNCONTROLLED TYPE 2 DIABETES MELLITUS WITH HYPERGLYCEMIA (HCC): ICD-10-CM

## 2020-04-29 PROCEDURE — 99442 PR PHYS/QHP TELEPHONE EVALUATION 11-20 MIN: CPT | Performed by: INTERNAL MEDICINE

## 2020-04-29 RX ORDER — PROCHLORPERAZINE 25 MG/1
1 SUPPOSITORY RECTAL TAKE AS DIRECTED
Qty: 1 EACH | Refills: 3 | Status: SHIPPED | OUTPATIENT
Start: 2020-04-29 | End: 2020-04-30 | Stop reason: SDUPTHER

## 2020-04-29 RX ORDER — CALCIUM ACETATE 667 MG/1
CAPSULE ORAL 3 TIMES DAILY
COMMUNITY
Start: 2020-04-21

## 2020-04-29 RX ORDER — PROCHLORPERAZINE 25 MG/1
1 SUPPOSITORY RECTAL TAKE AS DIRECTED
Qty: 3 EACH | Refills: 11 | Status: SHIPPED | OUTPATIENT
Start: 2020-04-29 | End: 2020-04-30 | Stop reason: SDUPTHER

## 2020-04-29 RX ORDER — PROCHLORPERAZINE 25 MG/1
1 SUPPOSITORY RECTAL DAILY
Qty: 1 DEVICE | Refills: 0 | Status: SHIPPED | OUTPATIENT
Start: 2020-04-29 | End: 2020-04-30 | Stop reason: SDUPTHER

## 2020-04-29 RX ORDER — NALOXEGOL OXALATE 12.5 MG/1
12.5 TABLET, FILM COATED ORAL AS NEEDED
COMMUNITY
Start: 2020-04-23

## 2020-04-29 RX ORDER — ERGOCALCIFEROL 1.25 MG/1
50000 CAPSULE ORAL
COMMUNITY
Start: 2020-02-26

## 2020-04-29 RX ORDER — SODIUM BICARBONATE 325 MG/1
325 TABLET ORAL 4 TIMES DAILY
Status: ON HOLD | COMMUNITY
End: 2020-01-01

## 2020-04-29 NOTE — TELEPHONE ENCOUNTER
I have not heard anything and I do not see any updates. I will forward the scripts for Dexcom to [facility].

## 2020-04-29 NOTE — TELEPHONE ENCOUNTER
THE MEDICATIONS WE SENT TO PHARMACY TODAY WAS SENT TO THE WRONG PHARMACY. SHE NEEDS US TO RESEND ALL THE PRESCRIPTIONS TO [facility] [place]. WE SENT TO [facility] IN [place].

## 2020-04-29 NOTE — PROGRESS NOTES
You have chosen to receive care through a telephone visit. Do you consent to use a telephone visit for your medical care today? Yes    Chief complaint  Diabetes (Follow up)    Subjective   Tashia Leon is a 64 y.o. female is here today for follow-up.  Diabetes mellitus type 2 diagnosed in mid 1990s.   Medications: Humalog and Toujeo.  Diabetic complications: Diabetic foot ulcer. Diab retinopathy. CKD - stable function.   Eye care: diabetic retinopathy by last exam, seeing eye dr regularly.  Podiatry visit - She underwent toe amputation in 1/2019, sees podiatrist regularly.   Patient was evaluated for the kidney transplant and went through testing. She is followed with nephrologist regularly.      Hyperlipidemia with high LDL. She had Side effects on statins . Doing well on Praluent, which was started Nov 2015, LDL <100.  Patient is taking Toujeo Max and Humalog U-200.  She has a high   Patient tests 4-5 times a day and has 5 injections of insulin daily. She would benefit greatly from CGM and it would simplify her regimen. I will resend the Dexcom referral  C/o leg swelling. She follows with kidney specialist with repeat labs.     Medications    Current Outpatient Medications:   •  Alirocumab (PRALUENT) 75 MG/ML solution pen-injector, Inject 75 mg under the skin into the appropriate area as directed Every 14 (Fourteen) Days., Disp: 2 pen, Rfl: 11  •  aspirin 325 MG EC tablet, TAKE 1 TABLET BY MOUTH DAILY., Disp: 30 tablet, Rfl: 11  •  calcium acetate (PHOS BINDER,) 667 MG capsule capsule, , Disp: , Rfl:   •  Dulaglutide (TRULICITY) 1.5 MG/0.5ML solution pen-injector, Inject 1.5 mg under the skin into the appropriate area as directed 1 (One) Time Per Week. Tuesday, Disp: 2 pen, Rfl: 5  •  furosemide (LASIX) 40 MG tablet, TAKE 1 TABLET TWICE DAILY, Disp: 60 tablet, Rfl: 1  •  GLOBAL EASE INJECT PEN NEEDLES 31G X 5 MM misc, USE 6 TIMES DAILY, Disp: 200 each, Rfl: 1  •  HYDROcodone-acetaminophen (NORCO) 7.5-325 MG  per tablet, Take 1 tablet by mouth Every 8 (Eight) Hours As Needed., Disp: , Rfl:   •  Insulin Glargine, 2 Unit Dial, (TOUJEO MAX SOLOSTAR) 300 UNIT/ML solution pen-injector injection, Inject 80 Units under the skin into the appropriate area as directed 2 (Two) Times a Day., Disp: 6 pen, Rfl: 11  •  Insulin Lispro (HUMALOG KWIKPEN) 200 UNIT/ML solution pen-injector, Inject 50 Units under the skin into the appropriate area as directed 3 (Three) Times a Day., Disp: 20 pen, Rfl: 11  •  Iron-Vit C-Vit B12-Folic Acid (IRON 100 PLUS PO), Take  by mouth., Disp: , Rfl:   •  lansoprazole (PREVACID) 30 MG capsule, Take 30 mg by mouth Daily., Disp: , Rfl: 11  •  metOLazone (ZAROXOLYN) 5 MG tablet, Take 1-2 tablets by mouth Daily As Needed (dyspnea, weight gain)., Disp: , Rfl:   •  MOVANTIK 12.5 MG tablet, , Disp: , Rfl:   •  sodium bicarbonate 325 MG tablet, Take 325 mg by mouth 4 (Four) Times a Day., Disp: , Rfl:   •  vitamin D (ERGOCALCIFEROL) 1.25 MG (19914 UT) capsule capsule, , Disp: , Rfl:   •  Continuous Blood Gluc  (DEXCOM G6 ) device, 1 each 1 (One) Time for 1 dose., Disp: 1 Device, Rfl: 0  •  Continuous Blood Gluc  (DEXCOM G6 ) device, 1 each Daily., Disp: 1 Device, Rfl: 0  •  Continuous Blood Gluc Sensor (DEXCOM G6 SENSOR), 1 each Take As Directed., Disp: 3 each, Rfl: 11  •  Continuous Blood Gluc Transmit (DEXCOM G6 TRANSMITTER) misc, 1 each Take As Directed., Disp: 1 each, Rfl: 3    PMH  The following portions of the patient's history were reviewed and updated as appropriate: allergies, current medications, past family history, past medical history, past social history, past surgical history and problem list.    Review of systems  Review of Systems   Constitutional: Positive for fatigue and unexpected weight change (weight gain, appears fluid related ).  disturbance.   Respiratory: Negative for cough and shortness of breath.    Cardiovascular: Positive for leg swelling. other systems  reviewed and are negative.      Physical exam     Physical Exam      Neurological: She is alert and oriented to person, place, and time.   Psychiatric: She has a normal mood and affect. Thought content normal. .        LABS AND IMAGING  Results for orders placed or performed in visit on 01/10/20   POC Glucose Fingerstick   Result Value Ref Range    Glucose 115 70 - 130 mg/dL   POC Glycosylated Hemoglobin (Hb A1C)   Result Value Ref Range    Hemoglobin A1C 7.8 %           Assessment  Assessment/Plan   Problem List Items Addressed This Visit        Cardiovascular and Mediastinum    Hyperlipidemia       Endocrine    Uncontrolled type 2 diabetes mellitus with hyperglycemia (CMS/[facility]) - Primary    Relevant Orders    Hemoglobin A1c       Genitourinary    Chronic kidney disease, stage V (CMS/[facility])          Plan  1.Diabetes mellitus type 2 with hx diabetic foot ulcer, retinopathy and CKD.     Toujeo Max and Humalog U-200   She also would be a great candidate for the Dexcom CGM. She tests 4-5 times daily and administers 5 injection a day.      2. Hyperlipidemia  Continue Praluent every 2 weeks.  She had reaction to crestor, livalo and lipitor and severe peripheral vascular disease. No alternatives exist.      3. CKD / edema - stable, edema is increased, continue diuretics.     Medications refilled. Hypoglycemia precautions and insulin instructions reviewed.     Follow-up in 3-4 months.     12 min spent on medical discussion.

## 2020-04-29 NOTE — TELEPHONE ENCOUNTER
----- Message from Franchesca VALE MD sent at 4/29/2020  1:22 PM EDT -----  Do you know what is the update on Dexcom? It should not be rejected based on her having DM type 2 diagnoses. She uses MDI and multiple insulin injection.  If that is not covered, send the message back to me and I will send rx for freestyle

## 2020-04-30 ENCOUNTER — TELEPHONE (OUTPATIENT)
Dept: ENDOCRINOLOGY | Facility: CLINIC | Age: 65
End: 2020-04-30

## 2020-04-30 RX ORDER — PROCHLORPERAZINE 25 MG/1
1 SUPPOSITORY RECTAL TAKE AS DIRECTED
Qty: 1 EACH | Refills: 3 | Status: SHIPPED | OUTPATIENT
Start: 2020-04-30 | End: 2020-01-01 | Stop reason: SDUPTHER

## 2020-04-30 RX ORDER — PROCHLORPERAZINE 25 MG/1
1 SUPPOSITORY RECTAL TAKE AS DIRECTED
Qty: 3 EACH | Refills: 11 | Status: SHIPPED | OUTPATIENT
Start: 2020-04-30 | End: 2020-01-01 | Stop reason: SDUPTHER

## 2020-04-30 RX ORDER — PROCHLORPERAZINE 25 MG/1
1 SUPPOSITORY RECTAL DAILY
Qty: 1 DEVICE | Refills: 0 | Status: SHIPPED | OUTPATIENT
Start: 2020-04-30 | End: 2021-01-01 | Stop reason: SDUPTHER

## 2020-04-30 NOTE — TELEPHONE ENCOUNTER
Spoke with pt and told her the Dexcom scripts were sent to [facility] because they help with insurance verification. Pt stated they called her yesterday and told her they do not take her insurance. I told her I would follow up with them today but they do not open until 9 am so it would be a litter later that I could her back to update her. Pt understood.

## 2020-04-30 NOTE — TELEPHONE ENCOUNTER
Spoke with [facility] and they are not in contract with pt's insurance so they are unable to do the insurance verification. They stated we can send it to her local pharmacy and see if a PA is required.     LVM and told pt this information. I did go ahead and [facility]. I told pt I would keep an eye out for a PA and complete that if need be and if she had any questions to let us know.

## 2020-05-04 NOTE — TELEPHONE ENCOUNTER
[facility] Dex[facility]  According to the denial, they only cover CGM for Type I Diabetics so I dont think an appeal would help  Please advise

## 2020-05-07 ENCOUNTER — TELEPHONE (OUTPATIENT)
Dept: ENDOCRINOLOGY | Facility: CLINIC | Age: 65
End: 2020-05-07

## 2020-05-13 NOTE — TELEPHONE ENCOUNTER
[facility] IS CALLING BACK ABOUT FAX WE SENT TRYING TO GET APPROVAL FOR DEXCOM. [facility] STATES DEXCOM WAS DENIED ON 04/13/2020. THEY CONTACTED PATIENT AND OUR OFFICE ABOUT DENIAL AND TO SEE WHAT WE WANTED TO DO ABOUT DENIAL. YOU CAN REACH THEM BACK [phone] REF#[id number]

## 2020-05-13 NOTE — TELEPHONE ENCOUNTER
I have faxed to both Mika at [facility], and [facility] for an update, as we have been trying since January to get her Dexcom.

## 2020-05-14 NOTE — TELEPHONE ENCOUNTER
Crescencio with [employer] called to give us an update on denial. Please call back at [phone]. Thanks.

## 2020-05-14 NOTE — TELEPHONE ENCOUNTER
Can you please call them? We would try to get it approved. Please check with Crescencio what he suggests to do

## 2020-05-20 RX ORDER — ALIROCUMAB 75 MG/ML
INJECTION, SOLUTION SUBCUTANEOUS
Qty: 2 ML | Refills: 5 | Status: SHIPPED | OUTPATIENT
Start: 2020-05-20 | End: 2020-01-01 | Stop reason: SDUPTHER

## 2020-06-19 RX ORDER — INSULIN GLARGINE 300 U/ML
INJECTION, SOLUTION SUBCUTANEOUS
Qty: 18 ML | Refills: 6 | OUTPATIENT
Start: 2020-06-19

## 2020-06-22 DIAGNOSIS — IMO0002 UNCONTROLLED TYPE 2 DIABETES MELLITUS WITH COMPLICATION, WITH LONG-TERM CURRENT USE OF INSULIN: ICD-10-CM

## 2020-06-22 RX ORDER — FUROSEMIDE 40 MG/1
TABLET ORAL
Qty: 60 TABLET | Refills: 5 | OUTPATIENT
Start: 2020-06-22

## 2020-06-22 NOTE — TELEPHONE ENCOUNTER
Refill request received for Lasix pt NS last apt spoke with her stated that she has changed her PCP to one closer to home. Refill request denied.

## 2020-06-23 ENCOUNTER — TELEPHONE (OUTPATIENT)
Dept: ENDOCRINOLOGY | Facility: CLINIC | Age: 65
End: 2020-06-23

## 2020-06-23 RX ORDER — PEN NEEDLE, DIABETIC 31 GX3/16"
NEEDLE, DISPOSABLE MISCELLANEOUS
Qty: 200 EACH | Refills: 1 | Status: SHIPPED | OUTPATIENT
Start: 2020-06-23 | End: 2020-01-01

## 2020-06-23 RX ORDER — INSULIN GLARGINE 300 U/ML
INJECTION, SOLUTION SUBCUTANEOUS
Qty: 6 ML | Refills: 3 | Status: SHIPPED | OUTPATIENT
Start: 2020-06-23 | End: 2020-01-01

## 2020-06-23 RX ORDER — FUROSEMIDE 40 MG/1
TABLET ORAL
Qty: 60 TABLET | Refills: 1 | Status: SHIPPED | OUTPATIENT
Start: 2020-06-23 | End: 2020-01-01

## 2020-06-23 NOTE — TELEPHONE ENCOUNTER
Returned patient call, to advise that the RX was sent to the pharmacy with confirmation of receipt. She stated pharmacy found it.

## 2020-06-23 NOTE — TELEPHONE ENCOUNTER
PT CALLED TO FIND OUT WHY WE DID NOT REFILL HER TOUJEO THE PHARMACY TOLD HER WE WOULD NOT REFILL IT    PLEASE CALL THE [phone]

## 2020-08-06 NOTE — PATIENT INSTRUCTIONS
Results for orders placed or performed in visit on 08/06/20   POC Glucose   Result Value Ref Range    Glucose 173 (A) 70 - 130 mg/dL   POC Glycosylated Hemoglobin (Hb A1C)   Result Value Ref Range    Hemoglobin A1C 6.8 %

## 2020-08-06 NOTE — PROGRESS NOTES
Chief complaint  Diabetes (3 month F/U); Hyperlipidemia; and Chronic Kidney Disease    Subjective   Tashia Leon is a 64 y.o. female is here today for follow-up.  Diabetes mellitus type 2 diagnosed in mid 1990s.   Medications: Humalog and Toujeo.  Diabetic complications: Diabetic foot ulcer. Diab retinopathy. CKD - stable function.   Eye care: diabetic retinopathy by last exam, seeing eye dr regularly.  Podiatry visit - She underwent toe amputation in 1/2019, sees podiatrist regularly.   Patient was evaluated for the kidney transplant and went through testing. She is followed with nephrologist regularly. She started peritoneal dialysis. Her glucose is higher with the dialysis.      Hyperlipidemia with high LDL. She had Side effects on statins . Doing well on Praluent, which was started Nov 2015, LDL <100.  Patient is taking Toujeo Max and Humalog U-200.  She has a high   Patient tests 4-5 times a day and has 5 injections of insulin daily. She would benefit greatly from CGM and it would simplify her regimen. I will resend the Dexcom referral    Patient started peritoneal dialysis and doing well. She noted that glucose goes up during the dialysis. Otherwise swelling improved and she is doing better       Medications    Current Outpatient Medications:   •  aspirin 325 MG EC tablet, TAKE 1 TABLET BY MOUTH DAILY., Disp: 30 tablet, Rfl: 11  •  calcium acetate (PHOS BINDER,) 667 MG capsule capsule, , Disp: , Rfl:   •  Continuous Blood Gluc  (DEXCOM G6 ) device, 1 each 1 (One) Time for 1 dose., Disp: 1 Device, Rfl: 0  •  Continuous Blood Gluc  (DEXCOM G6 ) device, 1 each Daily., Disp: 1 Device, Rfl: 0  •  Continuous Blood Gluc Sensor (DEXCOM G6 SENSOR), 1 each Take As Directed., Disp: 3 each, Rfl: 11  •  Continuous Blood Gluc Transmit (DEXCOM G6 TRANSMITTER) misc, 1 each Take As Directed., Disp: 1 each, Rfl: 3  •  Dulaglutide (TRULICITY) 1.5 MG/0.5ML solution pen-injector, Inject 1.5 mg  under the skin into the appropriate area as directed 1 (One) Time Per Week. Tuesday, Disp: 2 pen, Rfl: 5  •  furosemide (LASIX) 40 MG tablet, TAKE 1 TABLET TWICE DAILY, Disp: 60 tablet, Rfl: 1  •  GLOBAL EASE INJECT PEN NEEDLES 31G X 5 MM misc, USE 6 TIMES DAILY, Disp: 200 each, Rfl: 1  •  HYDROcodone-acetaminophen (NORCO) 7.5-325 MG per tablet, Take 1 tablet by mouth Every 8 (Eight) Hours As Needed., Disp: , Rfl:   •  Insulin Lispro (HUMALOG KWIKPEN) 200 UNIT/ML solution pen-injector, Inject 50 Units under the skin into the appropriate area as directed 3 (Three) Times a Day., Disp: 20 pen, Rfl: 11  •  Iron-Vit C-Vit B12-Folic Acid (IRON 100 PLUS PO), Take  by mouth., Disp: , Rfl:   •  lansoprazole (PREVACID) 30 MG capsule, Take 30 mg by mouth Daily., Disp: , Rfl: 11  •  metOLazone (ZAROXOLYN) 5 MG tablet, Take 1-2 tablets by mouth Daily As Needed (dyspnea, weight gain)., Disp: , Rfl:   •  MOVANTIK 12.5 MG tablet, , Disp: , Rfl:   •  PRALUENT 75 MG/ML solution auto-injector, INJECT 75 MG UNDER THE SKIN INTO THE APPROPRIATE AREA AS DIRECTED EVERY 14 DAYS, Disp: 2 mL, Rfl: 5  •  sodium bicarbonate 325 MG tablet, Take 325 mg by mouth 4 (Four) Times a Day., Disp: , Rfl:   •  TOUJEO MAX SOLOSTAR 300 UNIT/ML solution pen-injector injection, INJECT 80 UNITS UNDER THE SKIN INTO THE APPROPRIATE AREA AS DIRECTED 2 TIMES A DAY., Disp: 6 mL, Rfl: 3  •  vitamin D (ERGOCALCIFEROL) 1.25 MG (44163 UT) capsule capsule, , Disp: , Rfl:     PMH  The following portions of the patient's history were reviewed and updated as appropriate: allergies, current medications, past family history, past medical history, past social history, past surgical history and problem list.    Review of systems  Review of Systems   Constitutional: Positive for fatigue.   Cardiovascular: Positive for leg swelling.   Neurological: Positive for weakness.   All other systems reviewed and are negative.        Physical exam     Physical Exam   Physical Exam    Constitutional: She is oriented to person, place, and time. She appears well-developed and well-nourished.   HENT:   Head: Normocephalic and atraumatic.   Eyes: Conjunctivae are normal.   Cardiovascular: Normal rate, regular rhythm and normal heart sounds.   Pulmonary/Chest: Effort normal and breath sounds normal.   Musculoskeletal: She exhibits no edema.   Neurological: She is alert and oriented to person, place, and time.   Psychiatric: She has a normal mood and affect. Thought content normal.   Vitals reviewed.        LABS AND IMAGING  Results for orders placed or performed in visit on 01/10/20   POC Glucose Fingerstick   Result Value Ref Range    Glucose 115 70 - 130 mg/dL   POC Glycosylated Hemoglobin (Hb A1C)   Result Value Ref Range    Hemoglobin A1C 7.8 %       Assessment  Assessment/Plan   Problem List Items Addressed This Visit        Endocrine    Diabetes mellitus with neurological manifestations, uncontrolled (CMS/[facility])    Diabetic polyneuropathy associated with type 2 diabetes mellitus (CMS/[facility])    Uncontrolled type 2 diabetes mellitus with hyperglycemia (CMS/[facility]) - Primary    Relevant Orders    POC Glucose    POC Glycosylated Hemoglobin (Hb A1C)       Genitourinary    Chronic kidney disease, stage V (CMS/[facility])          Plan  1.Diabetes mellitus type 2 with hx diabetic foot ulcer, retinopathy and ESCKD on peritoneal dialysis.  She is doing much better with the CGM and adjusts insulin based on glucose    Toujeo Max and Humalog U-200 .   She is taking Toujeo 80 units daily and Humalog 50 units three times a day. Additional correction insulin  is given, 2-10 units in addition to meal 50 Units three times a day .   CGM analyzed and data reviewed.     2. Hyperlipidemia  Continue Praluent every 2 weeks.  She had reaction to crestor, livalo and lipitor and severe peripheral vascular disease. No alternatives exist.      3. CKD / edema - stable, on peritoneal dialysis.      Medications refilled. Hypoglycemia  precautions and insulin instructions reviewed.     Follow-up in 3-4 months.

## 2020-10-08 NOTE — ASSESSMENT & PLAN NOTE
[facility]                                                                                                                    PRE OP INSTRUCTIONS FOR  Yaima Patch        Date: 10/8/2020    Date of surgery: 10/09/2020   Arrival Time: Hospital will call you between 5pm and 7pm with your final arrival time for surgery    1. Do not eat or drink anything after midnight prior to surgery. This includes no water, chewing gum, mints or ice chips. 2. Take the following medications with a small sip of water on the morning of Surgery: bring inhaler     3. Diabetics may take evening dose of insulin but none after midnight. If you feel symptomatic or low blood sugar morning of surgery drink 1-2 ounces of apple juice only. 4. Aspirin, Ibuprofen, Advil, Naproxen, Vitamin E and other Anti-inflammatory products should be stopped  before surgery  as directed by your physician. Take Tylenol only unless instructed otherwise by your surgeon. 5. Check with your Doctor regarding stopping Plavix, Coumadin, Lovenox, Eliquis, Effient, or other blood thinners. 6. Do not smoke,use illicit drugs and do not drink any alcoholic beverages 24 hours prior to surgery. 7. You may brush your teeth the morning of surgery. DO NOT SWALLOW WATER    8. You MUST make arrangements for a responsible adult to take you home after your surgery. You will not be allowed to leave alone or drive yourself home. It is strongly suggested someone stay with you the first 24 hrs. Your surgery will be cancelled if you do not have a ride home. 9. PEDIATRIC PATIENTS ONLY:  A parent/legal guardian must accompany a child scheduled for surgery and plan to stay at the hospital until the child is discharged. Please do not bring other children with you.     10. Please wear simple, loose fitting clothing to the hospital.  Faisal Mckoy not bring valuables (money, credit cards, checkbooks, etc.) Do not wear any makeup (including no eye makeup) Praluent every 2 weeks continued.   LDL was 92 in 1/2018

## 2020-10-22 NOTE — TELEPHONE ENCOUNTER
[facility] CALL[facility] FOR THIS PT REQUESTING MEDICAL RECORDS ON THIS PT TO FULFILL MEDICARE GUIDELINES TO FILL PRESCRIPTION. PLEASE CONTACT PHARMACY. AND THANK YOU

## 2020-10-27 NOTE — TELEPHONE ENCOUNTER
DEB W/ [facility] CALLED STATING THAT THIS PT IS NOT ELIGIBLE FOR A DEXCOM B/C OUR NOTES INDICATE THAT SHE IS NOT ON A SLIDING SCALE WITH HER INSULINE. THE PHARMACIST ASKED THAT WE CHANGE THE NOTES OR SEND SOMETHING IN STATING SHE IS. THE PT STATED TO THE PHARMACIST SHE IS ON A SLIDING SCALE.

## 2020-10-30 NOTE — TELEPHONE ENCOUNTER
[facility] called back and stated that both the chart notes and the RX for the medicine must state the same.  They stated that Medicare audit team are requiring it,

## 2020-10-30 NOTE — TELEPHONE ENCOUNTER
Will need to contact pharmacy to se if I need to send them the updated addended note, or send to Insurance>

## 2020-11-03 NOTE — TELEPHONE ENCOUNTER
[facility] CALLED FOR [facility]HRAN. PHARMACIST STATED THAT SLIDING SCALE MUST REFLECT CERTAIN STANDARD TO FILL PRESCRIPTION FOR SYMPTOMS.  GUIDELINES. PLEASE CALL BACK AT CONVENIENCE.

## 2020-11-04 NOTE — TELEPHONE ENCOUNTER
PATIENT HAS BEEN TRYING TO CONTACT FOR THE PAST WEEK TO SEE ABOUT HER MEDICATION. PLEASE ADVISE.    [phone]

## 2020-11-09 NOTE — TELEPHONE ENCOUNTER
Patient would like a return call from Krysta regarding a chart note. Pt would not give me any information and would just like to speak with Krysta.

## 2020-11-11 NOTE — TELEPHONE ENCOUNTER
Returned patient call, left detailed message advising patient that Medicare is requiring specific wording on chart. Dr. Cowan has addend the note 4 times now. Was faxed to [facility] last night, waiting to hear back is they need additional information

## 2020-12-07 PROBLEM — I65.22 CAROTID STENOSIS, ASYMPTOMATIC, LEFT: Status: ACTIVE | Noted: 2020-01-01

## 2020-12-07 NOTE — PROGRESS NOTES
NAME: KADEN CERVANTES   DOS: 2020  : 1955  PCP: Provider, No Known    Chief Complaint:    Chief Complaint   Patient presents with   • Carotid Artery Disease       History of Present Illness:  65 y.o. female known to the neuro interventional service, being followed for asymptomatic, bilateral carotid artery disease.  She has had carotid duplexes in the past which demonstrated extensive plaque formation at the bilateral carotid bifurcations with borderline nonhemodynamically significant disease.  She does have a history of chronic kidney disease (now on peritoneal dialysis), as well as a severe hyperlipidemia (on Praluent).  She is currently on aspirin antiplatelet regimen.  She denies any stroke or TIA-like symptoms, specifically no unilateral weakness/numbness, no speech or vision changes.  She presents today for follow-up of her asymptomatic carotid stenosis.      Past Medical History:  Past Medical History:   Diagnosis Date   • Acute bronchitis    • Acute pharyngitis    • Acute sinusitis    • Benign colonic polyp    • Chronic kidney disease     peritoneal dialysis   • Edema    • GERD (gastroesophageal reflux disease)    • Heart murmur    • Hiatal hernia    • Hyperkalemia    • Hyperlipidemia    • Hypertension    • Low back pain    • Lumbar disc disease    • MRSA (methicillin resistant Staphylococcus aureus)     Osteomyelitis/methicillin-resistant Staphylococcus aureus of the left foot, 2013.   • Obesity    • Osteomyelitis (CMS/HCC)     Osteomyelitis/methicillin-resistant Staphylococcus aureus of the left foot, 2013.   • Peptic ulceration 2013    History of peptic ulcer disease, diagnosed 2013 by EGD.  Repeat EGD in 2013 was negative.   • Sepsis (CMS/HCC)     Sepsis, 2013, hospitalized at [facility].   • Tobacco use     Previous tobacco use with cessation in .   • Type 2 diabetes mellitus (CMS/HCC)     Proteinuria noted, January  .     • Vitamin D deficiency        Past Surgical History:  Past Surgical History:   Procedure Laterality Date   • AMPUTATION DIGIT Right 2018    Procedure: I&D right foot, great toe amputation and 1st Metatarsal amputation;  Surgeon: Chio Sterling MD;  Location: [facility];  Service: Orthopedics   •  SECTION      x2   • FOOT SURGERY Left     Incision and debridement of the left foot x2.   • INSERTION HEMODIALYSIS CATHETER N/A 2018    Procedure: GROSHONG CATHETER PLACEMENT;  Surgeon: Maribel May MD;  Location: [facility];  Service: General   • LASIK     • TONSILLECTOMY         Review of Systems:        Review of Systems   Constitutional: Positive for fatigue.   HENT: Positive for hearing loss.    Cardiovascular: Positive for leg swelling.   All other systems reviewed and are negative.       Medications    Current Outpatient Medications:   •  Alirocumab (Praluent) 75 MG/ML solution auto-injector, Inject 75 mg under the skin into the appropriate area as directed Every 14 (Fourteen) Days., Disp: 2 mL, Rfl: 5  •  aspirin 325 MG EC tablet, TAKE 1 TABLET BY MOUTH DAILY., Disp: 30 tablet, Rfl: 11  •  calcium acetate (PHOS BINDER,) 667 MG capsule capsule, , Disp: , Rfl:   •  clopidogrel (PLAVIX) 75 MG tablet, Take 1 tablet by mouth Daily., Disp: 30 tablet, Rfl: 5  •  Continuous Blood Gluc  (DEXCOM G6 ) device, 1 each Daily., Disp: 1 Device, Rfl: 0  •  Continuous Blood Gluc  (Dexcom G6 ) device, 1 each 1 (One) Time for 1 dose., Disp: 1 Device, Rfl: 0  •  Continuous Blood Gluc Sensor (Dexcom G6 Sensor), 1 each Take As Directed., Disp: 3 each, Rfl: 11  •  Continuous Blood Gluc Transmit (Dexcom G6 Transmitter) misc, 1 each Take As Directed., Disp: 1 each, Rfl: 3  •  Dulaglutide (Trulicity) 1.5 MG/0.5ML solution pen-injector, Inject 1.5 mg under the skin into the appropriate area as directed 1 (One) Time Per Week. Tuesday, Disp: 4 pen, Rfl: 5  •  furosemide  "(LASIX) 40 MG tablet, TAKE 1 TABLET TWICE DAILY, Disp: 60 tablet, Rfl: 1  •  Global Ease Inject Pen Needles 31G X 5 MM misc, USE 6 TIMES DAILY, Disp: 200 each, Rfl: 5  •  HYDROcodone-acetaminophen (NORCO) 7.5-325 MG per tablet, Take 1 tablet by mouth Every 8 (Eight) Hours As Needed., Disp: , Rfl:   •  Iron-Vit C-Vit B12-Folic Acid (IRON 100 PLUS PO), Take  by mouth., Disp: , Rfl:   •  lansoprazole (PREVACID) 30 MG capsule, Take 30 mg by mouth Daily., Disp: , Rfl: 11  •  metOLazone (ZAROXOLYN) 5 MG tablet, Take 1-2 tablets by mouth Daily As Needed (dyspnea, weight gain)., Disp: , Rfl:   •  MOVANTIK 12.5 MG tablet, , Disp: , Rfl:   •  NovoLOG FlexPen 100 UNIT/ML solution pen-injector sc pen, 50 Units Three Times Daily with meals, Disp: 20 pen, Rfl: 11  •  sodium bicarbonate 325 MG tablet, Take 325 mg by mouth 4 (Four) Times a Day., Disp: , Rfl:   •  TOUJEO MAX SOLOSTAR 300 UNIT/ML solution pen-injector injection, INJECT 80 UNITS UNDER THE SKIN INTO THE APPROPRIATE AREA AS DIRECTED 2 TIMES A DAY., Disp: 6 mL, Rfl: 3  •  vitamin D (ERGOCALCIFEROL) 1.25 MG (64408 UT) capsule capsule, , Disp: , Rfl:     Allergies:  Allergies   Allergen Reactions   • Statins Myalgia   • Ancef [Cefazolin] Other (See Comments) and GI Intolerance     Tunnel vision.  (TOLERATES INVANZ)   • Bactrim [Sulfamethoxazole-Trimethoprim] Hives and Nausea And Vomiting   • Cephalosporins GI Intolerance   • Keflex [Cephalexin] Other (See Comments) and GI Intolerance     \"tunnel vision\"..   (pt tolerates INVANZ)   • Levaquin [Levofloxacin]    • Lipitor [Atorvastatin] Myalgia   • Lisinopril    • Losartan Potassium Other (See Comments)     Unknown reaction   • Oxycodone GI Intolerance   • Quinolones    • Cleocin [Clindamycin Hcl] Rash   • Clindamycin/Lincomycin Rash       Social Hx:  Social History     Tobacco Use   • Smoking status: Former Smoker     Packs/day: 1.00     Years: 30.00     Pack years: 30.00     Types: Cigarettes   • Smokeless tobacco: Never " "Used   • Tobacco comment: quit 7 years ago   Substance Use Topics   • Alcohol use: Yes     Alcohol/week: 1.0 standard drinks     Types: 1 Glasses of wine per week     Comment: holidays   • Drug use: No       Family Hx:  Family History   Problem Relation Age of Onset   • Diabetes Mother    • Heart disease Mother    • Hypertension Mother    • Diabetes Father    • Hypertension Father    • Breast cancer Neg Hx    • Ovarian cancer Neg Hx        Review of Imaging:  Carotid duplex dated 12/7/2020 from [facility] was reviewed along with its corresponding radiologic report.  Comparison is made to multiple prior studies, the most recent being from [facility] dated 3/21/2019.  There has been marked interval progression of disease involving the bilateral carotid vasculature with a now a \"critical\" stenosis of the left internal carotid artery, and a \"high-grade\" stenosis of the right internal carotid artery.  Peak velocities within the left carotid vasculature are 643/192 cm/s (albeit I suspect the velocities are higher as there is an element of aliasing), with an ICA/CCA ratio of 9.1 (was  cm/s, ratio 1.5).  Additionally, the distal cervical left ICA demonstrates an element of \"tardus\" waveforms.  Peak velocities within the right carotid vasculature are 430/109 cm/s, with a ratio of 2.8 (was PSV to 92 cm/s, ratio 1.5).  There is normal flow and waveforms within the right vertebral artery, the left vertebral artery demonstrates dampened waveforms with loss of forward diastolic flow.      Physical Examination:  Vitals:    12/07/20 1433   BP: 132/64   Temp: 97.8 °F (36.6 °C)        General Appearance:   Well developed, well nourished, well groomed, alert, and cooperative.  Cardiovascular: Regular rate and rhythm.  Bilateral carotid bruits.      Neurological examination:  Neurologic Exam     Mental Status   Oriented to person, place, and time.   Speech: speech is normal   Level of " "consciousness: alert    Cranial Nerves   Cranial nerves II through XII intact.     Motor Exam Symmetric strength in the upper and lower extremities.     Sensory Exam   Right arm light touch: normal  Left arm light touch: normal  Right leg light touch: normal  Left leg light touch: decreased from knee    Gait, Coordination, and Reflexes Can ambulate unassisted, but uses a wheelchair for longer distances secondary to surgery on her left foot.     Baseline \"tremor\" involving her head.      Diagnoses/Plan:    Ms. Leon is a 65 y.o. female asymptomatic, \"critical\" left carotid stenosis and \"high-grade\" right carotid stenosis.  This represents a marked interval progression in disease compared to prior study from 2019.  I discussed these findings with Ms. Leon, given the critical nature of her carotid stenosis, along with the marked interval progression, I recommended revascularization \"sooner rather than later\".  I am going to start her on a dual antiplatelet regimen, adding Plavix to her baseline aspirin therapy.  We will tentatively schedule her for a diagnostic angiogram in the next week or so, with intent to treat her critical left carotid lesion/stenosis if amenable to angioplasty/stent placement.  Given the \"high-grade\" nature of her bilateral carotid disease, along with her baseline comorbidities, I do think that angioplasty/stent placement is her optimal treatment (rather than endarterectomy).  During the interim, she will contact our office in/or call 911 if she experiences any stroke or TIA-like symptoms.                  "

## 2020-12-07 NOTE — H&P (VIEW-ONLY)
NAME: KADEN CERVANTES   DOS: 2020  : 1955  PCP: Provider, No Known    Chief Complaint:    Chief Complaint   Patient presents with   • Carotid Artery Disease       History of Present Illness:  65 y.o. female known to the neuro interventional service, being followed for asymptomatic, bilateral carotid artery disease.  She has had carotid duplexes in the past which demonstrated extensive plaque formation at the bilateral carotid bifurcations with borderline nonhemodynamically significant disease.  She does have a history of chronic kidney disease (now on peritoneal dialysis), as well as a severe hyperlipidemia (on Praluent).  She is currently on aspirin antiplatelet regimen.  She denies any stroke or TIA-like symptoms, specifically no unilateral weakness/numbness, no speech or vision changes.  She presents today for follow-up of her asymptomatic carotid stenosis.      Past Medical History:  Past Medical History:   Diagnosis Date   • Acute bronchitis    • Acute pharyngitis    • Acute sinusitis    • Benign colonic polyp    • Chronic kidney disease     peritoneal dialysis   • Edema    • GERD (gastroesophageal reflux disease)    • Heart murmur    • Hiatal hernia    • Hyperkalemia    • Hyperlipidemia    • Hypertension    • Low back pain    • Lumbar disc disease    • MRSA (methicillin resistant Staphylococcus aureus)     Osteomyelitis/methicillin-resistant Staphylococcus aureus of the left foot, 2013.   • Obesity    • Osteomyelitis (CMS/HCC)     Osteomyelitis/methicillin-resistant Staphylococcus aureus of the left foot, 2013.   • Peptic ulceration 2013    History of peptic ulcer disease, diagnosed 2013 by EGD.  Repeat EGD in 2013 was negative.   • Sepsis (CMS/HCC)     Sepsis, 2013, hospitalized at [facility].   • Tobacco use     Previous tobacco use with cessation in .   • Type 2 diabetes mellitus (CMS/HCC)     Proteinuria noted, January  .     • Vitamin D deficiency        Past Surgical History:  Past Surgical History:   Procedure Laterality Date   • AMPUTATION DIGIT Right 2018    Procedure: I&D right foot, great toe amputation and 1st Metatarsal amputation;  Surgeon: Chio Sterling MD;  Location: [facility];  Service: Orthopedics   •  SECTION      x2   • FOOT SURGERY Left     Incision and debridement of the left foot x2.   • INSERTION HEMODIALYSIS CATHETER N/A 2018    Procedure: GROSHONG CATHETER PLACEMENT;  Surgeon: Maribel May MD;  Location: [facility];  Service: General   • LASIK     • TONSILLECTOMY         Review of Systems:        Review of Systems   Constitutional: Positive for fatigue.   HENT: Positive for hearing loss.    Cardiovascular: Positive for leg swelling.   All other systems reviewed and are negative.       Medications    Current Outpatient Medications:   •  Alirocumab (Praluent) 75 MG/ML solution auto-injector, Inject 75 mg under the skin into the appropriate area as directed Every 14 (Fourteen) Days., Disp: 2 mL, Rfl: 5  •  aspirin 325 MG EC tablet, TAKE 1 TABLET BY MOUTH DAILY., Disp: 30 tablet, Rfl: 11  •  calcium acetate (PHOS BINDER,) 667 MG capsule capsule, , Disp: , Rfl:   •  clopidogrel (PLAVIX) 75 MG tablet, Take 1 tablet by mouth Daily., Disp: 30 tablet, Rfl: 5  •  Continuous Blood Gluc  (DEXCOM G6 ) device, 1 each Daily., Disp: 1 Device, Rfl: 0  •  Continuous Blood Gluc  (Dexcom G6 ) device, 1 each 1 (One) Time for 1 dose., Disp: 1 Device, Rfl: 0  •  Continuous Blood Gluc Sensor (Dexcom G6 Sensor), 1 each Take As Directed., Disp: 3 each, Rfl: 11  •  Continuous Blood Gluc Transmit (Dexcom G6 Transmitter) misc, 1 each Take As Directed., Disp: 1 each, Rfl: 3  •  Dulaglutide (Trulicity) 1.5 MG/0.5ML solution pen-injector, Inject 1.5 mg under the skin into the appropriate area as directed 1 (One) Time Per Week. Tuesday, Disp: 4 pen, Rfl: 5  •  furosemide  "(LASIX) 40 MG tablet, TAKE 1 TABLET TWICE DAILY, Disp: 60 tablet, Rfl: 1  •  Global Ease Inject Pen Needles 31G X 5 MM misc, USE 6 TIMES DAILY, Disp: 200 each, Rfl: 5  •  HYDROcodone-acetaminophen (NORCO) 7.5-325 MG per tablet, Take 1 tablet by mouth Every 8 (Eight) Hours As Needed., Disp: , Rfl:   •  Iron-Vit C-Vit B12-Folic Acid (IRON 100 PLUS PO), Take  by mouth., Disp: , Rfl:   •  lansoprazole (PREVACID) 30 MG capsule, Take 30 mg by mouth Daily., Disp: , Rfl: 11  •  metOLazone (ZAROXOLYN) 5 MG tablet, Take 1-2 tablets by mouth Daily As Needed (dyspnea, weight gain)., Disp: , Rfl:   •  MOVANTIK 12.5 MG tablet, , Disp: , Rfl:   •  NovoLOG FlexPen 100 UNIT/ML solution pen-injector sc pen, 50 Units Three Times Daily with meals, Disp: 20 pen, Rfl: 11  •  sodium bicarbonate 325 MG tablet, Take 325 mg by mouth 4 (Four) Times a Day., Disp: , Rfl:   •  TOUJEO MAX SOLOSTAR 300 UNIT/ML solution pen-injector injection, INJECT 80 UNITS UNDER THE SKIN INTO THE APPROPRIATE AREA AS DIRECTED 2 TIMES A DAY., Disp: 6 mL, Rfl: 3  •  vitamin D (ERGOCALCIFEROL) 1.25 MG (14775 UT) capsule capsule, , Disp: , Rfl:     Allergies:  Allergies   Allergen Reactions   • Statins Myalgia   • Ancef [Cefazolin] Other (See Comments) and GI Intolerance     Tunnel vision.  (TOLERATES INVANZ)   • Bactrim [Sulfamethoxazole-Trimethoprim] Hives and Nausea And Vomiting   • Cephalosporins GI Intolerance   • Keflex [Cephalexin] Other (See Comments) and GI Intolerance     \"tunnel vision\"..   (pt tolerates INVANZ)   • Levaquin [Levofloxacin]    • Lipitor [Atorvastatin] Myalgia   • Lisinopril    • Losartan Potassium Other (See Comments)     Unknown reaction   • Oxycodone GI Intolerance   • Quinolones    • Cleocin [Clindamycin Hcl] Rash   • Clindamycin/Lincomycin Rash       Social Hx:  Social History     Tobacco Use   • Smoking status: Former Smoker     Packs/day: 1.00     Years: 30.00     Pack years: 30.00     Types: Cigarettes   • Smokeless tobacco: Never " "Used   • Tobacco comment: quit 7 years ago   Substance Use Topics   • Alcohol use: Yes     Alcohol/week: 1.0 standard drinks     Types: 1 Glasses of wine per week     Comment: holidays   • Drug use: No       Family Hx:  Family History   Problem Relation Age of Onset   • Diabetes Mother    • Heart disease Mother    • Hypertension Mother    • Diabetes Father    • Hypertension Father    • Breast cancer Neg Hx    • Ovarian cancer Neg Hx        Review of Imaging:  Carotid duplex dated 12/7/2020 from [facility] was reviewed along with its corresponding radiologic report.  Comparison is made to multiple prior studies, the most recent being from [facility] dated 3/21/2019.  There has been marked interval progression of disease involving the bilateral carotid vasculature with a now a \"critical\" stenosis of the left internal carotid artery, and a \"high-grade\" stenosis of the right internal carotid artery.  Peak velocities within the left carotid vasculature are 643/192 cm/s (albeit I suspect the velocities are higher as there is an element of aliasing), with an ICA/CCA ratio of 9.1 (was  cm/s, ratio 1.5).  Additionally, the distal cervical left ICA demonstrates an element of \"tardus\" waveforms.  Peak velocities within the right carotid vasculature are 430/109 cm/s, with a ratio of 2.8 (was PSV to 92 cm/s, ratio 1.5).  There is normal flow and waveforms within the right vertebral artery, the left vertebral artery demonstrates dampened waveforms with loss of forward diastolic flow.      Physical Examination:  Vitals:    12/07/20 1433   BP: 132/64   Temp: 97.8 °F (36.6 °C)        General Appearance:   Well developed, well nourished, well groomed, alert, and cooperative.  Cardiovascular: Regular rate and rhythm.  Bilateral carotid bruits.      Neurological examination:  Neurologic Exam     Mental Status   Oriented to person, place, and time.   Speech: speech is normal   Level of " "consciousness: alert    Cranial Nerves   Cranial nerves II through XII intact.     Motor Exam Symmetric strength in the upper and lower extremities.     Sensory Exam   Right arm light touch: normal  Left arm light touch: normal  Right leg light touch: normal  Left leg light touch: decreased from knee    Gait, Coordination, and Reflexes Can ambulate unassisted, but uses a wheelchair for longer distances secondary to surgery on her left foot.     Baseline \"tremor\" involving her head.      Diagnoses/Plan:    Ms. Leon is a 65 y.o. female asymptomatic, \"critical\" left carotid stenosis and \"high-grade\" right carotid stenosis.  This represents a marked interval progression in disease compared to prior study from 2019.  I discussed these findings with Ms. Leon, given the critical nature of her carotid stenosis, along with the marked interval progression, I recommended revascularization \"sooner rather than later\".  I am going to start her on a dual antiplatelet regimen, adding Plavix to her baseline aspirin therapy.  We will tentatively schedule her for a diagnostic angiogram in the next week or so, with intent to treat her critical left carotid lesion/stenosis if amenable to angioplasty/stent placement.  Given the \"high-grade\" nature of her bilateral carotid disease, along with her baseline comorbidities, I do think that angioplasty/stent placement is her optimal treatment (rather than endarterectomy).  During the interim, she will contact our office in/or call 911 if she experiences any stroke or TIA-like symptoms.                  "

## 2020-12-09 NOTE — TELEPHONE ENCOUNTER
[facility] IN [place] STATED THAT THEY REQUIRE DIRECTIONS FOR THIS PT'S ONE TOUCH VERIO TEST STRIPS PRESCRIPTION. PLEASE RESEND. THANK YOU

## 2020-12-11 PROBLEM — I65.23 CAROTID STENOSIS, BILATERAL: Status: ACTIVE | Noted: 2020-01-01

## 2020-12-11 NOTE — CONSULTS
Patient Care Team:  Provider, No Known as PCP - Denver Butler MD as Consulting Physician (Nephrology)  Hung Haynes MD as Consulting Physician (Infectious Diseases)    Chief complaint: ESRD      History of Present Illness  65 y.o. female known to the neuro interventional service, being followed for asymptomatic, bilateral carotid artery disease.  She has had carotid duplexes in the past which demonstrated extensive plaque formation at the bilateral carotid bifurcations with borderline nonhemodynamically significant disease. Nephrology is consulted for the management of PD during hospital stay. Patient has been on PD for 6 months She believes she had ESRD after septic shock and abx use. Although also has hx of DM. She follows w nephrologist at [place]     Review of Systems   Constitutional: Negative.    HENT: Negative.    Eyes: Negative.    Respiratory: Negative.    Cardiovascular: Negative.    Gastrointestinal: Negative.    Genitourinary: Negative.    Musculoskeletal: Negative.    Skin: Negative.    Neurological: Negative.    Hematological: Negative.    Psychiatric/Behavioral: Negative.         Past Medical History:   Diagnosis Date   • Acute bronchitis    • Acute pharyngitis    • Acute sinusitis    • Benign colonic polyp    • Chronic kidney disease     peritoneal dialysis   • Edema    • GERD (gastroesophageal reflux disease)    • Heart murmur    • Hiatal hernia    • Hyperkalemia    • Hyperlipidemia    • Hypertension    • Low back pain    • Lumbar disc disease    • MRSA (methicillin resistant Staphylococcus aureus)     Osteomyelitis/methicillin-resistant Staphylococcus aureus of the left foot, April 2013.   • Obesity    • Osteomyelitis (CMS/HCC)     Osteomyelitis/methicillin-resistant Staphylococcus aureus of the left foot, April 2013.   • Peptic ulceration 01/2013    History of peptic ulcer disease, diagnosed January 2013 by EGD.  Repeat EGD in August 2013 was negative.   • Sepsis  (CMS/[facility])     Sepsis, 2013, hospitalized at [facility].   • Tobacco use     Previous tobacco use with cessation in .   • Type 2 diabetes mellitus (CMS/[facility])     Proteinuria noted, 2014.     • Vitamin D deficiency    ,   Past Surgical History:   Procedure Laterality Date   • AMPUTATION DIGIT Right 2018    Procedure: I&D right foot, great toe amputation and 1st Metatarsal amputation;  Surgeon: Chio Sterling MD;  Location: [facility] OR;  Service: Orthopedics   •  SECTION      x2   • FOOT SURGERY Left     Incision and debridement of the left foot x2.   • INSERTION HEMODIALYSIS CATHETER N/A 2018    Procedure: GROSHONG CATHETER PLACEMENT;  Surgeon: Maribel May MD;  Location: [facility] OR;  Service: General   • LASIK     • TONSILLECTOMY     ,   Family History   Problem Relation Age of Onset   • Diabetes Mother    • Heart disease Mother    • Hypertension Mother    • Diabetes Father    • Hypertension Father    • Breast cancer Neg Hx    • Ovarian cancer Neg Hx    ,   Social History     Tobacco Use   • Smoking status: Former Smoker     Packs/day: 1.00     Years: 30.00     Pack years: 30.00     Types: Cigarettes   • Smokeless tobacco: Never Used   • Tobacco comment: quit 15 years ago   Substance Use Topics   • Alcohol use: Yes     Alcohol/week: 1.0 standard drinks     Types: 1 Glasses of wine per week     Comment: holidays   • Drug use: No     E-cigarette/Vaping     E-cigarette/Vaping Substances     E-cigarette/Vaping Devices        and   Medications Prior to Admission   Medication Sig Dispense Refill Last Dose   • aspirin 325 MG EC tablet TAKE 1 TABLET BY MOUTH DAILY. 30 tablet 11 12/10/2020 at Unknown time   • calcium acetate (PHOS BINDER,) 667 MG capsule capsule    12/10/2020 at Unknown time   • clopidogrel (PLAVIX) 75 MG tablet Take 1 tablet by mouth Daily. 30 tablet 5 12/10/2020 at Unknown time   • furosemide (LASIX) 40 MG tablet TAKE 1 TABLET TWICE  DAILY 60 tablet 1 12/11/2020 at Unknown time   • HYDROcodone-acetaminophen (NORCO) 7.5-325 MG per tablet Take 1 tablet by mouth Every 8 (Eight) Hours As Needed.   12/10/2020 at Unknown time   • Iron-Vit C-Vit B12-Folic Acid (IRON 100 PLUS PO) Take  by mouth.   12/10/2020 at Unknown time   • lansoprazole (PREVACID) 30 MG capsule Take 30 mg by mouth Daily.  11 12/11/2020 at Unknown time   • metOLazone (ZAROXOLYN) 5 MG tablet Take 1-2 tablets by mouth Daily As Needed (dyspnea, weight gain).   12/10/2020 at Unknown time   • MOVANTIK 12.5 MG tablet Take 12.5 mg by mouth.   12/10/2020 at Unknown time   • vitamin D (ERGOCALCIFEROL) 1.25 MG (56596 UT) capsule capsule    12/10/2020 at Unknown time   • Alirocumab (Praluent) 75 MG/ML solution auto-injector Inject 75 mg under the skin into the appropriate area as directed Every 14 (Fourteen) Days. 2 mL 5    • Continuous Blood Gluc  (DEXCOM G6 ) device 1 each Daily. 1 Device 0    • Continuous Blood Gluc  (Dexcom G6 ) device 1 each 1 (One) Time for 1 dose. 1 Device 0    • Continuous Blood Gluc Sensor (Dexcom G6 Sensor) 1 each Take As Directed. 3 each 11    • Continuous Blood Gluc Transmit (Dexcom G6 Transmitter) misc 1 each Take As Directed. 1 each 3    • Global Ease Inject Pen Needles 31G X 5 MM misc USE 6 TIMES DAILY 200 each 5    • glucose blood (OneTouch Verio) test strip Test Three Times Daily 100 each 11    • Insulin Glargine, 2 Unit Dial, (Toujeo Max SoloStar) 300 UNIT/ML solution pen-injector injection Inject 80 Units under the skin into the appropriate area as directed 2 (two) times a day. 6 pen 10    • Insulin Lispro (HumaLOG KwikPen) 200 UNIT/ML solution pen-injector Inject 50 Units under the skin into the appropriate area as directed 3 (Three) Times a Day. 10 pen 10    • NovoLOG FlexPen 100 UNIT/ML solution pen-injector sc pen 50 Units Three Times Daily with meals 20 pen 11        Objective     Vital Signs  Temp:  [97.4 °F (36.3 °C)-98  °F (36.7 °C)] 98 °F (36.7 °C)  Heart Rate:  [85-97] 86  Resp:  [16-18] 18  BP: (124-179)/(54-90) 134/67    No intake/output data recorded.  No intake/output data recorded.    Physical Exam  Constitutional:       Appearance: Normal appearance.   HENT:      Head: Normocephalic.      Nose: Nose normal.      Mouth/Throat:      Mouth: Mucous membranes are moist.   Eyes:      Extraocular Movements: Extraocular movements intact.      Pupils: Pupils are equal, round, and reactive to light.   Neck:      Musculoskeletal: Normal range of motion. No neck rigidity.   Cardiovascular:      Rate and Rhythm: Normal rate and regular rhythm.      Pulses: Normal pulses.      Heart sounds: Normal heart sounds. No murmur.   Pulmonary:      Effort: Pulmonary effort is normal.      Breath sounds: Normal breath sounds.   Abdominal:      General: Abdomen is flat.      Palpations: Abdomen is soft.   Musculoskeletal:      Right lower leg: No edema.      Left lower leg: No edema.   Skin:     General: Skin is warm and dry.   Neurological:      General: No focal deficit present.      Mental Status: She is alert and oriented to person, place, and time. Mental status is at baseline.   Psychiatric:         Mood and Affect: Mood normal.         Results Review:    I reviewed the patient's new clinical results.    WBC WBC   Date Value Ref Range Status   12/11/2020 12.96 (H) 3.40 - 10.80 10*3/mm3 Final      HGB Hemoglobin   Date Value Ref Range Status   12/11/2020 10.2 (L) 12.0 - 15.9 g/dL Final      HCT Hematocrit   Date Value Ref Range Status   12/11/2020 34.4 34.0 - 46.6 % Final      Platlets No results found for: LABPLAT   MCV MCV   Date Value Ref Range Status   12/11/2020 104.9 (H) 79.0 - 97.0 fL Final          Sodium Sodium   Date Value Ref Range Status   12/11/2020 139 136 - 145 mmol/L Final      Potassium Potassium   Date Value Ref Range Status   12/11/2020 4.3 3.5 - 5.2 mmol/L Final     Comment:     Slight hemolysis detected by analyzer.  Results may be affected.      Chloride Chloride   Date Value Ref Range Status   12/11/2020 99 98 - 107 mmol/L Final      CO2 CO2   Date Value Ref Range Status   12/11/2020 22.0 22.0 - 29.0 mmol/L Final      BUN BUN   Date Value Ref Range Status   12/11/2020 81 (H) 8 - 23 mg/dL Final      Creatinine Creatinine   Date Value Ref Range Status   12/11/2020 9.55 (H) 0.57 - 1.00 mg/dL Final      Calcium Calcium   Date Value Ref Range Status   12/11/2020 8.9 8.6 - 10.5 mg/dL Final      PO4 No results found for: CAPO4   Albumin No results found for: ALBUMIN   Magnesium No results found for: MG   Uric Acid No results found for: URICACID         Assessment/Plan       Carotid stenosis, bilateral    Coronary artery disease involving native coronary artery of native heart without angina pectoris    Essential hypertension    Hyperlipemia    Diabetic polyneuropathy associated with type 2 diabetes mellitus (CMS/[facility])    GERD (gastroesophageal reflux disease)    Uncontrolled type 2 diabetes mellitus with hyperglycemia (CMS/[facility])    Chronic kidney disease, stage V on PD    Obesity (BMI 30-39.9)      Assessment & Plan    ESRD: On PD home prescription 4 exchanged 8hr no last fill 2.5% dextrose     Anemia: Hb at target    Volume status: stable    Carotid stenosis: S/p stent placement       Recs  Resume PD 3 exchange x 4 hr each       I discussed the patients findings and my recommendations with patient    Sam Lozano MD  12/11/20  14:14 EST

## 2020-12-11 NOTE — PROGRESS NOTES
Intensive Care Admission Note     Chief Complaint:  Carotid stenosis, bilateral    History of Present Illness     Tashia Leon is a 65 y.o. female with PMH Bilateral TREVA, CKD Stage 5 on PD, CAD prior cardiac stents, HTN, HLP, T2DM w/neuropathy, GERD and Obesity.  She has been followed by Dr. Maldonado for asymptomatic Bilateral TREVA.  She was seen in his clinic 12/7 in follow up with Bilateral Carotid Duplex that revealed marked interval progression with critical stenosis of LICA and high grade stenosis DIMAS.  SHe was placed on DAPT.  She presents today for diagnostic angiogram and left carotid stent.      She was transferred to the ICU post operatively.  No acute issues at this time other than mild headache.  Blood pressures well controlled.  She has no complaints.  No focal neurological deficits.    Problem List, Surgical History, Family, Social History, and ROS     Patient Active Problem List    Diagnosis   • *Bilateral carotid artery stenosis, with a left internal carotid artery stent placed on 12/11/2020. [I65.23]   • Iron deficiency anemia, unspecified [D50.9]   • LLL pneumonia [J18.9]   • Partial nontraumatic amputation of foot, right (CMS/[facility]) [Z89.431]   • Neutrophilic leukocytosis [D72.9]   • Acute renal failure superimposed on stage 4 chronic kidney disease (CMS/[facility]) [N17.9, N18.4]   • Pneumonia of left lower lobe due to infectious organism [J18.9]   • Diabetic ulcer of right foot associated with type 2 diabetes mellitus (CMS/[facility]) [E11.621, L97.519]   • Sepsis (CMS/[facility]) [A41.9]   • Cellulitis [L03.90]   • Anemia, chronic disease [D63.8]   • Obesity (BMI 30-39.9) [E66.9]   • Impaired functional mobility, balance, gait, and endurance [Z74.09]   • Diabetic ulcer of right midfoot associated with type 2 diabetes mellitus, with necrosis of bone (CMS/[facility]) [E11.621, L97.414]   • Idiopathic hypochromic anemia [D50.9]   • Chronic kidney disease, stage V on PD [N18.5]   • Secondary hyperparathyroidism (CMS/[facility])  "[N25.81]   • Hearing loss [H91.90]   • MRSA (methicillin resistant Staphylococcus aureus) [A49.02]   • Lumbar disc disease [M51.9]   • Uncontrolled type 2 diabetes mellitus with hyperglycemia (CMS/[facility]) [E11.65]   • Health care maintenance [Z00.00]   • Diabetic polyneuropathy associated with type 2 diabetes mellitus (CMS/[facility]) [E11.42]   • Moderate nonproliferative diabetic retinopathy without macular edema associated with type 2 diabetes mellitus (CMS/[facility]) [E11.3399]   • Functional murmur [R01.0]   • GERD (gastroesophageal reflux disease) [K21.9]   • Peptic ulcer [K27.9]   • Vitamin D deficiency [E55.9]   • Coronary artery disease involving native coronary artery of native heart without angina pectoris [I25.10]   • Dyspnea [R06.00]   • Essential hypertension [I10]   • Hyperlipemia [E78.5]   • Lower extremity edema [R60.0]     Past Surgical History:   Procedure Laterality Date   • AMPUTATION DIGIT Right 2018    Procedure: I&D right foot, great toe amputation and 1st Metatarsal amputation;  Surgeon: Chio Sterling MD;  Location:  [facility];  Service: Orthopedics   •  SECTION      x2   • FOOT SURGERY Left     Incision and debridement of the left foot x2.   • INSERTION HEMODIALYSIS CATHETER N/A 2018    Procedure: GROSHONG CATHETER PLACEMENT;  Surgeon: Maribel May MD;  Location:  [facility];  Service: General   • LASIK     • TONSILLECTOMY         Allergies   Allergen Reactions   • Statins Myalgia   • Ancef [Cefazolin] Other (See Comments) and GI Intolerance     Tunnel vision.  (TOLERATES INVANZ)   • Bactrim [Sulfamethoxazole-Trimethoprim] Hives and Nausea And Vomiting   • Cephalosporins GI Intolerance   • Keflex [Cephalexin] Other (See Comments) and GI Intolerance     \"tunnel vision\"..   (pt tolerates INVANZ)   • Levaquin [Levofloxacin]    • Lipitor [Atorvastatin] Myalgia   • Lisinopril    • Losartan Potassium Other (See Comments)     Unknown reaction   • Oxycodone GI Intolerance   • Quinolones "    • Cleocin [Clindamycin Hcl] Rash   • Clindamycin/Lincomycin Rash     No current facility-administered medications on file prior to encounter.      Current Outpatient Medications on File Prior to Encounter   Medication Sig   • aspirin 325 MG EC tablet TAKE 1 TABLET BY MOUTH DAILY.   • calcium acetate (PHOS BINDER,) 667 MG capsule capsule    • clopidogrel (PLAVIX) 75 MG tablet Take 1 tablet by mouth Daily.   • furosemide (LASIX) 40 MG tablet TAKE 1 TABLET TWICE DAILY   • HYDROcodone-acetaminophen (NORCO) 7.5-325 MG per tablet Take 1 tablet by mouth Every 8 (Eight) Hours As Needed.   • Iron-Vit C-Vit B12-Folic Acid (IRON 100 PLUS PO) Take  by mouth.   • lansoprazole (PREVACID) 30 MG capsule Take 30 mg by mouth Daily.   • metOLazone (ZAROXOLYN) 5 MG tablet Take 1-2 tablets by mouth Daily As Needed (dyspnea, weight gain).   • MOVANTIK 12.5 MG tablet Take 12.5 mg by mouth.   • vitamin D (ERGOCALCIFEROL) 1.25 MG (59388 UT) capsule capsule    • Alirocumab (Praluent) 75 MG/ML solution auto-injector Inject 75 mg under the skin into the appropriate area as directed Every 14 (Fourteen) Days.   • Continuous Blood Gluc  (DEXCOM G6 ) device 1 each Daily.   • Continuous Blood Gluc  (Dexcom G6 ) device 1 each 1 (One) Time for 1 dose.   • Continuous Blood Gluc Sensor (Dexcom G6 Sensor) 1 each Take As Directed.   • Continuous Blood Gluc Transmit (Dexcom G6 Transmitter) misc 1 each Take As Directed.   • Global Ease Inject Pen Needles 31G X 5 MM misc USE 6 TIMES DAILY   • NovoLOG FlexPen 100 UNIT/ML solution pen-injector sc pen 50 Units Three Times Daily with meals   • [DISCONTINUED] sodium bicarbonate 325 MG tablet Take 325 mg by mouth 4 (Four) Times a Day.     MEDICATION LIST AND ALLERGIES REVIEWED.    Family History   Problem Relation Age of Onset   • Diabetes Mother    • Heart disease Mother    • Hypertension Mother    • Diabetes Father    • Hypertension Father    • Breast cancer Neg Hx    •  "Ovarian cancer Neg Hx      Social History     Tobacco Use   • Smoking status: Former Smoker     Packs/day: 1.00     Years: 30.00     Pack years: 30.00     Types: Cigarettes   • Smokeless tobacco: Never Used   • Tobacco comment: quit 15 years ago   Substance Use Topics   • Alcohol use: Yes     Alcohol/week: 1.0 standard drinks     Types: 1 Glasses of wine per week     Comment: holidays   • Drug use: No     Social History     Social History Narrative    Lives at home in [place]. Has help from children.      FAMILY AND SOCIAL HISTORY REVIEWED.    Review of Systems   Constitutional: Negative for activity change, appetite change, chills and fever.   HENT: Negative for congestion, sore throat and voice change.    Eyes: Negative for photophobia and visual disturbance.   Respiratory: Negative for cough, shortness of breath and wheezing.    Cardiovascular: Negative for chest pain, palpitations and leg swelling.   Gastrointestinal: Negative for abdominal distention and abdominal pain.   Genitourinary: Negative for difficulty urinating and flank pain.   Musculoskeletal: Negative for myalgias and neck stiffness.   Skin: Negative for color change and rash.   Neurological: Negative for dizziness, seizures and headaches.   Hematological: Negative for adenopathy.   Psychiatric/Behavioral: Negative for agitation, hallucinations and sleep disturbance.     ALL OTHER SYSTEMS REVIEWED AND ARE NEGATIVE.    Physical Exam and Clinical Information   /67   Pulse 86   Temp 98 °F (36.7 °C) (Oral)   Resp 18   Ht 170.2 cm (67\")   Wt 114 kg (251 lb 1.7 oz)   LMP  (LMP Unknown)   SpO2 94%   BMI 39.33 kg/m²   Physical Exam  Vitals signs and nursing note reviewed.   Constitutional:       General: She is not in acute distress.     Appearance: She is well-developed and normal weight. She is not ill-appearing or toxic-appearing.   HENT:      Head: Normocephalic and atraumatic.      Right Ear: External ear normal.      Left Ear: External " ear normal.      Nose: Nose normal.      Mouth/Throat:      Mouth: Mucous membranes are moist.      Pharynx: Oropharynx is clear. No oropharyngeal exudate or posterior oropharyngeal erythema.   Eyes:      Conjunctiva/sclera: Conjunctivae normal.      Pupils: Pupils are equal, round, and reactive to light.   Neck:      Musculoskeletal: Normal range of motion and neck supple. No neck rigidity.   Cardiovascular:      Rate and Rhythm: Normal rate and regular rhythm.      Pulses: Normal pulses.      Heart sounds: Normal heart sounds. No murmur. No friction rub. No gallop.    Pulmonary:      Effort: Pulmonary effort is normal. No respiratory distress.      Breath sounds: Normal breath sounds. No wheezing, rhonchi or rales.   Abdominal:      General: Bowel sounds are normal. There is no distension.      Palpations: Abdomen is soft.      Tenderness: There is no abdominal tenderness. There is no rebound.   Musculoskeletal: Normal range of motion.      Right lower leg: No edema.      Left lower leg: No edema.   Skin:     General: Skin is warm and dry.      Capillary Refill: Capillary refill takes less than 2 seconds.   Neurological:      General: No focal deficit present.      Mental Status: She is alert and oriented to person, place, and time.   Psychiatric:         Mood and Affect: Mood normal.         Behavior: Behavior normal.         Thought Content: Thought content normal.         Judgment: Judgment normal.         Results from last 7 days   Lab Units 12/11/20  0803   WBC 10*3/mm3 12.96*   HEMOGLOBIN g/dL 10.2*   PLATELETS 10*3/mm3 322     Results from last 7 days   Lab Units 12/11/20  0803   SODIUM mmol/L 139   POTASSIUM mmol/L 4.3   CO2 mmol/L 22.0   BUN mg/dL 81*   CREATININE mg/dL 9.55*   GLUCOSE mg/dL 148*     Estimated Creatinine Clearance: 7.9 mL/min (A) (by C-G formula based on SCr of 9.55 mg/dL (H)).          Lab Results   Component Value Date    LACTATE 1.6 01/22/2019          I reviewed the patient's results/  images and I agree with the reports.     Impression     Bilateral carotid artery stenosis, with a left internal carotid artery stent placed on 12/11/2020.    Coronary artery disease involving native coronary artery of native heart without angina pectoris    Essential hypertension    Hyperlipemia    Diabetic polyneuropathy associated with type 2 diabetes mellitus (CMS/HCC)    GERD (gastroesophageal reflux disease)    Uncontrolled type 2 diabetes mellitus with hyperglycemia (CMS/HCC)    Chronic kidney disease, stage V on PD    Obesity (BMI 30-39.9)      Plan/Recommendations     65 y.o. female with PMH Bilateral TREVA, CKD Stage 5 on PD, CAD prior cardiac stents, HTN, HLP, T2DM w/neuropathy, GERD and Obesity.  She has been followed by Dr. Maldonado for asymptomatic Bilateral TREVA.  Patient is transferred to the ICU status post left internal carotid artery stent placement per Dr. Maldonado on 12/11/2020.    · ICU to follow along  · Aspirin/Plavix  · Nicardipine for blood pressure control  · We will place the patient on basal bolus insulin while inpatient, goal to maintain blood sugar less than 180.  · Renal see the patient with plan for peritoneal dialysis  · Continue home Lasix for volume control  · Continue Protonix for GERD  · Ability guidelines per surgery  · Aggressive pulmonary toilet  · P.o. diet      FELICIA Lucero DO  Pulmonary and Critical Care Medicine  12/11/20 14:35 EST     CC: Provider, No Known

## 2020-12-11 NOTE — PROGRESS NOTES
Discharge Planning Assessment  [facility]     Patient Name: Tashia Leon  MRN: [id number]  Today's Date: 12/11/2020    Admit Date: 12/11/2020    Discharge Needs Assessment     Row Name 12/11/20 1429       Living Environment    Lives With  child(jeannie), adult;parent(s)    Current Living Arrangements  home/apartment/condo    Primary Care Provided by  self    Family Caregiver if Needed  child(jeannei), adult    Quality of Family Relationships  involved;supportive    Able to Return to Prior Arrangements  yes       Resource/Environmental Concerns    Transportation Concerns  car, none       Transition Planning    Patient/Family Anticipates Transition to  home with family    Patient/Family Anticipated Services at Transition  none    Transportation Anticipated  family or friend will provide       Discharge Needs Assessment    Equipment Currently Used at Home  none    Concerns to be Addressed  no discharge needs identified        Discharge Plan     Row Name 12/11/20 4349       Plan    Plan  home    Patient/Family in Agreement with Plan  yes    Plan Comments  I met with Mrs. Leon at bedside to discuss discharge planning.  She resides in [facility] [facility] with her mother and adopted daughter.  Independent with ADL's.  NO DME.  Has Rx coverage.  no d/c needs identified.    Final Discharge Disposition Code  01 - home or self-care        Continued Care and Services - Admitted Since 12/11/2020    Coordination has not been started for this encounter.         Demographic Summary     Row Name 12/11/20 1428       General Information    Reason for Consult  discharge planning    General Information Comments  Chelsi Us - PCP        Functional Status     Row Name 12/11/20 1429       Functional Status    Usual Activity Tolerance  moderate    Current Activity Tolerance  moderate       Functional Status, IADL    Medications  independent    Meal Preparation  independent    Housekeeping  independent    Laundry  independent    Shopping   independent        Psychosocial    No documentation.       Abuse/Neglect    No documentation.       Legal    No documentation.       Substance Abuse    No documentation.       Patient Forms    No documentation.           Joann Melgar RN

## 2020-12-11 NOTE — NURSING NOTE
PD rounds complete. Placed cowart extension to pt's PD catheter.  Policy and procedure reviewed with GLADIS Kam. Orders and documentation reviewed. Supplies adequate. Denies further need at this time. Encouraged to call [phone] for assistance or for questions.

## 2020-12-11 NOTE — BRIEF OP NOTE
CAROTID STENT  Progress Note    Tashia Leon  12/11/2020    Pre-op Diagnosis:   Carotid stenosis, asymptomatic, left [I65.22]       Post-Op Diagnosis Codes:     * Carotid stenosis, asymptomatic, left [I65.22]    Procedure/CPT® Codes:        Procedure(s):  Carotid Stent    Surgeon(s):  Chance Maldonado MD    Anesthesia: Sedation    Staff:   Circulator: Karen Thacker RN  Scrub Person: Mady Conway  Documenter: Jamaica Aviles         Estimated Blood Loss: minimal    Urine Voided: * No values recorded between 12/11/2020  9:07 AM and 12/11/2020 10:25 AM *    Specimens:                None          Drains: * No LDAs found *    Findings: Diagnostic angiography demonstrates extensive atherosclerotic plaque, with a high-grade (70%) right internal carotid artery origin stenosis.  There is extensive calcified plaque within the left common carotid artery, producing a critical (95%) stenosis involving the mid-distal portions of the left common carotid artery.  Plaque is present at the left carotid bifurcation, but without flow-limiting lesion.  The critical left common carotid stenosis was treated with angioplasty/stent placement, restoring a near normal caliber lumen and improved perfusion to the left cerebral hemisphere.    Complications: None apparent.          Chance Maldonado MD     Date: 12/11/2020  Time: 10:24 EST

## 2020-12-12 NOTE — PROGRESS NOTES
" LOS: 1 day   Patient Care Team:  Provider, No Known as PCP - General  Denver Baldwin MD as Consulting Physician (Nephrology)  Hung Haynes MD as Consulting Physician (Infectious Diseases)    Chief Complaint: ESRD on PD    Subjective:  Symptoms:  Stable.  No shortness of breath, chest pain, weakness, headache or anxiety.    Diet:  Adequate intake.    Activity level: Normal.    Pain:  She reports no pain.        History taken from: patient    Objective     Vital Sign Min/Max for last 24 hours  Temp  Min: 97.3 °F (36.3 °C)  Max: 98.2 °F (36.8 °C)   BP  Min: 92/49  Max: 155/69   Pulse  Min: 74  Max: 94   Resp  Min: 16  Max: 20   SpO2  Min: 89 %  Max: 100 %   Flow (L/min)  Min: 2  Max: 2   Weight  Min: 113 kg (249 lb 1.9 oz)  Max: 113 kg (249 lb 1.9 oz)     Flowsheet Rows      First Filed Value   Admission Height  170.2 cm (67\") Documented at 12/11/2020 0805   Admission Weight  114 kg (251 lb 1.7 oz) Documented at 12/11/2020 0805          I/O this shift:  In: -   Out: 2725 [Urine:525; Other:2200]  I/O last 3 completed shifts:  In: 8000 [Other:8000]  Out: 5525 [Urine:1000; Other:4525]    Objective:  General Appearance:  Comfortable.    Vital signs: (most recent): Blood pressure 111/53, pulse 84, temperature 98.2 °F (36.8 °C), temperature source Oral, resp. rate 16, height 170.2 cm (67\"), weight 113 kg (249 lb 1.9 oz), SpO2 91 %, not currently breastfeeding.  No fever.    Output: Producing urine.    HEENT: Normal HEENT exam.    Lungs:  Normal effort and normal respiratory rate.  Breath sounds clear to auscultation.  She is not in respiratory distress.    Heart: Normal rate.  Regular rhythm.  S1 normal and S2 normal.  No murmur.   Abdomen: Abdomen is soft.  Bowel sounds are normal.     Extremities: Normal range of motion.  There is no dependent edema or local swelling.    Neurological: Patient is alert and oriented to person, place and time.  Normal strength.    Pupils:  Pupils are equal, round, and " reactive to light.    Skin:  Warm.              Results Review:     I reviewed the patient's new clinical results.    WBC WBC   Date Value Ref Range Status   12/12/2020 10.74 3.40 - 10.80 10*3/mm3 Final   12/11/2020 12.96 (H) 3.40 - 10.80 10*3/mm3 Final      HGB Hemoglobin   Date Value Ref Range Status   12/12/2020 8.9 (L) 12.0 - 15.9 g/dL Final   12/11/2020 10.2 (L) 12.0 - 15.9 g/dL Final      HCT Hematocrit   Date Value Ref Range Status   12/12/2020 29.6 (L) 34.0 - 46.6 % Final   12/11/2020 34.4 34.0 - 46.6 % Final      Platlets No results found for: LABPLAT   MCV MCV   Date Value Ref Range Status   12/12/2020 103.9 (H) 79.0 - 97.0 fL Final   12/11/2020 104.9 (H) 79.0 - 97.0 fL Final          Sodium Sodium   Date Value Ref Range Status   12/12/2020 137 136 - 145 mmol/L Final   12/11/2020 139 136 - 145 mmol/L Final      Potassium Potassium   Date Value Ref Range Status   12/12/2020 4.1 3.5 - 5.2 mmol/L Final     Comment:     Slight hemolysis detected by analyzer. Results may be affected.   12/11/2020 4.3 3.5 - 5.2 mmol/L Final     Comment:     Slight hemolysis detected by analyzer. Results may be affected.      Chloride Chloride   Date Value Ref Range Status   12/12/2020 100 98 - 107 mmol/L Final   12/11/2020 99 98 - 107 mmol/L Final      CO2 CO2   Date Value Ref Range Status   12/12/2020 20.0 (L) 22.0 - 29.0 mmol/L Final   12/11/2020 22.0 22.0 - 29.0 mmol/L Final      BUN BUN   Date Value Ref Range Status   12/12/2020 76 (H) 8 - 23 mg/dL Final   12/11/2020 81 (H) 8 - 23 mg/dL Final      Creatinine Creatinine   Date Value Ref Range Status   12/12/2020 9.64 (H) 0.57 - 1.00 mg/dL Final   12/11/2020 9.55 (H) 0.57 - 1.00 mg/dL Final      Calcium Calcium   Date Value Ref Range Status   12/12/2020 8.0 (L) 8.6 - 10.5 mg/dL Final   12/11/2020 8.9 8.6 - 10.5 mg/dL Final      PO4 No results found for: CAPO4   Albumin No results found for: ALBUMIN   Magnesium No results found for: MG   Uric Acid No results found for: URICACID      Medication Review: yes    Assessment/Plan       Bilateral carotid artery stenosis, with a left internal carotid artery stent placed on 12/11/2020.    Coronary artery disease involving native coronary artery of native heart without angina pectoris    Essential hypertension    Hyperlipemia    Diabetic polyneuropathy associated with type 2 diabetes mellitus (CMS/[facility])    GERD (gastroesophageal reflux disease)    Uncontrolled type 2 diabetes mellitus with hyperglycemia (CMS/[facility])    Chronic kidney disease, stage V on PD    Obesity (BMI 30-39.9)      Assessment & Plan   ESRD: On PD home prescription 4 exchanged 8hr no last fill 2.5% dextrose      Anemia: Hb at target     Volume status: stable     Carotid stenosis: S/p stent placement        Recs  Stable for discharge from renal standpoint  Resume PD at home       Sam Lozano MD  12/12/20  10:40 EST

## 2020-12-12 NOTE — DISCHARGE SUMMARY
"PATIENT:  KADEN LEON  YOB: 1955  VISIT ID:  [id number]  PRIMARY CARE:  Provider, No Known  ADMITTING PHYSICIAN:  Chance Maldonado MD    DATE OF ADMISSION:  12/11/2020  DATE OF DISCHARGE:  12/12/20      ADMITTING DIAGNOSIS:  1.  Critical left common carotid artery stenosis, asymptomatic  2.  High grade right internal carotid artery stenosis, asymptomatic  3.  Chronic kidney disease, peritoneal dialysis    DISCHARGE DIAGNOSIS:  1.  Critical left common carotid artery stenosis, asymptomatic, s/p stent placement  2.  High grade right internal carotid artery stenosis, asymptomatic  3.  Chronic kidney disease, peritoneal dialysis  4.  High-grade, asymptomatic left vertebral stenosis    PROCEDURES:  1.  Diagnostic carotid angiography.  2.  Left cervical carotid angioplasty/stent placement, with EPD    BRIEF HOSPITAL COURSE:  Ms. Leon is a 65 y.o. female known to the neuro interventional service, having been followed for asymptomatic, bilateral carotid disease.  She was recently seen in clinic with a carotid duplex demonstrating marked interval progression of disease with now a critical stenosis of the distal cervical left common carotid artery and probable high-grade stenosis of the right internal carotid artery origin.  She denied any stroke or TIA-like symptoms.  She does have past medical history notable for chronic kidney disease (on peritoneal dialysis), as well as severe hyperlipidemia (on Praluent).    She was admitted on 12/11/2020 and underwent diagnostic angiography, confirming the critical stenosis of the left common carotid artery and high-grade stenosis of the right internal carotid artery origin.  Given the bilateral nature of her disease, along with her comorbidities, she was deemed a \"high surgical risk\" for endarterectomy.  The critical left common carotid stenosis was treated with angioplasty/stent placement, restoring a near normal caliber lumen and improved perfusion to the " left cerebral hemisphere.  Her right carotid stenosis (70%) will be managed medically for at least a short interim.  She does have a high-grade stenosis involving the left vertebral ostium, but the left vertebral artery is relatively hypoplastic and there is robust flow to the intracranial vertebrobasilar system from the dominant/widely patent right vertebral artery.    She made an uneventful recovery in the neuro intensive care unit, and was discharged home at her neurologic baseline on 12/12/2020.  She has baseline tremor involving her head, but her speech is clear.  Tolerating p.o. intake very well.  No new visual changes.  Was ambulating unassisted.  The nephrology and intensivist services were consulted to help manage her comorbidities during her hospitalization.  Carotid duplex following stent placement demonstrates widely patent left carotid vasculature without residual flow-limiting disease.  There are dampened waveforms within the left vertebral artery (hypoplastic) related to known high-grade ostial stenosis.    Approximately 22 minutes was utilized during this discharge summary, to include (but not limited to): Review of postoperative imaging studies/labs, examining the patient, providing discharge and follow-up instructions to the patient.      DISCHARGE MEDICATIONS:     Discharge Medications      Continue These Medications      Instructions Start Date   aspirin 325 MG EC tablet   TAKE 1 TABLET BY MOUTH DAILY.      calcium acetate 667 MG capsule capsule  Commonly known as: PHOS BINDER)   No dose, route, or frequency recorded.      clopidogrel 75 MG tablet  Commonly known as: PLAVIX   75 mg, Oral, Daily      Dexcom G6  device   1 each, Does not apply, Daily      Dexcom G6  device   1 each, Does not apply, Once      Dexcom G6 Sensor   1 each, Does not apply, Take As Directed      Dexcom G6 Transmitter misc   1 each, Does not apply, Take As Directed      furosemide 40 MG tablet  Commonly  known as: LASIX   TAKE 1 TABLET TWICE DAILY      Global Ease Inject Pen Needles 31G X 5 MM misc  Generic drug: Insulin Pen Needle   USE 6 TIMES DAILY      HumaLOG KwikPen 200 UNIT/ML solution pen-injector  Generic drug: Insulin Lispro   50 Units, Subcutaneous, 3 Times Daily      HYDROcodone-acetaminophen 7.5-325 MG per tablet  Commonly known as: NORCO   1 tablet, Oral, Every 8 Hours PRN      IRON 100 PLUS PO   Oral      lansoprazole 30 MG capsule  Commonly known as: PREVACID   30 mg, Oral, Daily      metOLazone 5 MG tablet  Commonly known as: ZAROXOLYN   5-10 mg, Oral, Daily PRN      Movantik 12.5 MG tablet  Generic drug: Naloxegol Oxalate   12.5 mg, Oral      NovoLOG FlexPen 100 UNIT/ML solution pen-injector sc pen  Generic drug: insulin aspart   50 Units Three Times Daily with meals      OneTouch Verio test strip  Generic drug: glucose blood   Test Three Times Daily      Praluent 75 MG/ML solution auto-injector  Generic drug: Alirocumab   75 mg, Subcutaneous, Every 14 Days      Toujeo Max SoloStar 300 UNIT/ML solution pen-injector injection  Generic drug: Insulin Glargine (2 Unit Dial)   80 Units, Subcutaneous, 2 times daily      vitamin D 1.25 MG (93043 UT) capsule capsule  Commonly known as: ERGOCALCIFEROL   No dose, route, or frequency recorded.                ACTIVITY:  No strenuous activity or heavy lifting for 5-7 days.    DIET:  Cardiac/renal diet    FOLLOW UP:    Follow-up Information     Given, Chance QUICK MD Follow up in 1 week(s).    Specialty: Neurosurgery  Why: Phone f/u in 1 week  Contact information:  [facility] [address]  [place] [id number] [phone]             Provider, No Known .    Contact information:  [facility] [address]

## 2020-12-12 NOTE — PROGRESS NOTES
Case Management Discharge Note      Final Note: Plan is for pt. to dc to home. No needs were voiced. Family to provide transport.         Selected Continued Care - Admitted Since 12/11/2020     Destination    No services have been selected for the patient.              Durable Medical Equipment    No services have been selected for the patient.              Dialysis/Infusion    No services have been selected for the patient.              Home Medical Care    No services have been selected for the patient.              Therapy    No services have been selected for the patient.              Community Resources    No services have been selected for the patient.                       Final Discharge Disposition Code: 01 - home or self-care

## 2020-12-12 NOTE — PLAN OF CARE
Goal Outcome Evaluation:  Plan of Care Reviewed With: patient  Progress: improving  Outcome Summary: VSS, right groin site C/D/I, soft, with old dried drainage. Pt expressed frustation at beginning of shift due to her medication schedule and medications being adjusted from what she normally takes at home. APRN aware and adjustments made. Pt refused scheduled levemir. Pt voiced concerns about PD order, stated she did not want to dwell for an extended period of time- Charge RN at bedside to listen to concerns. Dr. Young with Nephrology paged and okay with patient decision- per Dr. Young patient could decide wether to do PD exchange exactly like she does at home with same solution, not do PD at all overnight and allow pt to get back on schedule tomorrow once at home, or do exchange per Dr. Lozano's order ([place]ake order clarified: dwell for 4 hr and immediately instill for a total of 3x/day). Pt decided to do the 4 hour dwell time. Pt appears to be tolerating PD. 2-3 L NC applied while pt sleeping. Will continue to monitor.

## 2020-12-13 NOTE — OUTREACH NOTE
Prep Survey      Responses   Denominational facility patient discharged from?  [place]   Is LACE score < 7 ?  No   Eligibility  Readm Mgmt   Discharge diagnosis  Critical left common carotid artery stenosis, asymptomatic, s/p stent placement   Does the patient have one of the following disease processes/diagnoses(primary or secondary)?  Other   Does the patient have Home health ordered?  No   Is there a DME ordered?  No   Prep survey completed?  Yes          Elena Ortiz RN

## 2020-12-15 NOTE — OUTREACH NOTE
Medical Week 1 Survey      Responses   [facility] patient discharged from?  [place]   Does the patient have one of the following disease processes/diagnoses(primary or secondary)?  Other   Week 1 attempt successful?  Yes   Call start time  1322   Revoke  Decline to participate   Call end time  1323          Kirstin Mata, RN

## 2020-12-18 NOTE — PROGRESS NOTES
"NAME: KADEN LEON   DOS: 2020  : 1955  PCP: Provider, No Known    Type of Service: Appointment via telemedicine  You have chosen to receive care through a telehealth visit.  Do you consent to use a video/audio connection for your medical care today? Yes   Mode of Transmission: Telemedicine via 2 way interactive audio telecommunication  Telephone Visit: 10 minutes    Chief Complaint:  S/p left carotid stent  History of Present Illness: Ms. Leon is a 65 y.o. female who is known to the neuro interventional service, having been followed for an asymptomatic, bilateral, carotid disease.  She was recently seen in clinic with carotid duplex that demonstrated marked interval progression of disease with noncritical stenosis of the distal cervical left common carotid artery and probable high-grade stenosis of the right internal carotid artery origin.  She denied any stroke or TIA-like symptoms, specifically unilateral weakness/numbness or changes in vision or speech.  Patient does have a notable past medical history for chronic kidney disease (on peritoneal dialysis) as well as severe hyperlipidemia (on Praluent).  Therefore, she was admitted to [facility] on 2020 and underwent a diagnostic angiography which confirmed critical stenosis of the left common carotid artery and high-grade stenosis of the right internal carotid artery.  Due to the patient's comorbidities, she was deemed a \"high surgical risk\".  Therefore, she was treated with angioplasty/stent placement, which restored a near normal caliber lumen and improve perfusion to the left cerebral hemisphere.  To note, angiography also confirmed right carotid stenosis (70%) and high-grade stenosis involving the left vertebral ostium, however, the left vertebral artery is relatively hypoplastic with robust flow to the intracranial vertebral basilar system with dominant/widely patent right vertebral artery.  She made an uneventful " recovery in the neuro ICU and was discharged home at her neurological baseline, does have a baseline tremor involving her head.    Today, patient states that she is doing well.  Does note some minimal ecchymosis at the groin site incision.  Denies any TIA or strokelike symptoms, specifically unilateral weakness/numbness or changes in vision or speech.  Has remained on Plavix and aspirin without issue.  She is seen today in follow-up.      Past Medical History:   Diagnosis Date   • Acute bronchitis    • Acute pharyngitis    • Acute sinusitis    • Benign colonic polyp    • Chronic kidney disease     peritoneal dialysis   • Edema    • GERD (gastroesophageal reflux disease)    • Heart murmur    • Hiatal hernia    • Hyperkalemia    • Hyperlipidemia    • Hypertension    • Low back pain    • Lumbar disc disease    • MRSA (methicillin resistant Staphylococcus aureus)     Osteomyelitis/methicillin-resistant Staphylococcus aureus of the left foot, April 2013.   • Obesity    • Osteomyelitis (CMS/HCC)     Osteomyelitis/methicillin-resistant Staphylococcus aureus of the left foot, April 2013.   • Peptic ulceration 01/2013    History of peptic ulcer disease, diagnosed January 2013 by EGD.  Repeat EGD in August 2013 was negative.   • Sepsis (CMS/HCC)     Sepsis, April 2013, hospitalized at [facility].   • Tobacco use     Previous tobacco use with cessation in 2008.   • Type 2 diabetes mellitus (CMS/HCC)     Proteinuria noted, January 2014.     • Vitamin D deficiency        Past Surgical History:   Procedure Laterality Date   • AMPUTATION DIGIT Right 12/4/2018    Procedure: I&D right foot, great toe amputation and 1st Metatarsal amputation;  Surgeon: Chio Sterling MD;  Location:  [facility];  Service: Orthopedics   • CAROTID STENT Left 12/11/2020    Procedure: Carotid Stent;  Surgeon: Chance Maldonado MD;  Location:  [facility];  Service: Interventional Radiology;  Laterality: Left;   •   SECTION      x2   • FOOT SURGERY Left     Incision and debridement of the left foot x2.   • INSERTION HEMODIALYSIS CATHETER N/A 2018    Procedure: GROSHONG CATHETER PLACEMENT;  Surgeon: Maribel May MD;  Location: [facility];  Service: General   • LASIK     • TONSILLECTOMY               Review of Systems   All other systems reviewed and are negative.       Medications:    Current Outpatient Medications:   •  Alirocumab (Praluent) 75 MG/ML solution auto-injector, Inject 75 mg under the skin into the appropriate area as directed Every 14 (Fourteen) Days., Disp: 2 mL, Rfl: 5  •  aspirin 325 MG EC tablet, TAKE 1 TABLET BY MOUTH DAILY., Disp: 30 tablet, Rfl: 11  •  calcium acetate (PHOS BINDER,) 667 MG capsule capsule, , Disp: , Rfl:   •  clopidogrel (PLAVIX) 75 MG tablet, Take 1 tablet by mouth Daily., Disp: 30 tablet, Rfl: 5  •  Continuous Blood Gluc  (DEXCOM G6 ) device, 1 each Daily., Disp: 1 Device, Rfl: 0  •  Continuous Blood Gluc  (Dexcom G6 ) device, 1 each 1 (One) Time for 1 dose., Disp: 1 Device, Rfl: 0  •  Continuous Blood Gluc Sensor (Dexcom G6 Sensor), 1 each Take As Directed., Disp: 3 each, Rfl: 11  •  Continuous Blood Gluc Transmit (Dexcom G6 Transmitter) misc, 1 each Take As Directed., Disp: 1 each, Rfl: 3  •  furosemide (LASIX) 40 MG tablet, TAKE 1 TABLET TWICE DAILY, Disp: 60 tablet, Rfl: 1  •  Global Ease Inject Pen Needles 31G X 5 MM misc, USE 6 TIMES DAILY, Disp: 200 each, Rfl: 5  •  glucose blood (OneTouch Verio) test strip, Test Three Times Daily, Disp: 100 each, Rfl: 11  •  HYDROcodone-acetaminophen (NORCO) 7.5-325 MG per tablet, Take 1 tablet by mouth Every 8 (Eight) Hours As Needed., Disp: , Rfl:   •  Insulin Glargine, 2 Unit Dial, (Toujeo Max SoloStar) 300 UNIT/ML solution pen-injector injection, Inject 80 Units under the skin into the appropriate area as directed 2 (two) times a day., Disp: 6 pen, Rfl: 10  •  Insulin Lispro (HumaLOG  "KwikPen) 200 UNIT/ML solution pen-injector, Inject 50 Units under the skin into the appropriate area as directed 3 (Three) Times a Day., Disp: 10 pen, Rfl: 10  •  Iron-Vit C-Vit B12-Folic Acid (IRON 100 PLUS PO), Take  by mouth., Disp: , Rfl:   •  lansoprazole (PREVACID) 30 MG capsule, Take 30 mg by mouth Daily., Disp: , Rfl: 11  •  metOLazone (ZAROXOLYN) 5 MG tablet, Take 1-2 tablets by mouth Daily As Needed (dyspnea, weight gain)., Disp: , Rfl:   •  MOVANTIK 12.5 MG tablet, Take 12.5 mg by mouth., Disp: , Rfl:   •  NovoLOG FlexPen 100 UNIT/ML solution pen-injector sc pen, 50 Units Three Times Daily with meals, Disp: 20 pen, Rfl: 11  •  vitamin D (ERGOCALCIFEROL) 1.25 MG (96175 UT) capsule capsule, , Disp: , Rfl:     Allergies:  Allergies   Allergen Reactions   • Statins Myalgia   • Ancef [Cefazolin] Other (See Comments) and GI Intolerance     Tunnel vision.  (TOLERATES INVANZ)   • Bactrim [Sulfamethoxazole-Trimethoprim] Hives and Nausea And Vomiting   • Cephalosporins GI Intolerance   • Keflex [Cephalexin] Other (See Comments) and GI Intolerance     \"tunnel vision\"..   (pt tolerates INVANZ)   • Levaquin [Levofloxacin]    • Lipitor [Atorvastatin] Myalgia   • Lisinopril    • Losartan Potassium Other (See Comments)     Unknown reaction   • Oxycodone GI Intolerance   • Quinolones    • Cleocin [Clindamycin Hcl] Rash   • Clindamycin/Lincomycin Rash       Social History     Tobacco Use   • Smoking status: Former Smoker     Packs/day: 1.00     Years: 30.00     Pack years: 30.00     Types: Cigarettes   • Smokeless tobacco: Never Used   • Tobacco comment: quit 15 years ago   Substance Use Topics   • Alcohol use: Yes     Alcohol/week: 1.0 standard drinks     Types: 1 Glasses of wine per week     Comment: holidays   • Drug use: No       Family History   Problem Relation Age of Onset   • Diabetes Mother    • Heart disease Mother    • Hypertension Mother    • Diabetes Father    • Hypertension Father    • Breast cancer Neg Hx  "   • Ovarian cancer Neg Hx        Review of Imaging:  No new imaging to review    Vitals:    12/18/20 1159   Temp: 97.6 °F (36.4 °C)     Body mass index is 37.64 kg/m².    Physical Exam  Neurologic Exam  Visit done via telephone due to the COVID-19 epidemic    Diagnoses/Plan:    Ms. Leon is a 65 y.o. female is seen today status post left carotid stent on 12/10/2020.  Patient states that she has done well since her left carotid stent, noting groin incision is healing appropriate and no new TIA or strokelike symptoms.  In addition, the patient does have a known right carotid stenosis (70%) that is currently being managed medically, for at least the short-term.  She also has known high-grade stenosis involving the left vertebral ostium.  Patient will follow up in 1 month to discuss treatment options for the right carotid stenosis.  Patient was educated on signs and symptoms that would warrant a sooner call to the clinic/911.  Patient noted understanding of this plan and willing to proceed.      Patient's Body mass index is 37.64 kg/m². BMI is above normal parameters. Recommendations include: educational material.           Sheila Rasheed PA-C

## 2021-01-01 ENCOUNTER — TELEPHONE (OUTPATIENT)
Dept: NEUROSURGERY | Facility: CLINIC | Age: 66
End: 2021-01-01

## 2021-01-01 ENCOUNTER — TELEPHONE (OUTPATIENT)
Dept: ENDOCRINOLOGY | Facility: CLINIC | Age: 66
End: 2021-01-01

## 2021-01-01 ENCOUNTER — HOSPITAL ENCOUNTER (OUTPATIENT)
Facility: HOSPITAL | Age: 66
Setting detail: HOSPITAL OUTPATIENT SURGERY
Discharge: HOME | End: 2021-04-29
Attending: RADIOLOGY | Admitting: PHYSICIAN ASSISTANT

## 2021-01-01 ENCOUNTER — READMISSION MANAGEMENT (OUTPATIENT)
Dept: CALL CENTER | Facility: HOSPITAL | Age: 66
End: 2021-01-01

## 2021-01-01 ENCOUNTER — HOSPITAL ENCOUNTER (INPATIENT)
Facility: HOSPITAL | Age: 66
LOS: 1 days | Discharge: HOME | End: 2021-03-27
Attending: RADIOLOGY | Admitting: RADIOLOGY

## 2021-01-01 ENCOUNTER — OFFICE VISIT (OUTPATIENT)
Dept: ENDOCRINOLOGY | Facility: CLINIC | Age: 66
End: 2021-01-01

## 2021-01-01 ENCOUNTER — APPOINTMENT (OUTPATIENT)
Dept: CARDIOLOGY | Facility: HOSPITAL | Age: 66
End: 2021-01-01

## 2021-01-01 ENCOUNTER — OFFICE VISIT (OUTPATIENT)
Dept: NEUROSURGERY | Facility: CLINIC | Age: 66
End: 2021-01-01

## 2021-01-01 VITALS
OXYGEN SATURATION: 95 % | WEIGHT: 237.66 LBS | BODY MASS INDEX: 38.19 KG/M2 | HEART RATE: 81 BPM | DIASTOLIC BLOOD PRESSURE: 52 MMHG | TEMPERATURE: 98.4 F | HEIGHT: 66 IN | RESPIRATION RATE: 18 BRPM | SYSTOLIC BLOOD PRESSURE: 123 MMHG

## 2021-01-01 VITALS
TEMPERATURE: 97.3 F | WEIGHT: 235.7 LBS | BODY MASS INDEX: 36.99 KG/M2 | SYSTOLIC BLOOD PRESSURE: 122 MMHG | HEIGHT: 67 IN | DIASTOLIC BLOOD PRESSURE: 72 MMHG | HEART RATE: 82 BPM | OXYGEN SATURATION: 98 %

## 2021-01-01 VITALS
OXYGEN SATURATION: 98 % | SYSTOLIC BLOOD PRESSURE: 154 MMHG | RESPIRATION RATE: 16 BRPM | HEART RATE: 86 BPM | DIASTOLIC BLOOD PRESSURE: 73 MMHG | WEIGHT: 239.2 LBS | HEIGHT: 66 IN | TEMPERATURE: 97.5 F | BODY MASS INDEX: 38.44 KG/M2

## 2021-01-01 VITALS — WEIGHT: 240 LBS | TEMPERATURE: 97.8 F | BODY MASS INDEX: 37.67 KG/M2 | HEIGHT: 67 IN

## 2021-01-01 DIAGNOSIS — Z98.890 HISTORY OF LEFT COMMON CAROTID ARTERY STENT PLACEMENT: ICD-10-CM

## 2021-01-01 DIAGNOSIS — I65.21 CAROTID STENOSIS, ASYMPTOMATIC, RIGHT: ICD-10-CM

## 2021-01-01 DIAGNOSIS — I65.23 CAROTID STENOSIS, BILATERAL: ICD-10-CM

## 2021-01-01 DIAGNOSIS — I65.02 ASYMPTOMATIC STENOSIS OF LEFT VERTEBRAL ARTERY: ICD-10-CM

## 2021-01-01 DIAGNOSIS — I65.21 CAROTID STENOSIS, ASYMPTOMATIC, RIGHT: Primary | ICD-10-CM

## 2021-01-01 DIAGNOSIS — E11.65 UNCONTROLLED TYPE 2 DIABETES MELLITUS WITH HYPERGLYCEMIA (HCC): ICD-10-CM

## 2021-01-01 DIAGNOSIS — E11.65 UNCONTROLLED TYPE 2 DIABETES MELLITUS WITH HYPERGLYCEMIA (HCC): Primary | ICD-10-CM

## 2021-01-01 DIAGNOSIS — I65.21 CAROTID STENOSIS, RIGHT: Primary | ICD-10-CM

## 2021-01-01 DIAGNOSIS — Z95.828 HISTORY OF LEFT COMMON CAROTID ARTERY STENT PLACEMENT: ICD-10-CM

## 2021-01-01 DIAGNOSIS — E78.2 MIXED HYPERLIPIDEMIA: ICD-10-CM

## 2021-01-01 DIAGNOSIS — N18.5 CHRONIC KIDNEY DISEASE, STAGE 5 (HCC): ICD-10-CM

## 2021-01-01 DIAGNOSIS — I65.22 CAROTID STENOSIS, ASYMPTOMATIC, LEFT: ICD-10-CM

## 2021-01-01 DIAGNOSIS — I77.1 ILIAC ARTERY STENOSIS, BILATERAL (HCC): ICD-10-CM

## 2021-01-01 DIAGNOSIS — E11.42 DIABETIC POLYNEUROPATHY ASSOCIATED WITH TYPE 2 DIABETES MELLITUS (HCC): Primary | ICD-10-CM

## 2021-01-01 DIAGNOSIS — I65.23 CAROTID STENOSIS, BILATERAL: Primary | ICD-10-CM

## 2021-01-01 DIAGNOSIS — I77.1 ILIAC ARTERY STENOSIS, BILATERAL (HCC): Primary | ICD-10-CM

## 2021-01-01 DIAGNOSIS — E66.01 MORBIDLY OBESE (HCC): ICD-10-CM

## 2021-01-01 LAB
ACT BLD: 268 SECONDS (ref 82–152)
ANION GAP SERPL CALCULATED.3IONS-SCNC: 21 MMOL/L (ref 5–15)
ANION GAP SERPL CALCULATED.3IONS-SCNC: 21 MMOL/L (ref 5–15)
ANION GAP SERPL CALCULATED.3IONS-SCNC: 22 MMOL/L (ref 5–15)
BASOPHILS # BLD AUTO: 0.07 10*3/MM3 (ref 0–0.2)
BASOPHILS # BLD AUTO: 0.08 10*3/MM3 (ref 0–0.2)
BASOPHILS # BLD AUTO: 0.11 10*3/MM3 (ref 0–0.2)
BASOPHILS NFR BLD AUTO: 0.6 % (ref 0–1.5)
BASOPHILS NFR BLD AUTO: 0.8 % (ref 0–1.5)
BASOPHILS NFR BLD AUTO: 1.1 % (ref 0–1.5)
BH CV LEFT CCA HIDDEN LRR: 1 CM/S
BH CV MID RIGHT ICA HIDDEN LRR: 1 CM
BH CV RIGHT CCA HIDDEN LRR: 1 CM/S
BH CV VAS CAROTID LEFT DISTAL STENT EDV: 49 CM/S
BH CV VAS CAROTID LEFT DISTAL STENT: 272 CM/S
BH CV VAS CAROTID LEFT DISTAL TO STENT EDV: 96 CM/S
BH CV VAS CAROTID LEFT DISTAL TO STENT: 419 CM/S
BH CV VAS CAROTID LEFT MID STENT EDV: 52 CM/S
BH CV VAS CAROTID LEFT MID STENT: 280 CM/S
BH CV VAS CAROTID LEFT PROXIMAL STENT EDV: 22 CM/S
BH CV VAS CAROTID LEFT PROXIMAL STENT: 121 CM/S
BH CV VAS CAROTID LEFT PROXIMAL TO STENT: 107 CM/S
BH CV VAS CAROTID LEFT STENT NATIVE VESSEL PROXIMAL ED: 21 CM/S
BH CV VAS CAROTID RIGHT DISTAL STENT EDV: 28 CM/S
BH CV VAS CAROTID RIGHT DISTAL STENT PSV: 124 CM/S
BH CV VAS CAROTID RIGHT DISTAL TO STENT NATIVE VESSEL E: 30 CM/S
BH CV VAS CAROTID RIGHT DISTAL TO STENT PSV: 138 CM/S
BH CV VAS CAROTID RIGHT MID STENT EDV: 33 CM/S
BH CV VAS CAROTID RIGHT MID STENT PSV: 151 CM/S
BH CV VAS CAROTID RIGHT PROXIMAL STENT EDV: 18 CM/S
BH CV VAS CAROTID RIGHT PROXIMAL STENT PSV: 111 CM/S
BH CV VAS CAROTID RIGHT STENT NATIVE VESSEL PROXIMAL EDV: 28 CM/S
BH CV VAS CAROTID RIGHT STENT NATIVE VESSEL PROXIMAL PS: 141 CM/S
BH CV XLRA MEAS LEFT DIST CCA EDV: 53.4 CM/SEC
BH CV XLRA MEAS LEFT DIST CCA PSV: 281.3 CM/SEC
BH CV XLRA MEAS LEFT DIST ICA EDV: 39 CM/SEC
BH CV XLRA MEAS LEFT DIST ICA PSV: 167.2 CM/SEC
BH CV XLRA MEAS LEFT ICA/CCA RATIO: 2.6
BH CV XLRA MEAS LEFT MID CCA EDV: 21.8 CM/SEC
BH CV XLRA MEAS LEFT MID CCA PSV: 121.4 CM/SEC
BH CV XLRA MEAS LEFT MID ICA EDV: 61.1 CM/SEC
BH CV XLRA MEAS LEFT MID ICA PSV: 316.5 CM/SEC
BH CV XLRA MEAS LEFT PROX CCA EDV: 22.7 CM/SEC
BH CV XLRA MEAS LEFT PROX CCA PSV: 113.5 CM/SEC
BH CV XLRA MEAS LEFT PROX ECA EDV: 32.7 CM/SEC
BH CV XLRA MEAS LEFT PROX ECA PSV: 460.5 CM/SEC
BH CV XLRA MEAS LEFT PROX ICA EDV: 56.7 CM/SEC
BH CV XLRA MEAS LEFT PROX ICA PSV: 312.1 CM/SEC
BH CV XLRA MEAS LEFT PROX SCLA PSV: 237.7 CM/SEC
BH CV XLRA MEAS LEFT VERTEBRAL A PSV: 25.1 CM/SEC
BH CV XLRA MEAS RIGHT DIST CCA EDV: 13.3 CM/SEC
BH CV XLRA MEAS RIGHT DIST CCA PSV: 83.1 CM/SEC
BH CV XLRA MEAS RIGHT DIST ICA EDV: 30 CM/SEC
BH CV XLRA MEAS RIGHT DIST ICA PSV: 109 CM/SEC
BH CV XLRA MEAS RIGHT ICA/CCA RATIO: 1.23
BH CV XLRA MEAS RIGHT MID CCA EDV: 17 CM/SEC
BH CV XLRA MEAS RIGHT MID CCA PSV: 123 CM/SEC
BH CV XLRA MEAS RIGHT MID ICA EDV: 34.4 CM/SEC
BH CV XLRA MEAS RIGHT MID ICA PSV: 152.2 CM/SEC
BH CV XLRA MEAS RIGHT PROX CCA EDV: 11.9 CM/SEC
BH CV XLRA MEAS RIGHT PROX CCA PSV: 94.3 CM/SEC
BH CV XLRA MEAS RIGHT PROX ECA PSV: 367 CM/SEC
BH CV XLRA MEAS RIGHT PROX ICA EDV: 30.4 CM/SEC
BH CV XLRA MEAS RIGHT PROX ICA PSV: 143.4 CM/SEC
BH CV XLRA MEAS RIGHT PROX SCLA PSV: 108.4 CM/SEC
BH CV XLRA MEAS RIGHT VERTEBRAL A EDV: 45.4 CM/SEC
BH CV XLRA MEAS RIGHT VERTEBRAL A PSV: 146.7 CM/SEC
BH CVPROX LEFT ICA HIDDEN LRR: 1 CM
BH CVPROX RIGHT ICA HIDDEN LRR: 1 CM
BUN SERPL-MCNC: 85 MG/DL (ref 8–23)
BUN SERPL-MCNC: 89 MG/DL (ref 8–23)
BUN SERPL-MCNC: 98 MG/DL (ref 8–23)
BUN/CREAT SERPL: 7.4 (ref 7–25)
BUN/CREAT SERPL: 7.7 (ref 7–25)
BUN/CREAT SERPL: 8.8 (ref 7–25)
CALCIUM SPEC-SCNC: 7.4 MG/DL (ref 8.6–10.5)
CALCIUM SPEC-SCNC: 7.5 MG/DL (ref 8.6–10.5)
CALCIUM SPEC-SCNC: 8.2 MG/DL (ref 8.6–10.5)
CHLORIDE SERPL-SCNC: 93 MMOL/L (ref 98–107)
CHLORIDE SERPL-SCNC: 97 MMOL/L (ref 98–107)
CHLORIDE SERPL-SCNC: 97 MMOL/L (ref 98–107)
CO2 SERPL-SCNC: 21 MMOL/L (ref 22–29)
CO2 SERPL-SCNC: 23 MMOL/L (ref 22–29)
CO2 SERPL-SCNC: 23 MMOL/L (ref 22–29)
CREAT BLDA-MCNC: 13 MG/DL (ref 0.6–1.3)
CREAT SERPL-MCNC: 11.11 MG/DL (ref 0.57–1)
CREAT SERPL-MCNC: 11.42 MG/DL (ref 0.57–1)
CREAT SERPL-MCNC: 11.55 MG/DL (ref 0.57–1)
DEPRECATED RDW RBC AUTO: 50.7 FL (ref 37–54)
DEPRECATED RDW RBC AUTO: 52.5 FL (ref 37–54)
DEPRECATED RDW RBC AUTO: 52.8 FL (ref 37–54)
EOSINOPHIL # BLD AUTO: 0.06 10*3/MM3 (ref 0–0.4)
EOSINOPHIL # BLD AUTO: 0.24 10*3/MM3 (ref 0–0.4)
EOSINOPHIL # BLD AUTO: 0.36 10*3/MM3 (ref 0–0.4)
EOSINOPHIL NFR BLD AUTO: 0.5 % (ref 0.3–6.2)
EOSINOPHIL NFR BLD AUTO: 2.3 % (ref 0.3–6.2)
EOSINOPHIL NFR BLD AUTO: 3.5 % (ref 0.3–6.2)
ERYTHROCYTE [DISTWIDTH] IN BLOOD BY AUTOMATED COUNT: 14.4 % (ref 12.3–15.4)
ERYTHROCYTE [DISTWIDTH] IN BLOOD BY AUTOMATED COUNT: 14.5 % (ref 12.3–15.4)
ERYTHROCYTE [DISTWIDTH] IN BLOOD BY AUTOMATED COUNT: 14.6 % (ref 12.3–15.4)
EXPIRATION DATE: ABNORMAL
EXPIRATION DATE: NORMAL
GFR SERPL CREATININE-BSD FRML MDRD: 3 ML/MIN/1.73
GFR SERPL CREATININE-BSD FRML MDRD: ABNORMAL ML/MIN/{1.73_M2}
GLUCOSE BLDC GLUCOMTR-MCNC: 100 MG/DL (ref 70–130)
GLUCOSE BLDC GLUCOMTR-MCNC: 119 MG/DL (ref 70–130)
GLUCOSE BLDC GLUCOMTR-MCNC: 134 MG/DL (ref 70–130)
GLUCOSE BLDC GLUCOMTR-MCNC: 195 MG/DL (ref 70–130)
GLUCOSE BLDC GLUCOMTR-MCNC: 77 MG/DL (ref 70–130)
GLUCOSE BLDC GLUCOMTR-MCNC: 80 MG/DL (ref 70–130)
GLUCOSE BLDC GLUCOMTR-MCNC: 88 MG/DL (ref 70–130)
GLUCOSE SERPL-MCNC: 114 MG/DL (ref 65–99)
GLUCOSE SERPL-MCNC: 114 MG/DL (ref 65–99)
GLUCOSE SERPL-MCNC: 124 MG/DL (ref 65–99)
HBA1C MFR BLD: 6.4 %
HCT VFR BLD AUTO: 30.4 % (ref 34–46.6)
HCT VFR BLD AUTO: 31.6 % (ref 34–46.6)
HCT VFR BLD AUTO: 35.2 % (ref 34–46.6)
HGB BLD-MCNC: 10.6 G/DL (ref 12–15.9)
HGB BLD-MCNC: 9.5 G/DL (ref 12–15.9)
HGB BLD-MCNC: 9.5 G/DL (ref 12–15.9)
IMM GRANULOCYTES # BLD AUTO: 0.06 10*3/MM3 (ref 0–0.05)
IMM GRANULOCYTES # BLD AUTO: 0.08 10*3/MM3 (ref 0–0.05)
IMM GRANULOCYTES # BLD AUTO: 0.11 10*3/MM3 (ref 0–0.05)
IMM GRANULOCYTES NFR BLD AUTO: 0.5 % (ref 0–0.5)
IMM GRANULOCYTES NFR BLD AUTO: 0.8 % (ref 0–0.5)
IMM GRANULOCYTES NFR BLD AUTO: 1.1 % (ref 0–0.5)
LYMPHOCYTES # BLD AUTO: 1.44 10*3/MM3 (ref 0.7–3.1)
LYMPHOCYTES # BLD AUTO: 2.37 10*3/MM3 (ref 0.7–3.1)
LYMPHOCYTES # BLD AUTO: 2.46 10*3/MM3 (ref 0.7–3.1)
LYMPHOCYTES NFR BLD AUTO: 13 % (ref 19.6–45.3)
LYMPHOCYTES NFR BLD AUTO: 22.8 % (ref 19.6–45.3)
LYMPHOCYTES NFR BLD AUTO: 23.9 % (ref 19.6–45.3)
Lab: ABNORMAL
Lab: NORMAL
MCH RBC QN AUTO: 29.4 PG (ref 26.6–33)
MCH RBC QN AUTO: 29.9 PG (ref 26.6–33)
MCH RBC QN AUTO: 29.9 PG (ref 26.6–33)
MCHC RBC AUTO-ENTMCNC: 30.1 G/DL (ref 31.5–35.7)
MCHC RBC AUTO-ENTMCNC: 30.1 G/DL (ref 31.5–35.7)
MCHC RBC AUTO-ENTMCNC: 31.3 G/DL (ref 31.5–35.7)
MCV RBC AUTO: 95.6 FL (ref 79–97)
MCV RBC AUTO: 97.5 FL (ref 79–97)
MCV RBC AUTO: 99.4 FL (ref 79–97)
MONOCYTES # BLD AUTO: 0.77 10*3/MM3 (ref 0.1–0.9)
MONOCYTES # BLD AUTO: 0.86 10*3/MM3 (ref 0.1–0.9)
MONOCYTES # BLD AUTO: 0.98 10*3/MM3 (ref 0.1–0.9)
MONOCYTES NFR BLD AUTO: 6.9 % (ref 5–12)
MONOCYTES NFR BLD AUTO: 8.3 % (ref 5–12)
MONOCYTES NFR BLD AUTO: 9.4 % (ref 5–12)
NEUTROPHILS NFR BLD AUTO: 6.4 10*3/MM3 (ref 1.7–7)
NEUTROPHILS NFR BLD AUTO: 6.63 10*3/MM3 (ref 1.7–7)
NEUTROPHILS NFR BLD AUTO: 62.1 % (ref 42.7–76)
NEUTROPHILS NFR BLD AUTO: 63.9 % (ref 42.7–76)
NEUTROPHILS NFR BLD AUTO: 78.5 % (ref 42.7–76)
NEUTROPHILS NFR BLD AUTO: 8.71 10*3/MM3 (ref 1.7–7)
NRBC BLD AUTO-RTO: 0 /100 WBC (ref 0–0.2)
PLATELET # BLD AUTO: 290 10*3/MM3 (ref 140–450)
PLATELET # BLD AUTO: 320 10*3/MM3 (ref 140–450)
PLATELET # BLD AUTO: 326 10*3/MM3 (ref 140–450)
PMV BLD AUTO: 10.4 FL (ref 6–12)
PMV BLD AUTO: 10.9 FL (ref 6–12)
PMV BLD AUTO: 11.1 FL (ref 6–12)
POTASSIUM SERPL-SCNC: 3.7 MMOL/L (ref 3.5–5.2)
POTASSIUM SERPL-SCNC: 3.8 MMOL/L (ref 3.5–5.2)
POTASSIUM SERPL-SCNC: 4.2 MMOL/L (ref 3.5–5.2)
RBC # BLD AUTO: 3.18 10*6/MM3 (ref 3.77–5.28)
RBC # BLD AUTO: 3.18 10*6/MM3 (ref 3.77–5.28)
RBC # BLD AUTO: 3.61 10*6/MM3 (ref 3.77–5.28)
SODIUM SERPL-SCNC: 138 MMOL/L (ref 136–145)
SODIUM SERPL-SCNC: 139 MMOL/L (ref 136–145)
SODIUM SERPL-SCNC: 141 MMOL/L (ref 136–145)
WBC # BLD AUTO: 10.3 10*3/MM3 (ref 3.4–10.8)
WBC # BLD AUTO: 10.38 10*3/MM3 (ref 3.4–10.8)
WBC # BLD AUTO: 11.11 10*3/MM3 (ref 3.4–10.8)

## 2021-01-01 PROCEDURE — C1876 STENT, NON-COA/NON-COV W/DEL: HCPCS | Performed by: RADIOLOGY

## 2021-01-01 PROCEDURE — 80048 BASIC METABOLIC PNL TOTAL CA: CPT | Performed by: PHYSICIAN ASSISTANT

## 2021-01-01 PROCEDURE — 75630 X-RAY AORTA LEG ARTERIES: CPT | Performed by: RADIOLOGY

## 2021-01-01 PROCEDURE — C1769 GUIDE WIRE: HCPCS | Performed by: RADIOLOGY

## 2021-01-01 PROCEDURE — 85025 COMPLETE CBC W/AUTO DIFF WBC: CPT | Performed by: PHYSICIAN ASSISTANT

## 2021-01-01 PROCEDURE — 82565 ASSAY OF CREATININE: CPT

## 2021-01-01 PROCEDURE — C1725 CATH, TRANSLUMIN NON-LASER: HCPCS | Performed by: RADIOLOGY

## 2021-01-01 PROCEDURE — 93880 EXTRACRANIAL BILAT STUDY: CPT

## 2021-01-01 PROCEDURE — 36223 PLACE CATH CAROTID/INOM ART: CPT | Performed by: RADIOLOGY

## 2021-01-01 PROCEDURE — 37215 TRANSCATH STENT CCA W/EPS: CPT | Performed by: RADIOLOGY

## 2021-01-01 PROCEDURE — C1760 CLOSURE DEV, VASC: HCPCS | Performed by: RADIOLOGY

## 2021-01-01 PROCEDURE — 99442 PR PHYS/QHP TELEPHONE EVALUATION 11-20 MIN: CPT | Performed by: PHYSICIAN ASSISTANT

## 2021-01-01 PROCEDURE — 82962 GLUCOSE BLOOD TEST: CPT

## 2021-01-01 PROCEDURE — C1894 INTRO/SHEATH, NON-LASER: HCPCS | Performed by: RADIOLOGY

## 2021-01-01 PROCEDURE — 83036 HEMOGLOBIN GLYCOSYLATED A1C: CPT | Performed by: INTERNAL MEDICINE

## 2021-01-01 PROCEDURE — 25010000002 MIDAZOLAM PER 1 MG: Performed by: RADIOLOGY

## 2021-01-01 PROCEDURE — 37221 PR REVSC OPN/PRQ ILIAC ART W/STNT PLMT & ANGIOPLSTY: CPT | Performed by: RADIOLOGY

## 2021-01-01 PROCEDURE — 037K3DZ DILATION OF RIGHT INTERNAL CAROTID ARTERY WITH INTRALUMINAL DEVICE, PERCUTANEOUS APPROACH: ICD-10-PCS | Performed by: RADIOLOGY

## 2021-01-01 PROCEDURE — 82947 ASSAY GLUCOSE BLOOD QUANT: CPT | Performed by: INTERNAL MEDICINE

## 2021-01-01 PROCEDURE — 047C3ZZ DILATION OF RIGHT COMMON ILIAC ARTERY, PERCUTANEOUS APPROACH: ICD-10-PCS | Performed by: RADIOLOGY

## 2021-01-01 PROCEDURE — 25010000002 HEPARIN (PORCINE) PER 1000 UNITS: Performed by: RADIOLOGY

## 2021-01-01 PROCEDURE — 75625 CONTRAST EXAM ABDOMINL AORTA: CPT | Performed by: RADIOLOGY

## 2021-01-01 PROCEDURE — 25010000002 FENTANYL CITRATE (PF) 100 MCG/2ML SOLUTION: Performed by: RADIOLOGY

## 2021-01-01 PROCEDURE — 0 IODIXANOL PER 1 ML: Performed by: RADIOLOGY

## 2021-01-01 PROCEDURE — 99214 OFFICE O/P EST MOD 30 MIN: CPT | Performed by: INTERNAL MEDICINE

## 2021-01-01 PROCEDURE — 25010000002 HYDROMORPHONE PER 4 MG: Performed by: PHYSICIAN ASSISTANT

## 2021-01-01 PROCEDURE — C1884 EMBOLIZATION PROTECT SYST: HCPCS | Performed by: RADIOLOGY

## 2021-01-01 PROCEDURE — S0260 H&P FOR SURGERY: HCPCS | Performed by: PHYSICIAN ASSISTANT

## 2021-01-01 PROCEDURE — 37223 PR REVSC OPN/PRQ ILIAC ART W/STNT & ANGIOP IPSILATL: CPT | Performed by: RADIOLOGY

## 2021-01-01 PROCEDURE — 85347 COAGULATION TIME ACTIVATED: CPT

## 2021-01-01 PROCEDURE — 93880 EXTRACRANIAL BILAT STUDY: CPT | Performed by: INTERNAL MEDICINE

## 2021-01-01 PROCEDURE — 36245 INS CATH ABD/L-EXT ART 1ST: CPT | Performed by: RADIOLOGY

## 2021-01-01 PROCEDURE — 25010000002 DIPHENHYDRAMINE PER 50 MG: Performed by: RADIOLOGY

## 2021-01-01 PROCEDURE — 25010000002 ONDANSETRON PER 1 MG: Performed by: PHYSICIAN ASSISTANT

## 2021-01-01 PROCEDURE — 75710 ARTERY X-RAYS ARM/LEG: CPT | Performed by: RADIOLOGY

## 2021-01-01 PROCEDURE — X2AH336 CEREBRAL EMBOLIC FILTRATION, EXTRACORPOREAL FLOW REVERSAL CIRCUIT FROM RIGHT COMMON CAROTID ARTERY, PERCUTANEOUS APPROACH, NEW TECHNOLOGY GROUP 6: ICD-10-PCS | Performed by: RADIOLOGY

## 2021-01-01 PROCEDURE — 99024 POSTOP FOLLOW-UP VISIT: CPT | Performed by: PHYSICIAN ASSISTANT

## 2021-01-01 DEVICE — STNT BIL VISIPRO .035 7F 37MM 80CM: Type: IMPLANTABLE DEVICE | Status: FUNCTIONAL

## 2021-01-01 DEVICE — XACT CAROTID STENT SYSTEM 10.0 MM X 40 MM TAPERED
Type: IMPLANTABLE DEVICE | Status: FUNCTIONAL
Brand: XACT

## 2021-01-01 DEVICE — STNT BIL VISIPRO .035 7F 27MM 80CM: Type: IMPLANTABLE DEVICE | Status: FUNCTIONAL

## 2021-01-01 DEVICE — STNT BIL VISIPRO .035 7F 57MM 80CM: Type: IMPLANTABLE DEVICE | Status: FUNCTIONAL

## 2021-01-01 RX ORDER — PHENYLEPHRINE HCL IN 0.9% NACL 0.5 MG/5ML
.5-3 SYRINGE (ML) INTRAVENOUS
Status: DISCONTINUED | OUTPATIENT
Start: 2021-01-01 | End: 2021-01-01 | Stop reason: HOSPADM

## 2021-01-01 RX ORDER — FENTANYL CITRATE 50 UG/ML
INJECTION, SOLUTION INTRAMUSCULAR; INTRAVENOUS AS NEEDED
Status: DISCONTINUED | OUTPATIENT
Start: 2021-01-01 | End: 2021-01-01 | Stop reason: HOSPADM

## 2021-01-01 RX ORDER — LIDOCAINE HYDROCHLORIDE 10 MG/ML
INJECTION, SOLUTION EPIDURAL; INFILTRATION; INTRACAUDAL; PERINEURAL AS NEEDED
Status: DISCONTINUED | OUTPATIENT
Start: 2021-01-01 | End: 2021-01-01 | Stop reason: HOSPADM

## 2021-01-01 RX ORDER — MIDAZOLAM HYDROCHLORIDE 1 MG/ML
INJECTION INTRAMUSCULAR; INTRAVENOUS AS NEEDED
Status: DISCONTINUED | OUTPATIENT
Start: 2021-01-01 | End: 2021-01-01 | Stop reason: HOSPADM

## 2021-01-01 RX ORDER — HYDROCODONE BITARTRATE AND ACETAMINOPHEN 7.5; 325 MG/1; MG/1
1 TABLET ORAL EVERY 8 HOURS PRN
Status: DISCONTINUED | OUTPATIENT
Start: 2021-01-01 | End: 2021-01-01 | Stop reason: HOSPADM

## 2021-01-01 RX ORDER — SODIUM CHLORIDE 0.9 % (FLUSH) 0.9 %
10 SYRINGE (ML) INJECTION AS NEEDED
Status: DISCONTINUED | OUTPATIENT
Start: 2021-01-01 | End: 2021-01-01 | Stop reason: HOSPADM

## 2021-01-01 RX ORDER — CLOPIDOGREL BISULFATE 75 MG/1
75 TABLET ORAL DAILY
Qty: 30 TABLET | Refills: 5 | Status: SHIPPED | OUTPATIENT
Start: 2021-01-01

## 2021-01-01 RX ORDER — CALCITRIOL 0.5 UG/1
0.5 CAPSULE, LIQUID FILLED ORAL DAILY
COMMUNITY
Start: 2021-01-01

## 2021-01-01 RX ORDER — SODIUM CHLORIDE 0.9 % (FLUSH) 0.9 %
3 SYRINGE (ML) INJECTION EVERY 12 HOURS SCHEDULED
Status: DISCONTINUED | OUTPATIENT
Start: 2021-01-01 | End: 2021-01-01

## 2021-01-01 RX ORDER — DIPHENHYDRAMINE HYDROCHLORIDE 50 MG/ML
INJECTION INTRAMUSCULAR; INTRAVENOUS AS NEEDED
Status: DISCONTINUED | OUTPATIENT
Start: 2021-01-01 | End: 2021-01-01 | Stop reason: HOSPADM

## 2021-01-01 RX ORDER — ONDANSETRON 2 MG/ML
4 INJECTION INTRAMUSCULAR; INTRAVENOUS ONCE
Status: COMPLETED | OUTPATIENT
Start: 2021-01-01 | End: 2021-01-01

## 2021-01-01 RX ORDER — CLOPIDOGREL BISULFATE 75 MG/1
75 TABLET ORAL NIGHTLY
Status: DISCONTINUED | OUTPATIENT
Start: 2021-01-01 | End: 2021-01-01 | Stop reason: HOSPADM

## 2021-01-01 RX ORDER — IODIXANOL 320 MG/ML
INJECTION, SOLUTION INTRAVASCULAR AS NEEDED
Status: DISCONTINUED | OUTPATIENT
Start: 2021-01-01 | End: 2021-01-01 | Stop reason: HOSPADM

## 2021-01-01 RX ORDER — PROCHLORPERAZINE 25 MG/1
1 SUPPOSITORY RECTAL TAKE AS DIRECTED
Qty: 1 EACH | Refills: 3 | Status: SHIPPED | OUTPATIENT
Start: 2021-01-01

## 2021-01-01 RX ORDER — SODIUM CHLORIDE 9 MG/ML
100 INJECTION, SOLUTION INTRAVENOUS CONTINUOUS
Status: CANCELLED | OUTPATIENT
Start: 2021-01-01

## 2021-01-01 RX ORDER — DEXTROSE MONOHYDRATE 25 G/50ML
25 INJECTION, SOLUTION INTRAVENOUS
Status: DISCONTINUED | OUTPATIENT
Start: 2021-01-01 | End: 2021-01-01 | Stop reason: HOSPADM

## 2021-01-01 RX ORDER — NICOTINE POLACRILEX 4 MG
15 LOZENGE BUCCAL
Status: DISCONTINUED | OUTPATIENT
Start: 2021-01-01 | End: 2021-01-01 | Stop reason: HOSPADM

## 2021-01-01 RX ORDER — DIPHENHYDRAMINE HCL 50 MG
50 CAPSULE ORAL NIGHTLY PRN
Status: DISCONTINUED | OUTPATIENT
Start: 2021-01-01 | End: 2021-01-01 | Stop reason: HOSPADM

## 2021-01-01 RX ORDER — PROCHLORPERAZINE 25 MG/1
1 SUPPOSITORY RECTAL ONCE
Qty: 1 EACH | Refills: 0 | Status: SHIPPED | OUTPATIENT
Start: 2021-01-01 | End: 2021-01-01

## 2021-01-01 RX ORDER — PANTOPRAZOLE SODIUM 40 MG/1
40 TABLET, DELAYED RELEASE ORAL
Status: DISCONTINUED | OUTPATIENT
Start: 2021-01-01 | End: 2021-01-01 | Stop reason: HOSPADM

## 2021-01-01 RX ORDER — HEPARIN SODIUM 1000 [USP'U]/ML
INJECTION, SOLUTION INTRAVENOUS; SUBCUTANEOUS AS NEEDED
Status: DISCONTINUED | OUTPATIENT
Start: 2021-01-01 | End: 2021-01-01 | Stop reason: HOSPADM

## 2021-01-01 RX ORDER — SODIUM CHLORIDE 0.9 % (FLUSH) 0.9 %
1-10 SYRINGE (ML) INJECTION AS NEEDED
Status: CANCELLED | OUTPATIENT
Start: 2021-01-01

## 2021-01-01 RX ORDER — SODIUM CHLORIDE 9 MG/ML
50 INJECTION, SOLUTION INTRAVENOUS CONTINUOUS
Status: CANCELLED | OUTPATIENT
Start: 2021-01-01

## 2021-01-01 RX ORDER — INSULIN GLARGINE 300 U/ML
80 INJECTION, SOLUTION SUBCUTANEOUS 2 TIMES DAILY
Qty: 16 PEN | Refills: 3 | Status: SHIPPED | OUTPATIENT
Start: 2021-01-01

## 2021-01-01 RX ORDER — SODIUM CHLORIDE 0.9 % (FLUSH) 0.9 %
3 SYRINGE (ML) INJECTION EVERY 12 HOURS SCHEDULED
Status: CANCELLED | OUTPATIENT
Start: 2021-01-01

## 2021-01-01 RX ORDER — CINACALCET 60 MG/1
60 TABLET, FILM COATED ORAL NIGHTLY
COMMUNITY

## 2021-01-01 RX ORDER — PROCHLORPERAZINE 25 MG/1
SUPPOSITORY RECTAL
Qty: 1 EACH | Refills: 3 | OUTPATIENT
Start: 2021-01-01

## 2021-01-01 RX ORDER — SODIUM CHLORIDE 9 MG/ML
75 INJECTION, SOLUTION INTRAVENOUS CONTINUOUS
Status: ACTIVE | OUTPATIENT
Start: 2021-01-01 | End: 2021-01-01

## 2021-01-01 RX ORDER — SODIUM CHLORIDE 9 MG/ML
50 INJECTION, SOLUTION INTRAVENOUS CONTINUOUS
Status: DISCONTINUED | OUTPATIENT
Start: 2021-01-01 | End: 2021-01-01

## 2021-01-01 RX ORDER — HYDROCODONE BITARTRATE AND ACETAMINOPHEN 7.5; 325 MG/1; MG/1
1 TABLET ORAL EVERY 8 HOURS PRN
Qty: 45 TABLET | Refills: 0 | Status: SHIPPED | OUTPATIENT
Start: 2021-01-01

## 2021-01-01 RX ORDER — SODIUM CHLORIDE 0.9 % (FLUSH) 0.9 %
3 SYRINGE (ML) INJECTION EVERY 12 HOURS SCHEDULED
Status: DISCONTINUED | OUTPATIENT
Start: 2021-01-01 | End: 2021-01-01 | Stop reason: HOSPADM

## 2021-01-01 RX ORDER — SODIUM CHLORIDE 0.9 % (FLUSH) 0.9 %
1-10 SYRINGE (ML) INJECTION AS NEEDED
Status: DISCONTINUED | OUTPATIENT
Start: 2021-01-01 | End: 2021-01-01

## 2021-01-01 RX ORDER — METOLAZONE 5 MG/1
5 TABLET ORAL DAILY
Status: DISCONTINUED | OUTPATIENT
Start: 2021-01-01 | End: 2021-01-01 | Stop reason: HOSPADM

## 2021-01-01 RX ORDER — SODIUM CHLORIDE 9 MG/ML
100 INJECTION, SOLUTION INTRAVENOUS CONTINUOUS
Status: DISCONTINUED | OUTPATIENT
Start: 2021-01-01 | End: 2021-01-01 | Stop reason: HOSPADM

## 2021-01-01 RX ORDER — PROCHLORPERAZINE 25 MG/1
1 SUPPOSITORY RECTAL DAILY
Qty: 1 EACH | Refills: 0 | Status: SHIPPED | OUTPATIENT
Start: 2021-01-01

## 2021-01-01 RX ORDER — ASPIRIN 81 MG/1
81 TABLET ORAL NIGHTLY
COMMUNITY

## 2021-01-01 RX ORDER — ALIROCUMAB 75 MG/ML
75 INJECTION, SOLUTION SUBCUTANEOUS
Qty: 2 ML | Refills: 11 | Status: SHIPPED | OUTPATIENT
Start: 2021-01-01

## 2021-01-01 RX ORDER — HYDROMORPHONE HYDROCHLORIDE 1 MG/ML
0.25 INJECTION, SOLUTION INTRAMUSCULAR; INTRAVENOUS; SUBCUTANEOUS
Status: DISCONTINUED | OUTPATIENT
Start: 2021-01-01 | End: 2021-01-01 | Stop reason: HOSPADM

## 2021-01-01 RX ORDER — PHENAZOPYRIDINE HYDROCHLORIDE 100 MG/1
200 TABLET, FILM COATED ORAL ONCE
Status: COMPLETED | OUTPATIENT
Start: 2021-01-01 | End: 2021-01-01

## 2021-01-01 RX ORDER — INSULIN LISPRO 200 [IU]/ML
50 INJECTION, SOLUTION SUBCUTANEOUS 3 TIMES DAILY
Qty: 25 PEN | Refills: 3 | Status: SHIPPED | OUTPATIENT
Start: 2021-01-01

## 2021-01-01 RX ORDER — CALCIUM ACETATE 667 MG/1
2668 CAPSULE ORAL
Status: DISCONTINUED | OUTPATIENT
Start: 2021-01-01 | End: 2021-01-01 | Stop reason: HOSPADM

## 2021-01-01 RX ORDER — PROCHLORPERAZINE 25 MG/1
SUPPOSITORY RECTAL
Qty: 3 EACH | Refills: 11 | Status: SHIPPED | OUTPATIENT
Start: 2021-01-01

## 2021-01-01 RX ORDER — DOXYCYCLINE HYCLATE 100 MG/1
100 CAPSULE ORAL 2 TIMES DAILY
COMMUNITY

## 2021-01-01 RX ORDER — ASPIRIN 81 MG/1
81 TABLET ORAL NIGHTLY
Status: DISCONTINUED | OUTPATIENT
Start: 2021-01-01 | End: 2021-01-01 | Stop reason: HOSPADM

## 2021-01-01 RX ORDER — FUROSEMIDE 40 MG/1
40 TABLET ORAL 2 TIMES DAILY
Status: DISCONTINUED | OUTPATIENT
Start: 2021-01-01 | End: 2021-01-01 | Stop reason: HOSPADM

## 2021-01-01 RX ADMIN — CALCIUM ACETATE 2668 MG: 667 CAPSULE ORAL at 17:58

## 2021-01-01 RX ADMIN — HYDROMORPHONE HYDROCHLORIDE 0.25 MG: 1 INJECTION, SOLUTION INTRAMUSCULAR; INTRAVENOUS; SUBCUTANEOUS at 13:57

## 2021-01-01 RX ADMIN — PANTOPRAZOLE SODIUM 40 MG: 40 TABLET, DELAYED RELEASE ORAL at 08:27

## 2021-01-01 RX ADMIN — SODIUM CHLORIDE 75 ML/HR: 9 INJECTION, SOLUTION INTRAVENOUS at 13:07

## 2021-01-01 RX ADMIN — SODIUM CHLORIDE, PRESERVATIVE FREE 3 ML: 5 INJECTION INTRAVENOUS at 08:28

## 2021-01-01 RX ADMIN — NICARDIPINE HYDROCHLORIDE 5 MG/HR: 0.1 INJECTION, SOLUTION INTRAVENOUS at 14:02

## 2021-01-01 RX ADMIN — SODIUM CHLORIDE 50 ML/HR: 9 INJECTION, SOLUTION INTRAVENOUS at 10:06

## 2021-01-01 RX ADMIN — CLOPIDOGREL BISULFATE 75 MG: 75 TABLET ORAL at 20:09

## 2021-01-01 RX ADMIN — ASPIRIN 81 MG: 81 TABLET, COATED ORAL at 20:09

## 2021-01-01 RX ADMIN — HYDROMORPHONE HYDROCHLORIDE 0.25 MG: 1 INJECTION, SOLUTION INTRAMUSCULAR; INTRAVENOUS; SUBCUTANEOUS at 20:57

## 2021-01-01 RX ADMIN — CALCIUM ACETATE 2668 MG: 667 CAPSULE ORAL at 08:27

## 2021-01-01 RX ADMIN — FUROSEMIDE 40 MG: 40 TABLET ORAL at 08:27

## 2021-01-01 RX ADMIN — HYDROMORPHONE HYDROCHLORIDE 0.25 MG: 1 INJECTION, SOLUTION INTRAMUSCULAR; INTRAVENOUS; SUBCUTANEOUS at 03:31

## 2021-01-01 RX ADMIN — HYDROMORPHONE HYDROCHLORIDE 0.25 MG: 1 INJECTION, SOLUTION INTRAMUSCULAR; INTRAVENOUS; SUBCUTANEOUS at 08:42

## 2021-01-01 RX ADMIN — ONDANSETRON 4 MG: 2 INJECTION INTRAMUSCULAR; INTRAVENOUS at 23:06

## 2021-01-01 RX ADMIN — HYDROMORPHONE HYDROCHLORIDE 0.25 MG: 1 INJECTION, SOLUTION INTRAMUSCULAR; INTRAVENOUS; SUBCUTANEOUS at 17:58

## 2021-01-01 RX ADMIN — SODIUM CHLORIDE, PRESERVATIVE FREE 3 ML: 5 INJECTION INTRAVENOUS at 20:09

## 2021-01-01 RX ADMIN — PANTOPRAZOLE SODIUM 40 MG: 40 TABLET, DELAYED RELEASE ORAL at 17:58

## 2021-01-01 RX ADMIN — METOLAZONE 5 MG: 5 TABLET ORAL at 08:27

## 2021-01-01 RX ADMIN — PHENAZOPYRIDINE 200 MG: 100 TABLET ORAL at 23:44

## 2021-01-01 RX ADMIN — FUROSEMIDE 40 MG: 40 TABLET ORAL at 17:58

## 2021-01-01 RX ADMIN — SODIUM CHLORIDE 100 ML/HR: 9 INJECTION, SOLUTION INTRAVENOUS at 07:41

## 2021-01-22 NOTE — TELEPHONE ENCOUNTER
Contacted patient because received fax from [employer] stating they would no longer cover Pralunt. Patient stated not to worry about the PA, Medicaid covers it.  She will let us know if anything changes.  She also asked for Lab orders. I will mail those out to her

## 2021-02-02 NOTE — TELEPHONE ENCOUNTER
I have left a voicemail with patient to call back to schedule a televisit with Sheila BOSCH. Please direct the call to me if she gets to another line.

## 2021-02-02 NOTE — TELEPHONE ENCOUNTER
Caller: KADEN CERVANTES     Relationship to patient: SELF    Best call back number: [phone]    Chief complaint: NEEDS TO FOLLOW UP ON PLAVIX, SHE IS CONCERNED BECAUSE SHE HAD A SMALL CUT RECENTLY THAT BLED FOR THREE DAYS     Type of visit: TELEVISIT    Requested date: ASAP    If rescheduling, when is the original appointment: N/A    Additional notes:THE PATIENT LAST HAD A TELEVISIT ON 12/18/20. SHE WAS ADVISED TO FOLLOW UP IN A MONTH. SHE WANTS ANOTHER TELEVISIT.

## 2021-02-03 PROBLEM — E66.01 MORBIDLY OBESE (HCC): Status: ACTIVE | Noted: 2021-01-01

## 2021-02-03 NOTE — PROGRESS NOTES
"NAME: KADEN LEON   DOS: 2/3/2021  : 1955  PCP: Provider, No Known  Type of Service: Appointment via telemedicine  You have chosen to receive care through a telehealth visit.  Do you consent to use a video/audio connection for your medical care today? Yes   Mode of Transmission: Telemedicine via 2 way interactive audio telecommunication    Chief Complaint:      History of Present Illness: Ms. Leon is a 65 y.o. female who is known to the neuro interventional service, having been followed for an asymptomatic, bilateral, carotid disease.  She was recently seen in clinic with carotid duplex that demonstrated marked interval progression of disease with noncritical stenosis of the distal cervical left common carotid artery and probable high-grade stenosis of the right internal carotid artery origin.  She denied any stroke or TIA-like symptoms, specifically unilateral weakness/numbness or changes in vision or speech.  Patient does have a notable past medical history for chronic kidney disease (on peritoneal dialysis) as well as severe hyperlipidemia (on Praluent).  Therefore, she was admitted to [facility] on 2020 and underwent a diagnostic angiography which confirmed critical stenosis of the left common carotid artery and high-grade stenosis of the right internal carotid artery.  Due to the patient's comorbidities, she was deemed a \"high surgical risk\".  Therefore, she was treated with angioplasty/stent placement, which restored a near normal caliber lumen and improve perfusion to the left cerebral hemisphere.  To note, angiography also confirmed right carotid stenosis (70%) and high-grade stenosis involving the left vertebral ostium, however, the left vertebral artery is relatively hypoplastic with robust flow to the intracranial vertebral basilar system with dominant/widely patent right vertebral artery.  She made an uneventful recovery in the neuro ICU and was discharged home at " her neurological baseline, does have a baseline tremor involving her head.    Patient has done well since her left carotid stent placement.  She notes that her groin incision has healed appropriately.  Denies any new TIA or strokelike symptoms, specifically unilateral weakness/numbness or changes in vision/speech.  Patient has remained on Plavix and aspirin 325mg.  She does note that she had a small clot that bled for approximately 3-4 days with having to change the bandage a few times each day.  Patient noted to concern due to this increased bleeding.  She is seen today in follow-up.      Past Medical History:   Diagnosis Date   • Acute bronchitis    • Acute pharyngitis    • Acute sinusitis    • Benign colonic polyp    • Chronic kidney disease     peritoneal dialysis   • Edema    • GERD (gastroesophageal reflux disease)    • Heart murmur    • Hiatal hernia    • Hyperkalemia    • Hyperlipidemia    • Hypertension    • Low back pain    • Lumbar disc disease    • MRSA (methicillin resistant Staphylococcus aureus)     Osteomyelitis/methicillin-resistant Staphylococcus aureus of the left foot, April 2013.   • Obesity    • Osteomyelitis (CMS/HCC)     Osteomyelitis/methicillin-resistant Staphylococcus aureus of the left foot, April 2013.   • Peptic ulceration 01/2013    History of peptic ulcer disease, diagnosed January 2013 by EGD.  Repeat EGD in August 2013 was negative.   • Sepsis (CMS/HCC)     Sepsis, April 2013, hospitalized at [facility].   • Tobacco use     Previous tobacco use with cessation in 2008.   • Type 2 diabetes mellitus (CMS/HCC)     Proteinuria noted, January 2014.     • Vitamin D deficiency        Past Surgical History:   Procedure Laterality Date   • AMPUTATION DIGIT Right 12/4/2018    Procedure: I&D right foot, great toe amputation and 1st Metatarsal amputation;  Surgeon: Chio Sterling MD;  Location: [facility];  Service: Orthopedics   • CAROTID STENT Left 12/11/2020     Procedure: Carotid Stent;  Surgeon: Chance Maldonado MD;  Location:  [facility];  Service: Interventional Radiology;  Laterality: Left;   •  SECTION      x2   • FOOT SURGERY Left     Incision and debridement of the left foot x2.   • INSERTION HEMODIALYSIS CATHETER N/A 2018    Procedure: GROSHONG CATHETER PLACEMENT;  Surgeon: Maribel May MD;  Location:  [facility];  Service: General   • LASIK     • TONSILLECTOMY               Review of Systems   All other systems reviewed and are negative.       Medications:    Current Outpatient Medications:   •  Alirocumab (Praluent) 75 MG/ML solution auto-injector, Inject 75 mg under the skin into the appropriate area as directed Every 14 (Fourteen) Days., Disp: 2 mL, Rfl: 5  •  aspirin 325 MG EC tablet, TAKE 1 TABLET BY MOUTH DAILY., Disp: 30 tablet, Rfl: 11  •  calcium acetate (PHOS BINDER,) 667 MG capsule capsule, , Disp: , Rfl:   •  clopidogrel (PLAVIX) 75 MG tablet, Take 1 tablet by mouth Daily., Disp: 30 tablet, Rfl: 5  •  Continuous Blood Gluc  (DEXCOM G6 ) device, 1 each Daily., Disp: 1 Device, Rfl: 0  •  Continuous Blood Gluc  (Dexcom G6 ) device, 1 each 1 (One) Time for 1 dose., Disp: 1 Device, Rfl: 0  •  Continuous Blood Gluc Sensor (Dexcom G6 Sensor), 1 each Take As Directed., Disp: 3 each, Rfl: 11  •  Continuous Blood Gluc Transmit (Dexcom G6 Transmitter) misc, 1 each Take As Directed., Disp: 1 each, Rfl: 3  •  furosemide (LASIX) 40 MG tablet, TAKE 1 TABLET TWICE DAILY, Disp: 60 tablet, Rfl: 1  •  Global Ease Inject Pen Needles 31G X 5 MM misc, USE 6 TIMES DAILY, Disp: 200 each, Rfl: 5  •  glucose blood (OneTouch Verio) test strip, Test Three Times Daily, Disp: 100 each, Rfl: 11  •  HYDROcodone-acetaminophen (NORCO) 7.5-325 MG per tablet, Take 1 tablet by mouth Every 8 (Eight) Hours As Needed., Disp: , Rfl:   •  Insulin Glargine, 2 Unit Dial, (Toujeo Max SoloStar) 300 UNIT/ML solution pen-injector  "injection, Inject 80 Units under the skin into the appropriate area as directed 2 (two) times a day., Disp: 6 pen, Rfl: 10  •  Insulin Lispro (HumaLOG KwikPen) 200 UNIT/ML solution pen-injector, Inject 50 Units under the skin into the appropriate area as directed 3 (Three) Times a Day., Disp: 10 pen, Rfl: 10  •  Iron-Vit C-Vit B12-Folic Acid (IRON 100 PLUS PO), Take  by mouth., Disp: , Rfl:   •  lansoprazole (PREVACID) 30 MG capsule, Take 30 mg by mouth Daily., Disp: , Rfl: 11  •  metOLazone (ZAROXOLYN) 5 MG tablet, Take 1-2 tablets by mouth Daily As Needed (dyspnea, weight gain)., Disp: , Rfl:   •  MOVANTIK 12.5 MG tablet, Take 12.5 mg by mouth., Disp: , Rfl:   •  NovoLOG FlexPen 100 UNIT/ML solution pen-injector sc pen, 50 Units Three Times Daily with meals, Disp: 20 pen, Rfl: 11  •  vitamin D (ERGOCALCIFEROL) 1.25 MG (04871 UT) capsule capsule, , Disp: , Rfl:     Allergies:  Allergies   Allergen Reactions   • Statins Myalgia   • Ancef [Cefazolin] Other (See Comments) and GI Intolerance     Tunnel vision.  (TOLERATES INVANZ)   • Bactrim [Sulfamethoxazole-Trimethoprim] Hives and Nausea And Vomiting   • Cephalosporins GI Intolerance   • Keflex [Cephalexin] Other (See Comments) and GI Intolerance     \"tunnel vision\"..   (pt tolerates INVANZ)   • Levaquin [Levofloxacin]    • Lipitor [Atorvastatin] Myalgia   • Lisinopril    • Losartan Potassium Other (See Comments)     Unknown reaction   • Oxycodone GI Intolerance   • Quinolones    • Cleocin [Clindamycin Hcl] Rash   • Clindamycin/Lincomycin Rash       Social History     Tobacco Use   • Smoking status: Former Smoker     Packs/day: 1.00     Years: 30.00     Pack years: 30.00     Types: Cigarettes   • Smokeless tobacco: Never Used   • Tobacco comment: quit 15 years ago   Substance Use Topics   • Alcohol use: Yes     Alcohol/week: 1.0 standard drinks     Types: 1 Glasses of wine per week     Comment: holidays   • Drug use: No       Family History   Problem Relation Age of " "Onset   • Diabetes Mother    • Heart disease Mother    • Hypertension Mother    • Diabetes Father    • Hypertension Father    • Breast cancer Neg Hx    • Ovarian cancer Neg Hx        Review of Imaging:  No new imaging to review  Did review patient's prior angiogram with Dr. Maldonado that was performed on 12/11/2020.  Patient has evidence of advanced atherosclerotic plaque at the right carotid bifurcation with heavily calcified plaque producing 70% stenosis of the right ICA.  There is also evidence of high-grade (90% stenosis involving the left vertebral artery origin; however, the right vertebral artery is dominant and supplies robust flow to the intracranial vertebrobasilar system.      There were no vitals filed for this visit.  There is no height or weight on file to calculate BMI.    Physical Exam  Neurologic Exam  Visit was done via telephone due to the COVID-19 epidemic.  Diagnoses/Plan:    Ms. Leon is a 65 y.o. female is seen today status post left carotid stent and known high-grade stenosis of the right carotid internal artery.  Had lengthy conversation with patient in regards to possible course of treatment including short-term conservative management due to concern of blood thinners versus intervention.  Patient decided she wanted to pursue carotid stent treatment.  Patient is a \"high surgical risk\" due to her comorbidities, therefore angioplasty/stent placement has been deemed the most optimal treatment rather than endarterectomy.  We have instructed patient to continue Plavix daily and aspirin.  Noted that she can decrease her aspirin from 325 mg to 81 mg.  We will tentatively schedule patient for a angiogram with stent placement in the upcoming week or so, with intent to treat her right carotid lesion/stenosis.  Risks and benefits were discussed with patient, including stroke, dissection, infection, pseudoaneurysm, etc., and she noted understanding and willing to proceed.  During the interim, patient " was instructed to contact her office and/or call [phone] if she experiences any stroke or TIA-like symptoms.  All questions and concerns were answered.      Patient's There is no height or weight on file to calculate BMI. BMI is above normal parameters. Recommendations include: educational material.          Sheila Rasheed PA-C    Televisit Start Time: 1230  Televisit End Time: 1250

## 2021-02-12 PROBLEM — I65.21 CAROTID STENOSIS, ASYMPTOMATIC, RIGHT: Status: ACTIVE | Noted: 2021-01-01

## 2021-02-12 NOTE — TELEPHONE ENCOUNTER
----- Message from Franchesca VALE MD sent at 2/11/2021 12:36 AM EST -----  I have received labs from Dr. Chase and your hemoglobin A1c is 6.7.  This indicates well-controlled diabetes

## 2021-03-12 NOTE — TELEPHONE ENCOUNTER
It was NOT stated WHAT medication she was in need of.  I do not see where any of her medications are requiring refills.  Patient has an appt on 4/8/21.  In the mean time Bushra will contact us if she needs refills.

## 2021-03-12 NOTE — TELEPHONE ENCOUNTER
Patient called to let us know that her insurance has updated and she needs us to send her prescriptions to [facility] in [place] for 90-day refills. Please advise.

## 2021-03-28 NOTE — OUTREACH NOTE
Prep Survey      Responses   [facility] facility patient discharged from?  [place]   Is LACE score < 7 ?  No   Emergency Room discharge w/ pulse ox?  No   Eligibility  Readm Mgmt   Discharge diagnosis  Carotid stenosis,  carotid stent   Does the patient have one of the following disease processes/diagnoses(primary or secondary)?  Other   Does the patient have Home health ordered?  No   Is there a DME ordered?  No   Prep survey completed?  Yes          Elena Ortiz RN

## 2021-03-29 NOTE — OUTREACH NOTE
Medical Week 1 Survey      Responses   [facility] patient discharged from?  [place]   Does the patient have one of the following disease processes/diagnoses(primary or secondary)?  Other   Week 1 attempt successful?  Yes   Call start time  1704   Unsuccessful attempts  Attempt 1   Call end time  1709   Discharge diagnosis  Carotid stenosis,  carotid stent   Meds reviewed with patient/caregiver?  Yes   Is the patient having any side effects they believe may be caused by any medication additions or changes?  No   Does the patient have all medications ordered at discharge?  N/A   Is the patient taking all medications as directed (includes completed medication regime)?  Yes   Does the patient have a primary care provider?   Yes   Comments regarding PCP  PCP was updated in [facility]. Appt with PCP in a couple weeks.    Has the patient kept scheduled appointments due by today?  N/A   Psychosocial issues?  No   Did the patient receive a copy of their discharge instructions?  Yes   Nursing interventions  Reviewed instructions with patient   What is the patient's perception of their health status since discharge?  Improving   Is the patient/caregiver able to teach back signs and symptoms related to disease process for when to call PCP?  Yes   Is the patient/caregiver able to teach back signs and symptoms related to disease process for when to call 911?  Yes   Is the patient/caregiver able to teach back the hierarchy of who to call/visit for symptoms/problems? PCP, Specialist, Home health nurse, Urgent Care, ED, 911  Yes   If the patient is a current smoker, are they able to teach back resources for cessation?  Not a smoker   Week 1 call completed?  Yes          Ramya Lam RN

## 2021-04-05 NOTE — OUTREACH NOTE
Medical Week 2 Survey      Responses   [facility] patient discharged from?  [place]   Does the patient have one of the following disease processes/diagnoses(primary or secondary)?  Other   Week 2 attempt successful?  Yes   Call start time  1033   Call end time  1035   Is the patient taking all medications as directed (includes completed medication regime)?  Yes   Week 2 Call Completed?  Yes   Wrap up additional comments  Pt reports doing well and incision has healed. Pt has no questions at this time.          Jamaica Pack RN

## 2021-04-08 NOTE — PROGRESS NOTES
Chief complaint  Diabetes (Follow Up:;; Needs all RX to be in 90 day supply per [facility] )    Subjective   Tashia Leon is a 65 y.o. female is here today for follow-up.  Diabetes mellitus type 2 diagnosed in mid 1990s.   Medications: Humalog and Toujeo.  Diabetic complications: Diabetic foot ulcer. Diab retinopathy. CKD - stable function.   Eye care: diabetic retinopathy by last exam, seeing eye dr regularly.  Podiatry visit - She underwent toe amputation in 1/2019, sees podiatrist regularly.   Patient was evaluated for the kidney transplant and went through testing. She is followed with nephrologist regularly. She is on peritoneal dialysis. Her glucose is higher with the dialysis. She changed insulin schedule according to the dialysis      Hyperlipidemia with high LDL. She had Side effects on statins . Doing well on Praluent, which was started Nov 2015, LDL <100.  Patient is taking Toujeo Max and Humalog U-200.  She has a high       Patient started peritoneal dialysis and doing well. She noted that glucose goes up during the dialysis. Otherwise swelling improved and she is doing better          Medications    Current Outpatient Medications:   •  Alirocumab (Praluent) 75 MG/ML solution auto-injector, Inject 75 mg under the skin into the appropriate area as directed Every 14 (Fourteen) Days., Disp: 2 mL, Rfl: 11  •  aspirin 81 MG EC tablet, Take 81 mg by mouth Every Night., Disp: , Rfl:   •  calcium acetate (PHOS BINDER,) 667 MG capsule capsule, Take  by mouth 3 (Three) Times a Day. 4 pills tid, Disp: , Rfl:   •  cinacalcet (SENSIPAR) 60 MG tablet, Take 60 mg by mouth Every Night., Disp: , Rfl:   •  clopidogrel (PLAVIX) 75 MG tablet, Take 1 tablet by mouth Daily. (Patient taking differently: Take 75 mg by mouth Every Night.), Disp: 30 tablet, Rfl: 5  •  Continuous Blood Gluc  (DEXCOM G6 ) device, 1 each Daily., Disp: 1 Device, Rfl: 0  •  Continuous Blood Gluc Sensor (Dexcom G6 Sensor), 1  each Take As Directed., Disp: 3 each, Rfl: 11  •  Continuous Blood Gluc Transmit (Dexcom G6 Transmitter) misc, 1 each Take As Directed., Disp: 1 each, Rfl: 3  •  furosemide (LASIX) 40 MG tablet, TAKE 1 TABLET TWICE DAILY (Patient taking differently: Take 40 mg by mouth 2 (Two) Times a Day.), Disp: 60 tablet, Rfl: 1  •  Global Ease Inject Pen Needles 31G X 5 MM misc, USE 6 TIMES DAILY, Disp: 200 each, Rfl: 5  •  glucose blood (OneTouch Verio) test strip, Test Three Times Daily, Disp: 100 each, Rfl: 11  •  HYDROcodone-acetaminophen (NORCO) 7.5-325 MG per tablet, Take 1 tablet by mouth Every 8 (Eight) Hours As Needed for Moderate Pain ., Disp: 45 tablet, Rfl: 0  •  Insulin Glargine, 2 Unit Dial, (Toujeo Max SoloStar) 300 UNIT/ML solution pen-injector injection, Inject 80 Units under the skin into the appropriate area as directed 2 (two) times a day., Disp: 16 pen, Rfl: 3  •  Insulin Lispro (HumaLOG KwikPen) 200 UNIT/ML solution pen-injector, Inject 50 Units under the skin into the appropriate area as directed 3 (Three) Times a Day., Disp: 25 pen, Rfl: 3  •  lansoprazole (PREVACID) 30 MG capsule, Take 30 mg by mouth Every Morning., Disp: , Rfl: 11  •  metOLazone (ZAROXOLYN) 5 MG tablet, Take 1-2 tablets by mouth Daily As Needed (dyspnea, weight gain). (Patient taking differently: Take 5 mg by mouth Daily.), Disp: , Rfl:   •  MOVANTIK 12.5 MG tablet, Take 12.5 mg by mouth As Needed., Disp: , Rfl:   •  NovoLOG FlexPen 100 UNIT/ML solution pen-injector sc pen, 50 Units Three Times Daily with meals, Disp: 20 pen, Rfl: 11  •  vitamin D (ERGOCALCIFEROL) 1.25 MG (12467 UT) capsule capsule, Take 50,000 Units by mouth Every 7 (Seven) Days., Disp: , Rfl:   •  calcitriol (ROCALTROL) 0.5 MCG capsule, Take 0.5 mcg by mouth Daily., Disp: , Rfl:   •  Continuous Blood Gluc  (Dexcom G6 ) device, 1 each 1 (One) Time for 1 dose., Disp: 1 Device, Rfl: 0    Review of systems  Review of Systems   Constitutional: Positive for  fatigue.   Cardiovascular: Positive for leg swelling.   Neurological: Positive for weakness.   All other systems reviewed and are negative.        Physical exam     Physical Exam   Physical Exam   Constitutional: She is oriented to person, place, and time.   Cardiovascular: Normal rate, regular rhythm, normal heart sounds and normal pulses.   Musculoskeletal: No swelling.   Neurological: She is alert and oriented to person, place, and time.   Psychiatric: Mood and thought content normal.         LABS AND IMAGING  Results for orders placed or performed in visit on 04/08/21   POC Glucose, Blood    Specimen: Blood   Result Value Ref Range    Glucose 195 (A) 70 - 130 mg/dL    Lot Number [id number]     Expiration Date 11/23/2021    POC Glycosylated Hemoglobin (Hb A1C)    Specimen: Blood   Result Value Ref Range    Hemoglobin A1C 6.4 %    Lot Number [id number]     Expiration Date 12/15/2022        Assessment  Assessment/Plan   Problems Addressed this Visit        Other    Hyperlipemia    Relevant Medications    Alirocumab (Praluent) 75 MG/ML solution auto-injector    Diabetic polyneuropathy associated with type 2 diabetes mellitus (CMS/[facility]) - Primary    Relevant Medications    Insulin Lispro (HumaLOG KwikPen) 200 UNIT/ML solution pen-injector    Insulin Glargine, 2 Unit Dial, (Toujeo Max SoloStar) 300 UNIT/ML solution pen-injector injection    Other Relevant Orders    POC Glucose, Blood (Completed)    POC Glycosylated Hemoglobin (Hb A1C) (Completed)    Lipid Panel      Diagnoses       Codes Comments    Diabetic polyneuropathy associated with type 2 diabetes mellitus (CMS/[facility])    -  Primary ICD-10-CM: E11.42  ICD-9-CM: 250.60, 357.2     Mixed hyperlipidemia     ICD-10-CM: E78.2  ICD-9-CM: 272.2           Plan  1.Diabetes mellitus type 2 with hx diabetic foot ulcer, retinopathy and ESCKD on peritoneal dialysis.  She is doing much better with the CGM and adjusts insulin based on glucose  Glucose went up when she was off the  CGM    Toujeo Max and Humalog U-200 .   She is taking Toujeo 40 units bid and Humalog 15-40 units three times a day. Additional correction insulin  is given, 2-10 units in addition to meal 50 Units three times a day .   CGM analyzed and data reviewed.     2. Hyperlipidemia  Continue Praluent every 2 weeks.  She had reaction to crestor, livalo and lipitor and severe peripheral vascular disease. No alternatives exist.       Medications refilled. Hypoglycemia precautions and insulin instructions reviewed.     Follow-up in 3-4 months.

## 2021-04-12 NOTE — OUTREACH NOTE
Medical Week 3 Survey      Responses   [facility] patient discharged from?  [place]   Does the patient have one of the following disease processes/diagnoses(primary or secondary)?  Other   Week 3 attempt successful?  No   Unsuccessful attempts  Attempt 1          Zahira Lehman RN

## 2021-04-14 NOTE — OUTREACH NOTE
Medical Week 3 Survey      Responses   [facility] patient discharged from?  [place]   Does the patient have one of the following disease processes/diagnoses(primary or secondary)?  Other   Week 3 attempt successful?  Yes   Call start time  0855   Call end time  0859   Discharge diagnosis  Carotid stenosis,  carotid stent   Is patient permission given to speak with other caregiver?  No   Meds reviewed with patient/caregiver?  Yes   Is the patient having any side effects they believe may be caused by any medication additions or changes?  No   Does the patient have all medications ordered at discharge?  Yes   Is the patient taking all medications as directed (includes completed medication regime)?  Yes   Does the patient have a primary care provider?   Yes   Does the patient have an appointment with their PCP within 7 days of discharge?  Greater than 7 days   What is preventing the patient from scheduling follow up appointments within 7 days of discharge?  Earlier appointment not available   Has the patient kept scheduled appointments due by today?  Yes   Comments  She had a telehealth visit.    Did the patient receive a copy of their discharge instructions?  Yes   Nursing interventions  Reviewed instructions with patient   What is the patient's perception of their health status since discharge?  Improving [She states she is fine, healing well. ]   Is the patient/caregiver able to teach back signs and symptoms related to disease process for when to call PCP?  Yes   Is the patient/caregiver able to teach back signs and symptoms related to disease process for when to call 911?  Yes   Is the patient/caregiver able to teach back the hierarchy of who to call/visit for symptoms/problems? PCP, Specialist, Home health nurse, Urgent Care, ED, 911  Yes   If the patient is a current smoker, are they able to teach back resources for cessation?  Not a smoker   Week 3 Call Completed?  Yes   Graduated  Yes   Did the patient  feel the follow up calls were helpful during their recovery period?  Yes   Was the number of calls appropriate?  Yes          Karen Bradshaw RN

## 2021-04-19 PROBLEM — I77.1 ILIAC ARTERY STENOSIS, BILATERAL (HCC): Status: ACTIVE | Noted: 2021-01-01

## 2021-05-06 NOTE — TELEPHONE ENCOUNTER
Jose GRIFFITHS who has been providing her Dexcom supplies, state that her Insurance has denied the claim and denied the appeal.  They spoke with the patient to let her know.  Patient is to speak with her Insurance company to see why it was denied.  She will be due for supplies May 21st. They just wanted us to know.  I will wait for advisement from patient to see what she found out.  I do know that [employer] is now denying claim if patient is NOT type 1

## 2021-05-07 NOTE — TELEPHONE ENCOUNTER
PATIENT IS CALLING STATING PHARMACY SENT US A REQUEST FOR PRIOR AUTH FOR DEXCOM.  PATIENT IS CALLING TO SEE IF WE RECEIVED IT AND THE STATUS ON PRIOR AUTH. PHONE NUMBER [phone]. PATIENT WOULD LIKE A CALL BACK TO DISCUSS WHY PRIOR AUTH IS NEEDED.

## 2021-05-07 NOTE — TELEPHONE ENCOUNTER
Returned patient call, advised that the call I received yesterday from Jose was that [employer] had denied the PA and appeal. Patient was to contact [employer] to see why.  I have sent to [facility] to see if they could process it for her.

## 2021-05-26 NOTE — TELEPHONE ENCOUNTER
PATIENT STATES THAT SHE RECEIVED HER DEXCOM TODAY. SHE ALSO STATES THAT THIS OFFICE WILL BE SENDING A PRIOR AUTH REQUEST FOR PRALUENT.

## 2021-05-27 NOTE — TELEPHONE ENCOUNTER
Patient notified that medication Plavix was refilled. Patient thankful for the call back.     E-Prescribing Status: Receipt confirmed by pharmacy (5/27/2021  4:14 PM EDT)

## 2021-05-27 NOTE — TELEPHONE ENCOUNTER
Provider:  Erica  Caller:  Automated refill request  Surgery: Peripheral angiography with bilateral stent   Surgery Date:  04/29/2021  Last visit: 04/12/2021    Next visit: 07/26/2021    Reason for call:  Automated refill request for Plavix.       Requested Prescriptions     Pending Prescriptions Disp Refills   • clopidogrel (PLAVIX) 75 MG tablet 30 tablet 5     Sig: Take 1 tablet by mouth Daily.

## 2021-05-27 NOTE — TELEPHONE ENCOUNTER
Caller: SILVERIO  Relationship: [facility]    Best call back number: [phone]    Medication needed:   Requested Prescriptions     Pending Prescriptions Disp Refills   • clopidogrel (PLAVIX) 75 MG tablet 30 tablet 5     Sig: Take 1 tablet by mouth Daily.       When do you need the refill by: ASAP    What additional details did the patient provide when requesting the medication: PT CHANGED TO [facility] AND THEY NEED A RX FOR THIS MEDICATION. SHE STATES THAT THE PT LIKE TO HAVE A 90 DAY SUPPLY.    Does the patient have less than a 3 day supply:  [] Yes  [x] No    What is the patient's preferred pharmacy:    [facility] -[id number]

## 2022-05-03 NOTE — PROGRESS NOTES
Reason for Call:  Form, our goal is to have forms completed with 72 hours, however, some forms may require a visit or additional information.    Type of letter, form or note:  medical    Who is the form from?: Home care    Where did the form come from: form was faxed in    What clinic location was the form placed at?: [facility]    Where the form was placed: [facility] Box/Folder    What number is listed as a contact on the form?: [phone]           Call taken on 5/3/2022 at 5:46 PM by Yue Gunderson         "   LOS: 5 days    Patient Care Team:  Angelica Tran PA as PCP - General (Internal Medicine)  Denver Baldwin MD as Consulting Physician (Nephrology)    Chief Complaint:  Diabetic foot ulcer    Subjective     Interval History:     No acute events overnight. No new complaints.     Review of Systems:   NO CP Or SOA. NO Abdominal discomfort.     Objective     Vital Sign Min/Max for last 24 hours  Temp  Min: 97.9 °F (36.6 °C)  Max: 98.2 °F (36.8 °C)   BP  Min: 106/42  Max: 148/61   Pulse  Min: 81  Max: 90   Resp  Min: 16  Max: 18   SpO2  Min: 95 %  Max: 98 %   No Data Recorded   No Data Recorded     Flowsheet Rows      First Filed Value   Admission Height  167.6 cm (66\") Documented at 11/26/2018 1315   Admission Weight  109 kg (240 lb) Documented at 11/26/2018 1315          No intake/output data recorded.  I/O last 3 completed shifts:  In: 1120 [P.O.:620; I.V.:500]  Out: -     Physical Exam:     General Appearance:    Alert, cooperative, in no acute distress   Head:    Normocephalic, without obvious abnormality, atraumatic               Neck:   No adenopathy, supple, trachea midline, no thyromegaly, no     carotid bruit, no JVD       Lungs:     Clear to auscultation,respirations regular, even and                   unlabored    Heart:    Regular rhythm and normal rate, normal S1 and S2, no            murmur, no gallop, no rub, no click       Abdomen:     Normal bowel sounds, no masses, no organomegaly, soft        non-tender, non-distended, no guarding, no rebound                 tenderness       Extremities:  Ulcer of the right foot with erythema noted. Trace edema.                Neurologic:   No focal deficit noted.        WBC WBC   Date Value Ref Range Status   11/30/2018 15.18 (H) 3.50 - 10.80 10*3/mm3 Final   11/29/2018 18.34 (H) 3.50 - 10.80 10*3/mm3 Final      HGB Hemoglobin   Date Value Ref Range Status   11/30/2018 9.3 (L) 11.5 - 15.5 g/dL Final   11/29/2018 10.3 (L) 11.5 - 15.5 g/dL Final      HCT " Hematocrit   Date Value Ref Range Status   11/30/2018 29.4 (L) 34.5 - 44.0 % Final   11/29/2018 31.2 (L) 34.5 - 44.0 % Final      Platlets No results found for: LABPLAT   MCV MCV   Date Value Ref Range Status   11/30/2018 95.8 80.0 - 99.0 fL Final   11/29/2018 93.7 80.0 - 99.0 fL Final          Sodium Sodium   Date Value Ref Range Status   12/01/2018 133 132 - 146 mmol/L Final   11/30/2018 132 132 - 146 mmol/L Final   11/29/2018 134 132 - 146 mmol/L Final      Potassium Potassium   Date Value Ref Range Status   12/01/2018 3.8 3.5 - 5.5 mmol/L Final   11/30/2018 3.4 (L) 3.5 - 5.5 mmol/L Final   11/29/2018 3.3 (L) 3.5 - 5.5 mmol/L Final      Chloride Chloride   Date Value Ref Range Status   12/01/2018 95 (L) 99 - 109 mmol/L Final   11/30/2018 94 (L) 99 - 109 mmol/L Final   11/29/2018 96 (L) 99 - 109 mmol/L Final      CO2 CO2   Date Value Ref Range Status   12/01/2018 25.0 20.0 - 31.0 mmol/L Final   11/30/2018 24.0 20.0 - 31.0 mmol/L Final   11/29/2018 24.0 20.0 - 31.0 mmol/L Final      BUN BUN   Date Value Ref Range Status   12/01/2018 87 (H) 9 - 23 mg/dL Final   11/30/2018 79 (H) 9 - 23 mg/dL Final   11/29/2018 82 (H) 9 - 23 mg/dL Final      Creatinine Creatinine   Date Value Ref Range Status   12/01/2018 4.38 (H) 0.60 - 1.30 mg/dL Final   11/30/2018 4.33 (H) 0.60 - 1.30 mg/dL Final   11/29/2018 4.29 (H) 0.60 - 1.30 mg/dL Final      Calcium Calcium   Date Value Ref Range Status   12/01/2018 8.2 (L) 8.7 - 10.4 mg/dL Final   11/30/2018 8.2 (L) 8.7 - 10.4 mg/dL Final   11/29/2018 8.7 8.7 - 10.4 mg/dL Final      PO4 No results found for: CAPO4   Albumin No results found for: ALBUMIN   Magnesium No results found for: MG   Uric Acid No results found for: URICACID        Results Review:     I reviewed the patient's new clinical results.      aspirin  mg Oral Daily   DAPTOmycin 6 mg/kg (Adjusted) Intravenous Q48H   diltiaZEM  mg Oral Nightly   docusate sodium 100 mg Oral Daily   ertapenem 500 mg Intravenous Q24H    ferrous sulfate 325 mg Oral BID With Meals   heparin (porcine) 5,000 Units Subcutaneous Q12H   HYDROcodone-acetaminophen 1 tablet Oral TID   insulin detemir 40 Units Subcutaneous Q12H   insulin lispro 0-14 Units Subcutaneous 4x Daily With Meals & Nightly   mupirocin  Topical Q24H   Naloxegol Oxalate 12.5 mg Oral Once per day on Sun Wed   pantoprazole 40 mg Oral QAM   sodium chloride 3 mL Intravenous Q12H       sodium chloride 75 mL/hr       Medication Review:     Assessment/Plan       Diabetic ulcer of right foot associated with type 2 diabetes mellitus (CMS/HCC)    Coronary artery disease involving native coronary artery of native heart without angina pectoris    Essential hypertension    Diabetic peripheral neuropathy (CMS/HCC)    Diabetes mellitus type 2, uncontrolled, with complications (CMS/HCC)    Hyperlipidemia    Chronic kidney disease, stage V (CMS/HCC)    Sepsis (CMS/[facility])    Cellulitis    Hypokalemia    Anemia, chronic disease    Obesity (BMI 30-39.9)      1- CKD stage IV - DN likely - Scr 3.5 to 4.0 range at baseline.UOP is adequate. Scr 4.38 today- worsening.   2- HTN - stable.   3- Proteinuria -DN   4- Anemia of chronic disease - stable.   5- Diabetic foot ulcer.   6- hypokalemia   7- Iron def.      Plan:  - will obtain UA, Urine lytes, CK total and uric acid level  -  1 lit Nsaline.   - Replete electrolytes.       I discussed the patients findings and my recommendations with patient          Dannie Luna MD  12/01/18  12:12 PM

## (undated) DEVICE — ST ACC MICROPUNCTURE .018 TRANSLSS/SS/TP 5F/10CM 21G/7CM

## (undated) DEVICE — SYR CONTRL LUERLOK 10CC

## (undated) DEVICE — INTRO SHEATH ART/FEM ENGAGE .038 5F12CM

## (undated) DEVICE — CATH DIAG EXPO .052 PIG 6F 110CM

## (undated) DEVICE — DECANT BG O JET

## (undated) DEVICE — CATH TEMPO 5F BER 100CM: Brand: TEMPO

## (undated) DEVICE — LIMB HOLDER, WRIST/ANKLE: Brand: DEROYAL

## (undated) DEVICE — PK EXTREM LOWR 10

## (undated) DEVICE — COVER,LIGHT HANDLE,FLX,1/PK: Brand: MEDLINE INDUSTRIES, INC.

## (undated) DEVICE — CONTAINER,SPECIMEN,OR STERILE,4OZ: Brand: MEDLINE

## (undated) DEVICE — Device

## (undated) DEVICE — SKIN AFFIX SURG ADHESIVE 72/CS 0.55ML: Brand: MEDLINE

## (undated) DEVICE — SKIN PREP TRAY W/CHG: Brand: MEDLINE INDUSTRIES, INC.

## (undated) DEVICE — SUT MNCRYL 2/0 SH 27IN UD MCP417H

## (undated) DEVICE — NDL HYPO ECLPS SFTY 18G 1 1/2IN

## (undated) DEVICE — LEX NEURO ANGIOGRAPHY: Brand: MEDLINE INDUSTRIES, INC.

## (undated) DEVICE — INTRO SHEATH ENGAGE W/50 GW .038 7F12

## (undated) DEVICE — ROTATING HEMOSTATIC VALVE .096": Brand: RHV

## (undated) DEVICE — ANGIO-SEAL VIP VASCULAR CLOSURE DEVICE: Brand: ANGIO-SEAL

## (undated) DEVICE — MICRO SAGITTAL BLADE OFFSET (5.5 X 0.38 X 29.6MM)

## (undated) DEVICE — SPNG GZ WOVN 4X4IN 12PLY 10/BX STRL

## (undated) DEVICE — PK MINOR SPLT 10

## (undated) DEVICE — CVR HNDL LT SURG ACCSSRY BLU STRL

## (undated) DEVICE — INTENDED FOR TISSUE SEPARATION, AND OTHER PROCEDURES THAT REQUIRE A SHARP SURGICAL BLADE TO PUNCTURE OR CUT.: Brand: BARD-PARKER ® SAFETYLOCK CARBON RIB-BACK BLADES

## (undated) DEVICE — VIATRAC 14 PLUS PERIPHERAL DILATATION CATHETER 7.0 MM X 30 MM X 135 CM: Brand: VIATRAC

## (undated) DEVICE — DEV INFL MONARCH 25W

## (undated) DEVICE — DRN PENRS 1/4X18IN LTX

## (undated) DEVICE — INTRO SHEATH FLX 7F80CM

## (undated) DEVICE — GUARDIAN LVC: Brand: GUARDIAN

## (undated) DEVICE — SUT SILK 3/0 TIES 18IN A184H

## (undated) DEVICE — APPL CHLORAPREP W/TINT 26ML BLU

## (undated) DEVICE — UNDERCAST PADDING: Brand: DEROYAL

## (undated) DEVICE — SUT ETHLN 4/0 PS2 18IN 1667H

## (undated) DEVICE — SUT MNCRYL PLS ANTIB UD 4/0 PS2 18IN

## (undated) DEVICE — GROSHONG 9.5F DUAL-LUMEN CV CATHETER WITH SURECUFF TISSUE INGROWTH CUFF INTERMEDIATE TRAY: Brand: GROSHONG

## (undated) DEVICE — BNDG ELAS ELITE V/CLOSE 4IN 5YD LF STRL

## (undated) DEVICE — SUT ETHLN 2/0 PS 18IN 585H

## (undated) DEVICE — DRSNG TELFA PAD NONADH STR 1S 3X8IN

## (undated) DEVICE — EMBOSHIELD NAV6 EMBOLIC PROTECTION SYSTEM 7.2 MM X 190 CM: Brand: EMBOSHIELD  NAV6

## (undated) DEVICE — NDL HYPO ECLPS SFTY 22G 1 1/2IN

## (undated) DEVICE — RADIFOCUS GLIDEWIRE: Brand: GLIDEWIRE

## (undated) DEVICE — GLV SURG TRIUMPH ORTHO W/ALOE PF LTX 7.5 STRL

## (undated) DEVICE — TB SXN FRAZIER 12F STRL

## (undated) DEVICE — C-ARM: Brand: UNBRANDED

## (undated) DEVICE — PAD,ABDOMINAL,8"X10",ST,LF: Brand: MEDLINE

## (undated) DEVICE — 3M(TM) TEGADERM(TM) IV TRANSPARENT FILM DRESSING WITH BORDER 1655: Brand: 3M™ TEGADERM™

## (undated) DEVICE — 1010 S-DRAPE TOWEL DRAPE 10/BX: Brand: STERI-DRAPE™

## (undated) DEVICE — SUCTION CANISTER, 2500CC, RIGID: Brand: DEROYAL

## (undated) DEVICE — UNDERGLV SURG BIOGEL INDICAT PF 61/2 GRN

## (undated) DEVICE — LONG SHEATH: Brand: AXS INFINITY LS

## (undated) DEVICE — LN INJ CONTRST FLXCIL HP F/M LL 1200PSI48

## (undated) DEVICE — VIATRAC 14 PLUS PERIPHERAL DILATATION CATHETER 6.0 MM X 20 MM X 135 CM: Brand: VIATRAC

## (undated) DEVICE — BALN EVERCROSS OTW .035 5F 6X40 135

## (undated) DEVICE — GLV SURG SIGNATURE TOUCH PF LTX 7 STRL

## (undated) DEVICE — SPNG GZ STRL 2S 4X4 12PLY

## (undated) DEVICE — HANDPIECE SET WITH HIGH FLOW TIP AND SUCTION TUBE: Brand: INTERPULSE